# Patient Record
Sex: MALE | Race: ASIAN | NOT HISPANIC OR LATINO
[De-identification: names, ages, dates, MRNs, and addresses within clinical notes are randomized per-mention and may not be internally consistent; named-entity substitution may affect disease eponyms.]

---

## 2017-08-23 ENCOUNTER — APPOINTMENT (OUTPATIENT)
Dept: ORTHOPEDIC SURGERY | Facility: CLINIC | Age: 72
End: 2017-08-23
Payer: COMMERCIAL

## 2017-08-23 VITALS
WEIGHT: 184 LBS | HEIGHT: 65 IN | SYSTOLIC BLOOD PRESSURE: 107 MMHG | DIASTOLIC BLOOD PRESSURE: 72 MMHG | HEART RATE: 76 BPM | BODY MASS INDEX: 30.66 KG/M2

## 2017-08-23 DIAGNOSIS — M17.12 UNILATERAL PRIMARY OSTEOARTHRITIS, LEFT KNEE: ICD-10-CM

## 2017-08-23 PROCEDURE — 73564 X-RAY EXAM KNEE 4 OR MORE: CPT | Mod: LT

## 2017-08-23 PROCEDURE — 99204 OFFICE O/P NEW MOD 45 MIN: CPT | Mod: 25

## 2017-08-23 PROCEDURE — 20610 DRAIN/INJ JOINT/BURSA W/O US: CPT | Mod: LT

## 2018-02-19 ENCOUNTER — INPATIENT (INPATIENT)
Facility: HOSPITAL | Age: 73
LOS: 3 days | Discharge: HOME | DRG: 810 | End: 2018-02-23
Attending: FAMILY MEDICINE | Admitting: INTERNAL MEDICINE
Payer: COMMERCIAL

## 2018-02-19 VITALS
OXYGEN SATURATION: 97 % | WEIGHT: 180.78 LBS | SYSTOLIC BLOOD PRESSURE: 116 MMHG | HEIGHT: 65 IN | DIASTOLIC BLOOD PRESSURE: 67 MMHG | TEMPERATURE: 99 F | RESPIRATION RATE: 19 BRPM | HEART RATE: 95 BPM

## 2018-02-19 DIAGNOSIS — D64.9 ANEMIA, UNSPECIFIED: ICD-10-CM

## 2018-02-19 DIAGNOSIS — Z29.9 ENCOUNTER FOR PROPHYLACTIC MEASURES, UNSPECIFIED: ICD-10-CM

## 2018-02-19 DIAGNOSIS — R06.09 OTHER FORMS OF DYSPNEA: ICD-10-CM

## 2018-02-19 DIAGNOSIS — I10 ESSENTIAL (PRIMARY) HYPERTENSION: ICD-10-CM

## 2018-02-19 DIAGNOSIS — E11.9 TYPE 2 DIABETES MELLITUS WITHOUT COMPLICATIONS: ICD-10-CM

## 2018-02-19 LAB
ABO RH CONFIRMATION: SIGNIFICANT CHANGE UP
ALBUMIN SERPL ELPH-MCNC: 3.3 G/DL — SIGNIFICANT CHANGE UP (ref 3.3–5)
ALP SERPL-CCNC: 136 U/L — HIGH (ref 40–120)
ALT FLD-CCNC: 19 U/L — SIGNIFICANT CHANGE UP (ref 12–78)
ANION GAP SERPL CALC-SCNC: 7 MMOL/L — SIGNIFICANT CHANGE UP (ref 5–17)
APTT BLD: 29.9 SEC — SIGNIFICANT CHANGE UP (ref 27.5–37.4)
AST SERPL-CCNC: 22 U/L — SIGNIFICANT CHANGE UP (ref 15–37)
BASOPHILS NFR BLD AUTO: 2 % — SIGNIFICANT CHANGE UP (ref 0–2)
BILIRUB SERPL-MCNC: 1.9 MG/DL — HIGH (ref 0.2–1.2)
BUN SERPL-MCNC: 26 MG/DL — HIGH (ref 7–23)
CALCIUM SERPL-MCNC: 8.1 MG/DL — LOW (ref 8.5–10.1)
CHLORIDE SERPL-SCNC: 110 MMOL/L — HIGH (ref 96–108)
CK MB CFR SERPL CALC: 1.7 NG/ML — SIGNIFICANT CHANGE UP (ref 0–3.6)
CO2 SERPL-SCNC: 25 MMOL/L — SIGNIFICANT CHANGE UP (ref 22–31)
CREAT SERPL-MCNC: 1.5 MG/DL — HIGH (ref 0.5–1.3)
EOSINOPHIL NFR BLD AUTO: 8 % — HIGH (ref 0–6)
GLUCOSE SERPL-MCNC: 134 MG/DL — HIGH (ref 70–99)
HCT VFR BLD CALC: 17.8 % — CRITICAL LOW (ref 39–50)
HGB BLD-MCNC: 6.2 G/DL — CRITICAL LOW (ref 13–17)
INR BLD: 1.21 RATIO — HIGH (ref 0.88–1.16)
LIDOCAIN IGE QN: 167 U/L — SIGNIFICANT CHANGE UP (ref 73–393)
LYMPHOCYTES # BLD AUTO: 27 % — SIGNIFICANT CHANGE UP (ref 13–44)
MAGNESIUM SERPL-MCNC: 2.2 MG/DL — SIGNIFICANT CHANGE UP (ref 1.6–2.6)
MCHC RBC-ENTMCNC: 35 GM/DL — SIGNIFICANT CHANGE UP (ref 32–36)
MCHC RBC-ENTMCNC: 35.9 PG — HIGH (ref 27–34)
MCV RBC AUTO: 102.7 FL — HIGH (ref 80–100)
MONOCYTES NFR BLD AUTO: 8 % — SIGNIFICANT CHANGE UP (ref 1–9)
NEUTROPHILS NFR BLD AUTO: 42 % — LOW (ref 43–77)
NT-PROBNP SERPL-SCNC: 95 PG/ML — SIGNIFICANT CHANGE UP (ref 0–125)
OB PNL STL: NEGATIVE — SIGNIFICANT CHANGE UP
PLATELET # BLD AUTO: 191 K/UL — SIGNIFICANT CHANGE UP (ref 150–400)
POTASSIUM SERPL-MCNC: 3.8 MMOL/L — SIGNIFICANT CHANGE UP (ref 3.5–5.3)
POTASSIUM SERPL-SCNC: 3.8 MMOL/L — SIGNIFICANT CHANGE UP (ref 3.5–5.3)
PROT SERPL-MCNC: 6.4 G/DL — SIGNIFICANT CHANGE UP (ref 6–8.3)
PROTHROM AB SERPL-ACNC: 13.2 SEC — HIGH (ref 9.8–12.7)
RBC # BLD: 1.74 M/UL — LOW (ref 4.2–5.8)
RBC # FLD: 18.8 % — HIGH (ref 10.3–14.5)
SODIUM SERPL-SCNC: 142 MMOL/L — SIGNIFICANT CHANGE UP (ref 135–145)
TROPONIN I SERPL-MCNC: <.015 NG/ML — SIGNIFICANT CHANGE UP (ref 0.01–0.04)
WBC # BLD: 7.9 K/UL — SIGNIFICANT CHANGE UP (ref 3.8–10.5)
WBC # FLD AUTO: 7.9 K/UL — SIGNIFICANT CHANGE UP (ref 3.8–10.5)

## 2018-02-19 PROCEDURE — 71045 X-RAY EXAM CHEST 1 VIEW: CPT | Mod: 26

## 2018-02-19 PROCEDURE — 99223 1ST HOSP IP/OBS HIGH 75: CPT | Mod: AI,GC

## 2018-02-19 PROCEDURE — 99255 IP/OBS CONSLTJ NEW/EST HI 80: CPT

## 2018-02-19 PROCEDURE — 86077 PHYS BLOOD BANK SERV XMATCH: CPT

## 2018-02-19 PROCEDURE — 99285 EMERGENCY DEPT VISIT HI MDM: CPT

## 2018-02-19 PROCEDURE — 93010 ELECTROCARDIOGRAM REPORT: CPT

## 2018-02-19 RX ORDER — SODIUM CHLORIDE 9 MG/ML
1000 INJECTION, SOLUTION INTRAVENOUS
Qty: 0 | Refills: 0 | Status: DISCONTINUED | OUTPATIENT
Start: 2018-02-19 | End: 2018-02-20

## 2018-02-19 RX ORDER — GLUCAGON INJECTION, SOLUTION 0.5 MG/.1ML
1 INJECTION, SOLUTION SUBCUTANEOUS ONCE
Qty: 0 | Refills: 0 | Status: DISCONTINUED | OUTPATIENT
Start: 2018-02-19 | End: 2018-02-20

## 2018-02-19 RX ORDER — DEXTROSE 50 % IN WATER 50 %
25 SYRINGE (ML) INTRAVENOUS ONCE
Qty: 0 | Refills: 0 | Status: DISCONTINUED | OUTPATIENT
Start: 2018-02-19 | End: 2018-02-20

## 2018-02-19 RX ORDER — DEXTROSE 50 % IN WATER 50 %
1 SYRINGE (ML) INTRAVENOUS ONCE
Qty: 0 | Refills: 0 | Status: DISCONTINUED | OUTPATIENT
Start: 2018-02-19 | End: 2018-02-20

## 2018-02-19 RX ORDER — ACETAMINOPHEN 500 MG
650 TABLET ORAL ONCE
Qty: 0 | Refills: 0 | Status: DISCONTINUED | OUTPATIENT
Start: 2018-02-19 | End: 2018-02-23

## 2018-02-19 RX ORDER — DEXTROSE 50 % IN WATER 50 %
12.5 SYRINGE (ML) INTRAVENOUS ONCE
Qty: 0 | Refills: 0 | Status: DISCONTINUED | OUTPATIENT
Start: 2018-02-19 | End: 2018-02-20

## 2018-02-19 RX ORDER — PANTOPRAZOLE SODIUM 20 MG/1
40 TABLET, DELAYED RELEASE ORAL DAILY
Qty: 0 | Refills: 0 | Status: DISCONTINUED | OUTPATIENT
Start: 2018-02-19 | End: 2018-02-21

## 2018-02-19 RX ORDER — LOSARTAN POTASSIUM 100 MG/1
50 TABLET, FILM COATED ORAL DAILY
Qty: 0 | Refills: 0 | Status: DISCONTINUED | OUTPATIENT
Start: 2018-02-19 | End: 2018-02-19

## 2018-02-19 RX ORDER — ACETAMINOPHEN 500 MG
650 TABLET ORAL ONCE
Qty: 0 | Refills: 0 | Status: DISCONTINUED | OUTPATIENT
Start: 2018-02-19 | End: 2018-02-19

## 2018-02-19 RX ORDER — INSULIN LISPRO 100/ML
VIAL (ML) SUBCUTANEOUS
Qty: 0 | Refills: 0 | Status: DISCONTINUED | OUTPATIENT
Start: 2018-02-19 | End: 2018-02-20

## 2018-02-19 RX ORDER — DIPHENHYDRAMINE HCL 50 MG
25 CAPSULE ORAL DAILY
Qty: 0 | Refills: 0 | Status: DISCONTINUED | OUTPATIENT
Start: 2018-02-19 | End: 2018-02-23

## 2018-02-19 RX ADMIN — PANTOPRAZOLE SODIUM 40 MILLIGRAM(S): 20 TABLET, DELAYED RELEASE ORAL at 19:58

## 2018-02-19 NOTE — ED PROVIDER NOTE - CARE PLAN
Principal Discharge DX:	Dyspnea on exertion Principal Discharge DX:	Dyspnea on exertion  Secondary Diagnosis:	Anemia, unspecified type

## 2018-02-19 NOTE — H&P ADULT - NSHPPHYSICALEXAM_GEN_ALL_CORE
Vital Signs  T(C): 37 (19 Feb 2018 13:36), Max: 37 (19 Feb 2018 13:36)  T(F): 98.6 (19 Feb 2018 13:36), Max: 98.6 (19 Feb 2018 13:36)  HR: 95 (19 Feb 2018 13:36) (95 - 95)  BP: 116/67 (19 Feb 2018 13:36) (116/67 - 116/67)  RR: 19 (19 Feb 2018 13:36) (19 - 19)  SpO2: 97% (19 Feb 2018 13:36) (97% - 97%) Vital Signs  T(C): 37 (19 Feb 2018 13:36), Max: 37 (19 Feb 2018 13:36)  T(F): 98.6 (19 Feb 2018 13:36), Max: 98.6 (19 Feb 2018 13:36)  HR: 95 (19 Feb 2018 13:36) (95 - 95)  BP: 116/67 (19 Feb 2018 13:36) (116/67 - 116/67)  RR: 19 (19 Feb 2018 13:36) (19 - 19)  SpO2: 97% (19 Feb 2018 13:36) (97% - 97%)    Physical Exam:  General: Well developed, well nourished, No Acute Distress  HEENT: +Dry mucous membranes, Normocephallic Atraumatic, PERRLA, EOMI bl  Neck: Supple, nontender, no mass  Neurology: AA&Ox3, CN II-XII grossly intact, sensation intact  Respiratory: Clear To Auscultation B/L, No Wheezes, rhonchi or rales  CV: Regular Rate and Rhythm, +S1/S2, no murmurs, rubs or gallops  Abdominal: Soft, Non-Tender, Non-Distended +Bowel Soundsx4  Extremities: 1+ pitting edema B/L LE, No Clubbing, cyanosis, + peripheral pulses  Musculoskeletal: Normal Range of motion, no joint erythema or warmth, no joint swelling   Skin: +Multiple Old well healing bruises on back from "cupping" treatment, warm, dry, normal color

## 2018-02-19 NOTE — H&P ADULT - NSHPOUTPATIENTPROVIDERS_GEN_ALL_CORE
PMD: Dr. Camacho, Cardio Dr. Casillas PMD: Dr. Camacho, Cardio Dr. Casillas ([facility])

## 2018-02-19 NOTE — H&P ADULT - NSHPREVIEWOFSYSTEMS_GEN_ALL_CORE
Constitutional: Denies fever, chills, weight loss, weight gain, diaphoresis   HEENT: Denies sore throat, runny nose, photophobia, blurry vision, double vision, eye pain  Respiratory: +Shortness of breath, + dyspnea on exertion, Denies cough, sputum production, wheezing, hemoptysis  Cardiovascular: Denies chest pain, palpitations, edema  Gastrointestinal: Denies nausea, vomiting, diarrhea, constipation, abdominal pain, melena, hematochezia   Genitourinary: Denies dysuria, hematuria, frequency, urgency, incontinence  Skin/Breast: Denies rash, hives, itching  Musculoskeletal: Denies muscle pains, muscle weakness, joint pain or swelling  Neurologic: +weakness, dizziness, Denies syncope, loss of consciousness, headache, paresthesias, numbness, tingling, confusion, dementia   Psychiatric: Denies feeling anxious, depressed, suicidal, or homicidal thoughts  Endocrine: Denies cold or heat intolerance, polydipsia, polyphagia   Hematology/Oncology: Denies abnormal bruising, tender or enlarged lymph nodes   ROS negative except as noted above

## 2018-02-19 NOTE — H&P ADULT - PROBLEM SELECTOR PLAN 1
With elevated BUN and recent increased use of NSAIDs, likely upper GI bleed  -EGD Tomorrow with Dr. Forman  -f/u FOBT  -give 2 units PRBC  -f/u repeat H/H  -IV Protonix 40 BID  -NPO after midnight  -hold ASA 81 (no history of stent, for primary cardiac prevention) With elevated BUN and recent increased use of NSAIDs, likely upper GI bleed  -EGD Tomorrow with Dr. Forman  -f/u FOBT  -give 2 units PRBC  -f/u repeat H/H  -IV Protonix 40 daily  -NPO after midnight  -hold ASA 81 (no history of stent, for primary cardiac prevention)  -Out of bed to chair with assistance

## 2018-02-19 NOTE — H&P ADULT - HISTORY OF PRESENT ILLNESS
73M PMHx DM type 2, HTN presenting with .... worsening SOB and dyspnea on exertion x 2 weeks. Patient now unable to walk more than 15 minute without having to stop.     In the ED HR 95, T 98.6F, /67, SpO2 97%, RR 19. Hg 6.2, .7, BUN 26, Cr 1.5, total Bili 1.9, cardiac enzymes negative x1, proBNP within normal. 2 units PRBC ordered, CXR and EKG ordered..... GI (Dr. Forman) and Cardio (Dr. Marcelino) consulted. 73M PMHx DM type 2, HTN presenting with worsening SOB and dyspnea on exertion x 2 weeks. Patient now unable to walk more than 15 minute without having to stop. States he threw his back out 3 weeks ago, was taking 20mg Advil TID for 4 days, given muscle relaxer, and pain resolved. 2 weeks ago had 3 days of diarrhea, told it was gastroenteritis, denies blood, but unsure if dark in color. The dyspnea has gotten to the point where he cannot walk up 2 stairs without feeling tired, 1 month ago was able to run a 5k without difficulty.  Denies nausea, vomiting, loss of appetite, active bleeding, chest pain, palpitations.     In the ED HR 95, T 98.6F, /67, SpO2 97%, RR 19. Hg 6.2, .7, BUN 26, Cr 1.5, total Bili 1.9, cardiac enzymes negative x1, proBNP within normal. 2 units PRBC ordered, CXR prelim negative and EKG showing HR 88, normal sinus rhythm. GI (Dr. Forman) and Cardio (Dr. Marcelino) consulted. 73M PMHx DM type 2, HTN presenting with worsening SOB and dyspnea on exertion x 2 weeks. Patient now unable to walk more than 15 minute without having to stop. States he threw his back out 3 weeks ago, was taking 20mg Advil TID for 4 days, given muscle relaxer, and pain resolved. 2 weeks ago had 3 days of diarrhea, told it was gastroenteritis, denies blood, but unsure if dark in color. The dyspnea has gotten to the point where he cannot walk up 2 steps without feeling tired, 1 month ago was able to run a 5k without difficulty.  Denies nausea, vomiting, loss of appetite, active bleeding, chest pain, palpitations.     In the ED HR 95, T 98.6F, /67, SpO2 97%, RR 19. Hg 6.2, .7, BUN 26, Cr 1.5, total Bili 1.9, cardiac enzymes negative x1, proBNP within normal. 2 units PRBC ordered, CXR prelim negative and EKG showing HR 88, normal sinus rhythm. GI (Dr. Forman) and Cardio (Dr. Marcelino) consulted.

## 2018-02-19 NOTE — ED ADULT NURSE NOTE - NS ED NURSE REPORT GIVEN TO FT
Izzy BARNES on tele Izzy BARNES on tele who made aware that the pt has antibodies, blood will be coming from an outside blood bank.

## 2018-02-19 NOTE — H&P ADULT - PROBLEM SELECTOR PLAN 2
Likely 2/2 anemia (Hg 6.2)  -history of LE edema on Lasix, obtain echo (last echo 12/2016 per patient)  -Hold lasix in setting of acute blood loss  -f/u Cardio (Dr. Marcelino) consult Likely 2/2 anemia (Hg 6.2)  -history of LE edema on Lasix, obtain echo (last echo 12/2016 per patient)  -per patient had nuclear stress test 1 year ago, unremarkable  -Hold lasix in setting of acute blood loss  -f/u Cardio (Dr. Marcelino) consult Likely 2/2 anemia (Hg 6.2)  -history of LE edema on Lasix, obtain echo (last echo 12/2016 per patient)  -per patient had nuclear stress test 1 year ago, unremarkable  -Hold lasix in setting of IRMA, unknown LVF  -f/u Cardio (Dr. Marcelino) consult

## 2018-02-19 NOTE — CONSULT NOTE ADULT - ASSESSMENT
73M PMHx DM type 2, HTN, lower ext edema presenting with symptomatic anemia.   - He clearly is symptomatic from profound anemia.   - Tx PRBC to maintain H/H >8/24  - hold all antiplatelets or AC  - Will need urgent EGD. Currently no active cardiac conditions. No signs of ischemia, ADHF, clinical exam not consistent with severe stenotic valvular disease, no unstable arrhythmias noted. Therefore able to proceed with  EGD. Routine hemodynamic monitoring is suggested during the procedure.   -  IRMA. Hold lasix and losartan  - No signs of significant ischemia. EKG without sig ischemic changes  - trace edema is chronic. At some point can check a 2d echo  - Monitor and replete electrolytes. Keep K>4.0 and Mg>2.0.   - Further cardiac workup will depend on clinical course.   - All other workup per primary team. Will followup.

## 2018-02-19 NOTE — ED ADULT NURSE NOTE - ED STAT RN HANDOFF DETAILS
Spoke with the blood bank re blood, pt has antibodies, blood not given yet. Blood bank spoke with Dr. Hagan re: antibodies in blood.

## 2018-02-19 NOTE — H&P ADULT - PROBLEM SELECTOR PLAN 3
Controlled with diet and exercise  -when eating will give Carb consist DASH/TLC diet  -f/u HbA1c  -Accuchecks with low dose insulin sliding scale

## 2018-02-19 NOTE — H&P ADULT - NSHPSOCIALHISTORY_GEN_ALL_CORE
Social History:    Marital Status:   Occupation:   Lives with:     Substance Use :  Tobacco Usage:  (   ) never smoked   (   ) former smoker   (   ) current smoker  (     ) pack year  (        ) last tobacco use date  Alcohol Usage:      Health Management     For male:  Last prostate exam:          [  ] date:            (  ) findings      Immunization Hx:   (  ) flu shot                               (     ) date   (  ) pneumonia shot               (     ) date  (  ) tetanus                               (     ) date     (     ) Advanced Directives: (     ) declined   [  ] health care proxy Social History:    Marital Status:   Occupation:   Lives with: Wife    Health Management    For male:  Last prostate exam:    2017 ANNABELLE Within normal limits    Colonoscopy: 2014, unremarkable

## 2018-02-19 NOTE — ED PROVIDER NOTE - OBJECTIVE STATEMENT
74 yo male hx of DM, HTN c/o shortness of breath, dyspnea on exertion x 2 weeks, getting progressively worse.  [place] without having to stop to catch his breath. No chest pain.  PMD Dr. Camacho.  Cards Dr. Casillas.

## 2018-02-19 NOTE — H&P ADULT - ASSESSMENT
73M PMHx DM type 2, HTN presenting with worsening SOB and dyspnea admitted with acute symptomatic anemia, rule out GI Bleed.

## 2018-02-19 NOTE — H&P ADULT - PROBLEM SELECTOR PLAN 5
IMPROVE VTE Individual Risk Assessment          RISK                                                          Points  [  ] Previous VTE                                                3  [  ] Thrombophilia                                             2  [  ] Lower limb paralysis                                   2        (unable to hold up >15 seconds)    [  ] Current Cancer                                             2         (within 6 months)  [  ] Immobilization > 24 hrs                              1  [  ] ICU/CCU stay > 24 hours                             1  [ 1 ] Age > 60                                                         1    IMPROVE VTE Score: 1  In setting of anemia no pharmacologic anticoagulation, SCDs

## 2018-02-19 NOTE — ED ADULT NURSE NOTE - OBJECTIVE STATEMENT
Pt presents to the ED c/o SOB with exertion, denies chest pain. EKG done, pt placed on cardiac monitor

## 2018-02-19 NOTE — H&P ADULT - PROBLEM SELECTOR PLAN 4
-Continue Losartan with hold parameters -Continue Losartan with hold parameters, Hold for SBP<100 per Dr. Hagan -Hold Losartan and Lasix in setting of IRMA

## 2018-02-19 NOTE — H&P ADULT - ATTENDING COMMENTS
73M PMHx DM type 2, HTN presenting with worsening SOB and dyspnea admitted with acute symptomatic anemia, rule out GI Bleed.   Pt will receive 2 units of PRBCs, She has multiple antibodies and having cross reactions as per blood bank.  May give most compatible blood with caution and premedication. monitor H/H and NPO for EGD in AM. 73M PMHx DM type 2, HTN presenting with worsening SOB and dyspnea admitted with acute symptomatic anemia, rule out GI Bleed.   Pt will receive 2 units of PRBCs, She has multiple antibodies and having cross reactions as per blood bank.  May give most compatible blood with caution and premedication. monitor H/H and NPO for EGD in AM.  Dr. Camacho was informed about admission

## 2018-02-20 ENCOUNTER — TRANSCRIPTION ENCOUNTER (OUTPATIENT)
Age: 73
End: 2018-02-20

## 2018-02-20 ENCOUNTER — RESULT REVIEW (OUTPATIENT)
Age: 73
End: 2018-02-20

## 2018-02-20 DIAGNOSIS — N17.9 ACUTE KIDNEY FAILURE, UNSPECIFIED: ICD-10-CM

## 2018-02-20 LAB
ANION GAP SERPL CALC-SCNC: 8 MMOL/L — SIGNIFICANT CHANGE UP (ref 5–17)
BASOPHILS # BLD AUTO: 0.1 K/UL — SIGNIFICANT CHANGE UP (ref 0–0.2)
BASOPHILS NFR BLD AUTO: 1.3 % — SIGNIFICANT CHANGE UP (ref 0–2)
BUN SERPL-MCNC: 23 MG/DL — SIGNIFICANT CHANGE UP (ref 7–23)
CALCIUM SERPL-MCNC: 8.2 MG/DL — LOW (ref 8.5–10.1)
CHLORIDE SERPL-SCNC: 112 MMOL/L — HIGH (ref 96–108)
CO2 SERPL-SCNC: 23 MMOL/L — SIGNIFICANT CHANGE UP (ref 22–31)
CREAT SERPL-MCNC: 1.3 MG/DL — SIGNIFICANT CHANGE UP (ref 0.5–1.3)
EOSINOPHIL # BLD AUTO: 0.4 K/UL — SIGNIFICANT CHANGE UP (ref 0–0.5)
EOSINOPHIL NFR BLD AUTO: 5.8 % — SIGNIFICANT CHANGE UP (ref 0–6)
FERRITIN SERPL-MCNC: 1462 NG/ML — HIGH (ref 30–400)
GLUCOSE SERPL-MCNC: 106 MG/DL — HIGH (ref 70–99)
HAPTOGLOB SERPL-MCNC: <20 MG/DL — LOW (ref 34–200)
HBA1C BLD-MCNC: 5.3 % — SIGNIFICANT CHANGE UP (ref 4–5.6)
HCT VFR BLD CALC: 22.7 % — LOW (ref 39–50)
HCT VFR BLD CALC: 24 % — LOW (ref 39–50)
HGB BLD-MCNC: 7.9 G/DL — LOW (ref 13–17)
HGB BLD-MCNC: 8.2 G/DL — LOW (ref 13–17)
IRON SATN MFR SERPL: 43 % — SIGNIFICANT CHANGE UP (ref 16–55)
IRON SATN MFR SERPL: 96 UG/DL — SIGNIFICANT CHANGE UP (ref 45–165)
LDH SERPL L TO P-CCNC: 381 U/L — HIGH (ref 50–242)
LYMPHOCYTES # BLD AUTO: 2.2 K/UL — SIGNIFICANT CHANGE UP (ref 1–3.3)
LYMPHOCYTES # BLD AUTO: 29 % — SIGNIFICANT CHANGE UP (ref 13–44)
MCHC RBC-ENTMCNC: 35 GM/DL — SIGNIFICANT CHANGE UP (ref 32–36)
MCHC RBC-ENTMCNC: 35.3 PG — HIGH (ref 27–34)
MCV RBC AUTO: 100.9 FL — HIGH (ref 80–100)
MONOCYTES # BLD AUTO: 1.2 K/UL — HIGH (ref 0–0.9)
MONOCYTES NFR BLD AUTO: 15.8 % — HIGH (ref 1–9)
NEUTROPHILS # BLD AUTO: 3.7 K/UL — SIGNIFICANT CHANGE UP (ref 1.8–7.4)
NEUTROPHILS NFR BLD AUTO: 48.1 % — SIGNIFICANT CHANGE UP (ref 43–77)
PLATELET # BLD AUTO: 191 K/UL — SIGNIFICANT CHANGE UP (ref 150–400)
POTASSIUM SERPL-MCNC: 3.8 MMOL/L — SIGNIFICANT CHANGE UP (ref 3.5–5.3)
POTASSIUM SERPL-SCNC: 3.8 MMOL/L — SIGNIFICANT CHANGE UP (ref 3.5–5.3)
RBC # BLD: 2.25 M/UL — LOW (ref 4.2–5.8)
RBC # FLD: 17.9 % — HIGH (ref 10.3–14.5)
SODIUM SERPL-SCNC: 143 MMOL/L — SIGNIFICANT CHANGE UP (ref 135–145)
TIBC SERPL-MCNC: 222 UG/DL — SIGNIFICANT CHANGE UP (ref 220–430)
UIBC SERPL-MCNC: 126 UG/DL — SIGNIFICANT CHANGE UP (ref 110–370)
WBC # BLD: 7.7 K/UL — SIGNIFICANT CHANGE UP (ref 3.8–10.5)
WBC # FLD AUTO: 7.7 K/UL — SIGNIFICANT CHANGE UP (ref 3.8–10.5)

## 2018-02-20 PROCEDURE — 99233 SBSQ HOSP IP/OBS HIGH 50: CPT | Mod: GC

## 2018-02-20 PROCEDURE — 99232 SBSQ HOSP IP/OBS MODERATE 35: CPT

## 2018-02-20 PROCEDURE — 88312 SPECIAL STAINS GROUP 1: CPT | Mod: 26

## 2018-02-20 PROCEDURE — 93010 ELECTROCARDIOGRAM REPORT: CPT

## 2018-02-20 PROCEDURE — 88305 TISSUE EXAM BY PATHOLOGIST: CPT | Mod: 26

## 2018-02-20 RX ADMIN — PANTOPRAZOLE SODIUM 40 MILLIGRAM(S): 20 TABLET, DELAYED RELEASE ORAL at 11:56

## 2018-02-20 RX ADMIN — Medication 25 MILLIGRAM(S): at 00:42

## 2018-02-20 NOTE — PROGRESS NOTE ADULT - PROBLEM SELECTOR PLAN 4
PT has diet and exercise controlled DM, HBA1c 5.3  Continue Accuchecks with low dose insulin sliding scale

## 2018-02-20 NOTE — PROGRESS NOTE ADULT - ASSESSMENT
73M PMHx DM type 2, HTN and KIRTI presenting with worsening SOB and LLAMAS x 2 weeks, admitted for acute symptomatic anemia.   Pt currently stable with no signs of active severe GIB.

## 2018-02-20 NOTE — PROGRESS NOTE ADULT - PROBLEM SELECTOR PLAN 1
-s/p 2U PRBC  -moderate response  -endosopy today with Dasia  -cleared by cardio for endo  -medically optimized for endoscopy  -will check rpt H/H @1800  -1U PRBC ordered for transfusion if Hg <7 -s/p 2U PRBC possible r/o  gi bleed   -endosopy today with Dasia  -cleared by cardio for endo  -medically optimized for endoscopy  -will check rpt H/H @1800  -1U PRBC ordered for transfusion if Hg <7

## 2018-02-20 NOTE — PROGRESS NOTE ADULT - PROBLEM SELECTOR PLAN 1
Pt s/p 2 units prbcs  R/O GIB, pt for endoscopy with Dr. Forman, F/U any GI recs  Pt NPO after midnight  Hold ASA  Repeat CBC for H/H in am re-transfuse 1 unit PRBCs for HB < 7

## 2018-02-20 NOTE — CHART NOTE - NSCHARTNOTEFT_GEN_A_CORE
Called by RN to evaluate Pt experiencing intermittent chest discomfort.  Pt seen resting comfortably in bed, States he's had intermittent episodes of chest tightness lasting a few minutes.  Denies SOB, diaphoresis or numbness or tingling.  Pt states hes been thinking a lot about his condition and appears to be anxious.    General: Anxious  Neurology: A&Ox3, nonfocal   Respiratory: CTA B/L, No W/R/R  CV: RRR, +S1/S2, no murmurs, rubs or gallops  Abdominal: Soft, NT, ND +BSx4  Skin: warm, dry, normal color    A/P:  -Likely anxiety induced chest discomfort, R/O ACS  -Pt refusing anxiolytics  -EKG STAT shows NSR  -CE now and 6am  -RN to notify if any further changes Called by RN to evaluate Pt experiencing intermittent chest discomfort.  Pt seen resting comfortably in bed, States he's had intermittent episodes of chest tightness lasting a few minutes.  Denies SOB, diaphoresis or numbness or tingling.  Pt states hes been thinking a lot about his condition and appears to be anxious.    Vital Signs Last 24 Hrs  T(C): 37.1 (21 Feb 2018 00:11), Max: 37.2 (20 Feb 2018 20:03)  T(F): 98.8 (21 Feb 2018 00:11), Max: 98.9 (20 Feb 2018 20:03)  HR: 87 (21 Feb 2018 00:11) (68 - 88)  BP: 139/77 (21 Feb 2018 00:11) (97/59 - 139/77)  BP(mean): --  RR: 18 (21 Feb 2018 00:11) (12 - 20)  SpO2: 96% (21 Feb 2018 00:11) (96% - 100%)    General: Anxious  Neurology: A&Ox3, nonfocal   Respiratory: CTA B/L, No W/R/R  CV: RRR, +S1/S2, no murmurs, rubs or gallops  Abdominal: Soft, NT, ND +BSx4  Skin: warm, dry, normal color    A/P:  -Likely anxiety induced chest discomfort, R/O ACS  -Pt refusing anxiolytics  -EKG STAT shows NSR  -CE now and 6am  -RN to notify if any further changes

## 2018-02-20 NOTE — PROVIDER CONTACT NOTE (EICU) - BACKGROUND
72yo who presented with 2 wk history of LLAMAS on workup found to be anemic with Hbg 6.  Stable hemodynamics but needs ICU level of care for frequent vital sign check due to risk of transfusion reaction due to + blood antibodies.

## 2018-02-20 NOTE — PROGRESS NOTE ADULT - SUBJECTIVE AND OBJECTIVE BOX
DIONICIO SULLIVAN  73y  Male      Patient is a 73y old  Male who presents with a chief complaint of SOB, palpitations and fatigue. He presented to the the ED and found to have Hb of 6.2.    INTERVAL HPI/OVERNIGHT EVENTS: PT stable overnight, sinus rhythm on telemetry, S/P 2 units PRBCs.         REVIEW OF SYSTEMS:  CONSTITUTIONAL: Denies fever, no dizziness, admits to fatigue  EYES: No visual disturbances  ENMT:  No difficulty hearing   RESPIRATORY: No cough, No current shortness of breath  CARDIOVASCULAR: Denies chest pain, no current palpitations, no dizziness, has stable leg swelling  GASTROINTESTINAL: No abdominal or epigastric pain. No nausea, vomiting, or hematemesis; Had diarrhea last week 2/2 food poisoning had 2 days of loose watery stools. Denies h/o constipation. Does not pay attention to color of stools but denies noticing any melena or hematochezia.  GENITOURINARY: No dysuria or frequency or hematuria  NEUROLOGICAL: No headaches   MUSCULOSKELETAL: Knee pain B/L pt has OA, no current muscle, back, or extremity pain  HEME/LYMPH: No easy bruising, or bleeding gums      T(C): 36.7 (02-20-18 @ 07:48), Max: 37.2 (02-19-18 @ 23:38)  HR: 71 (02-20-18 @ 07:48) (68 - 95)  BP: 97/59 (02-20-18 @ 07:48) (97/59 - 129/62)  RR: 19 (02-20-18 @ 07:48) (12 - 19)  SpO2: 98% (02-20-18 @ 07:48) (97% - 100%)  Wt(kg): --Vital Signs Last 24 Hrs  T(C): 36.7 (20 Feb 2018 07:48), Max: 37.2 (19 Feb 2018 23:38)  T(F): 98 (20 Feb 2018 07:48), Max: 98.9 (19 Feb 2018 23:38)  HR: 71 (20 Feb 2018 07:48) (68 - 95)  BP: 97/59 (20 Feb 2018 07:48) (97/59 - 129/62)  BP(mean): --  RR: 19 (20 Feb 2018 07:48) (12 - 19)  SpO2: 98% (20 Feb 2018 07:48) (97% - 100%)    PHYSICAL EXAM:  GENERAL: NAD, well-groomed, well-developed  HEAD:  Atraumatic, Normocephalic  EYES: EOMI, PERRLA, conjunctiva and sclera clear  ENMT: Moist mucous membranes, Good dentition, No lesions  NECK: Supple, No JVD  NERVOUS SYSTEM:  Alert & Oriented X4, Good concentration   CHEST/LUNG: Clear to auscultation bilaterally; No rales, rhonchi, wheezing, or rubs  HEART: Regular rate and rhythm +S1/S2; No murmurs, rubs, or gallops  ABDOMEN: Soft, Nontender, Nondistended; Bowel sounds present  EXTREMITIES:  2+ Peripheral Pulses, No clubbing, cyanosis, or edema  SKIN: No rashes or lesions noted on face, UE or LE    LABS:                        7.9    7.7   )-----------( 191      ( 20 Feb 2018 07:10 )             22.7     02-20    143  |  112<H>  |  23  ----------------------------<  106<H>  3.8   |  23  |  1.30    Ca    8.2<L>      20 Feb 2018 07:10  Mg     2.2     02-19    TPro  6.4  /  Alb  3.3  /  TBili  1.9<H>  /  DBili  x   /  AST  22  /  ALT  19  /  AlkPhos  136<H>  02-19    PT/INR - ( 19 Feb 2018 14:36 )   PT: 13.2 sec;   INR: 1.21 ratio         PTT - ( 19 Feb 2018 14:36 )  PTT:29.9 sec    POCT Blood Glucose.: 120 mg/dL (20 Feb 2018 06:01)  POCT Blood Glucose.: 127 mg/dL (19 Feb 2018 19:02)

## 2018-02-20 NOTE — PROGRESS NOTE ADULT - SUBJECTIVE AND OBJECTIVE BOX
Resident Progress Note    Patient is a 73y old  Male who presents with a chief complaint of SOB (19 Feb 2018 15:12)      HPI: Patient seen and examined at bedside. No acute events overnight. s/p 2U PRBC. with some response (6.2-->7.9). Awaiting endoscopy today.     ROS:  Constitutional: denies fever, chills, diaphoresis   HEENT: denies blurry vision, difficulty hearing  Respiratory: denies SOB, LLAMAS, cough, sputum production, wheezing, hemoptysis  Cardiovascular: denies chest pain, palpitations, edema  Gastrointestinal: denies nausea, vomiting, diarrhea, constipation, abdominal pain, melena, hematochezia   Genitourinary: denies dysuria, frequency, urgency, hematuria   Skin/Breast: denies rash, itching  Musculoskeletal: denies myalgias, joint swelling, muscle weakness  Neurologic: denies headache, weakness, dizziness, paresthesias, numbness/tingling  Psychiatric: denies feeling anxious, depressed, suicidal, homicidal thoughts  Hematology/Oncology: denies bruising, tender or enlarged lymph nodes   ROS negative except as noted above    Vital Signs Last 24 Hrs  T(C): 36.7 (02-20-18 @ 07:48), Max: 37.2 (02-19-18 @ 23:38)  T(F): 98 (02-20-18 @ 07:48), Max: 98.9 (02-19-18 @ 23:38)  HR: 71 (02-20-18 @ 07:48) (68 - 95)  BP: 97/59 (02-20-18 @ 07:48) (97/59 - 129/62)  BP(mean): --  RR: 19 (02-20-18 @ 07:48) (12 - 19)  SpO2: 98% (02-20-18 @ 07:48) (97% - 100%)    Physical Exam:  General: Well developed, well nourished, NAD  HEENT: NCAT, PERRLA, EOMI bl, moist mucous membranes   Neck: Supple, nontender, no mass  Neurology: A&Ox3, nonfocal, CN II-XII grossly intact, sensation intact, no gait abnormalities   Respiratory: CTA B/L, No W/R/R  CV: RRR, +S1/S2, no murmurs, rubs or gallops  Abdominal: Soft, NT, ND +BSx4  Extremities: No C/C/E, + peripheral pulses  MSK: Normal ROM, no joint erythema or warmth, no joint swelling   Skin: warm, dry, normal color, no rash or abnormal lesions    LABS:                        7.9    7.7   )-----------( 191      ( 20 Feb 2018 07:10 )             22.7     20 Feb 2018 07:10    143    |  112    |  23     ----------------------------<  106    3.8     |  23     |  1.30     Ca    8.2        20 Feb 2018 07:10  Mg     2.2       19 Feb 2018 14:36    TPro  6.4    /  Alb  3.3    /  TBili  1.9    /  DBili  x      /  AST  22     /  ALT  19     /  AlkPhos  136    19 Feb 2018 14:36    PT/INR - ( 19 Feb 2018 14:36 )   PT: 13.2 sec;   INR: 1.21 ratio         PTT - ( 19 Feb 2018 14:36 )  PTT:29.9 sec  CARDIAC MARKERS ( 19 Feb 2018 14:36 )  <.015 ng/mL / x     / x     / x     / 1.7 ng/mL        CAPILLARY BLOOD GLUCOSE      POCT Blood Glucose.: 120 mg/dL (20 Feb 2018 06:01)  POCT Blood Glucose.: 127 mg/dL (19 Feb 2018 19:02)        RADIOLOGY & ADDITIONAL TESTS:    MEDICATIONS  (STANDING):  pantoprazole  Injectable 40 milliGRAM(s) IV Push daily    MEDICATIONS  (PRN):  acetaminophen   Tablet. 650 milliGRAM(s) Oral once PRN Mild Pain (1 - 3)  diphenhydrAMINE   Capsule 25 milliGRAM(s) Oral daily PRN Rash and/or Itching      Allergies    sulfa drugs (Unknown)    Intolerances Resident Progress Note    Patient is a 73y old  Male who presents with a chief complaint of SOB (19 Feb 2018 15:12)      HPI:73M PMHx DM type 2, HTN presenting with worsening SOB and dyspnea on exertion x 2 weeks. Patient now unable to walk more than 15 minute without having to stop. States he threw his back out 3 weeks ago, was taking 20mg Advil TID for 4 days, given muscle relaxer, and pain resolved. 2 weeks ago had 3 days of diarrhea, told it was gastroenteritis, denies blood, but unsure if dark in color. The dyspnea has gotten to the point where he cannot walk up 2 steps without feeling tired, 1 month ago was able to run a 5k without difficulty.  Denies nausea, vomiting, loss of appetite, active bleeding, chest pain, palpitations. admitted with symptomatic anemia     Patient seen and examined at bedside. No acute events overnight. s/p 2U PRBC. with some response (6.2-->7.9). Awaiting endoscopy today.     ROS:  Constitutional: denies fever, chills, diaphoresis   HEENT: denies blurry vision, difficulty hearing  Respiratory: denies SOB, cough, sputum production, wheezing, hemoptysis  Cardiovascular: denies chest pain, palpitations, edema  Gastrointestinal: denies nausea, vomiting, diarrhea, constipation, abdominal pain, melena, hematochezia   Genitourinary: denies dysuria, frequency,  Skin/Breast: denies rash, itching  Musculoskeletal: denies myalgias, joint swelling, muscle weakness  Neurologic: denies headache, weakness, dizziness, paresthesias, numbness    ROS negative except as noted above    Vital Signs Last 24 Hrs  T(C): 36.7 (02-20-18 @ 07:48), Max: 37.2 (02-19-18 @ 23:38)  T(F): 98 (02-20-18 @ 07:48), Max: 98.9 (02-19-18 @ 23:38)  HR: 71 (02-20-18 @ 07:48) (68 - 95)  BP: 97/59 (02-20-18 @ 07:48) (97/59 - 129/62)  BP(mean): --  RR: 19 (02-20-18 @ 07:48) (12 - 19)  SpO2: 98% (02-20-18 @ 07:48) (97% - 100%)    Physical Exam:  General: Well developed, well nourished, NAD  HEENT: NCAT, PERRLA, EOMI bl, moist mucous membranes   Neck: Supple, nontender, no mass  Neurology: A&Ox3, nonfocal, CN II-XII grossly intact, sensation intact, no gait abnormalities   Respiratory: CTA B/L, No W/R/R  CV: RRR, +S1/S2, no murmurs,    Abdominal: Soft, NT, ND +BSx4   Extremities: No C/C/E, + peripheral pulses  MSK: Normal ROM, no joint erythema or warmth, no joint swelling   Skin: warm, dry, normal color, no rash or abnormal lesions    LABS:                        7.9    7.7   )-----------( 191      ( 20 Feb 2018 07:10 )             22.7     20 Feb 2018 07:10    143    |  112    |  23     ----------------------------<  106    3.8     |  23     |  1.30     Ca    8.2        20 Feb 2018 07:10  Mg     2.2       19 Feb 2018 14:36    TPro  6.4    /  Alb  3.3    /  TBili  1.9    /  DBili  x      /  AST  22     /  ALT  19     /  AlkPhos  136    19 Feb 2018 14:36    PT/INR - ( 19 Feb 2018 14:36 )   PT: 13.2 sec;   INR: 1.21 ratio         PTT - ( 19 Feb 2018 14:36 )  PTT:29.9 sec  CARDIAC MARKERS ( 19 Feb 2018 14:36 )  <.015 ng/mL / x     / x     / x     / 1.7 ng/mL        CAPILLARY BLOOD GLUCOSE      POCT Blood Glucose.: 120 mg/dL (20 Feb 2018 06:01)  POCT Blood Glucose.: 127 mg/dL (19 Feb 2018 19:02)        RADIOLOGY & ADDITIONAL TESTS:    MEDICATIONS  (STANDING):  pantoprazole  Injectable 40 milliGRAM(s) IV Push daily    MEDICATIONS  (PRN):  acetaminophen   Tablet. 650 milliGRAM(s) Oral once PRN Mild Pain (1 - 3)  diphenhydrAMINE   Capsule 25 milliGRAM(s) Oral daily PRN Rash and/or Itching

## 2018-02-20 NOTE — PROGRESS NOTE ADULT - PROBLEM SELECTOR PLAN 5
Pt prior cardiac workup was negative, f/u cardio recommendations (Dr. Marcelino)     Pt should be encouraged to get OOB and into chair, and ambulate to use restroom if tolerated and not experiencing any symptoms such as SOB, LLAMAS, fatigue or lightheadedness

## 2018-02-20 NOTE — PROVIDER CONTACT NOTE (EICU) - ASSESSMENT
Labs/history reviewed.   Pt with progressive dyspnea over past few weeks with Macrocytic anemia on labs, and guaic negative stools.  Type and screen + for warm autoantibodies, Matthew +

## 2018-02-20 NOTE — PROGRESS NOTE ADULT - SUBJECTIVE AND OBJECTIVE BOX
[facility] - Cori Dawkins, Dahlia, Travis, Kyle, Ryland Ludmilalatoya  Office Number:  [phone]    Patient resting comfortably in bed in NAD.  Laying flat with no respiratory distress.  No complaints of chest pain, dyspnea, palpitations, PND, or orthopnea.  He is on nasal Bipap    ROS: negative unless otherwise mentioned.    Telemetry:      MEDICATIONS  (STANDING):  pantoprazole  Injectable 40 milliGRAM(s) IV Push daily    MEDICATIONS  (PRN):  acetaminophen   Tablet. 650 milliGRAM(s) Oral once PRN Mild Pain (1 - 3)  diphenhydrAMINE   Capsule 25 milliGRAM(s) Oral daily PRN Rash and/or Itching      Allergies    sulfa drugs (Unknown)    Intolerances        Vital Signs Last 24 Hrs  T(C): 36.7 (20 Feb 2018 07:48), Max: 37.2 (19 Feb 2018 23:38)  T(F): 98 (20 Feb 2018 07:48), Max: 98.9 (19 Feb 2018 23:38)  HR: 71 (20 Feb 2018 07:48) (68 - 95)  BP: 97/59 (20 Feb 2018 07:48) (97/59 - 129/62)  BP(mean): --  RR: 19 (20 Feb 2018 07:48) (12 - 19)  SpO2: 98% (20 Feb 2018 07:48) (97% - 100%)    I&O's Summary    19 Feb 2018 07:01  -  20 Feb 2018 07:00  --------------------------------------------------------  IN: 580 mL / OUT: 350 mL / NET: 230 mL        ON EXAM:    Constitutional: NAD, awake and alert, well-developed  HEENT: Moist Mucous Membranes, Anicteric  Pulmonary: Non-labored, breath sounds are clear bilaterally, No wheezing, rales or rhonchi  Cardiovascular: Regular, S1 and S2, No murmurs, rubs, gallops or clicks  Gastrointestinal: Bowel Sounds present, soft, nontender.   Lymph: trace peripheral edema. No lymphadenopathy.  Skin: No visible rashes or ulcers.  Psych:  Mood & affect appropriate    LABS: All Labs Reviewed:                        7.9    7.7   )-----------( 191      ( 20 Feb 2018 07:10 )             22.7                         6.2    7.9   )-----------( 191      ( 19 Feb 2018 14:36 )             17.8     20 Feb 2018 07:10    143    |  112    |  23     ----------------------------<  106    3.8     |  23     |  1.30   19 Feb 2018 14:36    142    |  110    |  26     ----------------------------<  134    3.8     |  25     |  1.50     Ca    8.2        20 Feb 2018 07:10  Ca    8.1        19 Feb 2018 14:36  Mg     2.2       19 Feb 2018 14:36    TPro  6.4    /  Alb  3.3    /  TBili  1.9    /  DBili  x      /  AST  22     /  ALT  19     /  AlkPhos  136    19 Feb 2018 14:36    PT/INR - ( 19 Feb 2018 14:36 )   PT: 13.2 sec;   INR: 1.21 ratio         PTT - ( 19 Feb 2018 14:36 )  PTT:29.9 sec  CARDIAC MARKERS ( 19 Feb 2018 14:36 )  <.015 ng/mL / x     / x     / x     / 1.7 ng/mL      Blood Culture:   02-19 @ 14:36  Pro Bnp 95

## 2018-02-20 NOTE — PROGRESS NOTE ADULT - ASSESSMENT
73M PMHx DM type 2, HTN, lower ext edema presenting with symptomatic anemia.   - He clearly is symptomatic from profound anemia.   - Tx PRBC to maintain H/H >8/24  - hold all antiplatelets or AC  - Will need EGD. Currently no active cardiac conditions. No signs of ischemia, ADHF, clinical exam not consistent with severe stenotic valvular disease, no unstable arrhythmias noted. Therefore able to proceed with  EGD. Routine hemodynamic monitoring is suggested during the procedure.   -  IRMA. Hold lasix and losartan  - No signs of significant ischemia. EKG without sig ischemic changes  - trace edema is chronic. At some point can check a 2d echo  - Monitor and replete electrolytes. Keep K>4.0 and Mg>2.0.   - Further cardiac workup will depend on clinical course.   - All other workup per primary team. Will followup.

## 2018-02-20 NOTE — PROGRESS NOTE ADULT - ASSESSMENT
73M PMHx DM type 2, HTN presenting with worsening SOB and dyspnea admitted with acute symptomatic anemia requiring transfusion; awaiting endoscopy to rule out GIB.

## 2018-02-20 NOTE — PROVIDER CONTACT NOTE (EICU) - RECOMMENDATIONS
Admitting to ICU for management of symptomatic anemia and close monitoring during transfusion.  Consider possibility of autoimmune hemolytic anemia (AIHA)  would check LDH, Haptoglobin, ferritin level.   Bili level low there is a possibility pt has extravascular hemolysis -- check for hypersplenism  Consider Hematology evaluation

## 2018-02-20 NOTE — PROGRESS NOTE ADULT - PROBLEM SELECTOR PLAN 3
-chronic  -hold home lasix, losartan, ASA in setting of borderline BPs and ?GIB -chronic  -hold home lasix, losartan,  as bp stable off meds

## 2018-02-21 DIAGNOSIS — Z71.89 OTHER SPECIFIED COUNSELING: ICD-10-CM

## 2018-02-21 LAB
CK MB BLD-MCNC: 3 % — SIGNIFICANT CHANGE UP (ref 0–3.5)
CK MB BLD-MCNC: 3.6 % — HIGH (ref 0–3.5)
CK MB CFR SERPL CALC: 0.9 NG/ML — SIGNIFICANT CHANGE UP (ref 0–3.6)
CK MB CFR SERPL CALC: 0.9 NG/ML — SIGNIFICANT CHANGE UP (ref 0–3.6)
CK SERPL-CCNC: 25 U/L — LOW (ref 26–308)
CK SERPL-CCNC: 30 U/L — SIGNIFICANT CHANGE UP (ref 26–308)
DIR ANTIGLOB POLYSPECIFIC INTERPRETATION: ABNORMAL
TROPONIN I SERPL-MCNC: <.015 NG/ML — SIGNIFICANT CHANGE UP (ref 0.01–0.04)
TROPONIN I SERPL-MCNC: <.015 NG/ML — SIGNIFICANT CHANGE UP (ref 0.01–0.04)

## 2018-02-21 PROCEDURE — 93306 TTE W/DOPPLER COMPLETE: CPT | Mod: 26

## 2018-02-21 PROCEDURE — 99233 SBSQ HOSP IP/OBS HIGH 50: CPT

## 2018-02-21 PROCEDURE — 99233 SBSQ HOSP IP/OBS HIGH 50: CPT | Mod: GC

## 2018-02-21 RX ORDER — FOLIC ACID 0.8 MG
1 TABLET ORAL DAILY
Qty: 0 | Refills: 0 | Status: DISCONTINUED | OUTPATIENT
Start: 2018-02-21 | End: 2018-02-23

## 2018-02-21 RX ORDER — PANTOPRAZOLE SODIUM 20 MG/1
40 TABLET, DELAYED RELEASE ORAL
Qty: 0 | Refills: 0 | Status: DISCONTINUED | OUTPATIENT
Start: 2018-02-21 | End: 2018-02-23

## 2018-02-21 RX ORDER — SODIUM CHLORIDE 9 MG/ML
1000 INJECTION INTRAMUSCULAR; INTRAVENOUS; SUBCUTANEOUS
Qty: 0 | Refills: 0 | Status: DISCONTINUED | OUTPATIENT
Start: 2018-02-21 | End: 2018-02-23

## 2018-02-21 RX ADMIN — Medication 60 MILLIGRAM(S): at 14:56

## 2018-02-21 RX ADMIN — PANTOPRAZOLE SODIUM 40 MILLIGRAM(S): 20 TABLET, DELAYED RELEASE ORAL at 11:23

## 2018-02-21 RX ADMIN — Medication 60 MILLIGRAM(S): at 21:33

## 2018-02-21 RX ADMIN — Medication 1 MILLIGRAM(S): at 14:56

## 2018-02-21 RX ADMIN — SODIUM CHLORIDE 100 MILLILITER(S): 9 INJECTION INTRAMUSCULAR; INTRAVENOUS; SUBCUTANEOUS at 20:33

## 2018-02-21 NOTE — CONSULT NOTE ADULT - SUBJECTIVE AND OBJECTIVE BOX
[facility]  Kj Doll PA-C  [address] [place] [address] [phone]      Chief Complaint:  Patient is a 73y old  Male who presents with a chief complaint of SOB (19 Feb 2018 15:12)      HPI:    73M PMHx DM type 2, HTN presenting with worsening SOB and dyspnea on exertion x 2 weeks. Patient now unable to walk more than 15 minute without having to stop. States he threw his back out 3 weeks ago, was taking 20mg Advil TID for 4 days, given muscle relaxer, and pain resolved. 2 weeks ago had 3 days of diarrhea, told it was gastroenteritis, denies blood, but unsure if dark in color. The dyspnea has gotten to the point where he cannot walk up 2 stairs without feeling tired, 1 month ago was able to run a 5k without difficulty.  Denies nausea, vomiting, loss of appetite, active bleeding, chest pain, palpitations.     In the ED HR 95, T 98.6F, /67, SpO2 97%, RR 19. Hg 6.2, .7, BUN 26, Cr 1.5, total Bili 1.9, cardiac enzymes negative x1, proBNP within normal. 2 units PRBC ordered, CXR prelim negative and EKG showing HR 88, normal sinus rhythm.   Allergies:  sulfa drugs (Unknown)      Medications:  dextrose 5%. 1000 milliLiter(s) IV Continuous <Continuous>  dextrose 50% Injectable 12.5 Gram(s) IV Push once  dextrose 50% Injectable 25 Gram(s) IV Push once  dextrose 50% Injectable 25 Gram(s) IV Push once  dextrose Gel 1 Dose(s) Oral once PRN  glucagon  Injectable 1 milliGRAM(s) IntraMuscular once PRN  insulin lispro (HumaLOG) corrective regimen sliding scale   SubCutaneous three times a day before meals  losartan 50 milliGRAM(s) Oral daily  pantoprazole  Injectable 40 milliGRAM(s) IV Push daily      PMHX/PSHX:  Diabetes  HTN (hypertension)  No significant past surgical history      Family history:  No pertinent family history in first degree relatives      Social History:     ROS:     General:  No wt loss, fevers, chills, night sweats, fatigue,   Eyes:  Good vision, no reported pain  ENT:  No sore throat, pain, runny nose, dysphagia  CV:  No pain, palpitations, hypo/hypertension  Resp:  No dyspnea, cough, tachypnea, wheezing  GI:  No pain, No nausea, No vomiting, No diarrhea, No constipation, No weight loss, No fever, No pruritis, No rectal bleeding, + tarry stools, No dysphagia,  :  No pain, bleeding, incontinence, nocturia  Muscle:  No pain, weakness  Neuro:  No weakness, tingling, memory problems  Psych:  No fatigue, insomnia, mood problems, depression  Endocrine:  No polyuria, polydipsia, cold/heat intolerance  Heme:  No petechiae, ecchymosis, easy bruisability  Skin:  No rash, tattoos, scars, edema      PHYSICAL EXAM:   Vital Signs:  Vital Signs Last 24 Hrs  T(C): 37 (19 Feb 2018 13:36), Max: 37 (19 Feb 2018 13:36)  T(F): 98.6 (19 Feb 2018 13:36), Max: 98.6 (19 Feb 2018 13:36)  HR: 88 (19 Feb 2018 14:30) (88 - 95)  BP: 104/48 (19 Feb 2018 14:30) (104/48 - 116/67)  BP(mean): --  RR: 16 (19 Feb 2018 14:30) (16 - 19)  SpO2: 99% (19 Feb 2018 14:30) (97% - 99%)  Daily Height in cm: 165.1 (19 Feb 2018 13:36)    Daily     GENERAL:  Appears stated age, well-groomed, well-nourished, no distress  HEENT:  NC/AT,  conjunctivae clear and pink, no thyromegaly, nodules, adenopathy, no JVD, sclera -anicteric  CHEST:  Full & symmetric excursion, no increased effort, breath sounds clear  HEART:  Regular rhythm, S1, S2, no murmur/rub/S3/S4, no abdominal bruit, no edema  ABDOMEN:  Soft, non-tender, non-distended, normoactive bowel sounds,  no masses ,no hepato-splenomegaly, no signs of chronic liver disease  EXTEREMITIES:  no cyanosis,clubbing or edema  SKIN:  No rash/erythema/ecchymoses/petechiae/wounds/abscess/warm/dry  NEURO:  Alert, oriented, no asterixis, no tremor, no encephalopathy    LABS:                        6.2    7.9   )-----------( 191      ( 19 Feb 2018 14:36 )             17.8     02-19    142  |  110<H>  |  26<H>  ----------------------------<  134<H>  3.8   |  25  |  1.50<H>    Ca    8.1<L>      19 Feb 2018 14:36  Mg     2.2     02-19    TPro  6.4  /  Alb  3.3  /  TBili  1.9<H>  /  DBili  x   /  AST  22  /  ALT  19  /  AlkPhos  136<H>  02-19    LIVER FUNCTIONS - ( 19 Feb 2018 14:36 )  Alb: 3.3 g/dL / Pro: 6.4 g/dL / ALK PHOS: 136 U/L / ALT: 19 U/L / AST: 22 U/L / GGT: x           PT/INR - ( 19 Feb 2018 14:36 )   PT: 13.2 sec;   INR: 1.21 ratio         PTT - ( 19 Feb 2018 14:36 )  PTT:29.9 sec    Amylase Serum--      Lipase [id number]       Ammonia--      Imaging:
CHIEF COMPLAINT: Patient is a 73y old  Male who presents with a chief complaint of SOB (19 Feb 2018 15:12)      HPI:  73M PMHx DM type 2, HTN, lower ext edema presenting with worsening SOB and dyspnea on exertion x 2 weeks. Patient now unable to walk more than 15 minute without having to stop. States he threw his back out 3 weeks ago, was taking 20mg Advil TID for 4 days, given muscle relaxer, and pain resolved. 2 weeks ago had 3 days of diarrhea, told it was gastroenteritis, denies blood, but unsure if dark in color. The dyspnea has gotten to the point where he cannot walk up 2 stairs without feeling tired, 1 month ago was able to run a 5k without difficulty.  Denies nausea, vomiting, loss of appetite, active bleeding, chest pain, palpitations.     In the ED HR 95, T 98.6F, /67, SpO2 97%, RR 19. Hg 6.2, .7, BUN 26, Cr 1.5, total Bili 1.9, cardiac enzymes negative x1, proBNP within normal. 2 units PRBC ordered,        EKG: SR    REVIEW OF SYSTEMS:   All other review of systems are negative unless indicated above    PAST MEDICAL & SURGICAL HISTORY:  Diabetes  HTN (hypertension)  No significant past surgical history      SOCIAL HISTORY:  No tobacco, ethanol, or drug abuse.    FAMILY HISTORY:  No pertinent family history in first degree relatives    No family history of acute MI or sudden cardiac death.    MEDICATIONS  (STANDING):  dextrose 5%. 1000 milliLiter(s) (50 mL/Hr) IV Continuous <Continuous>  dextrose 50% Injectable 12.5 Gram(s) IV Push once  dextrose 50% Injectable 25 Gram(s) IV Push once  dextrose 50% Injectable 25 Gram(s) IV Push once  insulin lispro (HumaLOG) corrective regimen sliding scale   SubCutaneous three times a day before meals  losartan 50 milliGRAM(s) Oral daily  pantoprazole  Injectable 40 milliGRAM(s) IV Push daily    MEDICATIONS  (PRN):  dextrose Gel 1 Dose(s) Oral once PRN Blood Glucose LESS THAN 70 milliGRAM(s)/deciliter  glucagon  Injectable 1 milliGRAM(s) IntraMuscular once PRN Glucose LESS THAN 70 milligrams/deciliter      Allergies    sulfa drugs (Unknown)    Intolerances        Home meds:  Home Medications:  aspirin 81 mg oral delayed release tablet: 1 tab(s) orally once a day (19 Feb 2018 15:09)  furosemide 40 mg oral tablet: 1 tab(s) orally once a day (19 Feb 2018 15:09)  losartan 50 mg oral tablet: 1 tab(s) orally once a day (19 Feb 2018 15:09)        VITAL SIGNS:   Vital Signs Last 24 Hrs  T(C): 37 (19 Feb 2018 13:36), Max: 37 (19 Feb 2018 13:36)  T(F): 98.6 (19 Feb 2018 13:36), Max: 98.6 (19 Feb 2018 13:36)  HR: 88 (19 Feb 2018 17:18) (88 - 95)  BP: 121/56 (19 Feb 2018 17:18) (104/48 - 121/56)  BP(mean): --  RR: 16 (19 Feb 2018 17:18) (16 - 19)  SpO2: 100% (19 Feb 2018 17:18) (97% - 100%)    I&O's Summary      On Exam:      Constitutional: NAD, awake and alert, well-developed  HEENT: Moist Mucous Membranes, Anicteric  Pulmonary: Non-labored, breath sounds are clear bilaterally, No wheezing, rales or rhonchi  Cardiovascular: Regular, S1 and S2, No murmurs, rubs, gallops or clicks  Gastrointestinal: Bowel Sounds present, soft, nontender.   Lymph: trace peripheral edema. No lymphadenopathy.  Skin: No visible rashes or ulcers.  Psych:  Mood & affect appropriate    LABS: All Labs Reviewed:                        6.2    7.9   )-----------( 191      ( 19 Feb 2018 14:36 )             17.8     19 Feb 2018 14:36    142    |  110    |  26     ----------------------------<  134    3.8     |  25     |  1.50     Ca    8.1        19 Feb 2018 14:36  Mg     2.2       19 Feb 2018 14:36    TPro  6.4    /  Alb  3.3    /  TBili  1.9    /  DBili  x      /  AST  22     /  ALT  19     /  AlkPhos  136    19 Feb 2018 14:36    PT/INR - ( 19 Feb 2018 14:36 )   PT: 13.2 sec;   INR: 1.21 ratio         PTT - ( 19 Feb 2018 14:36 )  PTT:29.9 sec  CARDIAC MARKERS ( 19 Feb 2018 14:36 )  <.015 ng/mL / x     / x     / x     / 1.7 ng/mL      Blood Culture:   02-19 @ 14:36  Pro Bnp 95        RADIOLOGY:  < from: Xray Chest 1 View AP/PA (02.19.18 @ 14:43) >    EXAM:  XR CHEST AP OR PA 1V                            PROCEDURE DATE:  02/19/2018          INTERPRETATION:  Chest 1 view    HISTORY: Shortness of breath    Radiographic examination shows the heart to be normal in size. The lungs   are clear and show no bleb formation. There is no evidence of focal   infiltrate, pneumothorax or pleural effusion.    IMPRESSION: No active pulmonary disease.    Thank you for this referral.                MARGARITA SESAY M.D., ATTENDING RADIOLOGIST  This document has been electronically signed. Feb 19 2018  4:00PM                < end of copied text >
Patient is a 73y old  Male who presents with a chief complaint of SOB (19 Feb 2018 15:12)      HPI:  73M PMHx DM type 2, HTN presenting with worsening SOB and dyspnea on exertion x 2 weeks. Patient now unable to walk more than 15 minute without having to stop. States he threw his back out 3 weeks ago, was taking 20mg Advil TID for 4 days, given muscle relaxer, and pain resolved. 2 weeks ago had 3 days of diarrhea, told it was gastroenteritis, denies blood, but unsure if dark in color. The dyspnea has gotten to the point where he cannot walk up 2 steps without feeling tired, 1 month ago was able to run a 5k without difficulty.  Denies nausea, vomiting, loss of appetite, active bleeding, chest pain, palpitations.     In the ED HR 95, T 98.6F, /67, SpO2 97%, RR 19. Hg 6.2, .7, BUN 26, Cr 1.5, total Bili 1.9, cardiac enzymes negative x1, proBNP within normal. 2 units PRBC ordered, CXR prelim negative and EKG showing HR 88, normal sinus rhythm.     GI (Dr. Forman) s/p EGD ; gastritis   Cardio (Dr. Marcelino) consulted           ROS:    CONSTITUTIONAL: No fever, weight loss, + 1 weeks  fatigue, since stomach flu  EYES: No eye pain, visual disturbances, or discharge  ENMT:  No difficulty hearing, tinnitus, vertigo; No sinus or throat pain  NECK: No pain or stiffness  BREASTS: No pain, masses, or nipple discharge  RESPIRATORY: No cough, wheezing, chills or hemoptysis; +shortness of breath  CARDIOVASCULAR: No chest pain, palpitations, dizziness, or leg swelling, no decreased exercise tolerance  GASTROINTESTINAL: No abdominal or epigastric pain. No nausea, vomiting, or hematemesis;+  diarrhea , now resolved , no blood  or constipation. No melena or hematochezia.  GENITOURINARY: No dysuria, frequency, hematuria, or incontinence  NEUROLOGICAL: No headaches, memory loss, loss of strength, numbness, or tremors  SKIN: No itching, burning, rashes, or lesions   LYMPH NODES: No enlargement or tenderness noted  ENDOCRINE: No heat or cold intolerance; No hair loss  MUSCULOSKELETAL: No muscle or back pain  PSYCHIATRIC: No depression, anxiety, mood swings, or difficulty sleeping  HEME/LYMPH: No easy bruising, or bleeding gums  ALLERGY AND IMMUNOLOGIC: No hives or eczema      PAST MEDICAL & SURGICAL HISTORY:  Diabetes  HTN (hypertension)  No significant past surgical history      SOCIAL HISTORY:  non smoker   no ETOH    FAMILY HISTORY:  No pertinent family history in first degree relatives    no FH of bleeding no h/o malignancies   MEDICATIONS  (STANDING):  folic acid 1 milliGRAM(s) Oral daily  methylPREDNISolone sodium succinate Injectable 60 milliGRAM(s) IV Push daily  pantoprazole    Tablet 40 milliGRAM(s) Oral before breakfast  sodium chloride 0.9%. 1000 milliLiter(s) (100 mL/Hr) IV Continuous <Continuous>    MEDICATIONS  (PRN):  acetaminophen   Tablet. 650 milliGRAM(s) Oral once PRN Mild Pain (1 - 3)  diphenhydrAMINE   Capsule 25 milliGRAM(s) Oral daily PRN Rash and/or Itching      Allergies    sulfa drugs (Unknown)    Intolerances        Vital Signs Last 24 Hrs  T(C): 37.1 (21 Feb 2018 15:30), Max: 37.2 (20 Feb 2018 20:03)  T(F): 98.7 (21 Feb 2018 15:30), Max: 98.9 (20 Feb 2018 20:03)  HR: 78 (21 Feb 2018 15:30) (74 - 87)  BP: 112/67 (21 Feb 2018 15:30) (102/65 - 139/77)  BP(mean): --  RR: 17 (21 Feb 2018 15:30) (15 - 20)  SpO2: 97% (21 Feb 2018 15:30) (96% - 98%)    PHYSICAL EXAM  General: adult in NAD  HEENT: clear oropharynx, anicteric sclera, pink conjunctivae  Neck: supple  CV: normal S1S2 with no murmur rubs or gallops  Lungs: clear to auscultation, no wheezes, no rhales  Abdomen: soft non-tender non-distended, no hepato/splenomegaly  Ext: no clubbing cyanosis or edema  Skin: no rashes and no petichiae  Neuro: alert and oriented X3 no focal deficits      LABS:    CBC Full  -  ( 21 Feb 2018 11:32 )  WBC Count : 8.5 K/uL  Hemoglobin : 8.4 g/dL  Hematocrit : 24.0 %  Platelet Count - Automated : 202 K/uL  Mean Cell Volume : 101.6 fl  Mean Cell Hemoglobin : 35.7 pg  Mean Cell Hemoglobin Concentration : 35.1 gm/dL  Auto Neutrophil # : x  Auto Lymphocyte # : x  Auto Monocyte # : x  Auto Eosinophil # : x  Auto Basophil # : x  Auto Neutrophil % : x  Auto Lymphocyte % : x  Auto Monocyte % : x  Auto Eosinophil % : x  Auto Basophil % : x    02-21    142  |  110<H>  |  24<H>  ----------------------------<  125<H>  4.2   |  23  |  1.50<H>    Ca    8.2<L>      21 Feb 2018 07:07            BLOOD SMEAR INTERPRETATION:  RBC: macrocytic , normochromic with poikiloanizocytosis no significant rouleaux  WBC: adequate in number , normal morphology, increased eosinophils   plt: adequate in number no clumps , +giant/ large plt     RADIOLOGY & ADDITIONAL STUDIES:  < from: Xray Chest 1 View AP/PA (02.19.18 @ 14:43) >  HISTORY: Shortness of breath    Radiographic examination shows the heart to be normal in size. The lungs   are clear and show no bleb formation. There is no evidence of focal   infiltrate, pneumothorax or pleural effusion.    IMPRESSION: No active pulmonary disease.    < end of copied text >

## 2018-02-21 NOTE — PROGRESS NOTE ADULT - SUBJECTIVE AND OBJECTIVE BOX
HPI:  73M PMHx DM type 2, HTN presenting with worsening SOB and dyspnea on exertion x 2 weeks. Patient now unable to walk more than 15 minute without having to stop. States he threw his back out 3 weeks ago, was taking 20mg Advil TID for 4 days, given muscle relaxer, and pain resolved. 2 weeks ago had 3 days of diarrhea, told it was gastroenteritis, denies blood, but unsure if dark in color. The dyspnea has gotten to the point where he cannot walk up 2 steps without feeling tired, 1 month ago was able to run a 5k without difficulty.  Denies nausea, vomiting, loss of appetite, active bleeding, chest pain, palpitations.     In the ED HR 95, T 98.6F, /67, SpO2 97%, RR 19. Hg 6.2, .7, BUN 26, Cr 1.5, total Bili 1.9, cardiac enzymes negative x1, proBNP within normal. 2 units PRBC ordered, CXR prelim negative and EKG showing HR 88, normal sinus rhythm. GI (Dr. Forman) and Cardio (Dr. Marcelino) consulted. (19 Feb 2018 15:12)      SUBJECTIVE: Pt. resting comfortably in bed in NAD,  with no respiratory distress, no c/o chest pain, dyspnea, palpitations, PND, or orthopnea, daughter at bedside       OBJECTIVE:  Review Of Systems:  Constitutional: [ ] Fever [ ] Chills [ ] Fatigue [ ] Weight change   HEENT: [ ] Blurred vision [ ] Eye Pain [ ] Headache [ ] Runny nose [ ] Sore Throat   Respiratory: [ ] Cough [ ] Wheezing [ ] Shortness of breath  Cardiovascular: [ ] Chest Pain [ ] Palpitations [ ] LLAMAS [ ] PND [ ] Orthopnea  Gastrointestinal: [ ] Abdominal Pain [ ] Diarrhea [ ] Constipation [ ] Hemorrhoids [ ] Nausea [ ] Vomiting  Genitourinary: [ ] Nocturia [ ] Dysuria [ ] Incontinence  Extremities: [ ] Swelling [ ] Joint Pain  Neurologic: [ ] Focal deficit [ ] Paresthesias [ ] Syncope  Lymphatic: [ ] Swelling [ ] Lymphadenopathy   Skin: [ ] Rash [ ] Ecchymoses [ ] Wounds [ ] Lesions  Psychiatry: [ ] Depression [ ] Suicidal/Homicidal Ideation [ ] Anxiety [ ] Sleep Disturbances  [X ] 10 point review of systems is otherwise negative except as mentioned above              [ ]Unable to obtain    Allergy:  Allergies    sulfa drugs (Unknown)    Intolerances        Medications:  MEDICATIONS  (STANDING):  pantoprazole  Injectable 40 milliGRAM(s) IV Push daily    MEDICATIONS  (PRN):  acetaminophen   Tablet. 650 milliGRAM(s) Oral once PRN Mild Pain (1 - 3)  diphenhydrAMINE   Capsule 25 milliGRAM(s) Oral daily PRN Rash and/or Itching      PMH/PSH/FH/SH: [ ] Unchanged    Vitals:  T(C): 36.9 (02-21-18 @ 07:20), Max: 37.2 (02-20-18 @ 20:03)  HR: 74 (02-21-18 @ 07:20) (74 - 87)  BP: 102/65 (02-21-18 @ 07:20) (102/65 - 139/77)  BP(mean): --  RR: 15 (02-21-18 @ 07:20) (15 - 20)  SpO2: 98% (02-21-18 @ 07:20) (96% - 98%)  Wt(kg): --  Daily     Daily   I&O's Summary      Labs:                        8.4    8.5   )-----------( 202      ( 21 Feb 2018 11:32 )             24.0     02-21    142  |  110<H>  |  24<H>  ----------------------------<  125<H>  4.2   |  23  |  1.50<H>    Ca    8.2<L>      21 Feb 2018 07:07  Mg     2.2     02-19    TPro  6.4  /  Alb  3.3  /  TBili  1.9<H>  /  DBili  x   /  AST  22  /  ALT  19  /  AlkPhos  136<H>  02-19    PT/INR - ( 19 Feb 2018 14:36 )   PT: 13.2 sec;   INR: 1.21 ratio         PTT - ( 19 Feb 2018 14:36 )  PTT:29.9 sec  CARDIAC MARKERS ( 21 Feb 2018 07:07 )  <.015 ng/mL / x     / 25 U/L / x     / 0.9 ng/mL  CARDIAC MARKERS ( 21 Feb 2018 00:21 )  <.015 ng/mL / x     / 30 U/L / x     / 0.9 ng/mL  CARDIAC MARKERS ( 19 Feb 2018 14:36 )  <.015 ng/mL / x     / x     / x     / 1.7 ng/mL        ECG:    Echo:    Stress Testing:     Cath:    Imaging:    Interpretation of Telemetry:  NSR @75       Physical Exam:  Appearance: [ ] Normal  [ ] abnormal [X ] NAD   Eyes: [ ] PERRL [ ] EOMI  HENT: [ ] Normal [ ] Abnormal oral mucosa [ ]NC/AT  Cardiovascular: [X ] S1 [X ] S2 [ ] RRR [ ] m/r/g [ ]edema [ ] JVP  Procedural Access Site: [ ]  hematoma [ ] tender to palpation [ ] 2+ pulse [ ] bruit [ ] Ecchymosis  Respiratory: [X ] Clear to auscultation bilaterally  Gastrointestinal: [ ] Soft [ ] tenderness[ ] distension [ ] BS  Musculoskeletal: [ ] clubbing [ ] joint deformity   Neurologic: [ ] Non-focal  Lymphatic: [ ] lymphadenopathy  Psychiatry: [X ] AAOx3  [ ] confused [ ] disoriented [ ] Mood & affect appropriate  Skin: [ ]  rashes [ ] ecchymoses [ ] cyanosis

## 2018-02-21 NOTE — PROGRESS NOTE ADULT - SUBJECTIVE AND OBJECTIVE BOX
The patient was interviewed and evaluated  73y Male    T(C): 36.9 (02-21-18 @ 07:20), Max: 37.2 (02-20-18 @ 20:03)  HR: 74 (02-21-18 @ 07:20) (74 - 87)  BP: 102/65 (02-21-18 @ 07:20) (102/65 - 139/77)  RR: 15 (02-21-18 @ 07:20) (15 - 20)  SpO2: 98% (02-21-18 @ 07:20) (96% - 98%)  Wt(kg): --    Pt seen, doing well, no anesthesia complications or complaints noted or reported.   No Nausea    All questions answered    No additional recommendations.

## 2018-02-21 NOTE — CONSULT NOTE ADULT - ASSESSMENT
74 y/o man presented with sever anemia.  3 weeks ago pt ''pulled muscle on the back and was taking advil and muscle relaxant.  Patient reports 2 weeks h/o weaknesses , SOB after he developed non bloody watery  diarrhea.      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

## 2018-02-21 NOTE — PROGRESS NOTE ADULT - PROBLEM SELECTOR PLAN 1
-s/p 2U PRBC possible ruled out   gi bleed   -endoscopy  with Sideridis no ulcer     -medically optimized for endoscopy    -1U PRBC ordered for transfusion if Hg <7

## 2018-02-21 NOTE — PROGRESS NOTE ADULT - ASSESSMENT
73M PMHx DM type 2, HTN presenting with worsening SOB and dyspnea admitted with acute symptomatic anemia requiring transfusion; awaiting endoscopy only gastritis pt does have out pt colonoscopy done  no need to repeat , possible this time cause of symptomatic anemia is hemolytic anemia.

## 2018-02-21 NOTE — PROGRESS NOTE ADULT - SUBJECTIVE AND OBJECTIVE BOX
Patient is a 73y old  Male who presents with a chief complaint of SOB (19 Feb 2018 15:12)      HPI:  73M PMHx DM type 2, HTN presenting with worsening SOB and dyspnea on exertion x 2 weeks. Patient now unable to walk more than 15 minute without having to stop. States he threw his back out 3 weeks ago, was taking 20mg Advil TID for 4 days, given muscle relaxer, and pain resolved. 2 weeks ago had 3 days of diarrhea, told it was gastroenteritis, denies blood, but unsure if dark in color. The dyspnea has gotten to the point where he cannot walk up 2 steps without feeling tired, 1 month ago was able to run a 5k without difficulty.  Denies nausea, vomiting, loss of appetite, active bleeding, chest pain, palpitations.  admitted with symptomatic  anemia hemolytic  pt seen and examine today pt state sob  still with exertion , no fever , no distress , tolerating po.     ROS:  Constitutional: denies fever, chills, diaphoresis   HEENT: denies blurry vision, difficulty hearing  Respiratory: denies SOB, cough, sputum production, wheezing, hemoptysis  Cardiovascular: denies chest pain, palpitations, edema  Gastrointestinal: denies nausea, vomiting, diarrhea, constipation, abdominal pain, melena, hematochezia   Genitourinary: denies dysuria, frequency,  Skin/Breast: denies rash, itching  Musculoskeletal: denies myalgias, joint swelling, muscle weakness  Neurologic: denies headache, weakness, dizziness, paresthesias, numbness    ROS negative except as noted above    INTERVAL HPI/OVERNIGHT EVENTS:  T(C): 36.9 (02-21-18 @ 07:20), Max: 37.2 (02-20-18 @ 20:03)  HR: 74 (02-21-18 @ 07:20) (74 - 87)  BP: 102/65 (02-21-18 @ 07:20) (102/65 - 139/77)  RR: 15 (02-21-18 @ 07:20) (15 - 20)  SpO2: 98% (02-21-18 @ 07:20) (96% - 98%)  Wt(kg): --  I&O's Summary      MEDICATIONS  (STANDING):  pantoprazole    Tablet 40 milliGRAM(s) Oral before breakfast  sodium chloride 0.9%. 1000 milliLiter(s) (100 mL/Hr) IV Continuous <Continuous>    MEDICATIONS  (PRN):  acetaminophen   Tablet. 650 milliGRAM(s) Oral once PRN Mild Pain (1 - 3)  diphenhydrAMINE   Capsule 25 milliGRAM(s) Oral daily PRN Rash and/or Itching      LABS:                        8.4    8.5   )-----------( 202      ( 21 Feb 2018 11:32 )             24.0     02-21    142  |  110<H>  |  24<H>  ----------------------------<  125<H>  4.2   |  23  |  1.50<H>    Ca    8.2<L>      21 Feb 2018 07:07  Mg     2.2     02-19    TPro  6.4  /  Alb  3.3  /  TBili  1.9<H>  /  DBili  x   /  AST  22  /  ALT  19  /  AlkPhos  136<H>  02-19    PT/INR - ( 19 Feb 2018 14:36 )   PT: 13.2 sec;   INR: 1.21 ratio         PTT - ( 19 Feb 2018 14:36 )  PTT:29.9 sec    CAPILLARY BLOOD GLUCOSE      POCT Blood Glucose.: 200 mg/dL (21 Feb 2018 12:06)  POCT Blood Glucose.: 138 mg/dL (21 Feb 2018 07:27)  POCT Blood Glucose.: 199 mg/dL (20 Feb 2018 21:07)  POCT Blood Glucose.: 183 mg/dL (20 Feb 2018 16:37)              RADIOLOGY & ADDITIONAL TESTS:    Imaging Personally Reviewed:     no new test   Advance Directives:    full code

## 2018-02-21 NOTE — PROGRESS NOTE ADULT - PROBLEM SELECTOR PLAN 1
s/p EGD with gastritis, f/u biopsy  ? hemolysis based on labs, heme eval recommended  monitor h/h daily, transfuse prn

## 2018-02-21 NOTE — PROGRESS NOTE ADULT - SUBJECTIVE AND OBJECTIVE BOX
Interval Events: s/p EGD with gastritis, f/u biopsy  No new overnight event.  No N/V/D.  Tolerating diet.    HPI:73M PMHx DM type 2, HTN presenting with worsening SOB and dyspnea on exertion x 2 weeks. Patient now unable to walk more than 15 minute without having to stop. States he threw his back out 3 weeks ago, was taking 20mg Advil TID for 4 days, given muscle relaxer, and pain resolved. 2 weeks ago had 3 days of diarrhea, told it was gastroenteritis, denies blood, but unsure if dark in color. The dyspnea has gotten to the point where he cannot walk up 2 stairs without feeling tired, 1 month ago was able to run a 5k without difficulty.  Denies nausea, vomiting, loss of appetite, active bleeding, chest pain, palpitations.     In the ED HR 95, T 98.6F, /67, SpO2 97%, RR 19. Hg 6.2, .7, BUN 26, Cr 1.5, total Bili 1.9, cardiac enzymes negative x1, proBNP within normal. 2 units PRBC ordered, CXR prelim negative and EKG showing HR 88, normal sinus rhythm.     MEDICATIONS  (STANDING):  folic acid 1 milliGRAM(s) Oral daily  methylPREDNISolone sodium succinate Injectable 60 milliGRAM(s) IV Push daily  pantoprazole    Tablet 40 milliGRAM(s) Oral before breakfast  sodium chloride 0.9%. 1000 milliLiter(s) (100 mL/Hr) IV Continuous <Continuous>    MEDICATIONS  (PRN):  acetaminophen   Tablet. 650 milliGRAM(s) Oral once PRN Mild Pain (1 - 3)  diphenhydrAMINE   Capsule 25 milliGRAM(s) Oral daily PRN Rash and/or Itching      Allergies    sulfa drugs (Unknown)    Intolerances        Review of Systems:    General:  No wt loss, fevers, chills, night sweats,fatigue,   Eyes:  Good vision, no reported pain  ENT:  No sore throat, pain, runny nose, dysphagia  CV:  No pain, palpitations, hypo/hypertension  Resp:  No dyspnea, cough, tachypnea, wheezing  GI:  No pain, No nausea, No vomiting, No diarrhea, No constipation, No weight loss, No fever, No pruritis, No rectal bleeding, No melena, No dysphagia  :  No pain, bleeding, incontinence, nocturia  Muscle:  No pain, weakness  Neuro:  No weakness, tingling, memory problems  Psych:  No fatigue, insomnia, mood problems, depression  Endocrine:  No polyuria, polydypsia, cold/heat intolerance  Heme:  No petechiae, ecchymosis, easy bruisability  Skin:  No rash, tattoos, scars, edema      Vital Signs Last 24 Hrs  T(C): 37.1 (21 Feb 2018 15:30), Max: 37.2 (20 Feb 2018 20:03)  T(F): 98.7 (21 Feb 2018 15:30), Max: 98.9 (20 Feb 2018 20:03)  HR: 78 (21 Feb 2018 15:30) (74 - 87)  BP: 112/67 (21 Feb 2018 15:30) (102/65 - 139/77)  BP(mean): --  RR: 17 (21 Feb 2018 15:30) (15 - 20)  SpO2: 97% (21 Feb 2018 15:30) (96% - 98%)    PHYSICAL EXAM:    Constitutional: NAD, well-developed  HEENT: EOMI, throat clear  Neck: No LAD, supple  Respiratory: CTA and P  Cardiovascular: S1 and S2, RRR, no M  Gastrointestinal: BS+, soft, NT/ND, neg HSM,  Extremities: No peripheral edema, neg clubing, cyanosis  Vascular: 2+ peripheral pulses  Neurological: A/O x 3, no focal deficits  Psychiatric: Normal mood, normal affect  Skin: No rashes      LABS:                        8.4    8.5   )-----------( 202      ( 21 Feb 2018 11:32 )             24.0     02-21    142  |  110<H>  |  24<H>  ----------------------------<  125<H>  4.2   |  23  |  1.50<H>    Ca    8.2<L>      21 Feb 2018 07:07            RADIOLOGY & ADDITIONAL TESTS:

## 2018-02-21 NOTE — PROGRESS NOTE ADULT - ASSESSMENT
73M PMHx DM type 2, HTN, lower ext edema presenting with symptomatic anemia.  Last night pt. c/o chest tightness EKG NSR@ 83BPM no acute changes noted     - He clearly is symptomatic from profound anemia.   - No signs of significant ischemia. EKG without sig ischemic changes  - Pt. has antibodies will give steroids   - Tx PRBC to maintain H/H >8/24  - Continue to hold all antiplatelets or AC  - Will need EGD. Currently no active cardiac conditions. No signs of ischemia, ADHF, clinical exam not consistent with severe stenotic valvular disease, no unstable arrhythmias noted. Therefore able to proceed with  EGD. Routine hemodynamic monitoring is suggested during the procedure.   -  IRMA. Creatinine 1.5 continue to hold lasix and losartan  - Trace edema is chronic. At some point can check a 2d echo  - Monitor and replete electrolytes. Keep K>4.0 and Mg>2.0.   - Further cardiac workup will depend on clinical course.   - All other workup per primary team. Will followup. 73M PMHx DM type 2, HTN, lower ext edema presenting with symptomatic anemia.  Last night pt. c/o chest tightness EKG NSR@ 83BPM no acute changes noted     - He clearly is symptomatic from profound anemia.   - He had nonanginal chest pain last night which was self limiting. No signs of significant ischemia. EKG without sig ischemic changes  - Pt. has antibodies will give steroids   - Tx PRBC to maintain H/H >8/24  - Continue to hold all antiplatelets or AC  -  IRMA. Creatinine 1.5 continue to hold lasix and losartan  - Check orthostatics.   - Trace edema is chronic.    - can check a 2d echo  - Monitor and replete electrolytes. Keep K>4.0 and Mg>2.0.   - Further cardiac workup will depend on clinical course.   - All other workup per primary team. Will followup.

## 2018-02-22 ENCOUNTER — TRANSCRIPTION ENCOUNTER (OUTPATIENT)
Age: 73
End: 2018-02-22

## 2018-02-22 DIAGNOSIS — R73.9 HYPERGLYCEMIA, UNSPECIFIED: ICD-10-CM

## 2018-02-22 LAB
ALBUMIN SERPL ELPH-MCNC: 3.4 G/DL — SIGNIFICANT CHANGE UP (ref 3.3–5)
ALP SERPL-CCNC: 141 U/L — HIGH (ref 40–120)
ALT FLD-CCNC: 17 U/L — SIGNIFICANT CHANGE UP (ref 12–78)
ANION GAP SERPL CALC-SCNC: 7 MMOL/L — SIGNIFICANT CHANGE UP (ref 5–17)
AST SERPL-CCNC: 21 U/L — SIGNIFICANT CHANGE UP (ref 15–37)
BILIRUB DIRECT SERPL-MCNC: 0.6 MG/DL — HIGH (ref 0.05–0.2)
BILIRUB INDIRECT FLD-MCNC: 1.1 MG/DL — HIGH (ref 0.2–1)
BILIRUB SERPL-MCNC: 1.7 MG/DL — HIGH (ref 0.2–1.2)
BUN SERPL-MCNC: 25 MG/DL — HIGH (ref 7–23)
CALCIUM SERPL-MCNC: 8.2 MG/DL — LOW (ref 8.5–10.1)
CHLORIDE SERPL-SCNC: 112 MMOL/L — HIGH (ref 96–108)
CO2 SERPL-SCNC: 22 MMOL/L — SIGNIFICANT CHANGE UP (ref 22–31)
CREAT SERPL-MCNC: 1.3 MG/DL — SIGNIFICANT CHANGE UP (ref 0.5–1.3)
FOLATE SERPL-MCNC: >20 NG/ML — SIGNIFICANT CHANGE UP (ref 4.8–24.2)
GLUCOSE SERPL-MCNC: 173 MG/DL — HIGH (ref 70–99)
HAPTOGLOB SERPL-MCNC: <20 MG/DL — LOW (ref 34–200)
HCT VFR BLD CALC: 22.8 % — LOW (ref 39–50)
HGB BLD-MCNC: 8.2 G/DL — LOW (ref 13–17)
LDH SERPL L TO P-CCNC: 365 U/L — HIGH (ref 50–242)
MCHC RBC-ENTMCNC: 36 GM/DL — SIGNIFICANT CHANGE UP (ref 32–36)
MCHC RBC-ENTMCNC: 36 PG — HIGH (ref 27–34)
MCV RBC AUTO: 100.1 FL — HIGH (ref 80–100)
PLATELET # BLD AUTO: 189 K/UL — SIGNIFICANT CHANGE UP (ref 150–400)
POTASSIUM SERPL-MCNC: 3.9 MMOL/L — SIGNIFICANT CHANGE UP (ref 3.5–5.3)
POTASSIUM SERPL-SCNC: 3.9 MMOL/L — SIGNIFICANT CHANGE UP (ref 3.5–5.3)
PROT SERPL-MCNC: 6.6 G/DL — SIGNIFICANT CHANGE UP (ref 6–8.3)
RBC # BLD: 2.27 M/UL — LOW (ref 4.2–5.8)
RBC # BLD: 2.27 M/UL — LOW (ref 4.2–5.8)
RBC # FLD: 18.3 % — HIGH (ref 10.3–14.5)
RETICS #: 184.6 K/UL — HIGH (ref 25–125)
RETICS/RBC NFR: 7.8 % — HIGH (ref 0.5–2.5)
SODIUM SERPL-SCNC: 141 MMOL/L — SIGNIFICANT CHANGE UP (ref 135–145)
VIT B12 SERPL-MCNC: 998 PG/ML — SIGNIFICANT CHANGE UP (ref 232–1245)
WBC # BLD: 10.5 K/UL — SIGNIFICANT CHANGE UP (ref 3.8–10.5)
WBC # FLD AUTO: 10.5 K/UL — SIGNIFICANT CHANGE UP (ref 3.8–10.5)

## 2018-02-22 PROCEDURE — 99232 SBSQ HOSP IP/OBS MODERATE 35: CPT

## 2018-02-22 PROCEDURE — 99233 SBSQ HOSP IP/OBS HIGH 50: CPT | Mod: GC

## 2018-02-22 RX ORDER — SODIUM CHLORIDE 9 MG/ML
1000 INJECTION, SOLUTION INTRAVENOUS
Qty: 0 | Refills: 0 | Status: DISCONTINUED | OUTPATIENT
Start: 2018-02-22 | End: 2018-02-23

## 2018-02-22 RX ORDER — DEXTROSE 50 % IN WATER 50 %
12.5 SYRINGE (ML) INTRAVENOUS ONCE
Qty: 0 | Refills: 0 | Status: DISCONTINUED | OUTPATIENT
Start: 2018-02-22 | End: 2018-02-23

## 2018-02-22 RX ORDER — INSULIN LISPRO 100/ML
VIAL (ML) SUBCUTANEOUS AT BEDTIME
Qty: 0 | Refills: 0 | Status: DISCONTINUED | OUTPATIENT
Start: 2018-02-22 | End: 2018-02-23

## 2018-02-22 RX ORDER — DEXTROSE 50 % IN WATER 50 %
25 SYRINGE (ML) INTRAVENOUS ONCE
Qty: 0 | Refills: 0 | Status: DISCONTINUED | OUTPATIENT
Start: 2018-02-22 | End: 2018-02-23

## 2018-02-22 RX ORDER — DEXTROSE 50 % IN WATER 50 %
1 SYRINGE (ML) INTRAVENOUS ONCE
Qty: 0 | Refills: 0 | Status: DISCONTINUED | OUTPATIENT
Start: 2018-02-22 | End: 2018-02-23

## 2018-02-22 RX ORDER — INSULIN LISPRO 100/ML
VIAL (ML) SUBCUTANEOUS
Qty: 0 | Refills: 0 | Status: DISCONTINUED | OUTPATIENT
Start: 2018-02-22 | End: 2018-02-23

## 2018-02-22 RX ORDER — GLUCAGON INJECTION, SOLUTION 0.5 MG/.1ML
1 INJECTION, SOLUTION SUBCUTANEOUS ONCE
Qty: 0 | Refills: 0 | Status: DISCONTINUED | OUTPATIENT
Start: 2018-02-22 | End: 2018-02-23

## 2018-02-22 RX ADMIN — Medication 3: at 11:57

## 2018-02-22 RX ADMIN — PANTOPRAZOLE SODIUM 40 MILLIGRAM(S): 20 TABLET, DELAYED RELEASE ORAL at 05:51

## 2018-02-22 RX ADMIN — Medication 1 MILLIGRAM(S): at 11:53

## 2018-02-22 RX ADMIN — Medication 3: at 17:37

## 2018-02-22 RX ADMIN — Medication 60 MILLIGRAM(S): at 05:51

## 2018-02-22 NOTE — DISCHARGE NOTE ADULT - MEDICATION SUMMARY - MEDICATIONS TO TAKE
I will START or STAY ON the medications listed below when I get home from the hospital:    predniSONE 20 mg oral tablet  -- 3 tab(s) by mouth once a day  -- Indication: For Hemolytic anemia    metFORMIN 500 mg oral tablet  -- 1 tab(s) by mouth once a day   -- Check with your doctor before becoming pregnant.  Do not drink alcoholic beverages when taking this medication.  It is very important that you take or use this exactly as directed.  Do not skip doses or discontinue unless directed by your doctor.  Obtain medical advice before taking any non-prescription drugs as some may affect the action of this medication.  Take with food or milk.    -- Indication: For Steroid-induced hyperglycemia    pantoprazole 40 mg oral delayed release tablet  -- 1 tab(s) by mouth once a day (before a meal)  -- Indication: For Prophylactic measure    folic acid 1 mg oral tablet  -- 1 tab(s) by mouth once a day  -- Indication: For Hemolytic anemia

## 2018-02-22 NOTE — PROGRESS NOTE ADULT - SUBJECTIVE AND OBJECTIVE BOX
Interval Events:No new overnight event.  No N/V/D.  Tolerating diet.    HPI:73M PMHx DM type 2, HTN presenting with worsening SOB and dyspnea on exertion x 2 weeks. Patient now unable to walk more than 15 minute without having to stop. States he threw his back out 3 weeks ago, was taking 20mg Advil TID for 4 days, given muscle relaxer, and pain resolved. 2 weeks ago had 3 days of diarrhea, told it was gastroenteritis, denies blood, but unsure if dark in color. The dyspnea has gotten to the point where he cannot walk up 2 stairs without feeling tired, 1 month ago was able to run a 5k without difficulty.  Denies nausea, vomiting, loss of appetite, active bleeding, chest pain, palpitations.     In the ED HR 95, T 98.6F, /67, SpO2 97%, RR 19. Hg 6.2, .7, BUN 26, Cr 1.5, total Bili 1.9, cardiac enzymes negative x1, proBNP within normal. 2 units PRBC ordered, CXR prelim negative and EKG showing HR 88, normal sinus rhythm.       MEDICATIONS  (STANDING):  dextrose 5%. 1000 milliLiter(s) (50 mL/Hr) IV Continuous <Continuous>  dextrose 50% Injectable 12.5 Gram(s) IV Push once  dextrose 50% Injectable 25 Gram(s) IV Push once  dextrose 50% Injectable 25 Gram(s) IV Push once  folic acid 1 milliGRAM(s) Oral daily  insulin lispro (HumaLOG) corrective regimen sliding scale   SubCutaneous three times a day before meals  insulin lispro (HumaLOG) corrective regimen sliding scale   SubCutaneous at bedtime  pantoprazole    Tablet 40 milliGRAM(s) Oral before breakfast  predniSONE   Tablet 60 milliGRAM(s) Oral daily  sodium chloride 0.9%. 1000 milliLiter(s) (100 mL/Hr) IV Continuous <Continuous>    MEDICATIONS  (PRN):  acetaminophen   Tablet. 650 milliGRAM(s) Oral once PRN Mild Pain (1 - 3)  dextrose Gel 1 Dose(s) Oral once PRN Blood Glucose LESS THAN 70 milliGRAM(s)/deciliter  diphenhydrAMINE   Capsule 25 milliGRAM(s) Oral daily PRN Rash and/or Itching  glucagon  Injectable 1 milliGRAM(s) IntraMuscular once PRN Glucose LESS THAN 70 milligrams/deciliter      Allergies    sulfa drugs (Unknown)    Intolerances        Review of Systems:    General:  No wt loss, fevers, chills, night sweats,fatigue,   Eyes:  Good vision, no reported pain  ENT:  No sore throat, pain, runny nose, dysphagia  CV:  No pain, palpitations, hypo/hypertension  Resp:  No dyspnea, cough, tachypnea, wheezing  GI:  No pain, No nausea, No vomiting, No diarrhea, No constipation, No weight loss, No fever, No pruritis, No rectal bleeding, No melena, No dysphagia  :  No pain, bleeding, incontinence, nocturia  Muscle:  No pain, weakness  Neuro:  No weakness, tingling, memory problems  Psych:  No fatigue, insomnia, mood problems, depression  Endocrine:  No polyuria, polydypsia, cold/heat intolerance  Heme:  No petechiae, ecchymosis, easy bruisability  Skin:  No rash, tattoos, scars, edema      Vital Signs Last 24 Hrs  T(C): 37.1 (22 Feb 2018 12:15), Max: 37.1 (21 Feb 2018 15:30)  T(F): 98.7 (22 Feb 2018 12:15), Max: 98.7 (21 Feb 2018 15:30)  HR: 85 (22 Feb 2018 12:15) (75 - 85)  BP: 122/72 (22 Feb 2018 12:15) (110/68 - 133/75)  BP(mean): --  RR: 17 (22 Feb 2018 12:15) (17 - 18)  SpO2: 98% (22 Feb 2018 12:15) (97% - 98%)    PHYSICAL EXAM:    Constitutional: NAD, well-developed  HEENT: EOMI, throat clear  Neck: No LAD, supple  Respiratory: CTA and P  Cardiovascular: S1 and S2, RRR, no M  Gastrointestinal: BS+, soft, NT/ND, neg HSM,  Extremities: No peripheral edema, neg clubing, cyanosis  Vascular: 2+ peripheral pulses  Neurological: A/O x 3, no focal deficits  Psychiatric: Normal mood, normal affect  Skin: No rashes      LABS:                        8.2    10.5  )-----------( 189      ( 22 Feb 2018 07:03 )             22.8     02-22    141  |  112<H>  |  25<H>  ----------------------------<  173<H>  3.9   |  22  |  1.30    Ca    8.2<L>      22 Feb 2018 07:03    TPro  6.6  /  Alb  3.4  /  TBili  1.7<H>  /  DBili  .60<H>  /  AST  21  /  ALT  17  /  AlkPhos  141<H>  02-22          RADIOLOGY & ADDITIONAL TESTS:

## 2018-02-22 NOTE — PROGRESS NOTE ADULT - SUBJECTIVE AND OBJECTIVE BOX
Patient is a 73y old  Male who presents with a chief complaint of SOB (19 Feb 2018 15:12)      HPI:  73M PMHx DM type 2, HTN presenting with worsening SOB and dyspnea on exertion x 2 weeks. Patient now unable to walk more than 15 minute without having to stop. States he threw his back out 3 weeks ago, was taking 20mg Advil TID for 4 days, given muscle relaxer, and pain resolved. 2 weeks ago had 3 days of diarrhea, told it was gastroenteritis, denies blood, but unsure if dark in color. The dyspnea has gotten to the point where he cannot walk up 2 steps without feeling tired, 1 month ago was able to run a 5k without difficulty.  Denies nausea, vomiting, loss of appetite, active bleeding, chest pain, palpitations.  admitted with symptomatic  anemia hemolytic  pt seen and examine today pt states SOB resolved at rest, but that he has not been ambulating since he needs assistance due to breathing.       ROS:  Constitutional: denies fever, chills, diaphoresis   HEENT: denies blurry vision, difficulty hearing  Respiratory: denies SOB at rest, cough, sputum production, wheezing, hemoptysis  Cardiovascular: denies chest pain, palpitations, chronic trace b/l LE edema  Gastrointestinal: denies nausea, vomiting, diarrhea, constipation, abdominal pain, melena, hematochezia   Genitourinary: denies dysuria, frequency,  Skin/Breast: denies rash, itching  Musculoskeletal: denies myalgias, joint swelling, muscle weakness  Neurologic: denies headache, weakness, dizziness, paresthesias, numbness    ROS negative except as noted above    INTERVAL HPI/OVERNIGHT EVENTS:  T(C): 36.9 (02-21-18 @ 07:20), Max: 37.2 (02-20-18 @ 20:03)  HR: 74 (02-21-18 @ 07:20) (74 - 87)  BP: 102/65 (02-21-18 @ 07:20) (102/65 - 139/77)  RR: 15 (02-21-18 @ 07:20) (15 - 20)  SpO2: 98% (02-21-18 @ 07:20) (96% - 98%)  Wt(kg): --  I&O's Summary    Physical Exam:  General: Well developed, well nourished, NAD; appears jaundiced  HEENT: NCAT, PERRLA, EOMI bl, moist mucous membranes; (+) scleral icterus   Neck: Supple, nontender, no mass  Neurology: A&Ox3, nonfocal, CN II-XII grossly intact, sensation intact  Respiratory: CTA B/L, No W/R/R  CV: RRR, +S1/S2, no murmurs, rubs or gallops  Abdominal: Soft, NT, ND +BSx4  Extremities: No C/C, trace pitting b/l LE edema, + peripheral pulses  MSK: Normal ROM, no joint erythema or warmth, no joint swelling   Skin: warm, dry, no rash or abnormal lesions; (+) jaundice      MEDICATIONS  (STANDING):  pantoprazole    Tablet 40 milliGRAM(s) Oral before breakfast  sodium chloride 0.9%. 1000 milliLiter(s) (100 mL/Hr) IV Continuous <Continuous>    MEDICATIONS  (PRN):  acetaminophen   Tablet. 650 milliGRAM(s) Oral once PRN Mild Pain (1 - 3)  diphenhydrAMINE   Capsule 25 milliGRAM(s) Oral daily PRN Rash and/or Itching      LABS:                        8.4    8.5   )-----------( 202      ( 21 Feb 2018 11:32 )             24.0     02-21    142  |  110<H>  |  24<H>  ----------------------------<  125<H>  4.2   |  23  |  1.50<H>    Ca    8.2<L>      21 Feb 2018 07:07  Mg     2.2     02-19    TPro  6.4  /  Alb  3.3  /  TBili  1.9<H>  /  DBili  x   /  AST  22  /  ALT  19  /  AlkPhos  136<H>  02-19    PT/INR - ( 19 Feb 2018 14:36 )   PT: 13.2 sec;   INR: 1.21 ratio         PTT - ( 19 Feb 2018 14:36 )  PTT:29.9 sec    CAPILLARY BLOOD GLUCOSE      POCT Blood Glucose.: 200 mg/dL (21 Feb 2018 12:06)  POCT Blood Glucose.: 138 mg/dL (21 Feb 2018 07:27)  POCT Blood Glucose.: 199 mg/dL (20 Feb 2018 21:07)  POCT Blood Glucose.: 183 mg/dL (20 Feb 2018 16:37)              RADIOLOGY & ADDITIONAL TESTS:    Imaging Personally Reviewed:     no new test   Advance Directives:    full code Patient is a 73y old  Male who presents with a chief complaint of SOB (19 Feb 2018 15:12)      HPI:  73M PMHx DM type 2, HTN presenting with worsening SOB and dyspnea on exertion x 2 weeks. Patient now unable to walk more than 15 minute without having to stop. States he threw his back out 3 weeks ago, was taking 20mg Advil TID for 4 days, given muscle relaxer, and pain resolved. 2 weeks ago had 3 days of diarrhea, told it was gastroenteritis, denies blood, but unsure if dark in color. The dyspnea has gotten to the point where he cannot walk up 2 steps without feeling tired, 1 month ago was able to run a 5k without difficulty.  Denies nausea, vomiting, loss of appetite, active bleeding, chest pain, palpitations.  admitted with symptomatic  anemia hemolytic  pt seen and examine today pt states SOB resolved at rest, but that he has not been ambulating since he needs assistance due to breathing.     acute symptomatic anemia requiring transfusion; endoscopy showing only gastritis pt had outpt colonoscopy done 3 years ago, no need to repeat at this time. now  hemolytic anemia. pt seen and examine today mild jaundice with no fever , no abd pain , tolreating po , oob.     ROS:  Constitutional: denies fever, chills, diaphoresis   HEENT: denies blurry vision, difficulty hearing  Respiratory: denies SOB at rest, cough, sputum production, wheezing, hemoptysis  Cardiovascular: denies chest pain, palpitations, chronic trace b/l LE edema  Gastrointestinal: denies nausea, vomiting, diarrhea, constipation, abdominal pain, melena, hematochezia   Genitourinary: denies dysuria, frequency,  Skin/Breast: denies rash, itching  Musculoskeletal: denies myalgias, joint swelling, muscle weakness  Neurologic: denies headache, weakness, dizziness, paresthesias, numbness    ROS negative except as noted above    INTERVAL HPI/OVERNIGHT EVENTS:  T(C): 36.9 (02-21-18 @ 07:20), Max: 37.2 (02-20-18 @ 20:03)  HR: 74 (02-21-18 @ 07:20) (74 - 87)  BP: 102/65 (02-21-18 @ 07:20) (102/65 - 139/77)  RR: 15 (02-21-18 @ 07:20) (15 - 20)  SpO2: 98% (02-21-18 @ 07:20) (96% - 98%)  Wt(kg): --  I&O's Summary    Physical Exam:  General: Well developed, well nourished, NAD; appears jaundiced  HEENT: NCAT, PERRLA, EOMI bl, moist mucous membranes; (+) scleral icterus   Neck: Supple, nontender, no mass  Neurology: A&Ox3, nonfocal, CN II-XII grossly intact, sensation intact  Respiratory: CTA B/L, No W/R/R  CV: RRR, +S1/S2, no murmurs, rubs or gallops  Abdominal: Soft, NT, ND +BSx4  Extremities: No C/C, trace pitting b/l LE edema, + peripheral pulses  MSK: Normal ROM, no joint erythema or warmth, no joint swelling   Skin: warm, dry, no rash or abnormal lesions; (+) jaundice      MEDICATIONS  (STANDING):  pantoprazole    Tablet 40 milliGRAM(s) Oral before breakfast  sodium chloride 0.9%. 1000 milliLiter(s) (100 mL/Hr) IV Continuous <Continuous>    MEDICATIONS  (PRN):  acetaminophen   Tablet. 650 milliGRAM(s) Oral once PRN Mild Pain (1 - 3)  diphenhydrAMINE   Capsule 25 milliGRAM(s) Oral daily PRN Rash and/or Itching      LABS:                        8.4    8.5   )-----------( 202      ( 21 Feb 2018 11:32 )             24.0     02-21    142  |  110<H>  |  24<H>  ----------------------------<  125<H>  4.2   |  23  |  1.50<H>    Ca    8.2<L>      21 Feb 2018 07:07  Mg     2.2     02-19    TPro  6.4  /  Alb  3.3  /  TBili  1.9<H>  /  DBili  x   /  AST  22  /  ALT  19  /  AlkPhos  136<H>  02-19    PT/INR - ( 19 Feb 2018 14:36 )   PT: 13.2 sec;   INR: 1.21 ratio         PTT - ( 19 Feb 2018 14:36 )  PTT:29.9 sec    CAPILLARY BLOOD GLUCOSE      POCT Blood Glucose.: 200 mg/dL (21 Feb 2018 12:06)  POCT Blood Glucose.: 138 mg/dL (21 Feb 2018 07:27)  POCT Blood Glucose.: 199 mg/dL (20 Feb 2018 21:07)  POCT Blood Glucose.: 183 mg/dL (20 Feb 2018 16:37)              RADIOLOGY & ADDITIONAL TESTS:    Imaging Personally Reviewed:     no new test   Advance Directives:    full code

## 2018-02-22 NOTE — PHYSICAL THERAPY INITIAL EVALUATION ADULT - PERTINENT HX OF CURRENT PROBLEM, REHAB EVAL
73M PMHx DM type 2, HTN presenting with worsening SOB and dyspnea on exertion x 2 weeks. Patient now unable to walk more than 15 minute without having to stop.

## 2018-02-22 NOTE — PROGRESS NOTE ADULT - ASSESSMENT
73M PMHx DM type 2, HTN presenting with worsening SOB and dyspnea admitted with acute symptomatic anemia requiring transfusion; endoscopy showing only gastritis pt had outpt colonoscopy done 3 years ago, no need to repeat at this time. Labs consistent with hemolytic anemia.

## 2018-02-22 NOTE — PROGRESS NOTE ADULT - SUBJECTIVE AND OBJECTIVE BOX
Follow up:    HPI:  73M PMHx DM type 2, HTN presenting with worsening SOB and dyspnea on exertion x 2 weeks. Patient now unable to walk more than 15 minute without having to stop. States he threw his back out 3 weeks ago, was taking 20mg Advil TID for 4 days, given muscle relaxer, and pain resolved. 2 weeks ago had 3 days of diarrhea, told it was gastroenteritis, denies blood, but unsure if dark in color. The dyspnea has gotten to the point where he cannot walk up 2 steps without feeling tired, 1 month ago was able to run a 5k without difficulty.  Denies nausea, vomiting, loss of appetite, active bleeding, chest pain, palpitations.     In the ED HR 95, T 98.6F, /67, SpO2 97%, RR 19. Hg 6.2, .7, BUN 26, Cr 1.5, total Bili 1.9, cardiac enzymes negative x1, proBNP within normal. 2 units PRBC ordered, CXR prelim negative and EKG showing HR 88, normal sinus rhythm. GI (Dr. Forman) and Cardio (Dr. Marcelino) consulted. (19 Feb 2018 15:12)      PAST MEDICAL & SURGICAL HISTORY:  Diabetes  HTN (hypertension)  No significant past surgical history      MEDICATIONS  (STANDING):  dextrose 5%. 1000 milliLiter(s) (50 mL/Hr) IV Continuous <Continuous>  dextrose 50% Injectable 12.5 Gram(s) IV Push once  dextrose 50% Injectable 25 Gram(s) IV Push once  dextrose 50% Injectable 25 Gram(s) IV Push once  folic acid 1 milliGRAM(s) Oral daily  insulin lispro (HumaLOG) corrective regimen sliding scale   SubCutaneous three times a day before meals  insulin lispro (HumaLOG) corrective regimen sliding scale   SubCutaneous at bedtime  pantoprazole    Tablet 40 milliGRAM(s) Oral before breakfast  predniSONE   Tablet 60 milliGRAM(s) Oral daily  sodium chloride 0.9%. 1000 milliLiter(s) (100 mL/Hr) IV Continuous <Continuous>    MEDICATIONS  (PRN):  acetaminophen   Tablet. 650 milliGRAM(s) Oral once PRN Mild Pain (1 - 3)  dextrose Gel 1 Dose(s) Oral once PRN Blood Glucose LESS THAN 70 milliGRAM(s)/deciliter  diphenhydrAMINE   Capsule 25 milliGRAM(s) Oral daily PRN Rash and/or Itching  glucagon  Injectable 1 milliGRAM(s) IntraMuscular once PRN Glucose LESS THAN 70 milligrams/deciliter      REVIEW OF SYSTEMS:    CONSTITUTIONAL: No weakness, fevers or chills  EYES: No visual changes, No diplopia  ENMT: No throat pain , No exudate  NECK: No pain or stiffness  RESPIRATORY: No cough, wheezing, hemoptysis; No shortness of breath  CARDIOVASCULAR: No chest pain or palpitations  GASTROINTESTINAL: No abdominal pain. No nausea, vomiting, or hematemesis; No diarrhea or constipation. No melena or hematochezia.  GENITOURINARY: No dysuria, frequency or hematuria  NEUROLOGICAL: No numbness or weakness  SKIN: No itching or rash  All other review of systems is negative unless indicated above    Vital Signs Last 24 Hrs  T(C): 37.1 (22 Feb 2018 12:15), Max: 37.1 (21 Feb 2018 15:30)  T(F): 98.7 (22 Feb 2018 12:15), Max: 98.7 (21 Feb 2018 15:30)  HR: 85 (22 Feb 2018 12:15) (75 - 85)  BP: 122/72 (22 Feb 2018 12:15) (109/67 - 133/75)  BP(mean): --  RR: 17 (22 Feb 2018 12:15) (17 - 18)  SpO2: 98% (22 Feb 2018 12:15) (97% - 98%)    I&O's Summary    21 Feb 2018 07:01  -  22 Feb 2018 07:00  --------------------------------------------------------  IN: 1000 mL / OUT: 450 mL / NET: 550 mL    PHYSICAL EXAM:    Constitutional: NAD, awake and alert, well-developed  Eyes:  EOMI,  Pupils round, no lesions  ENMT: no exudate or erythema  Pulmonary: Non-labored, breath sounds are clear bilaterally, No wheezing, rales or rhonchi  Cardiovascular: PMI not palpable non-displaced Regular S1 and S2, no murmurs, rubs, gallops or clicks  Gastrointestinal: Bowel Sounds present, soft, nontender.   Lymph: No peripheral edema. No cervical lymphadenopathy.  Neurological: Alert, no focal deficits  Skin: No rashes. Changes of chronic venous stasis. No cyanosis.  Psych:  Mood & affect appropriate Confused.      CARDIAC MARKERS ( 21 Feb 2018 07:07 )  <.015 ng/mL / x     / 25 U/L / x     / 0.9 ng/mL  CARDIAC MARKERS ( 21 Feb 2018 00:21 )  <.015 ng/mL / x     / 30 U/L / x     / 0.9 ng/mL                     8.2    10.5  )-----------( 189      ( 22 Feb 2018 07:03 )             22.8     CBC Full  -  ( 22 Feb 2018 07:03 )  WBC Count : 10.5 K/uL  Hemoglobin : 8.2 g/dL  Hematocrit : 22.8 %  Platelet Count - Automated : 189 K/uL  Mean Cell Volume : 100.1 fl  Mean Cell Hemoglobin : 36.0 pg  Mean Cell Hemoglobin Concentration : 36.0 gm/dL  Auto Neutrophil # : x  Auto Lymphocyte # : x  Auto Monocyte # : x  Auto Eosinophil # : x  Auto Basophil # : x  Auto Neutrophil % : x  Auto Lymphocyte % : x  Auto Monocyte % : x  Auto Eosinophil % : x  Auto Basophil % : x    02-22    141  |  112<H>  |  25<H>  ----------------------------<  173<H>  3.9   |  22  |  1.30    Ca    8.2<L>      22 Feb 2018 07:03    TPro  6.6  /  Alb  3.4  /  TBili  1.7<H>  /  DBili  .60<H>  /  AST  21  /  ALT  17  /  AlkPhos  141<H>  02-22 Follow up: Patient seen and examined. Events noted. Resting comfortably in bed. No complaints of chest pain, dyspnea, or palpitations reported. No signs of orthopnea or PND.     HPI:  73M PMHx DM type 2, HTN presenting with worsening SOB and dyspnea on exertion x 2 weeks. Patient now unable to walk more than 15 minute without having to stop. States he threw his back out 3 weeks ago, was taking 20mg Advil TID for 4 days, given muscle relaxer, and pain resolved. 2 weeks ago had 3 days of diarrhea, told it was gastroenteritis, denies blood, but unsure if dark in color. The dyspnea has gotten to the point where he cannot walk up 2 steps without feeling tired, 1 month ago was able to run a 5k without difficulty.  Denies nausea, vomiting, loss of appetite, active bleeding, chest pain, palpitations.     In the ED HR 95, T 98.6F, /67, SpO2 97%, RR 19. Hg 6.2, .7, BUN 26, Cr 1.5, total Bili 1.9, cardiac enzymes negative x1, proBNP within normal. 2 units PRBC ordered, CXR prelim negative and EKG showing HR 88, normal sinus rhythm. GI (Dr. Forman) and Cardio (Dr. Marcelino) consulted. (19 Feb 2018 15:12)      PAST MEDICAL & SURGICAL HISTORY:  Diabetes  HTN (hypertension)  No significant past surgical history      MEDICATIONS  (STANDING):  dextrose 5%. 1000 milliLiter(s) (50 mL/Hr) IV Continuous <Continuous>  dextrose 50% Injectable 12.5 Gram(s) IV Push once  dextrose 50% Injectable 25 Gram(s) IV Push once  dextrose 50% Injectable 25 Gram(s) IV Push once  folic acid 1 milliGRAM(s) Oral daily  insulin lispro (HumaLOG) corrective regimen sliding scale   SubCutaneous three times a day before meals  insulin lispro (HumaLOG) corrective regimen sliding scale   SubCutaneous at bedtime  pantoprazole    Tablet 40 milliGRAM(s) Oral before breakfast  predniSONE   Tablet 60 milliGRAM(s) Oral daily  sodium chloride 0.9%. 1000 milliLiter(s) (100 mL/Hr) IV Continuous <Continuous>    MEDICATIONS  (PRN):  acetaminophen   Tablet. 650 milliGRAM(s) Oral once PRN Mild Pain (1 - 3)  dextrose Gel 1 Dose(s) Oral once PRN Blood Glucose LESS THAN 70 milliGRAM(s)/deciliter  diphenhydrAMINE   Capsule 25 milliGRAM(s) Oral daily PRN Rash and/or Itching  glucagon  Injectable 1 milliGRAM(s) IntraMuscular once PRN Glucose LESS THAN 70 milligrams/deciliter      REVIEW OF SYSTEMS:    CONSTITUTIONAL: No weakness, fevers or chills  EYES: No visual changes, No diplopia  ENMT: No throat pain , No exudate  NECK: No pain or stiffness  RESPIRATORY: No cough, wheezing, hemoptysis; No shortness of breath  CARDIOVASCULAR: No chest pain or palpitations  GASTROINTESTINAL: No abdominal pain. No nausea, vomiting, or hematemesis; No diarrhea or constipation. No melena or hematochezia.  GENITOURINARY: No dysuria, frequency or hematuria  NEUROLOGICAL: No numbness or weakness  SKIN: No itching or rash  All other review of systems is negative unless indicated above    Vital Signs Last 24 Hrs  T(C): 37.1 (22 Feb 2018 12:15), Max: 37.1 (21 Feb 2018 15:30)  T(F): 98.7 (22 Feb 2018 12:15), Max: 98.7 (21 Feb 2018 15:30)  HR: 85 (22 Feb 2018 12:15) (75 - 85)  BP: 122/72 (22 Feb 2018 12:15) (109/67 - 133/75)  BP(mean): --  RR: 17 (22 Feb 2018 12:15) (17 - 18)  SpO2: 98% (22 Feb 2018 12:15) (97% - 98%)    I&O's Summary    21 Feb 2018 07:01  -  22 Feb 2018 07:00  --------------------------------------------------------  IN: 1000 mL / OUT: 450 mL / NET: 550 mL    PHYSICAL EXAM:    Constitutional: NAD, awake and alert, well-developed  Eyes:  EOMI,  Pupils round, no lesions  ENMT: no exudate or erythema  Pulmonary: Non-labored, breath sounds are clear bilaterally, No wheezing, rales or rhonchi  Cardiovascular: PMI not palpable non-displaced Regular S1 and S2, no murmurs, rubs, gallops or clicks  Gastrointestinal: Bowel Sounds present, soft, nontender.   Lymph: No peripheral edema. No cervical lymphadenopathy.  Neurological: Alert, no focal deficits  Skin: No rashes. Changes of chronic venous stasis. No cyanosis.  Psych:  Mood & affect appropriate Confused.      CARDIAC MARKERS ( 21 Feb 2018 07:07 )  <.015 ng/mL / x     / 25 U/L / x     / 0.9 ng/mL  CARDIAC MARKERS ( 21 Feb 2018 00:21 )  <.015 ng/mL / x     / 30 U/L / x     / 0.9 ng/mL                     8.2    10.5  )-----------( 189      ( 22 Feb 2018 07:03 )             22.8     CBC Full  -  ( 22 Feb 2018 07:03 )  WBC Count : 10.5 K/uL  Hemoglobin : 8.2 g/dL  Hematocrit : 22.8 %  Platelet Count - Automated : 189 K/uL  Mean Cell Volume : 100.1 fl  Mean Cell Hemoglobin : 36.0 pg  Mean Cell Hemoglobin Concentration : 36.0 gm/dL  Auto Neutrophil # : x  Auto Lymphocyte # : x  Auto Monocyte # : x  Auto Eosinophil # : x  Auto Basophil # : x  Auto Neutrophil % : x  Auto Lymphocyte % : x  Auto Monocyte % : x  Auto Eosinophil % : x  Auto Basophil % : x    02-22    141  |  112<H>  |  25<H>  ----------------------------<  173<H>  3.9   |  22  |  1.30    Ca    8.2<L>      22 Feb 2018 07:03    TPro  6.6  /  Alb  3.4  /  TBili  1.7<H>  /  DBili  .60<H>  /  AST  21  /  ALT  17  /  AlkPhos  141<H>  02-22

## 2018-02-22 NOTE — DISCHARGE NOTE ADULT - PATIENT PORTAL LINK FT
You can access the [facility] Portal, offered by [facility], by registering with the following website: [email or web address]

## 2018-02-22 NOTE — PROGRESS NOTE ADULT - ATTENDING COMMENTS
I was physically present for the key portions of the evaluation and management (E/M) service provided.  I agree with the above history, physical, and plan which I have reviewed and edited where appropriate.
I was physically present for the key portions of the evaluation and management (E/M) service provided.  I agree with the above history, physical, and plan which I have reviewed and edited where appropriate.
73M PMHx DM type 2, HTN presenting with worsening SOB and dyspnea admitted with  dyspnea  sec to acute symptomatic anemia sec to  hemolytic anemia  warm antibody positive , fu retic count  , start solumedrol iv by hem/onc . pt hx borderline dm type2  was not on home meds / fu with steroid closely . mild francisco j gentle hydration fu electrolyte  closely . chest tightness pt need echo possible sec anemia / cardio no acute sign of ischemia neg troponin. hx sleep apnea on cpap.
73M PMHx DM type 2, HTN presenting with worsening SOB and dyspnea admitted with acute symptomatic anemia,  sec to possible  GI Bleed vs hematological .   Pt  received 2 units of PRBCs, She has multiple antibodies and having cross reactions as per blood bank.  plan - endoscopy  today medically stabilize for procedure. hx sleep apnea on cpap .
73M PMHx DM type 2, HTN presenting with worsening SOB and dyspnea admitted with  dyspnea  sec to acute symptomatic anemia sec to  hemolytic anemia  warm antibody positive , solumedrol iv by hem/onc today hb stable change to po steriod  .   pt hx borderline dm type2  was not on home meds / fu with steroid closely .   mild irma gentle hydration fu electrolyte  closely  hold on lasix .   chest tightness possible sec anemia /-cardio no acute sign of ischemia neg troponin. fu echo .    hx sleep apnea on cpap.      -  IRMA improving Creatinine 1.5 down to 1.30, continue to hold lasix and losartan . if hb stable in am dc plan on po steriod.

## 2018-02-22 NOTE — DISCHARGE NOTE ADULT - HOSPITAL COURSE
73M PMHx DM type 2, HTN presenting with worsening SOB and dyspnea admitted with acutely symptomatic anemia.  Patient responded well to blood transfusions.  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  Treated with steroids, folate.  Followed by heme/onc (Dr. Stuart), with instructions to follow up after discharge.  Hemoglobin remained stable.  Cardio evaluated patient during admission.  Orthostatics negative; TTE with 65% EF and grade 1 diastolic dysfunction.  Pt required insulin during admission for steroid-induced hyperglycemia.  HA1C 5.3% during admission, however this may be falsely low in the setting of hemolytic disease. 73M PMHx DM type 2, HTN presenting with worsening SOB and dyspnea admitted with acutely symptomatic anemia.  Patient responded well to blood transfusions.  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  Treated with steroids, PPI, folate.  Followed by heme/onc (Dr. Alva). Hemoglobin remained stable and patient symptomatically improved.  Cardio evaluated patient during admission.  Orthostatics negative; TTE: 65% EF and grade 1 diastolic dysfunction.  Pt required insulin during admission for steroid-induced hyperglycemia.  HA1C 5.3%, however this may be falsely low in the setting of hemolytic disease.  Home Januvia was held and patient placed on sliding scale insulin.  Discharged on metformin.  Home lasix, losartan held on admission in setting of hypotension, IRMA.  BP controlled throughout admission.  Lasix, losartan not restarted at discharge.  Pt instructed to follow up with PCP within 1 week for assessment of glucose levels and BP.  Patient has appointment with hematology three days after discharge.      Patient was medically optimized and improved clinically throughout hospital course. Patient seen and examined on day of discharge.    Vital Signs Last 24 Hrs  T(C): 36.6 (23 Feb 2018 08:24), Max: 37.1 (22 Feb 2018 12:15)  T(F): 97.9 (23 Feb 2018 08:24), Max: 98.8 (22 Feb 2018 19:41)  HR: 70 (23 Feb 2018 08:24) (70 - 88)  BP: 121/77 (23 Feb 2018 08:24) (114/57 - 122/72)  RR: 15 (23 Feb 2018 08:24) (15 - 18)  SpO2: 99% (23 Feb 2018 08:24) (98% - 100%)    Physical Exam:  General: Well developed, well nourished, NAD, jaundice improved from prior  HEENT: NCAT, PERRLA, EOMI bl, moist mucous membranes   Neck: Supple, nontender, no mass  Neurology: A&Ox3, nonfocal, CN II-XII grossly intact  Respiratory: CTA B/L, No W/R/R  CV: RRR, +S1/S2, no murmurs, rubs or gallops  Abdominal: Soft, NT, ND +BSx4  Extremities: No C/C, chronic trace b/l LE pitting edema, + peripheral pulses  MSK: Normal ROM, no joint erythema or warmth, no joint swelling   Skin: warm, dry, normal color, no rash or abnormal lesions    Patient is medically stable and cleared for discharge to home with outpatient follow up. 73M PMHx DM type 2, HTN presenting with worsening SOB and dyspnea   admitted with acutely symptomatic anemia.  Patient responded well to blood transfusions. pt went for endoscopy as per gi sideridis  ruled out gi cause of anemia   endoscopy no ulcer no bleed notice . 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  Treated with steroids, PPI, folate.  Followed by heme/onc (Dr. Alva). Hemoglobin remained stable and patient symptomatically improved.  Cardio evaluated patient during admission.  Orthostatics negative; TTE: 65% EF and grade 1 diastolic dysfunction no chf  this time as per cardio dr drake chest tightness sec to symptomatic anemia  He had nonanginal chest pain last night which was self limiting. No signs of significant ischemia. EKG without sig ischemic changes , no acs this time. hx sleep apnea on cpap.  mild leucocytosis sec to steroid induce.        .  Pt required insulin during admission for steroid-induced hyperglycemia.  HA1C 5.3%, however this may be falsely low in the setting of hemolytic disease.  pt was not on home dm meds  was diet controlled and patient placed on sliding scale insulin hosp course . Discharged on metformin for steriod induce hyperglycemia .  Home lasix, losartan held on admission in setting of hypotension, IRMA  which sec to dehydration volume depletion was  resolved after blood transfusion .  BP controlled throughout admission without meds .  Lasix, losartan not restarted at discharge.  Pt instructed to follow up with PCP within 1 week for assessment of glucose levels and BP.  Patient has appointment with hematology three days after discharge.      Patient was medically optimized and improved clinically throughout hospital course. Patient seen and examined on day of discharge 2-23- 18 .    Vital Signs Last 24 Hrs  T(C): 36.6 (23 Feb 2018 08:24), Max: 37.1 (22 Feb 2018 12:15)  T(F): 97.9 (23 Feb 2018 08:24), Max: 98.8 (22 Feb 2018 19:41)  HR: 70 (23 Feb 2018 08:24) (70 - 88)  BP: 121/77 (23 Feb 2018 08:24) (114/57 - 122/72)  RR: 15 (23 Feb 2018 08:24) (15 - 18)  SpO2: 99% (23 Feb 2018 08:24) (98% - 100%)    Physical Exam:  General: Well developed, well nourished, NAD, jaundice improved from prior  HEENT: NCAT, PERRLA, EOMI bl, moist mucous membranes   Neck: Supple, nontender, no mass  Neurology: A&Ox3, nonfocal, CN II-XII grossly intact  Respiratory: CTA B/L, No W/R/R  CV: RRR, +S1/S2, no murmurs, rubs   Abdominal: Soft, NT, ND +BSx4  Extremities: No C/C, chronic trace b/l LE pitting edema, + peripheral pulses  MSK: Normal ROM, no joint erythema or warmth, no joint swelling   Skin: warm, dry, normal color, no rash or abnormal lesions    Patient is medically stable and cleared for discharge to home with outpatient follow up pmd dr ferraro office fu with in 1wk / fu mon day afternoon  hem/onch office.

## 2018-02-22 NOTE — DISCHARGE NOTE ADULT - PLAN OF CARE
stable hemoglobin level Continue medications as above.  Follow up with hematology on Monday for repeat hemoglobin level. Contact information is listed below. Continue metformin as instructed above.  Check fingersticks three times a day and write down your results.  Follow up with your PCP (Dr. Camacho) within 1 week of discharge to evaluate glucose levels. Call for appointment. Continue low salt diet.  Do not resume our home blood pressure medications as your blood pressures were low during admission.  Follow up with Dr. Camacho within 1 week of discharge for blood pressure check. Continue medications as above.  Follow up with hematology dr rodriguez gp  on Monday for repeat hemoglobin level. Contact information is listed below. off meds stable

## 2018-02-22 NOTE — PROGRESS NOTE ADULT - ASSESSMENT
74 y/o man presented with sever anemia.  3 weeks ago pt ''pulled muscle on the back and was taking advil and muscle relaxant.  Patient reports 2 weeks h/o weaknesses , SOB after he developed non bloody watery  diarrhea.      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

## 2018-02-22 NOTE — DISCHARGE NOTE ADULT - CARE PLAN
Principal Discharge DX:	Hemolytic anemia  Goal:	stable hemoglobin level  Assessment and plan of treatment:	Continue medications as above.  Follow up with hematology on Monday for repeat hemoglobin level. Contact information is listed below.  Secondary Diagnosis:	Steroid-induced hyperglycemia  Assessment and plan of treatment:	Continue metformin as instructed above.  Check fingersticks three times a day and write down your results.  Follow up with your PCP (Dr. Camacho) within 1 week of discharge to evaluate glucose levels. Call for appointment.  Secondary Diagnosis:	HTN (hypertension)  Assessment and plan of treatment:	Continue low salt diet.  Do not resume our home blood pressure medications as your blood pressures were low during admission.  Follow up with Dr. Camacho within 1 week of discharge for blood pressure check. Principal Discharge DX:	Hemolytic anemia  Goal:	stable hemoglobin level  Assessment and plan of treatment:	Continue medications as above.  Follow up with hematology dr rodriguez gp  on Monday for repeat hemoglobin level. Contact information is listed below.  Secondary Diagnosis:	Steroid-induced hyperglycemia  Assessment and plan of treatment:	Continue metformin as instructed above.  Check fingersticks three times a day and write down your results.  Follow up with your PCP (Dr. Camacho) within 1 week of discharge to evaluate glucose levels. Call for appointment.  Secondary Diagnosis:	HTN (hypertension)  Goal:	off meds stable  Assessment and plan of treatment:	Continue low salt diet.  Do not resume our home blood pressure medications as your blood pressures were low during admission.  Follow up with Dr. Camacho within 1 week of discharge for blood pressure check.

## 2018-02-22 NOTE — PROGRESS NOTE ADULT - PROBLEM SELECTOR PLAN 5
-A1c 5.3, which may be a falsely negative value given hemolysis  -not on home meds -A1c 5.3, which may be a falsely negative value given hemolysis  -not on home meds , fu closely bs

## 2018-02-22 NOTE — PROGRESS NOTE ADULT - PROBLEM SELECTOR PLAN 1
s/p EGD with gastritis, f/u biopsy  ? hemolysis based on labs, heme eval appreciated  monitor h/h daily, transfuse prn

## 2018-02-22 NOTE — DISCHARGE NOTE ADULT - MEDICATION SUMMARY - MEDICATIONS TO STOP TAKING
I will STOP taking the medications listed below when I get home from the hospital:    losartan 50 mg oral tablet  -- 1 tab(s) by mouth once a day    aspirin 81 mg oral delayed release tablet  -- 1 tab(s) by mouth once a day    furosemide 40 mg oral tablet  -- 1 tab(s) by mouth once a day

## 2018-02-22 NOTE — PROGRESS NOTE ADULT - ASSESSMENT
73M PMHx DM type 2, HTN, lower ext edema presenting with symptomatic anemia.  Last night pt. c/o chest tightness EKG NSR@ 83BPM no acute changes noted     - He clearly is symptomatic from profound anemia.   - He had nonanginal chest pain last night which was self limiting. No signs of significant ischemia. EKG without sig ischemic changes  - Pt. has antibodies will give steroids   - Tx PRBC to maintain H/H >8/24; today H/H 8.2/22.8 will monitor and treat as heme recs  - Continue to hold all antiplatelets or AC  -  IRMA improving Creatinine 1.5 down to 1.30, continue to hold lasix and losartan  - Check orthostatics.   - Trace edema is chronic.    - can check a 2d echo  - Monitor and replete electrolytes. Keep K>4.0 and Mg>2.0; replete K+ 3.9 with 20meq kcl  - Further cardiac workup will depend on clinical course.   - All other workup per primary team. Will followup. 73M PMHx DM type 2, HTN, lower ext edema presenting with symptomatic anemia.  Last night pt. c/o chest tightness EKG NSR@ 83BPM no acute changes noted     - He clearly is symptomatic from profound anemia.   - He had nonanginal chest pain last night which was self limiting. No signs of significant ischemia. EKG without sig ischemic changes  - Pt. has antibodies will give steroids   - Tx PRBC to maintain H/H >8/24; today H/H 8.2/22.8 will monitor and treat as heme recs  - Continue to hold all antiplatelets or AC  -  IRMA improving Creatinine 1.5 down to 1.30, continue to hold lasix and losartan  - Check orthostatics.   - Trace edema is chronic.    - echo is normal  - Monitor and replete electrolytes. Keep K>4.0 and Mg>2.0; replete K+ 3.9 with 20meq kcl  - Further cardiac workup will depend on clinical course.   - All other workup per primary team. Will followup.

## 2018-02-22 NOTE — PROGRESS NOTE ADULT - PROBLEM SELECTOR PLAN 1
-SOB resolved  -H/H stable  -s/p 2U PRBC   -ruled out upper gi bleed with endoscopy; recent colonoscopy w/o abnormality, no need to rpt this time cause  hemolytic anemia   -H/H stable  -s/p 2U PRBC   -ruled out upper gi bleed with endoscopy; recent colonoscopy w/o abnormality, no need to rpt

## 2018-02-22 NOTE — DISCHARGE NOTE ADULT - ADDITIONAL INSTRUCTIONS
fu pmd dr ferraro office fu with in 1wk  notified dc plan - fu bs and bp closely , fu  hem /onc  Fatuma Jones (MD), Hematology monday for repeat hb .pt off bp meds fu closely resume meds as per pmd fu up.

## 2018-02-22 NOTE — PROGRESS NOTE ADULT - PROBLEM SELECTOR PROBLEM 2
ACP (advance care planning)
Acute kidney injury
Dyspnea on exertion

## 2018-02-22 NOTE — PROGRESS NOTE ADULT - SUBJECTIVE AND OBJECTIVE BOX
Interval History: remains weak. no bleeding.  continues on IV steroids    Chart reviewed and events noted;   Overnight events:    MEDICATIONS  (STANDING):  dextrose 5%. 1000 milliLiter(s) (50 mL/Hr) IV Continuous <Continuous>  dextrose 50% Injectable 12.5 Gram(s) IV Push once  dextrose 50% Injectable 25 Gram(s) IV Push once  dextrose 50% Injectable 25 Gram(s) IV Push once  folic acid 1 milliGRAM(s) Oral daily  insulin lispro (HumaLOG) corrective regimen sliding scale   SubCutaneous three times a day before meals  insulin lispro (HumaLOG) corrective regimen sliding scale   SubCutaneous at bedtime  pantoprazole    Tablet 40 milliGRAM(s) Oral before breakfast  predniSONE   Tablet 60 milliGRAM(s) Oral daily  sodium chloride 0.9%. 1000 milliLiter(s) (100 mL/Hr) IV Continuous <Continuous>    MEDICATIONS  (PRN):  acetaminophen   Tablet. 650 milliGRAM(s) Oral once PRN Mild Pain (1 - 3)  dextrose Gel 1 Dose(s) Oral once PRN Blood Glucose LESS THAN 70 milliGRAM(s)/deciliter  diphenhydrAMINE   Capsule 25 milliGRAM(s) Oral daily PRN Rash and/or Itching  glucagon  Injectable 1 milliGRAM(s) IntraMuscular once PRN Glucose LESS THAN 70 milligrams/deciliter      Vital Signs Last 24 Hrs  T(C): 37.1 (22 Feb 2018 19:41), Max: 37.1 (22 Feb 2018 07:40)  T(F): 98.8 (22 Feb 2018 19:41), Max: 98.8 (22 Feb 2018 19:41)  HR: 88 (22 Feb 2018 23:25) (75 - 88)  BP: 115/66 (22 Feb 2018 19:41) (112/67 - 133/75)  BP(mean): --  RR: 17 (22 Feb 2018 19:41) (17 - 18)  SpO2: 99% (22 Feb 2018 23:25) (98% - 99%)    PHYSICAL EXAM  General: adult in NAD  HEENT: clear oropharynx, anicteric sclera, pink conjunctivae  Neck: supple  CV: normal S1S2 with no murmur rubs or gallops  Lungs: clear to auscultation, no wheezes, no rhales  Abdomen: soft non-tender non-distended, no hepato/splenomegaly  Ext: no clubbing cyanosis or edema  Skin: no rashes and no petichiae  Neuro: alert and oriented X3 no focal deficits      LABS:  CBC Full  -  ( 22 Feb 2018 07:03 )  WBC Count : 10.5 K/uL  Hemoglobin : 8.2 g/dL  Hematocrit : 22.8 %  Platelet Count - Automated : 189 K/uL  Mean Cell Volume : 100.1 fl  Mean Cell Hemoglobin : 36.0 pg  Mean Cell Hemoglobin Concentration : 36.0 gm/dL  Auto Neutrophil # : x  Auto Lymphocyte # : x  Auto Monocyte # : x  Auto Eosinophil # : x  Auto Basophil # : x  Auto Neutrophil % : x  Auto Lymphocyte % : x  Auto Monocyte % : x  Auto Eosinophil % : x  Auto Basophil % : x    02-22    141  |  112<H>  |  25<H>  ----------------------------<  173<H>  3.9   |  22  |  1.30    Ca    8.2<L>      22 Feb 2018 07:03    TPro  6.6  /  Alb  3.4  /  TBili  1.7<H>  /  DBili  .60<H>  /  AST  21  /  ALT  17  /  AlkPhos  141<H>  02-22        fe studies  Iron - Total Binding Capacity.: 222 ug/dL (02-20 @ 20:06)  Ferritin, Serum: 1462.0 ng/mL (02-20 @ 20:06)      WBC trend  10.5 K/uL (02-22-18 @ 07:03)  8.5 K/uL (02-21-18 @ 11:32)  7.7 K/uL (02-20-18 @ 07:10)      Hgb trend  8.2 g/dL (02-22-18 @ 07:03)  8.4 g/dL (02-21-18 @ 11:32)  8.2 g/dL (02-20-18 @ 18:05)  7.9 g/dL (02-20-18 @ 07:10)      plt trend  189 K/uL (02-22-18 @ 07:03)  202 K/uL (02-21-18 @ 11:32)  191 K/uL (02-20-18 @ 07:10)        RADIOLOGY & ADDITIONAL STUDIES:

## 2018-02-23 VITALS
OXYGEN SATURATION: 98 % | TEMPERATURE: 98 F | DIASTOLIC BLOOD PRESSURE: 68 MMHG | SYSTOLIC BLOOD PRESSURE: 111 MMHG | HEART RATE: 72 BPM | RESPIRATION RATE: 18 BRPM

## 2018-02-23 LAB
ALLERGY+IMMUNOLOGY DIAG STUDY NOTE: SIGNIFICANT CHANGE UP
ANION GAP SERPL CALC-SCNC: 10 MMOL/L — SIGNIFICANT CHANGE UP (ref 5–17)
BUN SERPL-MCNC: 30 MG/DL — HIGH (ref 7–23)
CALCIUM SERPL-MCNC: 8.2 MG/DL — LOW (ref 8.5–10.1)
CHLORIDE SERPL-SCNC: 111 MMOL/L — HIGH (ref 96–108)
CO2 SERPL-SCNC: 22 MMOL/L — SIGNIFICANT CHANGE UP (ref 22–31)
CREAT SERPL-MCNC: 1.5 MG/DL — HIGH (ref 0.5–1.3)
GLUCOSE SERPL-MCNC: 128 MG/DL — HIGH (ref 70–99)
HCT VFR BLD CALC: 22 % — LOW (ref 39–50)
HGB BLD-MCNC: 7.9 G/DL — LOW (ref 13–17)
MCHC RBC-ENTMCNC: 35.8 GM/DL — SIGNIFICANT CHANGE UP (ref 32–36)
MCHC RBC-ENTMCNC: 36.4 PG — HIGH (ref 27–34)
MCV RBC AUTO: 101.8 FL — HIGH (ref 80–100)
PLATELET # BLD AUTO: 198 K/UL — SIGNIFICANT CHANGE UP (ref 150–400)
POTASSIUM SERPL-MCNC: 3.5 MMOL/L — SIGNIFICANT CHANGE UP (ref 3.5–5.3)
POTASSIUM SERPL-SCNC: 3.5 MMOL/L — SIGNIFICANT CHANGE UP (ref 3.5–5.3)
RBC # BLD: 2.16 M/UL — LOW (ref 4.2–5.8)
RBC # FLD: 19 % — HIGH (ref 10.3–14.5)
SODIUM SERPL-SCNC: 143 MMOL/L — SIGNIFICANT CHANGE UP (ref 135–145)
WBC # BLD: 13.8 K/UL — HIGH (ref 3.8–10.5)
WBC # FLD AUTO: 13.8 K/UL — HIGH (ref 3.8–10.5)

## 2018-02-23 PROCEDURE — 85018 HEMOGLOBIN: CPT

## 2018-02-23 PROCEDURE — 94660 CPAP INITIATION&MGMT: CPT

## 2018-02-23 PROCEDURE — 86880 COOMBS TEST DIRECT: CPT

## 2018-02-23 PROCEDURE — 83880 ASSAY OF NATRIURETIC PEPTIDE: CPT

## 2018-02-23 PROCEDURE — 93306 TTE W/DOPPLER COMPLETE: CPT

## 2018-02-23 PROCEDURE — 80076 HEPATIC FUNCTION PANEL: CPT

## 2018-02-23 PROCEDURE — 85610 PROTHROMBIN TIME: CPT

## 2018-02-23 PROCEDURE — 80048 BASIC METABOLIC PNL TOTAL CA: CPT

## 2018-02-23 PROCEDURE — 82962 GLUCOSE BLOOD TEST: CPT

## 2018-02-23 PROCEDURE — 82553 CREATINE MB FRACTION: CPT

## 2018-02-23 PROCEDURE — 80053 COMPREHEN METABOLIC PANEL: CPT

## 2018-02-23 PROCEDURE — 82607 VITAMIN B-12: CPT

## 2018-02-23 PROCEDURE — 86922 COMPATIBILITY TEST ANTIGLOB: CPT

## 2018-02-23 PROCEDURE — 86900 BLOOD TYPING SEROLOGIC ABO: CPT

## 2018-02-23 PROCEDURE — 85027 COMPLETE CBC AUTOMATED: CPT

## 2018-02-23 PROCEDURE — 86902 BLOOD TYPE ANTIGEN DONOR EA: CPT

## 2018-02-23 PROCEDURE — 82746 ASSAY OF FOLIC ACID SERUM: CPT

## 2018-02-23 PROCEDURE — 82728 ASSAY OF FERRITIN: CPT

## 2018-02-23 PROCEDURE — 88312 SPECIAL STAINS GROUP 1: CPT

## 2018-02-23 PROCEDURE — 85730 THROMBOPLASTIN TIME PARTIAL: CPT

## 2018-02-23 PROCEDURE — 99232 SBSQ HOSP IP/OBS MODERATE 35: CPT

## 2018-02-23 PROCEDURE — P9016: CPT

## 2018-02-23 PROCEDURE — 86870 RBC ANTIBODY IDENTIFICATION: CPT

## 2018-02-23 PROCEDURE — 85045 AUTOMATED RETICULOCYTE COUNT: CPT

## 2018-02-23 PROCEDURE — 99285 EMERGENCY DEPT VISIT HI MDM: CPT | Mod: 25

## 2018-02-23 PROCEDURE — 82550 ASSAY OF CK (CPK): CPT

## 2018-02-23 PROCEDURE — 83036 HEMOGLOBIN GLYCOSYLATED A1C: CPT

## 2018-02-23 PROCEDURE — 82272 OCCULT BLD FECES 1-3 TESTS: CPT

## 2018-02-23 PROCEDURE — 93005 ELECTROCARDIOGRAM TRACING: CPT

## 2018-02-23 PROCEDURE — 86901 BLOOD TYPING SEROLOGIC RH(D): CPT

## 2018-02-23 PROCEDURE — 86905 BLOOD TYPING RBC ANTIGENS: CPT

## 2018-02-23 PROCEDURE — 83690 ASSAY OF LIPASE: CPT

## 2018-02-23 PROCEDURE — 83010 ASSAY OF HAPTOGLOBIN QUANT: CPT

## 2018-02-23 PROCEDURE — 99239 HOSP IP/OBS DSCHRG MGMT >30: CPT

## 2018-02-23 PROCEDURE — 83615 LACTATE (LD) (LDH) ENZYME: CPT

## 2018-02-23 PROCEDURE — 83550 IRON BINDING TEST: CPT

## 2018-02-23 PROCEDURE — 88305 TISSUE EXAM BY PATHOLOGIST: CPT

## 2018-02-23 PROCEDURE — 71045 X-RAY EXAM CHEST 1 VIEW: CPT

## 2018-02-23 PROCEDURE — 36415 COLL VENOUS BLD VENIPUNCTURE: CPT

## 2018-02-23 PROCEDURE — 86850 RBC ANTIBODY SCREEN: CPT

## 2018-02-23 PROCEDURE — 84484 ASSAY OF TROPONIN QUANT: CPT

## 2018-02-23 PROCEDURE — 36430 TRANSFUSION BLD/BLD COMPNT: CPT

## 2018-02-23 PROCEDURE — 97162 PT EVAL MOD COMPLEX 30 MIN: CPT

## 2018-02-23 PROCEDURE — 83735 ASSAY OF MAGNESIUM: CPT

## 2018-02-23 PROCEDURE — 86860 RBC ANTIBODY ELUTION: CPT

## 2018-02-23 RX ORDER — FOLIC ACID 0.8 MG
1 TABLET ORAL
Qty: 10 | Refills: 0
Start: 2018-02-23 | End: 2018-03-04

## 2018-02-23 RX ORDER — FUROSEMIDE 40 MG
1 TABLET ORAL
Qty: 0 | Refills: 0 | COMMUNITY

## 2018-02-23 RX ORDER — PANTOPRAZOLE SODIUM 20 MG/1
1 TABLET, DELAYED RELEASE ORAL
Qty: 10 | Refills: 0 | OUTPATIENT
Start: 2018-02-23 | End: 2018-03-04

## 2018-02-23 RX ORDER — LOSARTAN POTASSIUM 100 MG/1
1 TABLET, FILM COATED ORAL
Qty: 0 | Refills: 0 | COMMUNITY

## 2018-02-23 RX ORDER — METFORMIN HYDROCHLORIDE 850 MG/1
1 TABLET ORAL
Qty: 10 | Refills: 0 | OUTPATIENT
Start: 2018-02-23 | End: 2018-03-04

## 2018-02-23 RX ORDER — ASPIRIN/CALCIUM CARB/MAGNESIUM 324 MG
1 TABLET ORAL
Qty: 0 | Refills: 0 | COMMUNITY

## 2018-02-23 RX ADMIN — Medication 1 MILLIGRAM(S): at 12:17

## 2018-02-23 RX ADMIN — Medication 5: at 12:18

## 2018-02-23 RX ADMIN — PANTOPRAZOLE SODIUM 40 MILLIGRAM(S): 20 TABLET, DELAYED RELEASE ORAL at 05:06

## 2018-02-23 RX ADMIN — Medication 2: at 17:22

## 2018-02-23 RX ADMIN — Medication 1: at 08:11

## 2018-02-23 RX ADMIN — Medication 60 MILLIGRAM(S): at 05:07

## 2018-02-23 NOTE — PROGRESS NOTE ADULT - SUBJECTIVE AND OBJECTIVE BOX
Patient seen and examined;  Chart reviewed and events noted;   feeling well; wife at bedside    MEDICATIONS  (STANDING):  dextrose 5%. 1000 milliLiter(s) (50 mL/Hr) IV Continuous <Continuous>  dextrose 50% Injectable 12.5 Gram(s) IV Push once  dextrose 50% Injectable 25 Gram(s) IV Push once  dextrose 50% Injectable 25 Gram(s) IV Push once  folic acid 1 milliGRAM(s) Oral daily  insulin lispro (HumaLOG) corrective regimen sliding scale   SubCutaneous three times a day before meals  insulin lispro (HumaLOG) corrective regimen sliding scale   SubCutaneous at bedtime  pantoprazole    Tablet 40 milliGRAM(s) Oral before breakfast  predniSONE   Tablet 60 milliGRAM(s) Oral daily    MEDICATIONS  (PRN):  acetaminophen   Tablet. 650 milliGRAM(s) Oral once PRN Mild Pain (1 - 3)  dextrose Gel 1 Dose(s) Oral once PRN Blood Glucose LESS THAN 70 milliGRAM(s)/deciliter  diphenhydrAMINE   Capsule 25 milliGRAM(s) Oral daily PRN Rash and/or Itching  glucagon  Injectable 1 milliGRAM(s) IntraMuscular once PRN Glucose LESS THAN 70 milligrams/deciliter      Vital Signs Last 24 Hrs  T(C): 36.6 (23 Feb 2018 08:24), Max: 37.1 (22 Feb 2018 12:15)  T(F): 97.9 (23 Feb 2018 08:24), Max: 98.8 (22 Feb 2018 19:41)  HR: 70 (23 Feb 2018 08:24) (70 - 88)  BP: 121/77 (23 Feb 2018 08:24) (114/57 - 122/72)  RR: 15 (23 Feb 2018 08:24) (15 - 18)  SpO2: 99% (23 Feb 2018 08:24) (98% - 100%)    PHYSICAL EXAM  General: adult in NAD  HEENT: clear oropharynx, mild scleral icterus  Neck: supple  CV: normal S1S2 with no murmur rubs or gallops  Lungs: clear to auscultation, no wheezes, no rhales  Abdomen: soft non-tender non-distended, no hepato/splenomegaly  Ext: no clubbing cyanosis or edema  Skin: no rashes and no petichiae  Neuro: alert and oriented X3 no focal deficits      LABS:                        7.9    13.8  )-----------( 198      ( 23 Feb 2018 06:24 )             22.0     02-23    Hemoglobin: 7.9 g/dL (02-23 @ 06:24)  Hemoglobin: 8.2 g/dL (02-22 @ 07:03)  Hemoglobin: 8.4 g/dL (02-21 @ 11:32)  Hemoglobin: 8.2 g/dL (02-20 @ 18:05)  Hemoglobin: 7.9 g/dL (02-20 @ 07:10)      143  |  111<H>  |  30<H>  ----------------------------<  128<H>  3.5   |  22  |  1.50<H>    Ca    8.2<L>      23 Feb 2018 06:24    TPro  6.6  /  Alb  3.4  /  TBili  1.7<H>  /  DBili  .60<H>  /  AST  21  /  ALT  17  /  AlkPhos  141<H>  02-22

## 2018-02-23 NOTE — PROGRESS NOTE ADULT - SUBJECTIVE AND OBJECTIVE BOX
[facility] - Cori Dawkins, Dahlia, Travis, Kyle, Tyrel Marcelino  Office Number:  [phone]    Patient resting comfortably in bed in NAD.  Laying flat with no respiratory distress.  No complaints of chest pain, dyspnea, palpitations, PND, or orthopnea. wants to go home    ROS: negative unless otherwise mentioned.    Telemetry:  SR 70    MEDICATIONS  (STANDING):  dextrose 5%. 1000 milliLiter(s) (50 mL/Hr) IV Continuous <Continuous>  dextrose 50% Injectable 12.5 Gram(s) IV Push once  dextrose 50% Injectable 25 Gram(s) IV Push once  dextrose 50% Injectable 25 Gram(s) IV Push once  folic acid 1 milliGRAM(s) Oral daily  insulin lispro (HumaLOG) corrective regimen sliding scale   SubCutaneous three times a day before meals  insulin lispro (HumaLOG) corrective regimen sliding scale   SubCutaneous at bedtime  pantoprazole    Tablet 40 milliGRAM(s) Oral before breakfast  predniSONE   Tablet 60 milliGRAM(s) Oral daily    MEDICATIONS  (PRN):  acetaminophen   Tablet. 650 milliGRAM(s) Oral once PRN Mild Pain (1 - 3)  dextrose Gel 1 Dose(s) Oral once PRN Blood Glucose LESS THAN 70 milliGRAM(s)/deciliter  diphenhydrAMINE   Capsule 25 milliGRAM(s) Oral daily PRN Rash and/or Itching  glucagon  Injectable 1 milliGRAM(s) IntraMuscular once PRN Glucose LESS THAN 70 milligrams/deciliter      Allergies    sulfa drugs (Unknown)    Intolerances        Vital Signs Last 24 Hrs  T(C): 36.8 (23 Feb 2018 11:50), Max: 37.1 (22 Feb 2018 19:41)  T(F): 98.3 (23 Feb 2018 11:50), Max: 98.8 (22 Feb 2018 19:41)  HR: 74 (23 Feb 2018 11:50) (70 - 88)  BP: 117/72 (23 Feb 2018 11:50) (114/57 - 121/77)  BP(mean): --  RR: 19 (23 Feb 2018 11:50) (15 - 19)  SpO2: 99% (23 Feb 2018 11:50) (98% - 100%)    I&O's Summary    22 Feb 2018 07:01  -  23 Feb 2018 07:00  --------------------------------------------------------  IN: 780 mL / OUT: 0 mL / NET: 780 mL    23 Feb 2018 07:01  -  23 Feb 2018 12:27  --------------------------------------------------------  IN: 480 mL / OUT: 0 mL / NET: 480 mL        ON EXAM:    Constitutional: NAD, awake and alert, well-developed  Eyes:  EOMI,  Pupils round, no lesions  ENMT: no exudate or erythema  Pulmonary: Non-labored, breath sounds are clear bilaterally, No wheezing, rales or rhonchi  Cardiovascular: PMI not palpable non-displaced Regular S1 and S2, no murmurs, rubs, gallops or clicks  Gastrointestinal: Bowel Sounds present, soft, nontender.   Lymph: No peripheral edema. No cervical lymphadenopathy.  Neurological: Alert, no focal deficits  Skin: No rashes. Changes of chronic venous stasis. No cyanosis.  Psych:  Mood & affect appropriate.    LABS: All Labs Reviewed:                        7.9    13.8  )-----------( 198      ( 23 Feb 2018 06:24 )             22.0                         8.2    10.5  )-----------( 189      ( 22 Feb 2018 07:03 )             22.8                         8.4    8.5   )-----------( 202      ( 21 Feb 2018 11:32 )             24.0     23 Feb 2018 06:24    143    |  111    |  30     ----------------------------<  128    3.5     |  22     |  1.50   22 Feb 2018 07:03    141    |  112    |  25     ----------------------------<  173    3.9     |  22     |  1.30   21 Feb 2018 07:07    142    |  110    |  24     ----------------------------<  125    4.2     |  23     |  1.50     Ca    8.2        23 Feb 2018 06:24  Ca    8.2        22 Feb 2018 07:03  Ca    8.2        21 Feb 2018 07:07    TPro  6.6    /  Alb  3.4    /  TBili  1.7    /  DBili  .60    /  AST  21     /  ALT  17     /  AlkPhos  141    22 Feb 2018 07:03          Blood Culture:

## 2018-02-23 NOTE — PROGRESS NOTE ADULT - ASSESSMENT
74 y/o man presented with severe anemia in setting of recent non-bloody diarrhea; at time of admission found to have Hg 6.2 requiring transfusion  negative colonoscopy in 2015 and EGD during this admission only with gastritis    - LW consistent with hemolysis , DAISY is positive both IgG and complement , which is suggestive on Warm AIHA   - no palpable LAD , the trigger is unknown   - Hg has stabilized on oral steroids  - continue Prednisone 60mg daily over weekend  - continue folic acid supplement  - ensure PPI to prevent worsening gastritis  - patient has appointment scheduled in our office on Monday 2/26/18 for CBC check  - stable for discharge from Heme standpoint

## 2018-02-23 NOTE — PROGRESS NOTE ADULT - SUBJECTIVE AND OBJECTIVE BOX
Interval Events: No new overnight event.  No N/V/D.  Tolerating diet.    HPI:73M PMHx DM type 2, HTN presenting with worsening SOB and dyspnea on exertion x 2 weeks. Patient now unable to walk more than 15 minute without having to stop. States he threw his back out 3 weeks ago, was taking 20mg Advil TID for 4 days, given muscle relaxer, and pain resolved. 2 weeks ago had 3 days of diarrhea, told it was gastroenteritis, denies blood, but unsure if dark in color. The dyspnea has gotten to the point where he cannot walk up 2 stairs without feeling tired, 1 month ago was able to run a 5k without difficulty.  Denies nausea, vomiting, loss of appetite, active bleeding, chest pain, palpitations.     In the ED HR 95, T 98.6F, /67, SpO2 97%, RR 19. Hg 6.2, .7, BUN 26, Cr 1.5, total Bili 1.9, cardiac enzymes negative x1, proBNP within normal. 2 units PRBC ordered, CXR prelim negative and EKG showing HR 88, normal sinus rhythm.       MEDICATIONS  (STANDING):  dextrose 5%. 1000 milliLiter(s) (50 mL/Hr) IV Continuous <Continuous>  dextrose 50% Injectable 12.5 Gram(s) IV Push once  dextrose 50% Injectable 25 Gram(s) IV Push once  dextrose 50% Injectable 25 Gram(s) IV Push once  folic acid 1 milliGRAM(s) Oral daily  insulin lispro (HumaLOG) corrective regimen sliding scale   SubCutaneous three times a day before meals  insulin lispro (HumaLOG) corrective regimen sliding scale   SubCutaneous at bedtime  pantoprazole    Tablet 40 milliGRAM(s) Oral before breakfast  predniSONE   Tablet 60 milliGRAM(s) Oral daily    MEDICATIONS  (PRN):  acetaminophen   Tablet. 650 milliGRAM(s) Oral once PRN Mild Pain (1 - 3)  dextrose Gel 1 Dose(s) Oral once PRN Blood Glucose LESS THAN 70 milliGRAM(s)/deciliter  diphenhydrAMINE   Capsule 25 milliGRAM(s) Oral daily PRN Rash and/or Itching  glucagon  Injectable 1 milliGRAM(s) IntraMuscular once PRN Glucose LESS THAN 70 milligrams/deciliter      Allergies    sulfa drugs (Unknown)    Intolerances        Review of Systems:    General:  No wt loss, fevers, chills, night sweats,fatigue,   Eyes:  Good vision, no reported pain  ENT:  No sore throat, pain, runny nose, dysphagia  CV:  No pain, palpitations, hypo/hypertension  Resp:  No dyspnea, cough, tachypnea, wheezing  GI:  No pain, No nausea, No vomiting, No diarrhea, No constipation, No weight loss, No fever, No pruritis, No rectal bleeding, No melena, No dysphagia  :  No pain, bleeding, incontinence, nocturia  Muscle:  No pain, weakness  Neuro:  No weakness, tingling, memory problems  Psych:  No fatigue, insomnia, mood problems, depression  Endocrine:  No polyuria, polydypsia, cold/heat intolerance  Heme:  No petechiae, ecchymosis, easy bruisability  Skin:  No rash, tattoos, scars, edema      Vital Signs Last 24 Hrs  T(C): 36.8 (23 Feb 2018 11:50), Max: 37.1 (22 Feb 2018 12:15)  T(F): 98.3 (23 Feb 2018 11:50), Max: 98.8 (22 Feb 2018 19:41)  HR: 74 (23 Feb 2018 11:50) (70 - 88)  BP: 117/72 (23 Feb 2018 11:50) (114/57 - 122/72)  BP(mean): --  RR: 19 (23 Feb 2018 11:50) (15 - 19)  SpO2: 99% (23 Feb 2018 11:50) (98% - 100%)    PHYSICAL EXAM:    Constitutional: NAD, well-developed  HEENT: EOMI, throat clear  Neck: No LAD, supple  Respiratory: CTA and P  Cardiovascular: S1 and S2, RRR, no M  Gastrointestinal: BS+, soft, NT/ND, neg HSM,  Extremities: No peripheral edema, neg clubing, cyanosis  Vascular: 2+ peripheral pulses  Neurological: A/O x 3, no focal deficits  Psychiatric: Normal mood, normal affect  Skin: No rashes      LABS:                        7.9    13.8  )-----------( 198      ( 23 Feb 2018 06:24 )             22.0     02-23    143  |  111<H>  |  30<H>  ----------------------------<  128<H>  3.5   |  22  |  1.50<H>    Ca    8.2<L>      23 Feb 2018 06:24    TPro  6.6  /  Alb  3.4  /  TBili  1.7<H>  /  DBili  .60<H>  /  AST  21  /  ALT  17  /  AlkPhos  141<H>  02-22          RADIOLOGY & ADDITIONAL TESTS:

## 2018-02-23 NOTE — PROGRESS NOTE ADULT - PROBLEM SELECTOR PROBLEM 1
Anemia, unspecified type
Symptomatic anemia

## 2018-02-23 NOTE — PROGRESS NOTE ADULT - ASSESSMENT
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    - He clearly is symptomatic from profound anemia.   - He had nonanginal chest pain which was self limiting. No signs of significant ischemia. EKG without sig ischemic changes  - On steroids for warm hemolytic anemia  - heme follow up appreciated.  - Continue to hold all antiplatelets or AC  - IRMA improving, though creatinine 1.5  - Trace edema is chronic.    - echo is normal  - Monitor and replete electrolytes. Keep K>4.0 and Mg>2.0; replete K+ 3.9 with 20meq kcl  - Further cardiac workup will depend on clinical course.   - All other workup per primary team. Will followup.   - he will follow up with us in the office upon d/c

## 2018-02-23 NOTE — PROGRESS NOTE ADULT - PROBLEM SELECTOR PLAN 1
s/p EGD with gastritis, f/u biopsy  hemolysis based on labs, heme eval appreciated  on oral steriods  monitor h/h daily, transfuse prn

## 2018-02-23 NOTE — PROGRESS NOTE ADULT - PROVIDER SPECIALTY LIST ADULT
Anesthesia
Cardiology
Family Medicine
Gastroenterology
Heme/Onc
Heme/Onc
Hospitalist
Cardiology

## 2018-05-03 ENCOUNTER — NON-APPOINTMENT (OUTPATIENT)
Age: 73
End: 2018-05-03

## 2018-05-03 ENCOUNTER — APPOINTMENT (OUTPATIENT)
Dept: CARDIOLOGY | Facility: CLINIC | Age: 73
End: 2018-05-03
Payer: COMMERCIAL

## 2018-05-03 VITALS
DIASTOLIC BLOOD PRESSURE: 72 MMHG | BODY MASS INDEX: 29.49 KG/M2 | HEART RATE: 96 BPM | WEIGHT: 177 LBS | HEIGHT: 65 IN | SYSTOLIC BLOOD PRESSURE: 123 MMHG | OXYGEN SATURATION: 99 %

## 2018-05-03 DIAGNOSIS — I10 ESSENTIAL (PRIMARY) HYPERTENSION: ICD-10-CM

## 2018-05-03 DIAGNOSIS — R60.0 LOCALIZED EDEMA: ICD-10-CM

## 2018-05-03 PROCEDURE — 93000 ELECTROCARDIOGRAM COMPLETE: CPT

## 2018-05-03 PROCEDURE — 99214 OFFICE O/P EST MOD 30 MIN: CPT

## 2018-05-03 RX ORDER — FOLIC ACID 1 MG/1
1 TABLET ORAL
Qty: 30 | Refills: 0 | Status: ACTIVE | COMMUNITY
Start: 2018-03-05

## 2018-05-03 RX ORDER — SPIRONOLACTONE 50 MG/1
TABLET ORAL
Refills: 0 | Status: DISCONTINUED | COMMUNITY

## 2018-05-03 RX ORDER — ASPIRIN ENTERIC COATED TABLETS 81 MG 81 MG/1
81 TABLET, DELAYED RELEASE ORAL
Qty: 90 | Refills: 0 | Status: DISCONTINUED | COMMUNITY
Start: 2018-01-15

## 2018-05-03 RX ORDER — AZITHROMYCIN 250 MG/1
250 TABLET, FILM COATED ORAL
Qty: 6 | Refills: 0 | Status: DISCONTINUED | COMMUNITY
Start: 2018-03-26

## 2018-05-03 RX ORDER — UBIDECARENONE/VIT E ACET 100MG-5
CAPSULE ORAL
Refills: 0 | Status: DISCONTINUED | COMMUNITY

## 2018-05-03 RX ORDER — PANTOPRAZOLE 40 MG/1
40 TABLET, DELAYED RELEASE ORAL
Qty: 30 | Refills: 0 | Status: DISCONTINUED | COMMUNITY
Start: 2018-03-05

## 2018-05-03 RX ORDER — DIBASIC SODIUM PHOSPHATE, MONOBASIC POTASSIUM PHOSPHATE AND MONOBASIC SODIUM PHOSPHATE 852; 155; 130 MG/1; MG/1; MG/1
155-852-130 TABLET ORAL
Qty: 10 | Refills: 0 | Status: DISCONTINUED | COMMUNITY
Start: 2018-04-17

## 2018-05-03 RX ORDER — CYCLOBENZAPRINE HYDROCHLORIDE 10 MG/1
10 TABLET, FILM COATED ORAL
Qty: 21 | Refills: 0 | Status: DISCONTINUED | COMMUNITY
Start: 2018-01-26

## 2018-05-03 RX ORDER — LOSARTAN POTASSIUM 100 MG/1
TABLET, FILM COATED ORAL
Refills: 0 | Status: DISCONTINUED | COMMUNITY

## 2018-05-03 RX ORDER — METFORMIN HYDROCHLORIDE 625 MG/1
TABLET ORAL
Refills: 0 | Status: DISCONTINUED | COMMUNITY

## 2018-05-03 RX ORDER — GUAIFENESIN AND DEXTROMETHORPHAN HYDROBROMIDE 100; 10 MG/5ML; MG/5ML
100-10 LIQUID ORAL
Qty: 118 | Refills: 0 | Status: DISCONTINUED | COMMUNITY
Start: 2018-03-26

## 2018-05-03 RX ORDER — FEXOFENADINE HCL 60 MG
CAPSULE ORAL
Refills: 0 | Status: DISCONTINUED | COMMUNITY

## 2018-05-03 RX ORDER — FUROSEMIDE 40 MG/1
40 TABLET ORAL
Refills: 0 | Status: DISCONTINUED | COMMUNITY

## 2018-05-03 RX ORDER — ASPIRIN 81 MG
81 TABLET, DELAYED RELEASE (ENTERIC COATED) ORAL
Refills: 0 | Status: DISCONTINUED | COMMUNITY

## 2018-05-03 RX ORDER — PREDNISONE 20 MG/1
20 TABLET ORAL
Qty: 30 | Refills: 0 | Status: DISCONTINUED | COMMUNITY
Start: 2018-02-23

## 2018-05-03 RX ORDER — FUROSEMIDE 20 MG/1
20 TABLET ORAL
Qty: 20 | Refills: 0 | Status: DISCONTINUED | COMMUNITY
Start: 2018-03-12

## 2018-05-26 ENCOUNTER — OUTPATIENT (OUTPATIENT)
Dept: OUTPATIENT SERVICES | Facility: HOSPITAL | Age: 73
LOS: 1 days | End: 2018-05-26
Payer: COMMERCIAL

## 2018-05-26 ENCOUNTER — APPOINTMENT (OUTPATIENT)
Dept: CT IMAGING | Facility: CLINIC | Age: 73
End: 2018-05-26
Payer: COMMERCIAL

## 2018-05-26 DIAGNOSIS — Z00.8 ENCOUNTER FOR OTHER GENERAL EXAMINATION: ICD-10-CM

## 2018-05-26 PROCEDURE — 71260 CT THORAX DX C+: CPT | Mod: 26

## 2018-05-26 PROCEDURE — 74177 CT ABD & PELVIS W/CONTRAST: CPT | Mod: 26

## 2018-05-26 PROCEDURE — 74177 CT ABD & PELVIS W/CONTRAST: CPT

## 2018-05-26 PROCEDURE — 71260 CT THORAX DX C+: CPT

## 2018-07-28 ENCOUNTER — APPOINTMENT (OUTPATIENT)
Dept: CT IMAGING | Facility: CLINIC | Age: 73
End: 2018-07-28
Payer: COMMERCIAL

## 2018-07-28 ENCOUNTER — OUTPATIENT (OUTPATIENT)
Dept: OUTPATIENT SERVICES | Facility: HOSPITAL | Age: 73
LOS: 1 days | End: 2018-07-28
Payer: COMMERCIAL

## 2018-07-28 DIAGNOSIS — D64.89 OTHER SPECIFIED ANEMIAS: ICD-10-CM

## 2018-07-28 PROCEDURE — 74177 CT ABD & PELVIS W/CONTRAST: CPT | Mod: 26

## 2018-07-28 PROCEDURE — 71260 CT THORAX DX C+: CPT | Mod: 26

## 2018-07-28 PROCEDURE — 71260 CT THORAX DX C+: CPT

## 2018-07-28 PROCEDURE — 74177 CT ABD & PELVIS W/CONTRAST: CPT

## 2018-07-28 PROCEDURE — 82565 ASSAY OF CREATININE: CPT

## 2018-08-02 ENCOUNTER — APPOINTMENT (OUTPATIENT)
Dept: CARDIOLOGY | Facility: CLINIC | Age: 73
End: 2018-08-02

## 2018-10-23 ENCOUNTER — OUTPATIENT (OUTPATIENT)
Dept: OUTPATIENT SERVICES | Facility: HOSPITAL | Age: 73
LOS: 1 days | End: 2018-10-23
Payer: COMMERCIAL

## 2018-10-23 ENCOUNTER — APPOINTMENT (OUTPATIENT)
Dept: CT IMAGING | Facility: CLINIC | Age: 73
End: 2018-10-23

## 2018-10-23 DIAGNOSIS — Z00.8 ENCOUNTER FOR OTHER GENERAL EXAMINATION: ICD-10-CM

## 2018-10-23 PROCEDURE — 74177 CT ABD & PELVIS W/CONTRAST: CPT

## 2018-10-23 PROCEDURE — 71260 CT THORAX DX C+: CPT

## 2018-12-18 ENCOUNTER — INPATIENT (INPATIENT)
Facility: HOSPITAL | Age: 73
LOS: 0 days | Discharge: HOME | DRG: 149 | End: 2018-12-19
Attending: HOSPITALIST | Admitting: STUDENT IN AN ORGANIZED HEALTH CARE EDUCATION/TRAINING PROGRAM
Payer: COMMERCIAL

## 2018-12-18 VITALS
WEIGHT: 182.98 LBS | HEART RATE: 60 BPM | TEMPERATURE: 98 F | RESPIRATION RATE: 15 BRPM | HEIGHT: 65 IN | OXYGEN SATURATION: 96 % | SYSTOLIC BLOOD PRESSURE: 160 MMHG | DIASTOLIC BLOOD PRESSURE: 87 MMHG

## 2018-12-18 DIAGNOSIS — C95.90 LEUKEMIA, UNSPECIFIED NOT HAVING ACHIEVED REMISSION: ICD-10-CM

## 2018-12-18 DIAGNOSIS — R42 DIZZINESS AND GIDDINESS: ICD-10-CM

## 2018-12-18 DIAGNOSIS — E11.9 TYPE 2 DIABETES MELLITUS WITHOUT COMPLICATIONS: ICD-10-CM

## 2018-12-18 DIAGNOSIS — I10 ESSENTIAL (PRIMARY) HYPERTENSION: ICD-10-CM

## 2018-12-18 DIAGNOSIS — Z98.890 OTHER SPECIFIED POSTPROCEDURAL STATES: Chronic | ICD-10-CM

## 2018-12-18 DIAGNOSIS — Z29.9 ENCOUNTER FOR PROPHYLACTIC MEASURES, UNSPECIFIED: ICD-10-CM

## 2018-12-18 LAB
ALBUMIN SERPL ELPH-MCNC: 4.1 G/DL — SIGNIFICANT CHANGE UP (ref 3.3–5)
ALP SERPL-CCNC: 127 U/L — HIGH (ref 40–120)
ALT FLD-CCNC: 27 U/L — SIGNIFICANT CHANGE UP (ref 12–78)
ANION GAP SERPL CALC-SCNC: 5 MMOL/L — SIGNIFICANT CHANGE UP (ref 5–17)
APTT BLD: 21.7 SEC — LOW (ref 28.5–37)
APTT BLD: 30 SEC — SIGNIFICANT CHANGE UP (ref 28.5–37)
AST SERPL-CCNC: 20 U/L — SIGNIFICANT CHANGE UP (ref 15–37)
BASOPHILS # BLD AUTO: 0 K/UL — SIGNIFICANT CHANGE UP (ref 0–0.2)
BASOPHILS NFR BLD AUTO: 0 % — SIGNIFICANT CHANGE UP (ref 0–2)
BILIRUB SERPL-MCNC: 1.1 MG/DL — SIGNIFICANT CHANGE UP (ref 0.2–1.2)
BUN SERPL-MCNC: 26 MG/DL — HIGH (ref 7–23)
CALCIUM SERPL-MCNC: 8.3 MG/DL — LOW (ref 8.5–10.1)
CHLORIDE SERPL-SCNC: 105 MMOL/L — SIGNIFICANT CHANGE UP (ref 96–108)
CO2 SERPL-SCNC: 29 MMOL/L — SIGNIFICANT CHANGE UP (ref 22–31)
CREAT SERPL-MCNC: 1.3 MG/DL — SIGNIFICANT CHANGE UP (ref 0.5–1.3)
EOSINOPHIL # BLD AUTO: 0.07 K/UL — SIGNIFICANT CHANGE UP (ref 0–0.5)
EOSINOPHIL NFR BLD AUTO: 1 % — SIGNIFICANT CHANGE UP (ref 0–6)
GLUCOSE SERPL-MCNC: 120 MG/DL — HIGH (ref 70–99)
HCT VFR BLD CALC: 41.9 % — SIGNIFICANT CHANGE UP (ref 39–50)
HGB BLD-MCNC: 14.2 G/DL — SIGNIFICANT CHANGE UP (ref 13–17)
INR BLD: 1.03 RATIO — SIGNIFICANT CHANGE UP (ref 0.88–1.16)
INR BLD: 1.04 RATIO — SIGNIFICANT CHANGE UP (ref 0.88–1.16)
LYMPHOCYTES # BLD AUTO: 0.86 K/UL — LOW (ref 1–3.3)
LYMPHOCYTES # BLD AUTO: 12 % — LOW (ref 13–44)
MCHC RBC-ENTMCNC: 31.5 PG — SIGNIFICANT CHANGE UP (ref 27–34)
MCHC RBC-ENTMCNC: 33.9 GM/DL — SIGNIFICANT CHANGE UP (ref 32–36)
MCV RBC AUTO: 92.9 FL — SIGNIFICANT CHANGE UP (ref 80–100)
MONOCYTES # BLD AUTO: 0.72 K/UL — SIGNIFICANT CHANGE UP (ref 0–0.9)
MONOCYTES NFR BLD AUTO: 10 % — SIGNIFICANT CHANGE UP (ref 2–14)
NEUTROPHILS # BLD AUTO: 5.04 K/UL — SIGNIFICANT CHANGE UP (ref 1.8–7.4)
NEUTROPHILS NFR BLD AUTO: 66 % — SIGNIFICANT CHANGE UP (ref 43–77)
PLATELET # BLD AUTO: 102 K/UL — LOW (ref 150–400)
POTASSIUM SERPL-MCNC: 3.8 MMOL/L — SIGNIFICANT CHANGE UP (ref 3.5–5.3)
POTASSIUM SERPL-SCNC: 3.8 MMOL/L — SIGNIFICANT CHANGE UP (ref 3.5–5.3)
PROT SERPL-MCNC: 6.8 G/DL — SIGNIFICANT CHANGE UP (ref 6–8.3)
PROTHROM AB SERPL-ACNC: 11.7 SEC — SIGNIFICANT CHANGE UP (ref 10–12.9)
PROTHROM AB SERPL-ACNC: 11.9 SEC — SIGNIFICANT CHANGE UP (ref 10–12.9)
RBC # BLD: 4.51 M/UL — SIGNIFICANT CHANGE UP (ref 4.2–5.8)
RBC # FLD: 12 % — SIGNIFICANT CHANGE UP (ref 10.3–14.5)
SODIUM SERPL-SCNC: 139 MMOL/L — SIGNIFICANT CHANGE UP (ref 135–145)
WBC # BLD: 7.2 K/UL — SIGNIFICANT CHANGE UP (ref 3.8–10.5)
WBC # FLD AUTO: 7.2 K/UL — SIGNIFICANT CHANGE UP (ref 3.8–10.5)

## 2018-12-18 PROCEDURE — 71045 X-RAY EXAM CHEST 1 VIEW: CPT | Mod: 26

## 2018-12-18 PROCEDURE — 93010 ELECTROCARDIOGRAM REPORT: CPT

## 2018-12-18 PROCEDURE — 99285 EMERGENCY DEPT VISIT HI MDM: CPT

## 2018-12-18 PROCEDURE — 99223 1ST HOSP IP/OBS HIGH 75: CPT | Mod: GC,AI

## 2018-12-18 PROCEDURE — 70450 CT HEAD/BRAIN W/O DYE: CPT | Mod: 26

## 2018-12-18 RX ORDER — GLUCAGON INJECTION, SOLUTION 0.5 MG/.1ML
1 INJECTION, SOLUTION SUBCUTANEOUS ONCE
Qty: 0 | Refills: 0 | Status: DISCONTINUED | OUTPATIENT
Start: 2018-12-18 | End: 2018-12-19

## 2018-12-18 RX ORDER — DEXTROSE 50 % IN WATER 50 %
15 SYRINGE (ML) INTRAVENOUS ONCE
Qty: 0 | Refills: 0 | Status: DISCONTINUED | OUTPATIENT
Start: 2018-12-18 | End: 2018-12-18

## 2018-12-18 RX ORDER — DEXTROSE 50 % IN WATER 50 %
25 SYRINGE (ML) INTRAVENOUS ONCE
Qty: 0 | Refills: 0 | Status: DISCONTINUED | OUTPATIENT
Start: 2018-12-18 | End: 2018-12-18

## 2018-12-18 RX ORDER — INSULIN LISPRO 100/ML
VIAL (ML) SUBCUTANEOUS
Qty: 0 | Refills: 0 | Status: DISCONTINUED | OUTPATIENT
Start: 2018-12-18 | End: 2018-12-18

## 2018-12-18 RX ORDER — DEXTROSE 50 % IN WATER 50 %
15 SYRINGE (ML) INTRAVENOUS ONCE
Qty: 0 | Refills: 0 | Status: DISCONTINUED | OUTPATIENT
Start: 2018-12-18 | End: 2018-12-19

## 2018-12-18 RX ORDER — ONDANSETRON 8 MG/1
4 TABLET, FILM COATED ORAL EVERY 6 HOURS
Qty: 0 | Refills: 0 | Status: DISCONTINUED | OUTPATIENT
Start: 2018-12-18 | End: 2018-12-19

## 2018-12-18 RX ORDER — MECLIZINE HCL 12.5 MG
25 TABLET ORAL ONCE
Qty: 0 | Refills: 0 | Status: COMPLETED | OUTPATIENT
Start: 2018-12-18 | End: 2018-12-18

## 2018-12-18 RX ORDER — DEXTROSE 50 % IN WATER 50 %
12.5 SYRINGE (ML) INTRAVENOUS ONCE
Qty: 0 | Refills: 0 | Status: DISCONTINUED | OUTPATIENT
Start: 2018-12-18 | End: 2018-12-19

## 2018-12-18 RX ORDER — SODIUM CHLORIDE 9 MG/ML
1000 INJECTION, SOLUTION INTRAVENOUS
Qty: 0 | Refills: 0 | Status: DISCONTINUED | OUTPATIENT
Start: 2018-12-18 | End: 2018-12-19

## 2018-12-18 RX ORDER — FOLIC ACID 0.8 MG
1 TABLET ORAL DAILY
Qty: 0 | Refills: 0 | Status: DISCONTINUED | OUTPATIENT
Start: 2018-12-18 | End: 2018-12-19

## 2018-12-18 RX ORDER — ENTECAVIR 0.5 MG/1
0.5 TABLET ORAL DAILY
Qty: 0 | Refills: 0 | Status: DISCONTINUED | OUTPATIENT
Start: 2018-12-19 | End: 2018-12-19

## 2018-12-18 RX ORDER — DIAZEPAM 5 MG
5 TABLET ORAL ONCE
Qty: 0 | Refills: 0 | Status: DISCONTINUED | OUTPATIENT
Start: 2018-12-18 | End: 2018-12-18

## 2018-12-18 RX ORDER — ONDANSETRON 8 MG/1
4 TABLET, FILM COATED ORAL ONCE
Qty: 0 | Refills: 0 | Status: COMPLETED | OUTPATIENT
Start: 2018-12-18 | End: 2018-12-18

## 2018-12-18 RX ORDER — DEXTROSE 50 % IN WATER 50 %
25 SYRINGE (ML) INTRAVENOUS ONCE
Qty: 0 | Refills: 0 | Status: DISCONTINUED | OUTPATIENT
Start: 2018-12-18 | End: 2018-12-19

## 2018-12-18 RX ORDER — DEXTROSE 50 % IN WATER 50 %
12.5 SYRINGE (ML) INTRAVENOUS ONCE
Qty: 0 | Refills: 0 | Status: DISCONTINUED | OUTPATIENT
Start: 2018-12-18 | End: 2018-12-18

## 2018-12-18 RX ORDER — INSULIN LISPRO 100/ML
VIAL (ML) SUBCUTANEOUS
Qty: 0 | Refills: 0 | Status: DISCONTINUED | OUTPATIENT
Start: 2018-12-18 | End: 2018-12-19

## 2018-12-18 RX ORDER — SODIUM CHLORIDE 9 MG/ML
1000 INJECTION, SOLUTION INTRAVENOUS
Qty: 0 | Refills: 0 | Status: DISCONTINUED | OUTPATIENT
Start: 2018-12-18 | End: 2018-12-18

## 2018-12-18 RX ORDER — GLUCAGON INJECTION, SOLUTION 0.5 MG/.1ML
1 INJECTION, SOLUTION SUBCUTANEOUS ONCE
Qty: 0 | Refills: 0 | Status: DISCONTINUED | OUTPATIENT
Start: 2018-12-18 | End: 2018-12-18

## 2018-12-18 RX ORDER — MECLIZINE HCL 12.5 MG
25 TABLET ORAL
Qty: 0 | Refills: 0 | Status: DISCONTINUED | OUTPATIENT
Start: 2018-12-18 | End: 2018-12-19

## 2018-12-18 RX ADMIN — Medication 1: at 17:22

## 2018-12-18 RX ADMIN — ONDANSETRON 4 MILLIGRAM(S): 8 TABLET, FILM COATED ORAL at 10:57

## 2018-12-18 RX ADMIN — Medication 25 MILLIGRAM(S): at 10:57

## 2018-12-18 RX ADMIN — Medication 5 MILLIGRAM(S): at 13:21

## 2018-12-18 NOTE — ED PROVIDER NOTE - PROGRESS NOTE DETAILS
discussed case with Dr. Blackwell, will see patient later today.  Spoke with hematologist Dr. Allen recommend admit if patient still having difficulty walking, states the chemo medication he is on increased risk fo afib/stroke. patient still feels dizzy, discussed case with Dr. Young, will admit

## 2018-12-18 NOTE — PHYSICAL THERAPY INITIAL EVALUATION ADULT - ADDITIONAL COMMENTS
Pt lives in an apartment, + 13 AFTAB (6 outside and 7 inside).  Pt was independent in all ADLs and independent in ambulation without a device.

## 2018-12-18 NOTE — H&P ADULT - PROBLEM SELECTOR PLAN 1
Admit to [facility]  Concern for acute neurological event as potential side effect of Imbruvica   CT head done in ED not revealing of acute changes  Neurology consulted Dr. Blackwell  MRI brain planned for further w/u  Telemetry monitoring  Fall precautions  PT eval for gait evaluation Admit to [facility]  Concern for acute neurological event as potential side effect of Imbruvica   CT head done in ED not revealing of acute changes  Neurology consulted Dr. Blackwell  MRI brain planned for further w/u  Telemetry monitoring  Ambulate with Assistance  PT aaliyah for gait evaluation Admit to [facility]  Vertigo vs concern for acute neurological event as potential side effect of Imbruvica   CT head done in ED not revealing of acute changes  Neurology consulted Dr. Blackwell  Meclizine 25mg BID  MRI brain planned for further w/u  Telemetry monitoring  Ambulate with Assistance  PT aaliyah for gait evaluation Admit to [facility]  Vertigo vs acute neurological event as potential side effect of Imbruvica (Afib and bleeding)   CT head done in ED not revealing of acute changes  Neurology consulted Dr. Blackwell  Meclizine 25mg BID  MRI brain planned for further w/u  Telemetry monitoring  Ambulate with Assistance  PT eval for gait evaluation

## 2018-12-18 NOTE — H&P ADULT - NSHPPHYSICALEXAM_GEN_ALL_CORE
Physical Exam:  General: Well developed, well nourished, NAD, pleasant   HEENT: NCAT, PERRLA, EOMI bl, moist mucous membranes   Neck: Supple, nontender, no mass  Neurology: A&Ox3, nonfocal, sensation intact   Respiratory: CTA B/L, No W/R/R  CV: RRR, +S1/S2, no murmurs, rubs or gallops  Abdominal: Soft, NT, ND   Extremities: No C/C/E,  Skin: warm, dry, normal color Physical Exam:  General: Well developed, well nourished, NAD, pleasant   HEENT: NCAT, PERRLA, EOMI bl, moist mucous membranes   Neck: Supple, nontender, no mass  Neurology: A&Ox3, nonfocal, sensation intact   Respiratory: CTA B/L, No W/R/R  CV: RRR, +S1/S2, no murmurs, rubs or gallops  Abdominal: Soft, NT, ND   Extremities: No C/C/E, + hand swelling on right  Skin: warm, dry, normal color

## 2018-12-18 NOTE — H&P ADULT - HISTORY OF PRESENT ILLNESS
72 yo M with PMH of CLL (diagnosed May 2018 and started on Ibruciva July 2018), DM II (not currently on medication), Hepatitis (? type B) HTN presenting with dizziness since last Monday. Patient initially started feeling dizzy and felt as though "the room is spinning around my head." Patient denies any precipitating events or history of same and noticed symptoms were worse with sudden movement and associated with nausea. In the interim, symptoms were gradually improving and patient accommodated with moving slowly and avoiding sudden changes in position. He saw ID Dr. Webber for history of Hep C, who attributed these symptoms to Ibruciva. Patient was seen by Heme/Onc Dr. Abbott who noted that dizziness is not a common side effect and tried to treat him with ? Epley manuver. Patient the developed worsening nausea and dizziness and 1 episode of vomiting which perisited for over 20 minutes in office. EMS was called and patient was BIBA for further evaluation. Denies night sweats, weight loss, fatigue, headache, chest pain, palpitaions, abdominal pain, dysuria, hematuria, diarrhea, melena, hematochezia.    Of note, patient was initally seen in [facility] ED and admitted in Feb 2018 for complaint of weakness. He was found to have anemia with HGB of 6.2. Patient required blood transfusions, had upper endo and diagnosed with hemolytic anemia (warm agglutinins). He was discharged with PO prednisone and Folic Acid and metformin for new onset hypergycemia. Patient continued steroids outpatient and tapered under guidance of Dr. Abbott when patient was noticed to not tolerate decreasing pred dose. He had a CT and a BM biopsy in May 2018 and was diagnosed with CLL. He began treatment with Entacavir for hepatitis with Dr. Webber due to risk of reactivation and began treatment with Imbruciva in July 2018. He has otherwise tolerated the treatment well with no other side effects. 72 yo M with PMH of CLL (diagnosed May 2018 and started on Ibruciva July 2018), DM II (not currently on medication), Hepatitis (? type B) HTN presenting with dizziness since last Monday. Patient initially started feeling dizzy and felt as though "the room is spinning around my head." Patient denies any precipitating events or history of same and noticed symptoms were worse with sudden movement and associated with nausea. In the interim, symptoms were gradually improving and patient accommodated with moving slowly and avoiding sudden changes in position. He saw ID Dr. Webber for history of Hep C, who attributed these symptoms to Ibruciva. Patient was seen by Heme/Onc Dr. Robbins who noted that dizziness is not a common side effect and tried to treat him with ? Devon rizvi. Patient the developed worsening nausea and dizziness and 1 episode of vomiting which perisited for over 20 minutes in office. EMS was called and patient was BIBA for further evaluation. Denies night sweats, weight loss, fatigue, headache, chest pain, palpitaions, abdominal pain, dysuria, hematuria, diarrhea, melena, hematochezia.    Of note, patient was initially seen in [facility] ED and admitted in Feb 2018 for complaint of weakness. He was found to have anemia with HGB of 6.2. Patient required blood transfusions, had upper endo and diagnosed with hemolytic anemia (warm agglutinins). He was discharged with PO prednisone and Folic Acid and metformin for new onset hyperglycemia Patient continued steroids outpatient and tapered under guidance of Dr. Robbins when patient was noticed to not tolerate decreasing pred dose. He had a CT and a BM biopsy in May 2018 and was diagnosed with CLL. He began treatment with Entecavir for hepatitis with Dr. Webber due to risk of reactivation and began treatment with Imbruvica in July 2018. He has otherwise tolerated the treatment well with no other side effects.     In the ED: /88, HR 55, T 97.4 F, H/H 14.2/41.9, Alk Phos 127, EKG Sinus rhythm with 1st degree AV block, CT head- No acute intracranial hemorrhage. Received meclizine diazepam, Zofran 72 yo M with PMH of SLL (diagnosed May 2018 and started on Ibruciva July 2018), DM II (not currently on medication), Hepatitis (? type B) HTN presenting with dizziness since last Monday. Patient initially started feeling dizzy and felt as though "the room is spinning around my head." Patient denies any precipitating events or history of same and noticed symptoms were worse with sudden movement and associated with nausea. In the interim, symptoms were gradually improving and patient accommodated with moving slowly and avoiding sudden changes in position. He saw ID Dr. Webber for history of Hep C, who attributed these symptoms to his medications. Patient was seen by Heme/Onc Dr. Robbins who noted that dizziness is not a common side effect and tried to treat him with ? Devon rizvi. Patient the developed worsening nausea and dizziness and 1 episode of vomiting which perisited for over 20 minutes in office. EMS was called and patient was BIBA for further evaluation. Denies night sweats, weight loss, fatigue, headache, chest pain, palpitaions, abdominal pain, dysuria, hematuria, diarrhea, melena, hematochezia.    Of note, patient was initially seen in [facility] ED and admitted in Feb 2018 for complaint of weakness. He was found to have anemia with HGB of 6.2. Patient required blood transfusions, had upper endo and diagnosed with hemolytic anemia (warm agglutinins). He was discharged with PO prednisone and Folic Acid and metformin for new onset hyperglycemia Patient continued steroids outpatient and tapered under guidance of Dr. Robbins when patient was noticed to not tolerate decreasing pred dose. He had a CT and a BM biopsy in May 2018 and was diagnosed with SLL. He began treatment with Entecavir for hepatitis with Dr. Webber due to risk of reactivation and began treatment with Imbruvica in July 2018. He has otherwise tolerated the treatment well with no other side effects.     In the ED: /88, HR 55, T 97.4 F, H/H 14.2/41.9, Alk Phos 127, EKG Sinus rhythm with 1st degree AV block, CT head- No acute intracranial hemorrhage. Received meclizine diazepam, Zofran

## 2018-12-18 NOTE — ED ADULT NURSE NOTE - NSIMPLEMENTINTERV_GEN_ALL_ED
Implemented All Fall Risk Interventions:  [place] to call system. Call bell, personal items and telephone within reach. Instruct patient to call for assistance. Room bathroom lighting operational. Non-slip footwear when patient is off stretcher. Physically safe environment: no spills, clutter or unnecessary equipment. Stretcher in lowest position, wheels locked, appropriate side rails in place. Provide visual cue, wrist band, yellow gown, etc. Monitor gait and stability. Monitor for mental status changes and reorient to person, place, and time. Review medications for side effects contributing to fall risk. Reinforce activity limits and safety measures with patient and family.

## 2018-12-18 NOTE — H&P ADULT - PROBLEM SELECTOR PLAN 3
Not currently on medication  Will place on ISS to determine insulin need  A1c in AM Has hx of DM but not currently on medication  Diet controlled  A1c in AM Has hx of DM II but not currently on medication  Diet controlled  Started on  FS and low dose ISS   A1c in AM Has hx of DM II but not currently on medication  Diet controlled  Started on FS and low dose ISS   A1c in AM

## 2018-12-18 NOTE — H&P ADULT - FAMILY HISTORY
Family history of diabetes mellitus     Father  Still living? Unknown  Family history of lung cancer, Age at diagnosis: Age Unknown  Family history of hypertension, Age at diagnosis: Age Unknown

## 2018-12-18 NOTE — ED PROVIDER NOTE - NS ED ROS FT
Constitutional: - Fever, - Chills, - Anorexia, - Fatigue, - Night sweats  Eyes: - Discharge, - Irritation, - Redness, - Visual changes, - Light sensitivity, - Pain  EARS: - Ear Pain, - Tinnitus, - Decreased hearing  NOSE: - Congestion, - Bloody nose  MOUTH/THROAT: - Vocal Changes, - Drooling, - Sore throat  NECK: - Lumps, - Stiffness, - Pain  CV: - Palpitations, - Chest Pain, - Edema, - Syncope  RESP:  - Cough, - Shortness of Breath, - Dyspnea on Exertion, - Trouble speaking, - Pleuritic pain - Wheezing  GI: - Diarrhea, - Constipation, - Bloody stools, + Nausea, + Vomiting, - Abdominal Pain  : - Dysuria, -Frequency, - Hematuria, - Hesitancy, - Incontinence, - Saddle Anesthesia, - Abnormal discharge  MSK: - Myalgias, - Arthralgias, - Weakness, - Deformities, - Injuries  SKIN: - Color change, - Rash, - Swelling, - Ecchymosis, - Abrasion, - laceration  NEURO: - Change in behavior, - Dec. Alertness, - Headache, +Dizziness, - Change in speech, - Weakness, - Seizure-like activity, - Difficulty ambulating

## 2018-12-18 NOTE — H&P ADULT - ASSESSMENT
72 yo M with PMH of CLL (diagnosed May 2018 and started on Ibruciva July 2018), DM II (not currently on medication), Hepatitis (? type B) HTN presenting with dizziness since last Monday being admitted for neurology w/u. 74 yo M with PMH of SLL (diagnosed May 2018 and started on Ibruciva July 2018), DM II (not currently on medication), Hepatitis (? type B) HTN presenting with dizziness since last Monday being admitted for neurology w/u.

## 2018-12-18 NOTE — H&P ADULT - PROBLEM SELECTOR PLAN 5
Sequential Teds  Encourage early ambulation Sequential Teds  Encourage early ambulation  Agrees to [facility] Hep C screening

## 2018-12-18 NOTE — H&P ADULT - PROBLEM SELECTOR PLAN 2
H/H stable today  Heme/Onc consulted  Continue Imbruvica for CLL  Continue Entecavir for Hep prophylaxis   Continue Folic acid for prophylaxis H/H stable today  Heme/Onc consulted for management of (Imbruvica for CLL)  ID consulted for management of Entecavir (for Hep prophylaxis)   Continue Folic acid for prophylaxis H/H stable today  Heme/Onc consulted for management of (Imbruvica for SLL)  D/w with Dr. Robbins who advised to hold Imbuvica until she sees patient tomorrow  ID consulted for management of Entecavir (for Hep prophylaxis)   Continue Folic acid for prophylaxis H/H stable today  Heme/Onc consulted for management of (Imbruvica for SLL)  D/w with Dr. Robbins who advised to hold Imbuvica until she sees patient tomorrow  ID consulted for management of Entecavir (for Hep prophylaxis) pt took dose today and will bring from home   Continue Folic acid for prophylaxis

## 2018-12-18 NOTE — H&P ADULT - NSHPOUTPATIENTPROVIDERS_GEN_ALL_CORE
PMD- Dr. Camacho- [facility]   Heme/ Onc: Dr. Jorge PMD- Dr. Camacho- [facility]   Heme/ Onc: Dr. Robbins

## 2018-12-18 NOTE — PHYSICAL THERAPY INITIAL EVALUATION ADULT - PERTINENT HX OF CURRENT PROBLEM, REHAB EVAL
74 yo M with PMH of CLL (diagnosed May 2018 and started on Ibruciva July 2018), DM II (not currently on medication), Hepatitis (? type B) HTN presenting with dizziness since last Monday. Patient initially started feeling dizzy and felt as though "the room is spinning around my head." Patient denies any precipitating events or history of same and noticed symptoms were worse with sudden movement and associated with nausea.

## 2018-12-18 NOTE — H&P ADULT - NSHPREVIEWOFSYSTEMS_GEN_ALL_CORE
Constitutional: denies fever, chills, diaphoresis   HEENT: Denies blurred vision, difficulty hearing  Respiratory: denies SOB, LLAMAS, cough  Cardiovascular: denies CP, palpitations, edema  Gastrointestinal: admits nausea, vomiting, Denies diarrhea, constipation, abdominal pain, melena, hematochezia   Genitourinary: denies dysuria, frequency, hematuria   Neurologic: admits dizziness, denies headache, weakness   Hematology/Oncology: admits easy bruising   ROS negative except as noted above Constitutional: denies fever, chills, diaphoresis   HEENT: Denies blurred vision, difficulty hearing  Respiratory: denies SOB, LLAMAS, cough  Cardiovascular: denies CP, palpitations, edema  Gastrointestinal: admits nausea, vomiting, Denies diarrhea, constipation, abdominal pain, melena, hematochezia   Genitourinary: denies dysuria, frequency, hematuria   Neurologic: admits dizziness, denies headache, weakness   Ext: + Hand swelling on right  Hematology/Oncology: admits easy bruising   ROS negative except as noted above

## 2018-12-18 NOTE — ED ADULT NURSE NOTE - OBJECTIVE STATEMENT
Assumed patient care @ 1100. Pt received sitting on stretcher in no apparent distress. Pt AOx3 C/O being @ the doctors office this am and having dizziness/nausea/vomiting. Patient reports everything is moving upon position change. Vomiting x2 episode. Lungs clear to ausculation, respirations even unlabored. Denies Fevers, Chills., Diarrhea. Skin warm, dry, color appropriate for age and race.

## 2018-12-18 NOTE — H&P ADULT - PROBLEM SELECTOR PLAN 4
Not taking medication as outpatient  Continue to monitor and may need to start regimen if BP remains elevated Not taking medication as outpatient  BP was initially elevated in ED but improved, likely secondary to stress

## 2018-12-18 NOTE — ED PROVIDER NOTE - OBJECTIVE STATEMENT
74 yo male hx of DM, HTN, leukemia c/o dizziness "room spinning" for 1 week, worse with position, intermittently resolving on its own, today was at his oncologist office and they were trying some maneuver to make it better, but it ended up getting worse, now with nausea/vomiting.  No headache.  No motor/neuro deficits/weakness.

## 2018-12-19 ENCOUNTER — TRANSCRIPTION ENCOUNTER (OUTPATIENT)
Age: 73
End: 2018-12-19

## 2018-12-19 VITALS
DIASTOLIC BLOOD PRESSURE: 84 MMHG | RESPIRATION RATE: 16 BRPM | OXYGEN SATURATION: 96 % | HEART RATE: 62 BPM | SYSTOLIC BLOOD PRESSURE: 149 MMHG | TEMPERATURE: 98 F

## 2018-12-19 DIAGNOSIS — G47.33 OBSTRUCTIVE SLEEP APNEA (ADULT) (PEDIATRIC): ICD-10-CM

## 2018-12-19 DIAGNOSIS — B19.10 UNSPECIFIED VIRAL HEPATITIS B WITHOUT HEPATIC COMA: ICD-10-CM

## 2018-12-19 LAB
ANION GAP SERPL CALC-SCNC: 8 MMOL/L — SIGNIFICANT CHANGE UP (ref 5–17)
BASOPHILS # BLD AUTO: 0.03 K/UL — SIGNIFICANT CHANGE UP (ref 0–0.2)
BASOPHILS NFR BLD AUTO: 0.4 % — SIGNIFICANT CHANGE UP (ref 0–2)
BUN SERPL-MCNC: 28 MG/DL — HIGH (ref 7–23)
CALCIUM SERPL-MCNC: 8.5 MG/DL — SIGNIFICANT CHANGE UP (ref 8.5–10.1)
CHLORIDE SERPL-SCNC: 106 MMOL/L — SIGNIFICANT CHANGE UP (ref 96–108)
CO2 SERPL-SCNC: 27 MMOL/L — SIGNIFICANT CHANGE UP (ref 22–31)
CREAT SERPL-MCNC: 1.4 MG/DL — HIGH (ref 0.5–1.3)
EOSINOPHIL # BLD AUTO: 0.1 K/UL — SIGNIFICANT CHANGE UP (ref 0–0.5)
EOSINOPHIL NFR BLD AUTO: 1.4 % — SIGNIFICANT CHANGE UP (ref 0–6)
GLUCOSE SERPL-MCNC: 121 MG/DL — HIGH (ref 70–99)
HBA1C BLD-MCNC: 5.4 % — SIGNIFICANT CHANGE UP (ref 4–5.6)
HCT VFR BLD CALC: 43.2 % — SIGNIFICANT CHANGE UP (ref 39–50)
HCV AB S/CO SERPL IA: 0.06 S/CO — SIGNIFICANT CHANGE UP
HCV AB SERPL-IMP: SIGNIFICANT CHANGE UP
HGB BLD-MCNC: 14.5 G/DL — SIGNIFICANT CHANGE UP (ref 13–17)
IMM GRANULOCYTES NFR BLD AUTO: 0.6 % — SIGNIFICANT CHANGE UP (ref 0–1.5)
LYMPHOCYTES # BLD AUTO: 2.77 K/UL — SIGNIFICANT CHANGE UP (ref 1–3.3)
LYMPHOCYTES # BLD AUTO: 39.2 % — SIGNIFICANT CHANGE UP (ref 13–44)
MCHC RBC-ENTMCNC: 31.5 PG — SIGNIFICANT CHANGE UP (ref 27–34)
MCHC RBC-ENTMCNC: 33.6 GM/DL — SIGNIFICANT CHANGE UP (ref 32–36)
MCV RBC AUTO: 93.7 FL — SIGNIFICANT CHANGE UP (ref 80–100)
MONOCYTES # BLD AUTO: 0.97 K/UL — HIGH (ref 0–0.9)
MONOCYTES NFR BLD AUTO: 13.7 % — SIGNIFICANT CHANGE UP (ref 2–14)
NEUTROPHILS # BLD AUTO: 3.15 K/UL — SIGNIFICANT CHANGE UP (ref 1.8–7.4)
NEUTROPHILS NFR BLD AUTO: 44.7 % — SIGNIFICANT CHANGE UP (ref 43–77)
PLATELET # BLD AUTO: 100 K/UL — LOW (ref 150–400)
POTASSIUM SERPL-MCNC: 4.2 MMOL/L — SIGNIFICANT CHANGE UP (ref 3.5–5.3)
POTASSIUM SERPL-SCNC: 4.2 MMOL/L — SIGNIFICANT CHANGE UP (ref 3.5–5.3)
RBC # BLD: 4.61 M/UL — SIGNIFICANT CHANGE UP (ref 4.2–5.8)
RBC # FLD: 12.1 % — SIGNIFICANT CHANGE UP (ref 10.3–14.5)
SODIUM SERPL-SCNC: 141 MMOL/L — SIGNIFICANT CHANGE UP (ref 135–145)
WBC # BLD: 7.06 K/UL — SIGNIFICANT CHANGE UP (ref 3.8–10.5)
WBC # FLD AUTO: 7.06 K/UL — SIGNIFICANT CHANGE UP (ref 3.8–10.5)

## 2018-12-19 PROCEDURE — 70553 MRI BRAIN STEM W/O & W/DYE: CPT

## 2018-12-19 PROCEDURE — 97530 THERAPEUTIC ACTIVITIES: CPT

## 2018-12-19 PROCEDURE — 94660 CPAP INITIATION&MGMT: CPT

## 2018-12-19 PROCEDURE — 80048 BASIC METABOLIC PNL TOTAL CA: CPT

## 2018-12-19 PROCEDURE — 97116 GAIT TRAINING THERAPY: CPT

## 2018-12-19 PROCEDURE — 85730 THROMBOPLASTIN TIME PARTIAL: CPT

## 2018-12-19 PROCEDURE — G8979: CPT | Mod: CI

## 2018-12-19 PROCEDURE — 71045 X-RAY EXAM CHEST 1 VIEW: CPT

## 2018-12-19 PROCEDURE — 70553 MRI BRAIN STEM W/O & W/DYE: CPT | Mod: 26,76

## 2018-12-19 PROCEDURE — G8980: CPT | Mod: CI

## 2018-12-19 PROCEDURE — A9579: CPT

## 2018-12-19 PROCEDURE — 93005 ELECTROCARDIOGRAM TRACING: CPT

## 2018-12-19 PROCEDURE — 70450 CT HEAD/BRAIN W/O DYE: CPT

## 2018-12-19 PROCEDURE — 82962 GLUCOSE BLOOD TEST: CPT

## 2018-12-19 PROCEDURE — 85027 COMPLETE CBC AUTOMATED: CPT

## 2018-12-19 PROCEDURE — G8978: CPT | Mod: CI

## 2018-12-19 PROCEDURE — 80053 COMPREHEN METABOLIC PANEL: CPT

## 2018-12-19 PROCEDURE — 99285 EMERGENCY DEPT VISIT HI MDM: CPT | Mod: 25

## 2018-12-19 PROCEDURE — 96374 THER/PROPH/DIAG INJ IV PUSH: CPT

## 2018-12-19 PROCEDURE — 85610 PROTHROMBIN TIME: CPT

## 2018-12-19 PROCEDURE — 36415 COLL VENOUS BLD VENIPUNCTURE: CPT

## 2018-12-19 PROCEDURE — 83036 HEMOGLOBIN GLYCOSYLATED A1C: CPT

## 2018-12-19 PROCEDURE — 97161 PT EVAL LOW COMPLEX 20 MIN: CPT

## 2018-12-19 PROCEDURE — 99239 HOSP IP/OBS DSCHRG MGMT >30: CPT

## 2018-12-19 PROCEDURE — 86803 HEPATITIS C AB TEST: CPT

## 2018-12-19 RX ORDER — MECLIZINE HCL 12.5 MG
1 TABLET ORAL
Qty: 6 | Refills: 0
Start: 2018-12-19 | End: 2018-12-21

## 2018-12-19 RX ORDER — MECLIZINE HCL 12.5 MG
1 TABLET ORAL
Qty: 0 | Refills: 0 | COMMUNITY
Start: 2018-12-19

## 2018-12-19 RX ADMIN — ENTECAVIR 0.5 MILLIGRAM(S): 0.5 TABLET ORAL at 11:02

## 2018-12-19 RX ADMIN — Medication 1 MILLIGRAM(S): at 11:02

## 2018-12-19 NOTE — PROGRESS NOTE ADULT - SUBJECTIVE AND OBJECTIVE BOX
Patient is a 73y old  Male who presents with a chief complaint of Dizziness (19 Dec 2018 09:59)       INTERVAL HPI/OVERNIGHT EVENTS:    MEDICATIONS  (STANDING):  dextrose 5%. 1000 milliLiter(s) (50 mL/Hr) IV Continuous <Continuous>  dextrose 50% Injectable 12.5 Gram(s) IV Push once  dextrose 50% Injectable 25 Gram(s) IV Push once  dextrose 50% Injectable 25 Gram(s) IV Push once  entecavir 0.5 milliGRAM(s) Oral daily  folic acid 1 milliGRAM(s) Oral daily  insulin lispro (HumaLOG) corrective regimen sliding scale   SubCutaneous three times a day before meals    MEDICATIONS  (PRN):  dextrose 40% Gel 15 Gram(s) Oral once PRN Blood Glucose LESS THAN 70 milliGRAM(s)/deciliter  glucagon  Injectable 1 milliGRAM(s) IntraMuscular once PRN Glucose LESS THAN 70 milligrams/deciliter  meclizine 25 milliGRAM(s) Oral two times a day PRN Dizziness  ondansetron Injectable 4 milliGRAM(s) IV Push every 6 hours PRN Nausea      Allergies    sulfa drugs (Unknown)    Intolerances        REVIEW OF SYSTEMS:  CONSTITUTIONAL: No fever, weight loss, or fatigue  EYES: No eye pain, visual disturbances, or discharge  ENMT:  No difficulty hearing, tinnitus, vertigo; No sinus or throat pain  NECK: No pain or stiffness  BREASTS: No pain, masses, or nipple discharge  RESPIRATORY: No cough, wheezing, chills or hemoptysis; No shortness of breath  CARDIOVASCULAR: No chest pain, palpitations, dizziness, or leg swelling  GASTROINTESTINAL: No abdominal or epigastric pain. No nausea, vomiting, or hematemesis; No diarrhea or constipation. No melena or hematochezia.  GENITOURINARY: No dysuria, frequency, hematuria, or incontinence  NEUROLOGICAL: No headaches, memory loss, loss of strength, numbness, or tremors  SKIN: No itching, burning, rashes, or lesions   LYMPH NODES: No enlarged glands  ENDOCRINE: No heat or cold intolerance; No hair loss; No polydipsia or polyuria  MUSCULOSKELETAL: No joint pain or swelling; No muscle, back, or extremity pain  PSYCHIATRIC: No depression, anxiety, mood swings, or difficulty sleeping  HEME/LYMPH: No easy bruising, or bleeding gums  ALLERGY AND IMMUNOLOGIC: No hives or eczema    Vital Signs Last 24 Hrs  T(C): 36.5 (19 Dec 2018 09:50), Max: 36.6 (18 Dec 2018 20:08)  T(F): 97.7 (19 Dec 2018 09:50), Max: 97.8 (18 Dec 2018 20:08)  HR: 58 (19 Dec 2018 09:50) (55 - 83)  BP: 134/79 (19 Dec 2018 09:50) (134/64 - 166/84)  BP(mean): --  RR: 18 (19 Dec 2018 09:50) (16 - 18)  SpO2: 97% (19 Dec 2018 09:50) (95% - 98%)    PHYSICAL EXAM:  GENERAL: NAD, well-groomed, well-developed  HEAD:  Atraumatic, Normocephalic  EYES: EOMI, PERRLA, conjunctiva and sclera clear  ENMT: No tonsillar erythema, exudates, or enlargement; Moist mucous membranes, Good dentition, No lesions  NECK: Supple, No JVD, Normal thyroid  NERVOUS SYSTEM:  Alert & Oriented X3, Good concentration; Motor Strength 5/5 B/L upper and lower extremities; DTRs 2+ intact and symmetric  CHEST/LUNG: Clear to auscultation bilaterally; No rales, rhonchi, wheezing, or rubs  HEART: Regular rate and rhythm; No murmurs, rubs, or gallops  ABDOMEN: Soft, Nontender, Nondistended; Bowel sounds present  EXTREMITIES:  2+ Peripheral Pulses, No clubbing, cyanosis, or edema  LYMPH: No lymphadenopathy noted  SKIN: No rashes or lesions    LABS:                        14.5   7.06  )-----------( 100      ( 19 Dec 2018 05:56 )             43.2     19 Dec 2018 05:56    141    |  106    |  28     ----------------------------<  121    4.2     |  27     |  1.40     Ca    8.5        19 Dec 2018 05:56    TPro  6.8    /  Alb  4.1    /  TBili  1.1    /  DBili  x      /  AST  20     /  ALT  27     /  AlkPhos  127    18 Dec 2018 11:30    PT/INR - ( 18 Dec 2018 11:30 )   PT: 11.9 sec;   INR: 1.04 ratio         PTT - ( 18 Dec 2018 11:30 )  PTT:30.0 sec  CAPILLARY BLOOD GLUCOSE      POCT Blood Glucose.: 113 mg/dL (19 Dec 2018 07:37)  POCT Blood Glucose.: 138 mg/dL (18 Dec 2018 23:16)  POCT Blood Glucose.: 187 mg/dL (18 Dec 2018 16:51)    BLOOD CULTURE    RADIOLOGY & ADDITIONAL TESTS:    Imaging Personally Reviewed:  [ ] YES     Consultant(s) Notes Reviewed:      Care Discussed with Consultants/Other Providers:
neuro cons dict.  likely vertigo.  mri of the head and IAC WWO.  ANTIVERT 25 MG TID.

## 2018-12-19 NOTE — CONSULT NOTE ADULT - SUBJECTIVE AND OBJECTIVE BOX
Patient is a 73y old  Male who presents with a chief complaint of Dizziness (19 Dec 2018 09:03)      HPI:  74 yo M with PMH of SLL (diagnosed May 2018 and started on Ibrutinib  July 2018), DM II (not currently on medication), Hepatitis (? type B) HTN presenting with dizziness since last Monday. Patient initially started feeling dizzy and felt as though "the room is spinning around my head." Patient denies any precipitating events or history of same and noticed symptoms were worse with sudden movement and associated with nausea. In the interim, symptoms were gradually improving and patient accommodated with moving slowly and avoiding sudden changes in position. He saw ID Dr. Webber for history of Hep C, who attributed these symptoms to his medications. Patient was seen by Heme/Onc Dr. Robbins who noted that dizziness is not a common side effect and tried to treat him with ? Devon rizvi. Patient the developed worsening nausea and dizziness and 1 episode of vomiting which perisited for over 20 minutes in office. EMS was called and patient was BIBA for further evaluation. Denies night sweats, weight loss, fatigue, headache, chest pain, palpitations abdominal pain, dysuria, hematuria, diarrhea, melena, hematochezia.    Of note, patient was initially seen in [facility] ED and admitted in Feb 2018 for complaint of weakness. He was found to have anemia with HGB of 6.2. Patient required blood transfusions, had upper endo and diagnosed with hemolytic anemia (warm agglutinins). He was discharged with PO prednisone and Folic Acid and metformin for new onset hyperglycemia Patient continued steroids outpatient and tapered under guidance of Dr. Robbins when patient was noticed to not tolerate decreasing pred dose. He had a CT and a BM biopsy in May 2018 and was diagnosed with SLL. He began treatment with Entecavir for hepatitis with Dr. Webber due to risk of reactivation and began treatment with Imbruvica in July 2018. He has otherwise tolerated the treatment well with no other side effects.     In the ED: /88, HR 55, T 97.4 F, H/H 14.2/41.9, Alk Phos 127, EKG Sinus rhythm with 1st degree AV block, CT head- No acute intracranial hemorrhage. Received meclizine diazepam, Zofran (18 Dec 2018 15:24)       ROS:    CONSTITUTIONAL: No fever, weight loss, or fatigue  EYES: No eye pain, visual disturbances, or discharge  ENMT:  No difficulty hearing, tinnitus, vertigo; No sinus or throat pain  NECK: No pain or stiffness  BREASTS: No pain, masses, or nipple discharge  RESPIRATORY: No cough, wheezing, chills or hemoptysis; No shortness of breath  CARDIOVASCULAR: No chest pain, palpitations, dizziness, or leg swelling, no decreased exercise tolerance  GASTROINTESTINAL: No abdominal or epigastric pain. No nausea, vomiting, or hematemesis; No diarrhea or constipation. No melena or hematochezia.  GENITOURINARY: No dysuria, frequency, hematuria, or incontinence  NEUROLOGICAL: No headaches, memory loss, loss of strength, numbness, or tremors+ dizzines  SKIN: No itching, burning, rashes, or lesions   LYMPH NODES: No enlargement or tenderness noted  ENDOCRINE: No heat or cold intolerance; No hair loss  MUSCULOSKELETAL: No muscle or back pain  PSYCHIATRIC: No depression, anxiety, mood swings, or difficulty sleeping  HEME/LYMPH: + easy bruising, or bleeding gums  ALLERGY AND IMMUNOLOGIC: No hives or eczema      PAST MEDICAL & SURGICAL HISTORY:  Leukemia  Diabetes  HTN (hypertension)  H/O lithotripsy      SOCIAL HISTORY:  non smoker , no ETOH  FAMILY HISTORY:  Family history of diabetes mellitus  Family history of hypertension (Father)  Family history of lung cancer (Father)      MEDICATIONS  (STANDING):  dextrose 5%. 1000 milliLiter(s) (50 mL/Hr) IV Continuous <Continuous>  dextrose 50% Injectable 12.5 Gram(s) IV Push once  dextrose 50% Injectable 25 Gram(s) IV Push once  dextrose 50% Injectable 25 Gram(s) IV Push once  entecavir 0.5 milliGRAM(s) Oral daily  folic acid 1 milliGRAM(s) Oral daily  insulin lispro (HumaLOG) corrective regimen sliding scale   SubCutaneous three times a day before meals    MEDICATIONS  (PRN):  dextrose 40% Gel 15 Gram(s) Oral once PRN Blood Glucose LESS THAN 70 milliGRAM(s)/deciliter  glucagon  Injectable 1 milliGRAM(s) IntraMuscular once PRN Glucose LESS THAN 70 milligrams/deciliter  meclizine 25 milliGRAM(s) Oral two times a day PRN Dizziness  ondansetron Injectable 4 milliGRAM(s) IV Push every 6 hours PRN Nausea      Allergies    sulfa drugs (Unknown)    Intolerances        Vital Signs Last 24 Hrs  T(C): 36.5 (19 Dec 2018 09:50), Max: 36.6 (18 Dec 2018 20:08)  T(F): 97.7 (19 Dec 2018 09:50), Max: 97.8 (18 Dec 2018 20:08)  HR: 58 (19 Dec 2018 09:50) (55 - 83)  BP: 134/79 (19 Dec 2018 09:50) (134/64 - 166/84)  BP(mean): --  RR: 18 (19 Dec 2018 09:50) (15 - 18)  SpO2: 97% (19 Dec 2018 09:50) (95% - 98%)    PHYSICAL EXAM  General: adult in NAD  HEENT: clear oropharynx, anicteric sclera, pink conjunctivae  Neck: supple  CV: normal S1S2 with no murmur rubs or gallops  Lungs: clear to auscultation, no wheezes, no rhales  Abdomen: soft non-tender non-distended, no hepatosplenomegaly  Ext: no clubbing cyanosis or edema  Skin: no rashes and no petechiae  Neuro: alert and oriented X3 no focal deficits      LABS:    CBC Full  -  ( 19 Dec 2018 05:56 )  WBC Count : 7.06 K/uL  Hemoglobin : 14.5 g/dL  Hematocrit : 43.2 %  Platelet Count - Automated : 100 K/uL  Mean Cell Volume : 93.7 fl  Mean Cell Hemoglobin : 31.5 pg  Mean Cell Hemoglobin Concentration : 33.6 gm/dL  Auto Neutrophil # : 3.15 K/uL  Auto Lymphocyte # : 2.77 K/uL  Auto Monocyte # : 0.97 K/uL  Auto Eosinophil # : 0.10 K/uL  Auto Basophil # : 0.03 K/uL  Auto Neutrophil % : 44.7 %  Auto Lymphocyte % : 39.2 %  Auto Monocyte % : 13.7 %  Auto Eosinophil % : 1.4 %  Auto Basophil % : 0.4 %    12-19    141  |  106  |  28<H>  ----------------------------<  121<H>  4.2   |  27  |  1.40<H>    Ca    8.5      19 Dec 2018 05:56    TPro  6.8  /  Alb  4.1  /  TBili  1.1  /  DBili  x   /  AST  20  /  ALT  27  /  AlkPhos  127<H>  12-18    PT/INR - ( 18 Dec 2018 11:30 )   PT: 11.9 sec;   INR: 1.04 ratio         PTT - ( 18 Dec 2018 11:30 )  PTT:30.0 sec        RADIOLOGY & ADDITIONAL STUDIES:  < from: CT Head No Cont (12.18.18 @ 13:35) >  FINDINGS:  There is mild diffuse parenchymal volume loss. There are areas of low   attenuation in the periventricular white matter likely related to mild   chronic microvascular ischemic changes.    There is no acute intracranial hemorrhage, parenchymal mass, mass effect   or midlineshift. There is no acute territorial infarct. There is no   hydrocephalus. Nonspecific mild prominence bilateral optic nerve sheath.   Partial empty sella noted.    The cranium is intact.     The visualized paranasal sinuses are   well-aerated.    IMPRESSION:  No acute intracranial hemorrhage    < end of copied text >

## 2018-12-19 NOTE — DISCHARGE NOTE ADULT - PATIENT PORTAL LINK FT
You can access the [facility], offered by [facility], by registering with the following website: [email or web address]

## 2018-12-19 NOTE — CONSULT NOTE ADULT - ASSESSMENT
73m with small lymphocytic lymphoma -- ibruvica  diet controlled DM  hepatitis B  admitted for vertigo/ dizziness worsening

## 2018-12-19 NOTE — CONSULT NOTE ADULT - CONSULT REQUESTED DATE/TIME
18-Dec-2018 17:00 O-Z Plasty Text: The defect edges were debeveled with a #15 scalpel blade.  Given the location of the defect, shape of the defect and the proximity to free margins an O-Z plasty (double transposition flap) was deemed most appropriate.  Using a sterile surgical marker, the appropriate transposition flaps were drawn incorporating the defect and placing the expected incisions within the relaxed skin tension lines where possible.    The area thus outlined was incised deep to adipose tissue with a #15 scalpel blade.  The skin margins were undermined to an appropriate distance in all directions utilizing iris scissors.  Hemostasis was achieved with electrocautery.  The flaps were then transposed into place, one clockwise and the other counterclockwise, and anchored with interrupted buried subcutaneous sutures.

## 2018-12-19 NOTE — DISCHARGE NOTE ADULT - HOSPITAL COURSE
74 yo M with PMH of SLL (diagnosed May 2018 and started on Ibruciva July 2018), DM II (not currently on medication), Hepatitis (? type B) HTN presenting with dizziness since last Monday. Patient was seen by Neuro Dr. Collado. MRI came back normal. Symptoms likely secondary to Vertigo. Will be discharged on PRN meclizine. Will follow up as out patient with all the doctors.

## 2018-12-19 NOTE — PROGRESS NOTE ADULT - PROBLEM SELECTOR PLAN 3
Has hx of DM II but not currently on medication  Diet controlled  Started on FS and low dose ISS   A1c in AM

## 2018-12-19 NOTE — CONSULT NOTE ADULT - SUBJECTIVE AND OBJECTIVE BOX
Date/Time Patient Seen:  		  Referring MD:   Data Reviewed	       Patient is a 73y old  Male who presents with a chief complaint of Dizziness (18 Dec 2018 15:24)      Subjective/HPI    in bed  seen and examined  H and P reviewed  alert and verbal  ER provider note reviewed  labs and imaging reviewed    on CPAP night time at home, uses 9 cm water pressure with nasal pillows    H&P Adult [Charted Location: [place] [address]] [Authored: 18-Dec-2018 15:24]- for Visit: [id number], Complete, Revised, Signed in Full, General    History and Physical:   Source of Information	Chart(s), Patient, Spouse/Significant Other  Outpatient Providers	PMD- Dr. Camacho- ACP   Heme/ Onc: Dr. Robbins     Language:  · Patient/Family of Limited English Proficiency	No       History of Present Illness:  Reason for Admission: Dizziness  History of Present Illness:   74 yo M with PMH of SLL (diagnosed May 2018 and started on Ibruciva July 2018), DM II (not currently on medication), Hepatitis (? type B) HTN presenting with dizziness since last Monday. Patient initially started feeling dizzy and felt as though "the room is spinning around my head." Patient denies any precipitating events or history of same and noticed symptoms were worse with sudden movement and associated with nausea. In the interim, symptoms were gradually improving and patient accommodated with moving slowly and avoiding sudden changes in position. He saw ID Dr. Webber for history of Hep C, who attributed these symptoms to his medications. Patient was seen by Heme/Onc Dr. Robbins who noted that dizziness is not a common side effect and tried to treat him with ? Devon rizvi. Patient the developed worsening nausea and dizziness and 1 episode of vomiting which perisited for over 20 minutes in office. EMS was called and patient was BIBA for further evaluation. Denies night sweats, weight loss, fatigue, headache, chest pain, palpitaions, abdominal pain, dysuria, hematuria, diarrhea, melena, hematochezia.    Of note, patient was initially seen in [facility] ED and admitted in Feb 2018 for complaint of weakness. He was found to have anemia with HGB of 6.2. Patient required blood transfusions, had upper endo and diagnosed with hemolytic anemia (warm agglutinins). He was discharged with PO prednisone and Folic Acid and metformin for new onset hyperglycemia Patient continued steroids outpatient and tapered under guidance of Dr. Robbins when patient was noticed to not tolerate decreasing pred dose. He had a CT and a BM biopsy in May 2018 and was diagnosed with SLL. He began treatment with Entecavir for hepatitis with Dr. Webber due to risk of reactivation and began treatment with Imbruvica in July 2018. He has otherwise tolerated the treatment well with no other side effects.     In the ED: /88, HR 55, T 97.4 F, H/H 14.2/41.9, Alk Phos 127, EKG Sinus rhythm with 1st degree AV block, CT head- No acute intracranial hemorrhage. Received meclizine diazepam, Zofran     PAST MEDICAL & SURGICAL HISTORY:  Leukemia  Diabetes  HTN (hypertension)  H/O lithotripsy  No significant past surgical history        Medication list         MEDICATIONS  (STANDING):  dextrose 5%. 1000 milliLiter(s) (50 mL/Hr) IV Continuous <Continuous>  dextrose 50% Injectable 12.5 Gram(s) IV Push once  dextrose 50% Injectable 25 Gram(s) IV Push once  dextrose 50% Injectable 25 Gram(s) IV Push once  entecavir 0.5 milliGRAM(s) Oral daily  folic acid 1 milliGRAM(s) Oral daily  insulin lispro (HumaLOG) corrective regimen sliding scale   SubCutaneous three times a day before meals    MEDICATIONS  (PRN):  dextrose 40% Gel 15 Gram(s) Oral once PRN Blood Glucose LESS THAN 70 milliGRAM(s)/deciliter  glucagon  Injectable 1 milliGRAM(s) IntraMuscular once PRN Glucose LESS THAN 70 milligrams/deciliter  meclizine 25 milliGRAM(s) Oral two times a day PRN Dizziness  ondansetron Injectable 4 milliGRAM(s) IV Push every 6 hours PRN Nausea         Vitals log        ICU Vital Signs Last 24 Hrs  T(C): 36.3 (19 Dec 2018 07:53), Max: 36.6 (18 Dec 2018 20:08)  T(F): 97.4 (19 Dec 2018 07:53), Max: 97.8 (18 Dec 2018 20:08)  HR: 57 (19 Dec 2018 07:53) (55 - 83)  BP: 147/74 (19 Dec 2018 07:53) (134/64 - 166/84)  BP(mean): --  ABP: --  ABP(mean): --  RR: 16 (19 Dec 2018 07:53) (15 - 17)  SpO2: 97% (19 Dec 2018 07:53) (95% - 98%)           Input and Output:  I&O's Detail      Lab Data                        14.5   7.06  )-----------( 100      ( 19 Dec 2018 05:56 )             43.2     12-19    141  |  106  |  28<H>  ----------------------------<  121<H>  4.2   |  27  |  1.40<H>    Ca    8.5      19 Dec 2018 05:56    TPro  6.8  /  Alb  4.1  /  TBili  1.1  /  DBili  x   /  AST  20  /  ALT  27  /  AlkPhos  127<H>  12-18        Family History:  Family history of diabetes mellitus     Father  Still living? Unknown  Family history of lung cancer, Age at diagnosis: Age Unknown  Family history of hypertension, Age at diagnosis: Age Unknown.     Social History:  Social History (marital status, living situation, occupation, tobacco use, alcohol and drug use, and sexual history): Retired    Denies tobacco, alcohol drug use     Tobacco Screening:  · Core Measure Site	Yes  · Has the patient used tobacco in the past 30 days?	No    Risk Assessment:    Present on Admission:  Deep Venous Thrombosis	no  Pulmonary Embolus	no    non smoker  non drinker      Review of Systems	      Objective     Physical Examination    heart s1s2  lung dec BS  abd soft  cn grossly int  moves all extr  on room air  verbal and alert      Pertinent Lab findings & Imaging      Thompson:  NO   Adequate UO     I&O's Detail           Discussed with:     Cultures:	        Radiology

## 2018-12-19 NOTE — CONSULT NOTE ADULT - PROBLEM SELECTOR RECOMMENDATION 9
neuro work up under way  ID and Heme eval  monitor vs and HD and Sat  KIRTI - CPAP - 9 cm water pressure for night time use, full face mask  sleep hygiene discussed  will follow and monitor  pt is on room air  DM care and management, DM diet
Neuro f/u  f/u MRI results  etiology -- could be recent swim and water in ear  doubt otitis-- no recent viral symptoms  not bacterial , no pain no dicharge

## 2018-12-19 NOTE — ED ADULT NURSE REASSESSMENT NOTE - NS ED NURSE REASSESS COMMENT FT1
PT @ bedside assessing patient at this time.
Patient ambulating with some difficulty and dizziness. Dr. Yoder aware and patient to be scanned and hold off on food till CT.
Patient reports no nausea and feeling much better, less dizzy.
Patient returned from CT and IVF infiltrated right AC IV. IV removed, warm and cold pack provided. Dr. Yoder aware. Only about 300 mL of fluids infused.
Resident @ bedside assessing patient for admission @ this time.
Patient and family aware that he is awaiting Dr. Blackwell to see him.
Call bell within reach.  Report to Nolvia BARNES

## 2018-12-19 NOTE — DISCHARGE NOTE ADULT - CARE PLAN
Principal Discharge DX:	Dizziness  Goal:	Improvement  Assessment and plan of treatment:	Likely BPV  Continue PRN meclizine  F/u with your doctor  Secondary Diagnosis:	Hepatitis B  Assessment and plan of treatment:	Continue home med  F/u with your doctor  Secondary Diagnosis:	Leukemia  Assessment and plan of treatment:	Continue home med  F/u with your oncologist  Secondary Diagnosis:	KIRTI (obstructive sleep apnea)  Assessment and plan of treatment:	Continue home management  F/u with your doctor  Secondary Diagnosis:	Diabetes  Assessment and plan of treatment:	HbA1c 5.4  F/u with PCP

## 2018-12-19 NOTE — PROGRESS NOTE ADULT - PROBLEM SELECTOR PLAN 2
Heme/Onc consulted for management of (Imbruvica for SLL)  ID consulted for management of Entecavir (for Hep prophylaxis) pt took dose today and will bring from home   Continue Folic acid for prophylaxis

## 2018-12-19 NOTE — DISCHARGE NOTE ADULT - PLAN OF CARE
Improvement Likely BPV  Continue PRN meclizine  F/u with your doctor Continue home med  F/u with your doctor Continue home med  F/u with your oncologist Continue home management  F/u with your doctor HbA1c 5.4  F/u with PCP

## 2018-12-19 NOTE — CONSULT NOTE ADULT - ASSESSMENT
74 y/o man with SLL on Ibrutinib with excellent hematological and radiological response presented with 1 week onset of dizziness.    undergoing workup  no evidence of Ibrutinib related complications: no new onset A fib, no ICH  mild bruising ( expected, monitor)   mild thrombocytopenia due to therapy, hepatitis     - neuro evaluation for mngt of ? BPV  - seen by pulmonary for KIRTI    will f/u 72 y/o man with SLL on Ibrutinib with excellent hematological and radiological response presented with 1 week onset of dizziness.    undergoing workup  no evidence of Ibrutinib related complications: no new onset A fib, no ICH  mild bruising ( expected, monitor)   mild thrombocytopenia due to therapy, hepatitis     - neuro evaluation for mngt of ? BPV, MRI is pending as per neurology with contrast ( r/o schwannoma)  - seen by pulmonary for KIRTI  - if MRI is negative may be d/cesar ,if medically stable and able to ambulate safely ( eval by PT)   - to continue Imbruvica   will f/u

## 2018-12-19 NOTE — PROGRESS NOTE ADULT - PROBLEM SELECTOR PLAN 1
Vertigo vs acute neurological event as potential side effect of Imbruvica (Afib and bleeding)   CT head done in ED not revealing of acute changes  Neurology consulted Dr. Blackwell  Meclizine 25mg BID  MRI brain

## 2018-12-19 NOTE — DISCHARGE NOTE ADULT - MEDICATION SUMMARY - MEDICATIONS TO TAKE
I will START or STAY ON the medications listed below when I get home from the hospital:    meclizine 25 mg oral tablet  -- 1 tab(s) by mouth 2 times a day, As needed, Dizziness  -- Indication: For As needed     entecavir 0.5 mg oral tablet  -- 1 tab(s) by mouth once a day  -- Indication: For Home Med    Imbruvica  -- Indication: For Home Med    folic acid 1 mg oral tablet  -- 1 tab(s) by mouth once a day  -- Indication: For Home Med

## 2018-12-19 NOTE — PROGRESS NOTE ADULT - PROBLEM SELECTOR PLAN 4
Not taking medication as outpatient  BP was initially elevated in ED but improved, likely secondary to stress  Monitor off meds for now

## 2018-12-19 NOTE — PROGRESS NOTE ADULT - ASSESSMENT
72 yo M with PMH of SLL (diagnosed May 2018 and started on Ibruciva July 2018), DM II (not currently on medication), Hepatitis (? type B) HTN presenting with dizziness since last Monday being admitted for neurology w/u.

## 2018-12-19 NOTE — CONSULT NOTE ADULT - SUBJECTIVE AND OBJECTIVE BOX
[facility], Division of Infectious Diseases  ELLIOT Hopkins, YOAV Baker    DIONICIO SULLIVAN  73y, Male  [id number]    HPI--  HPI:  74 yo M with PMH of SLL (diagnosed May 2018 and started on Ibruciva July 2018), DM II (not currently on medication), HepatitisB,  HTN presenting with dizziness since last Monday.   Patient initially started feeling dizzy and felt as though "the room is spinning around my head." Symptoms started about 10 days ago. 12 days ago, pt went swimming and noted water in his ears.     He was seen IN ID clinic f/u and then complained of some veritigo at that time Dr Webber recommeded f/u with oncologist to make sure not side affect of meds  He saw his ONcologist yesterday, and hallpike maneuver to address issue which is not medication related.     Patient the developed worsening nausea and dizziness and 1 episode of vomiting which persisted for over 20 minutes in office. EMS was called and patient was BIBA for further evaluation.   NO headache, no recent viral infection, no sick contacts.      PMH/PSH--  Leukemia  Diabetes  HTN (hypertension)  H/O lithotripsy  No significant past surgical history      Allergies--sulfa      Medications--  Antibiotics: entecavir 0.5 milliGRAM(s) Oral daily    Immunologic:   Other: dextrose 40% Gel PRN  dextrose 5%.  dextrose 50% Injectable  dextrose 50% Injectable  dextrose 50% Injectable  folic acid  glucagon  Injectable PRN  insulin lispro (HumaLOG) corrective regimen sliding scale  meclizine PRN  ondansetron Injectable PRN      Social History--  EtOH: denies ***  Tobacco: denies ***  Drug Use: denies ***    Family/Marital History--  Family history of diabetes mellitus  Family history of hypertension (Father)  Family history of lung cancer (Father)   lives with wife    Remainder not relevant to clinical concern.    Travel/Environmental/Occupational History:  retired     Review of Systems:  A >=10-point review of systems was obtained.     Pertinent positives and negatives--  Constitutional: No fevers. No Chills. No Rigors.   Eyes: no blurry vision  ENMT: no sore throat  Cardiovascular: No chest pain. No palpitations.  Respiratory: No shortness of breath. No cough.  Gastrointestinal: No nausea or vomiting. No diarrhea or constipation.   Genitourinary: no dysuria  Musculoskeletal: no myalgia  Skin: no rash  Neurologic: + dizziness  Psychiatric: no depression    Review of systems otherwise negative except as previously noted.    Physical Exam--  Vital Signs: T(F): 97.7 (12-19-18 @ 09:50), Max: 97.8 (12-18-18 @ 20:08)  HR: 58 (12-19-18 @ 09:50)  BP: 134/79 (12-19-18 @ 09:50)  RR: 18 (12-19-18 @ 09:50)  SpO2: 97% (12-19-18 @ 09:50)  Wt(kg): --  General: Nontoxic-appearing Male in no acute distress.  HEENT: AT/NCAnicteric. Conjunctiva pink and moist. Oropharynx clear. Dentition fair.  Neck: Not rigid. No sense of mass.  Nodes: None palpable.  Lungs: Clear bilaterally without rales, wheezing or rhonchi  Heart: Regular rate and rhythm. No Murmur. No rub. No gallop. No palpable thrill.  Abdomen: Bowel sounds present and normoactive. Soft. Nondistended. Nontender.   Back: No spinal tenderness. No costovertebral angle tenderness.   Extremities: No cyanosis or clubbing. trace edema.   Skin: Warm. Dry. Good turgor. No rash. No vasculitic stigmata.  Psychiatric: Appropriate affect and mood for situation.         Laboratory & Imaging Data--  CBC                        14.5   7.06  )-----------( 100      ( 19 Dec 2018 05:56 )             43.2       Chemistries  12-19    141  |  106  |  28<H>  ----------------------------<  121<H>  4.2   |  27  |  1.40<H>    Ca    8.5      19 Dec 2018 05:56    TPro  6.8  /  Alb  4.1  /  TBili  1.1  /  DBili  x   /  AST  20  /  ALT  27  /  AlkPhos  127<H>  12-18      Culture Data  < from: CT Head No Cont (12.18.18 @ 13:35) >    EXAM:  CT BRAIN                            PROCEDURE DATE:  12/18/2018          INTERPRETATION:  CLINICAL STATEMENT: Dizziness    TECHNIQUE: CT of the head was performed without IV contrast.    COMPARISON: None.    FINDINGS:  There is mild diffuse parenchymal volume loss. There are areas of low   attenuation in the periventricular white matter likely related to mild   chronic microvascular ischemic changes.    There is no acute intracranial hemorrhage, parenchymal mass, mass effect   or midlineshift. There is no acute territorial infarct. There is no   hydrocephalus. Nonspecific mild prominence bilateral optic nerve sheath.   Partial empty sella noted.    The cranium is intact.     The visualized paranasal sinuses are   well-aerated.    IMPRESSION:  No acute intracranial hemorrhage        < end of copied text >

## 2018-12-19 NOTE — DISCHARGE NOTE ADULT - MEDICATION SUMMARY - MEDICATIONS TO CHANGE
Mother seeking xray results. I will SWITCH the dose or number of times a day I take the medications listed below when I get home from the hospital:  None

## 2019-08-30 ENCOUNTER — APPOINTMENT (OUTPATIENT)
Dept: CT IMAGING | Facility: CLINIC | Age: 74
End: 2019-08-30
Payer: MEDICARE

## 2019-08-30 ENCOUNTER — OUTPATIENT (OUTPATIENT)
Dept: OUTPATIENT SERVICES | Facility: HOSPITAL | Age: 74
LOS: 1 days | End: 2019-08-30
Payer: MEDICARE

## 2019-08-30 DIAGNOSIS — D64.89 OTHER SPECIFIED ANEMIAS: ICD-10-CM

## 2019-08-30 DIAGNOSIS — Z98.890 OTHER SPECIFIED POSTPROCEDURAL STATES: Chronic | ICD-10-CM

## 2019-08-30 PROCEDURE — 74177 CT ABD & PELVIS W/CONTRAST: CPT

## 2019-08-30 PROCEDURE — 82565 ASSAY OF CREATININE: CPT

## 2019-08-30 PROCEDURE — 71260 CT THORAX DX C+: CPT

## 2019-08-30 PROCEDURE — 71260 CT THORAX DX C+: CPT | Mod: 26

## 2019-08-30 PROCEDURE — 74177 CT ABD & PELVIS W/CONTRAST: CPT | Mod: 26

## 2019-12-02 NOTE — PATIENT PROFILE ADULT - NSPROMUTANXFEARADDRESSFT_GEN_A_NUR
NONE Transposition Flap Text: The defect edges were debeveled with a #15 scalpel blade.  Given the location of the defect and the proximity to free margins a transposition flap was deemed most appropriate.  Using a sterile surgical marker, an appropriate transposition flap was drawn incorporating the defect.    The area thus outlined was incised deep to adipose tissue with a #15 scalpel blade.  The skin margins were undermined to an appropriate distance in all directions utilizing iris scissors.

## 2020-02-04 ENCOUNTER — OUTPATIENT (OUTPATIENT)
Dept: OUTPATIENT SERVICES | Facility: HOSPITAL | Age: 75
LOS: 1 days | End: 2020-02-04
Payer: MEDICARE

## 2020-02-04 ENCOUNTER — APPOINTMENT (OUTPATIENT)
Dept: NUCLEAR MEDICINE | Facility: CLINIC | Age: 75
End: 2020-02-04
Payer: MEDICARE

## 2020-02-04 DIAGNOSIS — Z98.890 OTHER SPECIFIED POSTPROCEDURAL STATES: Chronic | ICD-10-CM

## 2020-02-04 DIAGNOSIS — Z00.8 ENCOUNTER FOR OTHER GENERAL EXAMINATION: ICD-10-CM

## 2020-02-04 PROCEDURE — 78815 PET IMAGE W/CT SKULL-THIGH: CPT | Mod: 26,PS

## 2020-02-04 PROCEDURE — A9552: CPT

## 2020-02-04 PROCEDURE — 78815 PET IMAGE W/CT SKULL-THIGH: CPT

## 2020-02-14 ENCOUNTER — INPATIENT (INPATIENT)
Facility: HOSPITAL | Age: 75
LOS: 0 days | Discharge: HOME | DRG: 194 | End: 2020-02-15
Attending: INTERNAL MEDICINE | Admitting: INTERNAL MEDICINE
Payer: MEDICARE

## 2020-02-14 VITALS
SYSTOLIC BLOOD PRESSURE: 126 MMHG | DIASTOLIC BLOOD PRESSURE: 67 MMHG | HEIGHT: 65 IN | HEART RATE: 103 BPM | TEMPERATURE: 102 F | WEIGHT: 160.94 LBS | OXYGEN SATURATION: 96 % | RESPIRATION RATE: 15 BRPM

## 2020-02-14 DIAGNOSIS — D64.9 ANEMIA, UNSPECIFIED: ICD-10-CM

## 2020-02-14 DIAGNOSIS — J10.1 INFLUENZA DUE TO OTHER IDENTIFIED INFLUENZA VIRUS WITH OTHER RESPIRATORY MANIFESTATIONS: ICD-10-CM

## 2020-02-14 DIAGNOSIS — Z98.890 OTHER SPECIFIED POSTPROCEDURAL STATES: Chronic | ICD-10-CM

## 2020-02-14 DIAGNOSIS — R50.9 FEVER, UNSPECIFIED: ICD-10-CM

## 2020-02-14 DIAGNOSIS — C95.92 LEUKEMIA, UNSPECIFIED, IN RELAPSE: ICD-10-CM

## 2020-02-14 DIAGNOSIS — C85.90 NON-HODGKIN LYMPHOMA, UNSPECIFIED, UNSPECIFIED SITE: ICD-10-CM

## 2020-02-14 LAB
ALBUMIN SERPL ELPH-MCNC: 2.8 G/DL — LOW (ref 3.3–5)
ALP SERPL-CCNC: 130 U/L — HIGH (ref 40–120)
ALT FLD-CCNC: 28 U/L — SIGNIFICANT CHANGE UP (ref 12–78)
ANION GAP SERPL CALC-SCNC: 10 MMOL/L — SIGNIFICANT CHANGE UP (ref 5–17)
APTT BLD: 30.2 SEC — SIGNIFICANT CHANGE UP (ref 28.5–37)
AST SERPL-CCNC: 42 U/L — HIGH (ref 15–37)
BASOPHILS # BLD AUTO: 0 K/UL — SIGNIFICANT CHANGE UP (ref 0–0.2)
BASOPHILS NFR BLD AUTO: 0 % — SIGNIFICANT CHANGE UP (ref 0–2)
BILIRUB SERPL-MCNC: 1.8 MG/DL — HIGH (ref 0.2–1.2)
BUN SERPL-MCNC: 30 MG/DL — HIGH (ref 7–23)
CALCIUM SERPL-MCNC: 8.3 MG/DL — LOW (ref 8.5–10.1)
CHLORIDE SERPL-SCNC: 108 MMOL/L — SIGNIFICANT CHANGE UP (ref 96–108)
CK MB CFR SERPL CALC: 1.2 NG/ML — SIGNIFICANT CHANGE UP (ref 0–3.6)
CO2 SERPL-SCNC: 19 MMOL/L — LOW (ref 22–31)
CREAT SERPL-MCNC: 1.7 MG/DL — HIGH (ref 0.5–1.3)
EOSINOPHIL # BLD AUTO: 0 K/UL — SIGNIFICANT CHANGE UP (ref 0–0.5)
EOSINOPHIL NFR BLD AUTO: 0 % — SIGNIFICANT CHANGE UP (ref 0–6)
FLU A RESULT: DETECTED
FLU A RESULT: DETECTED
FLUAV AG NPH QL: DETECTED
FLUBV AG NPH QL: SIGNIFICANT CHANGE UP
GLUCOSE SERPL-MCNC: 201 MG/DL — HIGH (ref 70–99)
HCT VFR BLD CALC: 21.1 % — LOW (ref 39–50)
HGB BLD-MCNC: 6.6 G/DL — CRITICAL LOW (ref 13–17)
INR BLD: 1.28 RATIO — HIGH (ref 0.88–1.16)
LACTATE SERPL-SCNC: 1.9 MMOL/L — SIGNIFICANT CHANGE UP (ref 0.7–2)
LIDOCAIN IGE QN: 291 U/L — SIGNIFICANT CHANGE UP (ref 73–393)
LYMPHOCYTES # BLD AUTO: 29.64 K/UL — HIGH (ref 1–3.3)
LYMPHOCYTES # BLD AUTO: 39 % — SIGNIFICANT CHANGE UP (ref 13–44)
MAGNESIUM SERPL-MCNC: 2.3 MG/DL — SIGNIFICANT CHANGE UP (ref 1.6–2.6)
MCHC RBC-ENTMCNC: 31.3 GM/DL — LOW (ref 32–36)
MCHC RBC-ENTMCNC: 35.7 PG — HIGH (ref 27–34)
MCV RBC AUTO: 114.1 FL — HIGH (ref 80–100)
MONOCYTES # BLD AUTO: 1.52 K/UL — HIGH (ref 0–0.9)
MONOCYTES NFR BLD AUTO: 2 % — SIGNIFICANT CHANGE UP (ref 2–14)
NEUTROPHILS # BLD AUTO: 11.4 K/UL — HIGH (ref 1.8–7.4)
NEUTROPHILS NFR BLD AUTO: 12 % — LOW (ref 43–77)
NRBC # BLD: SIGNIFICANT CHANGE UP /100 WBCS (ref 0–0)
NT-PROBNP SERPL-SCNC: 66 PG/ML — SIGNIFICANT CHANGE UP (ref 0–125)
PLATELET # BLD AUTO: 35 K/UL — LOW (ref 150–400)
POTASSIUM SERPL-MCNC: 3.7 MMOL/L — SIGNIFICANT CHANGE UP (ref 3.5–5.3)
POTASSIUM SERPL-SCNC: 3.7 MMOL/L — SIGNIFICANT CHANGE UP (ref 3.5–5.3)
PROCALCITONIN SERPL-MCNC: 0.32 NG/ML — HIGH (ref 0–0.04)
PROT SERPL-MCNC: 5.9 G/DL — LOW (ref 6–8.3)
PROTHROM AB SERPL-ACNC: 14.5 SEC — HIGH (ref 10–12.9)
RBC # BLD: 1.85 M/UL — LOW (ref 4.2–5.8)
RBC # FLD: 21 % — HIGH (ref 10.3–14.5)
RSV RESULT: SIGNIFICANT CHANGE UP
RSV RNA RESP QL NAA+PROBE: SIGNIFICANT CHANGE UP
SODIUM SERPL-SCNC: 137 MMOL/L — SIGNIFICANT CHANGE UP (ref 135–145)
TROPONIN I SERPL-MCNC: 0.02 NG/ML — SIGNIFICANT CHANGE UP (ref 0.01–0.04)
WBC # BLD: 75.99 K/UL — CRITICAL HIGH (ref 3.8–10.5)
WBC # FLD AUTO: 75.99 K/UL — CRITICAL HIGH (ref 3.8–10.5)

## 2020-02-14 PROCEDURE — 86077 PHYS BLOOD BANK SERV XMATCH: CPT

## 2020-02-14 PROCEDURE — 93010 ELECTROCARDIOGRAM REPORT: CPT

## 2020-02-14 PROCEDURE — 99283 EMERGENCY DEPT VISIT LOW MDM: CPT

## 2020-02-14 PROCEDURE — 71046 X-RAY EXAM CHEST 2 VIEWS: CPT | Mod: 26

## 2020-02-14 RX ORDER — ACETAMINOPHEN 500 MG
650 TABLET ORAL ONCE
Refills: 0 | Status: COMPLETED | OUTPATIENT
Start: 2020-02-14 | End: 2020-02-14

## 2020-02-14 RX ORDER — ENTECAVIR 0.5 MG/1
0.5 TABLET ORAL DAILY
Refills: 0 | Status: DISCONTINUED | OUTPATIENT
Start: 2020-02-14 | End: 2020-02-15

## 2020-02-14 RX ORDER — ALLOPURINOL 300 MG
100 TABLET ORAL THREE TIMES A DAY
Refills: 0 | Status: DISCONTINUED | OUTPATIENT
Start: 2020-02-14 | End: 2020-02-15

## 2020-02-14 RX ORDER — FOLIC ACID 0.8 MG
1 TABLET ORAL DAILY
Refills: 0 | Status: DISCONTINUED | OUTPATIENT
Start: 2020-02-14 | End: 2020-02-15

## 2020-02-14 RX ORDER — SODIUM CHLORIDE 9 MG/ML
1000 INJECTION INTRAMUSCULAR; INTRAVENOUS; SUBCUTANEOUS
Refills: 0 | Status: DISCONTINUED | OUTPATIENT
Start: 2020-02-14 | End: 2020-02-15

## 2020-02-14 RX ORDER — SODIUM CHLORIDE 9 MG/ML
1000 INJECTION INTRAMUSCULAR; INTRAVENOUS; SUBCUTANEOUS ONCE
Refills: 0 | Status: COMPLETED | OUTPATIENT
Start: 2020-02-14 | End: 2020-02-14

## 2020-02-14 RX ORDER — PREGABALIN 225 MG/1
1000 CAPSULE ORAL DAILY
Refills: 0 | Status: DISCONTINUED | OUTPATIENT
Start: 2020-02-14 | End: 2020-02-15

## 2020-02-14 RX ORDER — ACETAMINOPHEN 500 MG
650 TABLET ORAL EVERY 6 HOURS
Refills: 0 | Status: DISCONTINUED | OUTPATIENT
Start: 2020-02-14 | End: 2020-02-15

## 2020-02-14 RX ORDER — IBRUTINIB 140 MG/1
420 TABLET, FILM COATED ORAL DAILY
Refills: 0 | Status: DISCONTINUED | OUTPATIENT
Start: 2020-02-14 | End: 2020-02-14

## 2020-02-14 RX ORDER — IBRUTINIB 140 MG/1
0 TABLET, FILM COATED ORAL
Qty: 0 | Refills: 0 | DISCHARGE

## 2020-02-14 RX ADMIN — Medication 100 MILLIGRAM(S): at 22:35

## 2020-02-14 RX ADMIN — Medication 650 MILLIGRAM(S): at 09:36

## 2020-02-14 RX ADMIN — Medication 100 MILLIGRAM(S): at 12:46

## 2020-02-14 RX ADMIN — PREGABALIN 1000 MICROGRAM(S): 225 CAPSULE ORAL at 18:32

## 2020-02-14 RX ADMIN — Medication 650 MILLIGRAM(S): at 11:41

## 2020-02-14 RX ADMIN — Medication 1 MILLIGRAM(S): at 18:32

## 2020-02-14 RX ADMIN — SODIUM CHLORIDE 1000 MILLILITER(S): 9 INJECTION INTRAMUSCULAR; INTRAVENOUS; SUBCUTANEOUS at 11:15

## 2020-02-14 RX ADMIN — Medication 100 MILLIGRAM(S): at 13:15

## 2020-02-14 RX ADMIN — Medication 10 MILLIGRAM(S): at 12:46

## 2020-02-14 RX ADMIN — Medication 30 MILLIGRAM(S): at 18:32

## 2020-02-14 RX ADMIN — Medication 30 MILLIGRAM(S): at 11:41

## 2020-02-14 NOTE — H&P ADULT - HISTORY OF PRESENT ILLNESS
This is a 75 M with PMH of small cell B-cell lymphoma, AIHA, hepatitis B who was sent in to start new chemotherapy -- Venetoclax. He c/o generalized malaise, weakness, dry cough for several weeks. He denies SOB, CP, n/v/d, fever/chills. He was found to have a fever of 102 in ER.

## 2020-02-14 NOTE — CONSULT NOTE ADULT - PROBLEM SELECTOR RECOMMENDATION 4
may require transfusion    Thank you for consulting us and involving us in the management of this most interesting and challenging case.     We will follow along in the care of this patient.

## 2020-02-14 NOTE — H&P ADULT - PROBLEM SELECTOR PLAN 2
R/O sepsis  Pan-culture  Likely from flu  Defer iv abx for now  Check procalcitonin  ID consult  Further work-up/management pending clinical course.

## 2020-02-14 NOTE — ED PROVIDER NOTE - PHYSICAL EXAMINATION
GEN: awake, alert, well appearing, NAD, febrile    HENT: atraumatic, normocephalic, MYAH, EOMI, no midline instability, oropharynx w/o erythema or exudates, no lymphadenopathy  CV: tachycardic, S1, S2, no MRG, equal pulses throughout, no JVD, +LE edema   RESP: no distress, no IWOB, no retraction, clear to auscultation bilaterally   ABD: soft, nontender, distended, no rebound, no guarding, normoactive bowel sounds, no organomegally  MUSCULOSKELETAL: strenght 5/5 x 4, full range of motion, CMS intact   SKIN: normal color, no turgor, no wounds or rash   NEURO: Awake alert oriented x 3, no facial asymmetry, no slurred speech, no pronator drift, moving all extremities  PSYCH: no suicial ideation, no homicidal ideation, no depression, no anxiety, no hallucination

## 2020-02-14 NOTE — ED PROVIDER NOTE - NS ED ROS FT
GEN: no fever, no chills, +weakness  HENT: no eye pain, no visual changes, no ear pain, no visual or hearing changes, no sore throat, no swelling or neck pain  CV: no chest pain, no palpitations, no dizziness, no swelling  RESP: +coughing, no sob, no IWOB, no LLAMAS  GI: no abd pain, no distension, no nausea, no vomiting, no diarrhea, no constipation  : no dysuria,  no frequency, no hematuria, no discharge, no flank pain  MUSCULOSKELETAL: no myalgia, no arthralgia, no joint swelling, no bruising   SKIN: no rash, no wounds, no itching  NEURO: no change in mentation, no visual changes, no HA, no focal weakness, no trouble speaking, no gait abnormalities, no dizziness  PSYCH: no suidical ideation, no homicidal ideation, no depression, no anxiety, no hallucinations

## 2020-02-14 NOTE — ED ADULT NURSE REASSESSMENT NOTE - NS ED NURSE REASSESS COMMENT FT1
Patient is flu A positive, he was moved to an isolation room.  Afebrile at this time, he is alert and calm, NS infusing to left AC 20 gauge IV as ordered.  Report given to Russel Helton RN.

## 2020-02-14 NOTE — ED PROVIDER NOTE - CARE PLAN
Assessment and plan of treatment:	76yo M h/o leukemia, htn DM, on po chemo and steroid therapy sent for anemia thought to be due to oral chemo therapy. pt reports cough for some time. denies AC, no melena. Principal Discharge DX:	Influenza A  Assessment and plan of treatment:	76yo M h/o leukemia, htn DM, on po chemo and steroid therapy sent for anemia thought to be due to oral chemo therapy. pt reports cough for some time. denies AC, no melena.  Secondary Diagnosis:	Leukemia in relapse, unspecified leukemia type  Secondary Diagnosis:	Anemia, unspecified type

## 2020-02-14 NOTE — CONSULT NOTE ADULT - SUBJECTIVE AND OBJECTIVE BOX
HPI:  This is a 75 M with PMH of small cell B-cell lymphoma, AIHA, hepatitis B who was sent in to start new chemotherapy -- Venetoclax. He c/o generalized malaise, weakness, dry cough for several weeks. He denies SOB, CP, n/v/d, fever/chills. He was found to have a fever of 102 in ER. Patient reports no travel but does report he thinks his wife had the Flu and that his how he was exposed.      PAST MEDICAL & SURGICAL HISTORY:  Leukemia  Diabetes  HTN (hypertension)  H/O lithotripsy      Antimicrobials  entecavir 0.5 milliGRAM(s) Oral daily  oseltamivir 30 milliGRAM(s) Oral two times a day      Immunological      Other  acetaminophen   Tablet .. 650 milliGRAM(s) Oral every 6 hours PRN  allopurinol 100 milliGRAM(s) Oral three times a day  benzonatate 100 milliGRAM(s) Oral three times a day  guaiFENesin   Syrup  (Sugar-Free) 100 milliGRAM(s) Oral every 6 hours PRN  ibrutinib 420 milliGRAM(s) Oral daily  predniSONE   Tablet 10 milliGRAM(s) Oral daily  sodium chloride 0.9%. 1000 milliLiter(s) IV Continuous <Continuous>      Allergies    sulfa drugs (Unknown)    Intolerances    SOCIAL HISTORY:  no toxic habits reported      FAMILY HISTORY:  Family history of diabetes mellitus  Family history of hypertension  Family history of lung cancer      ROS:    EYES:  Negative  blurry vision or double vision  GASTROINTESTINAL:  Negative for nausea, vomiting, diarrhea  -otherwise negative except for subjective    Vital Signs Last 24 Hrs  T(C): 37.1 (14 Feb 2020 11:39), Max: 38.9 (14 Feb 2020 08:54)  T(F): 98.8 (14 Feb 2020 11:39), Max: 102 (14 Feb 2020 08:54)  HR: 87 (14 Feb 2020 11:39) (87 - 103)  BP: 128/88 (14 Feb 2020 11:39) (126/67 - 128/88)  BP(mean): --  RR: 18 (14 Feb 2020 11:39) (15 - 18)  SpO2: 98% (14 Feb 2020 11:39) (96% - 98%)    PE:  WDWN in no distress  HEENT:  NC, PERRL, sclerae anicteric, conjunctivae clear, EOMI.  Sinuses nontender, no nasal exudate.  No buccal or pharyngeal lesions, erythema or exudate  Neck:  Supple, no adenopathy  Lungs:  No accessory muscle use, bilaterally clear to auscultation  Cor:  RRR, S1, S2, no murmur appreciated  Abd:  Symmetric, normoactive BS.  Soft, nontender, no masses, guarding or rebound.  Liver and spleen not enlarged  Extrem:  No cyanosis or edema  Skin:  No rashes.  Neuro: grossly intact  Musc: moving all limbs freely, no focal deficits    LABS:                        6.6    75.99 )-----------( 35       ( 14 Feb 2020 09:34 )             21.1     Band Neutrophils %: 3.0 % (02.14.20 @ 09:34)    Auto Neutrophil #: 11.40 K/uL (02.14.20 @ 09:34)    Auto Lymphocyte #: 29.64 K/uL (02.14.20 @ 09:34)      WBC Count: 75.99 K/uL (02-14-20 @ 09:34)      02-14    137  |  108  |  30<H>  ----------------------------<  201<H>  3.7   |  19<L>  |  1.70<H>    Ca    8.3<L>      14 Feb 2020 09:34  Mg     2.3     02-14    TPro  5.9<L>  /  Alb  2.8<L>  /  TBili  1.8<H>  /  DBili  x   /  AST  42<H>  /  ALT  28  /  AlkPhos  130<H>  02-14      Creatinine, Serum: 1.70 mg/dL (02-14-20 @ 09:34)      MICROBIOLOGY:      RADIOLOGY & ADDITIONAL STUDIES:    --< from: Xray Chest 2 Views PA/Lat (02.14.20 @ 10:10) >    EXAM:  XR CHEST PA LAT 2V                            PROCEDURE DATE:  02/14/2020          INTERPRETATION:  DATE OF STUDY: 2/14/2020.    COMPARISON: Plain chest done 12/18/18; had CT scan of the chest done 8/30/19.    CLINICAL INDICATION: Weakness; fever -assess for chest process.    TECHNIQUE: PA and lateral chest films.     FINDINGS:   Thoracic aortic atheromatous changes and ectasia are stable.  The cardiomediastinal silhouette is otherwise within normal limits.  Pulmonary vascularity is normal.  No focal consolidations. No pleural effusion or pneumothorax.  The small bilateral lung nodules and mediastinal adenopathy better appreciated on 8/30/19 CT scan.    No acute bony findings.    IMPRESSION:    No acute finding.

## 2020-02-14 NOTE — H&P ADULT - SKIN
RT Protocol Note  Estela Tovar  68 y o  male MRN: [id number]  [facility] [address] )     No lesions; no rash

## 2020-02-14 NOTE — ED PROVIDER NOTE - OBJECTIVE STATEMENT
76yo M h/o leukemia, htn DM, on po chemo and steroid therapy sent for anemia thought to be due to oral chemo therapy. pt reports cough for some time. denies AC, no melena.  hem/onc: Heidi Stephenson  PCP: Wanda

## 2020-02-14 NOTE — ED PROVIDER NOTE - CLINICAL SUMMARY MEDICAL DECISION MAKING FREE TEXT BOX
74yo M h/o leukemia, htn DM, on po chemo and steroid therapy sent for anemia thought to be due to oral chemo therapy. pt reports cough for some time. denies AC, no melena. 76yo M h/o leukemia, htn DM, on po chemo and steroid therapy sent for anemia thought to be due to oral chemo therapy. pt reports cough for some time. denies AC, no melena.  febrile, nontoxic   ekg no sig change, labs pos influenza A, cxr neg, labs c/w leukemia, anemia thrombocytopenia  given ivf, tamiflu, pt admitted for further eval and mgmt

## 2020-02-14 NOTE — H&P ADULT - NSICDXFAMILYHX_GEN_ALL_CORE_FT
FAMILY HISTORY:  Family history of diabetes mellitus  Family history of hypertension  Family history of lung cancer

## 2020-02-14 NOTE — CONSULT NOTE ADULT - ASSESSMENT
75 M with PMH of small cell B-cell lymphoma, AIHA, hepatitis B coming in with clear CXR and FluA+
[ASSESSMENT and  PLAN]  75 M with PMH of small cell B-cell lymphoma/CLL, admitted with fever, FluA+.   CXR negative.     Leukocytosis due to CLL  Mote cytopenic wiht dec Hgb, dec Plts  02/12/20 ==>02/14/20  WBC 96==>75.99  Hgb 7.2==>6.6  Plt 62==>35    Hx  AIHA,   Hgb decreased from 7.2 in office Wed to 6.6 today.     hx chronic hepatitis B     RECOMMENDATIONS  Hold Venetoclax.   IVF  Tamiflu per infectious disease.   Continue HBV tx.   Appreciate ID input.     Hold Ibrutinib  Continue low dose Prednisone 10mg for AIHA.   May consider increased dosing.     Txfuse 1-2 U PRBC.   Keep Hgb >7  Monitor CBC    DVT Prophylaxis  OOB as darvin  SQ Heparin or LMWH if decreased ambulation and plts >50K.     Thank you for consulting us.     I have discussed the above plan of care with patient in detail. They expressed understanding of the treatment plan . Risks, benefits and alternatives discussed in detail. I have asked if they have any questions or concerns and appropriately addressed them; all questions answered to their satisfactions and in lay terms.     Discussed with Dr ANAMIKA Ledesma.

## 2020-02-14 NOTE — ED PROVIDER NOTE - PLAN OF CARE
74yo M h/o leukemia, htn DM, on po chemo and steroid therapy sent for anemia thought to be due to oral chemo therapy. pt reports cough for some time. denies AC, no melena.

## 2020-02-14 NOTE — CONSULT NOTE ADULT - SUBJECTIVE AND OBJECTIVE BOX
INCOMPLETE NOTE.  Documentation in Progress  PT SEEN AND EVALUATED.     FULL/ADDITIONAL RECOMMENDATIONS TO FOLLOW   ***************************************************************  ***************************************************************    Patient is a 75y old  Male who presents with a chief complaint of For chemo (14 Feb 2020 12:28)      HPI:  This is a 75 M with PMH of small cell B-cell lymphoma, AIHA, hepatitis B who was sent in to start new chemotherapy -- Venetoclax. He c/o generalized malaise, weakness, dry cough for several weeks. He denies SOB, CP, n/v/d, fever/chills. He was found to have a fever of 102 in ER. (14 Feb 2020 11:38)      PAST MEDICAL & SURGICAL HISTORY:  Leukemia  Diabetes  HTN (hypertension)  H/O lithotripsy      HEALTH ISSUES - PROBLEM Dx:  Lymphoma: Lymphoma  Leukemia in relapse, unspecified leukemia type: Leukemia in relapse, unspecified leukemia type  Anemia, unspecified type: Anemia, unspecified type  Fever: Fever  Influenza A: Influenza A          Influenza with other respiratory manifestations, other influenza virus identified (J10.1)  Leukemia (C95.90)  Diabetes (E11.9)  HTN (hypertension) (I10)  H/O lithotripsy (Z98.890)  No significant past surgical history ([id number])  Anemia, unspecified type (D64.9)  Leukemia in relapse, unspecified leukemia type (C95.92)      FAMILY HISTORY:  Family history of diabetes mellitus  Family history of hypertension  Family history of lung cancer        [SOCIAL HISTORY: ]     smoking:       EtOH:       illicit drugs:       occupation:       marital status:       Other:       [ALLERGIES/INTOLERANCES:]  Allergies    sulfa drugs (Unknown)    Intolerances          [MEDICATIONS]  MEDICATIONS  (STANDING):  allopurinol 100 milliGRAM(s) Oral three times a day  benzonatate 100 milliGRAM(s) Oral three times a day  entecavir 0.5 milliGRAM(s) Oral daily  ibrutinib 420 milliGRAM(s) Oral daily  oseltamivir 30 milliGRAM(s) Oral two times a day  predniSONE   Tablet 10 milliGRAM(s) Oral daily  sodium chloride 0.9%. 1000 milliLiter(s) (100 mL/Hr) IV Continuous <Continuous>    MEDICATIONS  (PRN):  acetaminophen   Tablet .. 650 milliGRAM(s) Oral every 6 hours PRN Temp greater or equal to 38C (100.4F), Mild Pain (1 - 3)  guaiFENesin   Syrup  (Sugar-Free) 100 milliGRAM(s) Oral every 6 hours PRN Cough        [REVIEW OF SYSTEMS: ]  CONSTITUTIONAL: normal, no fever, no shakes, no chills   EYES: No eye pain, no visual disturbances, no discharge  ENMT:  no discharge  NECK: No pain, no stiffness  BREASTS: No pain, no masses, no nipple discharge  RESPIRATORY: No cough, no wheezing, no chills, no hemoptysis; No shortness of breath  CARDIOVASCULAR: No chest pain, no palpitations, no dizziness, no leg swelling  GASTROINTESTINAL: No abdominal, no epigastric pain. No nausea, no vomiting, no hematemesis; No diarrhea , no constipation. No melena, no hematochezia.  GENITOURINARY: No dysuria, no frequency, no hematuria, no incontinence  NEUROLOGICAL: No headaches, no memory loss, no loss of strength, no numbness, no tremors  SKIN: No itching, no burning, no rashes, no lesions   LYMPH NODES: No enlarged glands  ENDOCRINE: No heat or cold intolerance; No hair loss  MUSCULOSKELETAL: No joint pain or swelling; No muscle, no back, no extremity pain  PSYCHIATRIC: No depression, no anxiety, no mood swings, no difficulty sleeping  HEME/LYMPH: No easy bruising, no bleeding gums      [VITALS SIGNS 24hrs]  Vital Signs Last 24 Hrs  T(C): 36.9 (14 Feb 2020 13:35), Max: 38.9 (14 Feb 2020 08:54)  T(F): 98.4 (14 Feb 2020 13:35), Max: 102 (14 Feb 2020 08:54)  HR: 88 (14 Feb 2020 13:35) (87 - 103)  BP: 122/70 (14 Feb 2020 13:35) (122/70 - 128/88)  BP(mean): --  RR: 18 (14 Feb 2020 13:35) (15 - 18)  SpO2: 99% (14 Feb 2020 13:35) (96% - 99%)    [PHYSICAL EXAM]  General: adult in NAD,  WN,  WD.  HEENT: clear oropharynx, anicteric sclera, pink conjunctivae.  Neck: supple, no masses.  CV: normal S1S2, no murmur, no rubs, no gallops.  Lungs: clear to auscultation, no wheezes, no rales, no rhonchi.  Abdomen: soft, non-tender, non-distended, no hepatosplenomegaly, normal BS, no guarding.  Ext: no clubbing, no cyanosis, no edema.  Skin: no rashes,  no petechiae, no venous stasis changes.  Neuro: alert and oriented X3, no focal motor deficits.  LN: no SC FERNIE.      [LABS:]                        6.6    75.99 )-----------( 35       ( 14 Feb 2020 09:34 )             21.1     CBC Full  -  ( 14 Feb 2020 09:34 )  WBC Count : 75.99 K/uL  RBC Count : 1.85 M/uL  Hemoglobin : 6.6 g/dL  Hematocrit : 21.1 %  Platelet Count - Automated : 35 K/uL  Mean Cell Volume : 114.1 fl  Mean Cell Hemoglobin : 35.7 pg  Mean Cell Hemoglobin Concentration : 31.3 gm/dL  Auto Neutrophil # : 11.40 K/uL  Auto Lymphocyte # : 29.64 K/uL  Auto Monocyte # : 1.52 K/uL  Auto Eosinophil # : 0.00 K/uL  Auto Basophil # : 0.00 K/uL  Auto Neutrophil % : 12.0 %  Auto Lymphocyte % : 39.0 %  Auto Monocyte % : 2.0 %  Auto Eosinophil % : 0.0 %  Auto Basophil % : 0.0 %    02-14    137  |  108  |  30<H>  ----------------------------<  201<H>  3.7   |  19<L>  |  1.70<H>    Ca    8.3<L>      14 Feb 2020 09:34  Mg     2.3     02-14    TPro  5.9<L>  /  Alb  2.8<L>  /  TBili  1.8<H>  /  DBili  x   /  AST  42<H>  /  ALT  28  /  AlkPhos  130<H>  02-14    PT/INR - ( 14 Feb 2020 09:34 )   PT: 14.5 sec;   INR: 1.28 ratio         PTT - ( 14 Feb 2020 09:34 )  PTT:30.2 sec  LIVER FUNCTIONS - ( 14 Feb 2020 09:34 )  Alb: 2.8 g/dL / Pro: 5.9 g/dL / ALK PHOS: 130 U/L / ALT: 28 U/L / AST: 42 U/L / GGT: x           CARDIAC MARKERS ( 14 Feb 2020 09:34 )  .024 ng/mL / x     / x     / x     / 1.2 ng/mL          WBC  TREND (5 Days)  WBC Count: 75.99 K/uL (02-14 @ 09:34)    HGB  TREND (5 Days)  Hemoglobin: 6.6 g/dL (02-14 @ 09:34)    HCT  TREND (5 Days)  Hematocrit: 21.1 % (02-14 @ 09:34)    PLT  TREND (5 Days)  Platelet Count - Automated: 35 K/uL (02-14 @ 09:34)      Anemia Studies                      Myeloma Studies                    [RADIOLOGY & ADDITIONAL STUDIES:] Patient is a 75y old  Male who presents with a chief complaint of For chemo (14 Feb 2020 12:28)      HPI:  This is a 75 M with PMH of small cell B-cell lymphoma, AIHA, hepatitis B who was sent in to start new chemotherapy -- Venetoclax. He c/o generalized malaise, weakness, dry cough for several weeks. He denies SOB, CP, n/v/d, fever/chills. He was found to have a fever of 102 in ER. (14 Feb 2020 11:38)      Pt known to office,   On Ibrutinib  Came in for IVF to prevent tumor lysis, and to start Venteclax  But noted in ER with have fever 102. FluA positive      PAST MEDICAL & SURGICAL HISTORY:  Leukemia  Diabetes  HTN (hypertension)  H/O lithotripsy      HEALTH ISSUES - PROBLEM Dx:  Lymphoma: Lymphoma  Leukemia in relapse, unspecified leukemia type: Leukemia in relapse, unspecified leukemia type  Anemia, unspecified type: Anemia, unspecified type  Fever: Fever  Influenza A: Influenza A          Influenza with other respiratory manifestations, other influenza virus identified (J10.1)  Leukemia (C95.90)  Diabetes (E11.9)  HTN (hypertension) (I10)  H/O lithotripsy (Z98.890)  No significant past surgical history ([id number])  Anemia, unspecified type (D64.9)  Leukemia in relapse, unspecified leukemia type (C95.92)      FAMILY HISTORY:  Family history of diabetes mellitus  Family history of hypertension  Family history of lung cancer        [SOCIAL HISTORY: ]     smoking:  denies     EtOH:  denies     illicit drugs:  denies     occupation:  retired      [ALLERGIES/INTOLERANCES:]  Allergies     sulfa drugs (Unknown)  Intolerances          [MEDICATIONS]  MEDICATIONS  (STANDING):  allopurinol 100 milliGRAM(s) Oral three times a day  benzonatate 100 milliGRAM(s) Oral three times a day  entecavir 0.5 milliGRAM(s) Oral daily  ibrutinib 420 milliGRAM(s) Oral daily  oseltamivir 30 milliGRAM(s) Oral two times a day  predniSONE   Tablet 10 milliGRAM(s) Oral daily  sodium chloride 0.9%. 1000 milliLiter(s) (100 mL/Hr) IV Continuous <Continuous>    MEDICATIONS  (PRN):  acetaminophen   Tablet .. 650 milliGRAM(s) Oral every 6 hours PRN Temp greater or equal to 38C (100.4F), Mild Pain (1 - 3)  guaiFENesin   Syrup  (Sugar-Free) 100 milliGRAM(s) Oral every 6 hours PRN Cough        [REVIEW OF SYSTEMS: ]  CONSTITUTIONAL: normal, +fever, no shakes, no chills   EYES: No eye pain, no visual disturbances, no discharge  ENMT:  no discharge  NECK: No pain, no stiffness  BREASTS: No pain, no masses, no nipple discharge  RESPIRATORY: No cough, no wheezing, no chills, no hemoptysis; No shortness of breath  CARDIOVASCULAR: No chest pain, no palpitations, no dizziness, no leg swelling  GASTROINTESTINAL: No abdominal, no epigastric pain. No nausea, no vomiting, no hematemesis; No diarrhea , no constipation. No melena, no hematochezia.  GENITOURINARY: No dysuria, no frequency, no hematuria, no incontinence  NEUROLOGICAL: No headaches, no memory loss, no loss of strength, no numbness, no tremors  SKIN: No itching, no burning, no rashes, no lesions   LYMPH NODES: No enlarged glands  ENDOCRINE: No heat or cold intolerance; No hair loss  MUSCULOSKELETAL: No joint pain or swelling; No muscle, no back, no extremity pain  PSYCHIATRIC: No depression, no anxiety, no mood swings, no difficulty sleeping  HEME/LYMPH: No easy bruising, no bleeding gums      [VITALS SIGNS 24hrs]  Vital Signs Last 24 Hrs  T(C): 36.9 (14 Feb 2020 13:35), Max: 38.9 (14 Feb 2020 08:54)  T(F): 98.4 (14 Feb 2020 13:35), Max: 102 (14 Feb 2020 08:54)  HR: 88 (14 Feb 2020 13:35) (87 - 103)  BP: 122/70 (14 Feb 2020 13:35) (122/70 - 128/88)  BP(mean): --  RR: 18 (14 Feb 2020 13:35) (15 - 18)  SpO2: 99% (14 Feb 2020 13:35) (96% - 99%)    [PHYSICAL EXAM]  General: adult in NAD,  WN,  WD.  HEENT: clear oropharynx, anicteric sclera, pink conjunctivae.  Neck: supple, no masses.  CV: normal S1S2, no murmur, no rubs, no gallops.  Lungs: clear to auscultation, no wheezes, no rales, no rhonchi.  Abdomen: soft, non-tender, non-distended, no hepatosplenomegaly, normal BS, no guarding.  Ext: no clubbing, no cyanosis, no edema.  Skin: no rashes,  no petechiae, no venous stasis changes.  Neuro: alert and oriented X3, no focal motor deficits.  LN: no SC FERNIE.      [LABS:]                        6.6    75.99 )-----------( 35       ( 14 Feb 2020 09:34 )             21.1     CBC Full  -  ( 14 Feb 2020 09:34 )  WBC Count : 75.99 K/uL  RBC Count : 1.85 M/uL  Hemoglobin : 6.6 g/dL  Hematocrit : 21.1 %  Platelet Count - Automated : 35 K/uL  Mean Cell Volume : 114.1 fl  Mean Cell Hemoglobin : 35.7 pg  Mean Cell Hemoglobin Concentration : 31.3 gm/dL  Auto Neutrophil # : 11.40 K/uL  Auto Lymphocyte # : 29.64 K/uL  Auto Monocyte # : 1.52 K/uL  Auto Eosinophil # : 0.00 K/uL  Auto Basophil # : 0.00 K/uL  Auto Neutrophil % : 12.0 %  Auto Lymphocyte % : 39.0 %  Auto Monocyte % : 2.0 %  Auto Eosinophil % : 0.0 %  Auto Basophil % : 0.0 %    02-14    137  |  108  |  30<H>  ----------------------------<  201<H>  3.7   |  19<L>  |  1.70<H>    Ca    8.3<L>      14 Feb 2020 09:34  Mg     2.3     02-14    TPro  5.9<L>  /  Alb  2.8<L>  /  TBili  1.8<H>  /  DBili  x   /  AST  42<H>  /  ALT  28  /  AlkPhos  130<H>  02-14    PT/INR - ( 14 Feb 2020 09:34 )   PT: 14.5 sec;   INR: 1.28 ratio         PTT - ( 14 Feb 2020 09:34 )  PTT:30.2 sec  LIVER FUNCTIONS - ( 14 Feb 2020 09:34 )  Alb: 2.8 g/dL / Pro: 5.9 g/dL / ALK PHOS: 130 U/L / ALT: 28 U/L / AST: 42 U/L / GGT: x           CARDIAC MARKERS ( 14 Feb 2020 09:34 )  .024 ng/mL / x     / x     / x     / 1.2 ng/mL          WBC  TREND (5 Days)  WBC Count: 75.99 K/uL (02-14 @ 09:34)    HGB  TREND (5 Days)  Hemoglobin: 6.6 g/dL (02-14 @ 09:34)    HCT  TREND (5 Days)  Hematocrit: 21.1 % (02-14 @ 09:34)    PLT  TREND (5 Days)  Platelet Count - Automated: 35 K/uL (02-14 @ 09:34)      [RADIOLOGY & ADDITIONAL STUDIES:]      --< from: Xray Chest 2 Views PA/Lat (02.14.20 @ 10:10) >  EXAM:  XR CHEST PA LAT 2V                        PROCEDURE DATE:  02/14/2020    INTERPRETATION:  DATE OF STUDY: 2/14/2020.  COMPARISON: Plain chest done 12/18/18; had CT scan of the chest done 8/30/19.  CLINICAL INDICATION: Weakness; fever -assess for chest process.  TECHNIQUE: PA and lateral chest films.     FINDINGS:   Thoracic aortic atheromatous changes and ectasia are stable.  The cardiomediastinal silhouette is otherwise within normal limits.  Pulmonary vascularity is normal.  No focal consolidations. No pleural effusion or pneumothorax.  The small bilateral lung nodules and mediastinal adenopathy better appreciated on 8/30/19 CT scan.    No acute bony findings.    IMPRESSION:    No acute finding.

## 2020-02-15 ENCOUNTER — TRANSCRIPTION ENCOUNTER (OUTPATIENT)
Age: 75
End: 2020-02-15

## 2020-02-15 VITALS
HEART RATE: 73 BPM | OXYGEN SATURATION: 98 % | RESPIRATION RATE: 18 BRPM | DIASTOLIC BLOOD PRESSURE: 73 MMHG | TEMPERATURE: 98 F | SYSTOLIC BLOOD PRESSURE: 123 MMHG

## 2020-02-15 LAB
ANION GAP SERPL CALC-SCNC: 11 MMOL/L — SIGNIFICANT CHANGE UP (ref 5–17)
BASOPHILS # BLD AUTO: 0.56 K/UL — HIGH (ref 0–0.2)
BASOPHILS NFR BLD AUTO: 1 % — SIGNIFICANT CHANGE UP (ref 0–2)
BUN SERPL-MCNC: 22 MG/DL — SIGNIFICANT CHANGE UP (ref 7–23)
CALCIUM SERPL-MCNC: 8 MG/DL — LOW (ref 8.5–10.1)
CHLORIDE SERPL-SCNC: 111 MMOL/L — HIGH (ref 96–108)
CO2 SERPL-SCNC: 16 MMOL/L — LOW (ref 22–31)
CREAT SERPL-MCNC: 1.4 MG/DL — HIGH (ref 0.5–1.3)
EOSINOPHIL # BLD AUTO: 0 K/UL — SIGNIFICANT CHANGE UP (ref 0–0.5)
EOSINOPHIL NFR BLD AUTO: 0 % — SIGNIFICANT CHANGE UP (ref 0–6)
GLUCOSE SERPL-MCNC: 131 MG/DL — HIGH (ref 70–99)
HCT VFR BLD CALC: 27.3 % — LOW (ref 39–50)
HGB BLD-MCNC: 8.7 G/DL — LOW (ref 13–17)
LYMPHOCYTES # BLD AUTO: 16.88 K/UL — HIGH (ref 1–3.3)
LYMPHOCYTES # BLD AUTO: 30 % — SIGNIFICANT CHANGE UP (ref 13–44)
LYMPHOCYTES # SPEC AUTO: 42 % — HIGH (ref 0–0)
MAGNESIUM SERPL-MCNC: 2.2 MG/DL — SIGNIFICANT CHANGE UP (ref 1.6–2.6)
MANUAL SMEAR VERIFICATION: SIGNIFICANT CHANGE UP
MCHC RBC-ENTMCNC: 31.9 GM/DL — LOW (ref 32–36)
MCHC RBC-ENTMCNC: 33.1 PG — SIGNIFICANT CHANGE UP (ref 27–34)
MCV RBC AUTO: 103.8 FL — HIGH (ref 80–100)
MONOCYTES # BLD AUTO: 0.56 K/UL — SIGNIFICANT CHANGE UP (ref 0–0.9)
MONOCYTES NFR BLD AUTO: 1 % — LOW (ref 2–14)
NEUTROPHILS # BLD AUTO: 14.06 K/UL — HIGH (ref 1.8–7.4)
NEUTROPHILS NFR BLD AUTO: 20 % — LOW (ref 43–77)
NEUTS BAND # BLD: 5 % — SIGNIFICANT CHANGE UP (ref 0–8)
NRBC # BLD: 2 — SIGNIFICANT CHANGE UP
NRBC # BLD: SIGNIFICANT CHANGE UP /100 WBCS (ref 0–0)
PLAT MORPH BLD: NORMAL — SIGNIFICANT CHANGE UP
PLATELET # BLD AUTO: 21 K/UL — LOW (ref 150–400)
POTASSIUM SERPL-MCNC: 3.4 MMOL/L — LOW (ref 3.5–5.3)
POTASSIUM SERPL-SCNC: 3.4 MMOL/L — LOW (ref 3.5–5.3)
PROCALCITONIN SERPL-MCNC: 0.26 NG/ML — HIGH (ref 0–0.04)
RBC # BLD: 2.63 M/UL — LOW (ref 4.2–5.8)
RBC # FLD: 21 % — HIGH (ref 10.3–14.5)
RBC BLD AUTO: SIGNIFICANT CHANGE UP
SODIUM SERPL-SCNC: 138 MMOL/L — SIGNIFICANT CHANGE UP (ref 135–145)
VARIANT LYMPHS # BLD: 1 % — SIGNIFICANT CHANGE UP (ref 0–6)
WBC # BLD: 56.25 K/UL — CRITICAL HIGH (ref 3.8–10.5)
WBC # FLD AUTO: 56.25 K/UL — CRITICAL HIGH (ref 3.8–10.5)

## 2020-02-15 PROCEDURE — 85027 COMPLETE CBC AUTOMATED: CPT

## 2020-02-15 PROCEDURE — 83880 ASSAY OF NATRIURETIC PEPTIDE: CPT

## 2020-02-15 PROCEDURE — 71046 X-RAY EXAM CHEST 2 VIEWS: CPT

## 2020-02-15 PROCEDURE — 86860 RBC ANTIBODY ELUTION: CPT

## 2020-02-15 PROCEDURE — 86901 BLOOD TYPING SEROLOGIC RH(D): CPT

## 2020-02-15 PROCEDURE — 82553 CREATINE MB FRACTION: CPT

## 2020-02-15 PROCEDURE — P9037: CPT

## 2020-02-15 PROCEDURE — 83735 ASSAY OF MAGNESIUM: CPT

## 2020-02-15 PROCEDURE — 86870 RBC ANTIBODY IDENTIFICATION: CPT

## 2020-02-15 PROCEDURE — 86922 COMPATIBILITY TEST ANTIGLOB: CPT

## 2020-02-15 PROCEDURE — 84145 PROCALCITONIN (PCT): CPT

## 2020-02-15 PROCEDURE — 83605 ASSAY OF LACTIC ACID: CPT

## 2020-02-15 PROCEDURE — 93005 ELECTROCARDIOGRAM TRACING: CPT

## 2020-02-15 PROCEDURE — 36430 TRANSFUSION BLD/BLD COMPNT: CPT

## 2020-02-15 PROCEDURE — 80048 BASIC METABOLIC PNL TOTAL CA: CPT

## 2020-02-15 PROCEDURE — 84484 ASSAY OF TROPONIN QUANT: CPT

## 2020-02-15 PROCEDURE — 99285 EMERGENCY DEPT VISIT HI MDM: CPT | Mod: 25

## 2020-02-15 PROCEDURE — P9016: CPT

## 2020-02-15 PROCEDURE — 86900 BLOOD TYPING SEROLOGIC ABO: CPT

## 2020-02-15 PROCEDURE — 83690 ASSAY OF LIPASE: CPT

## 2020-02-15 PROCEDURE — 87631 RESP VIRUS 3-5 TARGETS: CPT

## 2020-02-15 PROCEDURE — 86850 RBC ANTIBODY SCREEN: CPT

## 2020-02-15 PROCEDURE — 86880 COOMBS TEST DIRECT: CPT

## 2020-02-15 PROCEDURE — 36415 COLL VENOUS BLD VENIPUNCTURE: CPT

## 2020-02-15 PROCEDURE — 87040 BLOOD CULTURE FOR BACTERIA: CPT

## 2020-02-15 PROCEDURE — 85610 PROTHROMBIN TIME: CPT

## 2020-02-15 PROCEDURE — 85730 THROMBOPLASTIN TIME PARTIAL: CPT

## 2020-02-15 PROCEDURE — 80053 COMPREHEN METABOLIC PANEL: CPT

## 2020-02-15 PROCEDURE — 94660 CPAP INITIATION&MGMT: CPT

## 2020-02-15 RX ORDER — POTASSIUM CHLORIDE 20 MEQ
40 PACKET (EA) ORAL ONCE
Refills: 0 | Status: COMPLETED | OUTPATIENT
Start: 2020-02-15 | End: 2020-02-15

## 2020-02-15 RX ORDER — PREGABALIN 225 MG/1
1 CAPSULE ORAL
Qty: 30 | Refills: 0
Start: 2020-02-15 | End: 2020-03-15

## 2020-02-15 RX ORDER — FOLIC ACID 0.8 MG
1 TABLET ORAL
Qty: 30 | Refills: 0
Start: 2020-02-15 | End: 2020-03-15

## 2020-02-15 RX ORDER — ACETAMINOPHEN 500 MG
325 TABLET ORAL ONCE
Refills: 0 | Status: COMPLETED | OUTPATIENT
Start: 2020-02-15 | End: 2020-02-15

## 2020-02-15 RX ADMIN — Medication 10 MILLIGRAM(S): at 05:22

## 2020-02-15 RX ADMIN — Medication 100 MILLIGRAM(S): at 05:31

## 2020-02-15 RX ADMIN — Medication 325 MILLIGRAM(S): at 11:58

## 2020-02-15 RX ADMIN — Medication 100 MILLIGRAM(S): at 13:14

## 2020-02-15 RX ADMIN — Medication 100 MILLIGRAM(S): at 11:58

## 2020-02-15 RX ADMIN — Medication 30 MILLIGRAM(S): at 05:22

## 2020-02-15 RX ADMIN — Medication 100 MILLIGRAM(S): at 05:32

## 2020-02-15 RX ADMIN — Medication 30 MILLIGRAM(S): at 11:58

## 2020-02-15 RX ADMIN — Medication 40 MILLIEQUIVALENT(S): at 11:58

## 2020-02-15 RX ADMIN — Medication 30 MILLIGRAM(S): at 17:21

## 2020-02-15 RX ADMIN — Medication 1 MILLIGRAM(S): at 11:58

## 2020-02-15 RX ADMIN — PREGABALIN 1000 MICROGRAM(S): 225 CAPSULE ORAL at 11:58

## 2020-02-15 RX ADMIN — Medication 325 MILLIGRAM(S): at 12:59

## 2020-02-15 RX ADMIN — SODIUM CHLORIDE 100 MILLILITER(S): 9 INJECTION INTRAMUSCULAR; INTRAVENOUS; SUBCUTANEOUS at 05:23

## 2020-02-15 RX ADMIN — ENTECAVIR 0.5 MILLIGRAM(S): 0.5 TABLET ORAL at 13:07

## 2020-02-15 RX ADMIN — SODIUM CHLORIDE 100 MILLILITER(S): 9 INJECTION INTRAMUSCULAR; INTRAVENOUS; SUBCUTANEOUS at 13:07

## 2020-02-15 NOTE — DISCHARGE NOTE PROVIDER - NSDCCPCAREPLAN_GEN_ALL_CORE_FT
PRINCIPAL DISCHARGE DIAGNOSIS  Diagnosis: Influenza A  Assessment and Plan of Treatment: Finish course of antibiotics.  Follow-up with your primary care doctor within 1 week.        SECONDARY DISCHARGE DIAGNOSES  Diagnosis: Leukemia in relapse, unspecified leukemia type  Assessment and Plan of Treatment: Take prednisone 20mg on 2.16.2020 and then 10mg daily starting 2.17.2020  Follow-up with Dr Stephenson on 2.17.2020

## 2020-02-15 NOTE — PROGRESS NOTE ADULT - SUBJECTIVE AND OBJECTIVE BOX
[INTERVAL HX: ]  Patient seen and examined;  Chart reviewed and events noted;     No Cp, no SOB,  had blood transfusion yesterday with 2U. Feels better, less weak.   mild cough.   Did not take Venetoclax.  Did not take entacavir yest.   Ibrutinib was stopped.     Patient is a 75y Male with a known history of :  Influenza with other respiratory manifestations, other influenza virus identified (J10.1) [Active]  Leukemia (C95.90) [Active]  Diabetes (E11.9) [Active]  HTN (hypertension) (I10) [Active]  H/O lithotripsy (Z98.890) [Active]  No significant past surgical history ([id number]) [Inactive]      HPI:  This is a 75 M with PMH of small cell B-cell lymphoma, AIHA, hepatitis B who was sent in to start new chemotherapy -- Venetoclax. He c/o generalized malaise, weakness, dry cough for several weeks. He denies SOB, CP, n/v/d, fever/chills. He was found to have a fever of 102 in ER. (14 Feb 2020 11:38)            MEDICATIONS  (STANDING):  allopurinol 100 milliGRAM(s) Oral three times a day  benzonatate 100 milliGRAM(s) Oral three times a day  cyanocobalamin 1000 MICROGram(s) Oral daily  entecavir 0.5 milliGRAM(s) Oral daily  folic acid 1 milliGRAM(s) Oral daily  oseltamivir 30 milliGRAM(s) Oral two times a day  potassium chloride    Tablet ER 40 milliEquivalent(s) Oral once  predniSONE   Tablet 10 milliGRAM(s) Oral daily  sodium chloride 0.9%. 1000 milliLiter(s) (100 mL/Hr) IV Continuous <Continuous>    MEDICATIONS  (PRN):  acetaminophen   Tablet .. 650 milliGRAM(s) Oral every 6 hours PRN Temp greater or equal to 38C (100.4F), Mild Pain (1 - 3)  guaiFENesin   Syrup  (Sugar-Free) 100 milliGRAM(s) Oral every 6 hours PRN Cough      Vital Signs Last 24 Hrs  T(C): 37.2 (15 Feb 2020 09:09), Max: 37.6 (14 Feb 2020 19:15)  T(F): 99 (15 Feb 2020 09:09), Max: 99.7 (14 Feb 2020 19:15)  HR: 92 (15 Feb 2020 09:09) (76 - 93)  BP: 132/65 (15 Feb 2020 09:09) (116/68 - 135/95)  BP(mean): --  RR: 18 (15 Feb 2020 09:09) (18 - 19)  SpO2: 98% (15 Feb 2020 09:09) (97% - 99%)      [PHYSICAL EXAM]  General: adult in NAD,  WN,  WD.  HEENT: clear oropharynx, anicteric sclera, pink conjunctivae.  Neck: supple, no masses.  CV: normal S1S2, no murmur, no rubs, no gallops.  Lungs: clear to auscultation, no wheezes, no rales, no rhonchi.  Abdomen: soft, non-tender, non-distended, no hepatosplenomegaly, normal BS, no guarding.  Ext: no clubbing, no cyanosis, no edema.  Skin: no rashes,  no petechiae, no venous stasis changes.  Neuro: alert and oriented X3  , no focal motor deficits.  LN: no SC FERNIE.      [LABS:]                        8.7    56.25 )-----------( 21       ( 15 Feb 2020 06:51 )             27.3     02-15    138  |  111<H>  |  22  ----------------------------<  131<H>  3.4<L>   |  16<L>  |  1.40<H>    Ca    8.0<L>      15 Feb 2020 06:51  Mg     2.2     02-15    TPro  5.9<L>  /  Alb  2.8<L>  /  TBili  1.8<H>  /  DBili  x   /  AST  42<H>  /  ALT  28  /  AlkPhos  130<H>  02-14    PT/INR - ( 14 Feb 2020 09:34 )   PT: 14.5 sec;   INR: 1.28 ratio         PTT - ( 14 Feb 2020 09:34 )  PTT:30.2 sec        Vitamin B12, Serum in AM (02.22.18 @ 12:06)    Vitamin B12, Serum: 998: Note: Reference Range Change on 12/18/2017. pg/mL    Folate, Serum in AM (02.22.18 @ 12:06)    Folate, Serum: >20.0 ng/mL          [RADIOLOGY STUDIES:]

## 2020-02-15 NOTE — PROGRESS NOTE ADULT - ASSESSMENT
[ASSESSMENT and  PLAN]  75 M with PMH of small cell B-cell lymphoma/CLL, admitted with fever, FluA+.   CXR negative.     Leukocytosis due to CLL  Mote cytopenic wiht dec Hgb, dec Plts  02/12/20 ==>02/14/20==>02/15/20  WBC 96==>75.99==>56.25  Hgb 7.2==>6.6 +2U PRBC==> 8.7  Plt 62==>35==>21    Hx  AIHA,   Hgb decreased from 7.2 g/dL Wed to 6.6 g/dL Fri, given 2U PRBC Fri with appropriate response.     hx chronic hepatitis B   On entacavir    Thrombocytoepnia  likely from Flu.     RECOMMENDATIONS  SLL/CLL  Hold Venetoclax.   Hold Ibrutinib due to declining counts from Flu and AIHA.     FLu  Continue IVF, encourage PO fluids.   Continue Tamiflu per infectious disease.   HBV  Continue HBV tx.   Appreciate ID input.     AIHA  Continue low dose Prednisone 10mg daily for AIHA.   Will dose additional 30mg today for total 40mg. Additional dosing pending course.   ·	Sat 40mg  ·	Sun Consider 20mg,   ·	Mon 20 or 10mg  S/p Txfusion 2 U PRBC.   Keep Hgb >7  Monitor CBC    Thrombocytopenia  B12 and folate supplementation empirically for now.   Consider DC later this weekend.      DVT Prophylaxis:   OOB as darvin, ambulating.   SQ Heparin or LMWH if decreased ambulation and plts >50K.     DC planning if Hgb stable.     Thank you for consulting us.   I have discussed the above plan of care with patient in detail. They expressed understanding of the treatment plan . Risks, benefits and alternatives discussed in detail. I have asked if they have any questions or concerns and appropriately addressed them; all questions answered to their satisfactions and in lay terms.

## 2020-02-15 NOTE — PROGRESS NOTE ADULT - PROBLEM SELECTOR PLAN 4
Continue Imbruvica  Will defer starting new chemo until patient finishes treatment for influenza  Transfuse 1 unit PLT prior to d/c today
per oncology.    From an ID standpoint no further requirement for inpatient status for the management of ID issues. Fine with discharge from ID standpoint when other medical issues no longer require inpatient care and social issues allow for a safe discharge plan.  Thank you for consulting us and involving us in the management of this most interesting and challenging case.     Please Call with any further questions

## 2020-02-15 NOTE — PROGRESS NOTE ADULT - SUBJECTIVE AND OBJECTIVE BOX
Patient is a 75y old  Male who presents with a chief complaint of For chemo (15 Feb 2020 10:38)      INTERVAL HPI/OVERNIGHT EVENTS: Patient seen and examined. NAD. No complaints.    Vital Signs Last 24 Hrs  T(C): 37.2 (15 Feb 2020 09:09), Max: 37.6 (14 Feb 2020 19:15)  T(F): 99 (15 Feb 2020 09:09), Max: 99.7 (14 Feb 2020 19:15)  HR: 92 (15 Feb 2020 09:09) (76 - 93)  BP: 132/65 (15 Feb 2020 09:09) (116/68 - 135/95)  BP(mean): --  RR: 18 (15 Feb 2020 09:09) (18 - 19)  SpO2: 98% (15 Feb 2020 09:09) (97% - 99%)    02-15    138  |  111<H>  |  22  ----------------------------<  131<H>  3.4<L>   |  16<L>  |  1.40<H>    Ca    8.0<L>      15 Feb 2020 06:51  Mg     2.2     02-15    TPro  5.9<L>  /  Alb  2.8<L>  /  TBili  1.8<H>  /  DBili  x   /  AST  42<H>  /  ALT  28  /  AlkPhos  130<H>  02-14                          8.7    56.25 )-----------( 21       ( 15 Feb 2020 06:51 )             27.3     PT/INR - ( 14 Feb 2020 09:34 )   PT: 14.5 sec;   INR: 1.28 ratio         PTT - ( 14 Feb 2020 09:34 )  PTT:30.2 sec  CAPILLARY BLOOD GLUCOSE                  acetaminophen   Tablet .. 650 milliGRAM(s) Oral every 6 hours PRN  allopurinol 100 milliGRAM(s) Oral three times a day  benzonatate 100 milliGRAM(s) Oral three times a day  cyanocobalamin 1000 MICROGram(s) Oral daily  entecavir 0.5 milliGRAM(s) Oral daily  folic acid 1 milliGRAM(s) Oral daily  guaiFENesin   Syrup  (Sugar-Free) 100 milliGRAM(s) Oral every 6 hours PRN  oseltamivir 30 milliGRAM(s) Oral two times a day  potassium chloride    Tablet ER 40 milliEquivalent(s) Oral once  predniSONE   Tablet 30 milliGRAM(s) Oral once  predniSONE   Tablet 10 milliGRAM(s) Oral daily  sodium chloride 0.9%. 1000 milliLiter(s) IV Continuous <Continuous>              REVIEW OF SYSTEMS:  CONSTITUTIONAL: No fever, no weight loss, or no fatigue  NECK: No pain, no stiffness  RESPIRATORY: No cough, no wheezing, no chills, no hemoptysis, No shortness of breath  CARDIOVASCULAR: No chest pain, no palpitations, no dizziness, no leg swelling  GASTROINTESTINAL: No abdominal pain. No nausea, no vomiting, no hematemesis; No diarrhea, no constipation. No melena, no hematochezia.  GENITOURINARY: No dysuria, no frequency, no hematuria, no incontinence  NEUROLOGICAL: No headaches, no loss of strength, no numbness, no tremors  SKIN: No itching, no burning  MUSCULOSKELETAL: No joint pain, no swelling; No muscle, no back, no extremity pain  PSYCHIATRIC: No depression, no mood swings,   HEME/LYMPH: No easy bruising, no bleeding gums  ALLERY AND IMMUNOLOGIC: No hives       Consultant(s) Notes Reviewed:  [X] YES  [ ] NO    PHYSICAL EXAM:  GENERAL: NAD  HEAD:  Atraumatic, Normocephalic  EYES: EOMI, PERRLA, conjunctiva and sclera clear  ENMT: No tonsillar erythema, exudates, or enlargement; Moist mucous membranes  NECK: Supple, No JVD  NERVOUS SYSTEM:  Awake & alert  CHEST/LUNG: Clear to auscultation bilaterally; No rales, rhonchi, wheezing,  HEART: Regular rate and rhythm  ABDOMEN: Soft, Nontender, Nondistended; Bowel sounds present  EXTREMITIES:  No clubbing, cyanosis, or edema  LYMPH: No lymphadenopathy noted  SKIN: No rashes      Advanced care planning discussed with patient/family [X] YES   [ ] NO    Advanced care planning discussed with patient/family. Patient's health status was discussed. All appropriate changes have been made regarding patient's end-of-life care. Advanced care planning forms reviewed/discussed/completed.  20 minutes spent.

## 2020-02-15 NOTE — DISCHARGE NOTE NURSING/CASE MANAGEMENT/SOCIAL WORK - PATIENT PORTAL LINK FT
You can access the FollowMyHealth Patient Portal offered by [facility] by registering at the following website: [email or web address]. By joining [email or web address]’s FollowMyHealth portal, you will also be able to view your health information using other applications (apps) compatible with our system.

## 2020-02-15 NOTE — DISCHARGE NOTE PROVIDER - CARE PROVIDER_API CALL
Heidi Stephenson)  Medical Oncology  [address] [facility], [address] [place] [address]  Phone: [phone]  Fax: [phone]  Established Patient  Follow Up Time: 1-3 days

## 2020-02-15 NOTE — PROGRESS NOTE ADULT - SUBJECTIVE AND OBJECTIVE BOX
infectious diseases progress note:    DIONICIO SULLIVAN is a 75y y. o. Male patient    Patient reports: "going home today after getting these platelets"    ROS:    EYES:  Negative  blurry vision or double vision  GASTROINTESTINAL:  Negative for nausea, vomiting, diarrhea  -otherwise negative except for subjective    Allergies    sulfa drugs (Unknown)    Intolerances        ANTIBIOTICS/RELEVANT:  antimicrobials  entecavir 0.5 milliGRAM(s) Oral daily  oseltamivir 30 milliGRAM(s) Oral two times a day    immunologic:    OTHER:  acetaminophen   Tablet .. 650 milliGRAM(s) Oral every 6 hours PRN  allopurinol 100 milliGRAM(s) Oral three times a day  benzonatate 100 milliGRAM(s) Oral three times a day  cyanocobalamin 1000 MICROGram(s) Oral daily  folic acid 1 milliGRAM(s) Oral daily  guaiFENesin   Syrup  (Sugar-Free) 100 milliGRAM(s) Oral every 6 hours PRN  predniSONE   Tablet 10 milliGRAM(s) Oral daily  sodium chloride 0.9%. 1000 milliLiter(s) IV Continuous <Continuous>      Objective:  Vital Signs Last 24 Hrs  T(C): 36.8 (15 Feb 2020 13:34), Max: 37.6 (14 Feb 2020 19:15)  T(F): 98.3 (15 Feb 2020 13:34), Max: 99.7 (14 Feb 2020 19:15)  HR: 76 (15 Feb 2020 13:34) (76 - 93)  BP: 121/65 (15 Feb 2020 13:34) (116/68 - 136/72)  BP(mean): --  RR: 18 (15 Feb 2020 13:34) (18 - 19)  SpO2: 98% (15 Feb 2020 13:34) (97% - 99%)    T(C): 36.8 (02-15-20 @ 13:34), Max: 38.9 (02-14-20 @ 08:54)  T(C): 36.8 (02-15-20 @ 13:34), Max: 38.9 (02-14-20 @ 08:54)  T(C): 36.8 (02-15-20 @ 13:34), Max: 38.9 (02-14-20 @ 08:54)    PHYSICAL EXAM:  Constitutional: Well-developed, well nourished  Eyes: PERRLA, EOMI  Ear/Nose/Throat: oropharynx normal	  Neck: no JVD, no lymphadenopathy, supple  Respiratory: no accessory muscle use  Cardiovascular: RRR,   Gastrointestinal: soft, NT  Extremities: no clubbing, no cyanosis, edema absent      LABS:                        8.7    56.25 )-----------( 21       ( 15 Feb 2020 06:51 )             27.3       56.25 02-15 @ 06:51  75.99 02-14 @ 09:34      02-15    138  |  111<H>  |  22  ----------------------------<  131<H>  3.4<L>   |  16<L>  |  1.40<H>    Ca    8.0<L>      15 Feb 2020 06:51  Mg     2.2     02-15    TPro  5.9<L>  /  Alb  2.8<L>  /  TBili  1.8<H>  /  DBili  x   /  AST  42<H>  /  ALT  28  /  AlkPhos  130<H>  02-14      Creatinine, Serum: 1.40 mg/dL (02-15-20 @ 06:51)  Creatinine, Serum: 1.70 mg/dL (02-14-20 @ 09:34)      PT/INR - ( 14 Feb 2020 09:34 )   PT: 14.5 sec;   INR: 1.28 ratio         PTT - ( 14 Feb 2020 09:34 )  PTT:30.2 sec          MICROBIOLOGY:              RADIOLOGY & ADDITIONAL STUDIES:

## 2020-02-15 NOTE — DISCHARGE NOTE PROVIDER - NSDCMRMEDTOKEN_GEN_ALL_CORE_FT
allopurinol 100 mg oral tablet: 1 tab(s) orally 3 times a day  benzonatate 100 mg oral capsule: 1 cap(s) orally 3 times a day, As Needed -for cough   cyanocobalamin 1000 mcg oral tablet: 1 tab(s) orally once a day  entecavir 0.5 mg oral tablet: 1 tab(s) orally once a day  folic acid 1 mg oral tablet: 1 tab(s) orally once a day  Imbruvica 420 mg oral tablet: 1 tab(s) orally once a day  oseltamivir 30 mg oral capsule: 1 cap(s) orally 2 times a day  predniSONE 10 mg oral tablet: 1 tab(s) orally once a day

## 2020-02-15 NOTE — PROGRESS NOTE ADULT - PROBLEM SELECTOR PLAN 1
Tamiflu x 5 days  Supportive care
with clear CXR and no compelling evidence to suggest PNA recommend tamiflu but no abx at this point.

## 2020-02-15 NOTE — DISCHARGE NOTE PROVIDER - HOSPITAL COURSE
This is a 75 M with PMH of small cell B-cell lymphoma, AIHA, hepatitis B who was sent in to start new chemotherapy -- Venetoclax. He c/o generalized malaise, weakness, dry cough for several weeks. He denies SOB, CP, n/v/d, fever/chills. He was found to have a fever of 102 in ER.         Found to have Flu A    Treated with tamiflu    Transfused 2 unit PRBC and 1 unit PLT     Counts improved    Chemo deferred for now        >35 minutes spent on discharge

## 2020-02-19 LAB
CULTURE RESULTS: SIGNIFICANT CHANGE UP
CULTURE RESULTS: SIGNIFICANT CHANGE UP
SPECIMEN SOURCE: SIGNIFICANT CHANGE UP
SPECIMEN SOURCE: SIGNIFICANT CHANGE UP

## 2020-02-21 ENCOUNTER — INPATIENT (INPATIENT)
Facility: HOSPITAL | Age: 75
LOS: 42 days | Discharge: HOSPICE | DRG: 178 | End: 2020-04-04
Attending: INTERNAL MEDICINE | Admitting: HOSPITALIST
Payer: MEDICARE

## 2020-02-21 VITALS
OXYGEN SATURATION: 94 % | RESPIRATION RATE: 17 BRPM | TEMPERATURE: 99 F | DIASTOLIC BLOOD PRESSURE: 76 MMHG | HEIGHT: 65 IN | SYSTOLIC BLOOD PRESSURE: 132 MMHG | WEIGHT: 179.02 LBS | HEART RATE: 117 BPM

## 2020-02-21 DIAGNOSIS — Z98.890 OTHER SPECIFIED POSTPROCEDURAL STATES: Chronic | ICD-10-CM

## 2020-02-21 LAB
ALBUMIN SERPL ELPH-MCNC: 2.5 G/DL — LOW (ref 3.3–5)
ALP SERPL-CCNC: 167 U/L — HIGH (ref 40–120)
ALT FLD-CCNC: 26 U/L — SIGNIFICANT CHANGE UP (ref 12–78)
ANION GAP SERPL CALC-SCNC: 10 MMOL/L — SIGNIFICANT CHANGE UP (ref 5–17)
ANISOCYTOSIS BLD QL: SLIGHT — SIGNIFICANT CHANGE UP
AST SERPL-CCNC: 36 U/L — SIGNIFICANT CHANGE UP (ref 15–37)
BASOPHILS # BLD AUTO: 0 K/UL — SIGNIFICANT CHANGE UP (ref 0–0.2)
BASOPHILS NFR BLD AUTO: 0 % — SIGNIFICANT CHANGE UP (ref 0–2)
BILIRUB SERPL-MCNC: 3.3 MG/DL — HIGH (ref 0.2–1.2)
BLD GP AB SCN SERPL QL: SIGNIFICANT CHANGE UP
BUN SERPL-MCNC: 32 MG/DL — HIGH (ref 7–23)
CALCIUM SERPL-MCNC: 8 MG/DL — LOW (ref 8.5–10.1)
CHLORIDE SERPL-SCNC: 109 MMOL/L — HIGH (ref 96–108)
CO2 SERPL-SCNC: 18 MMOL/L — LOW (ref 22–31)
CREAT SERPL-MCNC: 1.5 MG/DL — HIGH (ref 0.5–1.3)
EOSINOPHIL # BLD AUTO: 0 K/UL — SIGNIFICANT CHANGE UP (ref 0–0.5)
EOSINOPHIL NFR BLD AUTO: 0 % — SIGNIFICANT CHANGE UP (ref 0–6)
FLU A RESULT: SIGNIFICANT CHANGE UP
FLU A RESULT: SIGNIFICANT CHANGE UP
FLUAV AG NPH QL: SIGNIFICANT CHANGE UP
FLUBV AG NPH QL: SIGNIFICANT CHANGE UP
GLUCOSE SERPL-MCNC: 207 MG/DL — HIGH (ref 70–99)
HCT VFR BLD CALC: 21.6 % — LOW (ref 39–50)
HGB BLD-MCNC: 6.8 G/DL — CRITICAL LOW (ref 13–17)
LACTATE SERPL-SCNC: 1.3 MMOL/L — SIGNIFICANT CHANGE UP (ref 0.7–2)
LYMPHOCYTES # BLD AUTO: 49 % — HIGH (ref 13–44)
LYMPHOCYTES # BLD AUTO: 60.34 K/UL — HIGH (ref 1–3.3)
LYMPHOCYTES # SPEC AUTO: 41 % — HIGH (ref 0–0)
MACROCYTES BLD QL: SLIGHT — SIGNIFICANT CHANGE UP
MANUAL SMEAR VERIFICATION: SIGNIFICANT CHANGE UP
MCHC RBC-ENTMCNC: 31.5 GM/DL — LOW (ref 32–36)
MCHC RBC-ENTMCNC: 33.2 PG — SIGNIFICANT CHANGE UP (ref 27–34)
MCV RBC AUTO: 105.4 FL — HIGH (ref 80–100)
MONOCYTES # BLD AUTO: 0 K/UL — SIGNIFICANT CHANGE UP (ref 0–0.9)
MONOCYTES NFR BLD AUTO: 0 % — LOW (ref 2–14)
NEUTROPHILS # BLD AUTO: 12.31 K/UL — HIGH (ref 1.8–7.4)
NEUTROPHILS NFR BLD AUTO: 8 % — LOW (ref 43–77)
NEUTS BAND # BLD: 2 % — SIGNIFICANT CHANGE UP (ref 0–8)
NRBC # BLD: 3 — SIGNIFICANT CHANGE UP
NRBC # BLD: SIGNIFICANT CHANGE UP /100 WBCS (ref 0–0)
PLAT MORPH BLD: NORMAL — SIGNIFICANT CHANGE UP
PLATELET # BLD AUTO: 11 K/UL — CRITICAL LOW (ref 150–400)
POIKILOCYTOSIS BLD QL AUTO: SLIGHT — SIGNIFICANT CHANGE UP
POLYCHROMASIA BLD QL SMEAR: SLIGHT — SIGNIFICANT CHANGE UP
POTASSIUM SERPL-MCNC: 3.4 MMOL/L — LOW (ref 3.5–5.3)
POTASSIUM SERPL-SCNC: 3.4 MMOL/L — LOW (ref 3.5–5.3)
PROT SERPL-MCNC: 5.6 G/DL — LOW (ref 6–8.3)
RBC # BLD: 2.05 M/UL — LOW (ref 4.2–5.8)
RBC # FLD: 21.2 % — HIGH (ref 10.3–14.5)
RBC BLD AUTO: ABNORMAL
RSV RESULT: SIGNIFICANT CHANGE UP
RSV RNA RESP QL NAA+PROBE: SIGNIFICANT CHANGE UP
SODIUM SERPL-SCNC: 137 MMOL/L — SIGNIFICANT CHANGE UP (ref 135–145)
SPHEROCYTES BLD QL SMEAR: SLIGHT — SIGNIFICANT CHANGE UP
WBC # BLD: 123.14 K/UL — CRITICAL HIGH (ref 3.8–10.5)
WBC # FLD AUTO: 123.14 K/UL — CRITICAL HIGH (ref 3.8–10.5)

## 2020-02-21 PROCEDURE — 71045 X-RAY EXAM CHEST 1 VIEW: CPT | Mod: 26

## 2020-02-21 PROCEDURE — 71250 CT THORAX DX C-: CPT | Mod: 26

## 2020-02-21 PROCEDURE — 93010 ELECTROCARDIOGRAM REPORT: CPT

## 2020-02-21 RX ORDER — CEFEPIME 1 G/1
INJECTION, POWDER, FOR SOLUTION INTRAMUSCULAR; INTRAVENOUS
Refills: 0 | Status: DISCONTINUED | OUTPATIENT
Start: 2020-02-21 | End: 2020-02-21

## 2020-02-21 RX ORDER — IPRATROPIUM/ALBUTEROL SULFATE 18-103MCG
3 AEROSOL WITH ADAPTER (GRAM) INHALATION ONCE
Refills: 0 | Status: COMPLETED | OUTPATIENT
Start: 2020-02-21 | End: 2020-02-21

## 2020-02-21 RX ORDER — ACETAMINOPHEN 500 MG
650 TABLET ORAL ONCE
Refills: 0 | Status: COMPLETED | OUTPATIENT
Start: 2020-02-21 | End: 2020-02-21

## 2020-02-21 RX ORDER — CEFEPIME 1 G/1
1000 INJECTION, POWDER, FOR SOLUTION INTRAMUSCULAR; INTRAVENOUS EVERY 12 HOURS
Refills: 0 | Status: DISCONTINUED | OUTPATIENT
Start: 2020-02-21 | End: 2020-02-24

## 2020-02-21 RX ORDER — SODIUM CHLORIDE 9 MG/ML
1000 INJECTION INTRAMUSCULAR; INTRAVENOUS; SUBCUTANEOUS ONCE
Refills: 0 | Status: COMPLETED | OUTPATIENT
Start: 2020-02-21 | End: 2020-02-21

## 2020-02-21 RX ADMIN — Medication 3 MILLILITER(S): at 22:41

## 2020-02-21 RX ADMIN — SODIUM CHLORIDE 1000 MILLILITER(S): 9 INJECTION INTRAMUSCULAR; INTRAVENOUS; SUBCUTANEOUS at 23:41

## 2020-02-21 RX ADMIN — Medication 650 MILLIGRAM(S): at 21:19

## 2020-02-21 RX ADMIN — SODIUM CHLORIDE 1000 MILLILITER(S): 9 INJECTION INTRAMUSCULAR; INTRAVENOUS; SUBCUTANEOUS at 22:34

## 2020-02-21 RX ADMIN — Medication 3 MILLILITER(S): at 22:35

## 2020-02-21 RX ADMIN — CEFEPIME 100 MILLIGRAM(S): 1 INJECTION, POWDER, FOR SOLUTION INTRAMUSCULAR; INTRAVENOUS at 22:45

## 2020-02-21 RX ADMIN — Medication 650 MILLIGRAM(S): at 22:04

## 2020-02-21 RX ADMIN — CEFEPIME 1000 MILLIGRAM(S): 1 INJECTION, POWDER, FOR SOLUTION INTRAMUSCULAR; INTRAVENOUS at 23:41

## 2020-02-21 NOTE — ED PROVIDER NOTE - CONSTITUTIONAL, MLM
normal... chronically ill, awake, alert, oriented to person, place, time/situation and in no apparent distress.

## 2020-02-21 NOTE — ED PROVIDER NOTE - CLINICAL SUMMARY MEDICAL DECISION MAKING FREE TEXT BOX
pt with hx leukemia on imbruvica dialy, today with fever, cough and weakness, wbc doubled, anemic, low plts, pan cultured d/w onc, abx, for pn, prbc, admit.  will consult in am

## 2020-02-21 NOTE — ED ADULT NURSE REASSESSMENT NOTE - NS ED NURSE REASSESS COMMENT FT1
Patient brought to CT scan at this time. Was advised by blood bank that they will call upon blood availability approximately an hour.

## 2020-02-21 NOTE — ED ADULT NURSE REASSESSMENT NOTE - NS ED NURSE REASSESS COMMENT FT1
Called blood bank to ask for blood availability was advised they are doing crossmatch and will call when ready.

## 2020-02-21 NOTE — ED ADULT NURSE NOTE - OBJECTIVE STATEMENT
Patient complaining of fever since yesterday and coughing since last week as reported was diagnosed to have flu last week and was taking tamiflu was suppose to have blood transfusion tomorrow with history of leukemia noted with bruising to bilateral arm and petechial rash to trunk and chest area waiting for MD evaluation. Patient complaining of fever since yesterday and coughing since last week as reported was diagnosed to have flu last week and was taking tamiflu was suppose to have blood transfusion tomorrow with history of leukemia noted with bruising to bilateral arm and petechial rash to trunk and chest area noted to be coughing out blood waiting for MD evaluation. Blood drawn and sent to lab.

## 2020-02-21 NOTE — ED PROVIDER NOTE - FAMILY HISTORY
Mother  Still living? Yes, Estimated age:   Family history of hypertension in mother, Age at diagnosis: Age Unknown

## 2020-02-21 NOTE — ED PROVIDER NOTE - OBJECTIVE STATEMENT
Pt is a 74 yo male with hx of leukemia on imbruvica and sp chemo several weeks ago.  pt also recently with influenza. pt today with fever and cough, non productive, no body aches,  mild dyspnea.  pt denies ha, new rash, cp or any other complaints.  pt called dr perea and sent to er    pmd; abhay  onc: dr preciado

## 2020-02-21 NOTE — ED ADULT NURSE REASSESSMENT NOTE - NS ED NURSE REASSESS COMMENT FT1
Patient lying in bed at this time informed of the plan of care with understanding with spouse at the bedside.

## 2020-02-22 DIAGNOSIS — C85.90 NON-HODGKIN LYMPHOMA, UNSPECIFIED, UNSPECIFIED SITE: ICD-10-CM

## 2020-02-22 DIAGNOSIS — C95.90 LEUKEMIA, UNSPECIFIED NOT HAVING ACHIEVED REMISSION: ICD-10-CM

## 2020-02-22 DIAGNOSIS — I10 ESSENTIAL (PRIMARY) HYPERTENSION: ICD-10-CM

## 2020-02-22 DIAGNOSIS — J18.9 PNEUMONIA, UNSPECIFIED ORGANISM: ICD-10-CM

## 2020-02-22 DIAGNOSIS — N18.3 CHRONIC KIDNEY DISEASE, STAGE 3 (MODERATE): ICD-10-CM

## 2020-02-22 DIAGNOSIS — B19.10 UNSPECIFIED VIRAL HEPATITIS B WITHOUT HEPATIC COMA: ICD-10-CM

## 2020-02-22 DIAGNOSIS — G47.33 OBSTRUCTIVE SLEEP APNEA (ADULT) (PEDIATRIC): ICD-10-CM

## 2020-02-22 DIAGNOSIS — C91.10 CHRONIC LYMPHOCYTIC LEUKEMIA OF B-CELL TYPE NOT HAVING ACHIEVED REMISSION: ICD-10-CM

## 2020-02-22 DIAGNOSIS — R60.0 LOCALIZED EDEMA: ICD-10-CM

## 2020-02-22 DIAGNOSIS — D64.9 ANEMIA, UNSPECIFIED: ICD-10-CM

## 2020-02-22 DIAGNOSIS — E11.9 TYPE 2 DIABETES MELLITUS WITHOUT COMPLICATIONS: ICD-10-CM

## 2020-02-22 DIAGNOSIS — R50.9 FEVER, UNSPECIFIED: ICD-10-CM

## 2020-02-22 DIAGNOSIS — Z29.9 ENCOUNTER FOR PROPHYLACTIC MEASURES, UNSPECIFIED: ICD-10-CM

## 2020-02-22 DIAGNOSIS — E80.6 OTHER DISORDERS OF BILIRUBIN METABOLISM: ICD-10-CM

## 2020-02-22 DIAGNOSIS — D69.6 THROMBOCYTOPENIA, UNSPECIFIED: ICD-10-CM

## 2020-02-22 LAB
ALBUMIN SERPL ELPH-MCNC: 2.2 G/DL — LOW (ref 3.3–5)
ALP SERPL-CCNC: 140 U/L — HIGH (ref 40–120)
ALT FLD-CCNC: 23 U/L — SIGNIFICANT CHANGE UP (ref 12–78)
ANION GAP SERPL CALC-SCNC: 11 MMOL/L — SIGNIFICANT CHANGE UP (ref 5–17)
AST SERPL-CCNC: 31 U/L — SIGNIFICANT CHANGE UP (ref 15–37)
BASOPHILS # BLD AUTO: 0.01 K/UL — SIGNIFICANT CHANGE UP (ref 0–0.2)
BASOPHILS NFR BLD AUTO: 0 % — SIGNIFICANT CHANGE UP (ref 0–2)
BILIRUB SERPL-MCNC: 3.5 MG/DL — HIGH (ref 0.2–1.2)
BUN SERPL-MCNC: 28 MG/DL — HIGH (ref 7–23)
CALCIUM SERPL-MCNC: 7.8 MG/DL — LOW (ref 8.5–10.1)
CHLORIDE SERPL-SCNC: 110 MMOL/L — HIGH (ref 96–108)
CO2 SERPL-SCNC: 19 MMOL/L — LOW (ref 22–31)
CREAT SERPL-MCNC: 1.5 MG/DL — HIGH (ref 0.5–1.3)
EOSINOPHIL # BLD AUTO: 0.17 K/UL — SIGNIFICANT CHANGE UP (ref 0–0.5)
EOSINOPHIL NFR BLD AUTO: 0.2 % — SIGNIFICANT CHANGE UP (ref 0–6)
GLUCOSE SERPL-MCNC: 204 MG/DL — HIGH (ref 70–99)
GRAM STN FLD: SIGNIFICANT CHANGE UP
HCT VFR BLD CALC: 23.4 % — LOW (ref 39–50)
HCT VFR BLD CALC: 23.5 % — LOW (ref 39–50)
HGB BLD-MCNC: 7.4 G/DL — LOW (ref 13–17)
HGB BLD-MCNC: 7.6 G/DL — LOW (ref 13–17)
IMM GRANULOCYTES NFR BLD AUTO: 0.3 % — SIGNIFICANT CHANGE UP (ref 0–1.5)
LYMPHOCYTES # BLD AUTO: 58.59 K/UL — HIGH (ref 1–3.3)
LYMPHOCYTES # BLD AUTO: 74.7 % — HIGH (ref 13–44)
MAGNESIUM SERPL-MCNC: 2.5 MG/DL — SIGNIFICANT CHANGE UP (ref 1.6–2.6)
MCHC RBC-ENTMCNC: 31.6 GM/DL — LOW (ref 32–36)
MCHC RBC-ENTMCNC: 32.3 PG — SIGNIFICANT CHANGE UP (ref 27–34)
MCV RBC AUTO: 102.2 FL — HIGH (ref 80–100)
MONOCYTES # BLD AUTO: 15.69 K/UL — HIGH (ref 0–0.9)
MONOCYTES NFR BLD AUTO: 20 % — HIGH (ref 2–14)
MRSA PCR RESULT.: SIGNIFICANT CHANGE UP
NEUTROPHILS # BLD AUTO: 3.68 K/UL — SIGNIFICANT CHANGE UP (ref 1.8–7.4)
NEUTROPHILS NFR BLD AUTO: 4.8 % — LOW (ref 43–77)
NRBC # BLD: 0 /100 WBCS — SIGNIFICANT CHANGE UP (ref 0–0)
PHOSPHATE SERPL-MCNC: 2.1 MG/DL — LOW (ref 2.5–4.5)
PLATELET # BLD AUTO: 6 K/UL — CRITICAL LOW (ref 150–400)
POTASSIUM SERPL-MCNC: 3.3 MMOL/L — LOW (ref 3.5–5.3)
POTASSIUM SERPL-SCNC: 3.3 MMOL/L — LOW (ref 3.5–5.3)
PROT SERPL-MCNC: 5 G/DL — LOW (ref 6–8.3)
RAPID RVP RESULT: SIGNIFICANT CHANGE UP
RBC # BLD: 2.29 M/UL — LOW (ref 4.2–5.8)
RBC # FLD: 23 % — HIGH (ref 10.3–14.5)
S AUREUS DNA NOSE QL NAA+PROBE: SIGNIFICANT CHANGE UP
SODIUM SERPL-SCNC: 140 MMOL/L — SIGNIFICANT CHANGE UP (ref 135–145)
SPECIMEN SOURCE: SIGNIFICANT CHANGE UP
WBC # BLD: 78.39 K/UL — CRITICAL HIGH (ref 3.8–10.5)
WBC # FLD AUTO: 78.39 K/UL — CRITICAL HIGH (ref 3.8–10.5)

## 2020-02-22 PROCEDURE — 99223 1ST HOSP IP/OBS HIGH 75: CPT | Mod: GC,AI

## 2020-02-22 PROCEDURE — 99285 EMERGENCY DEPT VISIT HI MDM: CPT

## 2020-02-22 PROCEDURE — 93970 EXTREMITY STUDY: CPT | Mod: 26

## 2020-02-22 RX ORDER — AZITHROMYCIN 500 MG/1
TABLET, FILM COATED ORAL
Refills: 0 | Status: DISCONTINUED | OUTPATIENT
Start: 2020-02-22 | End: 2020-02-24

## 2020-02-22 RX ORDER — ALLOPURINOL 300 MG
1 TABLET ORAL
Qty: 0 | Refills: 0 | DISCHARGE

## 2020-02-22 RX ORDER — ENTECAVIR 0.5 MG/1
1 TABLET ORAL
Qty: 0 | Refills: 0 | DISCHARGE

## 2020-02-22 RX ORDER — IBRUTINIB 140 MG/1
420 TABLET, FILM COATED ORAL DAILY
Refills: 0 | Status: DISCONTINUED | OUTPATIENT
Start: 2020-02-22 | End: 2020-03-03

## 2020-02-22 RX ORDER — POTASSIUM CHLORIDE 20 MEQ
20 PACKET (EA) ORAL ONCE
Refills: 0 | Status: COMPLETED | OUTPATIENT
Start: 2020-02-22 | End: 2020-02-22

## 2020-02-22 RX ORDER — AZITHROMYCIN 500 MG/1
500 TABLET, FILM COATED ORAL EVERY 24 HOURS
Refills: 0 | Status: DISCONTINUED | OUTPATIENT
Start: 2020-02-23 | End: 2020-02-24

## 2020-02-22 RX ORDER — ENTECAVIR 0.5 MG/1
0.5 TABLET ORAL DAILY
Refills: 0 | Status: DISCONTINUED | OUTPATIENT
Start: 2020-02-22 | End: 2020-04-04

## 2020-02-22 RX ORDER — IPRATROPIUM/ALBUTEROL SULFATE 18-103MCG
3 AEROSOL WITH ADAPTER (GRAM) INHALATION EVERY 6 HOURS
Refills: 0 | Status: DISCONTINUED | OUTPATIENT
Start: 2020-02-22 | End: 2020-04-04

## 2020-02-22 RX ORDER — AZITHROMYCIN 500 MG/1
500 TABLET, FILM COATED ORAL ONCE
Refills: 0 | Status: COMPLETED | OUTPATIENT
Start: 2020-02-22 | End: 2020-02-22

## 2020-02-22 RX ORDER — ACETAMINOPHEN 500 MG
650 TABLET ORAL EVERY 6 HOURS
Refills: 0 | Status: DISCONTINUED | OUTPATIENT
Start: 2020-02-22 | End: 2020-02-24

## 2020-02-22 RX ADMIN — Medication 100 MILLIGRAM(S): at 22:24

## 2020-02-22 RX ADMIN — CEFEPIME 100 MILLIGRAM(S): 1 INJECTION, POWDER, FOR SOLUTION INTRAMUSCULAR; INTRAVENOUS at 10:31

## 2020-02-22 RX ADMIN — Medication 10 MILLIGRAM(S): at 06:13

## 2020-02-22 RX ADMIN — AZITHROMYCIN 255 MILLIGRAM(S): 500 TABLET, FILM COATED ORAL at 04:28

## 2020-02-22 RX ADMIN — IBRUTINIB 420 MILLIGRAM(S): 140 TABLET, FILM COATED ORAL at 18:25

## 2020-02-22 RX ADMIN — Medication 20 MILLIEQUIVALENT(S): at 04:29

## 2020-02-22 RX ADMIN — ENTECAVIR 0.5 MILLIGRAM(S): 0.5 TABLET ORAL at 11:24

## 2020-02-22 RX ADMIN — Medication 100 MILLIGRAM(S): at 10:56

## 2020-02-22 RX ADMIN — CEFEPIME 100 MILLIGRAM(S): 1 INJECTION, POWDER, FOR SOLUTION INTRAMUSCULAR; INTRAVENOUS at 18:25

## 2020-02-22 NOTE — H&P ADULT - PROBLEM SELECTOR PLAN 4
Hgb 6.8. Upon d/c on 2/14, hgb was 8.7 s/p 2 units pRBCs.   - Likely due to AIHA. On prednisone 10mg qd  - States that baseline hgb ~14  - Transfuse 1 unit pRBCs  - Trend H/H. Keep Hgb >7  - Monitor CBC Hgb 6.8. Upon d/c on 2/14, hgb was 8.7 s/p 2 units pRBCs.   - Likely due to AIHA. On prednisone 10mg qd  - States that baseline hgb ~14  - Transfuse 1 unit pRBCs  - Trend H/H. Keep Hgb >7  - check fobt  - Monitor CBC

## 2020-02-22 NOTE — CONSULT NOTE ADULT - SUBJECTIVE AND OBJECTIVE BOX
Patient is a 75y old  Male who presents with a chief complaint of weakness, anemia (22 Feb 2020 15:32)      HPI:  Patient is a 76 y/o M with 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 Has been coughing as he was recently treated for influenza A on last admission. States that when he was discharged from [facility] on 2/15, he was feeling well and had no fevers. Patient developed a fever, T100.9F (oral) today for which he called heme/onc, Dr. Stephenson. Per Dr. Stuart, patient advised to go to ER. He was found to have a fever, T100.6F (rectal) in ER. Denies recent travel or sick contacts. Denies headache, chest pain, sob, palpitations, abdominal pain, diarrhea, melena, hematochezia, dysuria or hematuria.     Of note, patient was recently admitted to [facility] from 2/14-2/15/2020 for chemotherapy with Venetoclax, found to be febrile with similar associated complaints of generalized malaise, cough, and weakness. Patient was found to be Flu A positive during that admission and was treated with tamiflu. Patient was found to be thrombocytopenic to 35k with a Hb of 6.6, transfused 2U PRBCs and 1U platelets. Chemotherapy was held off due to acute illness.      In ED, VS Tmax 100.6 rectal (repeat s/p tylenol 99.5),  -> 99, BP stable, saturating well on RA  CBC significant for .14 (on d/c 56.25), Hb 6.8 (on d/c 8.7), Plt 11 (on d/c 21)  CMP significant for K 3.4, Cr 1.5 (appears to be baseline per chart review), bili 3.3  Type and screen performed. Will be getting 1 unit pRBCs  Flu/RSV negative  CT Chest: Multilobar patchy peribronchovascular airspace opacities and more dense consolidative process in the right middle lobe likely reflecting multifocal pneumonia. Small right and trace left pleural effusion. Mediastinal and hilar lymphadenopathy worsened compared with prior exam from 8/30/2019. Numerous mildly prominent bilateral axillary lymph nodes. Retroperitoneal lymphadenopathy. Mild splenomegaly. Cholelithiasis.  CXR (wet read): noted to have increased opacities in right lower and middle lobes as well as left lower lobe in comparison to CXR on 2/14/2020  EKG: sinus tachycardic    S/p cefepime 1g IV x1, NS bolus 1L x1, duonebs x2, tylenol 650mg PO x1, 1U PRBCs ordered and to be transfused    Dr. Stuart was called and he recommended to only transfused PRBC and not platelets.  He will see pt in the morning. (22 Feb 2020 00:14)       ROS:  Negative except for:    PAST MEDICAL & SURGICAL HISTORY:  KIRTI (obstructive sleep apnea): on nocturnal cpap  Hypertension: not on any medications  Diabetes: not on any medications  Hepatitis B  Lymphoma: Small cell B cell  AIHA (autoimmune hemolytic anemia)  H/O lithotripsy      SOCIAL HISTORY:    FAMILY HISTORY:  Family history of hypertension in mother      MEDICATIONS  (STANDING):  azithromycin  IVPB      cefepime   IVPB 1000 milliGRAM(s) IV Intermittent every 12 hours  entecavir 0.5 milliGRAM(s) Oral daily  ibrutinib 420 milliGRAM(s) Oral daily  predniSONE   Tablet 10 milliGRAM(s) Oral daily    MEDICATIONS  (PRN):  acetaminophen   Tablet .. 650 milliGRAM(s) Oral every 6 hours PRN Temp greater or equal to 38C (100.4F), Mild Pain (1 - 3)  albuterol/ipratropium for Nebulization 3 milliLiter(s) Nebulizer every 6 hours PRN Shortness of Breath and/or Wheezing  benzonatate 100 milliGRAM(s) Oral three times a day PRN Cough      Allergies    sulfa drugs (Unknown)    Intolerances        Vital Signs Last 24 Hrs  T(C): 36.4 (22 Feb 2020 14:02), Max: 38.1 (21 Feb 2020 20:14)  T(F): 97.5 (22 Feb 2020 14:02), Max: 100.6 (21 Feb 2020 20:14)  HR: 89 (22 Feb 2020 14:02) (85 - 117)  BP: 123/63 (22 Feb 2020 14:02) (115/67 - 141/70)  BP(mean): --  RR: 16 (22 Feb 2020 14:02) (16 - 18)  SpO2: 97% (22 Feb 2020 14:02) (94% - 98%)    PHYSICAL EXAM  General: adult in NAD  HEENT: clear oropharynx, anicteric sclera, pink conjunctivae  Neck: supple  CV: normal S1S2 with no murmur rubs or gallops  Lungs: clear to auscultation, no wheezes, no rhales  Abdomen: soft non-tender non-distended, no hepato/splenomegaly  Ext: no clubbing cyanosis or edema  Skin: no rashes and no petichiae  Neuro: alert and oriented X3 no focal deficits      LABS:    CBC Full  -  ( 22 Feb 2020 13:36 )  WBC Count : x  RBC Count : x  Hemoglobin : 7.6 g/dL  Hematocrit : 23.5 %  Platelet Count - Automated : x  Mean Cell Volume : x  Mean Cell Hemoglobin : x  Mean Cell Hemoglobin Concentration : x  Auto Neutrophil # : x  Auto Lymphocyte # : x  Auto Monocyte # : x  Auto Eosinophil # : x  Auto Basophil # : x  Auto Neutrophil % : x  Auto Lymphocyte % : x  Auto Monocyte % : x  Auto Eosinophil % : x  Auto Basophil % : x    02-22    140  |  110<H>  |  28<H>  ----------------------------<  204<H>  3.3<L>   |  19<L>  |  1.50<H>    Ca    7.8<L>      22 Feb 2020 06:06  Phos  2.1     02-22  Mg     2.5     02-22    TPro  5.0<L>  /  Alb  2.2<L>  /  TBili  3.5<H>  /  DBili  2.60<H>  /  AST  31  /  ALT  23  /  AlkPhos  140<H>  02-22              BLOOD SMEAR INTERPRETATION:    RADIOLOGY & ADDITIONAL STUDIES:    < from: CT Chest No Cont (02.21.20 @ 23:43) >  EXAM:  CT CHEST                            PROCEDURE DATE:  02/21/2020          INTERPRETATION:  CLINICAL INFORMATION: Cough, fever, leukemia.    COMPARISON: 8/30/2019    PROCEDURE:   CT of the Chest was performed without intravenous contrast.  Sagittal and coronal reformats were performed.      FINDINGS:    LUNGS AND AIRWAYS: Patent central airways.  Peribronchial thickening. Multilobar patchy peribronchovascular airspace opacities and more dense consolidative process in the right middle lobe.. Calcified granuloma in the left upper lobe. Nodular opacities along the right minor fissure and left major fissure likely representing intrafissural lymph nodes.    PLEURA: Small right and trace left pleural effusion.    MEDIASTINUM AND FABIAN: Mediastinal and hilar lymphadenopathy, worsened compared with prior exam. Representative prevascular lymph node measuring 3.3 x 1.9 cm (series 2:44, previously measuring up to 1.2 x 0.8 cm.    VESSELS: Normal caliber thoracic aorta. Main pulmonary artery isnot dilated. Atherosclerotic calcifications of the thoracic aorta, great vessels, and coronary arteries.    HEART: Heart size is normal. Trace pericardial effusion.    CHEST WALL AND LOWER NECK: Mild heterogeneity of the right thyroid lobe with few small subcentimeter hypodense nodules. Numerous mildly prominent bilateral axillary lymph nodes.    VISUALIZED UPPER ABDOMEN: Cholelithiasis. Retroperitoneal lymphadenopathy. Splenomegaly. Upper pole left renal cyst.    BONES: Degenerative changes of the spine.    IMPRESSION:     Multilobar patchy peribronchovascular airspace opacities and more dense consolidative process in the right middle lobe likely reflecting multifocal pneumonia.    Small right and trace left pleural effusion.    Mediastinal and hilar lymphadenopathy worsened compared with prior exam from 8/30/2019. Numerous mildly prominent bilateral axillary lymph nodes. Retroperitoneal lymphadenopathy. Mild splenomegaly.    Cholelithiasis.                RONEN CALDERÓN D.O. ATTENDINGRADIOLOGIST  This document has been electronically signed. Feb 22 2020 12:07AM    < end of copied text >    < from: Xray Chest 1 View- PORTABLE-Urgent (02.21.20 @ 22:40) >  EXAM:  XR CHEST PORTABLE URGENT 1V                            PROCEDURE DATE:  02/21/2020          INTERPRETATION:  AP semierect chest on February 21, 2020 at 10:42 PM. Patient has cough, fever, and leukemia.    Heart is magnified by technique.    The aorta is ectatic particularly in the transverse area.    Scattered right lower lobe infiltrate is seen and is new since February 14.    IMPRESSION: Right lower lobe pneumonia new since February 14 of this year.                SARAH KEVIN M.D., ATTENDING RADIOLOGIST  This document has been electronically signed. Feb 22 2020  8:21AM    < end of copied text >

## 2020-02-22 NOTE — CONSULT NOTE ADULT - ASSESSMENT
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  continue abx as per ID  2.  to follow CBC and transfuse PRBC and platelets as indicated  3.  to check Ig levels (were adequate 3/2019)  4.  to evaluate for additional therapy for CLL after clinical improvement (?Rituxan and venetoclax)  5.  further hem recommendations pending above  discussed with patient, Dr. Lemons and Akbar [facility] (Pediatric)

## 2020-02-22 NOTE — CONSULT NOTE ADULT - SUBJECTIVE AND OBJECTIVE BOX
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 Has been coughing as he was recently treated for influenza A on last admission. States that when he was discharged from [facility]"" on 2/15, he was feeling well and had no fevers. Patient developed a fever, T100.9F (oral) today for which he called heme/onc, Dr. Stephenson. Per Dr. Stuart, patient advised to go to ER. He was found to have a fever, T100.6F (rectal) in ER. Denies recent travel or sick contacts. Denies headache, chest pain, sob, palpitations, abdominal pain, diarrhea, melena, hematochezia, dysuria or hematuria.     Of note, patient was recently admitted to [facility] from 2/14-2/15/2020 for chemotherapy with Venetoclax, found to be febrile with similar associated complaints of generalized malaise, cough, and weakness. Patient was found to be Flu A positive during that admission and was treated with tamiflu. Patient was found to be thrombocytopenic to 35k with a Hb of 6.6, transfused 2U PRBCs and 1U platelets. Chemotherapy was held off due to acute illness.      In ED, VS Tmax 100.6 rectal (repeat s/p tylenol 99.5),  -> 99, BP stable, saturating well on RA  CBC significant for .14 (on d/c 56.25), Hb 6.8 (on d/c 8.7), Plt 11 (on d/c 21)  CMP significant for K 3.4, Cr 1.5 (appears to be baseline per chart review), bili 3.3  Type and screen performed. Will be getting 1 unit pRBCs  Flu/RSV negative  CT Chest: Multilobar patchy peribronchovascular airspace opacities and more dense consolidative process in the right middle lobe likely reflecting multifocal pneumonia. Small right and trace left pleural effusion. Mediastinal and hilar lymphadenopathy worsened compared with prior exam from 8/30/2019. Numerous mildly prominent bilateral axillary lymph nodes. Retroperitoneal lymphadenopathy. Mild splenomegaly. Cholelithiasis.  CXR (wet read): noted to have increased opacities in right lower and middle lobes as well as left lower lobe in comparison to CXR on 2/14/2020  EKG: sinus tachycardic    S/p cefepime 1g IV x1, NS bolus 1L x1, duonebs x2, tylenol 650mg PO x1, 1U PRBCs ordered and to be transfused    Dr. Stuart was called and he recommended to only transfused PRBC and not platelets.  He will see pt in the morning. (22 Feb 2020 00:14)      PAST MEDICAL & SURGICAL HISTORY:  KIRTI (obstructive sleep apnea): on nocturnal cpap  Hypertension: not on any medications  Diabetes: not on any medications  Hepatitis B  Lymphoma: Small cell B cell  AIHA (autoimmune hemolytic anemia)  H/O lithotripsy      Antimicrobials  azithromycin  IVPB      cefepime   IVPB 1000 milliGRAM(s) IV Intermittent every 12 hours  entecavir 0.5 milliGRAM(s) Oral daily      Immunological      Other  acetaminophen   Tablet .. 650 milliGRAM(s) Oral every 6 hours PRN  albuterol/ipratropium for Nebulization 3 milliLiter(s) Nebulizer every 6 hours PRN  benzonatate 100 milliGRAM(s) Oral three times a day PRN  ibrutinib 420 milliGRAM(s) Oral daily  predniSONE   Tablet 10 milliGRAM(s) Oral daily      Allergies    sulfa drugs (Unknown)    Intolerances        SOCIAL HISTORY:  Marital Status:   Occupation: former   Lives with: wife  Ambulates at home: independently    Substance Use: denies  Tobacco Usage: denies  Alcohol Usage: denies  Illicit Drug Usage: denies (22 Feb 2020 00:14)      FAMILY HISTORY:  Family history of hypertension in mother      ROS:    EYES:  Negative  blurry vision or double vision  GASTROINTESTINAL:  Negative for nausea, vomiting, diarrhea  -otherwise negative except for subjective    Vital Signs Last 24 Hrs  T(C): 36.4 (22 Feb 2020 14:02), Max: 38.1 (21 Feb 2020 20:14)  T(F): 97.5 (22 Feb 2020 14:02), Max: 100.6 (21 Feb 2020 20:14)  HR: 89 (22 Feb 2020 14:02) (85 - 117)  BP: 123/63 (22 Feb 2020 14:02) (115/67 - 141/70)  BP(mean): --  RR: 16 (22 Feb 2020 14:02) (16 - 18)  SpO2: 97% (22 Feb 2020 14:02) (94% - 98%)    PE:  WDWN in no distress  HEENT:  NC, PERRL, sclerae anicteric, conjunctivae clear, EOMI.  Sinuses nontender, no nasal exudate.  No buccal or pharyngeal lesions, erythema or exudate  Neck:  Supple, no adenopathy  Lungs:  No accessory muscle use, bilaterally clear to auscultation  Cor:  RRR, S1, S2, no murmur appreciated  Abd:  Symmetric, normoactive BS.  Soft, nontender, no masses, guarding or rebound.  Liver and spleen not enlarged  Extrem:  No cyanosis or edema  Skin:  No rashes.  Neuro: grossly intact  Musc: moving all limbs freely, no focal deficits    LABS:                        7.6    x     )-----------( x        ( 22 Feb 2020 13:36 )             23.5       WBC Count: 78.39 K/uL (02-22-20 @ 06:06)  WBC Count: 123.14 K/uL (02-21-20 @ 20:29)      02-22    140  |  110<H>  |  28<H>  ----------------------------<  204<H>  3.3<L>   |  19<L>  |  1.50<H>    Ca    7.8<L>      22 Feb 2020 06:06  Phos  2.1     02-22  Mg     2.5     02-22    TPro  5.0<L>  /  Alb  2.2<L>  /  TBili  3.5<H>  /  DBili  2.60<H>  /  AST  31  /  ALT  23  /  AlkPhos  140<H>  02-22      Creatinine, Serum: 1.50 mg/dL (02-22-20 @ 06:06)  Creatinine, Serum: 1.50 mg/dL (02-21-20 @ 20:29)    MICROBIOLOGY:      RADIOLOGY & ADDITIONAL STUDIES:    --

## 2020-02-22 NOTE — ED ADULT NURSE REASSESSMENT NOTE - NS ED NURSE REASSESS COMMENT FT1
Patient stated he has sleep apnea advised MD reinoso ordered for BIPAP/CPAP called RT requested for BIPAP machine was advised will look for a machine. Patient's cancer medications sent to pharmacy for labeling for meds to be taken in the hospital.

## 2020-02-22 NOTE — H&P ADULT - NSHPREVIEWOFSYSTEMS_GEN_ALL_CORE
Constitutional: admits to fever, malaise, generalized weakness; denies chills, sweating  HEENT: denies headache, dizziness, or lightheadedness  Respiratory: admits to cough; denies SOB or wheezing  Cardiovascular: denies CP, palpitations  Gastrointestinal: denies nausea, vomiting, diarrhea, constipation, abdominal pain, or bloody stools  Genitourinary: denies painful urination, increased frequency, urgency, or bloody urine  Skin/Breast: denies rashes or itching  Musculoskeletal: denies muscle aches, joint swelling, or muscle weakness  Neurologic: denies loss of sensation, numbness, or tingling  ROS negative except as noted above Constitutional: admits to fever, malaise, generalized weakness; denies chills, sweating  HEENT: denies headache, dizziness, or lightheadedness  Respiratory: admits to cough; denies SOB or wheezing  Cardiovascular: denies CP, palpitations  Gastrointestinal: denies nausea, vomiting, diarrhea, constipation, abdominal pain, or bloody stools  Genitourinary: denies painful urination, increased frequency, urgency, or bloody urine  Skin/Breast: denies rashes or itching  Musculoskeletal: admits b/l leg swelling, denies muscle aches, joint swelling, or muscle weakness  Neurologic: denies loss of sensation, numbness, or tingling

## 2020-02-22 NOTE — H&P ADULT - ASSESSMENT
Patient is a 74 y/o M with PMHx of small cell B-cell lymphoma, AIHA, hepatitis B, T2DM (managed with lifestyle changes), HTN (not on any medication) who presents with chief complaint of fever with associated cough and weakness. Found to be profoundly anemic and thrombocytopenic. Admitted for further management of multifocal pneumonia, anemia and TCP in setting of lymphoma. Patient is a 76 y/o M with PMHx of small cell B-cell lymphoma, AIHA, hepatitis B, KIRTI on cpap, T2DM (managed with lifestyle changes), HTN (not on any medication) who presents with chief complaint of fever with associated cough and weakness. Found to be profoundly anemic and thrombocytopenic requiring transfusion. Admitted for further management of multifocal pneumonia, anemia and TCP in setting of lymphoma.

## 2020-02-22 NOTE — ED ADULT NURSE REASSESSMENT NOTE - NS ED NURSE REASSESS COMMENT FT1
Patient currently receiving blood transfusion at this time attached to CPAP machine call bell within reach.

## 2020-02-22 NOTE — CONSULT NOTE ADULT - PROBLEM SELECTOR RECOMMENDATION 4
discussed with Dr Lemons and Dr Stuart. This is new with acute infection and although one would anticipate elevation of indirect this appears concurrent with acute infection. Reasonable to perform US but of not no abns noted on exam.  Thank you for consulting us and involving us in the management of this most interesting and challenging case.     We will follow along in the care of this patient.

## 2020-02-22 NOTE — ED ADULT NURSE REASSESSMENT NOTE - NS ED NURSE REASSESS COMMENT FT1
Blood transfusion started to patient with 2 nurses verifying the blood informed patient to report any adverse reaction call bell within reach.

## 2020-02-22 NOTE — H&P ADULT - NSHPPHYSICALEXAM_GEN_ALL_CORE
Vital Signs Last 24 Hrs  T(C): 37.5 (21 Feb 2020 22:56), Max: 38.1 (21 Feb 2020 20:14)  T(F): 99.5 (21 Feb 2020 22:56), Max: 100.6 (21 Feb 2020 20:14)  HR: 99 (21 Feb 2020 22:56) (99 - 117)  BP: 133/73 (21 Feb 2020 22:56) (128/70 - 141/70)  RR: 18 (21 Feb 2020 22:56) (17 - 18)  SpO2: 97% (21 Feb 2020 22:56) (94% - 98%)    Physical Exam:  General: Well developed, well nourished, NAD  HEENT: NC/AT, PERRLA, EOMI B/L, moist mucous membranes   Neck: Supple, nontender, no masses  CV: RRR, +S1/S2, no murmurs, rubs or gallops  Respiratory: CTA B/L, No W/R/R  Abdominal: Soft, NT, ND +BSx4  Extremities: No C/C/E, + peripheral pulses  Neurology: AAOx3, nonfocal, CN II-XII grossly intact, sensation intact Vital Signs Last 24 Hrs  T(C): 37.5 (21 Feb 2020 22:56), Max: 38.1 (21 Feb 2020 20:14)  T(F): 99.5 (21 Feb 2020 22:56), Max: 100.6 (21 Feb 2020 20:14)  HR: 99 (21 Feb 2020 22:56) (99 - 117)  BP: 133/73 (21 Feb 2020 22:56) (128/70 - 141/70)  RR: 18 (21 Feb 2020 22:56) (17 - 18)  SpO2: 97% (21 Feb 2020 22:56) (94% - 98%)    Physical Exam:  General: Well developed, appears fatigued, NAD  HEENT: left eye with lateral subconjunctival hemorrhage    Neck: Supple, nontender, no masses  CV: RRR, +S1/S2, no murmurs, rubs or gallops  Respiratory: b/l lower lobe wheezing, no rales, no rhonchi  Abdominal: Soft, NT, ND +BSx4  Extremities: 2+ pitting edema b/l, L>R  Neurology: AAOx3, nonfocal, CN II-XII grossly intact, sensation intact  Skin: appears mildly jaundiced

## 2020-02-22 NOTE — H&P ADULT - PROBLEM SELECTOR PLAN 8
Cr 1.5 today. Per chart review, baseline Cr 1.3  - Avoid nephrotoxic medications  - s/p 1L IVF  - Check AM CMP

## 2020-02-22 NOTE — CHART NOTE - NSCHARTNOTEFT_GEN_A_CORE
Hospitalist Attending Chart Update / Progress Note    Please see H&P written early today by Dr. Horner, for more information.     Pt seen, interviewed, and examined by me.     Pt reported some improvement in cough and denies SOB. No subjective fever or chills today. No nausea, vomiting, abd pain, diarrhea, constipation. No dysuria, change in urinary frequency/urgency. Denied bleeding.    Lung exam notable for decreased breath sounds and scattered area of coarse breath sounds b/l. LEs with 2+ edema b/l.     A&P:  74yo M with PMH of refractory CLL, AIHA, chronic hepatitis B (on entecavir), KIRTI on CPAP, T2DM (managed with lifestyle changes), HTN (not on any medication) who presents with chief complaint of fever, cough and generalized weakness, admitted with multifocal pneumonia, anemia and severe thrombocytopenia.  -given recent admission and pt being immunocompromised, started on cefepime / azithromycin and MRSA PCR swab performed to eval for need to add vanco   -RVP negative  -f/up BCx, urine legionella Ag and sputum Cx  -Akbar (ID) recs appreciated  -consulted heme (Sade) recs appreciated -- rec 1 un of platelets and to monitor CBC daily  -pt had 1un PRBC overnight with rise in Hgb from 6.8 to 7.6.   -will transfuse for goal Hgb > 7 for now  -pt's bilirubin was elevated at 3.5 (2.6 direct bili fraction) -- was 1.8 a week ago  -pt has no GI symptoms and benign abdominal exam, but will check RUQ US to eval GB and biliary system  -pt was diagnosed with AIHA and given his Hgb did drop 2g in a week it is not unreasonable to believe he had more hemolysis which can raise bilirubin, but would expect a higher fraction of indirect bilirubin  -monitor CMP, and f/up RUQ US; if direct bili persistently elevated, will consult GI  -pt with hx of chronic Hep B on entecavir (will continue)  -pt instructed to not attempt walking by himself and nursing staff notified of need for him to walk only with assistance for now given his severe thrombocytopenia Hospitalist Attending Chart Update / Progress Note    Please see H&P written early today by Dr. Horner, for more information.      Pt seen, interviewed, and examined by me.     Pt reported some improvement in cough and denies SOB. No subjective fever or chills today. No nausea, vomiting, abd pain, diarrhea, constipation. No dysuria, change in urinary frequency/urgency. Denied bleeding.    Lung exam notable for decreased breath sounds and scattered area of coarse breath sounds b/l. LEs with 2+ edema b/l.     A&P:  74yo M with PMH of refractory CLL, AIHA, chronic hepatitis B (on entecavir), KIRTI on CPAP, T2DM (managed with lifestyle changes), HTN (not on any medication) who presents with chief complaint of fever, cough and generalized weakness, admitted with multifocal pneumonia, anemia and severe thrombocytopenia.  -given recent admission and pt being immunocompromised, started on cefepime / azithromycin and MRSA PCR swab performed to eval for need to add vanco   -RVP negative  -f/up BCx, urine legionella Ag and sputum Cx  -Akbar (ID) recs appreciated  -consulted heme (Sade) recs appreciated -- rec 1 un of platelets and to monitor CBC daily  -pt had 1un PRBC overnight with rise in Hgb from 6.8 to 7.6.   -will transfuse for goal Hgb > 7 for now  -pt's bilirubin was elevated at 3.5 (2.6 direct bili fraction) -- was 1.8 a week ago  -pt has no GI symptoms and benign abdominal exam, but will check RUQ US to eval GB and biliary system  -pt was diagnosed with AIHA and given his Hgb did drop 2g in a week it is not unreasonable to believe he had more hemolysis which can raise bilirubin, but would expect a higher fraction of indirect bilirubin  -monitor CMP, and f/up RUQ US; if direct bili persistently elevated, will consult GI  -pt with hx of chronic Hep B on entecavir (will continue)  -pt instructed to not attempt walking by himself and nursing staff notified of need for him to walk only with assistance for now given his severe thrombocytopenia Hospitalist Attending Chart Update / Progress Note    Please see H&P written early today by Dr. Horner, for more information.      Pt seen, interviewed, and examined by me.     Pt reported some improvement in cough and denies SOB. No subjective fever or chills today. No nausea, vomiting, abd pain, diarrhea, constipation. No dysuria, change in urinary frequency/urgency. Denied bleeding.    Lung exam notable for decreased breath sounds and scattered area of coarse breath sounds b/l. LEs with 2+ edema b/l.     A&P:  74yo M with PMH of refractory CLL, AIHA, chronic hepatitis B (on entecavir), KIRTI on CPAP, T2DM (managed with lifestyle changes), HTN (not on any medication) who presents with chief complaint of fever, cough and generalized weakness, admitted with multifocal pneumonia, anemia and severe thrombocytopenia.  -given recent admission and pt being immunocompromised, started on cefepime / azithromycin and MRSA PCR swab performed to eval for need to add vanco   -RVP negative  -f/up BCx, urine legionella Ag and sputum Cx  -Akbar (ID) recs appreciated  -consulted heme (Sade) recs appreciated -- rec 1 un of platelets and to monitor CBC daily  -pt had 1un PRBC overnight with rise in Hgb from 6.8 to 7.6.   -will transfuse for goal Hgb > 7 for now  -pt's bilirubin was elevated at 3.5 (2.6 direct bili fraction) -- was 1.8 a week ago  -pt has no GI symptoms and benign abdominal exam, but will check RUQ US to eval GB and biliary system  -pt was diagnosed with AIHA and given his Hgb did drop 2g in a week it is not unreasonable to believe he had more hemolysis which can raise bilirubin, but would expect a higher fraction of indirect bilirubin  -monitor CMP, and f/up RUQ US; if direct bili persistently elevated, will consult GI  -pt with hx of chronic Hep B on entecavir (will continue)  -pt instructed to not attempt walking by himself and nursing staff notified of need for him to walk only with assistance for now given his severe thrombocytopenia  -has LE edema worsening since being given prednisone for AIHA as per pt  -LE Doppler negative for DVT  -will check TTE

## 2020-02-22 NOTE — CONSULT NOTE ADULT - PROBLEM SELECTOR RECOMMENDATION 9
PNA following recent flu in immunocompromised host so concur with cefepime/azithro but with higher risk for staph post flu will follow clinical course and results of MRSA PCR screen and sputum regarding any need for MRSA coverage

## 2020-02-22 NOTE — H&P ADULT - NSHPSOCIALHISTORY_GEN_ALL_CORE
Social History/Preventive Medicine:    Marital Status:   Occupation: former   Lives with: wife  Ambulates at home:    Substance Use:  Tobacco Usage:   Alcohol Usage:   Illicit Drug Usage: Marital Status:   Occupation: former   Lives with: wife  Ambulates at home: independently    Substance Use: denies  Tobacco Usage: denies  Alcohol Usage: denies  Illicit Drug Usage: denies

## 2020-02-22 NOTE — CONSULT NOTE ADULT - SUBJECTIVE AND OBJECTIVE BOX
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 Has been coughing as he was recently treated for influenza A on last admission. States that when he was discharged from [facility] on 2/15, he was feeling well and had no fevers. Patient developed a fever, T100.9F (oral) today for which he called heme/onc, Dr. Stephenson. Per Dr. Stuart, patient advised to go to ER. He was found to have a fever, T100.6F (rectal) in ER. Denies recent travel or sick contacts. Denies headache, chest pain, sob, palpitations, abdominal pain, diarrhea, melena, hematochezia, dysuria or hematuria.     Of note, patient was recently admitted to [facility] from 2/14-2/15/2020 for chemotherapy with Venetoclax, found to be febrile with similar associated complaints of generalized malaise, cough, and weakness. Patient was found to be Flu A positive during that admission and was treated with tamiflu. Patient was found to be thrombocytopenic to 35k with a Hb of 6.6, transfused 2U PRBCs and 1U platelets. Chemotherapy was held off due to acute illness.      In ED, VS Tmax 100.6 rectal (repeat s/p tylenol 99.5),  -> 99, BP stable, saturating well on RA  CBC significant for .14 (on d/c 56.25), Hb 6.8 (on d/c 8.7), Plt 11 (on d/c 21)  CMP significant for K 3.4, Cr 1.5 (appears to be baseline per chart review), bili 3.3  Type and screen performed. Will be getting 1 unit pRBCs  Flu/RSV negative  CT Chest: Multilobar patchy peribronchovascular airspace opacities and more dense consolidative process in the right middle lobe likely reflecting multifocal pneumonia. Small right and trace left pleural effusion. Mediastinal and hilar lymphadenopathy worsened compared with prior exam from 8/30/2019. Numerous mildly prominent bilateral axillary lymph nodes. Retroperitoneal lymphadenopathy. Mild splenomegaly. Cholelithiasis.  CXR (wet read): noted to have increased opacities in right lower and middle lobes as well as left lower lobe in comparison to CXR on 2/14/2020  EKG: sinus tachycardic    S/p cefepime 1g IV x1, NS bolus 1L x1, duonebs x2, tylenol 650mg PO x1, 1U PRBCs ordered and to be transfused    Dr. Stuart was called and he recommended to only transfused PRBC and not platelets.  He will see pt in the morning. (22 Feb 2020 00:14)      PAST MEDICAL & SURGICAL HISTORY:  KIRTI (obstructive sleep apnea): on nocturnal cpap  Hypertension: not on any medications  Diabetes: not on any medications  Hepatitis B  Lymphoma: Small cell B cell  AIHA (autoimmune hemolytic anemia)  H/O lithotripsy      Antimicrobials  azithromycin  IVPB      cefepime   IVPB 1000 milliGRAM(s) IV Intermittent every 12 hours  entecavir 0.5 milliGRAM(s) Oral daily      Immunological      Other  acetaminophen   Tablet .. 650 milliGRAM(s) Oral every 6 hours PRN  albuterol/ipratropium for Nebulization 3 milliLiter(s) Nebulizer every 6 hours PRN  benzonatate 100 milliGRAM(s) Oral three times a day PRN  ibrutinib 420 milliGRAM(s) Oral daily  predniSONE   Tablet 10 milliGRAM(s) Oral daily      Allergies    sulfa drugs (Unknown)    Intolerances        SOCIAL HISTORY:  Social History:  Marital Status:   Occupation: former   Lives with: wife  Ambulates at home: independently    Substance Use: denies  Tobacco Usage: denies  Alcohol Usage: denies  Illicit Drug Usage: denies (22 Feb 2020 00:14)      FAMILY HISTORY:  Family history of hypertension in mother      ROS:    EYES:  Negative  blurry vision or double vision  GASTROINTESTINAL:  Negative for nausea, vomiting, diarrhea  -otherwise negative except for subjective    Vital Signs Last 24 Hrs  T(C): 36.4 (22 Feb 2020 14:02), Max: 38.1 (21 Feb 2020 20:14)  T(F): 97.5 (22 Feb 2020 14:02), Max: 100.6 (21 Feb 2020 20:14)  HR: 89 (22 Feb 2020 14:02) (85 - 117)  BP: 123/63 (22 Feb 2020 14:02) (115/67 - 141/70)  BP(mean): --  RR: 16 (22 Feb 2020 14:02) (16 - 18)  SpO2: 97% (22 Feb 2020 14:02) (94% - 98%)    PE:  WDWN in some  distress  HEENT:  NC, PERRL, sclerae anicteric, conjunctivae clear, EOMI.  Sinuses nontender, no nasal exudate.  No buccal or pharyngeal lesions, erythema or exudate  Neck:  Supple, no adenopathy  Lungs:  No accessory muscle use, bilaterally areas of decreased BS and crackles  Cor:  RRR, S1, S2, no murmur appreciated  Abd:  Symmetric, normoactive BS.  Soft, nontender, no masses, guarding or rebound.  Liver and spleen not enlarged  Extrem:  No cyanosis or edema  Skin:  No rashes.  Neuro: grossly intact  Musc: moving all limbs freely, no focal deficits    LABS:                        7.6    x     )-----------( x        ( 22 Feb 2020 13:36 )             23.5       WBC Count: 78.39 K/uL (02-22-20 @ 06:06)  WBC Count: 123.14 K/uL (02-21-20 @ 20:29)      02-22    140  |  110<H>  |  28<H>  ----------------------------<  204<H>  3.3<L>   |  19<L>  |  1.50<H>    Ca    7.8<L>      22 Feb 2020 06:06  Phos  2.1     02-22  Mg     2.5     02-22    TPro  5.0<L>  /  Alb  2.2<L>  /  TBili  3.5<H>  /  DBili  2.60<H>  /  AST  31  /  ALT  23  /  AlkPhos  140<H>  02-22      Creatinine, Serum: 1.50 mg/dL (02-22-20 @ 06:06)  Creatinine, Serum: 1.50 mg/dL (02-21-20 @ 20:29)              MICROBIOLOGY:      RADIOLOGY & ADDITIONAL STUDIES:    --< from: CT Chest No Cont (02.21.20 @ 23:43) >    EXAM:  CT CHEST                            PROCEDURE DATE:  02/21/2020          INTERPRETATION:  CLINICAL INFORMATION: Cough, fever, leukemia.    COMPARISON: 8/30/2019    PROCEDURE:   CT of the Chest was performed without intravenous contrast.  Sagittal and coronal reformats were performed.      FINDINGS:    LUNGS AND AIRWAYS: Patent central airways.  Peribronchial thickening. Multilobar patchy peribronchovascular airspace opacities and more dense consolidative process in the right middle lobe.. Calcified granuloma in the left upper lobe. Nodular opacities along the right minor fissure and left major fissure likely representing intrafissural lymph nodes.    PLEURA: Small right and trace left pleural effusion.    MEDIASTINUM AND FABIAN: Mediastinal and hilar lymphadenopathy, worsened compared with prior exam. Representative prevascular lymph node measuring 3.3 x 1.9 cm (series 2:44, previously measuring up to 1.2 x 0.8 cm.    VESSELS: Normal caliber thoracic aorta. Main pulmonary artery isnot dilated. Atherosclerotic calcifications of the thoracic aorta, great vessels, and coronary arteries.    HEART: Heart size is normal. Trace pericardial effusion.    CHEST WALL AND LOWER NECK: Mild heterogeneity of the right thyroid lobe with few small subcentimeter hypodense nodules. Numerous mildly prominent bilateral axillary lymph nodes.    VISUALIZED UPPER ABDOMEN: Cholelithiasis. Retroperitoneal lymphadenopathy. Splenomegaly. Upper pole left renal cyst.    BONES: Degenerative changes of the spine.    IMPRESSION:     Multilobar patchy peribronchovascular airspace opacities and more dense consolidative process in the right middle lobe likely reflecting multifocal pneumonia.    Small right and trace left pleural effusion.    Mediastinal and hilar lymphadenopathy worsened compared with prior exam from 8/30/2019. Numerous mildly prominent bilateral axillary lymph nodes. Retroperitoneal lymphadenopathy. Mild splenomegaly.    Cholelithiasis.

## 2020-02-22 NOTE — H&P ADULT - NSICDXPASTMEDICALHX_GEN_ALL_CORE_FT
PAST MEDICAL HISTORY:  AIHA (autoimmune hemolytic anemia)     Diabetes not on any medications    Hepatitis B     Hypertension not on any medications    Lymphoma Small cell B cell PAST MEDICAL HISTORY:  AIHA (autoimmune hemolytic anemia)     Diabetes not on any medications    Hepatitis B     Hypertension not on any medications    Lymphoma Small cell B cell    KIRTI (obstructive sleep apnea) on nocturnal cpap

## 2020-02-22 NOTE — H&P ADULT - PROBLEM SELECTOR PLAN 7
Pt states that lower ext edema occurs while on prednisone  - Check venous doppler  - ECHO 2/2018 showed Preserved left ventricular systolic function. Stage I   diastolic dysfunction. EF 65%  - Would avoid IVF   - Strict I &O's  - daily weights  - SCDs Pt states that lower ext edema occurs while on prednisone  - Check venous doppler  - ECHO 2/2018 showed Preserved left ventricular systolic function. Stage I   diastolic dysfunction. EF 65%  - Caution with IVF  - Strict I &O's  - daily weights  - SCDs

## 2020-02-22 NOTE — H&P ADULT - PROBLEM SELECTOR PLAN 2
History of Small Cell B-Cell Lymphoma, relapsed  - Worsening anemia and TCP from discharge ~1 week ago  - Patient to receive 1U PRBCs in ED  - No acute s/s of bleeding on admission, continue to monitor  - Patient was to start chemo on previous admission 2/14, but was deferred due to acute illness  - Heme Dr. Stephenson following, f/u recs Likely due to pna  - Tylenol prn fever  - On Cefepime  - Follow up blood cultures  - check rvp  - ID consulted- Dr. Webber

## 2020-02-22 NOTE — H&P ADULT - HISTORY OF PRESENT ILLNESS
Patient is a 74 y/o M with PMHx of small cell B-cell lymphoma, AIHA, hepatitis B, T2DM (managed with lifestyle changes), HTN (not on any medication) who presents with chief complaint of fever with associated cough and weakness.    Of note, patient was recently admitted to [facility] from 2/14-2/15/2020 for chemotherapy with Venetoclax, found to be febrile with similar associated complaints of generalized malaise, cough, and weakness. Patient was found to be Flu A positive during that admission and was treated with tamiflu. Patient was found to be thrombocytopenic to 35k with a Hb of 6.6, transfused 2U PRBCs and 1U platelets. Chemotherapy was held off due to acute illness.      In ED, VS Tmax 100.6 rectal (repeat s/p tylenol 99.5),  -> 99, BP stable, saturating well on RA  CBC significant for .14 (on d/c 56.25), Hb 6.8 (on d/c 8.7), Plt 11 (on d/c 21)  CMP significant for K 3.4, Cr 1.5 (appears to be baseline per chart review), bili 3.3  Type and screen performed  Flu/RSV negative  CT Chest: Multilobar patchy peribronchovascular airspace opacities and more dense consolidative process in the right middle lobe likely reflecting multifocal pneumonia. Small right and trace left pleural effusion. Mediastinal and hilar lymphadenopathy worsened compared with prior exam from 8/30/2019. Numerous mildly prominent bilateral axillary lymph nodes. Retroperitoneal lymphadenopathy. Mild splenomegaly. Cholelithiasis.  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 Has been coughing as he was recently treated for influenza A on last admission. States that when he was discharged from \[facility]"" on 2/15, he was feeling well and had no fevers. Patient developed a fever, T100.9F (oral) today for which he called heme/onc, Dr. Stephenson. Per Dr. Stuart, patient advised to go to ER. He was found to have a fever, T100.6F (rectal) in ER. Denies recent travel or sick contacts. Denies headache, chest pain, sob, palpitations, abdominal pain, diarrhea, melena, hematochezia, dysuria or hematuria.     Of note, patient was recently admitted to [facility] from 2/14-2/15/2020 for chemotherapy with Venetoclax, found to be febrile with similar associated complaints of generalized malaise, cough, and weakness. Patient was found to be Flu A positive during that admission and was treated with tamiflu. Patient was found to be thrombocytopenic to 35k with a Hb of 6.6, transfused 2U PRBCs and 1U platelets. Chemotherapy was held off due to acute illness.      In ED, VS Tmax 100.6 rectal (repeat s/p tylenol 99.5),  -> 99, BP stable, saturating well on RA  CBC significant for .14 (on d/c 56.25), Hb 6.8 (on d/c 8.7), Plt 11 (on d/c 21)  CMP significant for K 3.4, Cr 1.5 (appears to be baseline per chart review), bili 3.3  Type and screen performed. Will be getting 1 unit pRBCs  Flu/RSV negative  CT Chest: Multilobar patchy peribronchovascular airspace opacities and more dense consolidative process in the right middle lobe likely reflecting multifocal pneumonia. Small right and trace left pleural effusion. Mediastinal and hilar lymphadenopathy worsened compared with prior exam from 8/30/2019. Numerous mildly prominent bilateral axillary lymph nodes. Retroperitoneal lymphadenopathy. Mild splenomegaly. Cholelithiasis.  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 Has been coughing as he was recently treated for influenza A on last admission. States that when he was discharged from [facility] on 2/15, he was feeling well and had no fevers. Patient developed a fever, T100.9F (oral) today for which he called heme/onc, Dr. Stephenson. Per Dr. Stuart, patient advised to go to ER. He was found to have a fever, T100.6F (rectal) in ER. Denies recent travel or sick contacts. Denies headache, chest pain, sob, palpitations, abdominal pain, diarrhea, melena, hematochezia, dysuria or hematuria.     Of note, patient was recently admitted to [facility] from 2/14-2/15/2020 for chemotherapy with Venetoclax, found to be febrile with similar associated complaints of generalized malaise, cough, and weakness. Patient was found to be Flu A positive during that admission and was treated with tamiflu. Patient was found to be thrombocytopenic to 35k with a Hb of 6.6, transfused 2U PRBCs and 1U platelets. Chemotherapy was held off due to acute illness.      In ED, VS Tmax 100.6 rectal (repeat s/p tylenol 99.5),  -> 99, BP stable, saturating well on RA  CBC significant for .14 (on d/c 56.25), Hb 6.8 (on d/c 8.7), Plt 11 (on d/c 21)  CMP significant for K 3.4, Cr 1.5 (appears to be baseline per chart review), bili 3.3  Type and screen performed. Will be getting 1 unit pRBCs  Flu/RSV negative  CT Chest: Multilobar patchy peribronchovascular airspace opacities and more dense consolidative process in the right middle lobe likely reflecting multifocal pneumonia. Small right and trace left pleural effusion. Mediastinal and hilar lymphadenopathy worsened compared with prior exam from 8/30/2019. Numerous mildly prominent bilateral axillary lymph nodes. Retroperitoneal lymphadenopathy. Mild splenomegaly. Cholelithiasis.  CXR (wet read): noted to have increased opacities in right lower and middle lobes as well as left lower lobe in comparison to CXR on 2/14/2020  EKG: sinus tachycardic    S/p cefepime 1g IV x1, NS bolus 1L x1, duonebs x2, tylenol 650mg PO x1, 1U PRBCs ordered and to be transfused    Dr. Stuart was called and he recommended to only transfused PRBC and not platelets.  He will see pt in the morning.

## 2020-02-22 NOTE — H&P ADULT - NSICDXFAMILYHX_GEN_ALL_CORE_FT
FAMILY HISTORY:  Mother  Still living? Yes, Estimated age:   Family history of hypertension in mother, Age at diagnosis: Age Unknown FAMILY HISTORY:  Family history of hypertension in mother

## 2020-02-22 NOTE — CONSULT NOTE ADULT - ASSESSMENT
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

## 2020-02-22 NOTE — H&P ADULT - PROBLEM SELECTOR PLAN 10
IMPROVE VTE Individual Risk Assessment          RISK                                                          Points  [  ] Previous VTE                                                3  [  ] Thrombophilia                                             2  [  ] Lower limb paralysis                                   2        (unable to hold up >15 seconds)    [ x ] Current Cancer                                             2         (within 6 months)  [  ] Immobilization > 24 hrs                              1  [  ] ICU/CCU stay > 24 hours                             1  [ x ] Age > 60                                                         1    IMPROVE VTE Score: 3  DVT PPx: SCDs, no chemical ppx in the setting of symptomatic anemia and thrombocytopenia    11. HTN- not on meds  12. T2DM- stable with lifestyle modifications IMPROVE VTE Individual Risk Assessment          RISK                                                          Points  [  ] Previous VTE                                                3  [  ] Thrombophilia                                             2  [  ] Lower limb paralysis                                   2        (unable to hold up >15 seconds)    [ x ] Current Cancer                                             2         (within 6 months)  [  ] Immobilization > 24 hrs                              1  [  ] ICU/CCU stay > 24 hours                             1  [ x ] Age > 60                                                         1    IMPROVE VTE Score: 3  DVT PPx: SCDs, no chemical ppx in the setting of symptomatic anemia and thrombocytopenia    11. HTN- not on meds  12. T2DM- stable with lifestyle modifications. Hyperglycemia likely due to steroid use.

## 2020-02-22 NOTE — H&P ADULT - PROBLEM SELECTOR PLAN 1
- Admit to telemetry  - S/p 1g Cefepime x1 in ED, continue Q12H. Abx treatment was differed on patient's last admission as CXR at that time was no convincing of pna.  - Aspiration precautions  - F/u s/s eval  - CT Chest showing multilobar patchy peribronchovascular airspace opacities and more dense consolidative process in the right middle lobe likely reflecting multifocal pneumonia, trace b/l pleural effusions  - Febrile in ED, monitor fever curve, tylenol PRN for fever  - WBC doubled since d/c, f/u AM CBC  - ID Dr. Webber consulted, f/u recs - Admit to telemetry  - Consider post-viral bacterial pneumonia as pt recently treated for influenza A  - S/p 1g Cefepime x1 in ED, continue Q12H. Abx treatment was deferred on patient's last admission as CXR at that time was no convincing of pna.  - CT Chest showing multilobar patchy peribronchovascular airspace opacities and more dense consolidative process in the right middle lobe likely reflecting multifocal pneumonia, trace b/l pleural effusions  - Febrile in ED, monitor fever curve, tylenol PRN for fever  - duonebs prn sob/wheezing  - RSV/flu neg. Will check RVP  - WBC doubled since d/c, f/u AM CBC  - pt completed a course of tamiflu  - ID Dr. Webber consulted, f/u recs - Admit to telemetry  - Consider post-viral bacterial pneumonia as pt recently treated for influenza A  - S/p 1g Cefepime x1 in ED, continue Q12H. Abx treatment was deferred on patient's last admission as CXR at that time was no convincing of pna.  - Continue Cefepime and Azithromycin for atypical coverage  - Check urine legionella and strep pna. Can de-escalate if legionella neg  - CT Chest showing multilobar patchy peribronchovascular airspace opacities and more dense consolidative process in the right middle lobe likely reflecting multifocal pneumonia, trace b/l pleural effusions  - Febrile in ED, monitor fever curve, tylenol PRN for fever  - duonebs prn sob/wheezing  - RSV/flu neg. Will check RVP  - WBC doubled since d/c, f/u AM CBC  - pt completed a course of tamiflu  - ID Dr. Webber consulted, f/u recs

## 2020-02-22 NOTE — H&P ADULT - PROBLEM SELECTOR PLAN 5
Per chart review, platelet count steadily declining since 2018  - Plt 11 today  - On Prednisone 10mg qd  - Pt was empirically taking b12 and folate. Continue folate 1mg qd for now. Per patient, he stopped taking b12 when he finished taking tamiflu.

## 2020-02-23 LAB
ALBUMIN SERPL ELPH-MCNC: 2.2 G/DL — LOW (ref 3.3–5)
ALP SERPL-CCNC: 137 U/L — HIGH (ref 40–120)
ALT FLD-CCNC: 21 U/L — SIGNIFICANT CHANGE UP (ref 12–78)
ANION GAP SERPL CALC-SCNC: 9 MMOL/L — SIGNIFICANT CHANGE UP (ref 5–17)
AST SERPL-CCNC: 33 U/L — SIGNIFICANT CHANGE UP (ref 15–37)
BILIRUB SERPL-MCNC: 3.8 MG/DL — HIGH (ref 0.2–1.2)
BUN SERPL-MCNC: 23 MG/DL — SIGNIFICANT CHANGE UP (ref 7–23)
CALCIUM SERPL-MCNC: 7.9 MG/DL — LOW (ref 8.5–10.1)
CHLORIDE SERPL-SCNC: 109 MMOL/L — HIGH (ref 96–108)
CO2 SERPL-SCNC: 20 MMOL/L — LOW (ref 22–31)
CREAT SERPL-MCNC: 1.2 MG/DL — SIGNIFICANT CHANGE UP (ref 0.5–1.3)
GLUCOSE SERPL-MCNC: 144 MG/DL — HIGH (ref 70–99)
HCT VFR BLD CALC: 21 % — CRITICAL LOW (ref 39–50)
HGB BLD-MCNC: 6.7 G/DL — CRITICAL LOW (ref 13–17)
LEGIONELLA AG UR QL: NEGATIVE — SIGNIFICANT CHANGE UP
MAGNESIUM SERPL-MCNC: 2.4 MG/DL — SIGNIFICANT CHANGE UP (ref 1.6–2.6)
MCHC RBC-ENTMCNC: 31.9 GM/DL — LOW (ref 32–36)
MCHC RBC-ENTMCNC: 32.7 PG — SIGNIFICANT CHANGE UP (ref 27–34)
MCV RBC AUTO: 102.4 FL — HIGH (ref 80–100)
NRBC # BLD: 0 /100 WBCS — SIGNIFICANT CHANGE UP (ref 0–0)
PHOSPHATE SERPL-MCNC: 1.6 MG/DL — LOW (ref 2.5–4.5)
PLATELET # BLD AUTO: 16 K/UL — CRITICAL LOW (ref 150–400)
POTASSIUM SERPL-MCNC: 3.2 MMOL/L — LOW (ref 3.5–5.3)
POTASSIUM SERPL-SCNC: 3.2 MMOL/L — LOW (ref 3.5–5.3)
PROCALCITONIN SERPL-MCNC: 0.36 NG/ML — HIGH (ref 0–0.04)
PROT SERPL-MCNC: 5.1 G/DL — LOW (ref 6–8.3)
RBC # BLD: 2.05 M/UL — LOW (ref 4.2–5.8)
RBC # FLD: 23.7 % — HIGH (ref 10.3–14.5)
SODIUM SERPL-SCNC: 138 MMOL/L — SIGNIFICANT CHANGE UP (ref 135–145)
WBC # BLD: 117.59 K/UL — CRITICAL HIGH (ref 3.8–10.5)
WBC # FLD AUTO: 117.59 K/UL — CRITICAL HIGH (ref 3.8–10.5)

## 2020-02-23 PROCEDURE — 76705 ECHO EXAM OF ABDOMEN: CPT | Mod: 26

## 2020-02-23 PROCEDURE — 99233 SBSQ HOSP IP/OBS HIGH 50: CPT

## 2020-02-23 RX ORDER — SODIUM,POTASSIUM PHOSPHATES 278-250MG
1 POWDER IN PACKET (EA) ORAL
Refills: 0 | Status: COMPLETED | OUTPATIENT
Start: 2020-02-23 | End: 2020-02-23

## 2020-02-23 RX ORDER — PANTOPRAZOLE SODIUM 20 MG/1
40 TABLET, DELAYED RELEASE ORAL DAILY
Refills: 0 | Status: DISCONTINUED | OUTPATIENT
Start: 2020-02-23 | End: 2020-02-27

## 2020-02-23 RX ORDER — POTASSIUM CHLORIDE 20 MEQ
40 PACKET (EA) ORAL ONCE
Refills: 0 | Status: COMPLETED | OUTPATIENT
Start: 2020-02-23 | End: 2020-02-23

## 2020-02-23 RX ADMIN — Medication 1 TABLET(S): at 12:19

## 2020-02-23 RX ADMIN — Medication 40 MILLIEQUIVALENT(S): at 09:16

## 2020-02-23 RX ADMIN — IBRUTINIB 420 MILLIGRAM(S): 140 TABLET, FILM COATED ORAL at 17:44

## 2020-02-23 RX ADMIN — AZITHROMYCIN 255 MILLIGRAM(S): 500 TABLET, FILM COATED ORAL at 00:50

## 2020-02-23 RX ADMIN — ENTECAVIR 0.5 MILLIGRAM(S): 0.5 TABLET ORAL at 06:10

## 2020-02-23 RX ADMIN — Medication 1 TABLET(S): at 17:45

## 2020-02-23 RX ADMIN — Medication 10 MILLIGRAM(S): at 06:00

## 2020-02-23 RX ADMIN — PANTOPRAZOLE SODIUM 40 MILLIGRAM(S): 20 TABLET, DELAYED RELEASE ORAL at 16:14

## 2020-02-23 RX ADMIN — Medication 100 MILLIGRAM(S): at 22:59

## 2020-02-23 RX ADMIN — CEFEPIME 100 MILLIGRAM(S): 1 INJECTION, POWDER, FOR SOLUTION INTRAMUSCULAR; INTRAVENOUS at 17:45

## 2020-02-23 RX ADMIN — CEFEPIME 100 MILLIGRAM(S): 1 INJECTION, POWDER, FOR SOLUTION INTRAMUSCULAR; INTRAVENOUS at 05:59

## 2020-02-23 RX ADMIN — Medication 1 TABLET(S): at 09:16

## 2020-02-23 RX ADMIN — Medication 30 MILLIGRAM(S): at 16:14

## 2020-02-23 NOTE — PROGRESS NOTE ADULT - PROBLEM SELECTOR PLAN 9
IMPROVE VTE Individual Risk Assessment          RISK                                                          Points  [  ] Previous VTE                                                3  [  ] Thrombophilia                                             2  [  ] Lower limb paralysis                                   2        (unable to hold up >15 seconds)    [ x ] Current Cancer                                             2         (within 6 months)  [  ] Immobilization > 24 hrs                              1  [  ] ICU/CCU stay > 24 hours                             1  [ x ] Age > 60                                                         1    IMPROVE VTE Score: 3  DVT PPx: SCDs, no chemical ppx in the setting of symptomatic anemia and thrombocytopenia    11. HTN- not on meds  12. T2DM- stable with lifestyle modifications. Hyperglycemia likely due to steroid use.

## 2020-02-23 NOTE — PROGRESS NOTE ADULT - SUBJECTIVE AND OBJECTIVE BOX
Patient is a 75y old  Male who presents with a chief complaint of fever, weakness, and cough.      INTERVAL HPI/OVERNIGHT EVENTS: Pt states his weakness is improving with blood transfusions. Reports cough is starting to improve. Denies fever, chills, SOB, CP, palpitations, headache, abd pain.    MEDICATIONS  (STANDING):  azithromycin  IVPB      azithromycin  IVPB 500 milliGRAM(s) IV Intermittent every 24 hours  cefepime   IVPB 1000 milliGRAM(s) IV Intermittent every 12 hours  entecavir 0.5 milliGRAM(s) Oral daily  ibrutinib 420 milliGRAM(s) Oral daily  potassium acid phosphate/sodium acid phosphate tablet (K-PHOS No. 2) 1 Tablet(s) Oral four times a day with meals  predniSONE   Tablet 10 milliGRAM(s) Oral daily    MEDICATIONS  (PRN):  acetaminophen   Tablet .. 650 milliGRAM(s) Oral every 6 hours PRN Temp greater or equal to 38C (100.4F), Mild Pain (1 - 3)  albuterol/ipratropium for Nebulization 3 milliLiter(s) Nebulizer every 6 hours PRN Shortness of Breath and/or Wheezing  benzonatate 100 milliGRAM(s) Oral three times a day PRN Cough      Allergies    sulfa drugs (Unknown)    Intolerances        REVIEW OF SYSTEMS:  CONSTITUTIONAL: +generalized weakness (improving); No fever or chills  HEENT:  No headache, no sore throat  RESPIRATORY: +cough (improving), denies shortness of breath  CARDIOVASCULAR: No chest pain, palpitations  GASTROINTESTINAL: No melena, hematochezia, abd pain, nausea, vomiting, or diarrhea  GENITOURINARY: No dysuria, frequency, or hematuria  NEUROLOGICAL: no focal weakness or dizziness  MUSCULOSKELETAL: no myalgias     Vital Signs Last 24 Hrs  T(C): 36.5 (23 Feb 2020 07:36), Max: 36.8 (22 Feb 2020 19:53)  T(F): 97.7 (23 Feb 2020 07:36), Max: 98.3 (22 Feb 2020 19:53)  HR: 85 (23 Feb 2020 07:36) (85 - 93)  BP: 130/72 (23 Feb 2020 07:36) (123/63 - 145/75)  BP(mean): --  RR: 17 (23 Feb 2020 07:36) (16 - 18)  SpO2: 98% (23 Feb 2020 07:36) (94% - 98%)    PHYSICAL EXAM:  GENERAL: NAD  HEENT:  moist mucous membranes, EOMI  CHEST/LUNG:  decreased breath sounds in right lower lung field  HEART:  RRR, S1, S2  ABDOMEN:  BS+, soft, nontender, nondistended  EXTREMITIES: 3+ edema in LEs b/l, No cyanosis or calf tenderness  NERVOUS SYSTEM: answers questions and follows commands appropriately    LABS:                        6.7    117.59 )-----------( 16       ( 23 Feb 2020 07:06 )             21.0     CBC Full  -  ( 23 Feb 2020 07:06 )  WBC Count : 117.59 K/uL  Hemoglobin : 6.7 g/dL  Hematocrit : 21.0 %  Platelet Count - Automated : 16 K/uL  Mean Cell Volume : 102.4 fl  Mean Cell Hemoglobin : 32.7 pg  Mean Cell Hemoglobin Concentration : 31.9 gm/dL  Auto Neutrophil # : x  Auto Lymphocyte # : x  Auto Monocyte # : x  Auto Eosinophil # : x  Auto Basophil # : x  Auto Neutrophil % : x  Auto Lymphocyte % : x  Auto Monocyte % : x  Auto Eosinophil % : x  Auto Basophil % : x    23 Feb 2020 07:06    138    |  109    |  23     ----------------------------<  144    3.2     |  20     |  1.20     Ca    7.9        23 Feb 2020 07:06  Phos  1.6       23 Feb 2020 07:06  Mg     2.4       23 Feb 2020 07:06    TPro  5.1    /  Alb  2.2    /  TBili  3.8    /  DBili  2.60   /  AST  33     /  ALT  21     /  AlkPhos  137    23 Feb 2020 07:06        CAPILLARY BLOOD GLUCOSE              Culture - Blood (collected 02-22-20 @ 01:08)  Source: .Blood Blood  Preliminary Report (02-23-20 @ 02:02):    No growth to date.    Culture - Blood (collected 02-22-20 @ 01:08)  Source: .Blood Blood  Preliminary Report (02-23-20 @ 02:02):    No growth to date.        RADIOLOGY & ADDITIONAL TESTS:    Personally reviewed.     Consultant(s) Notes Reviewed:  [x] YES  [ ] NO Patient is a 75y old  Male who presents with a chief complaint of fever, weakness, and cough.    INTERVAL HPI/OVERNIGHT EVENTS: Pt states his weakness is improving with blood transfusions. Reports cough is starting to improve. Denies fever, chills, SOB, CP, palpitations, headache, abd pain.    MEDICATIONS  (STANDING):  azithromycin  IVPB      azithromycin  IVPB 500 milliGRAM(s) IV Intermittent every 24 hours  cefepime   IVPB 1000 milliGRAM(s) IV Intermittent every 12 hours  entecavir 0.5 milliGRAM(s) Oral daily  ibrutinib 420 milliGRAM(s) Oral daily  potassium acid phosphate/sodium acid phosphate tablet (K-PHOS No. 2) 1 Tablet(s) Oral four times a day with meals  predniSONE   Tablet 10 milliGRAM(s) Oral daily    MEDICATIONS  (PRN):  acetaminophen   Tablet .. 650 milliGRAM(s) Oral every 6 hours PRN Temp greater or equal to 38C (100.4F), Mild Pain (1 - 3)  albuterol/ipratropium for Nebulization 3 milliLiter(s) Nebulizer every 6 hours PRN Shortness of Breath and/or Wheezing  benzonatate 100 milliGRAM(s) Oral three times a day PRN Cough      Allergies    sulfa drugs (Unknown)    Intolerances        REVIEW OF SYSTEMS:  CONSTITUTIONAL: +generalized weakness (improving); No fever or chills  HEENT:  No headache, no sore throat  RESPIRATORY: +cough (improving), denies shortness of breath  CARDIOVASCULAR: No chest pain, palpitations  GASTROINTESTINAL: No melena, hematochezia, abd pain, nausea, vomiting, or diarrhea  GENITOURINARY: No dysuria, frequency, or hematuria  NEUROLOGICAL: no focal weakness or dizziness  MUSCULOSKELETAL: no myalgias     Vital Signs Last 24 Hrs  T(C): 36.5 (23 Feb 2020 07:36), Max: 36.8 (22 Feb 2020 19:53)  T(F): 97.7 (23 Feb 2020 07:36), Max: 98.3 (22 Feb 2020 19:53)  HR: 85 (23 Feb 2020 07:36) (85 - 93)  BP: 130/72 (23 Feb 2020 07:36) (123/63 - 145/75)  BP(mean): --  RR: 17 (23 Feb 2020 07:36) (16 - 18)  SpO2: 98% (23 Feb 2020 07:36) (94% - 98%)    PHYSICAL EXAM:  GENERAL: NAD  HEENT:  moist mucous membranes, EOMI  CHEST/LUNG:  decreased breath sounds in right lower lung field  HEART:  RRR, S1, S2  ABDOMEN:  BS+, soft, nontender, nondistended  EXTREMITIES: 3+ edema in LEs b/l, No cyanosis or calf tenderness  NERVOUS SYSTEM: answers questions and follows commands appropriately    LABS:                        6.7    117.59 )-----------( 16       ( 23 Feb 2020 07:06 )             21.0     CBC Full  -  ( 23 Feb 2020 07:06 )  WBC Count : 117.59 K/uL  Hemoglobin : 6.7 g/dL  Hematocrit : 21.0 %  Platelet Count - Automated : 16 K/uL  Mean Cell Volume : 102.4 fl  Mean Cell Hemoglobin : 32.7 pg  Mean Cell Hemoglobin Concentration : 31.9 gm/dL  Auto Neutrophil # : x  Auto Lymphocyte # : x  Auto Monocyte # : x  Auto Eosinophil # : x  Auto Basophil # : x  Auto Neutrophil % : x  Auto Lymphocyte % : x  Auto Monocyte % : x  Auto Eosinophil % : x  Auto Basophil % : x    23 Feb 2020 07:06    138    |  109    |  23     ----------------------------<  144    3.2     |  20     |  1.20     Ca    7.9        23 Feb 2020 07:06  Phos  1.6       23 Feb 2020 07:06  Mg     2.4       23 Feb 2020 07:06    TPro  5.1    /  Alb  2.2    /  TBili  3.8    /  DBili  2.60   /  AST  33     /  ALT  21     /  AlkPhos  137    23 Feb 2020 07:06        CAPILLARY BLOOD GLUCOSE              Culture - Blood (collected 02-22-20 @ 01:08)  Source: .Blood Blood  Preliminary Report (02-23-20 @ 02:02):    No growth to date.    Culture - Blood (collected 02-22-20 @ 01:08)  Source: .Blood Blood  Preliminary Report (02-23-20 @ 02:02):    No growth to date.        RADIOLOGY & ADDITIONAL TESTS:    Personally reviewed.     Consultant(s) Notes Reviewed:  [x] YES  [ ] NO

## 2020-02-23 NOTE — PROGRESS NOTE ADULT - SUBJECTIVE AND OBJECTIVE BOX
Interval History:  feels better on abx.  no visible bleeding    Chart reviewed and events noted;   Overnight events:    MEDICATIONS  (STANDING):  azithromycin  IVPB      azithromycin  IVPB 500 milliGRAM(s) IV Intermittent every 24 hours  cefepime   IVPB 1000 milliGRAM(s) IV Intermittent every 12 hours  entecavir 0.5 milliGRAM(s) Oral daily  ibrutinib 420 milliGRAM(s) Oral daily  potassium acid phosphate/sodium acid phosphate tablet (K-PHOS No. 2) 1 Tablet(s) Oral four times a day with meals  predniSONE   Tablet 10 milliGRAM(s) Oral daily    MEDICATIONS  (PRN):  acetaminophen   Tablet .. 650 milliGRAM(s) Oral every 6 hours PRN Temp greater or equal to 38C (100.4F), Mild Pain (1 - 3)  albuterol/ipratropium for Nebulization 3 milliLiter(s) Nebulizer every 6 hours PRN Shortness of Breath and/or Wheezing  benzonatate 100 milliGRAM(s) Oral three times a day PRN Cough      Vital Signs Last 24 Hrs  T(C): 36.5 (23 Feb 2020 07:36), Max: 36.8 (22 Feb 2020 19:53)  T(F): 97.7 (23 Feb 2020 07:36), Max: 98.3 (22 Feb 2020 19:53)  HR: 85 (23 Feb 2020 07:36) (85 - 93)  BP: 130/72 (23 Feb 2020 07:36) (123/63 - 145/75)  BP(mean): --  RR: 17 (23 Feb 2020 07:36) (16 - 18)  SpO2: 98% (23 Feb 2020 07:36) (94% - 98%)    PHYSICAL EXAM  General: adult in NAD  HEENT: clear oropharynx, anicteric sclera, pink conjunctivae  Neck: supple  CV: normal S1S2 with no murmur rubs or gallops  Lungs: clear to auscultation, no wheezes, no rhales  Abdomen: soft non-tender non-distended, no hepato/splenomegaly  Ext: no clubbing cyanosis or edema  Skin: no rashes and no petichiae  Neuro: alert and oriented X3 no focal deficits      LABS:  CBC Full  -  ( 23 Feb 2020 07:06 )  WBC Count : 117.59 K/uL  RBC Count : 2.05 M/uL  Hemoglobin : 6.7 g/dL  Hematocrit : 21.0 %  Platelet Count - Automated : 16 K/uL  Mean Cell Volume : 102.4 fl  Mean Cell Hemoglobin : 32.7 pg  Mean Cell Hemoglobin Concentration : 31.9 gm/dL  Auto Neutrophil # : x  Auto Lymphocyte # : x  Auto Monocyte # : x  Auto Eosinophil # : x  Auto Basophil # : x  Auto Neutrophil % : x  Auto Lymphocyte % : x  Auto Monocyte % : x  Auto Eosinophil % : x  Auto Basophil % : x    02-23    138  |  109<H>  |  23  ----------------------------<  144<H>  3.2<L>   |  20<L>  |  1.20    Ca    7.9<L>      23 Feb 2020 07:06  Phos  1.6     02-23  Mg     2.4     02-23    TPro  5.1<L>  /  Alb  2.2<L>  /  TBili  3.8<H>  /  DBili  x   /  AST  33  /  ALT  21  /  AlkPhos  137<H>  02-23        fe studies      WBC trend  117.59 K/uL (02-23-20 @ 07:06)  78.39 K/uL (02-22-20 @ 06:06)  123.14 K/uL (02-21-20 @ 20:29)      Hgb trend  6.7 g/dL (02-23-20 @ 07:06)  7.6 g/dL (02-22-20 @ 13:36)  7.4 g/dL (02-22-20 @ 06:06)  6.8 g/dL (02-21-20 @ 20:29)      plt trend  16 K/uL (02-23-20 @ 07:06)  6 K/uL (02-22-20 @ 06:06)  11 K/uL (02-21-20 @ 20:29)        RADIOLOGY & ADDITIONAL STUDIES:

## 2020-02-23 NOTE — PROGRESS NOTE ADULT - PROBLEM SELECTOR PLAN 2
History of Small Cell B-Cell Lymphoma/leukemia, relapsed  - Worsening anemia and thrombocytopenia since discharge ~1 week ago likely in part due to lymphoma/leukemia  - c/w imbruvica as per heme  - Heme Dr. Stephenson group following, f/u recs

## 2020-02-23 NOTE — PROGRESS NOTE ADULT - PROBLEM SELECTOR PLAN 3
- Worsening anemia and thrombocytopenia since discharge ~1 week ago likely in part due to lymphoma/leukemia  - transfused 1un PRBC on 2/21 and 1 more unit PRBC this morning for Hgb < 7.  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 On prednisone 10mg daily -- heme rec to increase to prednisone 40mg po daily.  - States that baseline hgb ~14  - Trend H/H. Keep Hgb >7  - Monitor CBC

## 2020-02-23 NOTE — PROGRESS NOTE ADULT - PROBLEM SELECTOR PLAN 4
Per chart review, platelet count steadily declining since 2018  - Plt 16 today (required 1unit of platelets yesterday as pt was below 10,000. No platelets today as per heme  - likely related to the pt's leukemia, but may also in part be related to AIHA  - Pt was empirically taking B12 and folate. Continue folate 1mg daily for now. Per patient, he stopped taking b12 when he finished taking tamiflu.

## 2020-02-23 NOTE — PROGRESS NOTE ADULT - PROBLEM SELECTOR PLAN 1
pt doing well with potential to de-escalate to ceftriaxone and potentially finish course with oral Rx if pt continues to do well

## 2020-02-23 NOTE — PROGRESS NOTE ADULT - SUBJECTIVE AND OBJECTIVE BOX
infectious diseases progress note:    DIONICIO SULLIVAN is a 75y y. o. Male patient    Patient reports: feeling better    ROS:    EYES:  Negative  blurry vision or double vision  GASTROINTESTINAL:  Negative for nausea, vomiting, diarrhea  -otherwise negative except for subjective    Allergies    sulfa drugs (Unknown)    Intolerances        ANTIBIOTICS/RELEVANT:  antimicrobials  azithromycin  IVPB      azithromycin  IVPB 500 milliGRAM(s) IV Intermittent every 24 hours  cefepime   IVPB 1000 milliGRAM(s) IV Intermittent every 12 hours  entecavir 0.5 milliGRAM(s) Oral daily    immunologic:    OTHER:  acetaminophen   Tablet .. 650 milliGRAM(s) Oral every 6 hours PRN  albuterol/ipratropium for Nebulization 3 milliLiter(s) Nebulizer every 6 hours PRN  benzonatate 100 milliGRAM(s) Oral three times a day PRN  ibrutinib 420 milliGRAM(s) Oral daily  potassium acid phosphate/sodium acid phosphate tablet (K-PHOS No. 2) 1 Tablet(s) Oral four times a day with meals  predniSONE   Tablet 10 milliGRAM(s) Oral daily      Objective:  Last 24-Vital Signs Last 24 Hrs  T(C): 36.5 (23 Feb 2020 07:36), Max: 36.8 (22 Feb 2020 19:53)  T(F): 97.7 (23 Feb 2020 07:36), Max: 98.3 (22 Feb 2020 19:53)  HR: 85 (23 Feb 2020 07:36) (85 - 93)  BP: 130/72 (23 Feb 2020 07:36) (123/63 - 145/75)  BP(mean): --  RR: 17 (23 Feb 2020 07:36) (16 - 18)  SpO2: 98% (23 Feb 2020 07:36) (94% - 98%)    T(C): 36.5 (02-23-20 @ 07:36), Max: 38.1 (02-21-20 @ 20:14)  T(F): 97.7 (02-23-20 @ 07:36), Max: 100.6 (02-21-20 @ 20:14)  T(C): 36.5 (02-23-20 @ 07:36), Max: 38.1 (02-21-20 @ 20:14)  T(F): 97.7 (02-23-20 @ 07:36), Max: 100.6 (02-21-20 @ 20:14)  T(C): 36.5 (02-23-20 @ 07:36), Max: 38.1 (02-21-20 @ 20:14)  T(F): 97.7 (02-23-20 @ 07:36), Max: 100.6 (02-21-20 @ 20:14)    PHYSICAL EXAM:  Constitutional: Well-developed, well nourished  Eyes: PERRLA, EOMI  Ear/Nose/Throat: oropharynx normal	  Neck: no JVD, no lymphadenopathy, supple  Respiratory: no accessory muscle use, lungs with decreased BS in RLL  Cardiovascular: RRR, normal S1, S2 no m/r/g  Gastrointestinal: soft, NT, no HSM, BS-normal  Extremities: no clubbing, no cyanosis, edema absent  Neuro: patient alert, oriented and appropriate  Skin: no sig lesions      LABS:                        6.7    117.59 )-----------( 16       ( 23 Feb 2020 07:06 )             21.0   Auto Neutrophil #: 3.68 K/uL (02.22.20 @ 06:06)    Auto Eosinophil #: 0.17 K/uL (02.22.20 @ 06:06)        .59  02-23 @ 07:06  WBC 78.39  02-22 @ 06:06  .14  02-21 @ 20:29      02-23    138  |  109<H>  |  23  ----------------------------<  144<H>  3.2<L>   |  20<L>  |  1.20    Ca    7.9<L>      23 Feb 2020 07:06  Phos  1.6     02-23  Mg     2.4     02-23    TPro  5.1<L>  /  Alb  2.2<L>  /  TBili  3.8<H>  /  DBili  x   /  AST  33  /  ALT  21  /  AlkPhos  137<H>  02-23      Creatinine, Serum: 1.20 mg/dL (02-23-20 @ 07:06)  Creatinine, Serum: 1.50 mg/dL (02-22-20 @ 06:06)  Creatinine, Serum: 1.50 mg/dL (02-21-20 @ 20:29)      MICROBIOLOGY:        RADIOLOGY & ADDITIONAL STUDIES:

## 2020-02-23 NOTE — PROGRESS NOTE ADULT - ASSESSMENT
74yo M with PMH of refractory CLL, AIHA, chronic hepatitis B (on entecavir), KIRTI on CPAP, T2DM (managed with lifestyle changes), HTN (not on any medication) who presents with chief complaint of fever, cough and generalized weakness, admitted with suspected gram-negative multifocal pneumonia, severe anemia and severe thrombocytopenia.

## 2020-02-23 NOTE — PROGRESS NOTE ADULT - PROBLEM SELECTOR PLAN 1
- suspected gram-negative PNA   - in setting of post-influenza bacterial pneumonia, ruled out staph with nares swab which was negative for MRSA/MSSA.  - Continue Cefepime and Azithromycin  - Check urine legionella Ag and strep pneumo Ag.   - CT Chest showing multilobar patchy peribronchovascular airspace opacities and more dense consolidative process in the right middle lobe likely reflecting multifocal pneumonia, trace b/l pleural effusions  - fever resolving, tylenol PRN for fever  - duonebs PRN for wheezing  - RVP negative  - WBC very labile and difficult to gauge utility  - ID Dr. Webber consulted, recs appreciated

## 2020-02-23 NOTE — PROGRESS NOTE ADULT - ASSESSMENT
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

## 2020-02-23 NOTE — PROGRESS NOTE ADULT - ASSESSMENT
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  continue abx as per ID  2.  to follow CBC and transfuse PRBC and platelets as indicated. to transfuse 1 unit PRBC and hold platelets today  3.  to check Ig levels (were adequate 3/2019)  4.  to evaluate for additional therapy for CLL after clinical improvement (?Rituxan and venetoclax)  5.  further hem recommendations pending above  discussed with patient, Dr. Lemons and Akbar Refractory CLL with 17p deletion,  11q-  anddeletion of chr 12 and 13 now admitted with RML pneumonia  Elevation of bilirubin is not related to hemolysis with fractionation showing predominant direct bilirubin    Recommendations:  1.  continue abx as per ID  2.  to follow CBC and transfuse PRBC and platelets as indicated. to transfuse 1 unit PRBC and hold platelets today  3.  to check Ig levels (were adequate 3/2019)  4.  to evaluate for additional therapy for CLL after clinical improvement (?Rituxan and venetoclax)  5.  to continue ibrutinib  6.  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  further hem recommendations pending above  discussed with patient, Dr. Lemons and Akbar

## 2020-02-23 NOTE — PROGRESS NOTE ADULT - PROBLEM SELECTOR PLAN 6
Pt states that lower ext edema occurs while on prednisone  - Check venous doppler - negative  - ECHO 2/2018 showed Preserved left ventricular systolic function. Stage I   diastolic dysfunction. EF 65% -- will repeat TTE  - Caution with IVF  - Strict I &O's  - daily weights  - SCDs

## 2020-02-24 LAB
ALBUMIN SERPL ELPH-MCNC: 2.4 G/DL — LOW (ref 3.3–5)
ALP SERPL-CCNC: 154 U/L — HIGH (ref 40–120)
ALT FLD-CCNC: 21 U/L — SIGNIFICANT CHANGE UP (ref 12–78)
ANION GAP SERPL CALC-SCNC: 11 MMOL/L — SIGNIFICANT CHANGE UP (ref 5–17)
AST SERPL-CCNC: 28 U/L — SIGNIFICANT CHANGE UP (ref 15–37)
BASOPHILS # BLD AUTO: 0.02 K/UL — SIGNIFICANT CHANGE UP (ref 0–0.2)
BASOPHILS NFR BLD AUTO: 0 % — SIGNIFICANT CHANGE UP (ref 0–2)
BILIRUB SERPL-MCNC: 3.6 MG/DL — HIGH (ref 0.2–1.2)
BUN SERPL-MCNC: 26 MG/DL — HIGH (ref 7–23)
CALCIUM SERPL-MCNC: 8 MG/DL — LOW (ref 8.5–10.1)
CHLORIDE SERPL-SCNC: 110 MMOL/L — HIGH (ref 96–108)
CO2 SERPL-SCNC: 16 MMOL/L — LOW (ref 22–31)
CREAT SERPL-MCNC: 1.1 MG/DL — SIGNIFICANT CHANGE UP (ref 0.5–1.3)
CULTURE RESULTS: SIGNIFICANT CHANGE UP
EOSINOPHIL # BLD AUTO: 0.09 K/UL — SIGNIFICANT CHANGE UP (ref 0–0.5)
EOSINOPHIL NFR BLD AUTO: 0.1 % — SIGNIFICANT CHANGE UP (ref 0–6)
GLUCOSE SERPL-MCNC: 218 MG/DL — HIGH (ref 70–99)
HCT VFR BLD CALC: 25.6 % — LOW (ref 39–50)
HGB BLD-MCNC: 8.3 G/DL — LOW (ref 13–17)
IGA FLD-MCNC: 40 MG/DL — LOW (ref 84–499)
IGG FLD-MCNC: 408 MG/DL — LOW (ref 610–1660)
IGM SERPL-MCNC: 20 MG/DL — LOW (ref 35–242)
IMM GRANULOCYTES NFR BLD AUTO: 0.6 % — SIGNIFICANT CHANGE UP (ref 0–1.5)
KAPPA LC SER QL IFE: 10.51 MG/DL — HIGH (ref 0.33–1.94)
KAPPA/LAMBDA FREE LIGHT CHAIN RATIO, SERUM: 11.81 RATIO — HIGH (ref 0.26–1.65)
LAMBDA LC SER QL IFE: 0.89 MG/DL — SIGNIFICANT CHANGE UP (ref 0.57–2.63)
LYMPHOCYTES # BLD AUTO: 119.73 K/UL — HIGH (ref 1–3.3)
LYMPHOCYTES # BLD AUTO: 79.2 % — HIGH (ref 13–44)
MAGNESIUM SERPL-MCNC: 2.6 MG/DL — SIGNIFICANT CHANGE UP (ref 1.6–2.6)
MCHC RBC-ENTMCNC: 32 PG — SIGNIFICANT CHANGE UP (ref 27–34)
MCHC RBC-ENTMCNC: 32.4 GM/DL — SIGNIFICANT CHANGE UP (ref 32–36)
MCV RBC AUTO: 98.8 FL — SIGNIFICANT CHANGE UP (ref 80–100)
MONOCYTES # BLD AUTO: 25.31 K/UL — HIGH (ref 0–0.9)
MONOCYTES NFR BLD AUTO: 16.7 % — HIGH (ref 2–14)
NEUTROPHILS # BLD AUTO: 5.15 K/UL — SIGNIFICANT CHANGE UP (ref 1.8–7.4)
NEUTROPHILS NFR BLD AUTO: 3.4 % — LOW (ref 43–77)
NRBC # BLD: 0 /100 WBCS — SIGNIFICANT CHANGE UP (ref 0–0)
PHOSPHATE SERPL-MCNC: 2.4 MG/DL — LOW (ref 2.5–4.5)
PLATELET # BLD AUTO: 11 K/UL — CRITICAL LOW (ref 150–400)
POTASSIUM SERPL-MCNC: 4 MMOL/L — SIGNIFICANT CHANGE UP (ref 3.5–5.3)
POTASSIUM SERPL-SCNC: 4 MMOL/L — SIGNIFICANT CHANGE UP (ref 3.5–5.3)
PROT SERPL-MCNC: 5.6 G/DL — LOW (ref 6–8.3)
RBC # BLD: 2.59 M/UL — LOW (ref 4.2–5.8)
RBC # FLD: 22.7 % — HIGH (ref 10.3–14.5)
S PNEUM AG UR QL: NEGATIVE — SIGNIFICANT CHANGE UP
SODIUM SERPL-SCNC: 137 MMOL/L — SIGNIFICANT CHANGE UP (ref 135–145)
SPECIMEN SOURCE: SIGNIFICANT CHANGE UP
WBC # BLD: 151.16 K/UL — CRITICAL HIGH (ref 3.8–10.5)
WBC # FLD AUTO: 151.16 K/UL — CRITICAL HIGH (ref 3.8–10.5)

## 2020-02-24 PROCEDURE — 93306 TTE W/DOPPLER COMPLETE: CPT | Mod: 26

## 2020-02-24 PROCEDURE — 99233 SBSQ HOSP IP/OBS HIGH 50: CPT

## 2020-02-24 RX ORDER — ACETAMINOPHEN 500 MG
650 TABLET ORAL EVERY 6 HOURS
Refills: 0 | Status: DISCONTINUED | OUTPATIENT
Start: 2020-02-24 | End: 2020-04-04

## 2020-02-24 RX ORDER — CEFTRIAXONE 500 MG/1
1000 INJECTION, POWDER, FOR SOLUTION INTRAMUSCULAR; INTRAVENOUS EVERY 24 HOURS
Refills: 0 | Status: COMPLETED | OUTPATIENT
Start: 2020-02-24 | End: 2020-02-26

## 2020-02-24 RX ADMIN — Medication 100 MILLIGRAM(S): at 23:04

## 2020-02-24 RX ADMIN — ENTECAVIR 0.5 MILLIGRAM(S): 0.5 TABLET ORAL at 05:31

## 2020-02-24 RX ADMIN — AZITHROMYCIN 255 MILLIGRAM(S): 500 TABLET, FILM COATED ORAL at 01:00

## 2020-02-24 RX ADMIN — CEFTRIAXONE 100 MILLIGRAM(S): 500 INJECTION, POWDER, FOR SOLUTION INTRAMUSCULAR; INTRAVENOUS at 12:05

## 2020-02-24 RX ADMIN — CEFEPIME 100 MILLIGRAM(S): 1 INJECTION, POWDER, FOR SOLUTION INTRAMUSCULAR; INTRAVENOUS at 05:30

## 2020-02-24 RX ADMIN — Medication 100 MILLIGRAM(S): at 05:30

## 2020-02-24 RX ADMIN — PANTOPRAZOLE SODIUM 40 MILLIGRAM(S): 20 TABLET, DELAYED RELEASE ORAL at 12:05

## 2020-02-24 RX ADMIN — IBRUTINIB 420 MILLIGRAM(S): 140 TABLET, FILM COATED ORAL at 17:32

## 2020-02-24 RX ADMIN — Medication 40 MILLIGRAM(S): at 05:30

## 2020-02-24 NOTE — PROGRESS NOTE ADULT - PROBLEM SELECTOR PLAN 3
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 On prednisone 10mg daily -- heme rec to increase to prednisone 40mg po daily.  - States that baseline hgb ~14  - Trend H/H. Keep Hgb >7  - Monitor CBC

## 2020-02-24 NOTE — PROGRESS NOTE ADULT - ASSESSMENT
76 yo male with Refractory CLL with 17p deletion,  11q-  and deletion of chr 12 and 13; has been progressing on ibrutinib and in early February 2020 admitted for planned hydration/Venetoclax but unfortunately found to have Flu and treated with Tamiflu; patient again developed worsening cough and admitted on 2/22/20 with respiratory symptoms and found to have RML pneumonia;      - Elevation of bilirubin is not related to hemolysis with fractionation showing predominant direct bilirubin; suggest GI eval  - antibiotics as per ID  - awaiting Immunoglobulin levels and would consider IVIG if hypogammaglobulinemia  - for now continue ibrutinib  - continue prednisone 40mg po QD  - follow CBC daily  - hold platelet and blood transfusion today    discussed with patient and Dr. Lemons

## 2020-02-24 NOTE — PROGRESS NOTE ADULT - PROBLEM SELECTOR PLAN 1
pt doing well will de-escalate to ceftriaxone and potentially finish course with oral Rx if pt continues to do well

## 2020-02-24 NOTE — PROGRESS NOTE ADULT - PROBLEM SELECTOR PLAN 10
DVT PPx: SCDs, no chemical ppx in the setting of symptomatic anemia and thrombocytopenia    11. HTN- not on meds  12. T2DM- stable with lifestyle modifications. Hyperglycemia likely due to steroid use.

## 2020-02-24 NOTE — PROGRESS NOTE ADULT - PROBLEM SELECTOR PLAN 5
-unclear why pt's direct bilirubin is the majority fraction   -t bili stable in the 3.5-4 range   -pt has no symptoms or exam findings consistent with obstructed CVDk -unclear why pt's direct bilirubin is the majority fraction   -t bili stable in the 3.5-4 range   -pt has no symptoms or exam findings consistent with obstructed CBD  -RUQ US showing GB with stones but no obstructing stones, and normal caliber CBD  -given hep B hx, GI recs MRCP with and without contrast

## 2020-02-24 NOTE — PROGRESS NOTE ADULT - PROBLEM SELECTOR PLAN 2
History of Small Cell B-Cell Lymphoma/leukemia, relapsed  - Worsening anemia and thrombocytopenia since discharge ~1 week ago likely in part due to lymphoma/leukemia  - c/w imbruvica as per heme  - Heme Dr. Stephenson group following, recs appreciated

## 2020-02-24 NOTE — PROGRESS NOTE ADULT - PROBLEM SELECTOR PLAN 4
Per chart review, platelet count steadily declining since 2018  - Plt 16 today (required 1unit of platelets on 02/22 yesterday as pt was below 10,000. No platelets today adwdcs per heme  - likely related to the pt's leukemia, but may also in part be related to AIHA  - Pt was empirically taking B12 and folate. Continue folate 1mg daily for now. Per patient, he stopped taking b12 when he finished taking tamiflu. Per chart review, platelet count steadily declining since 2018  - Plt 16 today (required 1unit of platelets on 02/22 as pt was below 10,000. No platelets today per heme  - likely related to the pt's leukemia, but may also in part be related to AIHA  - Pt was empirically taking B12 and folate. Continue folate 1mg daily for now. Per patient, he stopped taking b12 when he finished taking tamiflu.

## 2020-02-24 NOTE — PROGRESS NOTE ADULT - SUBJECTIVE AND OBJECTIVE BOX
Patient seen and examined;  Chart reviewed and events noted;   continues to cough      MEDICATIONS  (STANDING):  cefTRIAXone   IVPB 1000 milliGRAM(s) IV Intermittent every 24 hours  entecavir 0.5 milliGRAM(s) Oral daily  ibrutinib 420 milliGRAM(s) Oral daily  pantoprazole  Injectable 40 milliGRAM(s) IV Push daily  predniSONE   Tablet 40 milliGRAM(s) Oral daily    MEDICATIONS  (PRN):  acetaminophen   Tablet .. 650 milliGRAM(s) Oral every 6 hours PRN Temp greater or equal to 38C (100.4F), Mild Pain (1 - 3)  albuterol/ipratropium for Nebulization 3 milliLiter(s) Nebulizer every 6 hours PRN Shortness of Breath and/or Wheezing  benzonatate 100 milliGRAM(s) Oral three times a day PRN Cough      Vital Signs Last 24 Hrs  T(C): 36.3 (24 Feb 2020 08:00), Max: 36.8 (23 Feb 2020 11:54)  T(F): 97.3 (24 Feb 2020 08:00), Max: 98.2 (23 Feb 2020 11:54)  HR: 70 (24 Feb 2020 08:00) (70 - 87)  BP: 132/73 (24 Feb 2020 08:00) (123/70 - 142/78)  RR: 17 (24 Feb 2020 08:00) (16 - 18)  SpO2: 97% (24 Feb 2020 08:00) (96% - 98%)    PHYSICAL EXAM  General: adult in NAD  HEENT: clear oropharynx, anicteric sclera, pink conjunctivae  Neck: supple  CV: normal S1S2 with no murmur rubs or gallops  Lungs: course breath sounds throughtou  Abdomen: soft non-tender non-distended, no hepato/splenomegaly  Ext: no clubbing cyanosis or edema  Skin: circular rash on back healing; + papular rash on bilateral flanks  Neuro: alert and oriented X3 no focal deficits      LABS:                        8.3    151.16 )-----------( 11       ( 24 Feb 2020 06:40 )             25.6     Hemoglobin: 8.3 g/dL (02-24 @ 06:40)  Hemoglobin: 6.7 g/dL (02-23 @ 07:06)  Hemoglobin: 7.6 g/dL (02-22 @ 13:36)  Hemoglobin: 7.4 g/dL (02-22 @ 06:06)  Hemoglobin: 6.8 g/dL (02-21 @ 20:29)    Platelet Count - Automated: 11 K/uL (02-24 @ 06:40)  Platelet Count - Automated: 16 K/uL (02-23 @ 07:06)  Platelet Count - Automated: 6 K/uL (02-22 @ 06:06)  Platelet Count - Automated: 11 K/uL (02-21 @ 20:29)    WBC Count: 151.16 K/uL (02-24 @ 06:40)  WBC Count: 117.59 K/uL (02-23 @ 07:06)  WBC Count: 78.39 K/uL (02-22 @ 06:06)  WBC Count: 123.14 K/uL (02-21 @ 20:29)    02-24    137  |  110<H>  |  26<H>  ----------------------------<  218<H>  4.0   |  16<L>  |  1.10    Ca    8.0<L>      24 Feb 2020 06:39  Phos  2.4     02-24  Mg     2.6     02-24    TPro  5.6<L>  /  Alb  2.4<L>  /  TBili  3.6<H>  /  DBili 2.6  /  AST  28  /  ALT  21  /  AlkPhos  154<H>  02-24    Immunoglobulin level pending

## 2020-02-24 NOTE — PROGRESS NOTE ADULT - SUBJECTIVE AND OBJECTIVE BOX
Patient is a 75y old  Male who presents with a chief complaint of fever, weakness, and cough.    INTERVAL HPI/OVERNIGHT EVENTS: Pt states his weakness is improving. Reports cough is starting to improve. Denies fever, chills, SOB, CP, palpitations, headache, abd pain.      MEDICATIONS  (STANDING):  cefTRIAXone   IVPB 1000 milliGRAM(s) IV Intermittent every 24 hours  entecavir 0.5 milliGRAM(s) Oral daily  ibrutinib 420 milliGRAM(s) Oral daily  pantoprazole  Injectable 40 milliGRAM(s) IV Push daily  predniSONE   Tablet 40 milliGRAM(s) Oral daily    MEDICATIONS  (PRN):  acetaminophen   Tablet .. 650 milliGRAM(s) Oral every 6 hours PRN Mild Pain (1 - 3)  acetaminophen   Tablet .. 650 milliGRAM(s) Oral every 6 hours PRN Temp greater or equal to 38C (100.4F)  albuterol/ipratropium for Nebulization 3 milliLiter(s) Nebulizer every 6 hours PRN Shortness of Breath and/or Wheezing  benzonatate 100 milliGRAM(s) Oral three times a day PRN Cough      Allergies    sulfa drugs (Unknown)    Intolerances        REVIEW OF SYSTEMS:  CONSTITUTIONAL: +generalized weakness (improving); No fever or chills  HEENT:  No headache, no sore throat  RESPIRATORY: +cough (improving), denies shortness of breath  CARDIOVASCULAR: No chest pain, palpitations  GASTROINTESTINAL: No melena, hematochezia, abd pain, nausea, vomiting, or diarrhea  GENITOURINARY: No dysuria, frequency, or hematuria  NEUROLOGICAL: no focal weakness or dizziness  MUSCULOSKELETAL: no myalgias     Vital Signs Last 24 Hrs  T(C): 36.4 (24 Feb 2020 19:12), Max: 36.7 (23 Feb 2020 23:11)  T(F): 97.5 (24 Feb 2020 19:12), Max: 98 (23 Feb 2020 23:11)  HR: 72 (24 Feb 2020 19:12) (69 - 87)  BP: 126/76 (24 Feb 2020 19:12) (124/73 - 153/82)  BP(mean): --  RR: 18 (24 Feb 2020 19:12) (16 - 18)  SpO2: 97% (24 Feb 2020 19:12) (96% - 100%)    PHYSICAL EXAM:  GENERAL: NAD  HEENT:  moist mucous membranes, EOMI  CHEST/LUNG:  decreased breath sounds in right lower lung field, coarse breath sounds in right lateral side of chest  HEART:  RRR, S1, S2  ABDOMEN:  BS+, soft, nontender, nondistended  EXTREMITIES: 3+ edema in LEs b/l, No cyanosis or calf tenderness  NERVOUS SYSTEM: answers questions and follows commands appropriately    LABS:                        8.3    151.16 )-----------( 11       ( 24 Feb 2020 06:40 )             25.6     CBC Full  -  ( 24 Feb 2020 06:40 )  WBC Count : 151.16 K/uL  Hemoglobin : 8.3 g/dL  Hematocrit : 25.6 %  Platelet Count - Automated : 11 K/uL  Mean Cell Volume : 98.8 fl  Mean Cell Hemoglobin : 32.0 pg  Mean Cell Hemoglobin Concentration : 32.4 gm/dL  Auto Neutrophil # : 5.15 K/uL  Auto Lymphocyte # : 119.73 K/uL  Auto Monocyte # : 25.31 K/uL  Auto Eosinophil # : 0.09 K/uL  Auto Basophil # : 0.02 K/uL  Auto Neutrophil % : 3.4 %  Auto Lymphocyte % : 79.2 %  Auto Monocyte % : 16.7 %  Auto Eosinophil % : 0.1 %  Auto Basophil % : 0.0 %    24 Feb 2020 06:39    137    |  110    |  26     ----------------------------<  218    4.0     |  16     |  1.10     Ca    8.0        24 Feb 2020 06:39  Phos  2.4       24 Feb 2020 06:39  Mg     2.6       24 Feb 2020 06:39    TPro  5.6    /  Alb  2.4    /  TBili  3.6    /  DBili  x      /  AST  28     /  ALT  21     /  AlkPhos  154    24 Feb 2020 06:39        CAPILLARY BLOOD GLUCOSE            Culture - Sputum (collected 02-22-20 @ 20:36)  Source: .Sputum Sputum  Gram Stain (02-22-20 @ 22:48):    Rare polymorphonuclear leukocytes per low power field    Rare Squamous epithelial cells per low power field    Rare Gram Negative Rods per oil power field  Final Report (02-24-20 @ 17:40):    Normal Respiratory Criss present    Culture - Blood (collected 02-22-20 @ 01:08)  Source: .Blood Blood  Preliminary Report (02-23-20 @ 02:02):    No growth to date.    Culture - Blood (collected 02-22-20 @ 01:08)  Source: .Blood Blood  Preliminary Report (02-23-20 @ 02:02):    No growth to date.        RADIOLOGY & ADDITIONAL TESTS:    Personally reviewed.     Consultant(s) Notes Reviewed:  [x] YES  [ ] NO

## 2020-02-24 NOTE — CONSULT NOTE ADULT - ASSESSMENT
hepatitis B   elevated lfts   CLL  pneumonia    patient with elevated bilirubin and alkphos at baseline ? leon  suspect worsening LFTS 2/2 ceftriaxone  u/s reviewed; he does have gallstones  given history of hepatitis B would suggest MRI with IV contrast r/o hepatoma  consider HBV DNA   will follow with you

## 2020-02-24 NOTE — PROGRESS NOTE ADULT - PROBLEM SELECTOR PLAN 1
- suspected gram-negative PNA   - in setting of post-influenza bacterial pneumonia, ruled out staph with nares swab which was negative for MRSA/MSSA.  - Cefepime and Azithromycin de-escalated to rocephin today per ID recs  - Check urine legionella Ag and strep pneumo Ag.   - CT Chest showing multilobar patchy peribronchovascular airspace opacities and more dense consolidative process in the right middle lobe likely reflecting multifocal pneumonia, trace b/l pleural effusions  - fever resolving, tylenol PRN for fever  - duonebs PRN for wheezing  - RVP negative  - WBC very labile and difficult to gauge utility  - ID Dr. Webber consulted, recs appreciated

## 2020-02-24 NOTE — CONSULT NOTE ADULT - SUBJECTIVE AND OBJECTIVE BOX
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 Has been coughing as he was recently treated for influenza A on last admission. States that when he was discharged from [facility] on 2/15, he was feeling well and had no fevers. Patient developed a fever, T100.9F (oral) today for which he called heme/onc, Dr. Stephenson. Per Dr. Stuart, patient advised to go to ER. He was found to have a fever, T100.6F (rectal) in ER. Denies recent travel or sick contacts. Denies headache, chest pain, sob, palpitations, abdominal pain, diarrhea, melena, hematochezia, dysuria or hematuria. found to have pneumonia and influenza     Allergies:  sulfa drugs (Unknown)      Medications:  acetaminophen   Tablet .. 650 milliGRAM(s) Oral every 6 hours PRN  acetaminophen   Tablet .. 650 milliGRAM(s) Oral every 6 hours PRN  albuterol/ipratropium for Nebulization 3 milliLiter(s) Nebulizer every 6 hours PRN  benzonatate 100 milliGRAM(s) Oral three times a day PRN  cefTRIAXone   IVPB 1000 milliGRAM(s) IV Intermittent every 24 hours  entecavir 0.5 milliGRAM(s) Oral daily  ibrutinib 420 milliGRAM(s) Oral daily  pantoprazole  Injectable 40 milliGRAM(s) IV Push daily  predniSONE   Tablet 40 milliGRAM(s) Oral daily      PMHX/PSHX:  KIRTI (obstructive sleep apnea)  Hypertension  Diabetes  Hepatitis B  Lymphoma  AIHA (autoimmune hemolytic anemia)  Leukemia  Diabetes  HTN (hypertension)  H/O lithotripsy  No significant past surgical history      Family history:  Family history of hypertension in mother  Family history of diabetes mellitus  Family history of hypertension  Family history of lung cancer  No pertinent family history in first degree relatives      Social History:     ROS:     General:  No wt loss, fevers, chills, night sweats, fatigue,   Eyes:  Good vision, no reported pain  ENT:  No sore throat, pain, runny nose, dysphagia  CV:  No pain, palpitations, hypo/hypertension  Resp:  No dyspnea, cough, tachypnea, wheezing  GI:  No pain, No nausea, No vomiting, No diarrhea, No constipation, No weight loss, No fever, No pruritis, No rectal bleeding, No tarry stools, No dysphagia,  :  No pain, bleeding, incontinence, nocturia  Muscle:  No pain, weakness  Neuro:  No weakness, tingling, memory problems  Psych:  No fatigue, insomnia, mood problems, depression  Endocrine:  No polyuria, polydipsia, cold/heat intolerance  Heme:  No petechiae, ecchymosis, easy bruisability  Skin:  No rash, tattoos, scars, edema      PHYSICAL EXAM:   Vital Signs:  Vital Signs Last 24 Hrs  T(C): 36.3 (24 Feb 2020 16:06), Max: 36.7 (23 Feb 2020 19:12)  T(F): 97.4 (24 Feb 2020 16:06), Max: 98.1 (23 Feb 2020 19:12)  HR: 69 (24 Feb 2020 16:06) (69 - 87)  BP: 153/82 (24 Feb 2020 16:06) (124/73 - 153/82)  BP(mean): --  RR: 17 (24 Feb 2020 16:06) (16 - 18)  SpO2: 99% (24 Feb 2020 16:06) (96% - 100%)  Daily     Daily     GENERAL:  Appears stated age, well-groomed, well-nourished, no distress  HEENT:  NC/AT,  conjunctivae clear and pink, no thyromegaly, nodules, adenopathy, no JVD, sclera -anicteric  CHEST:  Full & symmetric excursion, no increased effort, breath sounds clear  HEART:  Regular rhythm, S1, S2, no murmur/rub/S3/S4, no abdominal bruit, no edema  ABDOMEN:  Soft, non-tender, non-distended, normoactive bowel sounds,  no masses ,no hepato-splenomegaly, no signs of chronic liver disease  EXTEREMITIES:  no cyanosis,clubbing or edema  SKIN:  No rash/erythema/ecchymoses/petechiae/wounds/abscess/warm/dry  NEURO:  Alert, oriented, no asterixis, no tremor, no encephalopathy    LABS:                        8.3    151.16 )-----------( 11       ( 24 Feb 2020 06:40 )             25.6     02-24    137  |  110<H>  |  26<H>  ----------------------------<  218<H>  4.0   |  16<L>  |  1.10    Ca    8.0<L>      24 Feb 2020 06:39  Phos  2.4     02-24  Mg     2.6     02-24    TPro  5.6<L>  /  Alb  2.4<L>  /  TBili  3.6<H>  /  DBili  x   /  AST  28  /  ALT  21  /  AlkPhos  154<H>  02-24    LIVER FUNCTIONS - ( 24 Feb 2020 06:39 )  Alb: 2.4 g/dL / Pro: 5.6 g/dL / ALK PHOS: 154 U/L / ALT: 21 U/L / AST: 28 U/L / GGT: x                   Imaging:

## 2020-02-24 NOTE — PROGRESS NOTE ADULT - SUBJECTIVE AND OBJECTIVE BOX
infectious diseases progress note:    DIONICIO SULLIVAN is a 75y y. o. Male patient    Patient reports:    ROS:    EYES:  Negative  blurry vision or double vision  GASTROINTESTINAL:  Negative for nausea, vomiting, diarrhea  -otherwise negative except for subjective    Allergies    sulfa drugs (Unknown)    Intolerances        ANTIBIOTICS/RELEVANT:  antimicrobials  cefTRIAXone   IVPB 1000 milliGRAM(s) IV Intermittent every 24 hours  entecavir 0.5 milliGRAM(s) Oral daily    immunologic:    OTHER:  acetaminophen   Tablet .. 650 milliGRAM(s) Oral every 6 hours PRN  albuterol/ipratropium for Nebulization 3 milliLiter(s) Nebulizer every 6 hours PRN  benzonatate 100 milliGRAM(s) Oral three times a day PRN  ibrutinib 420 milliGRAM(s) Oral daily  pantoprazole  Injectable 40 milliGRAM(s) IV Push daily  predniSONE   Tablet 40 milliGRAM(s) Oral daily      Objective:  Last 24-Vital Signs Last 24 Hrs  T(C): 36.3 (24 Feb 2020 08:00), Max: 36.8 (23 Feb 2020 11:54)  T(F): 97.3 (24 Feb 2020 08:00), Max: 98.2 (23 Feb 2020 11:54)  HR: 70 (24 Feb 2020 08:00) (70 - 87)  BP: 132/73 (24 Feb 2020 08:00) (123/70 - 142/78)  BP(mean): --  RR: 17 (24 Feb 2020 08:00) (16 - 18)  SpO2: 97% (24 Feb 2020 08:00) (96% - 98%)    T(C): 36.3 (02-24-20 @ 08:00), Max: 36.8 (02-22-20 @ 19:53)  T(F): 97.3 (02-24-20 @ 08:00), Max: 98.3 (02-22-20 @ 19:53)  T(C): 36.3 (02-24-20 @ 08:00), Max: 38.1 (02-21-20 @ 20:14)  T(F): 97.3 (02-24-20 @ 08:00), Max: 100.6 (02-21-20 @ 20:14)  T(C): 36.3 (02-24-20 @ 08:00), Max: 38.1 (02-21-20 @ 20:14)  T(F): 97.3 (02-24-20 @ 08:00), Max: 100.6 (02-21-20 @ 20:14)    PHYSICAL EXAM:  Constitutional: Well-developed, well nourished  Eyes: PERRLA, EOMI  Ear/Nose/Throat: oropharynx normal	  Neck: no JVD, no lymphadenopathy, supple  Respiratory: no accessory muscle use, lung fields bilaterally diffuse scattered crackles  Cardiovascular: RRR, normal S1, S2 no m/r/g  Gastrointestinal: soft, NT, no HSM, BS-normal  Extremities: no clubbing, no cyanosis, edema absent  Neuro: patient alert, oriented and appropriate  Skin: no sig lesions      LABS:                        8.3    151.16 )-----------( 11       ( 24 Feb 2020 06:40 )             25.6     Auto Neutrophil #: 5.15 K/uL (02.24.20 @ 06:40)      .16  02-24 @ 06:40  .59  02-23 @ 07:06  WBC 78.39  02-22 @ 06:06  .14  02-21 @ 20:29      02-24    137  |  110<H>  |  26<H>  ----------------------------<  218<H>  4.0   |  16<L>  |  1.10    Ca    8.0<L>      24 Feb 2020 06:39  Phos  2.4     02-24  Mg     2.6     02-24    TPro  5.6<L>  /  Alb  2.4<L>  /  TBili  3.6<H>  /  DBili  x   /  AST  28  /  ALT  21  /  AlkPhos  154<H>  02-24      Creatinine, Serum: 1.10 mg/dL (02-24-20 @ 06:39)  Creatinine, Serum: 1.20 mg/dL (02-23-20 @ 07:06)  Creatinine, Serum: 1.50 mg/dL (02-22-20 @ 06:06)  Creatinine, Serum: 1.50 mg/dL (02-21-20 @ 20:29)    MICROBIOLOGY:    Legionella Antigen, Urine: Negative (02.22.20 @ 23:12)    Culture - Sputum . (02.22.20 @ 20:36)    Gram Stain:   Rare polymorphonuclear leukocytes per low power field  Rare Squamous epithelial cells per low power field  Rare Gram Negative Rods per oil power field    Specimen Source: .Sputum Sputum    Culture Results:   Normal Respiratory Criss present    MRSA/MSSA PCR (02.22.20 @ 19:59)    MRSA PCR Result.: NotDetec:     Staph Aureus PCR Result: NotDetec        RADIOLOGY & ADDITIONAL STUDIES:  < from: US Abdomen Limited (02.23.20 @ 16:45) >  EXAM:  US ABDOMEN LIMITED                            PROCEDURE DATE:  02/23/2020          INTERPRETATION:  CLINICAL INFORMATION: 75-year-old man with history of B-cell lymphoma, presents with fever    COMPARISON: CT abdomen/pelvis August 30, 2019    TECHNIQUE: Sonography of the right upper quadrant.     FINDINGS:    Liver: Within normal limits.    Bile ducts: Normal caliber. Common bile duct measures 4 mm.     Gallbladder: Numerous gallstones causing a wall echo shadow complex which limits evaluation of the posterior gallbladder. Grossly, no gallbladder wall thickening and no pericholecystic fluid collection. Negative sonographic Barahona's sign.    Pancreas: Not adequately visualized.    Right kidney: 9.8 cm. No hydronephrosis, stone or mass.    Ascites: None.    IVC: Visualized portions are within normal limits.    Other: Right pleural effusion.    IMPRESSION:     Multiple gallstones. No sonographic evidence of acute cholecystitis.

## 2020-02-25 LAB
ALBUMIN SERPL ELPH-MCNC: 2.5 G/DL — LOW (ref 3.3–5)
ALP SERPL-CCNC: 144 U/L — HIGH (ref 40–120)
ALT FLD-CCNC: 19 U/L — SIGNIFICANT CHANGE UP (ref 12–78)
ANION GAP SERPL CALC-SCNC: 8 MMOL/L — SIGNIFICANT CHANGE UP (ref 5–17)
AST SERPL-CCNC: 24 U/L — SIGNIFICANT CHANGE UP (ref 15–37)
BASOPHILS # BLD AUTO: 0 K/UL — SIGNIFICANT CHANGE UP (ref 0–0.2)
BASOPHILS NFR BLD AUTO: 0 % — SIGNIFICANT CHANGE UP (ref 0–2)
BILIRUB SERPL-MCNC: 2.4 MG/DL — HIGH (ref 0.2–1.2)
BUN SERPL-MCNC: 27 MG/DL — HIGH (ref 7–23)
CALCIUM SERPL-MCNC: 8.2 MG/DL — LOW (ref 8.5–10.1)
CHLORIDE SERPL-SCNC: 111 MMOL/L — HIGH (ref 96–108)
CO2 SERPL-SCNC: 21 MMOL/L — LOW (ref 22–31)
CREAT SERPL-MCNC: 1.1 MG/DL — SIGNIFICANT CHANGE UP (ref 0.5–1.3)
EOSINOPHIL # BLD AUTO: 1.72 K/UL — HIGH (ref 0–0.5)
EOSINOPHIL NFR BLD AUTO: 1 % — SIGNIFICANT CHANGE UP (ref 0–6)
GLUCOSE SERPL-MCNC: 142 MG/DL — HIGH (ref 70–99)
HCT VFR BLD CALC: 25 % — LOW (ref 39–50)
HGB BLD-MCNC: 7.7 G/DL — LOW (ref 13–17)
LYMPHOCYTES # BLD AUTO: 158.51 K/UL — HIGH (ref 1–3.3)
LYMPHOCYTES # BLD AUTO: 92 % — HIGH (ref 13–44)
MAGNESIUM SERPL-MCNC: 2.5 MG/DL — SIGNIFICANT CHANGE UP (ref 1.6–2.6)
MCHC RBC-ENTMCNC: 30.8 GM/DL — LOW (ref 32–36)
MCHC RBC-ENTMCNC: 31.3 PG — SIGNIFICANT CHANGE UP (ref 27–34)
MCV RBC AUTO: 101.6 FL — HIGH (ref 80–100)
MONOCYTES # BLD AUTO: 3.45 K/UL — HIGH (ref 0–0.9)
MONOCYTES NFR BLD AUTO: 2 % — SIGNIFICANT CHANGE UP (ref 2–14)
NEUTROPHILS # BLD AUTO: 6.89 K/UL — SIGNIFICANT CHANGE UP (ref 1.8–7.4)
NEUTROPHILS NFR BLD AUTO: 4 % — LOW (ref 43–77)
NRBC # BLD: SIGNIFICANT CHANGE UP /100 WBCS (ref 0–0)
PHOSPHATE SERPL-MCNC: 1.9 MG/DL — LOW (ref 2.5–4.5)
PLATELET # BLD AUTO: 9 K/UL — CRITICAL LOW (ref 150–400)
POTASSIUM SERPL-MCNC: 4 MMOL/L — SIGNIFICANT CHANGE UP (ref 3.5–5.3)
POTASSIUM SERPL-SCNC: 4 MMOL/L — SIGNIFICANT CHANGE UP (ref 3.5–5.3)
PROT SERPL-MCNC: 5.5 G/DL — LOW (ref 6–8.3)
RBC # BLD: 2.46 M/UL — LOW (ref 4.2–5.8)
RBC # FLD: 22.9 % — HIGH (ref 10.3–14.5)
SODIUM SERPL-SCNC: 140 MMOL/L — SIGNIFICANT CHANGE UP (ref 135–145)
WBC # BLD: 172.29 K/UL — CRITICAL HIGH (ref 3.8–10.5)
WBC # FLD AUTO: 172.29 K/UL — CRITICAL HIGH (ref 3.8–10.5)

## 2020-02-25 PROCEDURE — 74183 MRI ABD W/O CNTR FLWD CNTR: CPT | Mod: 26

## 2020-02-25 PROCEDURE — 99233 SBSQ HOSP IP/OBS HIGH 50: CPT

## 2020-02-25 RX ORDER — SODIUM,POTASSIUM PHOSPHATES 278-250MG
1 POWDER IN PACKET (EA) ORAL
Refills: 0 | Status: COMPLETED | OUTPATIENT
Start: 2020-02-25 | End: 2020-02-25

## 2020-02-25 RX ADMIN — Medication 1 TABLET(S): at 21:07

## 2020-02-25 RX ADMIN — Medication 40 MILLIGRAM(S): at 08:56

## 2020-02-25 RX ADMIN — Medication 1 TABLET(S): at 17:23

## 2020-02-25 RX ADMIN — Medication 1 TABLET(S): at 12:54

## 2020-02-25 RX ADMIN — ENTECAVIR 0.5 MILLIGRAM(S): 0.5 TABLET ORAL at 08:56

## 2020-02-25 RX ADMIN — IBRUTINIB 420 MILLIGRAM(S): 140 TABLET, FILM COATED ORAL at 17:22

## 2020-02-25 RX ADMIN — PANTOPRAZOLE SODIUM 40 MILLIGRAM(S): 20 TABLET, DELAYED RELEASE ORAL at 12:37

## 2020-02-25 RX ADMIN — CEFTRIAXONE 100 MILLIGRAM(S): 500 INJECTION, POWDER, FOR SOLUTION INTRAMUSCULAR; INTRAVENOUS at 12:37

## 2020-02-25 NOTE — PROGRESS NOTE ADULT - PROBLEM SELECTOR PLAN 2
History of Small Cell B-Cell Lymphoma/leukemia, relapsed  - Worsening anemia and thrombocytopenia since discharge ~1 week ago likely in part due to lymphoma/leukemia  - c/w imbruvica as per heme  - Heme Dr. Stephenson group following, recs appreciated History of Small Cell B-Cell Lymphoma/leukemia, relapsed  - Worsening anemia and thrombocytopenia since discharge ~1 week ago likely in part due to lymphoma/leukemia  - c/w imbruvica as per heme  - Heme Dr. Stephenson group following, recs appreciated -- considering starting chemo at tail end of this admission

## 2020-02-25 NOTE — PROGRESS NOTE ADULT - PROBLEM SELECTOR PLAN 3
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 Was on prednisone 10mg daily -- heme rec to increase to prednisone 40mg po daily.  - States that baseline hgb ~14  - Trend H/H. Keep Hgb >7  - Monitor CBC

## 2020-02-25 NOTE — PROGRESS NOTE ADULT - SUBJECTIVE AND OBJECTIVE BOX
Patient is a 75y old  Male who presents with a chief complaint of fever, weakness, and cough.    INTERVAL HPI/OVERNIGHT EVENTS: Pt states his weakness is improving. Reports cough is slowly improving. Denies fever, chills, SOB, CP, palpitations, headache, abd pain.    MEDICATIONS  (STANDING):  cefTRIAXone   IVPB 1000 milliGRAM(s) IV Intermittent every 24 hours  entecavir 0.5 milliGRAM(s) Oral daily  ibrutinib 420 milliGRAM(s) Oral daily  pantoprazole  Injectable 40 milliGRAM(s) IV Push daily  predniSONE   Tablet 40 milliGRAM(s) Oral daily    MEDICATIONS  (PRN):  acetaminophen   Tablet .. 650 milliGRAM(s) Oral every 6 hours PRN Mild Pain (1 - 3)  acetaminophen   Tablet .. 650 milliGRAM(s) Oral every 6 hours PRN Temp greater or equal to 38C (100.4F)  albuterol/ipratropium for Nebulization 3 milliLiter(s) Nebulizer every 6 hours PRN Shortness of Breath and/or Wheezing  benzonatate 100 milliGRAM(s) Oral three times a day PRN Cough      Allergies    sulfa drugs (Unknown)    Intolerances        REVIEW OF SYSTEMS:  CONSTITUTIONAL: +generalized weakness (improving); No fever or chills  HEENT:  No headache, no sore throat  RESPIRATORY: +cough (improving), denies shortness of breath  CARDIOVASCULAR: No chest pain, palpitations  GASTROINTESTINAL: No melena, hematochezia, abd pain, nausea, vomiting, or diarrhea  GENITOURINARY: No dysuria, frequency, or hematuria  NEUROLOGICAL: no focal weakness or dizziness  MUSCULOSKELETAL: no myalgias     Vital Signs Last 24 Hrs  T(C): 36.2 (25 Feb 2020 19:35), Max: 36.8 (25 Feb 2020 15:30)  T(F): 97.2 (25 Feb 2020 19:35), Max: 98.3 (25 Feb 2020 15:30)  HR: 96 (25 Feb 2020 19:35) (74 - 102)  BP: 127/79 (25 Feb 2020 19:35) (110/67 - 138/75)  BP(mean): --  RR: 18 (25 Feb 2020 19:35) (18 - 18)  SpO2: 94% (25 Feb 2020 19:35) (94% - 99%)    PHYSICAL EXAM:  GENERAL: NAD  HEENT:  moist mucous membranes, EOMI  CHEST/LUNG:  decreased breath sounds in right lower lung field, coarse breath sounds in right lateral side of chest  HEART:  RRR, S1, S2  ABDOMEN:  BS+, soft, nontender, nondistended  EXTREMITIES: 3+ edema in LEs b/l, No cyanosis or calf tenderness  NERVOUS SYSTEM: answers questions and follows commands appropriately    LABS:                        7.7    172.29 )-----------( 9        ( 25 Feb 2020 06:59 )             25.0     CBC Full  -  ( 25 Feb 2020 06:59 )  WBC Count : 172.29 K/uL  Hemoglobin : 7.7 g/dL  Hematocrit : 25.0 %  Platelet Count - Automated : 9 K/uL  Mean Cell Volume : 101.6 fl  Mean Cell Hemoglobin : 31.3 pg  Mean Cell Hemoglobin Concentration : 30.8 gm/dL  Auto Neutrophil # : 6.89 K/uL  Auto Lymphocyte # : 158.51 K/uL  Auto Monocyte # : 3.45 K/uL  Auto Eosinophil # : 1.72 K/uL  Auto Basophil # : 0.00 K/uL  Auto Neutrophil % : 4.0 %  Auto Lymphocyte % : 92.0 %  Auto Monocyte % : 2.0 %  Auto Eosinophil % : 1.0 %  Auto Basophil % : 0.0 %    25 Feb 2020 06:59    140    |  111    |  27     ----------------------------<  142    4.0     |  21     |  1.10     Ca    8.2        25 Feb 2020 06:59  Phos  1.9       25 Feb 2020 06:59  Mg     2.5       25 Feb 2020 06:59    TPro  5.5    /  Alb  2.5    /  TBili  2.4    /  DBili  x      /  AST  24     /  ALT  19     /  AlkPhos  144    25 Feb 2020 06:59        CAPILLARY BLOOD GLUCOSE            Culture - Sputum (collected 02-22-20 @ 20:36)  Source: .Sputum Sputum  Gram Stain (02-22-20 @ 22:48):    Rare polymorphonuclear leukocytes per low power field    Rare Squamous epithelial cells per low power field    Rare Gram Negative Rods per oil power field  Final Report (02-24-20 @ 17:40):    Normal Respiratory Criss present    Culture - Blood (collected 02-22-20 @ 01:08)  Source: .Blood Blood  Preliminary Report (02-23-20 @ 02:02):    No growth to date.    Culture - Blood (collected 02-22-20 @ 01:08)  Source: .Blood Blood  Preliminary Report (02-23-20 @ 02:02):    No growth to date.        RADIOLOGY & ADDITIONAL TESTS:    Personally reviewed.     Consultant(s) Notes Reviewed:  [x] YES  [ ] NO    d

## 2020-02-25 NOTE — PROGRESS NOTE ADULT - PROBLEM SELECTOR PLAN 1
challenging to follow wbc (neutrophils/eosinophils) with CLL   pt doing well clinically and afebrile recommend  continue on ceftriaxone and potentially finish course with oral Rx if pt continues to do well

## 2020-02-25 NOTE — PROGRESS NOTE ADULT - PROBLEM SELECTOR PLAN 5
-unclear why pt's direct bilirubin is the majority fraction   -t bili coming down today to 2.6  -pt has no symptoms or exam findings consistent with obstructed CBD  -RUQ US showing GB with stones but no obstructing stones, and normal caliber CBD  -given hep B hx, GI recs MRCP with and without contrast  -MRCP did not reveal obstruction

## 2020-02-25 NOTE — PROGRESS NOTE ADULT - PROBLEM SELECTOR PLAN 7
Pt states that lower ext edema occurs while on prednisone  - Checked venous doppler - negative  - ECHO 2/2018 showed Preserved left ventricular systolic function. Stage I   diastolic dysfunction. EF 65% -- will repeat TTE  - Caution with IVF  - Strict I&O's  - daily weights  - SCDs

## 2020-02-25 NOTE — PROGRESS NOTE ADULT - SUBJECTIVE AND OBJECTIVE BOX
infectious diseases progress note:    DIONICIO SULLIVAN is a 75y y. o. Male patient    Patient reports: still coughing    ROS:    EYES:  Negative  blurry vision or double vision  GASTROINTESTINAL:  Negative for nausea, vomiting, diarrhea  -otherwise negative except for subjective    Allergies    sulfa drugs (Unknown)    Intolerances        ANTIBIOTICS/RELEVANT:  antimicrobials  cefTRIAXone   IVPB 1000 milliGRAM(s) IV Intermittent every 24 hours  entecavir 0.5 milliGRAM(s) Oral daily    immunologic:    OTHER:  acetaminophen   Tablet .. 650 milliGRAM(s) Oral every 6 hours PRN  acetaminophen   Tablet .. 650 milliGRAM(s) Oral every 6 hours PRN  albuterol/ipratropium for Nebulization 3 milliLiter(s) Nebulizer every 6 hours PRN  benzonatate 100 milliGRAM(s) Oral three times a day PRN  ibrutinib 420 milliGRAM(s) Oral daily  pantoprazole  Injectable 40 milliGRAM(s) IV Push daily  predniSONE   Tablet 40 milliGRAM(s) Oral daily      Objective:  Last 24-Vital Signs Last 24 Hrs  T(C): 36.4 (25 Feb 2020 07:34), Max: 36.6 (24 Feb 2020 23:06)  T(F): 97.6 (25 Feb 2020 07:34), Max: 97.9 (24 Feb 2020 23:06)  HR: 82 (25 Feb 2020 07:34) (69 - 84)  BP: 138/75 (25 Feb 2020 07:34) (111/76 - 153/82)  BP(mean): --  RR: 18 (25 Feb 2020 07:34) (16 - 18)  SpO2: 97% (25 Feb 2020 07:34) (96% - 100%)    T(C): 36.4 (02-25-20 @ 07:34), Max: 36.8 (02-23-20 @ 11:54)  T(F): 97.6 (02-25-20 @ 07:34), Max: 98.2 (02-23-20 @ 11:54)  T(C): 36.4 (02-25-20 @ 07:34), Max: 36.8 (02-22-20 @ 19:53)  T(F): 97.6 (02-25-20 @ 07:34), Max: 98.3 (02-22-20 @ 19:53)  T(C): 36.4 (02-25-20 @ 07:34), Max: 38.1 (02-21-20 @ 20:14)  T(F): 97.6 (02-25-20 @ 07:34), Max: 100.6 (02-21-20 @ 20:14)    PHYSICAL EXAM:  Constitutional: Well-developed, well nourished  Eyes: PERRLA, EOMI  Ear/Nose/Throat: oropharynx normal	  Neck: no JVD, no lymphadenopathy, supple  Respiratory: no accessory muscle use, lung fields bilaterally scattered crackles  Cardiovascular: RRR, normal S1, S2 no m/r/g  Gastrointestinal: soft, NT, no HSM, BS-normal  Extremities: no clubbing, no cyanosis, edema absent  Neuro: patient alert, oriented and appropriate  Skin: no sig lesions      LABS:                        7.7    172.29 )-----------( 9        ( 25 Feb 2020 06:59 )             25.0     Auto Neutrophil #: 6.89 K/uL (02.25.20 @ 06:59)    Auto Neutrophil #: 5.15 K/uL (02.24.20 @ 06:40)    Auto Eosinophil #: 1.72 K/uL (02.25.20 @ 06:59)    Auto Eosinophil #: 0.09 K/uL (02.24.20 @ 06:40)      .29  02-25 @ 06:59  .16  02-24 @ 06:40  .59  02-23 @ 07:06  WBC 78.39  02-22 @ 06:06  .14  02-21 @ 20:29      02-25    140  |  111<H>  |  27<H>  ----------------------------<  142<H>  4.0   |  21<L>  |  1.10    Ca    8.2<L>      25 Feb 2020 06:59  Phos  1.9     02-25  Mg     2.5     02-25    TPro  5.5<L>  /  Alb  2.5<L>  /  TBili  2.4<H>  /  DBili  x   /  AST  24  /  ALT  19  /  AlkPhos  144<H>  02-25      Creatinine, Serum: 1.10 mg/dL (02-25-20 @ 06:59)  Creatinine, Serum: 1.10 mg/dL (02-24-20 @ 06:39)  Creatinine, Serum: 1.20 mg/dL (02-23-20 @ 07:06)  Creatinine, Serum: 1.50 mg/dL (02-22-20 @ 06:06)  Creatinine, Serum: 1.50 mg/dL (02-21-20 @ 20:29)      MICROBIOLOGY:        RADIOLOGY & ADDITIONAL STUDIES:  < from: MR MRCP w/wo IV Cont (02.25.20 @ 08:34) >    EXAM:  MR MRCP WAW IC                            PROCEDURE DATE:  02/25/2020          INTERPRETATION:  History: Leukemia elevated bilirubin.    MRCP and multiphasic abdominal MR without with IV contrast.  8 cc Gadavist injected intravenously.  Contracted gallbladder with stones. No significant biliary dilatation. Common duct 7 mm normal for age. No common duct stone or stricture. Liver pancreas not remarkable. Spleen slightly enlarged measuring up to 15 cm long dimension.  No adrenal nodules. Left renal cysts.  There is  moderate retrocrural, periportal portacaval and retroperitoneal para-aortic lymphadenopathy. The largest retroperitoneal lymph nodes measure on the order of 2 cm.  No ascites.  Normal marrow signal.  Incidental nonspecific airspace opacities at the lung bases bilaterally.    Impression:  Contracted gallbladder with stones.  No biliary dilatation or common duct stone.  Splenomegaly.  Multistation adenopathy as described.

## 2020-02-25 NOTE — PROGRESS NOTE ADULT - SUBJECTIVE AND OBJECTIVE BOX
INTERVAL HPI/OVERNIGHT EVENTS:  pt seen and examined  denies n/v/d/c/abd pain  no acute overnight events per rn, no overt gib  plt transfusion pending    MEDICATIONS  (STANDING):  cefTRIAXone   IVPB 1000 milliGRAM(s) IV Intermittent every 24 hours  entecavir 0.5 milliGRAM(s) Oral daily  ibrutinib 420 milliGRAM(s) Oral daily  pantoprazole  Injectable 40 milliGRAM(s) IV Push daily  predniSONE   Tablet 40 milliGRAM(s) Oral daily    MEDICATIONS  (PRN):  acetaminophen   Tablet .. 650 milliGRAM(s) Oral every 6 hours PRN Mild Pain (1 - 3)  acetaminophen   Tablet .. 650 milliGRAM(s) Oral every 6 hours PRN Temp greater or equal to 38C (100.4F)  albuterol/ipratropium for Nebulization 3 milliLiter(s) Nebulizer every 6 hours PRN Shortness of Breath and/or Wheezing  benzonatate 100 milliGRAM(s) Oral three times a day PRN Cough      Allergies    sulfa drugs (Unknown)    Intolerances        Review of Systems:    General:  No wt loss, fevers, chills, night sweats, fatigue   Eyes:  Good vision, no reported pain  ENT:  No sore throat, pain, runny nose, dysphagia  CV:  No pain, palpitations, hypo/hypertension  Resp:  No dyspnea, cough, tachypnea, wheezing  GI:  No pain, No nausea, No vomiting, No diarrhea, No constipation, No weight loss, No fever, No pruritis, No rectal bleeding, No melena, No dysphagia  :  No pain, bleeding, incontinence, nocturia  Muscle:  No pain, weakness  Neuro:  No weakness, tingling, memory problems  Psych:  No fatigue, insomnia, mood problems, depression  Endocrine:  No polyuria, polydypsia, cold/heat intolerance  Heme:  No petechiae, ecchymosis, easy bruisability  Skin:  No rash, tattoos, scars, edema      Vital Signs Last 24 Hrs  T(C): 36.4 (25 Feb 2020 07:34), Max: 36.6 (24 Feb 2020 23:06)  T(F): 97.6 (25 Feb 2020 07:34), Max: 97.9 (24 Feb 2020 23:06)  HR: 82 (25 Feb 2020 07:34) (69 - 84)  BP: 138/75 (25 Feb 2020 07:34) (111/76 - 153/82)  BP(mean): --  RR: 18 (25 Feb 2020 07:34) (16 - 18)  SpO2: 97% (25 Feb 2020 07:34) (96% - 100%)    PHYSICAL EXAM:    Constitutional: sitting up in bed  HEENT: ncat  Neck: No LAD  Gastrointestinal: soft nt nd rash to abd  Extremities: edema  Neurological: Awake alert responds appropriately      LABS:                        7.7    172.29 )-----------( 9        ( 25 Feb 2020 06:59 )             25.0     02-25    140  |  111<H>  |  27<H>  ----------------------------<  142<H>  4.0   |  21<L>  |  1.10    Ca    8.2<L>      25 Feb 2020 06:59  Phos  1.9     02-25  Mg     2.5     02-25    TPro  5.5<L>  /  Alb  2.5<L>  /  TBili  2.4<H>  /  DBili  x   /  AST  24  /  ALT  19  /  AlkPhos  144<H>  02-25          RADIOLOGY & ADDITIONAL TESTS:

## 2020-02-25 NOTE — PHYSICAL THERAPY INITIAL EVALUATION ADULT - PERTINENT HX OF CURRENT PROBLEM, REHAB EVAL
73M PMHx DM type 2, HTN admitted 2/22 due to chief c/o fever, cough and generalized weakness. 73M PMHx DM type 2, HTN, h/o CLL admitted 2/22 due to chief c/o fever, cough and generalized weakness.

## 2020-02-25 NOTE — PROGRESS NOTE ADULT - ASSESSMENT
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  Repeat Flow Cytometry still SLL/CLL, but FISH now w 11q-, 17p-, del of chromosome 12 and 13, all poor prognostic factors.]  PETCT only mildly hypermetabolic LADs w highest SUV 3, largest conglomerate f LNs ~5x2cm prevascular, mild splenomegaly 14cm  Pt was scheduled for Venetoclax/Rituxan second line treatment w admission for ramp up Venetoclax and tumor lysis prophylaxis when found w Influenza A during the 2/2020 adm, Rx w Temaflu, subsequent also sig more anemic/thrombocytopenic.  Now admitted w again fever and found w pneumonia    -clinically now improved, on Ceftriaxone  -prednisone increased to 40mg due to concern over now w probable element of immune hemolysis and ITP(elevated bili, AST, normal GGTP in office), MRI/MRCP negative liver pathology, transaminases now normal, bili decreasing.  -leukocytosis due to steroid effect, >90% lymphocytes  -anemia and thrombocytopenia due to acute illness, compromised bone marrow. CLL/SLL, possible immune element, continue daily CBC monitoring and transfuse as clinically indicated  -consider start ramp up Venetoclax course during this admission when clinically improves more.    discussed w Medicine, discussed w ID.

## 2020-02-25 NOTE — PROGRESS NOTE ADULT - ASSESSMENT
hepatitis B   elevated lfts   CLL  pneumonia    patient with elevated bilirubin and alk phos at baseline ? leon  suspect worsening LFTS 2/2 ceftriaxone  u/s reviewed; he does have gallstones  f/u mri to r/o hepatoma given hx of hep b  consider checking HBV DNA   will follow    Advanced care planning was discussed with patient and family.  Advanced care planning forms were reviewed and discussed.  Risks, benefits and alternatives of gastroenterologic procedures were discussed in detail and all questions were answered.    30 minutes spent.

## 2020-02-25 NOTE — PROGRESS NOTE ADULT - PROBLEM SELECTOR PLAN 4
Per chart review, platelet count steadily declining since 2018  - Plt 9 today - transfused 1un ; (required 1unit of platelets on 02/22, as well, as pt was below 10,000.   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 Continue folate 1mg daily for now. Per patient, he stopped taking b12 when he finished taking tamiflu.

## 2020-02-25 NOTE — PROGRESS NOTE ADULT - PROBLEM SELECTOR PLAN 1
- suspected gram-negative PNA   - in setting of post-influenza bacterial pneumonia, ruled out staph with nares swab which was negative for MRSA/MSSA.  - Cefepime and Azithromycin de-escalated to rocephin today per ID recs  - urine legionella Ag and strep pneumo Ag negative ; sputum Cx: normal resp marnie - RVP negative  - CT Chest showing multilobar patchy peribronchovascular airspace opacities and more dense consolidative process in the right middle lobe likely reflecting multifocal pneumonia, trace b/l pleural effusions  - fever resolving, tylenol PRN for fever  - duonebs PRN for wheezing  - WBC very labile and difficult to gauge utility  - ID Dr. Webber consulted, recs appreciated - suspected gram-negative PNA   - in setting of post-influenza bacterial pneumonia, ruled out staph with nares swab which was negative for MRSA/MSSA.  - c/w rocephin per ID recs  - urine legionella Ag and strep pneumo Ag negative ; sputum Cx: normal resp marnie - RVP negative  - CT Chest showing multilobar patchy peribronchovascular airspace opacities and more dense consolidative process in the right middle lobe likely reflecting multifocal pneumonia, trace b/l pleural effusions  - fever resolving, tylenol PRN for fever  - duonebs PRN for wheezing  - WBC very labile and difficult to gauge utility  - ID Dr. Webber consulted, recs appreciated

## 2020-02-25 NOTE — PHYSICAL THERAPY INITIAL EVALUATION ADULT - ADDITIONAL COMMENTS
Pt recently discharged from this hospital, Pt states that he has a flight of stairs to enter home and a flight of stairs inside. Pt recently discharged from this hospital, Pt lives in an apt with 6 steps outside and 7 steps inside. Pt does not own any equipment.

## 2020-02-25 NOTE — PROGRESS NOTE ADULT - SUBJECTIVE AND OBJECTIVE BOX
All interim records and events noted.    up in chair, wife and brother present.  feeling better although still w coughs.    MEDICATIONS  (STANDING):  cefTRIAXone   IVPB 1000 milliGRAM(s) IV Intermittent every 24 hours  entecavir 0.5 milliGRAM(s) Oral daily  ibrutinib 420 milliGRAM(s) Oral daily  pantoprazole  Injectable 40 milliGRAM(s) IV Push daily  potassium acid phosphate/sodium acid phosphate tablet (K-PHOS No. 2) 1 Tablet(s) Oral four times a day with meals  predniSONE   Tablet 40 milliGRAM(s) Oral daily    MEDICATIONS  (PRN):  acetaminophen   Tablet .. 650 milliGRAM(s) Oral every 6 hours PRN Mild Pain (1 - 3)  acetaminophen   Tablet .. 650 milliGRAM(s) Oral every 6 hours PRN Temp greater or equal to 38C (100.4F)  albuterol/ipratropium for Nebulization 3 milliLiter(s) Nebulizer every 6 hours PRN Shortness of Breath and/or Wheezing  benzonatate 100 milliGRAM(s) Oral three times a day PRN Cough      Vital Signs Last 24 Hrs  T(C): 36.4 (25 Feb 2020 11:06), Max: 36.6 (24 Feb 2020 23:06)  T(F): 97.5 (25 Feb 2020 11:06), Max: 97.9 (24 Feb 2020 23:06)  HR: 102 (25 Feb 2020 11:06) (69 - 102)  BP: 110/67 (25 Feb 2020 11:06) (110/67 - 153/82)  BP(mean): --  RR: 18 (25 Feb 2020 11:06) (17 - 18)  SpO2: 99% (25 Feb 2020 11:06) (96% - 99%)    PHYSICAL EXAM  General: well developed  well nourished, in no acute distress, w pallor  Head: atraumatic, normocephalic  ENT: sclera anicteric, buccal mucosa moist  Neck: supple, trachea midline  CV: S1 S2, regular rate and rhythm  Lungs: clear to auscultation, no wheezes/rhonchi  Abdomen: soft, nontender, bowel sounds present, no palpable masses  Extrem: trace blair pedal and lower legs edema  Skin: no significant increased ecchymosis/petechiae  Neuro: alert and oriented X3,  no focal deficits      LABS:             7.7    172.29 )-----------( 9        ( 02-25 @ 06:59 )             25.0                8.3    151.16 )-----------( 11       ( 02-24 @ 06:40 )             25.6                6.7    117.59 )-----------( 16       ( 02-23 @ 07:06 )             21.0                7.6    x     )-----------( x        ( 02-22 @ 13:36 )             23.5       02-25    140  |  111<H>  |  27<H>  ----------------------------<  142<H>  4.0   |  21<L>  |  1.10    Ca    8.2<L>      25 Feb 2020 06:59  Phos  1.9     02-25  Mg     2.5     02-25    TPro  5.5<L>  /  Alb  2.5<L>  /  TBili  2.4<H>  /  DBili  x   /  AST  24  /  ALT  19  /  AlkPhos  144<H>  02-25        RADIOLOGY & ADDITIONAL STUDIES:    IMPRESSION/RECOMMENDATIONS:

## 2020-02-26 ENCOUNTER — TRANSCRIPTION ENCOUNTER (OUTPATIENT)
Age: 75
End: 2020-02-26

## 2020-02-26 LAB
ALBUMIN SERPL ELPH-MCNC: 2.5 G/DL — LOW (ref 3.3–5)
ALP SERPL-CCNC: 133 U/L — HIGH (ref 40–120)
ALT FLD-CCNC: 18 U/L — SIGNIFICANT CHANGE UP (ref 12–78)
ANION GAP SERPL CALC-SCNC: 8 MMOL/L — SIGNIFICANT CHANGE UP (ref 5–17)
AST SERPL-CCNC: 21 U/L — SIGNIFICANT CHANGE UP (ref 15–37)
BASOPHILS # BLD AUTO: SIGNIFICANT CHANGE UP (ref 0–0.2)
BASOPHILS NFR BLD AUTO: SIGNIFICANT CHANGE UP (ref 0–2)
BILIRUB SERPL-MCNC: 2.2 MG/DL — HIGH (ref 0.2–1.2)
BUN SERPL-MCNC: 28 MG/DL — HIGH (ref 7–23)
CALCIUM SERPL-MCNC: 8.3 MG/DL — LOW (ref 8.5–10.1)
CHLORIDE SERPL-SCNC: 110 MMOL/L — HIGH (ref 96–108)
CO2 SERPL-SCNC: 21 MMOL/L — LOW (ref 22–31)
CREAT SERPL-MCNC: 1.2 MG/DL — SIGNIFICANT CHANGE UP (ref 0.5–1.3)
EOSINOPHIL # BLD AUTO: SIGNIFICANT CHANGE UP (ref 0–0.5)
EOSINOPHIL NFR BLD AUTO: SIGNIFICANT CHANGE UP (ref 0–6)
GLUCOSE SERPL-MCNC: 205 MG/DL — HIGH (ref 70–99)
HCT VFR BLD CALC: 21.7 % — LOW (ref 39–50)
HCT VFR BLD CALC: 22.8 % — LOW (ref 39–50)
HGB BLD-MCNC: 6.9 G/DL — CRITICAL LOW (ref 13–17)
HGB BLD-MCNC: 7.1 G/DL — LOW (ref 13–17)
IMM GRANULOCYTES NFR BLD AUTO: SIGNIFICANT CHANGE UP (ref 0–1.5)
LYMPHOCYTES # BLD AUTO: SIGNIFICANT CHANGE UP (ref 13–44)
LYMPHOCYTES # BLD AUTO: SIGNIFICANT CHANGE UP (ref 1–3.3)
MAGNESIUM SERPL-MCNC: 2.4 MG/DL — SIGNIFICANT CHANGE UP (ref 1.6–2.6)
MCHC RBC-ENTMCNC: 31.1 GM/DL — LOW (ref 32–36)
MCHC RBC-ENTMCNC: 31.1 PG — SIGNIFICANT CHANGE UP (ref 27–34)
MCHC RBC-ENTMCNC: 31.8 GM/DL — LOW (ref 32–36)
MCHC RBC-ENTMCNC: 32.2 PG — SIGNIFICANT CHANGE UP (ref 27–34)
MCV RBC AUTO: 100 FL — SIGNIFICANT CHANGE UP (ref 80–100)
MCV RBC AUTO: 101.4 FL — HIGH (ref 80–100)
MONOCYTES # BLD AUTO: SIGNIFICANT CHANGE UP (ref 0–0.9)
MONOCYTES NFR BLD AUTO: SIGNIFICANT CHANGE UP (ref 2–14)
NEUTROPHILS # BLD AUTO: SIGNIFICANT CHANGE UP (ref 1.8–7.4)
NEUTROPHILS NFR BLD AUTO: SIGNIFICANT CHANGE UP (ref 43–77)
NRBC # BLD: 0 /100 WBCS — SIGNIFICANT CHANGE UP (ref 0–0)
NRBC # BLD: 0 /100 WBCS — SIGNIFICANT CHANGE UP (ref 0–0)
PHOSPHATE SERPL-MCNC: 2.1 MG/DL — LOW (ref 2.5–4.5)
PLATELET # BLD AUTO: 10 K/UL — CRITICAL LOW (ref 150–400)
PLATELET # BLD AUTO: 35 K/UL — LOW (ref 150–400)
POTASSIUM SERPL-MCNC: 4.2 MMOL/L — SIGNIFICANT CHANGE UP (ref 3.5–5.3)
POTASSIUM SERPL-SCNC: 4.2 MMOL/L — SIGNIFICANT CHANGE UP (ref 3.5–5.3)
PROT SERPL-MCNC: 5.2 G/DL — LOW (ref 6–8.3)
RBC # BLD: 2.14 M/UL — LOW (ref 4.2–5.8)
RBC # BLD: 2.28 M/UL — LOW (ref 4.2–5.8)
RBC # FLD: 21.6 % — HIGH (ref 10.3–14.5)
RBC # FLD: 22.4 % — HIGH (ref 10.3–14.5)
SODIUM SERPL-SCNC: 139 MMOL/L — SIGNIFICANT CHANGE UP (ref 135–145)
WBC # BLD: 111.94 K/UL — CRITICAL HIGH (ref 3.8–10.5)
WBC # BLD: 128.72 K/UL — CRITICAL HIGH (ref 3.8–10.5)
WBC # FLD AUTO: 111.94 K/UL — CRITICAL HIGH (ref 3.8–10.5)
WBC # FLD AUTO: 128.72 K/UL — CRITICAL HIGH (ref 3.8–10.5)

## 2020-02-26 PROCEDURE — 99233 SBSQ HOSP IP/OBS HIGH 50: CPT

## 2020-02-26 RX ORDER — DIPHENHYDRAMINE HCL 50 MG
25 CAPSULE ORAL ONCE
Refills: 0 | Status: COMPLETED | OUTPATIENT
Start: 2020-02-26 | End: 2020-02-26

## 2020-02-26 RX ORDER — IMMUNE GLOBULIN (HUMAN) 10 G/100ML
40 INJECTION INTRAVENOUS; SUBCUTANEOUS ONCE
Refills: 0 | Status: COMPLETED | OUTPATIENT
Start: 2020-02-26 | End: 2020-02-26

## 2020-02-26 RX ADMIN — IMMUNE GLOBULIN (HUMAN) 66.67 GRAM(S): 10 INJECTION INTRAVENOUS; SUBCUTANEOUS at 12:21

## 2020-02-26 RX ADMIN — PANTOPRAZOLE SODIUM 40 MILLIGRAM(S): 20 TABLET, DELAYED RELEASE ORAL at 12:40

## 2020-02-26 RX ADMIN — ENTECAVIR 0.5 MILLIGRAM(S): 0.5 TABLET ORAL at 05:11

## 2020-02-26 RX ADMIN — CEFTRIAXONE 100 MILLIGRAM(S): 500 INJECTION, POWDER, FOR SOLUTION INTRAMUSCULAR; INTRAVENOUS at 12:39

## 2020-02-26 RX ADMIN — Medication 40 MILLIGRAM(S): at 05:09

## 2020-02-26 RX ADMIN — IBRUTINIB 420 MILLIGRAM(S): 140 TABLET, FILM COATED ORAL at 17:29

## 2020-02-26 RX ADMIN — Medication 25 MILLIGRAM(S): at 12:20

## 2020-02-26 NOTE — DISCHARGE NOTE PROVIDER - CARE PROVIDER_API CALL
Heidi Stephenson)  Medical Oncology  [address] [facility], [address] [place] [address]  Phone: [phone]  Fax: [phone]  Follow Up Time:

## 2020-02-26 NOTE — PROGRESS NOTE ADULT - PROBLEM SELECTOR PLAN 5
-unclear why pt's direct bilirubin is the majority fraction   -t bili coming down today to 2.6  -pt has no symptoms or exam findings consistent with obstructed CBD  -RUQ US showing GB with stones but no obstructing stones, and normal caliber CBD  -given hep B hx, GI recs MRCP with and without contrast  -MRCP did not reveal obstruction -unclear why pt's direct bilirubin is the majority fraction   -t bili coming down today to 2.2  -pt has no symptoms or exam findings consistent with obstructed CBD  -RUQ US showing GB with stones but no obstructing stones, and normal caliber CBD  -given hep B hx, GI recs MRCP with and without contrast  -MRCP did not reveal obstruction

## 2020-02-26 NOTE — DISCHARGE NOTE PROVIDER - HOSPITAL COURSE
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 Has been coughing as he was recently treated for influenza A on last admission. States that when he was discharged from [facility] on 2/15, he was feeling well and had no fevers. Patient developed a fever, T100.9F (oral) today for which he called heme/onc, Dr. Stephenson. Per Dr. Stuart, patient advised to go to ER. He was found to have a fever, T100.6F (rectal) in ER. Denies recent travel or sick contacts. Denies headache, chest pain, sob, palpitations, abdominal pain, diarrhea, melena, hematochezia, dysuria or hematuria.         Of note, patient was recently admitted to [facility] from 2/14-2/15/2020 for chemotherapy with Venetoclax, found to be febrile with similar associated complaints of generalized malaise, cough, and weakness. Patient was found to be Flu A positive during that admission and was treated with tamiflu. Patient was found to be thrombocytopenic to 35k with a Hb of 6.6, transfused 2U PRBCs and 1U platelets. Chemotherapy was held off due to acute illness.          In ED, VS Tmax 100.6 rectal (repeat s/p tylenol 99.5),  -> 99, BP stable, saturating well on RA    CBC significant for .14 (on d/c 56.25), Hb 6.8 (on d/c 8.7), Plt 11 (on d/c 21)    CMP significant for K 3.4, Cr 1.5 (appears to be baseline per chart review), bili 3.3    Type and screen performed. Will be getting 1 unit pRBCs    Flu/RSV negative    CT Chest: Multilobar patchy peribronchovascular airspace opacities and more dense consolidative process in the right middle lobe likely reflecting multifocal pneumonia. Small right and trace left pleural effusion. Mediastinal and hilar lymphadenopathy worsened compared with prior exam from 8/30/2019. Numerous mildly prominent bilateral axillary lymph nodes. Retroperitoneal lymphadenopathy. Mild splenomegaly. Cholelithiasis.    CXR (wet read): noted to have increased opacities in right lower and middle lobes as well as left lower lobe in comparison to CXR on 2/14/2020    EKG: sinus tachycardic        S/p cefepime 1g IV x1, NS bolus 1L x1, duonebs x2, tylenol 650mg PO x1, 1U PRBCs ordered and to be transfused        Dr. Stuart was called and he recommended to only transfused PRBC and not platelets.  He will see pt in the morning. (22 Feb 2020 00:14)            ---    HOSPITAL COURSE:     Patient was admitted to telemetry for suspected gram-negative multifocal pneumonia, severe anemia and severe thrombocytopenia. The patient was followed by infectious disease, Dr. Webber. He was started on cefepime in the ED, and eventually transitioned to ceftriaxone and azithromycin once admitted. Patient was also given acetaminophen and benzonatate for symptomatic relief. For anemia and thrombocytopenia, the patient was followed by Heme/onc, Dr. Vazquez. He was transfused 3 units PRBC and 3 units platelets. An autoimmune process likely contributed to anemia and thrombocytopenia. IV Ig was given... Patient was also seen by gastroenterology, Dr. Torres, for elevated liver enzymes and bilirubin. MRI was obtained, and no acute pathology was found.         ---    CONSULTANTS:         ---    TIME SPENT:    The total amount of time spent reviewing the hospital notes, laboratory values, imaging findings, assessing/counseling the patient, discussing with consultant physicians, social work, nursing staff took -- minutes        ---    FINAL DISCHARGE DIAGNOSIS LIST:    Please see last daily progress note for final discharge diagnoses.        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 Has been coughing as he was recently treated for influenza A on last admission. States that when he was discharged from [facility] on 2/15, he was feeling well and had no fevers. Patient developed a fever, T100.9F (oral) today for which he called heme/onc, Dr. Stephenson. Per Dr. Stuart, patient advised to go to ER. He was found to have a fever, T100.6F (rectal) in ER. Denies recent travel or sick contacts. Denies headache, chest pain, sob, palpitations, abdominal pain, diarrhea, melena, hematochezia, dysuria or hematuria.         Of note, patient was recently admitted to [facility] from 2/14-2/15/2020 for chemotherapy with Venetoclax, found to be febrile with similar associated complaints of generalized malaise, cough, and weakness. Patient was found to be Flu A positive during that admission and was treated with tamiflu. Patient was found to be thrombocytopenic to 35k with a Hb of 6.6, transfused 2U PRBCs and 1U platelets. Chemotherapy was held off due to acute illness.          In ED, VS Tmax 100.6 rectal (repeat s/p tylenol 99.5),  -> 99, BP stable, saturating well on RA    CBC significant for .14 (on d/c 56.25), Hb 6.8 (on d/c 8.7), Plt 11 (on d/c 21)    CMP significant for K 3.4, Cr 1.5 (appears to be baseline per chart review), bili 3.3    Type and screen performed. Will be getting 1 unit pRBCs    Flu/RSV negative    CT Chest: Multilobar patchy peribronchovascular airspace opacities and more dense consolidative process in the right middle lobe likely reflecting multifocal pneumonia. Small right and trace left pleural effusion. Mediastinal and hilar lymphadenopathy worsened compared with prior exam from 8/30/2019. Numerous mildly prominent bilateral axillary lymph nodes. Retroperitoneal lymphadenopathy. Mild splenomegaly. Cholelithiasis.    CXR (wet read): noted to have increased opacities in right lower and middle lobes as well as left lower lobe in comparison to CXR on 2/14/2020    EKG: sinus tachycardic        S/p cefepime 1g IV x1, NS bolus 1L x1, duonebs x2, tylenol 650mg PO x1, 1U PRBCs ordered and to be transfused        Dr. Stuart was called and he recommended to only transfused PRBC and not platelets.  He will see pt in the morning. (22 Feb 2020 00:14)            ---    HOSPITAL COURSE:     Patient was admitted to telemetry for suspected gram-negative multifocal pneumonia, severe anemia and severe thrombocytopenia. The patient was followed by infectious disease, Dr. Webber. He was started on cefepime in the ED, and eventually transitioned to ceftriaxone and azithromycin once admitted. Patient was also given acetaminophen and benzonatate for symptomatic relief. For anemia and thrombocytopenia, the patient was followed by Heme/onc, Dr. Vazquez. He was transfused 3 units PRBC and 3 units platelets. An autoimmune process likely contributed to anemia and thrombocytopenia. IV Ig was given... Patient was also seen by gastroenterology, Dr. Torres, for elevated liver enzymes and bilirubin. MRI was obtained, and no acute pathology was found.                 ---    TIME SPENT:    45min

## 2020-02-26 NOTE — PROGRESS NOTE ADULT - ASSESSMENT
74yo M with PMH of refractory CLL, AIHA, chronic hepatitis B (on entecavir), KIRTI on CPAP, T2DM (managed with lifestyle changes), HTN (not on any medication) who presents with chief complaint of fever, cough and generalized weakness, admitted with suspected gram-negative multifocal pneumonia, severe anemia and severe thrombocytopenia. 74yo M with PMH of CLL, AIHA, chronic hepatitis B (on entecavir), KIRTI on CPAP, T2DM (managed with lifestyle changes), HTN (not on any medication) who presents with chief complaint of fever, cough and generalized weakness, admitted with suspected gram-negative multifocal pneumonia, severe anemia and severe thrombocytopenia s/p transfusion of 2 units of PRBC and 2 units of platelets. Plt has not improved after transfusion, suspect possible ITP

## 2020-02-26 NOTE — PROGRESS NOTE ADULT - PROBLEM SELECTOR PLAN 2
History of Small Cell B-Cell Lymphoma/leukemia, relapsed  - Worsening anemia and thrombocytopenia since discharge ~1 week ago likely in part due to lymphoma/leukemia  - c/w imbruvica as per heme  - Heme Dr. Stephenson group following, recs appreciated -- considering starting chemo at tail end of this admission 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  - Worsening anemia and thrombocytopenia since discharge ~1 week ago likely in part due to lymphoma/leukemia  - c/w imbruvica as per heme  - Heme Dr. Stephenson group following, recs appreciated -- considering starting chemo at tail end of this admission 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  - Worsening anemia and thrombocytopenia since discharge ~1 week ago likely in part due to lymphoma/leukemia  - c/w imbruvica as per heme  - Heme Dr. Stephenson group following, recs appreciated -- considering starting chemo at tail end of this admission  - ID, Dr. Webber - no issues per Id with resuming chemo.

## 2020-02-26 NOTE — PROGRESS NOTE ADULT - SUBJECTIVE AND OBJECTIVE BOX
[INTERVAL HX: ]  Patient seen and examined;  Chart reviewed and events noted;   no new complaints.   no CP, no SOB, no overt bleeding.     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 Has been coughing as he was recently treated for influenza A on last admission. States that when he was discharged from [facility] on 2/15, he was feeling well and had no fevers. Patient developed a fever, T100.9F (oral) today for which he called heme/onc, Dr. Stephenson. Per Dr. Stuart, patient advised to go to ER. He was found to have a fever, T100.6F (rectal) in ER. Denies recent travel or sick contacts. Denies headache, chest pain, sob, palpitations, abdominal pain, diarrhea, melena, hematochezia, dysuria or hematuria.     Of note, patient was recently admitted to [facility] from -2/15/2020 for chemotherapy with Venetoclax, found to be febrile with similar associated complaints of generalized malaise, cough, and weakness. Patient was found to be Flu A positive during that admission and was treated with tamiflu. Patient was found to be thrombocytopenic to 35k with a Hb of 6.6, transfused 2U PRBCs and 1U platelets. Chemotherapy was held off due to acute illness.      In ED, VS Tmax 100.6 rectal (repeat s/p tylenol 99.5),  -> 99, BP stable, saturating well on RA  CBC significant for .14 (on d/c 56.25), Hb 6.8 (on d/c 8.7), Plt 11 (on d/c 21)  CMP significant for K 3.4, Cr 1.5 (appears to be baseline per chart review), bili 3.3  Type and screen performed. Will be getting 1 unit pRBCs  Flu/RSV negative  CT Chest: Multilobar patchy peribronchovascular airspace opacities and more dense consolidative process in the right middle lobe likely reflecting multifocal pneumonia. Small right and trace left pleural effusion. Mediastinal and hilar lymphadenopathy worsened compared with prior exam from 2019. Numerous mildly prominent bilateral axillary lymph nodes. Retroperitoneal lymphadenopathy. Mild splenomegaly. Cholelithiasis.  CXR (wet read): noted to have increased opacities in right lower and middle lobes as well as left lower lobe in comparison to CXR on 2020  EKG: sinus tachycardic    S/p cefepime 1g IV x1, NS bolus 1L x1, duonebs x2, tylenol 650mg PO x1, 1U PRBCs ordered and to be transfused    Dr. Stuart was called and he recommended to only transfused PRBC and not platelets.  He will see pt in the morning. (2020 00:14)            MEDICATIONS  (STANDING):  cefTRIAXone   IVPB 1000 milliGRAM(s) IV Intermittent every 24 hours  entecavir 0.5 milliGRAM(s) Oral daily  ibrutinib 420 milliGRAM(s) Oral daily  pantoprazole  Injectable 40 milliGRAM(s) IV Push daily  predniSONE   Tablet 40 milliGRAM(s) Oral daily    MEDICATIONS  (PRN):  acetaminophen   Tablet .. 650 milliGRAM(s) Oral every 6 hours PRN Mild Pain (1 - 3)  acetaminophen   Tablet .. 650 milliGRAM(s) Oral every 6 hours PRN Temp greater or equal to 38C (100.4F)  albuterol/ipratropium for Nebulization 3 milliLiter(s) Nebulizer every 6 hours PRN Shortness of Breath and/or Wheezing  benzonatate 100 milliGRAM(s) Oral three times a day PRN Cough      Vital Signs Last 24 Hrs  T(C): 36.3 (2020 07:47), Max: 36.8 (2020 15:30)  T(F): 97.3 (2020 07:47), Max: 98.3 (2020 15:30)  HR: 80 (2020 07:47) (77 - 102)  BP: 132/71 (2020 07:47) (110/67 - 144/82)  BP(mean): --  RR: 17 (2020 07:47) (17 - 18)  SpO2: 96% (2020 07:47) (94% - 99%)      [PHYSICAL EXAM]  General: adult in NAD,  WN,  WD.  HEENT: clear oropharynx, anicteric sclera, pink conjunctivae.  Neck: supple, no masses.  CV: normal S1S2, no murmur, no rubs, no gallops.  Lungs: clear to auscultation, no wheezes, no rales, no rhonchi.  Abdomen: soft, non-tender, non-distended, no hepatosplenomegaly, normal BS, no guarding.  Ext: no clubbing, no cyanosis, no edema.  Skin: no rashes,  scattered faint petechiae, no venous stasis changes.  Neuro: alert and oriented X3  , no focal motor deficits.  LN: no SC FERNIE.      [LABS:]                        6.9    128.72 )-----------( 10       ( 2020 07:16 )             21.7     -    139  |  110<H>  |  28<H>  ----------------------------<  205<H>  4.2   |  21<L>  |  1.20    Ca    8.3<L>      2020 07:16  Phos  2.1     -  Mg     2.4     -    TPro  5.2<L>  /  Alb  2.5<L>  /  TBili  2.2<H>  /  DBili  x   /  AST  21  /  ALT  18  /  AlkPhos  133<H>  -          Immunoglobulins Panel (20 @ 20:27)    Quantitative Ig mg/dL    Quantitative IgA: 40 mg/dL    Quantitative IgM: 20 mg/dL    JOSEFA Kappa: 10.51 mg/dL    JOSEFA Lambda: 0.89 mg/dL    [place]/Lambda Free Light Chain Ratio, Serum: 11.81 Ratio            [RADIOLOGY STUDIES:]

## 2020-02-26 NOTE — DISCHARGE NOTE PROVIDER - NSDCCPCAREPLAN_GEN_ALL_CORE_FT
PRINCIPAL DISCHARGE DIAGNOSIS  Diagnosis: Leukemia  Assessment and Plan of Treatment: You had multiple therapies with the guiadance of your oncologists that have not had a good response. Your oncologist has recommended that the focus now should be on medication to provide comfort if you develop uncomfortable symptoms in the future.   If you have any new symptoms or questions, please call the [facility] at [phone] who can help give you any medications needed to control discomfort you may develop.      SECONDARY DISCHARGE DIAGNOSES  Diagnosis: Hepatitis B  Assessment and Plan of Treatment: You may continue your entecavir if you wish, but would need to get refills from your regular pharmacy once your supply is low.    Diagnosis: Hypokalemia  Assessment and Plan of Treatment: You may take the potassium supplement if you wish. Your potassium was somewhat low earlier in the hospitalization and has no normalized.

## 2020-02-26 NOTE — PROGRESS NOTE ADULT - ASSESSMENT
76 y/o man w Hepatitis B on Entecavirt, Chronic Lymphocytic Leukemia/Small Lymphocytic Lymphoma 4/2018 when presented w immune hemolytic anemia w partial response to steroids, started on Ibrutinib w heme CR and VT by CT scan w some decrease of lymphadenopathies, until 1/2020 when relapsed w incr WBC, anemia(initially not hemolytic, has chronic Matthew positive due to CLL/SLL), thrombocytopenia.  Repeat Flow Cytometry still SLL/CLL, but FISH now w 11q-, 17p-, del of chromosome 12 and 13, all poor prognostic factors.]  PETCT only mildly hypermetabolic LADs w highest SUV 3, largest conglomerate f LNs ~5x2cm prevascular, mild splenomegaly 14cm  Pt was scheduled for Venetoclax/Rituxan second line treatment w admission for ramp up Venetoclax and tumor lysis prophylaxis when found w Influenza A during the 2/2020 adm, Rx w Temaflu, subsequent also sig more anemic/thrombocytopenic.  Now admitted w again fever and found w pneumonia    -clinically now improved, on Ceftriaxone  -prednisone increased to 40mg due to concern over now w probable element of immune hemolysis and ITP(elevated bili, AST, normal GGTP in office), MRI/MRCP negative liver pathology, transaminases now normal, bili decreasing.  -leukocytosis due to steroid effect, >90% lymphocytes  -anemia and thrombocytopenia due to acute illness, compromised bone marrow. CLL/SLL, possible immune element, continue daily CBC monitoring and transfuse as clinically indicated      RECOMMEND:   Continue Prednisone 40mg daily.   Start IVIG dose 40g IV gammagard.   Premed on Steroids  Premed with Benadryl prior.     Transfuse 1U PRBC  IVIG   Then transfuse 1U SDP    may need PRBC txfusion tomorrow again.       consider start ramp up Venetoclax course during this admission when clinically improves more.  BUT For now hold    discussed w Medicine team. 74 y/o man w Hepatitis B on Entecavirt, Chronic Lymphocytic Leukemia/Small Lymphocytic Lymphoma 4/2018 when presented w immune hemolytic anemia w partial response to steroids, started on Ibrutinib w heme CR and NE by CT scan w some decrease of lymphadenopathies, until 1/2020 when relapsed w incr WBC, anemia(initially not hemolytic, has chronic Matthew positive due to CLL/SLL), thrombocytopenia.  Repeat Flow Cytometry still SLL/CLL, but FISH now w 11q-, 17p-, del of chromosome 12 and 13, all poor prognostic factors.]  PETCT only mildly hypermetabolic LADs w highest SUV 3, largest conglomerate f LNs ~5x2cm prevascular, mild splenomegaly 14cm  Pt was scheduled for Venetoclax/Rituxan second line treatment w admission for ramp up Venetoclax and tumor lysis prophylaxis when found w Influenza A during the 2/2020 adm, Rx w Temaflu, subsequent also sig more anemic/thrombocytopenic.  Now admitted w again fever and found w pneumonia    -clinically now improved, on Ceftriaxone  -prednisone increased to 40mg due to concern over now w probable element of immune hemolysis and ITP(elevated bili, AST, normal GGTP in office), MRI/MRCP negative liver pathology, transaminases now normal, bili decreasing.  -leukocytosis due to steroid effect, >90% lymphocytes  -anemia and thrombocytopenia due to acute illness, compromised bone marrow. CLL/SLL, possible immune element, continue daily CBC monitoring and transfuse as clinically indicated      RECOMMEND:   Continue Prednisone 40mg daily.   Start IVIG dose 40g IV gammagard.   Premed on Steroids  Premed with Benadryl prior.     Transfuse 1U PRBC  IVIG 40g  Then transfuse 1U SDP    may need PRBC txfusion tomorrow again.   may cosnider additional doses of IVIG for ITP.     consider start ramp up Venetoclax course during this admission when clinically improves more.  BUT For now hold    discussed w Medicine team.

## 2020-02-26 NOTE — PROGRESS NOTE ADULT - SUBJECTIVE AND OBJECTIVE BOX
INTERVAL HPI/OVERNIGHT EVENTS:  pt seen and examined  denies n/v/abd pain  no s/s overt gib per pt/rn  tolerating po  sp plts yesterday    MEDICATIONS  (STANDING):  cefTRIAXone   IVPB 1000 milliGRAM(s) IV Intermittent every 24 hours  entecavir 0.5 milliGRAM(s) Oral daily  ibrutinib 420 milliGRAM(s) Oral daily  pantoprazole  Injectable 40 milliGRAM(s) IV Push daily  predniSONE   Tablet 40 milliGRAM(s) Oral daily    MEDICATIONS  (PRN):  acetaminophen   Tablet .. 650 milliGRAM(s) Oral every 6 hours PRN Mild Pain (1 - 3)  acetaminophen   Tablet .. 650 milliGRAM(s) Oral every 6 hours PRN Temp greater or equal to 38C (100.4F)  albuterol/ipratropium for Nebulization 3 milliLiter(s) Nebulizer every 6 hours PRN Shortness of Breath and/or Wheezing  benzonatate 100 milliGRAM(s) Oral three times a day PRN Cough      Allergies    sulfa drugs (Unknown)    Intolerances        Review of Systems:    General:  No wt loss, fevers, chills, night sweats, fatigue   Eyes:  Good vision, no reported pain  ENT:  No sore throat, pain, runny nose, dysphagia  CV:  No pain, palpitations, hypo/hypertension  Resp:  No dyspnea, cough, tachypnea, wheezing  GI:  No pain, No nausea, No vomiting, No diarrhea, No constipation, No weight loss, No fever, No pruritis, No rectal bleeding, No melena, No dysphagia  :  No pain, bleeding, incontinence, nocturia  Muscle:  No pain, weakness  Neuro:  No weakness, tingling, memory problems  Psych:  No fatigue, insomnia, mood problems, depression  Endocrine:  No polyuria, polydypsia, cold/heat intolerance  Heme:  No petechiae, ecchymosis, easy bruisability  Skin:  No rash, tattoos, scars, edema      Vital Signs Last 24 Hrs  T(C): 36.3 (26 Feb 2020 07:47), Max: 36.8 (25 Feb 2020 15:30)  T(F): 97.3 (26 Feb 2020 07:47), Max: 98.3 (25 Feb 2020 15:30)  HR: 80 (26 Feb 2020 07:47) (77 - 102)  BP: 132/71 (26 Feb 2020 07:47) (110/67 - 144/82)  BP(mean): --  RR: 17 (26 Feb 2020 07:47) (17 - 18)  SpO2: 96% (26 Feb 2020 07:47) (94% - 99%)    PHYSICAL EXAM:  Constitutional: sitting up in bed  HEENT: ncat  Neck: No LAD  Gastrointestinal: soft nt mild dt rash to abd  Extremities: edema  Neurological: Awake alert responds appropriately    LABS:                        6.9    128.72 )-----------( 10       ( 26 Feb 2020 07:16 )             21.7     02-26    139  |  110<H>  |  28<H>  ----------------------------<  205<H>  4.2   |  21<L>  |  1.20    Ca    8.3<L>      26 Feb 2020 07:16  Phos  2.1     02-26  Mg     2.4     02-26    TPro  5.2<L>  /  Alb  2.5<L>  /  TBili  2.2<H>  /  DBili  x   /  AST  21  /  ALT  18  /  AlkPhos  133<H>  02-26          RADIOLOGY & ADDITIONAL TESTS:  < from: MR MRCP w/wo IV Cont (02.25.20 @ 08:34) >    EXAM:  MR MRCP [facility] IC                            PROCEDURE DATE:  02/25/2020          INTERPRETATION:  History: Leukemia elevated bilirubin.    MRCP and multiphasic abdominal MR without with IV contrast.  8 cc Gadavist injected intravenously.  Contracted gallbladder with stones. No significant biliary dilatation. Common duct 7 mm normal for age. No common duct stone or stricture. Liver pancreas not remarkable. Spleen slightly enlarged measuring up to 15 cm long dimension.  No adrenal nodules. Left renal cysts.  There is  moderate retrocrural, periportal portacaval and retroperitoneal para-aortic lymphadenopathy. The largest retroperitoneal lymph nodes measure on the order of 2 cm.  No ascites.  Normal marrow signal.  Incidental nonspecific airspace opacities at the lung bases bilaterally.    Impression:  Contracted gallbladder with stones.  No biliary dilatation or common duct stone.  Splenomegaly.  Multistation adenopathy as described.                PANTERA CALVILLO M.D., ATTENDING RADIOLOGIST  This document has been electronically signed. Feb 25 2020  9:49AM                < end of copied text >

## 2020-02-26 NOTE — DISCHARGE NOTE PROVIDER - NSDCMRMEDTOKEN_GEN_ALL_CORE_FT
allopurinol 100 mg oral tablet: 1 tab(s) orally 3 times a day  entecavir 0.5 mg oral tablet: 1 tab(s) orally once a day  Equipment: Rolling Walker   Dx: gait instability (ICD 10: R26.89)  OG: 99  Disp: 1  folic acid 1 mg oral tablet: 1 tab(s) orally once a day  Imbruvica 420 mg oral tablet: 1 tab(s) orally once a day  predniSONE 10 mg oral tablet: 1 tab(s) orally once a day  valsartan 80 mg oral tablet: 1 tab(s) orally once a day  Vitamin B12 1000 mcg oral tablet: 1 tab(s) orally once a day acetaminophen 325 mg oral tablet: 2 tab(s) orally every 6 hours, As needed, for pain or fever  entecavir 0.5 mg oral tablet: 1 tab(s) orally once a day  guaiFENesin 100 mg/5 mL oral liquid: 10 milliliter(s) orally every 6 hours, As needed, Cough  nystatin 100,000 units/mL oral suspension: 5 milliliter(s) orally 3 times a day, As Needed for oral thrush  pantoprazole 40 mg oral delayed release tablet: 1 tab(s) orally once a day (before a meal)  potassium chloride 20 mEq oral tablet, extended release: 1 tab(s) orally once a day  predniSONE 2.5 mg oral tablet: 1 tab(s) orally once a day acetaminophen 325 mg oral tablet: 2 tab(s) orally every 6 hours, As needed, for pain or fever  entecavir 0.5 mg oral tablet: 1 tab(s) orally once a day  guaiFENesin 100 mg/5 mL oral liquid: 10 milliliter(s) orally every 6 hours, As needed, Cough  Lidocaine Viscous 2% mucous membrane solution: Apply topically to affected area 3 times a day (before meals), As Needed  for mouth pain  nystatin 100,000 units/mL oral suspension: 5 milliliter(s) orally 3 times a day, As Needed for oral thrush  pantoprazole 40 mg oral delayed release tablet: 1 tab(s) orally once a day (before a meal)  potassium chloride 20 mEq oral tablet, extended release: 1 tab(s) orally once a day  predniSONE 2.5 mg oral tablet: 1 tab(s) orally once a day

## 2020-02-26 NOTE — PROGRESS NOTE ADULT - SUBJECTIVE AND OBJECTIVE BOX
Patient is a 75y old  Male who presents with a chief complaint of weakness, anemia (26 Feb 2020 09:59)      INTERVAL HPI/OVERNIGHT EVENTS: Patient seen and examined at bedside. No overnight events occurred. Patient has no complaints at this time. Denies fevers, chills, headache, lightheadedness, chest pain, dyspnea, abdominal pain, n/v/d/c.    MEDICATIONS  (STANDING):  cefTRIAXone   IVPB 1000 milliGRAM(s) IV Intermittent every 24 hours  diphenhydrAMINE   Injectable 25 milliGRAM(s) IV Push once  entecavir 0.5 milliGRAM(s) Oral daily  ibrutinib 420 milliGRAM(s) Oral daily  immune   globulin 10% (GAMMAGARD) IVPB 40 Gram(s) IV Intermittent once  pantoprazole  Injectable 40 milliGRAM(s) IV Push daily  predniSONE   Tablet 40 milliGRAM(s) Oral daily    MEDICATIONS  (PRN):  acetaminophen   Tablet .. 650 milliGRAM(s) Oral every 6 hours PRN Mild Pain (1 - 3)  acetaminophen   Tablet .. 650 milliGRAM(s) Oral every 6 hours PRN Temp greater or equal to 38C (100.4F)  albuterol/ipratropium for Nebulization 3 milliLiter(s) Nebulizer every 6 hours PRN Shortness of Breath and/or Wheezing  benzonatate 100 milliGRAM(s) Oral three times a day PRN Cough      Allergies    sulfa drugs (Unknown)    Intolerances        REVIEW OF SYSTEMS:  CONSTITUTIONAL: No fever or chills  HEENT:  No headache, no sore throat  RESPIRATORY: No cough, wheezing, or shortness of breath  CARDIOVASCULAR: No chest pain, palpitations  GASTROINTESTINAL: No abd pain, nausea, vomiting, or diarrhea  GENITOURINARY: No dysuria, frequency, or hematuria  NEUROLOGICAL: no focal weakness or dizziness  MUSCULOSKELETAL: no myalgias     Vital Signs Last 24 Hrs  T(C): 36.3 (26 Feb 2020 07:47), Max: 36.8 (25 Feb 2020 15:30)  T(F): 97.3 (26 Feb 2020 07:47), Max: 98.3 (25 Feb 2020 15:30)  HR: 80 (26 Feb 2020 07:47) (77 - 102)  BP: 132/71 (26 Feb 2020 07:47) (110/67 - 144/82)  BP(mean): --  RR: 17 (26 Feb 2020 07:47) (17 - 18)  SpO2: 96% (26 Feb 2020 07:47) (94% - 99%)    PHYSICAL EXAM:  GENERAL: NAD  HEENT:  anicteric, moist mucous membranes  CHEST/LUNG:  CTA b/l, no rales, wheezes, or rhonchi  HEART:  RRR, S1, S2  ABDOMEN:  BS+, soft, nontender, nondistended  EXTREMITIES: no edema, cyanosis, or calf tenderness  NERVOUS SYSTEM: answers questions and follows commands appropriately    LABS:                        6.9    128.72 )-----------( 10       ( 26 Feb 2020 07:16 )             21.7     CBC Full  -  ( 26 Feb 2020 07:16 )  WBC Count : 128.72 K/uL  Hemoglobin : 6.9 g/dL  Hematocrit : 21.7 %  Platelet Count - Automated : 10 K/uL  Mean Cell Volume : 101.4 fl  Mean Cell Hemoglobin : 32.2 pg  Mean Cell Hemoglobin Concentration : 31.8 gm/dL  Auto Neutrophil # : x  Auto Lymphocyte # : x  Auto Monocyte # : x  Auto Eosinophil # : x  Auto Basophil # : x  Auto Neutrophil % : x  Auto Lymphocyte % : x  Auto Monocyte % : x  Auto Eosinophil % : x  Auto Basophil % : x    26 Feb 2020 07:16    139    |  110    |  28     ----------------------------<  205    4.2     |  21     |  1.20     Ca    8.3        26 Feb 2020 07:16  Phos  2.1       26 Feb 2020 07:16  Mg     2.4       26 Feb 2020 07:16    TPro  5.2    /  Alb  2.5    /  TBili  2.2    /  DBili  x      /  AST  21     /  ALT  18     /  AlkPhos  133    26 Feb 2020 07:16        CAPILLARY BLOOD GLUCOSE            Culture - Sputum (collected 02-22-20 @ 20:36)  Source: .Sputum Sputum  Gram Stain (02-22-20 @ 22:48):    Rare polymorphonuclear leukocytes per low power field    Rare Squamous epithelial cells per low power field    Rare Gram Negative Rods per oil power field  Final Report (02-24-20 @ 17:40):    Normal Respiratory Criss present    Culture - Blood (collected 02-22-20 @ 01:08)  Source: .Blood Blood  Preliminary Report (02-23-20 @ 02:02):    No growth to date.    Culture - Blood (collected 02-22-20 @ 01:08)  Source: .Blood Blood  Preliminary Report (02-23-20 @ 02:02):    No growth to date.        RADIOLOGY & ADDITIONAL TESTS:    Personally reviewed.     Consultant(s) Notes Reviewed:  [x] YES  [ ] NO Patient is a 75y old  Male who presents with a chief complaint of weakness, anemia (26 Feb 2020 09:59)      INTERVAL HPI/OVERNIGHT EVENTS: Patient seen and examined at bedside. No overnight events occurred. Patient states feeling weak, dry cough especially at night. Denies fevers, chills. No chest pain, palpitation or shortness of breath.    MEDICATIONS  (STANDING):  cefTRIAXone   IVPB 1000 milliGRAM(s) IV Intermittent every 24 hours  diphenhydrAMINE   Injectable 25 milliGRAM(s) IV Push once  entecavir 0.5 milliGRAM(s) Oral daily  ibrutinib 420 milliGRAM(s) Oral daily  immune   globulin 10% (GAMMAGARD) IVPB 40 Gram(s) IV Intermittent once  pantoprazole  Injectable 40 milliGRAM(s) IV Push daily  predniSONE   Tablet 40 milliGRAM(s) Oral daily    MEDICATIONS  (PRN):  acetaminophen   Tablet .. 650 milliGRAM(s) Oral every 6 hours PRN Mild Pain (1 - 3)  acetaminophen   Tablet .. 650 milliGRAM(s) Oral every 6 hours PRN Temp greater or equal to 38C (100.4F)  albuterol/ipratropium for Nebulization 3 milliLiter(s) Nebulizer every 6 hours PRN Shortness of Breath and/or Wheezing  benzonatate 100 milliGRAM(s) Oral three times a day PRN Cough      Allergies    sulfa drugs (Unknown)    Intolerances    REVIEW OF SYSTEMS:  CONSTITUTIONAL: No fever or chills  HEENT:  No headache, no sore throat  RESPIRATORY: + cough, no wheezing, or shortness of breath  CARDIOVASCULAR: No chest pain, palpitations  GASTROINTESTINAL: No abd pain, nausea, vomiting, or diarrhea  GENITOURINARY: No dysuria, frequency, or hematuria  NEUROLOGICAL: + weakness, no dizziness    Vital Signs Last 24 Hrs  T(C): 36.3 (26 Feb 2020 07:47), Max: 36.8 (25 Feb 2020 15:30)  T(F): 97.3 (26 Feb 2020 07:47), Max: 98.3 (25 Feb 2020 15:30)  HR: 80 (26 Feb 2020 07:47) (77 - 102)  BP: 132/71 (26 Feb 2020 07:47) (110/67 - 144/82)  BP(mean): --  RR: 17 (26 Feb 2020 07:47) (17 - 18)  SpO2: 96% (26 Feb 2020 07:47) (94% - 99%)    PHYSICAL EXAM:  GENERAL: NAD  HEENT:  anicteric, moist mucous membranes  CHEST/LUNG:  CTA b/l, no rales, wheezes, or rhonchi  HEART:  RRR, S1, S2  ABDOMEN:  BS+, soft, nontender, nondistended  EXTREMITIES: 2+ pitting edema on extremities  SKIN: scattered fainted petechiae  NERVOUS SYSTEM: answers questions and follows commands appropriately    LABS:                        6.9    128.72 )-----------( 10       ( 26 Feb 2020 07:16 )             21.7     CBC Full  -  ( 26 Feb 2020 07:16 )  WBC Count : 128.72 K/uL  Hemoglobin : 6.9 g/dL  Hematocrit : 21.7 %  Platelet Count - Automated : 10 K/uL  Mean Cell Volume : 101.4 fl  Mean Cell Hemoglobin : 32.2 pg  Mean Cell Hemoglobin Concentration : 31.8 gm/dL  Auto Neutrophil # : x  Auto Lymphocyte # : x  Auto Monocyte # : x  Auto Eosinophil # : x  Auto Basophil # : x  Auto Neutrophil % : x  Auto Lymphocyte % : x  Auto Monocyte % : x  Auto Eosinophil % : x  Auto Basophil % : x    26 Feb 2020 07:16    139    |  110    |  28     ----------------------------<  205    4.2     |  21     |  1.20     Ca    8.3        26 Feb 2020 07:16  Phos  2.1       26 Feb 2020 07:16  Mg     2.4       26 Feb 2020 07:16    TPro  5.2    /  Alb  2.5    /  TBili  2.2    /  DBili  x      /  AST  21     /  ALT  18     /  AlkPhos  133    26 Feb 2020 07:16    CAPILLARY BLOOD GLUCOSE    Culture - Sputum (collected 02-22-20 @ 20:36)  Source: .Sputum Sputum  Gram Stain (02-22-20 @ 22:48):    Rare polymorphonuclear leukocytes per low power field    Rare Squamous epithelial cells per low power field    Rare Gram Negative Rods per oil power field  Final Report (02-24-20 @ 17:40):    Normal Respiratory Criss present    Culture - Blood (collected 02-22-20 @ 01:08)  Source: .Blood Blood  Preliminary Report (02-23-20 @ 02:02):    No growth to date.    Culture - Blood (collected 02-22-20 @ 01:08)  Source: .Blood Blood  Preliminary Report (02-23-20 @ 02:02):    No growth to date.    RADIOLOGY & ADDITIONAL TESTS:    Personally reviewed.     Consultant(s) Notes Reviewed:  [x] YES  [ ] NO Patient is a 75y old  Male who presents with a chief complaint of weakness, anemia (26 Feb 2020 09:59)      INTERVAL HPI/OVERNIGHT EVENTS: Patient seen and examined at bedside. No overnight events occurred. Patient states feeling weak, dry cough especially at night. Denies fevers, chills. No chest pain, palpitation or shortness of breath.    MEDICATIONS  (STANDING):  cefTRIAXone   IVPB 1000 milliGRAM(s) IV Intermittent every 24 hours  diphenhydrAMINE   Injectable 25 milliGRAM(s) IV Push once  entecavir 0.5 milliGRAM(s) Oral daily  ibrutinib 420 milliGRAM(s) Oral daily  immune   globulin 10% (GAMMAGARD) IVPB 40 Gram(s) IV Intermittent once  pantoprazole  Injectable 40 milliGRAM(s) IV Push daily  predniSONE   Tablet 40 milliGRAM(s) Oral daily    MEDICATIONS  (PRN):  acetaminophen   Tablet .. 650 milliGRAM(s) Oral every 6 hours PRN Mild Pain (1 - 3)  acetaminophen   Tablet .. 650 milliGRAM(s) Oral every 6 hours PRN Temp greater or equal to 38C (100.4F)  albuterol/ipratropium for Nebulization 3 milliLiter(s) Nebulizer every 6 hours PRN Shortness of Breath and/or Wheezing  benzonatate 100 milliGRAM(s) Oral three times a day PRN Cough      Allergies    sulfa drugs (Unknown)    Intolerances    REVIEW OF SYSTEMS:  CONSTITUTIONAL: No fever or chills  HEENT:  No headache, no sore throat  RESPIRATORY: + cough, no wheezing, or shortness of breath  CARDIOVASCULAR: No chest pain, palpitations  GASTROINTESTINAL: No abd pain, nausea, vomiting, or diarrhea  GENITOURINARY: No dysuria, frequency, or hematuria  EXTREMITIES: Admits extremity swelling since being on steroids.   NEUROLOGICAL: + weakness, no dizziness    Vital Signs Last 24 Hrs  T(C): 36.3 (26 Feb 2020 07:47), Max: 36.8 (25 Feb 2020 15:30)  T(F): 97.3 (26 Feb 2020 07:47), Max: 98.3 (25 Feb 2020 15:30)  HR: 80 (26 Feb 2020 07:47) (77 - 102)  BP: 132/71 (26 Feb 2020 07:47) (110/67 - 144/82)  BP(mean): --  RR: 17 (26 Feb 2020 07:47) (17 - 18)  SpO2: 96% (26 Feb 2020 07:47) (94% - 99%)    PHYSICAL EXAM:  GENERAL: NAD, resting comfortably in chair.   HEENT:  anicteric, moist mucous membranes, EOMI  CHEST/LUNG:  CTA b/l, no rales, wheezes, or rhonchi  HEART:  RRR, S1, S2, No murmurs appreciated.   ABDOMEN:  BS+, soft, nontender, nondistended  EXTREMITIES: 2+ pitting edema on extremities  SKIN: scattered fainted petechiae, Ecchymoses at IV sites.    NERVOUS SYSTEM: answers questions and follows commands appropriately    LABS:                        6.9    128.72 )-----------( 10       ( 26 Feb 2020 07:16 )             21.7     CBC Full  -  ( 26 Feb 2020 07:16 )  WBC Count : 128.72 K/uL  Hemoglobin : 6.9 g/dL  Hematocrit : 21.7 %  Platelet Count - Automated : 10 K/uL  Mean Cell Volume : 101.4 fl  Mean Cell Hemoglobin : 32.2 pg  Mean Cell Hemoglobin Concentration : 31.8 gm/dL  Auto Neutrophil # : x  Auto Lymphocyte # : x  Auto Monocyte # : x  Auto Eosinophil # : x  Auto Basophil # : x  Auto Neutrophil % : x  Auto Lymphocyte % : x  Auto Monocyte % : x  Auto Eosinophil % : x  Auto Basophil % : x    26 Feb 2020 07:16    139    |  110    |  28     ----------------------------<  205    4.2     |  21     |  1.20     Ca    8.3        26 Feb 2020 07:16  Phos  2.1       26 Feb 2020 07:16  Mg     2.4       26 Feb 2020 07:16    TPro  5.2    /  Alb  2.5    /  TBili  2.2    /  DBili  x      /  AST  21     /  ALT  18     /  AlkPhos  133    26 Feb 2020 07:16    CAPILLARY BLOOD GLUCOSE    Culture - Sputum (collected 02-22-20 @ 20:36)  Source: .Sputum Sputum  Gram Stain (02-22-20 @ 22:48):    Rare polymorphonuclear leukocytes per low power field    Rare Squamous epithelial cells per low power field    Rare Gram Negative Rods per oil power field  Final Report (02-24-20 @ 17:40):    Normal Respiratory Criss present    Culture - Blood (collected 02-22-20 @ 01:08)  Source: .Blood Blood  Preliminary Report (02-23-20 @ 02:02):    No growth to date.    Culture - Blood (collected 02-22-20 @ 01:08)  Source: .Blood Blood  Preliminary Report (02-23-20 @ 02:02):    No growth to date.    RADIOLOGY & ADDITIONAL TESTS:    Personally reviewed.     Consultant(s) Notes Reviewed:  [x] YES  [ ] NO

## 2020-02-26 NOTE — PROGRESS NOTE ADULT - ASSESSMENT
hepatitis B   elevated lfts   CLL  pneumonia    patient with elevated bilirubin and alk phos at baseline ? leon  suspect prior worsening LFTS 2/2 ceftriaxone  mri showed normal liver, no cbd stone/dilatation  lfts now improving  monitor cbc daily  transfuse as per heme/once  diet as tolerated  will follow    Advanced care planning was discussed with patient and family.  Advanced care planning forms were reviewed and discussed.  Risks, benefits and alternatives of gastroenterologic procedures were discussed in detail and all questions were answered.    30 minutes spent.

## 2020-02-26 NOTE — PROGRESS NOTE ADULT - PROBLEM SELECTOR PLAN 1
challenging to follow wbc (neutrophils/eosinophils) with CLL   pt doing well clinically and afebrile recommend  today can stop ceftriaxone after this last dose (day 5 of abx) and do no recommend additional abx past this point

## 2020-02-26 NOTE — DISCHARGE NOTE PROVIDER - NS AS DC PROVIDER CONTACT Y/N MULTI
INDICATION:  Headache, history of aneurysm.



COMPARISON: Comparison is made with a prior CT angiogram of the brain from February 03, 2016 correlation is also made with a prior CT of the brain from April 25, 2017.



TECHNIQUE: A CT angiogram of the head and neck was performed following intravenous

injection of 80 ml of Visipaque 320 nonionic contrast. Contiguous axial sections were

obtained from the thoracic inlet through the skull vertex. Images were reconstructed in

the coronal and sagittal planes and in a 3-D volume rendered format.  The distal cervical

internal carotid artery diameter is used as the denominator for stenosis measurement.



FINDINGS: 



RIGHT CAROTID:  The common carotid artery appears widely patent. There is moderate

calcific plaque present within the carotid bulb and proximal internal carotid artery

giving rise to approximately a 30% stenosis. The remaining internal carotid artery appears

widely patent.



LEFT CAROTID: The left common carotid artery appears widely patent. There is moderate

calcific plaque present within the carotid bulb and proximal internal carotid artery

giving rise to approximately a 30% stenosis. The remaining internal carotid artery appears

widely patent.



VERTEBRALS: The vertebral arteries appear patent.



CTA BRAIN:  The internal carotid, anterior and middle cerebral arteries appear patent

without evidence for high-grade stenosis or occlusion. There is mild to moderate calcific

plaque present within the cavernous portion of the internal carotid arteries. There are

aneurysm clips present at the skull base which limits the study slightly.



The vertebral, basilar and posterior cerebral arteries appear patent without evidence for

high-grade stenosis or occlusion.  



There is a focal area of encephalomalacia involving the anterior aspect of the right

temporal lobe and the adjacent right frontal lobe. No other focal abnormalities are seen.



No aneurysm or vascular malformation is seen. 



NECK:  No significant enlarged lymph nodes are seen within the neck. The thyroid, parotid

and submandibular glands appear to be within normal limits.



Mild interstitial changes noted at the lung apices which otherwise appear clear.



The sinuses are clear. The patient is status post right frontal craniotomy.



IMPRESSION:  

1. NO EVIDENCE FOR HEMODYNAMICALLY SIGNIFICANT CAROTID STENOSIS.

2. NO EVIDENCE FOR RECURRENT ANEURYSM.

3. STUDY SLIGHTLY LIMITED DUE TO ARTIFACT FROM ANEURYSM CLIPS.



CPT II Codes: 3100F Yes

## 2020-02-26 NOTE — PROGRESS NOTE ADULT - SUBJECTIVE AND OBJECTIVE BOX
infectious diseases progress note:    DIONICIO SULLIVAN is a 75y y. o. Male patient    Patient reports: "feeling better and coughing less"    ROS:    EYES:  Negative  blurry vision or double vision  GASTROINTESTINAL:  Negative for nausea, vomiting, diarrhea  -otherwise negative except for subjective    Allergies    sulfa drugs (Unknown)    Intolerances        ANTIBIOTICS/RELEVANT:  antimicrobials  cefTRIAXone   IVPB 1000 milliGRAM(s) IV Intermittent every 24 hours  entecavir 0.5 milliGRAM(s) Oral daily    immunologic:  immune   globulin 10% (GAMMAGARD) IVPB 40 Gram(s) IV Intermittent once    OTHER:  acetaminophen   Tablet .. 650 milliGRAM(s) Oral every 6 hours PRN  acetaminophen   Tablet .. 650 milliGRAM(s) Oral every 6 hours PRN  albuterol/ipratropium for Nebulization 3 milliLiter(s) Nebulizer every 6 hours PRN  benzonatate 100 milliGRAM(s) Oral three times a day PRN  diphenhydrAMINE   Injectable 25 milliGRAM(s) IV Push once  ibrutinib 420 milliGRAM(s) Oral daily  pantoprazole  Injectable 40 milliGRAM(s) IV Push daily  predniSONE   Tablet 40 milliGRAM(s) Oral daily      Objective:  Last 24-Vital Signs Last 24 Hrs  T(C): 36.3 (26 Feb 2020 07:47), Max: 36.8 (25 Feb 2020 15:30)  T(F): 97.3 (26 Feb 2020 07:47), Max: 98.3 (25 Feb 2020 15:30)  HR: 80 (26 Feb 2020 07:47) (77 - 96)  BP: 132/71 (26 Feb 2020 07:47) (127/75 - 144/82)  BP(mean): --  RR: 17 (26 Feb 2020 07:47) (17 - 18)  SpO2: 96% (26 Feb 2020 07:47) (94% - 98%)    T(C): 36.3 (02-26-20 @ 07:47), Max: 36.8 (02-25-20 @ 15:30)  T(F): 97.3 (02-26-20 @ 07:47), Max: 98.3 (02-25-20 @ 15:30)  T(C): 36.3 (02-26-20 @ 07:47), Max: 36.8 (02-23-20 @ 11:54)  T(F): 97.3 (02-26-20 @ 07:47), Max: 98.3 (02-25-20 @ 15:30)  T(C): 36.3 (02-26-20 @ 07:47), Max: 36.8 (02-22-20 @ 19:53)  T(F): 97.3 (02-26-20 @ 07:47), Max: 98.3 (02-22-20 @ 19:53)    PHYSICAL EXAM:  Constitutional: Well-developed, well nourished  Eyes: PERRLA, EOMI  Ear/Nose/Throat: oropharynx normal	  Neck: no JVD, no lymphadenopathy, supple  Respiratory: no accessory muscle use, lung fields bilaterally clear  Cardiovascular: RRR, normal S1, S2 no m/r/g  Gastrointestinal: soft, NT, no HSM, BS-normal  Extremities: no clubbing, no cyanosis, edema absent  Neuro: patient alert, oriented and appropriate  Skin: no sig lesions      LABS:                        6.9    128.72 )-----------( 10       ( 26 Feb 2020 07:16 )             21.7       .72  02-26 @ 07:16  .29  02-25 @ 06:59  .16  02-24 @ 06:40  .59  02-23 @ 07:06  WBC 78.39  02-22 @ 06:06  .14  02-21 @ 20:29      02-26    139  |  110<H>  |  28<H>  ----------------------------<  205<H>  4.2   |  21<L>  |  1.20    Ca    8.3<L>      26 Feb 2020 07:16  Phos  2.1     02-26  Mg     2.4     02-26    TPro  5.2<L>  /  Alb  2.5<L>  /  TBili  2.2<H>  /  DBili  x   /  AST  21  /  ALT  18  /  AlkPhos  133<H>  02-26      Creatinine, Serum: 1.20 mg/dL (02-26-20 @ 07:16)  Creatinine, Serum: 1.10 mg/dL (02-25-20 @ 06:59)  Creatinine, Serum: 1.10 mg/dL (02-24-20 @ 06:39)  Creatinine, Serum: 1.20 mg/dL (02-23-20 @ 07:06)  Creatinine, Serum: 1.50 mg/dL (02-22-20 @ 06:06)  Creatinine, Serum: 1.50 mg/dL (02-21-20 @ 20:29)                MICROBIOLOGY:              RADIOLOGY & ADDITIONAL STUDIES:

## 2020-02-26 NOTE — PROGRESS NOTE ADULT - PROBLEM SELECTOR PLAN 7
Pt states that lower ext edema occurs while on prednisone  - Checked venous doppler - negative  - ECHO 2/2018 showed Preserved left ventricular systolic function. Stage I   diastolic dysfunction. EF 65% -- will repeat TTE  - Caution with IVF  - Strict I&O's  - daily weights  - SCDs Pt states that lower ext edema occurs while on prednisone  - Checked venous doppler - negative  - ECHO 2/2018 showed Preserved left ventricular systolic function. Stage I   diastolic dysfunction. EF 65% -- will repeat TTE  - albumin level has been low might contribute to edema  - Caution with IVF  - Strict I&O's  - daily weights  - SCDs

## 2020-02-26 NOTE — PROGRESS NOTE ADULT - PROBLEM SELECTOR PLAN 3
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 Was on prednisone 10mg daily -- heme rec to increase to prednisone 40mg po daily.  - States that baseline hgb ~14  - Trend H/H. 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 Was on prednisone 10mg daily -- heme rec to increase to prednisone 40mg po daily. 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 Was on prednisone 10mg daily -- heme rec to increase to prednisone 40mg po daily.  - will transfuse 1 unit of PRBC today, follow H/H at 8:00pm.  - Follow PRBC with IVIg

## 2020-02-26 NOTE — PROGRESS NOTE ADULT - PROBLEM SELECTOR PLAN 4
Per chart review, platelet count steadily declining since 2018  - Plt 9 today - transfused 1un ; (required 1unit of platelets on 02/22, as well, as pt was below 10,000.   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 Continue folate 1mg daily for now. Per patient, he stopped taking b12 when he finished taking tamiflu. - Per chart review, platelet count steadily declining since 2018  - Plt 10 today s/p 2 units of platelets transfusion  - Heme/Onc consults (Dr. Vazquez) considering ITP possible cause of no response to plt transfusion  - one dose of IVIG premed with benadryl  - transfuse 1 unit of plt after IVIG  - Heme/Onc recs reviewed and appreciated

## 2020-02-26 NOTE — PROGRESS NOTE ADULT - PROBLEM SELECTOR PLAN 1
- suspected gram-negative PNA   - in setting of post-influenza bacterial pneumonia, ruled out staph with nares swab which was negative for MRSA/MSSA.  - c/w rocephin per ID recs  - urine legionella Ag and strep pneumo Ag negative ; sputum Cx: normal resp marnie - RVP negative  - CT Chest showing multilobar patchy peribronchovascular airspace opacities and more dense consolidative process in the right middle lobe likely reflecting multifocal pneumonia, trace b/l pleural effusions  - fever resolving, tylenol PRN for fever  - duonebs PRN for wheezing  - WBC very labile and difficult to gauge utility  - ID Dr. Webber consulted, recs appreciated - CT Chest showing multilobar patchy peribronchovascular airspace opacities and more dense consolidative process in the right middle lobe likely reflecting multifocal pneumonia, trace b/l pleural effusions  - nares swab negative for MRSA/MSSA  - urine legionella Ag and strep pneumo Ag negative   - sputum Cx: normal resp marnie   - RVP negative  - c/w rocephin per ID recs  - fever resolving, tylenol PRN for fever  - duonebs PRN for wheezing  - WBC very labile and difficult to gauge utility  - ID Dr. Webber consulted, recs appreciated - CT Chest showing multilobar patchy peribronchovascular airspace opacities and more dense consolidative process in the right middle lobe likely reflecting multifocal pneumonia, trace b/l pleural effusions  - nares swab negative for MRSA/MSSA  - urine legionella Ag and strep pneumo Ag negative   - sputum Cx: normal resp marnie   - RVP negative  - clinically improved, DC rocephin today per ID recs  - fever resolving, tylenol PRN for fever  - duonebs PRN for wheezing  - WBC very labile and difficult to gauge utility  - ID Dr. Webber consulted, recs appreciated - CT Chest showing multilobar patchy peribronchovascular airspace opacities and more dense consolidative process in the right middle lobe likely reflecting multifocal pneumonia, trace b/l pleural effusions  - nares swab negative for MRSA/MSSA  - urine legionella Ag and strep pneumo Ag negative   - sputum Cx: normal resp marnie   - RVP negative  - clinically improved, DC rocephin today per ID recs - No additional abx needed.   - fever resolving, tylenol PRN for fever  - duonebs PRN for wheezing  - WBC very labile and difficult to gauge utility  - ID Dr. Webber consulted, recs appreciated

## 2020-02-27 LAB
ALBUMIN SERPL ELPH-MCNC: 2.4 G/DL — LOW (ref 3.3–5)
ALP SERPL-CCNC: 122 U/L — HIGH (ref 40–120)
ALT FLD-CCNC: 17 U/L — SIGNIFICANT CHANGE UP (ref 12–78)
ANION GAP SERPL CALC-SCNC: 9 MMOL/L — SIGNIFICANT CHANGE UP (ref 5–17)
AST SERPL-CCNC: 18 U/L — SIGNIFICANT CHANGE UP (ref 15–37)
BASOPHILS # BLD AUTO: 0.04 K/UL — SIGNIFICANT CHANGE UP (ref 0–0.2)
BASOPHILS NFR BLD AUTO: 0 % — SIGNIFICANT CHANGE UP (ref 0–2)
BILIRUB SERPL-MCNC: 2.4 MG/DL — HIGH (ref 0.2–1.2)
BUN SERPL-MCNC: 28 MG/DL — HIGH (ref 7–23)
CALCIUM SERPL-MCNC: 8 MG/DL — LOW (ref 8.5–10.1)
CHLORIDE SERPL-SCNC: 111 MMOL/L — HIGH (ref 96–108)
CO2 SERPL-SCNC: 19 MMOL/L — LOW (ref 22–31)
CREAT SERPL-MCNC: 1.1 MG/DL — SIGNIFICANT CHANGE UP (ref 0.5–1.3)
CULTURE RESULTS: SIGNIFICANT CHANGE UP
CULTURE RESULTS: SIGNIFICANT CHANGE UP
EOSINOPHIL # BLD AUTO: 0.01 K/UL — SIGNIFICANT CHANGE UP (ref 0–0.5)
EOSINOPHIL NFR BLD AUTO: 0 % — SIGNIFICANT CHANGE UP (ref 0–6)
GLUCOSE SERPL-MCNC: 144 MG/DL — HIGH (ref 70–99)
HBV DNA # SERPL NAA+PROBE: SIGNIFICANT CHANGE UP IU/ML
HBV DNA SERPL NAA+PROBE-LOG#: SIGNIFICANT CHANGE UP LOG10IU/ML
HCT VFR BLD CALC: 23.1 % — LOW (ref 39–50)
HGB BLD-MCNC: 7.4 G/DL — LOW (ref 13–17)
IMM GRANULOCYTES NFR BLD AUTO: 0.4 % — SIGNIFICANT CHANGE UP (ref 0–1.5)
LYMPHOCYTES # BLD AUTO: 115.22 K/UL — HIGH (ref 1–3.3)
LYMPHOCYTES # BLD AUTO: 81.2 % — HIGH (ref 13–44)
MCHC RBC-ENTMCNC: 31.9 PG — SIGNIFICANT CHANGE UP (ref 27–34)
MCHC RBC-ENTMCNC: 32 GM/DL — SIGNIFICANT CHANGE UP (ref 32–36)
MCV RBC AUTO: 99.6 FL — SIGNIFICANT CHANGE UP (ref 80–100)
MONOCYTES # BLD AUTO: 23.74 K/UL — HIGH (ref 0–0.9)
MONOCYTES NFR BLD AUTO: 16.7 % — HIGH (ref 2–14)
NEUTROPHILS # BLD AUTO: 2.41 K/UL — SIGNIFICANT CHANGE UP (ref 1.8–7.4)
NEUTROPHILS NFR BLD AUTO: 1.7 % — LOW (ref 43–77)
NRBC # BLD: 0 /100 WBCS — SIGNIFICANT CHANGE UP (ref 0–0)
PLATELET # BLD AUTO: 25 K/UL — LOW (ref 150–400)
POTASSIUM SERPL-MCNC: 3.5 MMOL/L — SIGNIFICANT CHANGE UP (ref 3.5–5.3)
POTASSIUM SERPL-SCNC: 3.5 MMOL/L — SIGNIFICANT CHANGE UP (ref 3.5–5.3)
PROT SERPL-MCNC: 5.9 G/DL — LOW (ref 6–8.3)
RBC # BLD: 2.32 M/UL — LOW (ref 4.2–5.8)
RBC # FLD: 21.7 % — HIGH (ref 10.3–14.5)
SODIUM SERPL-SCNC: 139 MMOL/L — SIGNIFICANT CHANGE UP (ref 135–145)
SPECIMEN SOURCE: SIGNIFICANT CHANGE UP
SPECIMEN SOURCE: SIGNIFICANT CHANGE UP
WBC # BLD: 141.98 K/UL — CRITICAL HIGH (ref 3.8–10.5)
WBC # FLD AUTO: 141.98 K/UL — CRITICAL HIGH (ref 3.8–10.5)

## 2020-02-27 PROCEDURE — 99232 SBSQ HOSP IP/OBS MODERATE 35: CPT

## 2020-02-27 RX ORDER — PANTOPRAZOLE SODIUM 20 MG/1
40 TABLET, DELAYED RELEASE ORAL
Refills: 0 | Status: DISCONTINUED | OUTPATIENT
Start: 2020-02-27 | End: 2020-04-04

## 2020-02-27 RX ADMIN — IBRUTINIB 420 MILLIGRAM(S): 140 TABLET, FILM COATED ORAL at 17:57

## 2020-02-27 RX ADMIN — Medication 40 MILLIGRAM(S): at 06:56

## 2020-02-27 RX ADMIN — ENTECAVIR 0.5 MILLIGRAM(S): 0.5 TABLET ORAL at 06:57

## 2020-02-27 RX ADMIN — PANTOPRAZOLE SODIUM 40 MILLIGRAM(S): 20 TABLET, DELAYED RELEASE ORAL at 09:29

## 2020-02-27 NOTE — PROGRESS NOTE ADULT - SUBJECTIVE AND OBJECTIVE BOX
Patient is a 75y old  Male who presents with a chief complaint of weakness, anemia (27 Feb 2020 11:28)      INTERVAL HPI/OVERNIGHT EVENTS: Patient seen and examined at bedside. No overnight events occurred. Patient has no complains of lingering cough. Denies fevers, chills, headache, lightheadedness, chest pain, dyspnea, abdominal pain, n/v/d/c.    MEDICATIONS  (STANDING):  entecavir 0.5 milliGRAM(s) Oral daily  ibrutinib 420 milliGRAM(s) Oral daily  pantoprazole    Tablet 40 milliGRAM(s) Oral before breakfast  predniSONE   Tablet 40 milliGRAM(s) Oral daily    MEDICATIONS  (PRN):  acetaminophen   Tablet .. 650 milliGRAM(s) Oral every 6 hours PRN Mild Pain (1 - 3)  acetaminophen   Tablet .. 650 milliGRAM(s) Oral every 6 hours PRN Temp greater or equal to 38C (100.4F)  albuterol/ipratropium for Nebulization 3 milliLiter(s) Nebulizer every 6 hours PRN Shortness of Breath and/or Wheezing  benzonatate 100 milliGRAM(s) Oral three times a day PRN Cough  guaiFENesin   Syrup  (Sugar-Free) 200 milliGRAM(s) Oral every 6 hours PRN Cough      Allergies    sulfa drugs (Unknown)    Intolerances        REVIEW OF SYSTEMS:  CONSTITUTIONAL: No fever or chills  HEENT:  No headache, no sore throat  RESPIRATORY: Admits cough. Denies wheezing, or shortness of breath  CARDIOVASCULAR: No chest pain, palpitations  GASTROINTESTINAL: No abd pain, nausea, vomiting, or diarrhea  GENITOURINARY: No dysuria, frequency, or hematuria  NEUROLOGICAL: no focal weakness or dizziness  MUSCULOSKELETAL: no myalgias     Vital Signs Last 24 Hrs  T(C): 36.3 (27 Feb 2020 11:21), Max: 36.9 (27 Feb 2020 08:01)  T(F): 97.4 (27 Feb 2020 11:21), Max: 98.5 (27 Feb 2020 08:01)  HR: 86 (27 Feb 2020 11:21) (71 - 86)  BP: 142/76 (27 Feb 2020 11:21) (130/65 - 155/84)  BP(mean): --  RR: 20 (27 Feb 2020 11:21) (18 - 20)  SpO2: 98% (27 Feb 2020 11:21) (97% - 98%)    PHYSICAL EXAM:  GENERAL: NAD, resting comfortably in chair  HEENT:  anicteric, moist mucous membranes, EOMI  CHEST/LUNG:  CTA b/l, no rales, wheezes, or rhonchi  HEART:  RRR, S1, S2, No murmurs appreciated.   ABDOMEN:  BS+, soft, nontender, nondistended  EXTREMITIES: no edema, cyanosis, or calf tenderness  NERVOUS SYSTEM: answers questions and follows commands appropriately    LABS:                        7.4    141.98 )-----------( 25       ( 27 Feb 2020 06:37 )             23.1     CBC Full  -  ( 27 Feb 2020 06:37 )  WBC Count : 141.98 K/uL  Hemoglobin : 7.4 g/dL  Hematocrit : 23.1 %  Platelet Count - Automated : 25 K/uL  Mean Cell Volume : 99.6 fl  Mean Cell Hemoglobin : 31.9 pg  Mean Cell Hemoglobin Concentration : 32.0 gm/dL  Auto Neutrophil # : 2.41 K/uL  Auto Lymphocyte # : 115.22 K/uL  Auto Monocyte # : 23.74 K/uL  Auto Eosinophil # : 0.01 K/uL  Auto Basophil # : 0.04 K/uL  Auto Neutrophil % : 1.7 %  Auto Lymphocyte % : 81.2 %  Auto Monocyte % : 16.7 %  Auto Eosinophil % : 0.0 %  Auto Basophil % : 0.0 %    27 Feb 2020 06:37    139    |  111    |  28     ----------------------------<  144    3.5     |  19     |  1.10     Ca    8.0        27 Feb 2020 06:37    TPro  5.9    /  Alb  2.4    /  TBili  2.4    /  DBili  x      /  AST  18     /  ALT  17     /  AlkPhos  122    27 Feb 2020 06:37        CAPILLARY BLOOD GLUCOSE            Culture - Sputum (collected 02-22-20 @ 20:36)  Source: .Sputum Sputum  Gram Stain (02-22-20 @ 22:48):    Rare polymorphonuclear leukocytes per low power field    Rare Squamous epithelial cells per low power field    Rare Gram Negative Rods per oil power field  Final Report (02-24-20 @ 17:40):    Normal Respiratory Criss present    Culture - Blood (collected 02-22-20 @ 01:08)  Source: .Blood Blood  Final Report (02-27-20 @ 02:01):    No growth at 5 days.    Culture - Blood (collected 02-22-20 @ 01:08)  Source: .Blood Blood  Final Report (02-27-20 @ 02:00):    No growth at 5 days.        RADIOLOGY & ADDITIONAL TESTS:    Personally reviewed.     Consultant(s) Notes Reviewed:  [x] YES  [ ] NO Patient is a 75y old  Male who presents with a chief complaint of weakness, anemia (27 Feb 2020 11:28)      INTERVAL HPI/OVERNIGHT EVENTS: Patient seen and examined at bedside. No overnight events occurred. Patient has no complains of lingering cough. Denies fevers, chills, headache, lightheadedness, chest pain, dyspnea, abdominal pain, n/v/d/c.    MEDICATIONS  (STANDING):  entecavir 0.5 milliGRAM(s) Oral daily  ibrutinib 420 milliGRAM(s) Oral daily  pantoprazole    Tablet 40 milliGRAM(s) Oral before breakfast  predniSONE   Tablet 40 milliGRAM(s) Oral daily    MEDICATIONS  (PRN):  acetaminophen   Tablet .. 650 milliGRAM(s) Oral every 6 hours PRN Mild Pain (1 - 3)  acetaminophen   Tablet .. 650 milliGRAM(s) Oral every 6 hours PRN Temp greater or equal to 38C (100.4F)  albuterol/ipratropium for Nebulization 3 milliLiter(s) Nebulizer every 6 hours PRN Shortness of Breath and/or Wheezing  benzonatate 100 milliGRAM(s) Oral three times a day PRN Cough  guaiFENesin   Syrup  (Sugar-Free) 200 milliGRAM(s) Oral every 6 hours PRN Cough      Allergies    sulfa drugs (Unknown)    Intolerances        REVIEW OF SYSTEMS:  CONSTITUTIONAL: No fever or chills  HEENT:  No headache, no sore throat  RESPIRATORY: Admits cough. Denies wheezing, or shortness of breath  CARDIOVASCULAR: No chest pain, palpitations  GASTROINTESTINAL: No abd pain, nausea, vomiting, or diarrhea  GENITOURINARY: No dysuria, frequency, or hematuria  NEUROLOGICAL: no focal weakness or dizziness  MUSCULOSKELETAL: no myalgias     Vital Signs Last 24 Hrs  T(C): 36.3 (27 Feb 2020 11:21), Max: 36.9 (27 Feb 2020 08:01)  T(F): 97.4 (27 Feb 2020 11:21), Max: 98.5 (27 Feb 2020 08:01)  HR: 86 (27 Feb 2020 11:21) (71 - 86)  BP: 142/76 (27 Feb 2020 11:21) (130/65 - 155/84)  BP(mean): --  RR: 20 (27 Feb 2020 11:21) (18 - 20)  SpO2: 98% (27 Feb 2020 11:21) (97% - 98%)    PHYSICAL EXAM:  GENERAL: NAD, resting comfortably in chair  HEENT:  anicteric, moist mucous membranes, EOMI  CHEST/LUNG:  CTA b/l, no rales, wheezes, or rhonchi  HEART:  RRR, S1, S2, No murmurs appreciated.   ABDOMEN:  BS+, soft, nontender, nondistended  EXTREMITIES: + edema B/l LE, cyanosis, or calf tenderness  NERVOUS SYSTEM: answers questions and follows commands appropriately    LABS:                        7.4    141.98 )-----------( 25       ( 27 Feb 2020 06:37 )             23.1     CBC Full  -  ( 27 Feb 2020 06:37 )  WBC Count : 141.98 K/uL  Hemoglobin : 7.4 g/dL  Hematocrit : 23.1 %  Platelet Count - Automated : 25 K/uL  Mean Cell Volume : 99.6 fl  Mean Cell Hemoglobin : 31.9 pg  Mean Cell Hemoglobin Concentration : 32.0 gm/dL  Auto Neutrophil # : 2.41 K/uL  Auto Lymphocyte # : 115.22 K/uL  Auto Monocyte # : 23.74 K/uL  Auto Eosinophil # : 0.01 K/uL  Auto Basophil # : 0.04 K/uL  Auto Neutrophil % : 1.7 %  Auto Lymphocyte % : 81.2 %  Auto Monocyte % : 16.7 %  Auto Eosinophil % : 0.0 %  Auto Basophil % : 0.0 %    27 Feb 2020 06:37    139    |  111    |  28     ----------------------------<  144    3.5     |  19     |  1.10     Ca    8.0        27 Feb 2020 06:37    TPro  5.9    /  Alb  2.4    /  TBili  2.4    /  DBili  x      /  AST  18     /  ALT  17     /  AlkPhos  122    27 Feb 2020 06:37        CAPILLARY BLOOD GLUCOSE            Culture - Sputum (collected 02-22-20 @ 20:36)  Source: .Sputum Sputum  Gram Stain (02-22-20 @ 22:48):    Rare polymorphonuclear leukocytes per low power field    Rare Squamous epithelial cells per low power field    Rare Gram Negative Rods per oil power field  Final Report (02-24-20 @ 17:40):    Normal Respiratory Criss present    Culture - Blood (collected 02-22-20 @ 01:08)  Source: .Blood Blood  Final Report (02-27-20 @ 02:01):    No growth at 5 days.    Culture - Blood (collected 02-22-20 @ 01:08)  Source: .Blood Blood  Final Report (02-27-20 @ 02:00):    No growth at 5 days.        RADIOLOGY & ADDITIONAL TESTS:    Personally reviewed.     Consultant(s) Notes Reviewed:  [x] YES  [ ] NO

## 2020-02-27 NOTE — PROGRESS NOTE ADULT - SUBJECTIVE AND OBJECTIVE BOX
infectious diseases progress note:    DIONICIO SULLIVAN is a 75y y. o. Male patient    Patient reports: "feeling better but still coughing"    ROS:    EYES:  Negative  blurry vision or double vision  GASTROINTESTINAL:  Negative for nausea, vomiting, diarrhea  -otherwise negative except for subjective    Allergies    sulfa drugs (Unknown)    Intolerances        ANTIBIOTICS/RELEVANT:  antimicrobials  entecavir 0.5 milliGRAM(s) Oral daily    immunologic:    OTHER:  acetaminophen   Tablet .. 650 milliGRAM(s) Oral every 6 hours PRN  acetaminophen   Tablet .. 650 milliGRAM(s) Oral every 6 hours PRN  albuterol/ipratropium for Nebulization 3 milliLiter(s) Nebulizer every 6 hours PRN  benzonatate 100 milliGRAM(s) Oral three times a day PRN  guaiFENesin   Syrup  (Sugar-Free) 200 milliGRAM(s) Oral every 6 hours PRN  ibrutinib 420 milliGRAM(s) Oral daily  pantoprazole    Tablet 40 milliGRAM(s) Oral before breakfast  predniSONE   Tablet 40 milliGRAM(s) Oral daily      Objective:  Last 24-Vital Signs Last 24 Hrs  T(C): 36.3 (27 Feb 2020 11:21), Max: 36.9 (27 Feb 2020 08:01)  T(F): 97.4 (27 Feb 2020 11:21), Max: 98.5 (27 Feb 2020 08:01)  HR: 86 (27 Feb 2020 11:21) (71 - 86)  BP: 142/76 (27 Feb 2020 11:21) (130/65 - 155/84)  BP(mean): --  RR: 20 (27 Feb 2020 11:21) (16 - 20)  SpO2: 98% (27 Feb 2020 11:21) (97% - 98%)    T(C): 36.3 (02-27-20 @ 11:21), Max: 36.9 (02-27-20 @ 08:01)  T(F): 97.4 (02-27-20 @ 11:21), Max: 98.5 (02-27-20 @ 08:01)  T(C): 36.3 (02-27-20 @ 11:21), Max: 36.9 (02-27-20 @ 08:01)  T(F): 97.4 (02-27-20 @ 11:21), Max: 98.5 (02-27-20 @ 08:01)  T(C): 36.3 (02-27-20 @ 11:21), Max: 36.9 (02-27-20 @ 08:01)  T(F): 97.4 (02-27-20 @ 11:21), Max: 98.5 (02-27-20 @ 08:01)    PHYSICAL EXAM:  Constitutional: Well-developed, well nourished  Eyes: PERRLA, EOMI  Ear/Nose/Throat: oropharynx normal	  Neck: no JVD, no lymphadenopathy, supple  Respiratory: no accessory muscle use, lung fields bilaterally clear  Cardiovascular: RRR, normal S1, S2 no m/r/g  Gastrointestinal: soft, NT, no HSM, BS-normal  Extremities: no clubbing, no cyanosis, edema absent  Neuro: patient alert, oriented and appropriate  Skin: no sig lesions      LABS:                        7.4    141.98 )-----------( 25       ( 27 Feb 2020 06:37 )             23.1       .98  02-27 @ 06:37  .94  02-26 @ 22:51  .72  02-26 @ 07:16  .29  02-25 @ 06:59  .16  02-24 @ 06:40  .59  02-23 @ 07:06  WBC 78.39  02-22 @ 06:06  .14  02-21 @ 20:29      02-27    139  |  111<H>  |  28<H>  ----------------------------<  144<H>  3.5   |  19<L>  |  1.10    Ca    8.0<L>      27 Feb 2020 06:37  Phos  2.1     02-26  Mg     2.4     02-26    TPro  5.9<L>  /  Alb  2.4<L>  /  TBili  2.4<H>  /  DBili  x   /  AST  18  /  ALT  17  /  AlkPhos  122<H>  02-27      Creatinine, Serum: 1.10 mg/dL (02-27-20 @ 06:37)  Creatinine, Serum: 1.20 mg/dL (02-26-20 @ 07:16)  Creatinine, Serum: 1.10 mg/dL (02-25-20 @ 06:59)  Creatinine, Serum: 1.10 mg/dL (02-24-20 @ 06:39)  Creatinine, Serum: 1.20 mg/dL (02-23-20 @ 07:06)  Creatinine, Serum: 1.50 mg/dL (02-22-20 @ 06:06)  Creatinine, Serum: 1.50 mg/dL (02-21-20 @ 20:29)                MICROBIOLOGY:              RADIOLOGY & ADDITIONAL STUDIES:

## 2020-02-27 NOTE — PROGRESS NOTE ADULT - SUBJECTIVE AND OBJECTIVE BOX
INTERVAL HPI/OVERNIGHT EVENTS:  pt seen and examined  denies n/v/abd pain  no s/s overt gib per pt/rn  tolerating po      MEDICATIONS  (STANDING):  cefTRIAXone   IVPB 1000 milliGRAM(s) IV Intermittent every 24 hours  entecavir 0.5 milliGRAM(s) Oral daily  ibrutinib 420 milliGRAM(s) Oral daily  pantoprazole  Injectable 40 milliGRAM(s) IV Push daily  predniSONE   Tablet 40 milliGRAM(s) Oral daily    MEDICATIONS  (PRN):  acetaminophen   Tablet .. 650 milliGRAM(s) Oral every 6 hours PRN Mild Pain (1 - 3)  acetaminophen   Tablet .. 650 milliGRAM(s) Oral every 6 hours PRN Temp greater or equal to 38C (100.4F)  albuterol/ipratropium for Nebulization 3 milliLiter(s) Nebulizer every 6 hours PRN Shortness of Breath and/or Wheezing  benzonatate 100 milliGRAM(s) Oral three times a day PRN Cough      Allergies    sulfa drugs (Unknown)    Intolerances        Review of Systems:    General:  No wt loss, fevers, chills, night sweats, fatigue   Eyes:  Good vision, no reported pain  ENT:  No sore throat, pain, runny nose, dysphagia  CV:  No pain, palpitations, hypo/hypertension  Resp:  No dyspnea, cough, tachypnea, wheezing  GI:  No pain, No nausea, No vomiting, No diarrhea, No constipation, No weight loss, No fever, No pruritis, No rectal bleeding, No melena, No dysphagia  :  No pain, bleeding, incontinence, nocturia  Muscle:  No pain, weakness  Neuro:  No weakness, tingling, memory problems  Psych:  No fatigue, insomnia, mood problems, depression  Endocrine:  No polyuria, polydypsia, cold/heat intolerance  Heme:  No petechiae, ecchymosis, easy bruisability  Skin:  No rash, tattoos, scars, edema      Vital Signs Last 24 Hrs  T(C): 36.3 (26 Feb 2020 07:47), Max: 36.8 (25 Feb 2020 15:30)  T(F): 97.3 (26 Feb 2020 07:47), Max: 98.3 (25 Feb 2020 15:30)  HR: 80 (26 Feb 2020 07:47) (77 - 102)  BP: 132/71 (26 Feb 2020 07:47) (110/67 - 144/82)  BP(mean): --  RR: 17 (26 Feb 2020 07:47) (17 - 18)  SpO2: 96% (26 Feb 2020 07:47) (94% - 99%)    PHYSICAL EXAM:  Constitutional: sitting up in bed  HEENT: ncat  Neck: No LAD  Gastrointestinal: soft nt mild dt rash to abd  Extremities: edema  Neurological: Awake alert responds appropriately    LABS:                        6.9    128.72 )-----------( 10       ( 26 Feb 2020 07:16 )             21.7     02-26    139  |  110<H>  |  28<H>  ----------------------------<  205<H>  4.2   |  21<L>  |  1.20    Ca    8.3<L>      26 Feb 2020 07:16  Phos  2.1     02-26  Mg     2.4     02-26    TPro  5.2<L>  /  Alb  2.5<L>  /  TBili  2.2<H>  /  DBili  x   /  AST  21  /  ALT  18  /  AlkPhos  133<H>  02-26          RADIOLOGY & ADDITIONAL TESTS:  < from: MR MRCP w/wo IV Cont (02.25.20 @ 08:34) >    EXAM:  MR MRCP [facility] IC                            PROCEDURE DATE:  02/25/2020          INTERPRETATION:  History: Leukemia elevated bilirubin.    MRCP and multiphasic abdominal MR without with IV contrast.  8 cc Gadavist injected intravenously.  Contracted gallbladder with stones. No significant biliary dilatation. Common duct 7 mm normal for age. No common duct stone or stricture. Liver pancreas not remarkable. Spleen slightly enlarged measuring up to 15 cm long dimension.  No adrenal nodules. Left renal cysts.  There is  moderate retrocrural, periportal portacaval and retroperitoneal para-aortic lymphadenopathy. The largest retroperitoneal lymph nodes measure on the order of 2 cm.  No ascites.  Normal marrow signal.  Incidental nonspecific airspace opacities at the lung bases bilaterally.    Impression:  Contracted gallbladder with stones.  No biliary dilatation or common duct stone.  Splenomegaly.  Multistation adenopathy as described.                PANTERA CALVILLO M.D., ATTENDING RADIOLOGIST  This document has been electronically signed. Feb 25 2020  9:49AM                < end of copied text >

## 2020-02-27 NOTE — PROGRESS NOTE ADULT - SUBJECTIVE AND OBJECTIVE BOX
Interval History:  no new complaints. still weak.   Chart reviewed and events noted;   Overnight events:    MEDICATIONS  (STANDING):  entecavir 0.5 milliGRAM(s) Oral daily  ibrutinib 420 milliGRAM(s) Oral daily  pantoprazole    Tablet 40 milliGRAM(s) Oral before breakfast  predniSONE   Tablet 40 milliGRAM(s) Oral daily    MEDICATIONS  (PRN):  acetaminophen   Tablet .. 650 milliGRAM(s) Oral every 6 hours PRN Mild Pain (1 - 3)  acetaminophen   Tablet .. 650 milliGRAM(s) Oral every 6 hours PRN Temp greater or equal to 38C (100.4F)  albuterol/ipratropium for Nebulization 3 milliLiter(s) Nebulizer every 6 hours PRN Shortness of Breath and/or Wheezing  benzonatate 100 milliGRAM(s) Oral three times a day PRN Cough  guaiFENesin   Syrup  (Sugar-Free) 200 milliGRAM(s) Oral every 6 hours PRN Cough      Vital Signs Last 24 Hrs  T(C): 36.3 (27 Feb 2020 11:21), Max: 36.9 (27 Feb 2020 08:01)  T(F): 97.4 (27 Feb 2020 11:21), Max: 98.5 (27 Feb 2020 08:01)  HR: 86 (27 Feb 2020 11:21) (71 - 86)  BP: 142/76 (27 Feb 2020 11:21) (130/65 - 155/84)  BP(mean): --  RR: 20 (27 Feb 2020 11:21) (16 - 20)  SpO2: 98% (27 Feb 2020 11:21) (97% - 98%)    PHYSICAL EXAM  General: adult in NAD, chronically ill appearing  HEENT: clear oropharynx, anicteric sclera, pink conjunctivae  Neck: supple  CV: normal S1S2 with no murmur rubs or gallops  Lungs: clear to auscultation, no wheezes, no rhales  Abdomen: soft non-tender non-distended, no hepato/splenomegaly  Ext: no clubbing cyanosis or edema  Skin: no rashes and no petichiae  Neuro: alert and oriented X3 no focal deficits      LABS:  CBC Full  -  ( 27 Feb 2020 06:37 )  WBC Count : 141.98 K/uL  RBC Count : 2.32 M/uL  Hemoglobin : 7.4 g/dL  Hematocrit : 23.1 %  Platelet Count - Automated : 25 K/uL  Mean Cell Volume : 99.6 fl  Mean Cell Hemoglobin : 31.9 pg  Mean Cell Hemoglobin Concentration : 32.0 gm/dL  Auto Neutrophil # : 2.41 K/uL  Auto Lymphocyte # : 115.22 K/uL  Auto Monocyte # : 23.74 K/uL  Auto Eosinophil # : 0.01 K/uL  Auto Basophil # : 0.04 K/uL  Auto Neutrophil % : 1.7 %  Auto Lymphocyte % : 81.2 %  Auto Monocyte % : 16.7 %  Auto Eosinophil % : 0.0 %  Auto Basophil % : 0.0 %    02-27    139  |  111<H>  |  28<H>  ----------------------------<  144<H>  3.5   |  19<L>  |  1.10    Ca    8.0<L>      27 Feb 2020 06:37  Phos  2.1     02-26  Mg     2.4     02-26    TPro  5.9<L>  /  Alb  2.4<L>  /  TBili  2.4<H>  /  DBili  x   /  AST  18  /  ALT  17  /  AlkPhos  122<H>  02-27        fe studies      WBC trend  141.98 K/uL (02-27-20 @ 06:37)  111.94 K/uL (02-26-20 @ 22:51)  128.72 K/uL (02-26-20 @ 07:16)  172.29 K/uL (02-25-20 @ 06:59)      Hgb trend  7.4 g/dL (02-27-20 @ 06:37)  7.1 g/dL (02-26-20 @ 22:51)  6.9 g/dL (02-26-20 @ 07:16)  7.7 g/dL (02-25-20 @ 06:59)      plt trend  25 K/uL (02-27-20 @ 06:37)  35 K/uL (02-26-20 @ 22:51)  10 K/uL (02-26-20 @ 07:16)  9 K/uL (02-25-20 @ 06:59)        RADIOLOGY & ADDITIONAL STUDIES:

## 2020-02-27 NOTE — PROGRESS NOTE ADULT - PROBLEM SELECTOR PLAN 5
-unclear why pt's direct bilirubin is the majority fraction   -t bili today to 2.4  -pt has no symptoms or exam findings consistent with obstructed CBD  -RUQ US showing GB with stones but no obstructing stones, and normal caliber CBD  -given hep B hx, GI recs MRCP with and without contrast  -MRCP did not reveal obstruction

## 2020-02-27 NOTE — PROGRESS NOTE ADULT - PROBLEM SELECTOR PLAN 2
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 Stephenson group following, recs appreciated -- considering starting chemo at tail end of this admission  - ID, Dr. Webber - no issues per Id with resuming chemo.

## 2020-02-27 NOTE — PROGRESS NOTE ADULT - PROBLEM SELECTOR PLAN 1
challenging to follow wbc (neutrophils/eosinophils) with CLL   pt doing well clinically and afebrile recommend  sp ceftriaxone after last dose (day 5 of abx) 2/26 and do not recommend additional abx

## 2020-02-27 NOTE — PROGRESS NOTE ADULT - ASSESSMENT
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  Repeat Flow Cytometry still SLL/CLL, but FISH now w 11q-, 17p-, del of chromosome 12 and 13, all poor prognostic factors.]  PETCT only mildly hypermetabolic LADs w highest SUV 3, largest conglomerate f LNs ~5x2cm prevascular, mild splenomegaly 14cm  Pt was scheduled for Venetoclax/Rituxan second line treatment w admission for ramp up Venetoclax and tumor lysis prophylaxis when found w Influenza A during the 2/2020 adm, Rx w Temaflu, subsequent also sig more anemic/thrombocytopenic.  Now admitted w again fever and found w pneumonia    -clinically now improved, on Ceftriaxone  -prednisone increased to 40mg due to concern over now w probable element of immune hemolysis and ITP(elevated bili, AST, normal GGTP in office), MRI/MRCP negative liver pathology, transaminases now normal, bili decreasing.  -leukocytosis due to steroid effect, >90% lymphocytes  -anemia and thrombocytopenia due to acute illness, compromised bone marrow. CLL/SLL, possible immune element, continue daily CBC monitoring and transfuse as clinically indicated      RECOMMEND:   Continue Prednisone 40mg daily.   to hold  IVIG today and observe      Transfuse 1U PRBC  may cosnider additional doses of IVIG for ITP.     consider start ramp up Venetoclax course during this admission when clinically improves more.  BUT For now hold    discussed w Medicine team.

## 2020-02-27 NOTE — PROGRESS NOTE ADULT - PROBLEM SELECTOR PLAN 4
- Per chart review, platelet count steadily declining since 2018  - Plt 24 today s/p 3 units of platelets transfusion, IVIg  - one dose of IVIG premed with benadryl given yesterday before 1 unit platelets  - Heme/Onc recs reviewed and appreciated

## 2020-02-27 NOTE — PROGRESS NOTE ADULT - PROBLEM SELECTOR PLAN 4
noted rise unclear etiology-Gi involved  Thank you for consulting us and involving us in the management of this most interesting and challenging case.     Please Call with any further questions

## 2020-02-27 NOTE — PROGRESS NOTE ADULT - PROBLEM SELECTOR PLAN 3
- Worsening anemia and thrombocytopenia since discharge ~1 week ago likely in part due to lymphoma/leukemia in part due to hemolytic anemia  - s/p 2 units of PRBC  6.8-1u-7.4-7.6-6.7-1u-8.3-7.7-6.9-7.4  - No acute s/s of bleeding on admission, continue to monitor, high bilirubin and though a greater direct bili component, suspect this is in large part due to AIHA and the indirect bili is getting conjugated  - suspect due to AIHA. Was on prednisone 10mg daily -- heme rec to increase to prednisone 40mg po daily.  - will transfuse 1 unit of PRBC today  - Will follow AM CBC

## 2020-02-27 NOTE — PROGRESS NOTE ADULT - PROBLEM SELECTOR PLAN 1
- CT Chest showing multilobar patchy peribronchovascular airspace opacities and more dense consolidative process in the right middle lobe likely reflecting multifocal pneumonia, trace b/l pleural effusions  - nares swab negative for MRSA/MSSA  - urine legionella Ag and strep pneumo Ag negative   - sputum Cx: normal resp marnie   - RVP negative  - clinically improved, DC rocephin today per ID recs - No additional abx needed.   - fever resolving, tylenol PRN for fever  - duonebs PRN for wheezing  - WBC very labile and difficult to gauge utility  - ID Dr. Webber consulted, recs appreciated - CT Chest showing multilobar patchy peribronchovascular airspace opacities and more dense consolidative process in the right middle lobe likely reflecting multifocal pneumonia, trace b/l pleural effusions  - nares swab negative for MRSA/MSSA  - urine legionella Ag and strep pneumo Ag negative   - sputum Cx: normal resp marnie   - RVP negative  - clinically improved, completed course of abx as per ID recs - No additional abx needed.   - fever resolving, tylenol PRN for fever  - duonebs PRN for wheezing  - WBC very labile and difficult to gauge utility  - ID Dr. Webber consulted, recs appreciated

## 2020-02-27 NOTE — PROGRESS NOTE ADULT - ASSESSMENT
76yo M with PMH of CLL, AIHA, chronic hepatitis B (on entecavir), KIRTI on CPAP, T2DM (managed with lifestyle changes), HTN (not on any medication) who presents with chief complaint of fever, cough and generalized weakness, admitted with suspected gram-negative multifocal pneumonia, severe anemia and severe thrombocytopenia s/p transfusion of 2 units of PRBC and 2 units of platelets. Plt has not improved after transfusion, suspect possible ITP

## 2020-02-27 NOTE — PROGRESS NOTE ADULT - PROBLEM SELECTOR PLAN 7
Pt states that lower ext edema occurs while on prednisone  - Checked venous doppler - negative  - ECHO 2/2018 showed Preserved left ventricular systolic function. Stage I   diastolic dysfunction. EF 65% -- will repeat TTE  - albumin level has been low might contribute to edema  - Caution with IVF  - Strict I&O's  - daily weights  - SCDs

## 2020-02-27 NOTE — PROGRESS NOTE ADULT - ASSESSMENT
hepatitis B   elevated lfts   CLL  pneumonia    patient with elevated bilirubin and alk phos at baseline ? leon  lfts now improving  etiology unclear  mri showed normal liver, no cbd stone/dilatation  hep b dna pending   monitor cbc daily  transfuse as per heme/once  diet as tolerated  will follow    Advanced care planning was discussed with patient and family.  Advanced care planning forms were reviewed and discussed.  Risks, benefits and alternatives of gastroenterologic procedures were discussed in detail and all questions were answered.    30 minutes spent.

## 2020-02-28 LAB
ALBUMIN SERPL ELPH-MCNC: 2.5 G/DL — LOW (ref 3.3–5)
ALP SERPL-CCNC: 134 U/L — HIGH (ref 40–120)
ALT FLD-CCNC: 18 U/L — SIGNIFICANT CHANGE UP (ref 12–78)
ANION GAP SERPL CALC-SCNC: 9 MMOL/L — SIGNIFICANT CHANGE UP (ref 5–17)
AST SERPL-CCNC: 19 U/L — SIGNIFICANT CHANGE UP (ref 15–37)
BILIRUB SERPL-MCNC: 2.5 MG/DL — HIGH (ref 0.2–1.2)
BUN SERPL-MCNC: 26 MG/DL — HIGH (ref 7–23)
CALCIUM SERPL-MCNC: 8.3 MG/DL — LOW (ref 8.5–10.1)
CHLORIDE SERPL-SCNC: 109 MMOL/L — HIGH (ref 96–108)
CO2 SERPL-SCNC: 19 MMOL/L — LOW (ref 22–31)
CREAT SERPL-MCNC: 1.1 MG/DL — SIGNIFICANT CHANGE UP (ref 0.5–1.3)
GLUCOSE SERPL-MCNC: 168 MG/DL — HIGH (ref 70–99)
HCT VFR BLD CALC: 27.1 % — LOW (ref 39–50)
HGB BLD-MCNC: 8.9 G/DL — LOW (ref 13–17)
MCHC RBC-ENTMCNC: 32.1 PG — SIGNIFICANT CHANGE UP (ref 27–34)
MCHC RBC-ENTMCNC: 32.8 GM/DL — SIGNIFICANT CHANGE UP (ref 32–36)
MCV RBC AUTO: 97.8 FL — SIGNIFICANT CHANGE UP (ref 80–100)
NRBC # BLD: 0 /100 WBCS — SIGNIFICANT CHANGE UP (ref 0–0)
PLATELET # BLD AUTO: 10 K/UL — CRITICAL LOW (ref 150–400)
POTASSIUM SERPL-MCNC: 4.7 MMOL/L — SIGNIFICANT CHANGE UP (ref 3.5–5.3)
POTASSIUM SERPL-SCNC: 4.7 MMOL/L — SIGNIFICANT CHANGE UP (ref 3.5–5.3)
PROT SERPL-MCNC: 6.3 G/DL — SIGNIFICANT CHANGE UP (ref 6–8.3)
RBC # BLD: 2.77 M/UL — LOW (ref 4.2–5.8)
RBC # FLD: 21.2 % — HIGH (ref 10.3–14.5)
SODIUM SERPL-SCNC: 137 MMOL/L — SIGNIFICANT CHANGE UP (ref 135–145)
WBC # BLD: 120.22 K/UL — CRITICAL HIGH (ref 3.8–10.5)
WBC # FLD AUTO: 120.22 K/UL — CRITICAL HIGH (ref 3.8–10.5)

## 2020-02-28 PROCEDURE — 99233 SBSQ HOSP IP/OBS HIGH 50: CPT | Mod: GC

## 2020-02-28 RX ORDER — DIPHENHYDRAMINE HCL 50 MG
25 CAPSULE ORAL ONCE
Refills: 0 | Status: COMPLETED | OUTPATIENT
Start: 2020-02-28 | End: 2020-02-28

## 2020-02-28 RX ORDER — IMMUNE GLOBULIN (HUMAN) 10 G/100ML
40 INJECTION INTRAVENOUS; SUBCUTANEOUS ONCE
Refills: 0 | Status: COMPLETED | OUTPATIENT
Start: 2020-02-28 | End: 2020-02-28

## 2020-02-28 RX ADMIN — ENTECAVIR 0.5 MILLIGRAM(S): 0.5 TABLET ORAL at 05:55

## 2020-02-28 RX ADMIN — IBRUTINIB 420 MILLIGRAM(S): 140 TABLET, FILM COATED ORAL at 17:23

## 2020-02-28 RX ADMIN — Medication 200 MILLIGRAM(S): at 00:41

## 2020-02-28 RX ADMIN — PANTOPRAZOLE SODIUM 40 MILLIGRAM(S): 20 TABLET, DELAYED RELEASE ORAL at 05:57

## 2020-02-28 RX ADMIN — Medication 100 MILLIGRAM(S): at 16:15

## 2020-02-28 RX ADMIN — Medication 25 MILLIGRAM(S): at 16:04

## 2020-02-28 RX ADMIN — Medication 40 MILLIGRAM(S): at 05:55

## 2020-02-28 RX ADMIN — Medication 200 MILLIGRAM(S): at 16:15

## 2020-02-28 RX ADMIN — IMMUNE GLOBULIN (HUMAN) 66.67 GRAM(S): 10 INJECTION INTRAVENOUS; SUBCUTANEOUS at 16:21

## 2020-02-28 RX ADMIN — Medication 200 MILLIGRAM(S): at 23:45

## 2020-02-28 NOTE — PROGRESS NOTE ADULT - ASSESSMENT
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  Repeat Flow Cytometry still SLL/CLL, but FISH now w 11q-, 17p-, del of chromosome 12 and 13, all poor prognostic factors.]  PETCT only mildly hypermetabolic LADs w highest SUV 3, largest conglomerate f LNs ~5x2cm prevascular, mild splenomegaly 14cm  Pt was scheduled for Venetoclax/Rituxan second line treatment w admission for ramp up Venetoclax and tumor lysis prophylaxis when found w Influenza A during the 2/2020 adm, Rx w Temaflu, subsequent also sig more anemic/thrombocytopenic.  Now admitted w again fever and found w pneumonia    -clinically now improved, on Ceftriaxone  -prednisone increased to 40mg due to concern over now w probable element of immune hemolysis and ITP(elevated bili, AST, normal GGTP in office), MRI/MRCP negative liver pathology, transaminases now normal, bili decreasing.  -leukocytosis due to steroid effect, >90% lymphocytes  -anemia and thrombocytopenia due to acute illness, compromised bone marrow. CLL/SLL, possible immune element, continue daily CBC monitoring and transfuse as clinically indicated      RECOMMEND:   Continue Prednisone 40mg daily.     Redose IVIG today at 40g.   may consider additional doses of IVIG for ITP.     Transfuse 1U SDP today.     HOLD start of Venetoclax, till seen by Dr Stephenson; when counts better.     OOB as darvin.

## 2020-02-28 NOTE — PROGRESS NOTE ADULT - ASSESSMENT
74yo M with PMH of CLL, AIHA, chronic hepatitis B (on entecavir), KIRTI on CPAP, T2DM (managed with lifestyle changes), HTN (not on any medication) who presents with chief complaint of fever, cough and generalized weakness, admitted with suspected gram-negative multifocal pneumonia, severe anemia and severe thrombocytopenia s/p transfusion of 2 units of PRBC and 2 units of platelets. Plt has not improved after transfusion, suspect possible ITP 74yo M with PMH of CLL/SLL, AIHA, chronic hepatitis B (on entecavir), KIRTI on CPAP, T2DM (managed with lifestyle changes), HTN (not on any medication) who presents with chief complaint of fever, cough and generalized weakness, admitted with suspected gram-negative multifocal pneumonia, severe anemia s/p transfusion of 4 units of PRBC and severe thrombocytopenia s/p platelet transfusion.  Suspect possible ITP after poor response to initial platelet transfusion. After one dose of IVIG, platelet kyung to 35 from 10 after 1 unit of platelet transfusion, however, drops to 10 2 days later

## 2020-02-28 NOTE — PROGRESS NOTE ADULT - PROBLEM SELECTOR PLAN 3
- Worsening anemia and thrombocytopenia since discharge ~1 week ago likely in part due to lymphoma/leukemia in part due to hemolytic anemia  - s/p 2 units of PRBC  6.8-1u-7.4-7.6-6.7-1u-8.3-7.7-6.9-7.4  - No acute s/s of bleeding on admission, continue to monitor, high bilirubin and though a greater direct bili component, suspect this is in large part due to AIHA and the indirect bili is getting conjugated  - suspect due to AIHA. Was on prednisone 10mg daily -- heme rec to increase to prednisone 40mg po daily.  - will transfuse 1 unit of PRBC today  - Will follow AM CBC - Worsening anemia and thrombocytopenia since discharge ~1 week ago likely in part due to lymphoma/leukemia in part due to hemolytic anemia  - s/p 4 units of PRBC  6.8-1u-7.4-7.6-6.7-1u-8.3-7.7-6.9-1u-7.1-7.4-1u-8.9  - No acute s/s of bleeding on admission, continue to monitor, high bilirubin and though a greater direct bili component, suspect this is in large part due to AIHA and the indirect bili is getting conjugated  - suspect due to AIHA. Was on prednisone 10mg daily -- heme rec to increase to prednisone 40mg po daily. - Worsening anemia and thrombocytopenia since discharge ~1 week ago likely in part due to lymphoma/leukemia in part due to hemolytic anemia  - s/p 4 units of PRBC  6.8-1u-7.4-7.6-6.7-1u-8.3-7.7-6.9-1u-7.1-7.4-1u-8.9  - No acute s/s of bleeding on admission, continue to monitor, high bilirubin and though a greater direct bili component, suspect this is in large part due to AIHA and the indirect bili is getting conjugated  - suspect due to AIHA. Was on prednisone 10mg daily -- heme rec to increase to prednisone 40mg po daily.  - 4uPRBC total since admission

## 2020-02-28 NOTE — PROGRESS NOTE ADULT - PROBLEM SELECTOR PLAN 5
-unclear why pt's direct bilirubin is the majority fraction   -t bili today to 2.4  -pt has no symptoms or exam findings consistent with obstructed CBD  -RUQ US showing GB with stones but no obstructing stones, and normal caliber CBD  -given hep B hx, GI recs MRCP with and without contrast  -MRCP did not reveal obstruction -unclear why pt's direct bilirubin is the majority fraction   -t bili today to 2.4  -pt has no symptoms or exam findings consistent with obstructed CBD  -RUQ US showing GB with stones but no obstructing stones, and normal caliber CBD  -given hep B hx, GI recs MRCP with and without contrast  -MRCP did not reveal obstruction  -GI following

## 2020-02-28 NOTE — PROGRESS NOTE ADULT - PROBLEM SELECTOR PLAN 4
- Per chart review, platelet count steadily declining since 2018  - Plt 24 today s/p 3 units of platelets transfusion, IVIg  - one dose of IVIG premed with benadryl given yesterday before 1 unit platelets  - Heme/Onc recs reviewed and appreciated - Per chart review, platelet count steadily declining since 2018  - plt count up to 35 with 1 unit of platelet transfusion after 1 dose of IVIG, drops to 10 two days after  - s/p 3 units of platelet transfusion since admission  - Heme/Onc recs reviewed and appreciated - Per chart review, platelet count steadily declining since 2018  - plt count up to 35 with 1 unit of platelet transfusion after 1 dose of IVIG, drops to 10 two days after  - s/p 3 units of platelet transfusion since admission  - Heme/Onc recs reviewed  - transfused 1 unit platelet, IVIG

## 2020-02-28 NOTE — PROGRESS NOTE ADULT - PROBLEM SELECTOR PLAN 2
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 Stephenson group following, recs appreciated -- considering starting chemo at tail end of this admission  - ID, Dr. Webber - no issues per Id with resuming chemo. 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 Stephenson group following, recs appreciated -- considering starting chemo at tail end of this admission  - ID, Dr. Webber - no issues per Id with resuming chemo.

## 2020-02-28 NOTE — PROGRESS NOTE ADULT - PROBLEM SELECTOR PLAN 1
- CT Chest showing multilobar patchy peribronchovascular airspace opacities and more dense consolidative process in the right middle lobe likely reflecting multifocal pneumonia, trace b/l pleural effusions  - nares swab negative for MRSA/MSSA  - urine legionella Ag and strep pneumo Ag negative   - sputum Cx: normal resp marnie   - RVP negative  - clinically improved, completed course of abx as per ID recs - No additional abx needed.   - fever resolving, tylenol PRN for fever  - duonebs PRN for wheezing  - WBC very labile and difficult to gauge utility  - ID Dr. Webber consulted, recs appreciated - CT Chest showing multilobar patchy peribronchovascular airspace opacities and more dense consolidative process in the right middle lobe likely reflecting multifocal pneumonia, trace b/l pleural effusions  - nares swab negative for MRSA/MSSA  - urine legionella Ag and strep pneumo Ag negative   - sputum Cx: normal resp marnie   - RVP negative  - clinically improved, completed course of abx as per ID recs - No additional abx needed.   - fever resolving, tylenol PRN for fever  - duonebs PRN for wheezing  - WBC very labile and difficult to gauge utility  - ID Dr. Webber consulted, recs appreciated  - Tessalon Perle and Robitussin for cough

## 2020-02-28 NOTE — PROGRESS NOTE ADULT - SUBJECTIVE AND OBJECTIVE BOX
Patient is a 75y old  Male who presents with a chief complaint of weakness, anemia (28 Feb 2020 09:18)      INTERVAL HPI/OVERNIGHT EVENTS: Patient seen and examined at bedside. No overnight events occurred. Patient has no complaints at this time. Denies fevers, chills, headache, lightheadedness, chest pain, dyspnea, abdominal pain, n/v/d/c.    MEDICATIONS  (STANDING):  entecavir 0.5 milliGRAM(s) Oral daily  ibrutinib 420 milliGRAM(s) Oral daily  pantoprazole    Tablet 40 milliGRAM(s) Oral before breakfast  predniSONE   Tablet 40 milliGRAM(s) Oral daily    MEDICATIONS  (PRN):  acetaminophen   Tablet .. 650 milliGRAM(s) Oral every 6 hours PRN Mild Pain (1 - 3)  acetaminophen   Tablet .. 650 milliGRAM(s) Oral every 6 hours PRN Temp greater or equal to 38C (100.4F)  albuterol/ipratropium for Nebulization 3 milliLiter(s) Nebulizer every 6 hours PRN Shortness of Breath and/or Wheezing  benzonatate 100 milliGRAM(s) Oral three times a day PRN Cough  guaiFENesin   Syrup  (Sugar-Free) 200 milliGRAM(s) Oral every 6 hours PRN Cough      Allergies    sulfa drugs (Unknown)    Intolerances        REVIEW OF SYSTEMS:  CONSTITUTIONAL: No fever or chills  HEENT:  No headache, no sore throat  RESPIRATORY: No cough, wheezing, or shortness of breath  CARDIOVASCULAR: No chest pain, palpitations  GASTROINTESTINAL: No abd pain, nausea, vomiting, or diarrhea  GENITOURINARY: No dysuria, frequency, or hematuria  NEUROLOGICAL: no focal weakness or dizziness  MUSCULOSKELETAL: no myalgias     Vital Signs Last 24 Hrs  T(C): 36.4 (28 Feb 2020 05:15), Max: 37.1 (27 Feb 2020 18:15)  T(F): 97.6 (28 Feb 2020 05:15), Max: 98.8 (27 Feb 2020 18:15)  HR: 77 (28 Feb 2020 05:15) (77 - 98)  BP: 148/78 (28 Feb 2020 05:15) (131/70 - 151/78)  BP(mean): --  RR: 18 (28 Feb 2020 05:15) (18 - 22)  SpO2: 97% (28 Feb 2020 05:15) (95% - 98%)    PHYSICAL EXAM:  GENERAL: NAD  HEENT:  anicteric, moist mucous membranes  CHEST/LUNG:  CTA b/l, no rales, wheezes, or rhonchi  HEART:  RRR, S1, S2  ABDOMEN:  BS+, soft, nontender, nondistended  EXTREMITIES: no edema, cyanosis, or calf tenderness  NERVOUS SYSTEM: answers questions and follows commands appropriately    LABS:                        8.9    120.22 )-----------( 10       ( 28 Feb 2020 08:11 )             27.1     CBC Full  -  ( 28 Feb 2020 08:11 )  WBC Count : 120.22 K/uL  Hemoglobin : 8.9 g/dL  Hematocrit : 27.1 %  Platelet Count - Automated : 10 K/uL  Mean Cell Volume : 97.8 fl  Mean Cell Hemoglobin : 32.1 pg  Mean Cell Hemoglobin Concentration : 32.8 gm/dL  Auto Neutrophil # : x  Auto Lymphocyte # : x  Auto Monocyte # : x  Auto Eosinophil # : x  Auto Basophil # : x  Auto Neutrophil % : x  Auto Lymphocyte % : x  Auto Monocyte % : x  Auto Eosinophil % : x  Auto Basophil % : x    28 Feb 2020 08:11    137    |  109    |  26     ----------------------------<  168    4.7     |  19     |  1.10     Ca    8.3        28 Feb 2020 08:11    TPro  6.3    /  Alb  2.5    /  TBili  2.5    /  DBili  x      /  AST  19     /  ALT  18     /  AlkPhos  134    28 Feb 2020 08:11        CAPILLARY BLOOD GLUCOSE            Culture - Sputum (collected 02-22-20 @ 20:36)  Source: .Sputum Sputum  Gram Stain (02-22-20 @ 22:48):    Rare polymorphonuclear leukocytes per low power field    Rare Squamous epithelial cells per low power field    Rare Gram Negative Rods per oil power field  Final Report (02-24-20 @ 17:40):    Normal Respiratory Criss present    Culture - Blood (collected 02-22-20 @ 01:08)  Source: .Blood Blood  Final Report (02-27-20 @ 02:01):    No growth at 5 days.    Culture - Blood (collected 02-22-20 @ 01:08)  Source: .Blood Blood  Final Report (02-27-20 @ 02:00):    No growth at 5 days.        RADIOLOGY & ADDITIONAL TESTS:    Personally reviewed.     Consultant(s) Notes Reviewed:  [x] YES  [ ] NO Patient is a 75y old  Male who presents with a chief complaint of weakness, anemia (28 Feb 2020 09:18)      INTERVAL HPI/OVERNIGHT EVENTS: Patient seen and examined at bedside. No overnight events occurred. Patient states he feels stronger compared to before. No other complaints at this time. Denies fevers, chills, headache, lightheadedness, chest pain, dyspnea. No n/v/d/c.    MEDICATIONS  (STANDING):  entecavir 0.5 milliGRAM(s) Oral daily  ibrutinib 420 milliGRAM(s) Oral daily  pantoprazole    Tablet 40 milliGRAM(s) Oral before breakfast  predniSONE   Tablet 40 milliGRAM(s) Oral daily    MEDICATIONS  (PRN):  acetaminophen   Tablet .. 650 milliGRAM(s) Oral every 6 hours PRN Mild Pain (1 - 3)  acetaminophen   Tablet .. 650 milliGRAM(s) Oral every 6 hours PRN Temp greater or equal to 38C (100.4F)  albuterol/ipratropium for Nebulization 3 milliLiter(s) Nebulizer every 6 hours PRN Shortness of Breath and/or Wheezing  benzonatate 100 milliGRAM(s) Oral three times a day PRN Cough  guaiFENesin   Syrup  (Sugar-Free) 200 milliGRAM(s) Oral every 6 hours PRN Cough    Allergies    sulfa drugs (Unknown)    Intolerances    REVIEW OF SYSTEMS:  CONSTITUTIONAL: No fever or chills  HEENT:  No headache, no sore throat  RESPIRATORY: intermittent cough, no wheezing, or shortness of breath  CARDIOVASCULAR: No chest pain, palpitations  GASTROINTESTINAL: No abd pain, nausea, vomiting, or diarrhea  GENITOURINARY: No dysuria, frequency, or hematuria  NEUROLOGICAL: +/- weakness, no dizziness  MUSCULOSKELETAL: no myalgias     Vital Signs Last 24 Hrs  T(C): 36.4 (28 Feb 2020 05:15), Max: 37.1 (27 Feb 2020 18:15)  T(F): 97.6 (28 Feb 2020 05:15), Max: 98.8 (27 Feb 2020 18:15)  HR: 77 (28 Feb 2020 05:15) (77 - 98)  BP: 148/78 (28 Feb 2020 05:15) (131/70 - 151/78)  BP(mean): --  RR: 18 (28 Feb 2020 05:15) (18 - 22)  SpO2: 97% (28 Feb 2020 05:15) (95% - 98%)    PHYSICAL EXAM:  GENERAL: NAD  HEENT:  anicteric, moist mucous membranes  CHEST/LUNG:  CTA b/l, no rales, wheezes, or rhonchi  HEART:  RRR, S1, S2  ABDOMEN:  BS+, soft, nontender, nondistended  EXTREMITIES: 2+ pitting edema on lower extremities, no cyanosis, or calf tenderness  NERVOUS SYSTEM: answers questions and follows commands appropriately    LABS:                        8.9    120.22 )-----------( 10       ( 28 Feb 2020 08:11 )             27.1     CBC Full  -  ( 28 Feb 2020 08:11 )  WBC Count : 120.22 K/uL  Hemoglobin : 8.9 g/dL  Hematocrit : 27.1 %  Platelet Count - Automated : 10 K/uL  Mean Cell Volume : 97.8 fl  Mean Cell Hemoglobin : 32.1 pg  Mean Cell Hemoglobin Concentration : 32.8 gm/dL  Auto Neutrophil # : x  Auto Lymphocyte # : x  Auto Monocyte # : x  Auto Eosinophil # : x  Auto Basophil # : x  Auto Neutrophil % : x  Auto Lymphocyte % : x  Auto Monocyte % : x  Auto Eosinophil % : x  Auto Basophil % : x    28 Feb 2020 08:11    137    |  109    |  26     ----------------------------<  168    4.7     |  19     |  1.10     Ca    8.3        28 Feb 2020 08:11    TPro  6.3    /  Alb  2.5    /  TBili  2.5    /  DBili  x      /  AST  19     /  ALT  18     /  AlkPhos  134    28 Feb 2020 08:11    CAPILLARY BLOOD GLUCOSE    Culture - Sputum (collected 02-22-20 @ 20:36)  Source: .Sputum Sputum  Gram Stain (02-22-20 @ 22:48):    Rare polymorphonuclear leukocytes per low power field    Rare Squamous epithelial cells per low power field    Rare Gram Negative Rods per oil power field  Final Report (02-24-20 @ 17:40):    Normal Respiratory Criss present    Culture - Blood (collected 02-22-20 @ 01:08)  Source: .Blood Blood  Final Report (02-27-20 @ 02:01):    No growth at 5 days.    Culture - Blood (collected 02-22-20 @ 01:08)  Source: .Blood Blood  Final Report (02-27-20 @ 02:00):    No growth at 5 days.    RADIOLOGY & ADDITIONAL TESTS:    Personally reviewed.     Consultant(s) Notes Reviewed:  [x] YES  [ ] NO Patient is a 75y old  Male who presents with a chief complaint of weakness, anemia (28 Feb 2020 09:18)      INTERVAL HPI/OVERNIGHT EVENTS: Patient seen and examined at bedside. No overnight events occurred. Patient states he feels stronger compared to before. No other complaints at this time. Denies fevers, chills, headache, lightheadedness, chest pain, dyspnea. No n/v/d/c.    MEDICATIONS  (STANDING):  entecavir 0.5 milliGRAM(s) Oral daily  ibrutinib 420 milliGRAM(s) Oral daily  pantoprazole    Tablet 40 milliGRAM(s) Oral before breakfast  predniSONE   Tablet 40 milliGRAM(s) Oral daily    MEDICATIONS  (PRN):  acetaminophen   Tablet .. 650 milliGRAM(s) Oral every 6 hours PRN Mild Pain (1 - 3)  acetaminophen   Tablet .. 650 milliGRAM(s) Oral every 6 hours PRN Temp greater or equal to 38C (100.4F)  albuterol/ipratropium for Nebulization 3 milliLiter(s) Nebulizer every 6 hours PRN Shortness of Breath and/or Wheezing  benzonatate 100 milliGRAM(s) Oral three times a day PRN Cough  guaiFENesin   Syrup  (Sugar-Free) 200 milliGRAM(s) Oral every 6 hours PRN Cough    Allergies    sulfa drugs (Unknown)    Intolerances    REVIEW OF SYSTEMS:  CONSTITUTIONAL: No fever or chills  HEENT:  No headache, no sore throat  RESPIRATORY: intermittent cough, no wheezing, or shortness of breath  CARDIOVASCULAR: No chest pain, palpitations  GASTROINTESTINAL: No abd pain, nausea, vomiting, or diarrhea  GENITOURINARY: No dysuria, frequency, or hematuria  NEUROLOGICAL: +/- weakness, no dizziness  MUSCULOSKELETAL: no myalgias     Vital Signs Last 24 Hrs  T(C): 36.4 (28 Feb 2020 05:15), Max: 37.1 (27 Feb 2020 18:15)  T(F): 97.6 (28 Feb 2020 05:15), Max: 98.8 (27 Feb 2020 18:15)  HR: 77 (28 Feb 2020 05:15) (77 - 98)  BP: 148/78 (28 Feb 2020 05:15) (131/70 - 151/78)  BP(mean): --  RR: 18 (28 Feb 2020 05:15) (18 - 22)  SpO2: 97% (28 Feb 2020 05:15) (95% - 98%)    PHYSICAL EXAM:  GENERAL: NAD, resting comfortably in chair, coughing occasionally   HEENT:  anicteric, moist mucous membranes, EOMI  CHEST/LUNG:  CTA b/l, no rales, wheezes, or rhonchi  HEART:  RRR, S1, S2, no murmurs appreciated   ABDOMEN:  BS+, soft, nontender, nondistended  EXTREMITIES: 2+ pitting edema on lower extremities, no cyanosis, or calf tenderness  NERVOUS SYSTEM: answers questions and follows commands appropriately    LABS:                        8.9    120.22 )-----------( 10       ( 28 Feb 2020 08:11 )             27.1     CBC Full  -  ( 28 Feb 2020 08:11 )  WBC Count : 120.22 K/uL  Hemoglobin : 8.9 g/dL  Hematocrit : 27.1 %  Platelet Count - Automated : 10 K/uL  Mean Cell Volume : 97.8 fl  Mean Cell Hemoglobin : 32.1 pg  Mean Cell Hemoglobin Concentration : 32.8 gm/dL  Auto Neutrophil # : x  Auto Lymphocyte # : x  Auto Monocyte # : x  Auto Eosinophil # : x  Auto Basophil # : x  Auto Neutrophil % : x  Auto Lymphocyte % : x  Auto Monocyte % : x  Auto Eosinophil % : x  Auto Basophil % : x    28 Feb 2020 08:11    137    |  109    |  26     ----------------------------<  168    4.7     |  19     |  1.10     Ca    8.3        28 Feb 2020 08:11    TPro  6.3    /  Alb  2.5    /  TBili  2.5    /  DBili  x      /  AST  19     /  ALT  18     /  AlkPhos  134    28 Feb 2020 08:11    CAPILLARY BLOOD GLUCOSE    Culture - Sputum (collected 02-22-20 @ 20:36)  Source: .Sputum Sputum  Gram Stain (02-22-20 @ 22:48):    Rare polymorphonuclear leukocytes per low power field    Rare Squamous epithelial cells per low power field    Rare Gram Negative Rods per oil power field  Final Report (02-24-20 @ 17:40):    Normal Respiratory Criss present    Culture - Blood (collected 02-22-20 @ 01:08)  Source: .Blood Blood  Final Report (02-27-20 @ 02:01):    No growth at 5 days.    Culture - Blood (collected 02-22-20 @ 01:08)  Source: .Blood Blood  Final Report (02-27-20 @ 02:00):    No growth at 5 days.    RADIOLOGY & ADDITIONAL TESTS:    Personally reviewed.     Consultant(s) Notes Reviewed:  [x] YES  [ ] NO

## 2020-02-28 NOTE — PROGRESS NOTE ADULT - SUBJECTIVE AND OBJECTIVE BOX
INTERVAL HPI/OVERNIGHT EVENTS:  pt seen and examined  denies n/v/abd pain  no melena/hematochezia  tolerating po  sp 1u prbc yesterday    MEDICATIONS  (STANDING):  entecavir 0.5 milliGRAM(s) Oral daily  ibrutinib 420 milliGRAM(s) Oral daily  pantoprazole    Tablet 40 milliGRAM(s) Oral before breakfast  predniSONE   Tablet 40 milliGRAM(s) Oral daily    MEDICATIONS  (PRN):  acetaminophen   Tablet .. 650 milliGRAM(s) Oral every 6 hours PRN Mild Pain (1 - 3)  acetaminophen   Tablet .. 650 milliGRAM(s) Oral every 6 hours PRN Temp greater or equal to 38C (100.4F)  albuterol/ipratropium for Nebulization 3 milliLiter(s) Nebulizer every 6 hours PRN Shortness of Breath and/or Wheezing  benzonatate 100 milliGRAM(s) Oral three times a day PRN Cough  guaiFENesin   Syrup  (Sugar-Free) 200 milliGRAM(s) Oral every 6 hours PRN Cough      Allergies    sulfa drugs (Unknown)    Intolerances        Review of Systems:    General:  No wt loss, fevers, chills, night sweats, fatigue   Eyes:  Good vision, no reported pain  ENT:  No sore throat, pain, runny nose, dysphagia  CV:  No pain, palpitations, hypo/hypertension  Resp:  No dyspnea, cough, tachypnea, wheezing  GI:  No pain, No nausea, No vomiting, No diarrhea, No constipation, No weight loss, No fever, No pruritis, No rectal bleeding, No melena, No dysphagia  :  No pain, bleeding, incontinence, nocturia  Muscle:  No pain, weakness  Neuro:  No weakness, tingling, memory problems  Psych:  No fatigue, insomnia, mood problems, depression  Endocrine:  No polyuria, polydypsia, cold/heat intolerance  Heme:  No petechiae, ecchymosis, easy bruisability  Skin:  No rash, tattoos, scars, edema      Vital Signs Last 24 Hrs  T(C): 36.4 (28 Feb 2020 05:15), Max: 37.1 (27 Feb 2020 18:15)  T(F): 97.6 (28 Feb 2020 05:15), Max: 98.8 (27 Feb 2020 18:15)  HR: 77 (28 Feb 2020 05:15) (77 - 98)  BP: 148/78 (28 Feb 2020 05:15) (131/70 - 151/78)  BP(mean): --  RR: 18 (28 Feb 2020 05:15) (18 - 22)  SpO2: 97% (28 Feb 2020 05:15) (95% - 98%)    PHYSICAL EXAM:    Constitutional: oob in chair  HEENT: ncat  Gastrointestinal: soft nt mild dt  Extremities: edema  Neurological: Awake alert responds appropriately    LABS:                        8.9    120.22 )-----------( 10       ( 28 Feb 2020 08:11 )             27.1     02-28    137  |  109<H>  |  26<H>  ----------------------------<  168<H>  4.7   |  19<L>  |  1.10    Ca    8.3<L>      28 Feb 2020 08:11    TPro  6.3  /  Alb  2.5<L>  /  TBili  2.5<H>  /  DBili  x   /  AST  19  /  ALT  18  /  AlkPhos  134<H>  02-28          RADIOLOGY & ADDITIONAL TESTS:

## 2020-02-28 NOTE — PROGRESS NOTE ADULT - SUBJECTIVE AND OBJECTIVE BOX
[INTERVAL HX: ]  Patient seen and examined;  Chart reviewed and events noted;   No HA, no CP, no SOB  s/p txfusion with improved Hgb  Wife at bedside.     Discussed plan for IVIG today and SDP.   Questions answered.   hold chemotx start till see by Dr Stephenson tomorrow.       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 Has been coughing as he was recently treated for influenza A on last admission. States that when he was discharged from [facility] on 2/15, he was feeling well and had no fevers. Patient developed a fever, T100.9F (oral) today for which he called heme/onc, Dr. Stephenson. Per Dr. Stuart, patient advised to go to ER. He was found to have a fever, T100.6F (rectal) in ER. Denies recent travel or sick contacts. Denies headache, chest pain, sob, palpitations, abdominal pain, diarrhea, melena, hematochezia, dysuria or hematuria.     Of note, patient was recently admitted to [facility] hospital from 2/14-2/15/2020 for chemotherapy with Venetoclax, found to be febrile with similar associated complaints of generalized malaise, cough, and weakness. Patient was found to be Flu A positive during that admission and was treated with tamiflu. Patient was found to be thrombocytopenic to 35k with a Hb of 6.6, transfused 2U PRBCs and 1U platelets. Chemotherapy was held off due to acute illness.      In ED, VS Tmax 100.6 rectal (repeat s/p tylenol 99.5),  -> 99, BP stable, saturating well on RA  CBC significant for .14 (on d/c 56.25), Hb 6.8 (on d/c 8.7), Plt 11 (on d/c 21)  CMP significant for K 3.4, Cr 1.5 (appears to be baseline per chart review), bili 3.3  Type and screen performed. Will be getting 1 unit pRBCs  Flu/RSV negative  CT Chest: Multilobar patchy peribronchovascular airspace opacities and more dense consolidative process in the right middle lobe likely reflecting multifocal pneumonia. Small right and trace left pleural effusion. Mediastinal and hilar lymphadenopathy worsened compared with prior exam from 8/30/2019. Numerous mildly prominent bilateral axillary lymph nodes. Retroperitoneal lymphadenopathy. Mild splenomegaly. Cholelithiasis.  CXR (wet read): noted to have increased opacities in right lower and middle lobes as well as left lower lobe in comparison to CXR on 2/14/2020  EKG: sinus tachycardic    S/p cefepime 1g IV x1, NS bolus 1L x1, duonebs x2, tylenol 650mg PO x1, 1U PRBCs ordered and to be transfused    Dr. Stuart was called and he recommended to only transfused PRBC and not platelets.  He will see pt in the morning. (22 Feb 2020 00:14)            MEDICATIONS  (STANDING):  diphenhydrAMINE   Injectable 25 milliGRAM(s) IntraMuscular once  entecavir 0.5 milliGRAM(s) Oral daily  ibrutinib 420 milliGRAM(s) Oral daily  immune   globulin 10% (GAMMAGARD) IVPB 40 Gram(s) IV Intermittent once  pantoprazole    Tablet 40 milliGRAM(s) Oral before breakfast  predniSONE   Tablet 40 milliGRAM(s) Oral daily    MEDICATIONS  (PRN):  acetaminophen   Tablet .. 650 milliGRAM(s) Oral every 6 hours PRN Mild Pain (1 - 3)  acetaminophen   Tablet .. 650 milliGRAM(s) Oral every 6 hours PRN Temp greater or equal to 38C (100.4F)  albuterol/ipratropium for Nebulization 3 milliLiter(s) Nebulizer every 6 hours PRN Shortness of Breath and/or Wheezing  benzonatate 100 milliGRAM(s) Oral three times a day PRN Cough  guaiFENesin   Syrup  (Sugar-Free) 200 milliGRAM(s) Oral every 6 hours PRN Cough      Vital Signs Last 24 Hrs  T(C): 36.3 (28 Feb 2020 12:58), Max: 37.1 (27 Feb 2020 18:15)  T(F): 97.4 (28 Feb 2020 12:58), Max: 98.8 (27 Feb 2020 18:15)  HR: 88 (28 Feb 2020 12:58) (77 - 88)  BP: 158/80 (28 Feb 2020 12:58) (131/76 - 158/80)  BP(mean): --  RR: 18 (28 Feb 2020 12:58) (18 - 19)  SpO2: 98% (28 Feb 2020 12:58) (95% - 98%)      [PHYSICAL EXAM]  General: adult in NAD,  WN,  WD.  HEENT: clear oropharynx, anicteric sclera, pink conjunctivae.  Neck: supple, no masses.  CV: normal S1S2, no murmur, no rubs, no gallops.  Lungs: clear to auscultation, no wheezes, no rales, no rhonchi.  Abdomen: soft, non-tender, non-distended, no hepatosplenomegaly, normal BS, no guarding.  Ext: no clubbing, no cyanosis, no edema.  Skin: no rashes,  +scattered faint petechiae, no venous stasis changes. tongue petechiae.   Neuro: alert and oriented X 3 , no focal motor deficits.  LN: no SC FERNIE.      [LABS:]                        8.9    120.22 )-----------( 10       ( 28 Feb 2020 08:11 )             27.1     02-28    137  |  109<H>  |  26<H>  ----------------------------<  168<H>  4.7   |  19<L>  |  1.10    Ca    8.3<L>      28 Feb 2020 08:11    TPro  6.3  /  Alb  2.5<L>  /  TBili  2.5<H>  /  DBili  x   /  AST  19  /  ALT  18  /  AlkPhos  134<H>  02-28                  [RADIOLOGY STUDIES:]

## 2020-02-28 NOTE — PROGRESS NOTE ADULT - ASSESSMENT
hepatitis B   elevated lfts   CLL  pneumonia    patient with elevated bilirubin and alk phos at baseline ? leon  lfts overall improving; etiology unclear  mri showed normal liver, no cbd stone/dilatation; hep p pcr neg  monitor cbc daily  transfuse as per heme/once  diet as tolerated  will follow    Advanced care planning was discussed with patient and family.  Advanced care planning forms were reviewed and discussed.  Risks, benefits and alternatives of gastroenterologic procedures were discussed in detail and all questions were answered.    30 minutes spent.

## 2020-02-29 DIAGNOSIS — J18.9 PNEUMONIA, UNSPECIFIED ORGANISM: ICD-10-CM

## 2020-02-29 LAB
ALBUMIN SERPL ELPH-MCNC: 2.5 G/DL — LOW (ref 3.3–5)
ALP SERPL-CCNC: 121 U/L — HIGH (ref 40–120)
ALT FLD-CCNC: 18 U/L — SIGNIFICANT CHANGE UP (ref 12–78)
ANION GAP SERPL CALC-SCNC: 8 MMOL/L — SIGNIFICANT CHANGE UP (ref 5–17)
AST SERPL-CCNC: 17 U/L — SIGNIFICANT CHANGE UP (ref 15–37)
BILIRUB SERPL-MCNC: 2.3 MG/DL — HIGH (ref 0.2–1.2)
BUN SERPL-MCNC: 23 MG/DL — SIGNIFICANT CHANGE UP (ref 7–23)
CALCIUM SERPL-MCNC: 8.1 MG/DL — LOW (ref 8.5–10.1)
CHLORIDE SERPL-SCNC: 110 MMOL/L — HIGH (ref 96–108)
CO2 SERPL-SCNC: 20 MMOL/L — LOW (ref 22–31)
CREAT SERPL-MCNC: 0.98 MG/DL — SIGNIFICANT CHANGE UP (ref 0.5–1.3)
GLUCOSE SERPL-MCNC: 134 MG/DL — HIGH (ref 70–99)
HCT VFR BLD CALC: 24.3 % — LOW (ref 39–50)
HGB BLD-MCNC: 8 G/DL — LOW (ref 13–17)
MCHC RBC-ENTMCNC: 32.4 PG — SIGNIFICANT CHANGE UP (ref 27–34)
MCHC RBC-ENTMCNC: 32.9 GM/DL — SIGNIFICANT CHANGE UP (ref 32–36)
MCV RBC AUTO: 98.4 FL — SIGNIFICANT CHANGE UP (ref 80–100)
NRBC # BLD: 0 /100 WBCS — SIGNIFICANT CHANGE UP (ref 0–0)
PLATELET # BLD AUTO: 21 K/UL — LOW (ref 150–400)
POTASSIUM SERPL-MCNC: 3.9 MMOL/L — SIGNIFICANT CHANGE UP (ref 3.5–5.3)
POTASSIUM SERPL-SCNC: 3.9 MMOL/L — SIGNIFICANT CHANGE UP (ref 3.5–5.3)
PROT SERPL-MCNC: 6.8 G/DL — SIGNIFICANT CHANGE UP (ref 6–8.3)
RBC # BLD: 2.47 M/UL — LOW (ref 4.2–5.8)
RBC # FLD: 21.1 % — HIGH (ref 10.3–14.5)
SODIUM SERPL-SCNC: 138 MMOL/L — SIGNIFICANT CHANGE UP (ref 135–145)
WBC # BLD: 146.14 K/UL — CRITICAL HIGH (ref 3.8–10.5)
WBC # FLD AUTO: 146.14 K/UL — CRITICAL HIGH (ref 3.8–10.5)

## 2020-02-29 PROCEDURE — 99233 SBSQ HOSP IP/OBS HIGH 50: CPT

## 2020-02-29 RX ORDER — ALLOPURINOL 300 MG
300 TABLET ORAL ONCE
Refills: 0 | Status: COMPLETED | OUTPATIENT
Start: 2020-02-29 | End: 2020-02-29

## 2020-02-29 RX ORDER — ALLOPURINOL 300 MG
100 TABLET ORAL EVERY 8 HOURS
Refills: 0 | Status: DISCONTINUED | OUTPATIENT
Start: 2020-02-29 | End: 2020-03-06

## 2020-02-29 RX ORDER — SODIUM CHLORIDE 9 MG/ML
1000 INJECTION, SOLUTION INTRAVENOUS
Refills: 0 | Status: DISCONTINUED | OUTPATIENT
Start: 2020-02-29 | End: 2020-03-03

## 2020-02-29 RX ADMIN — Medication 100 MILLIGRAM(S): at 20:08

## 2020-02-29 RX ADMIN — IBRUTINIB 420 MILLIGRAM(S): 140 TABLET, FILM COATED ORAL at 17:34

## 2020-02-29 RX ADMIN — Medication 100 MILLIGRAM(S): at 01:04

## 2020-02-29 RX ADMIN — Medication 200 MILLIGRAM(S): at 04:10

## 2020-02-29 RX ADMIN — PANTOPRAZOLE SODIUM 40 MILLIGRAM(S): 20 TABLET, DELAYED RELEASE ORAL at 06:00

## 2020-02-29 RX ADMIN — Medication 40 MILLIGRAM(S): at 05:50

## 2020-02-29 RX ADMIN — SODIUM CHLORIDE 125 MILLILITER(S): 9 INJECTION, SOLUTION INTRAVENOUS at 12:16

## 2020-02-29 RX ADMIN — Medication 300 MILLIGRAM(S): at 12:44

## 2020-02-29 RX ADMIN — ENTECAVIR 0.5 MILLIGRAM(S): 0.5 TABLET ORAL at 05:53

## 2020-02-29 RX ADMIN — Medication 100 MILLIGRAM(S): at 12:16

## 2020-02-29 RX ADMIN — SODIUM CHLORIDE 125 MILLILITER(S): 9 INJECTION, SOLUTION INTRAVENOUS at 20:07

## 2020-02-29 RX ADMIN — Medication 200 MILLIGRAM(S): at 12:16

## 2020-02-29 NOTE — PROGRESS NOTE ADULT - SUBJECTIVE AND OBJECTIVE BOX
All interim records and events noted.    could not sleep well last night due to dry coughs again  no active bleeds      MEDICATIONS  (STANDING):  entecavir 0.5 milliGRAM(s) Oral daily  ibrutinib 420 milliGRAM(s) Oral daily  pantoprazole    Tablet 40 milliGRAM(s) Oral before breakfast  predniSONE   Tablet 30 milliGRAM(s) Oral daily    MEDICATIONS  (PRN):  acetaminophen   Tablet .. 650 milliGRAM(s) Oral every 6 hours PRN Mild Pain (1 - 3)  acetaminophen   Tablet .. 650 milliGRAM(s) Oral every 6 hours PRN Temp greater or equal to 38C (100.4F)  albuterol/ipratropium for Nebulization 3 milliLiter(s) Nebulizer every 6 hours PRN Shortness of Breath and/or Wheezing  benzonatate 100 milliGRAM(s) Oral three times a day PRN Cough  guaiFENesin   Syrup  (Sugar-Free) 200 milliGRAM(s) Oral every 6 hours PRN Cough      Vital Signs Last 24 Hrs  T(C): 36.6 (29 Feb 2020 04:42), Max: 36.6 (29 Feb 2020 04:42)  T(F): 97.8 (29 Feb 2020 04:42), Max: 97.8 (29 Feb 2020 04:42)  HR: 88 (29 Feb 2020 04:42) (82 - 88)  BP: 136/78 (29 Feb 2020 04:42) (136/78 - 158/80)  BP(mean): --  RR: 18 (29 Feb 2020 04:42) (18 - 18)  SpO2: 99% (29 Feb 2020 04:42) (98% - 99%)    PHYSICAL EXAM  General: well developed  well nourished, mildly ill appearing man up in chair, in no acute distress  Head: atraumatic, normocephalic  ENT: sclera anicteric, buccal mucosa moist  Neck: supple, trachea midline, no palpable masses  CV: S1 S2, regular rate and rhythm  Lungs: clear to auscultation, no wheezes/rhonchi  Abdomen: soft, nontender, bowel sounds present, no palpable masses  Extrem: no clubbing/cyanosis/edema  Skin: no significant increased ecchymosis/petechiae  Neuro: alert and oriented X3,  no focal deficits      LABS:             8.0    146.14 )-----------( 21       ( 02-29 @ 05:55 )             24.3                8.9    120.22 )-----------( 10       ( 02-28 @ 08:11 )             27.1                7.4    141.98 )-----------( 25       ( 02-27 @ 06:37 )             23.1                7.1    111.94 )-----------( 35       ( 02-26 @ 22:51 )             22.8       02-29    138  |  110<H>  |  23  ----------------------------<  134<H>  3.9   |  20<L>  |  0.98    Ca    8.1<L>      29 Feb 2020 05:55    TPro  6.8  /  Alb  2.5<L>  /  TBili  2.3<H>  /  DBili  x   /  AST  17  /  ALT  18  /  AlkPhos  121<H>  02-29        RADIOLOGY & ADDITIONAL STUDIES:    IMPRESSION/RECOMMENDATIONS: All interim records and events noted.    could not sleep well last night due to dry coughs again  no active bleeds      MEDICATIONS  (STANDING):  entecavir 0.5 milliGRAM(s) Oral daily  ibrutinib 420 milliGRAM(s) Oral daily  pantoprazole    Tablet 40 milliGRAM(s) Oral before breakfast  predniSONE   Tablet 30 milliGRAM(s) Oral daily    MEDICATIONS  (PRN):  acetaminophen   Tablet .. 650 milliGRAM(s) Oral every 6 hours PRN Mild Pain (1 - 3)  acetaminophen   Tablet .. 650 milliGRAM(s) Oral every 6 hours PRN Temp greater or equal to 38C (100.4F)  albuterol/ipratropium for Nebulization 3 milliLiter(s) Nebulizer every 6 hours PRN Shortness of Breath and/or Wheezing  benzonatate 100 milliGRAM(s) Oral three times a day PRN Cough  guaiFENesin   Syrup  (Sugar-Free) 200 milliGRAM(s) Oral every 6 hours PRN Cough      Vital Signs Last 24 Hrs  T(C): 36.6 (29 Feb 2020 04:42), Max: 36.6 (29 Feb 2020 04:42)  T(F): 97.8 (29 Feb 2020 04:42), Max: 97.8 (29 Feb 2020 04:42)  HR: 88 (29 Feb 2020 04:42) (82 - 88)  BP: 136/78 (29 Feb 2020 04:42) (136/78 - 158/80)  BP(mean): --  RR: 18 (29 Feb 2020 04:42) (18 - 18)  SpO2: 99% (29 Feb 2020 04:42) (98% - 99%)    PHYSICAL EXAM  General: well developed  well nourished, mildly ill appearing man up in chair, in no acute distress  Head: atraumatic, normocephalic  ENT: sclera anicteric, buccal mucosa moist  Neck: supple, trachea midline, no palpable masses  CV: S1 S2, regular rate and rhythm  Lungs: clear to auscultation, no wheezes/rhonchi  Abdomen: soft, nontender, bowel sounds present, no palpable masses  Extrem: no clubbing/cyanosis, has trace-1+ edema all extremities  Skin: no significant increased ecchymosis/petechiae  Neuro: alert and oriented X3,  no focal deficits      LABS:             8.0    146.14 )-----------( 21       ( 02-29 @ 05:55 )             24.3                8.9    120.22 )-----------( 10       ( 02-28 @ 08:11 )             27.1                7.4    141.98 )-----------( 25       ( 02-27 @ 06:37 )             23.1                7.1    111.94 )-----------( 35       ( 02-26 @ 22:51 )             22.8       02-29    138  |  110<H>  |  23  ----------------------------<  134<H>  3.9   |  20<L>  |  0.98    Ca    8.1<L>      29 Feb 2020 05:55    TPro  6.8  /  Alb  2.5<L>  /  TBili  2.3<H>  /  DBili  x   /  AST  17  /  ALT  18  /  AlkPhos  121<H>  02-29        RADIOLOGY & ADDITIONAL STUDIES:    IMPRESSION/RECOMMENDATIONS:

## 2020-02-29 NOTE — PROGRESS NOTE ADULT - SUBJECTIVE AND OBJECTIVE BOX
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 Has been coughing as he was recently treated for influenza A on last admission. States that when he was discharged from [facility] on 2/15, he was feeling well and had no fevers. Patient developed a fever, T100.9F (oral) today for which he called heme/onc, Dr. Stephenson. Per Dr. Stuart, patient advised to go to ER. He was found to have a fever, T100.6F (rectal) in ER. Denies recent travel or sick contacts. Denies headache, chest pain, sob, palpitations, abdominal pain, diarrhea, melena, hematochezia, dysuria or hematuria.     Of note, patient was recently admitted to [facility] from 2/14-2/15/2020 for chemotherapy with Venetoclax, found to be febrile with similar associated complaints of generalized malaise, cough, and weakness. Patient was found to be Flu A positive during that admission and was treated with tamiflu. Patient was found to be thrombocytopenic to 35k with a Hb of 6.6, transfused 2U PRBCs and 1U platelets. Chemotherapy was held off due to acute illness.      In ED, VS Tmax 100.6 rectal (repeat s/p tylenol 99.5),  -> 99, BP stable, saturating well on RA  CBC significant for .14 (on d/c 56.25), Hb 6.8 (on d/c 8.7), Plt 11 (on d/c 21)  CMP significant for K 3.4, Cr 1.5 (appears to be baseline per chart review), bili 3.3  Type and screen performed. Will be getting 1 unit pRBCs  Flu/RSV negative  CT Chest: Multilobar patchy peribronchovascular airspace opacities and more dense consolidative process in the right middle lobe likely reflecting multifocal pneumonia. Small right and trace left pleural effusion. Mediastinal and hilar lymphadenopathy worsened compared with prior exam from 8/30/2019. Numerous mildly prominent bilateral axillary lymph nodes. Retroperitoneal lymphadenopathy. Mild splenomegaly. Cholelithiasis.  CXR (wet read): noted to have increased opacities in right lower and middle lobes as well as left lower lobe in comparison to CXR on 2/14/2020  EKG: sinus tachycardic    S/p cefepime 1g IV x1, NS bolus 1L x1, duonebs x2, tylenol 650mg PO x1, 1U PRBCs ordered and to be transfused    Dr. Stuart was called and he recommended to only transfused PRBC and not platelets.  He will see pt in the morning. (22 Feb 2020 00:14)        INTERVAL HPI/OVERNIGHT EVENTS:  Pt seen and examined at bedside.     Allergies/Intolerance: sulfa drugs (Unknown)      MEDICATIONS  (STANDING):  entecavir 0.5 milliGRAM(s) Oral daily  ibrutinib 420 milliGRAM(s) Oral daily  pantoprazole    Tablet 40 milliGRAM(s) Oral before breakfast  predniSONE   Tablet 40 milliGRAM(s) Oral daily    MEDICATIONS  (PRN):  acetaminophen   Tablet .. 650 milliGRAM(s) Oral every 6 hours PRN Mild Pain (1 - 3)  acetaminophen   Tablet .. 650 milliGRAM(s) Oral every 6 hours PRN Temp greater or equal to 38C (100.4F)  albuterol/ipratropium for Nebulization 3 milliLiter(s) Nebulizer every 6 hours PRN Shortness of Breath and/or Wheezing  benzonatate 100 milliGRAM(s) Oral three times a day PRN Cough  guaiFENesin   Syrup  (Sugar-Free) 200 milliGRAM(s) Oral every 6 hours PRN Cough        ROS: as above; all other systems reviewed and wnl      PHYSICAL EXAMINATION:  Vital Signs Last 24 Hrs  T(C): 36.6 (29 Feb 2020 04:42), Max: 36.6 (29 Feb 2020 04:42)  T(F): 97.8 (29 Feb 2020 04:42), Max: 97.8 (29 Feb 2020 04:42)  HR: 88 (29 Feb 2020 04:42) (82 - 88)  BP: 136/78 (29 Feb 2020 04:42) (136/78 - 158/80)  BP(mean): --  RR: 18 (29 Feb 2020 04:42) (18 - 18)  SpO2: 99% (29 Feb 2020 04:42) (98% - 99%)  CAPILLARY BLOOD GLUCOSE          GENERAL: NAD  HEAD:  atraumatic  EYES: sclera anicteric  ENMT: mucous membranes dry  NECK: supple  CHEST/LUNG: respirations unlabored; air entry symmetric; no wheezing  HEART: normal S1, S2  ABDOMEN: BS+, soft, ND, NT   EXTREMITIES:  no edema b/l LEs, no calf tenderness  NEURO: awake, alert, interactive; moves all extremities      LABS:                        8.0    146.14 )-----------( 21       ( 29 Feb 2020 05:55 )             24.3     02-29    138  |  110<H>  |  23  ----------------------------<  134<H>  3.9   |  20<L>  |  0.98    Ca    8.1<L>      29 Feb 2020 05:55    TPro  6.8  /  Alb  2.5<L>  /  TBili  2.3<H>  /  DBili  x   /  AST  17  /  ALT  18  /  AlkPhos  121<H>  02-29            RADIOLOGY & ADDITIONAL TESTS: CC/F/U for: pna, anemia/thrombocytopenia, leukemia    HPI:  Patient is a 76 y/o M with 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 Has been coughing as he was recently treated for influenza A on last admission. States that when he was discharged from [facility] on 2/15, he was feeling well and had no fevers. Patient developed a fever, T100.9F (oral) today for which he called heme/onc, Dr. Stephenson. Per Dr. Stuart, patient advised to go to ER. He was found to have a fever, T100.6F (rectal) in ER. Denies recent travel or sick contacts. Denies headache, chest pain, sob, palpitations, abdominal pain, diarrhea, melena, hematochezia, dysuria or hematuria.     Of note, patient was recently admitted to [facility] from 2/14-2/15/2020 for chemotherapy with Venetoclax, found to be febrile with similar associated complaints of generalized malaise, cough, and weakness. Patient was found to be Flu A positive during that admission and was treated with tamiflu. Patient was found to be thrombocytopenic to 35k with a Hb of 6.6, transfused 2U PRBCs and 1U platelets. Chemotherapy was held off due to acute illness.      In ED, VS Tmax 100.6 rectal (repeat s/p tylenol 99.5),  -> 99, BP stable, saturating well on RA  CBC significant for .14 (on d/c 56.25), Hb 6.8 (on d/c 8.7), Plt 11 (on d/c 21)  CMP significant for K 3.4, Cr 1.5 (appears to be baseline per chart review), bili 3.3  Type and screen performed. Will be getting 1 unit pRBCs  Flu/RSV negative  CT Chest: Multilobar patchy peribronchovascular airspace opacities and more dense consolidative process in the right middle lobe likely reflecting multifocal pneumonia. Small right and trace left pleural effusion. Mediastinal and hilar lymphadenopathy worsened compared with prior exam from 8/30/2019. Numerous mildly prominent bilateral axillary lymph nodes. Retroperitoneal lymphadenopathy. Mild splenomegaly. Cholelithiasis.  CXR (wet read): noted to have increased opacities in right lower and middle lobes as well as left lower lobe in comparison to CXR on 2/14/2020  EKG: sinus tachycardic    S/p cefepime 1g IV x1, NS bolus 1L x1, duonebs x2, tylenol 650mg PO x1, 1U PRBCs ordered and to be transfused    Dr. Stuart was called and he recommended to only transfused PRBC and not platelets.  He will see pt in the morning. (22 Feb 2020 00:14)        INTERVAL HPI/OVERNIGHT EVENTS:  Pt seen and examined at bedside - per Onc, to start chemotx today.     Allergies/Intolerance: sulfa drugs (Unknown)      MEDICATIONS  (STANDING):  entecavir 0.5 milliGRAM(s) Oral daily  ibrutinib 420 milliGRAM(s) Oral daily  pantoprazole    Tablet 40 milliGRAM(s) Oral before breakfast  predniSONE   Tablet 40 milliGRAM(s) Oral daily    MEDICATIONS  (PRN):  acetaminophen   Tablet .. 650 milliGRAM(s) Oral every 6 hours PRN Mild Pain (1 - 3)  acetaminophen   Tablet .. 650 milliGRAM(s) Oral every 6 hours PRN Temp greater or equal to 38C (100.4F)  albuterol/ipratropium for Nebulization 3 milliLiter(s) Nebulizer every 6 hours PRN Shortness of Breath and/or Wheezing  benzonatate 100 milliGRAM(s) Oral three times a day PRN Cough  guaiFENesin   Syrup  (Sugar-Free) 200 milliGRAM(s) Oral every 6 hours PRN Cough        ROS: as above; all other systems reviewed and wnl      PHYSICAL EXAMINATION:  Vital Signs Last 24 Hrs  T(C): 36.6 (29 Feb 2020 04:42), Max: 36.6 (29 Feb 2020 04:42)  T(F): 97.8 (29 Feb 2020 04:42), Max: 97.8 (29 Feb 2020 04:42)  HR: 88 (29 Feb 2020 04:42) (82 - 88)  BP: 136/78 (29 Feb 2020 04:42) (136/78 - 158/80)  RR: 18 (29 Feb 2020 04:42) (18 - 18)  SpO2: 99% (29 Feb 2020 04:42) (98% - 99%)      GENERAL: obese, elderly male, NAD  HEAD:  atraumatic  EYES: sclera anicteric  ENMT: mucous membranes dry  NECK: supple  CHEST/LUNG: respirations unlabored; air entry symmetric; no wheezing  HEART: normal S1, S2  ABDOMEN: obese, BS+, soft, ND, NT   EXTREMITIES:  no edema b/l LEs, no calf tenderness  NEURO: awake, alert, interactive; moves all extremities      LABS:                        8.0    146.14 )-----------( 21       ( 29 Feb 2020 05:55 )             24.3     02-29    138  |  110<H>  |  23  ----------------------------<  134<H>  3.9   |  20<L>  |  0.98    Ca    8.1<L>      29 Feb 2020 05:55    TPro  6.8  /  Alb  2.5<L>  /  TBili  2.3<H>  /  DBili  x   /  AST  17  /  ALT  18  /  AlkPhos  121<H>  02-29

## 2020-02-29 NOTE — PROGRESS NOTE ADULT - SUBJECTIVE AND OBJECTIVE BOX
INTERVAL HPI/OVERNIGHT EVENTS:  pt seen and examined  denies n/v/abd pain  no melena/hematochezia  tolerating po      MEDICATIONS  (STANDING):  entecavir 0.5 milliGRAM(s) Oral daily  ibrutinib 420 milliGRAM(s) Oral daily  pantoprazole    Tablet 40 milliGRAM(s) Oral before breakfast  predniSONE   Tablet 40 milliGRAM(s) Oral daily    MEDICATIONS  (PRN):  acetaminophen   Tablet .. 650 milliGRAM(s) Oral every 6 hours PRN Mild Pain (1 - 3)  acetaminophen   Tablet .. 650 milliGRAM(s) Oral every 6 hours PRN Temp greater or equal to 38C (100.4F)  albuterol/ipratropium for Nebulization 3 milliLiter(s) Nebulizer every 6 hours PRN Shortness of Breath and/or Wheezing  benzonatate 100 milliGRAM(s) Oral three times a day PRN Cough  guaiFENesin   Syrup  (Sugar-Free) 200 milliGRAM(s) Oral every 6 hours PRN Cough      Allergies    sulfa drugs (Unknown)    Intolerances        Review of Systems:    General:  No wt loss, fevers, chills, night sweats, fatigue   Eyes:  Good vision, no reported pain  ENT:  No sore throat, pain, runny nose, dysphagia  CV:  No pain, palpitations, hypo/hypertension  Resp:  No dyspnea, cough, tachypnea, wheezing  GI:  No pain, No nausea, No vomiting, No diarrhea, No constipation, No weight loss, No fever, No pruritis, No rectal bleeding, No melena, No dysphagia  :  No pain, bleeding, incontinence, nocturia  Muscle:  No pain, weakness  Neuro:  No weakness, tingling, memory problems  Psych:  No fatigue, insomnia, mood problems, depression  Endocrine:  No polyuria, polydypsia, cold/heat intolerance  Heme:  No petechiae, ecchymosis, easy bruisability  Skin:  No rash, tattoos, scars, edema      Vital Signs Last 24 Hrs  T(C): 36.4 (28 Feb 2020 05:15), Max: 37.1 (27 Feb 2020 18:15)  T(F): 97.6 (28 Feb 2020 05:15), Max: 98.8 (27 Feb 2020 18:15)  HR: 77 (28 Feb 2020 05:15) (77 - 98)  BP: 148/78 (28 Feb 2020 05:15) (131/70 - 151/78)  BP(mean): --  RR: 18 (28 Feb 2020 05:15) (18 - 22)  SpO2: 97% (28 Feb 2020 05:15) (95% - 98%)    PHYSICAL EXAM:    Constitutional: oob in chair  HEENT: ncat  Gastrointestinal: soft nt mild dt  Extremities: edema  Neurological: Awake alert responds appropriately    LABS:                        8.9    120.22 )-----------( 10       ( 28 Feb 2020 08:11 )             27.1     02-28    137  |  109<H>  |  26<H>  ----------------------------<  168<H>  4.7   |  19<L>  |  1.10    Ca    8.3<L>      28 Feb 2020 08:11    TPro  6.3  /  Alb  2.5<L>  /  TBili  2.5<H>  /  DBili  x   /  AST  19  /  ALT  18  /  AlkPhos  134<H>  02-28          RADIOLOGY & ADDITIONAL TESTS:

## 2020-02-29 NOTE — PROGRESS NOTE ADULT - ASSESSMENT
75M, DM/diet-controlled, HTN, KIRTI/CPAP, HCV on Entecavir, hx SCLC, refractory CLL, AIHA/IVIG, recent admit 2/14-2/15 for chemotx/influenza A tx’d w/Tamiflu; adm w/fever, cough, weakness; mgmt for multifocal PNA, anemia/thrombocytopenia requiring multiple transfusions – being optimized hematologically, planned for further chemotx doses inhouse  -multifocal PNA – completed course ceftriaxone/azithromycin; residual post-infectious cough – on tessalon perles  -anemia/thrombocytopenia – acute on chronic – hgb improved p transfusion PRBCs total x 4 so far; platelets improved to 30s s/p transfusion x 3u platelets, now downtrended to 10 – d/w Heme re need for further transfusion  -Onc – Onc to decide timing of further chemotx inhouse  -dvt prophy 75M, DM/diet-controlled, HTN, KIRTI/CPAP, HCV on Entecavir, hx SCLC, refractory CLL, AIHA/IVIG, recent admit 2/14-2/15 for chemotx/influenza A tx’d w/Tamiflu; adm w/fever, cough, weakness; mgmt for multifocal PNA, anemia/thrombocytopenia requiring multiple transfusions – being optimized hematologically, to restart chemotx doses inhouse  -multifocal PNA – completed course ceftriaxone/azithromycin; residual post-infectious cough – on tessalon perles  -anemia/thrombocytopenia – acute on chronic – hgb improved p transfusion PRBCs total x 4 so far; platelets 10-30s range threshold for transfusion platelet <10  -Onc – per Onc, to restart chemotx inhouse - to start Venetoclax - first dose tomorrow - pt has own medication to be provided to pharmacy - per Onc, elev risk for TLS - aggressive IVF hydration, allopurinol tid, and serial CMP/Ca/phos/LDH/uric acid measures before 1st dose, then q4h after - orders placed  -dvt prophy

## 2020-02-29 NOTE — PROGRESS NOTE ADULT - ASSESSMENT
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  Repeat Flow Cytometry still SLL/CLL, but FISH now w 11q-, 17p-, del of chromosome 12 and 13, all poor prognostic factors.]  Repeat PETCT only mildly hypermetabolic LADs w highest SUV 3, largest conglomerate f LNs ~5x2cm prevascular, mild splenomegaly 14cm  Pt was scheduled for Venetoclax/Rituxan second line treatment w admission for ramp up Venetoclax and tumor lysis prophylaxis when found w Influenza A during the 2/2020 adm, Rx w Temaflu, subsequent also sig more anemic/thrombocytopenic.  Now admitted w again fever and found w pneumonia, continues to require transfusions plts and PRBCs.    -completed course of  Ceftriaxone  -Hep B titer negative  -prednisone increased to 40mg due to concern over now w probable element of immune hemolysis and ITP(elevated bili, AST, normal GGTP in office), MRI/MRCP negative liver pathology. Now post IVIG due to hypogammaglobulinemia in face of infection, also in hope of improve anemia/thrombocytopenia from autoimmune etiology. However, min improvement, will start taper prednisone. Decr to 30mg today  -leukocytosis due to CLL/SLL w circulating lymphs now exacerbated by steroid effect, >90% lymphocytes,   -anemia and thrombocytopenia due to acute illness, compromised bone marrow. CLL/SLL, possible immune element, continue daily CBC monitoring and transfuse as clinically indicated  -present clinical condition due to progression of CLL/SLL, worsened by acute illness of Influenza A/pneumonia/+-drug effects  -discussed w pt, although very concerned pt at higher risk for treatment related adverse side effects, also very worrisome for further deteriorating physical condition due to continued progression of CLL/SLL. Will start ramp up Venetoclax, start hydration/allopurinol 100mg po TID today, pt has medication supply, plan for first dose tomorrow.  -pt in agreement, understands greater risk of adverse side effects and events assoc w Rx  -Will need IV NS hydration, 1.5-2 liters a day, start Venetoclax 20mg po qD for 7days then incr to 50mg qD for 7days, then 200mg po qD for 7 days, then 400mg po qD. Rituxan to start after 5 weeks.  -monitor for tumor lysis syndrome, check CMP including LDH/calcium/phos/renal function/electrolytes, before first dose/4/8/12/24 hours later. 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  Repeat Flow Cytometry still SLL/CLL, but FISH now w 11q-, 17p-, del of chromosome 12 and 13, all poor prognostic factors.]  Repeat PETCT only mildly hypermetabolic LADs w highest SUV 3, largest conglomerate f LNs ~5x2cm prevascular, mild splenomegaly 14cm  Pt was scheduled for Venetoclax/Rituxan second line treatment w admission for ramp up Venetoclax and tumor lysis prophylaxis when found w Influenza A during the 2/2020 adm, Rx w Temaflu, subsequent also sig more anemic/thrombocytopenic.  Now admitted w again fever and found w pneumonia, continues to require transfusions plts and PRBCs.    -completed course of  Ceftriaxone  -Hep B titer negative  -prednisone increased to 40mg due to concern over now w probable element of immune hemolysis and ITP(elevated bili, AST, normal GGTP in office), MRI/MRCP negative liver pathology. Now post IVIG due to hypogammaglobulinemia in face of infection, also in hope of improve anemia/thrombocytopenia from autoimmune etiology. However, min improvement, will start taper prednisone. Decr to 30mg today  -leukocytosis due to CLL/SLL w circulating lymphs now exacerbated by steroid effect, >90% lymphocytes,   -anemia and thrombocytopenia due to acute illness, compromised bone marrow. CLL/SLL, possible immune element, continue daily CBC monitoring and transfuse as clinically indicated  -present clinical condition due to progression of CLL/SLL, worsened by acute illness of Influenza A/pneumonia/+-drug effects  -discussed w pt, although very concerned pt at higher risk for treatment related adverse side effects, also very worrisome for further deteriorating physical condition due to continued progression of CLL/SLL. Will start ramp up Venetoclax, start hydration/allopurinol 100mg po TID today, pt has medication supply, plan for first dose tomorrow.  -pt in agreement, understands greater risk of adverse side effects and events assoc w Rx  -Will need IV NS hydration, 1.5-2 liters a day, start Venetoclax 20mg po qD for 7days then incr to 50mg qD for 7days, then 200mg po qD for 7 days, then 400mg po qD. Rituxan to start after 5 weeks.  -monitor for tumor lysis syndrome, check CMP including LDH/calcium/uric acid/phos/renal function/electrolytes, before first dose/4/8/12/24 hours later. 74 y/o man w Hepatitis B on Entecavirt, Chronic Lymphocytic Leukemia/Small Lymphocytic Lymphoma 4/2018 when presented w immune hemolytic anemia w partial response to steroids, started on Ibrutinib w heme CR and [place] by CT scan w some decrease of lymphadenopathies, until 1/2020 when relapsed w incr WBC, anemia(initially not hemolytic, has chronic Matthew positive due to CLL/SLL), thrombocytopenia.  Repeat Flow Cytometry still SLL/CLL, but FISH now w 11q-, 17p-, del of chromosome 12 and 13, all poor prognostic factors.]  Repeat PETCT only mildly hypermetabolic LADs w highest SUV 3, largest conglomerate f LNs ~5x2cm prevascular, mild splenomegaly 14cm  Pt was scheduled for Venetoclax/Rituxan second line treatment w admission for ramp up Venetoclax and tumor lysis prophylaxis when found w Influenza A during the 2/2020 adm, Rx w Temaflu, subsequent also sig more anemic/thrombocytopenic.  Now admitted w again fever and found w pneumonia, continues to require transfusions plts and PRBCs.    -completed course of  Ceftriaxone  -Hep B titer negative  -prednisone increased to 40mg due to concern over now w probable element of immune hemolysis and ITP(elevated bili, AST, normal GGTP in office), MRI/MRCP negative liver pathology. Now post IVIG due to hypogammaglobulinemia in face of infection, also in hope of improve anemia/thrombocytopenia from autoimmune etiology. However, min improvement, will start taper prednisone. Decr to 30mg today  -leukocytosis due to CLL/SLL w circulating lymphs now exacerbated by steroid effect, >90% lymphocytes,   -anemia and thrombocytopenia due to acute illness, compromised bone marrow. CLL/SLL, possible immune element, continue daily CBC monitoring and transfuse as clinically indicated  -present clinical condition due to progression of CLL/SLL, worsened by acute illness of Influenza A/pneumonia/+-drug effects  -discussed w pt, although very concerned pt at higher risk for treatment related adverse side effects, also very worrisome for further deteriorating physical condition due to continued progression of CLL/SLL. Will start ramp up Venetoclax, start hydration/allopurinol 100mg po TID today, pt has medication supply, plan for first dose tomorrow.  -pt in agreement, understands greater risk of adverse side effects and events assoc w Rx  -Will need IV hydration, 1.5-2 liters a day, start Venetoclax 20mg po qD for 7days then incr to 50mg qD for 7days, then 200mg po qD for 7 days, then 400mg po qD. Rituxan to start after 5 weeks.  -monitor for tumor lysis syndrome, check CMP including LDH/calcium/uric acid/phos/renal function/electrolytes, before first dose/4/8/12/24 hours later. 74 y/o man w Hepatitis B on Entecavirt, Chronic Lymphocytic Leukemia/Small Lymphocytic Lymphoma 4/2018 when presented w immune hemolytic anemia w partial response to steroids, started on Ibrutinib w heme CR and [place] by CT scan w some decrease of lymphadenopathies, until 1/2020 when relapsed w incr WBC, anemia(initially not hemolytic, has chronic Matthew positive due to CLL/SLL), thrombocytopenia.  Repeat Flow Cytometry still SLL/CLL, but FISH now w 11q-, 17p-, del of chromosome 12 and 13, all poor prognostic factors.]  Repeat PETCT only mildly hypermetabolic LADs w highest SUV 3, largest conglomerate f LNs ~5x2cm prevascular, mild splenomegaly 14cm  Pt was scheduled for Venetoclax/Rituxan second line treatment w admission for ramp up Venetoclax and tumor lysis prophylaxis when found w Influenza A during the 2/2020 adm, Rx w Temaflu, subsequent also sig more anemic/thrombocytopenic.  Now admitted w again fever and found w pneumonia, continues to require transfusions plts and PRBCs.    -completed course of  Ceftriaxone  -Hep B titer negative  -prednisone increased to 40mg due to concern over now w probable element of immune hemolysis and ITP(elevated bili, AST, normal GGTP in office), MRI/MRCP negative liver pathology. Now post IVIG due to hypogammaglobulinemia in face of infection, also in hope of improve anemia/thrombocytopenia from autoimmune etiology. However, min improvement, will start taper prednisone. Decr to 30mg today  -leukocytosis due to CLL/SLL w circulating lymphs now exacerbated by steroid effect, >90% lymphocytes,   -anemia and thrombocytopenia due to acute illness, compromised bone marrow. CLL/SLL, possible immune element, continue daily CBC monitoring and transfuse as clinically indicated  -present clinical condition due to progression of CLL/SLL, worsened by acute illness of Influenza A/pneumonia/+-drug effects  -discussed w pt, although very concerned pt at higher risk for treatment related adverse side effects, also very worrisome for further deteriorating physical condition due to continued progression of CLL/SLL. Will start ramp up Venetoclax, start hydration/allopurinol 100mg po TID today, pt has medication supply, plan for first dose tomorrow.  -pt in agreement, understands greater risk of adverse side effects and events assoc w Rx  -Will need IV hydration, 1.5-2 liters a day, start Venetoclax 20mg po qD for 7days then incr to 50mg qD for 7days, then 200mg po qD for 7 days, then 400mg po qD. Rituxan to start after 5 weeks.  -monitor for tumor lysis syndrome, check CMP including LDH/calcium/uric acid/phos/renal function/electrolytes, before first dose/4/8/12/24 hours later.  -will d/c ibrutinib after started on Venetoclax

## 2020-03-01 LAB
ALBUMIN SERPL ELPH-MCNC: 2.4 G/DL — LOW (ref 3.3–5)
ALBUMIN SERPL ELPH-MCNC: 2.4 G/DL — LOW (ref 3.3–5)
ALP SERPL-CCNC: 241 U/L — HIGH (ref 40–120)
ALP SERPL-CCNC: 277 U/L — HIGH (ref 40–120)
ALT FLD-CCNC: 38 U/L — SIGNIFICANT CHANGE UP (ref 12–78)
ALT FLD-CCNC: 45 U/L — SIGNIFICANT CHANGE UP (ref 12–78)
ANION GAP SERPL CALC-SCNC: 10 MMOL/L — SIGNIFICANT CHANGE UP (ref 5–17)
ANION GAP SERPL CALC-SCNC: 8 MMOL/L — SIGNIFICANT CHANGE UP (ref 5–17)
ANISOCYTOSIS BLD QL: SLIGHT — SIGNIFICANT CHANGE UP
AST SERPL-CCNC: 23 U/L — SIGNIFICANT CHANGE UP (ref 15–37)
AST SERPL-CCNC: 30 U/L — SIGNIFICANT CHANGE UP (ref 15–37)
BASOPHILS # BLD AUTO: 0 K/UL — SIGNIFICANT CHANGE UP (ref 0–0.2)
BASOPHILS NFR BLD AUTO: 0 % — SIGNIFICANT CHANGE UP (ref 0–2)
BILIRUB DIRECT SERPL-MCNC: 1.9 MG/DL — HIGH (ref 0.05–0.2)
BILIRUB SERPL-MCNC: 1.9 MG/DL — HIGH (ref 0.2–1.2)
BILIRUB SERPL-MCNC: 2.8 MG/DL — HIGH (ref 0.2–1.2)
BLD GP AB SCN SERPL QL: SIGNIFICANT CHANGE UP
BUN SERPL-MCNC: 21 MG/DL — SIGNIFICANT CHANGE UP (ref 7–23)
BUN SERPL-MCNC: 21 MG/DL — SIGNIFICANT CHANGE UP (ref 7–23)
CALCIUM SERPL-MCNC: 7.8 MG/DL — LOW (ref 8.5–10.1)
CALCIUM SERPL-MCNC: 7.8 MG/DL — LOW (ref 8.5–10.1)
CHLORIDE SERPL-SCNC: 109 MMOL/L — HIGH (ref 96–108)
CHLORIDE SERPL-SCNC: 110 MMOL/L — HIGH (ref 96–108)
CO2 SERPL-SCNC: 19 MMOL/L — LOW (ref 22–31)
CO2 SERPL-SCNC: 21 MMOL/L — LOW (ref 22–31)
CREAT SERPL-MCNC: 1 MG/DL — SIGNIFICANT CHANGE UP (ref 0.5–1.3)
CREAT SERPL-MCNC: 1.1 MG/DL — SIGNIFICANT CHANGE UP (ref 0.5–1.3)
EOSINOPHIL # BLD AUTO: 0 K/UL — SIGNIFICANT CHANGE UP (ref 0–0.5)
EOSINOPHIL NFR BLD AUTO: 0 % — SIGNIFICANT CHANGE UP (ref 0–6)
GLUCOSE SERPL-MCNC: 224 MG/DL — HIGH (ref 70–99)
GLUCOSE SERPL-MCNC: 291 MG/DL — HIGH (ref 70–99)
HCT VFR BLD CALC: 23.9 % — LOW (ref 39–50)
HGB BLD-MCNC: 7.6 G/DL — LOW (ref 13–17)
HYPOCHROMIA BLD QL: SLIGHT — SIGNIFICANT CHANGE UP
LYMPHOCYTES # BLD AUTO: 62 % — HIGH (ref 13–44)
LYMPHOCYTES # BLD AUTO: 86.78 K/UL — HIGH (ref 1–3.3)
LYMPHOCYTES # SPEC AUTO: 36 % — HIGH (ref 0–0)
MACROCYTES BLD QL: SLIGHT — SIGNIFICANT CHANGE UP
MAGNESIUM SERPL-MCNC: 2 MG/DL — SIGNIFICANT CHANGE UP (ref 1.6–2.6)
MANUAL SMEAR VERIFICATION: SIGNIFICANT CHANGE UP
MCHC RBC-ENTMCNC: 31.7 PG — SIGNIFICANT CHANGE UP (ref 27–34)
MCHC RBC-ENTMCNC: 31.8 GM/DL — LOW (ref 32–36)
MCV RBC AUTO: 99.6 FL — SIGNIFICANT CHANGE UP (ref 80–100)
MONOCYTES # BLD AUTO: 1.4 K/UL — HIGH (ref 0–0.9)
MONOCYTES NFR BLD AUTO: 1 % — LOW (ref 2–14)
NEUTROPHILS # BLD AUTO: 1.4 K/UL — LOW (ref 1.8–7.4)
NEUTROPHILS NFR BLD AUTO: 1 % — LOW (ref 43–77)
NRBC # BLD: 0 — SIGNIFICANT CHANGE UP
NRBC # BLD: SIGNIFICANT CHANGE UP /100 WBCS (ref 0–0)
OB PNL STL: NEGATIVE — SIGNIFICANT CHANGE UP
OVALOCYTES BLD QL SMEAR: SLIGHT — SIGNIFICANT CHANGE UP
PHOSPHATE SERPL-MCNC: 1.1 MG/DL — LOW (ref 2.5–4.5)
PHOSPHATE SERPL-MCNC: 1.7 MG/DL — LOW (ref 2.5–4.5)
PLAT MORPH BLD: NORMAL — SIGNIFICANT CHANGE UP
PLATELET # BLD AUTO: 10 K/UL — CRITICAL LOW (ref 150–400)
POIKILOCYTOSIS BLD QL AUTO: SLIGHT — SIGNIFICANT CHANGE UP
POLYCHROMASIA BLD QL SMEAR: SLIGHT — SIGNIFICANT CHANGE UP
POTASSIUM SERPL-MCNC: 3.4 MMOL/L — LOW (ref 3.5–5.3)
POTASSIUM SERPL-MCNC: 3.9 MMOL/L — SIGNIFICANT CHANGE UP (ref 3.5–5.3)
POTASSIUM SERPL-SCNC: 3.4 MMOL/L — LOW (ref 3.5–5.3)
POTASSIUM SERPL-SCNC: 3.9 MMOL/L — SIGNIFICANT CHANGE UP (ref 3.5–5.3)
PROT SERPL-MCNC: 6.2 G/DL — SIGNIFICANT CHANGE UP (ref 6–8.3)
PROT SERPL-MCNC: 6.3 G/DL — SIGNIFICANT CHANGE UP (ref 6–8.3)
RBC # BLD: 2.4 M/UL — LOW (ref 4.2–5.8)
RBC # FLD: 21.4 % — HIGH (ref 10.3–14.5)
RBC BLD AUTO: ABNORMAL
SODIUM SERPL-SCNC: 138 MMOL/L — SIGNIFICANT CHANGE UP (ref 135–145)
SODIUM SERPL-SCNC: 139 MMOL/L — SIGNIFICANT CHANGE UP (ref 135–145)
URATE SERPL-MCNC: 0.6 MG/DL — LOW (ref 3.4–8.8)
WBC # BLD: 139.97 K/UL — CRITICAL HIGH (ref 3.8–10.5)
WBC # FLD AUTO: 139.97 K/UL — CRITICAL HIGH (ref 3.8–10.5)

## 2020-03-01 PROCEDURE — 99233 SBSQ HOSP IP/OBS HIGH 50: CPT

## 2020-03-01 RX ORDER — SODIUM,POTASSIUM PHOSPHATES 278-250MG
2 POWDER IN PACKET (EA) ORAL ONCE
Refills: 0 | Status: DISCONTINUED | OUTPATIENT
Start: 2020-03-01 | End: 2020-03-01

## 2020-03-01 RX ORDER — POTASSIUM CHLORIDE 20 MEQ
40 PACKET (EA) ORAL ONCE
Refills: 0 | Status: COMPLETED | OUTPATIENT
Start: 2020-03-01 | End: 2020-03-01

## 2020-03-01 RX ORDER — DIPHENHYDRAMINE HCL 50 MG
25 CAPSULE ORAL ONCE
Refills: 0 | Status: COMPLETED | OUTPATIENT
Start: 2020-03-01 | End: 2020-03-01

## 2020-03-01 RX ORDER — VENETOCLAX 100 MG/1
20 TABLET, FILM COATED ORAL
Refills: 0 | Status: DISCONTINUED | OUTPATIENT
Start: 2020-03-01 | End: 2020-03-05

## 2020-03-01 RX ORDER — ACETAMINOPHEN 500 MG
650 TABLET ORAL ONCE
Refills: 0 | Status: COMPLETED | OUTPATIENT
Start: 2020-03-01 | End: 2020-03-01

## 2020-03-01 RX ADMIN — SODIUM CHLORIDE 125 MILLILITER(S): 9 INJECTION, SOLUTION INTRAVENOUS at 16:37

## 2020-03-01 RX ADMIN — VENETOCLAX 20 MILLIGRAM(S): 100 TABLET, FILM COATED ORAL at 14:39

## 2020-03-01 RX ADMIN — Medication 650 MILLIGRAM(S): at 12:09

## 2020-03-01 RX ADMIN — Medication 100 MILLIGRAM(S): at 05:35

## 2020-03-01 RX ADMIN — Medication 40 MILLIEQUIVALENT(S): at 08:28

## 2020-03-01 RX ADMIN — Medication 85 MILLIMOLE(S): at 08:28

## 2020-03-01 RX ADMIN — Medication 100 MILLIGRAM(S): at 21:46

## 2020-03-01 RX ADMIN — Medication 650 MILLIGRAM(S): at 12:08

## 2020-03-01 RX ADMIN — Medication 30 MILLIGRAM(S): at 05:36

## 2020-03-01 RX ADMIN — Medication 25 MILLIGRAM(S): at 12:09

## 2020-03-01 RX ADMIN — ENTECAVIR 0.5 MILLIGRAM(S): 0.5 TABLET ORAL at 05:36

## 2020-03-01 RX ADMIN — Medication 100 MILLIGRAM(S): at 13:42

## 2020-03-01 RX ADMIN — PANTOPRAZOLE SODIUM 40 MILLIGRAM(S): 20 TABLET, DELAYED RELEASE ORAL at 05:35

## 2020-03-01 RX ADMIN — IBRUTINIB 420 MILLIGRAM(S): 140 TABLET, FILM COATED ORAL at 18:00

## 2020-03-01 RX ADMIN — SODIUM CHLORIDE 125 MILLILITER(S): 9 INJECTION, SOLUTION INTRAVENOUS at 05:35

## 2020-03-01 NOTE — DIETITIAN INITIAL EVALUATION ADULT. - PROBLEM SELECTOR PLAN 7
Pt states that lower ext edema occurs while on prednisone  - Check venous doppler  - ECHO 2/2018 showed Preserved left ventricular systolic function. Stage I   diastolic dysfunction. EF 65%  - Caution with IVF  - Strict I &O's  - daily weights  - SCDs

## 2020-03-01 NOTE — PROGRESS NOTE ADULT - SUBJECTIVE AND OBJECTIVE BOX
Patient seen and examined at bedside. reports feels tired and weak.    Hb at 7.6  plt at 10  K very low  Po4 very low  on track to start venatoclax today      Review of Systems:  General:denies fever chills, headache, +++generalized weakness  HEENT: denies blurry vision,diffculty swallowing, difficulty hearing, tinnitus  Cardiovascular: denies chest pain  ,palpitations  Pulmonary:denies shortness of breath, cough, wheezing, hemoptysis  Gastrointestinal: denies abdominal pain, constipation, diarrhea,nausea , vomiting, hematochezia  : denies hematuria, dysuria, or incontinence  Neurological: denies weakness, numbness , tingling, dizziness, tremors  MSK: denies muscle pain, difficulty ambulating, swelling, back pain  skin: denies skin rash, itching, burning, or  skin lesions  Psychiatrical: denies mood disturbances, anxierty, feeling depressed, depression , or difficulty sleeping    ~Objective:  Vitals  T(C): 37.1 (03-01-20 @ 04:43), Max: 37.1 (03-01-20 @ 04:43)  HR: 81 (03-01-20 @ 04:43) (81 - 99)  BP: 133/67 (03-01-20 @ 04:43) (133/67 - 148/82)  RR: 17 (03-01-20 @ 04:43) (17 - 18)  SpO2: 100% (03-01-20 @ 04:43) (98% - 100%)    Physical Exam:  General: weak.. ill appearing  HEENT: Atraumatic, no LAD, trachea midline, PERRLA  Cardiovascular: normal s1s2, no murmurs, gallops or fricition rubs  Pulmonary: clear to ausculation Bilaterally, no wheezing , rhonchi  Gastrointestinal: soft non tender non distended, no masses felt, no organomegally  Muscloskeletal: no lower extremity edema, intact bilateral lower extremity pulses  Neurological: CN II-12 intact. No focal weakness  Psychiatrical: normal mood, cooperative  SKIN: no rash, lesions or ulcers    ~  Labs:                          7.6    139.97 )-----------( 10       ( 01 Mar 2020 06:21 )             23.9     03-01    139  |  110<H>  |  21  ----------------------------<  224<H>  3.4<L>   |  21<L>  |  1.00    Ca    7.8<L>      01 Mar 2020 06:21  Phos  1.1     03-01    TPro  6.3  /  Alb  2.4<L>  /  TBili  2.8<H>  /  DBili  1.90<H>  /  AST  30  /  ALT  45  /  AlkPhos  277<H>  03-01    LIVER FUNCTIONS - ( 01 Mar 2020 06:21 )  Alb: 2.4 g/dL / Pro: 6.3 g/dL / ALK PHOS: 277 U/L / ALT: 45 U/L / AST: 30 U/L / GGT: x                 Active Medications  MEDICATIONS  (STANDING):  acetaminophen   Tablet .. 650 milliGRAM(s) Oral once  allopurinol 100 milliGRAM(s) Oral every 8 hours  dextrose 5% + sodium chloride 0.45%. 1000 milliLiter(s) (125 mL/Hr) IV Continuous <Continuous>  entecavir 0.5 milliGRAM(s) Oral daily  ibrutinib 420 milliGRAM(s) Oral daily  pantoprazole    Tablet 40 milliGRAM(s) Oral before breakfast  predniSONE   Tablet 30 milliGRAM(s) Oral daily  venetoclax 20 milliGRAM(s) Oral <User Schedule>    MEDICATIONS  (PRN):  acetaminophen   Tablet .. 650 milliGRAM(s) Oral every 6 hours PRN Mild Pain (1 - 3)  acetaminophen   Tablet .. 650 milliGRAM(s) Oral every 6 hours PRN Temp greater or equal to 38C (100.4F)  albuterol/ipratropium for Nebulization 3 milliLiter(s) Nebulizer every 6 hours PRN Shortness of Breath and/or Wheezing  benzonatate 100 milliGRAM(s) Oral three times a day PRN Cough  diphenhydrAMINE 25 milliGRAM(s) Oral once PRN Rash and/or Itching  guaiFENesin   Syrup  (Sugar-Free) 200 milliGRAM(s) Oral every 6 hours PRN Cough

## 2020-03-01 NOTE — DIETITIAN INITIAL EVALUATION ADULT. - PROBLEM SELECTOR PLAN 1
- Admit to telemetry  - Consider post-viral bacterial pneumonia as pt recently treated for influenza A  - S/p 1g Cefepime x1 in ED, continue Q12H. Abx treatment was deferred on patient's last admission as CXR at that time was no convincing of pna.  - Continue Cefepime and Azithromycin for atypical coverage  - Check urine legionella and strep pna. Can de-escalate if legionella neg  - CT Chest showing multilobar patchy peribronchovascular airspace opacities and more dense consolidative process in the right middle lobe likely reflecting multifocal pneumonia, trace b/l pleural effusions  - Febrile in ED, monitor fever curve, tylenol PRN for fever  - duonebs prn sob/wheezing  - RSV/flu neg. Will check RVP  - WBC doubled since d/c, f/u AM CBC  - pt completed a course of tamiflu  - ID Dr. Webber consulted, f/u recs

## 2020-03-01 NOTE — DIETITIAN INITIAL EVALUATION ADULT. - PROBLEM SELECTOR PLAN 3
History of Small Cell B-Cell Lymphoma, relapsed  - Worsening anemia and TCP from discharge ~1 week ago  - Patient to receive 1U PRBCs in ED  - No acute s/s of bleeding on admission, continue to monitor  - Patient was to start chemo on previous admission 2/14, but was deferred due to acute illness  - Heme Dr. Stephenson following, f/u recs

## 2020-03-01 NOTE — PROGRESS NOTE ADULT - ASSESSMENT
4yo M with PMH of CLL/SLL, AIHA, chronic hepatitis B (on entecavir), KIRTI on CPAP, T2DM (managed with lifestyle changes), HTN (not on any medication) who presents with chief complaint of fever, cough and generalized weakness, admitted with suspected gram-negative multifocal pneumonia, severe anemia s/p transfusion of 4 units of PRBC and severe thrombocytopenia s/p platelet transfusion.  Suspect possible ITP after poor response to initial platelet transfusion.    Multifocal pneumonia.   CT Chest showing multilobar patchy peribronchovascular airspace opacities and more dense consolidative process in the right middle lobe likely reflecting multifocal pneumonia, trace b/l pleural effusions  - nares swab negative for MRSA/MSSA  - urine legionella Ag and strep pneumo Ag negative   - sputum Cx: normal resp marnie   - RVP negative  - ID Dr. Webber consulted, recs appreciated  - clinically improved, completed course of abx as per ID recs - No additional abx needed.  Plan:  - Tessalon Perle and Robitussin for cough.     Progression of  Small Cell B-Cell Lymphoma/leukemia, not in remission  Pancytopenia with anemia and refractory Thrombocytopenia  Hyperbilirubinemia  - repeat flow cytometry confirms diagnosis, FISH study suggests poor prognosis, detailed in Hem/Onc notes  - Worsening anemia and thrombocytopenia since discharge ~1 week ago likely in part due to lymphoma/leukemia  - on imbruvica as per heme  - ID, Dr. Webber - cleared for chemotherapy  -Hematology on board  -GI on board  - s/p 4 units of PRBC   -s/p 3 units of platelet  - No acute s/s of bleeding on admission, continue to monitor, high bilirubin and though a greater direct bili component, suspect this is in large part due to AIHA and the indirect bili is getting conjugated  -pt has no symptoms or exam findings consistent with obstructed CBD  -RUQ US showing GB with stones but no obstructing stones, and normal caliber CBD  -given hep B hx, GI recs MRCP with and without contrast  -MRCP did not reveal obstruction  - suspect due to AIHA. Was on prednisone 10mg daily -- now on steroid taper  -Now patient on chemotherapy and IVIG     Plan:  -Start venetoclax  -Maintain active type and screen  -Tranfuse plt >10 and HB >8  -Monitor chemitries 4/8/12/24 after first dose given    Patient is very high risk of clinical deteroriation and failure of chemotherapy. Hematology Extensively discussed case with patient and wife      Chronic Hepatitis B.   Continue Entecavir 0.5mg daily  - ID- Dr. Webber (recs appreciated).    Lower extremity edema.  Plan: Pt states that lower ext edema occurs while on prednisone  - Checked venous doppler - negative  - ECHO 2/2018 showed Preserved left ventricular systolic function. Stage I   diastolic dysfunction. EF 65% -- will repeat TTE  - albumin level has been low might contribute to edema  - Caution with IVF  - Strict I&O's  - daily weights  - SCDs.      Problem/Plan - 8:  ·  Problem: CKD (chronic kidney disease), stage III.   Cr at baseline     Problem/Plan - 9:  ·  Problem: KIRTI (obstructive sleep apnea).  Plan: - c/w Nocturnal BiPAP.       11. HTN- not on meds    12. T2DM- stable with lifestyle modifications. Hyperglycemia likely due to steroid use.

## 2020-03-01 NOTE — PROGRESS NOTE ADULT - ASSESSMENT
74 y/o man w Hepatitis B on Entecavirt, Chronic Lymphocytic Leukemia/Small Lymphocytic Lymphoma 4/2018 when presented w immune hemolytic anemia w partial response to steroids, started on Ibrutinib w heme CR and [place] by CT scan w some decrease of lymphadenopathies, until 1/2020 when relapsed w incr WBC, anemia(initially not hemolytic, has chronic Matthew positive due to CLL/SLL), thrombocytopenia.  Repeat Flow Cytometry still SLL/CLL, but FISH now w 11q-, 17p-, del of chromosome 12 and 13, all poor prognostic factors.]  Repeat PETCT only mildly hypermetabolic LADs w highest SUV 3, largest conglomerate f LNs ~5x2cm prevascular, mild splenomegaly 14cm  Pt was scheduled for Venetoclax/Rituxan second line treatment w admission for ramp up Venetoclax and tumor lysis prophylaxis when found w Influenza A during the 2/2020 adm, Rx w Temaflu, subsequent also sig more anemic/thrombocytopenic.  Now admitted w again fever and found w pneumonia, continues to require transfusions plts and PRBCs.  Completed course of  Ceftriaxone  Hep B titer negative  Post IVIG    -prednisone started taper yesterday, continue 30mg today  -leukocytosis due to CLL/SLL w circulating lymphs now exacerbated by steroid effect,  -anemia and thrombocytopenia due to acute illness, compromised bone marrow. CLL/SLL, possible immune element, continue daily CBC monitoring and transfuse as clinically indicated  -present clinical condition due to progression of CLL/SLL, worsened by acute illness of Influenza A/pneumonia/+-drug effects  -after discussion w pt and wife, both agreed to start Venetoclax, both understand incr risks at present state  -started  IV hydration, 1.5-2 liters a day, start Venetoclax 20mg po qD for 7days then incr to 50mg qD for 7days, then 200mg po qD for 7 days, then 400mg po qD. Rituxan to start after 5 weeks.  -monitor for tumor lysis syndrome, check CMP including LDH/calcium/uric acid/phos/renal function/electrolytes, before first dose/4/8/12/24 hours later.  -will d/c ibrutinib after started on Venetoclax  -labs noted, on K and phos repletion, ordered 1 unit each PRBC and platelets  -plan for first dose Venetoclax at 1pm  today    discussed w pt, Medicine and Nursing

## 2020-03-01 NOTE — DIETITIAN INITIAL EVALUATION ADULT. - PROBLEM SELECTOR PLAN 2
Likely due to pna  - Tylenol prn fever  - On Cefepime  - Follow up blood cultures  - check rvp  - ID consulted- Dr. Webber

## 2020-03-01 NOTE — DIETITIAN INITIAL EVALUATION ADULT. - PROBLEM SELECTOR PLAN 4
Hgb 6.8. Upon d/c on 2/14, hgb was 8.7 s/p 2 units pRBCs.   - Likely due to AIHA. On prednisone 10mg qd  - States that baseline hgb ~14  - Transfuse 1 unit pRBCs  - Trend H/H. Keep Hgb >7  - check fobt  - Monitor CBC

## 2020-03-01 NOTE — PROGRESS NOTE ADULT - SUBJECTIVE AND OBJECTIVE BOX
All interim records and events noted.    feeling tired      MEDICATIONS  (STANDING):  acetaminophen   Tablet .. 650 milliGRAM(s) Oral once  allopurinol 100 milliGRAM(s) Oral every 8 hours  dextrose 5% + sodium chloride 0.45%. 1000 milliLiter(s) (125 mL/Hr) IV Continuous <Continuous>  entecavir 0.5 milliGRAM(s) Oral daily  ibrutinib 420 milliGRAM(s) Oral daily  pantoprazole    Tablet 40 milliGRAM(s) Oral before breakfast  predniSONE   Tablet 30 milliGRAM(s) Oral daily    MEDICATIONS  (PRN):  acetaminophen   Tablet .. 650 milliGRAM(s) Oral every 6 hours PRN Mild Pain (1 - 3)  acetaminophen   Tablet .. 650 milliGRAM(s) Oral every 6 hours PRN Temp greater or equal to 38C (100.4F)  albuterol/ipratropium for Nebulization 3 milliLiter(s) Nebulizer every 6 hours PRN Shortness of Breath and/or Wheezing  benzonatate 100 milliGRAM(s) Oral three times a day PRN Cough  diphenhydrAMINE 25 milliGRAM(s) Oral once PRN Rash and/or Itching  guaiFENesin   Syrup  (Sugar-Free) 200 milliGRAM(s) Oral every 6 hours PRN Cough      Vital Signs Last 24 Hrs  T(C): 37.1 (01 Mar 2020 04:43), Max: 37.1 (01 Mar 2020 04:43)  T(F): 98.7 (01 Mar 2020 04:43), Max: 98.7 (01 Mar 2020 04:43)  HR: 81 (01 Mar 2020 04:43) (81 - 99)  BP: 133/67 (01 Mar 2020 04:43) (133/67 - 148/82)  BP(mean): --  RR: 17 (01 Mar 2020 04:43) (17 - 18)  SpO2: 100% (01 Mar 2020 04:43) (98% - 100%)    PHYSICAL EXAM  General: well developed  well nourished, in no acute distress but weak appearing  Head: atraumatic, normocephalic  ENT: sclera anicteric, buccal mucosa moist  Neck: supple, trachea midline, no palpable masses  CV: S1 S2, regular rate and rhythm  Lungs: clear to auscultation, no wheezes/rhonchi  Abdomen: soft, nontender, bowel sounds present, pouchy  Extrem: trace-1+ edema extremities  Skin: min petechiae  Neuro: alert and oriented X3,  no focal deficits      LABS:             7.6    139.97 )-----------( 10       ( 03-01 @ 06:21 )             23.9                8.0    146.14 )-----------( 21       ( 02-29 @ 05:55 )             24.3                8.9    120.22 )-----------( 10       ( 02-28 @ 08:11 )             27.1       03-01    139  |  110<H>  |  21  ----------------------------<  224<H>  3.4<L>   |  21<L>  |  1.00    Ca    7.8<L>      01 Mar 2020 06:21  Phos  1.1     03-01    TPro  6.3  /  Alb  2.4<L>  /  TBili  2.8<H>  /  DBili  1.90<H>  /  AST  30  /  ALT  45  /  AlkPhos  277<H>  03-01        RADIOLOGY & ADDITIONAL STUDIES:    IMPRESSION/RECOMMENDATIONS:

## 2020-03-01 NOTE — DIETITIAN INITIAL EVALUATION ADULT. - OTHER INFO
74 y/o male adm with leudemia no having achieved remission, pneumonia, fever, anemia. PMH KIRTI, HTN, DM (diet controlled), hep B, small cell B lymphoma, AIHA, flu A. Pt tolerating diet well, no complaints. Pt consumes HBV protein daily. Met with pt and pt's wife. Blood sugars are elevated. Pt made aware of glucose of 224. Current diet order is regular. Spoke with Dr. Campbell, rx changing diet to consistent carb and ordering POCT glucose levels. Pt currently taking prednisone, h/o DM which is usually diet controlled. Provided pt and pt's wife with verbal and written information re: consistent carb diet. Verbalized understanding.

## 2020-03-02 LAB
ALBUMIN SERPL ELPH-MCNC: 2.4 G/DL — LOW (ref 3.3–5)
ALBUMIN SERPL ELPH-MCNC: 2.5 G/DL — LOW (ref 3.3–5)
ALP SERPL-CCNC: 218 U/L — HIGH (ref 40–120)
ALP SERPL-CCNC: 228 U/L — HIGH (ref 40–120)
ALT FLD-CCNC: 33 U/L — SIGNIFICANT CHANGE UP (ref 12–78)
ALT FLD-CCNC: 38 U/L — SIGNIFICANT CHANGE UP (ref 12–78)
ANION GAP SERPL CALC-SCNC: 10 MMOL/L — SIGNIFICANT CHANGE UP (ref 5–17)
ANION GAP SERPL CALC-SCNC: 9 MMOL/L — SIGNIFICANT CHANGE UP (ref 5–17)
AST SERPL-CCNC: 43 U/L — HIGH (ref 15–37)
AST SERPL-CCNC: 55 U/L — HIGH (ref 15–37)
BASOPHILS # BLD AUTO: 0.02 K/UL — SIGNIFICANT CHANGE UP (ref 0–0.2)
BASOPHILS NFR BLD AUTO: 0 % — SIGNIFICANT CHANGE UP (ref 0–2)
BILIRUB SERPL-MCNC: 2.1 MG/DL — HIGH (ref 0.2–1.2)
BILIRUB SERPL-MCNC: 2.5 MG/DL — HIGH (ref 0.2–1.2)
BUN SERPL-MCNC: 23 MG/DL — SIGNIFICANT CHANGE UP (ref 7–23)
BUN SERPL-MCNC: 26 MG/DL — HIGH (ref 7–23)
CALCIUM SERPL-MCNC: 7.7 MG/DL — LOW (ref 8.5–10.1)
CALCIUM SERPL-MCNC: 8 MG/DL — LOW (ref 8.5–10.1)
CHLORIDE SERPL-SCNC: 107 MMOL/L — SIGNIFICANT CHANGE UP (ref 96–108)
CHLORIDE SERPL-SCNC: 112 MMOL/L — HIGH (ref 96–108)
CO2 SERPL-SCNC: 16 MMOL/L — LOW (ref 22–31)
CO2 SERPL-SCNC: 19 MMOL/L — LOW (ref 22–31)
CREAT SERPL-MCNC: 0.84 MG/DL — SIGNIFICANT CHANGE UP (ref 0.5–1.3)
CREAT SERPL-MCNC: 1 MG/DL — SIGNIFICANT CHANGE UP (ref 0.5–1.3)
EOSINOPHIL # BLD AUTO: 0.02 K/UL — SIGNIFICANT CHANGE UP (ref 0–0.5)
EOSINOPHIL NFR BLD AUTO: 0 % — SIGNIFICANT CHANGE UP (ref 0–6)
GLUCOSE SERPL-MCNC: 177 MG/DL — HIGH (ref 70–99)
GLUCOSE SERPL-MCNC: 282 MG/DL — HIGH (ref 70–99)
HCT VFR BLD CALC: 24.9 % — LOW (ref 39–50)
HGB BLD-MCNC: 8.2 G/DL — LOW (ref 13–17)
IMM GRANULOCYTES NFR BLD AUTO: 0.3 % — SIGNIFICANT CHANGE UP (ref 0–1.5)
LDH SERPL L TO P-CCNC: 208 U/L — SIGNIFICANT CHANGE UP (ref 50–242)
LDH SERPL L TO P-CCNC: 216 U/L — SIGNIFICANT CHANGE UP (ref 50–242)
LDH SERPL L TO P-CCNC: 339 U/L — HIGH (ref 50–242)
LDH SERPL L TO P-CCNC: 410 U/L — HIGH (ref 50–242)
LYMPHOCYTES # BLD AUTO: 74.61 K/UL — HIGH (ref 1–3.3)
LYMPHOCYTES # BLD AUTO: 81.2 % — HIGH (ref 13–44)
MAGNESIUM SERPL-MCNC: 2 MG/DL — SIGNIFICANT CHANGE UP (ref 1.6–2.6)
MCHC RBC-ENTMCNC: 32.3 PG — SIGNIFICANT CHANGE UP (ref 27–34)
MCHC RBC-ENTMCNC: 32.9 GM/DL — SIGNIFICANT CHANGE UP (ref 32–36)
MCV RBC AUTO: 98 FL — SIGNIFICANT CHANGE UP (ref 80–100)
MONOCYTES # BLD AUTO: 15.58 K/UL — HIGH (ref 0–0.9)
MONOCYTES NFR BLD AUTO: 17 % — HIGH (ref 2–14)
NEUTROPHILS # BLD AUTO: 1.37 K/UL — LOW (ref 1.8–7.4)
NEUTROPHILS NFR BLD AUTO: 1.5 % — LOW (ref 43–77)
NRBC # BLD: 0 /100 WBCS — SIGNIFICANT CHANGE UP (ref 0–0)
PHOSPHATE SERPL-MCNC: 2.2 MG/DL — LOW (ref 2.5–4.5)
PLATELET # BLD AUTO: 17 K/UL — CRITICAL LOW (ref 150–400)
POTASSIUM SERPL-MCNC: 3.9 MMOL/L — SIGNIFICANT CHANGE UP (ref 3.5–5.3)
POTASSIUM SERPL-MCNC: 4.2 MMOL/L — SIGNIFICANT CHANGE UP (ref 3.5–5.3)
POTASSIUM SERPL-SCNC: 3.9 MMOL/L — SIGNIFICANT CHANGE UP (ref 3.5–5.3)
POTASSIUM SERPL-SCNC: 4.2 MMOL/L — SIGNIFICANT CHANGE UP (ref 3.5–5.3)
PROT SERPL-MCNC: 6.2 G/DL — SIGNIFICANT CHANGE UP (ref 6–8.3)
PROT SERPL-MCNC: 6.3 G/DL — SIGNIFICANT CHANGE UP (ref 6–8.3)
RBC # BLD: 2.54 M/UL — LOW (ref 4.2–5.8)
RBC # FLD: 19.9 % — HIGH (ref 10.3–14.5)
SODIUM SERPL-SCNC: 136 MMOL/L — SIGNIFICANT CHANGE UP (ref 135–145)
SODIUM SERPL-SCNC: 137 MMOL/L — SIGNIFICANT CHANGE UP (ref 135–145)
URATE SERPL-MCNC: 1.2 MG/DL — LOW (ref 3.4–8.8)
URATE SERPL-MCNC: 1.7 MG/DL — LOW (ref 3.4–8.8)
WBC # BLD: 91.85 K/UL — CRITICAL HIGH (ref 3.8–10.5)
WBC # FLD AUTO: 91.85 K/UL — CRITICAL HIGH (ref 3.8–10.5)

## 2020-03-02 PROCEDURE — 99233 SBSQ HOSP IP/OBS HIGH 50: CPT | Mod: GC

## 2020-03-02 RX ADMIN — SODIUM CHLORIDE 125 MILLILITER(S): 9 INJECTION, SOLUTION INTRAVENOUS at 23:44

## 2020-03-02 RX ADMIN — IBRUTINIB 420 MILLIGRAM(S): 140 TABLET, FILM COATED ORAL at 17:04

## 2020-03-02 RX ADMIN — Medication 30 MILLIGRAM(S): at 05:49

## 2020-03-02 RX ADMIN — SODIUM CHLORIDE 125 MILLILITER(S): 9 INJECTION, SOLUTION INTRAVENOUS at 09:17

## 2020-03-02 RX ADMIN — Medication 100 MILLIGRAM(S): at 05:49

## 2020-03-02 RX ADMIN — Medication 100 MILLIGRAM(S): at 21:29

## 2020-03-02 RX ADMIN — VENETOCLAX 20 MILLIGRAM(S): 100 TABLET, FILM COATED ORAL at 13:16

## 2020-03-02 RX ADMIN — Medication 200 MILLIGRAM(S): at 05:52

## 2020-03-02 RX ADMIN — Medication 100 MILLIGRAM(S): at 13:16

## 2020-03-02 RX ADMIN — SODIUM CHLORIDE 125 MILLILITER(S): 9 INJECTION, SOLUTION INTRAVENOUS at 17:05

## 2020-03-02 RX ADMIN — ENTECAVIR 0.5 MILLIGRAM(S): 0.5 TABLET ORAL at 05:50

## 2020-03-02 RX ADMIN — PANTOPRAZOLE SODIUM 40 MILLIGRAM(S): 20 TABLET, DELAYED RELEASE ORAL at 05:49

## 2020-03-02 NOTE — PROGRESS NOTE ADULT - PROBLEM SELECTOR PLAN 2
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 Stephenson group following, recs appreciated -- considering starting chemo at tail end of this admission  - ID, Dr. Webber - no issues per Id with resuming chemo. 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 Webber - no issues per Id with resuming chemo. 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 Webber - no issues per Id with resuming chemo.  - Steroids being tapered - 30mg today  - chemo therapy with venetoclax started yesterday per Hem/Onc  - IVF, allopurinol started to prevent tumor lysis syndrome  - follow up with CMP, uric acid level

## 2020-03-02 NOTE — PROGRESS NOTE ADULT - PROBLEM SELECTOR PLAN 3
- Worsening anemia and thrombocytopenia since discharge ~1 week ago likely in part due to lymphoma/leukemia in part due to hemolytic anemia  - s/p 4 units of PRBC  6.8-1u-7.4-7.6-6.7-1u-8.3-7.7-6.9-1u-7.1-7.4-1u-8.9  - No acute s/s of bleeding on admission, continue to monitor, high bilirubin and though a greater direct bili component, suspect this is in large part due to AIHA and the indirect bili is getting conjugated  - suspect due to AIHA. Was on prednisone 10mg daily -- heme rec to increase to prednisone 40mg po daily.  - 4uPRBC total since admission - Worsening anemia and thrombocytopenia since discharge ~1 week ago likely in part due to lymphoma/leukemia in part due to hemolytic anemia  - s/p 5 units of PRBC  6.8-1u-7.4-7.6-6.7-1u-8.3-7.7-6.9-1u-7.1-7.4-1u-8.9-8.0-7.6-1u-8.2  - No acute s/s of bleeding on admission, continue to monitor, high bilirubin and though a greater direct bili component, suspect this is in large part due to AIHA and the indirect bili is getting conjugated  - suspect due to AIHA. Was on prednisone 10mg daily -- heme rec to increase to prednisone 40mg po, tapering started @2/29   - S/P 5uPRBC since admission

## 2020-03-02 NOTE — PROGRESS NOTE ADULT - SUBJECTIVE AND OBJECTIVE BOX
Patient is a 75y old  Male who presents with a chief complaint of weakness, anemia (02 Mar 2020 08:35)      INTERVAL HPI/OVERNIGHT EVENTS: Patient seen and examined at bedside. No overnight events occurred. Patient received 1uPRBC, 1u platelets yesterday. Patient now on Venetoclax per heme onc recs. He has no complaints today. Denies fever, chills, nausea, vomiting, chest pain, abdominal pain, diarrhea, constipation.     MEDICATIONS  (STANDING):  allopurinol 100 milliGRAM(s) Oral every 8 hours  dextrose 5% + sodium chloride 0.45%. 1000 milliLiter(s) (125 mL/Hr) IV Continuous <Continuous>  entecavir 0.5 milliGRAM(s) Oral daily  ibrutinib 420 milliGRAM(s) Oral daily  pantoprazole    Tablet 40 milliGRAM(s) Oral before breakfast  predniSONE   Tablet 30 milliGRAM(s) Oral daily  venetoclax 20 milliGRAM(s) Oral <User Schedule>    MEDICATIONS  (PRN):  acetaminophen   Tablet .. 650 milliGRAM(s) Oral every 6 hours PRN Mild Pain (1 - 3)  acetaminophen   Tablet .. 650 milliGRAM(s) Oral every 6 hours PRN Temp greater or equal to 38C (100.4F)  albuterol/ipratropium for Nebulization 3 milliLiter(s) Nebulizer every 6 hours PRN Shortness of Breath and/or Wheezing  benzonatate 100 milliGRAM(s) Oral three times a day PRN Cough  guaiFENesin   Syrup  (Sugar-Free) 200 milliGRAM(s) Oral every 6 hours PRN Cough      Allergies    sulfa drugs (Unknown)    Intolerances        REVIEW OF SYSTEMS:  CONSTITUTIONAL: No fever or chills  HEENT:  No headache, no sore throat  RESPIRATORY: No cough, wheezing, or shortness of breath  CARDIOVASCULAR: No chest pain, palpitations  GASTROINTESTINAL: No abd pain, nausea, vomiting, or diarrhea  GENITOURINARY: No dysuria, frequency, or hematuria  NEUROLOGICAL: no focal weakness or dizziness  MUSCULOSKELETAL: no myalgias     Vital Signs Last 24 Hrs  T(C): 36.3 (02 Mar 2020 04:52), Max: 36.8 (01 Mar 2020 20:30)  T(F): 97.4 (02 Mar 2020 04:52), Max: 98.2 (01 Mar 2020 20:30)  HR: 95 (02 Mar 2020 04:52) (81 - 95)  BP: 151/78 (02 Mar 2020 04:52) (132/64 - 151/78)  BP(mean): --  RR: 17 (02 Mar 2020 04:52) (17 - 17)  SpO2: 98% (02 Mar 2020 04:52) (98% - 100%)    PHYSICAL EXAM:  GENERAL: NAD  HEENT:  anicteric, moist mucous membranes  CHEST/LUNG:  CTA b/l, no rales, wheezes, or rhonchi  HEART:  RRR, S1, S2  ABDOMEN:  BS+, soft, nontender, nondistended  EXTREMITIES: no edema, cyanosis, or calf tenderness  NERVOUS SYSTEM: answers questions and follows commands appropriately    LABS:                        8.2    91.85 )-----------( 17       ( 02 Mar 2020 03:48 )             24.9     CBC Full  -  ( 02 Mar 2020 03:48 )  WBC Count : 91.85 K/uL  Hemoglobin : 8.2 g/dL  Hematocrit : 24.9 %  Platelet Count - Automated : 17 K/uL  Mean Cell Volume : 98.0 fl  Mean Cell Hemoglobin : 32.3 pg  Mean Cell Hemoglobin Concentration : 32.9 gm/dL  Auto Neutrophil # : 1.37 K/uL  Auto Lymphocyte # : 74.61 K/uL  Auto Monocyte # : 15.58 K/uL  Auto Eosinophil # : 0.02 K/uL  Auto Basophil # : 0.02 K/uL  Auto Neutrophil % : 1.5 %  Auto Lymphocyte % : 81.2 %  Auto Monocyte % : 17.0 %  Auto Eosinophil % : 0.0 %  Auto Basophil % : 0.0 %    02 Mar 2020 03:19    137    |  112    |  23     ----------------------------<  177    3.9     |  16     |  0.84     Ca    7.7        02 Mar 2020 03:19  Phos  2.2       02 Mar 2020 03:19  Mg     2.0       02 Mar 2020 03:19    TPro  6.3    /  Alb  2.4    /  TBili  2.1    /  DBili  x      /  AST  55     /  ALT  38     /  AlkPhos  228    02 Mar 2020 03:19        CAPILLARY BLOOD GLUCOSE              RADIOLOGY & ADDITIONAL TESTS:    Personally reviewed.     Consultant(s) Notes Reviewed:  [x] YES  [ ] NO Patient is a 75y old  Male who presents with a chief complaint of weakness, anemia (02 Mar 2020 08:35)      INTERVAL HPI/OVERNIGHT EVENTS: Patient seen and examined at bedside. No overnight events occurred. Patient received 1uPRBC, 1u platelets yesterday. Patient started chemo (Venetoclax) @3/1 per heme onc recs. He complains cough overnight which prevented him from sleeping. Denies fever, chills, nausea, vomiting, chest pain, abdominal pain, diarrhea, constipation.     MEDICATIONS  (STANDING):  allopurinol 100 milliGRAM(s) Oral every 8 hours  dextrose 5% + sodium chloride 0.45%. 1000 milliLiter(s) (125 mL/Hr) IV Continuous <Continuous>  entecavir 0.5 milliGRAM(s) Oral daily  ibrutinib 420 milliGRAM(s) Oral daily  pantoprazole    Tablet 40 milliGRAM(s) Oral before breakfast  predniSONE   Tablet 30 milliGRAM(s) Oral daily  venetoclax 20 milliGRAM(s) Oral <User Schedule>    MEDICATIONS  (PRN):  acetaminophen   Tablet .. 650 milliGRAM(s) Oral every 6 hours PRN Mild Pain (1 - 3)  acetaminophen   Tablet .. 650 milliGRAM(s) Oral every 6 hours PRN Temp greater or equal to 38C (100.4F)  albuterol/ipratropium for Nebulization 3 milliLiter(s) Nebulizer every 6 hours PRN Shortness of Breath and/or Wheezing  benzonatate 100 milliGRAM(s) Oral three times a day PRN Cough  guaiFENesin   Syrup  (Sugar-Free) 200 milliGRAM(s) Oral every 6 hours PRN Cough    Allergies    sulfa drugs (Unknown)    Intolerances    REVIEW OF SYSTEMS:  CONSTITUTIONAL: No fever or chills  HEENT:  No headache, no sore throat  RESPIRATORY: + cough, no wheezing, or shortness of breath  CARDIOVASCULAR: No chest pain, palpitations  GASTROINTESTINAL: No abd pain, nausea, vomiting, or diarrhea  GENITOURINARY: No dysuria, frequency, or hematuria  NEUROLOGICAL: weakness, no dizziness     Vital Signs Last 24 Hrs  T(C): 36.3 (02 Mar 2020 04:52), Max: 36.8 (01 Mar 2020 20:30)  T(F): 97.4 (02 Mar 2020 04:52), Max: 98.2 (01 Mar 2020 20:30)  HR: 95 (02 Mar 2020 04:52) (81 - 95)  BP: 151/78 (02 Mar 2020 04:52) (132/64 - 151/78)  BP(mean): --  RR: 17 (02 Mar 2020 04:52) (17 - 17)  SpO2: 98% (02 Mar 2020 04:52) (98% - 100%)    PHYSICAL EXAM:  GENERAL: NAD  HEENT:  anicteric, moist mucous membranes  CHEST/LUNG:  CTA b/l, no rales, wheezes, or rhonchi  HEART:  RRR, S1, S2  ABDOMEN:  BS+, soft, nontender, nondistended  EXTREMITIES: 2+ pitting edema on lower extremities, no cyanosis, or calf tenderness  NERVOUS SYSTEM: answers questions and follows commands appropriately    LABS:                        8.2    91.85 )-----------( 17       ( 02 Mar 2020 03:48 )             24.9     CBC Full  -  ( 02 Mar 2020 03:48 )  WBC Count : 91.85 K/uL  Hemoglobin : 8.2 g/dL  Hematocrit : 24.9 %  Platelet Count - Automated : 17 K/uL  Mean Cell Volume : 98.0 fl  Mean Cell Hemoglobin : 32.3 pg  Mean Cell Hemoglobin Concentration : 32.9 gm/dL  Auto Neutrophil # : 1.37 K/uL  Auto Lymphocyte # : 74.61 K/uL  Auto Monocyte # : 15.58 K/uL  Auto Eosinophil # : 0.02 K/uL  Auto Basophil # : 0.02 K/uL  Auto Neutrophil % : 1.5 %  Auto Lymphocyte % : 81.2 %  Auto Monocyte % : 17.0 %  Auto Eosinophil % : 0.0 %  Auto Basophil % : 0.0 %    02 Mar 2020 03:19    137    |  112    |  23     ----------------------------<  177    3.9     |  16     |  0.84     Ca    7.7        02 Mar 2020 03:19  Phos  2.2       02 Mar 2020 03:19  Mg     2.0       02 Mar 2020 03:19    TPro  6.3    /  Alb  2.4    /  TBili  2.1    /  DBili  x      /  AST  55     /  ALT  38     /  AlkPhos  228    02 Mar 2020 03:19        CAPILLARY BLOOD GLUCOSE              RADIOLOGY & ADDITIONAL TESTS:    Personally reviewed.     Consultant(s) Notes Reviewed:  [x] YES  [ ] NO Patient is a 75y old  Male who presents with a chief complaint of weakness, anemia (02 Mar 2020 08:35)      INTERVAL HPI/OVERNIGHT EVENTS: Patient seen and examined at bedside. No overnight events occurred. Patient received 1uPRBC, 1u platelets yesterday. Patient started chemo (Venetoclax) @3/1 per heme onc recs. He complains cough overnight which prevented him from sleeping. Denies fever, chills, nausea, vomiting, chest pain, abdominal pain, diarrhea, constipation.     MEDICATIONS  (STANDING):  allopurinol 100 milliGRAM(s) Oral every 8 hours  dextrose 5% + sodium chloride 0.45%. 1000 milliLiter(s) (125 mL/Hr) IV Continuous <Continuous>  entecavir 0.5 milliGRAM(s) Oral daily  ibrutinib 420 milliGRAM(s) Oral daily  pantoprazole    Tablet 40 milliGRAM(s) Oral before breakfast  predniSONE   Tablet 30 milliGRAM(s) Oral daily  venetoclax 20 milliGRAM(s) Oral <User Schedule>    MEDICATIONS  (PRN):  acetaminophen   Tablet .. 650 milliGRAM(s) Oral every 6 hours PRN Mild Pain (1 - 3)  acetaminophen   Tablet .. 650 milliGRAM(s) Oral every 6 hours PRN Temp greater or equal to 38C (100.4F)  albuterol/ipratropium for Nebulization 3 milliLiter(s) Nebulizer every 6 hours PRN Shortness of Breath and/or Wheezing  benzonatate 100 milliGRAM(s) Oral three times a day PRN Cough  guaiFENesin   Syrup  (Sugar-Free) 200 milliGRAM(s) Oral every 6 hours PRN Cough    Allergies    sulfa drugs (Unknown)    Intolerances    REVIEW OF SYSTEMS:  CONSTITUTIONAL: No fever or chills  HEENT:  No headache, no sore throat  RESPIRATORY: + cough, no wheezing, or shortness of breath  CARDIOVASCULAR: No chest pain, palpitations  GASTROINTESTINAL: No abd pain, nausea, vomiting, or diarrhea  GENITOURINARY: No dysuria, frequency, or hematuria  NEUROLOGICAL: weakness, no dizziness     Vital Signs Last 24 Hrs  T(C): 36.3 (02 Mar 2020 04:52), Max: 36.8 (01 Mar 2020 20:30)  T(F): 97.4 (02 Mar 2020 04:52), Max: 98.2 (01 Mar 2020 20:30)  HR: 95 (02 Mar 2020 04:52) (81 - 95)  BP: 151/78 (02 Mar 2020 04:52) (132/64 - 151/78)  BP(mean): --  RR: 17 (02 Mar 2020 04:52) (17 - 17)  SpO2: 98% (02 Mar 2020 04:52) (98% - 100%)    PHYSICAL EXAM:  GENERAL: NAD, resting comfortably in bed  HEENT:  anicteric, moist mucous membranes, EOMI  CHEST/LUNG:  CTA b/l, no rales, wheezes, or rhonchi  HEART:  RRR, S1, S2  ABDOMEN:  BS+, soft, nontender, nondistended  EXTREMITIES: 2+ pitting edema on lower extremities, no cyanosis, or calf tenderness  NERVOUS SYSTEM: answers questions and follows commands appropriately    LABS:                        8.2    91.85 )-----------( 17       ( 02 Mar 2020 03:48 )             24.9     CBC Full  -  ( 02 Mar 2020 03:48 )  WBC Count : 91.85 K/uL  Hemoglobin : 8.2 g/dL  Hematocrit : 24.9 %  Platelet Count - Automated : 17 K/uL  Mean Cell Volume : 98.0 fl  Mean Cell Hemoglobin : 32.3 pg  Mean Cell Hemoglobin Concentration : 32.9 gm/dL  Auto Neutrophil # : 1.37 K/uL  Auto Lymphocyte # : 74.61 K/uL  Auto Monocyte # : 15.58 K/uL  Auto Eosinophil # : 0.02 K/uL  Auto Basophil # : 0.02 K/uL  Auto Neutrophil % : 1.5 %  Auto Lymphocyte % : 81.2 %  Auto Monocyte % : 17.0 %  Auto Eosinophil % : 0.0 %  Auto Basophil % : 0.0 %    02 Mar 2020 03:19    137    |  112    |  23     ----------------------------<  177    3.9     |  16     |  0.84     Ca    7.7        02 Mar 2020 03:19  Phos  2.2       02 Mar 2020 03:19  Mg     2.0       02 Mar 2020 03:19    TPro  6.3    /  Alb  2.4    /  TBili  2.1    /  DBili  x      /  AST  55     /  ALT  38     /  AlkPhos  228    02 Mar 2020 03:19        CAPILLARY BLOOD GLUCOSE              RADIOLOGY & ADDITIONAL TESTS:    Personally reviewed.     Consultant(s) Notes Reviewed:  [x] YES  [ ] NO

## 2020-03-02 NOTE — PROGRESS NOTE ADULT - PROBLEM SELECTOR PLAN 1
- CT Chest showing multilobar patchy peribronchovascular airspace opacities and more dense consolidative process in the right middle lobe likely reflecting multifocal pneumonia, trace b/l pleural effusions  - nares swab negative for MRSA/MSSA  - urine legionella Ag and strep pneumo Ag negative   - sputum Cx: normal resp marnie   - RVP negative  - clinically improved, completed course of abx as per ID recs - No additional abx needed.   - fever resolving, tylenol PRN for fever  - duonebs PRN for wheezing  - WBC very labile and difficult to gauge utility  - ID Dr. Webber consulted, recs appreciated  - Tessalon Perle and Robitussin for cough

## 2020-03-02 NOTE — PROGRESS NOTE ADULT - SUBJECTIVE AND OBJECTIVE BOX
Patient seen and examined;  Chart reviewed and events noted;   continues to cough; reports getting better      MEDICATIONS  (STANDING):  allopurinol 100 milliGRAM(s) Oral every 8 hours  dextrose 5% + sodium chloride 0.45%. 1000 milliLiter(s) (125 mL/Hr) IV Continuous <Continuous>  entecavir 0.5 milliGRAM(s) Oral daily  ibrutinib 420 milliGRAM(s) Oral daily  pantoprazole    Tablet 40 milliGRAM(s) Oral before breakfast  predniSONE   Tablet 30 milliGRAM(s) Oral daily  venetoclax 20 milliGRAM(s) Oral <User Schedule>    MEDICATIONS  (PRN):  acetaminophen   Tablet .. 650 milliGRAM(s) Oral every 6 hours PRN Mild Pain (1 - 3)  acetaminophen   Tablet .. 650 milliGRAM(s) Oral every 6 hours PRN Temp greater or equal to 38C (100.4F)  albuterol/ipratropium for Nebulization 3 milliLiter(s) Nebulizer every 6 hours PRN Shortness of Breath and/or Wheezing  benzonatate 100 milliGRAM(s) Oral three times a day PRN Cough  guaiFENesin   Syrup  (Sugar-Free) 200 milliGRAM(s) Oral every 6 hours PRN Cough      Vital Signs Last 24 Hrs  T(C): 36.3 (02 Mar 2020 04:52), Max: 36.8 (01 Mar 2020 20:30)  T(F): 97.4 (02 Mar 2020 04:52), Max: 98.2 (01 Mar 2020 20:30)  HR: 95 (02 Mar 2020 04:52) (81 - 95)  BP: 151/78 (02 Mar 2020 04:52) (132/64 - 151/78)  BP(mean): --  RR: 17 (02 Mar 2020 04:52) (17 - 17)  SpO2: 98% (02 Mar 2020 04:52) (98% - 100%)    PHYSICAL EXAM  General: adult in NAD  HEENT: clear oropharynx, anicteric sclera, pink conjunctivae  Neck: supple  CV: normal S1S2 with no murmur rubs or gallops  Lungs: clear to auscultation, no wheezes, no rhales  Abdomen: soft non-tender non-distended, no hepato/splenomegaly  Ext: 2+ edema  Skin: no rashes and no petichiae  Neuro: alert and oriented X3 no focal deficits      LABS:                        8.2    91.85 )-----------( 17       ( 02 Mar 2020 03:48 )             24.9     Hemoglobin: 8.2 g/dL (03-02 @ 03:48)  Hemoglobin: 7.6 g/dL (03-01 @ 06:21)  Hemoglobin: 8.0 g/dL (02-29 @ 05:55)  Hemoglobin: 8.9 g/dL (02-28 @ 08:11)  Hemoglobin: 7.4 g/dL (02-27 @ 06:37)    WBC Count: 91.85 K/uL (03-02 @ 03:48)  WBC Count: 139.97 K/uL (03-01 @ 06:21)  WBC Count: 146.14 K/uL (02-29 @ 05:55)  WBC Count: 120.22 K/uL (02-28 @ 08:11)  WBC Count: 141.98 K/uL (02-27 @ 06:37)    Platelet Count - Automated: 17 K/uL (03-02 @ 03:48)  Platelet Count - Automated: 10 K/uL (03-01 @ 06:21)  Platelet Count - Automated: 21 K/uL (02-29 @ 05:55)  Platelet Count - Automated: 10 K/uL (02-28 @ 08:11)  Platelet Count - Automated: 25 K/uL (02-27 @ 06:37)    03-02    137  |  112<H>  |  23  ----------------------------<  177<H>  3.9   |  16<L>  |  0.84    Ca    7.7<L>      02 Mar 2020 03:19  Phos  2.2     03-02  Mg     2.0     03-02    TPro  6.3  /  Alb  2.4<L>  /  TBili  2.1<H>  /  DBili  x   /  AST  55<H>  /  ALT  38  /  AlkPhos  228<H>  03-02    Lactate Dehydrogenase, Serum: 410 U/L <--208  Uric Acid, Serum: 1.2 mg/dL <--0.6

## 2020-03-02 NOTE — PROGRESS NOTE ADULT - ASSESSMENT
76yo M with PMH of CLL/SLL, AIHA, chronic hepatitis B (on entecavir), KIRTI on CPAP, T2DM (managed with lifestyle changes), HTN (not on any medication) who presents with chief complaint of fever, cough and generalized weakness, admitted with suspected gram-negative multifocal pneumonia, severe anemia s/p transfusion of 4 units of PRBC and severe thrombocytopenia s/p platelet transfusion.  Suspect possible ITP after poor response to initial platelet transfusion. After one dose of IVIG, platelet kyung to 35 from 10 after 1 unit of platelet transfusion, however, drops to 10 2 days later 74yo M with PMH of CLL/SLL, AIHA, chronic hepatitis B (on entecavir), KIRTI on CPAP, T2DM (managed with lifestyle changes), HTN (not on any medication) who presents with chief complaint of fever, cough and generalized weakness, admitted with suspected gram-negative multifocal pneumonia, completed a course of IV antibiotics.  Severe anemia s/p transfusion of 5 units of PRBC and severe thrombocytopenia s/p platelet transfusion.  Suspect possible ITP after poor response to initial platelet transfusion. One dose of IVIG was given, s/p 5 units of platelets transfusion. Pt started chemo therapy with venetoclax @ 3/1/2020 76yo M with PMH of CLL/SLL, AIHA, chronic hepatitis B (on entecavir), KIRTI on CPAP, T2DM (managed with lifestyle changes), HTN (not on any medication) who presents with chief complaint of fever, cough and generalized weakness, admitted with suspected gram-negative multifocal pneumonia, completed a course of IV antibiotics.  Severe anemia s/p transfusion of 5 units of PRBC and severe thrombocytopenia s/p platelet transfusion.  Suspect possible ITP after poor response to initial platelet transfusion. One dose of IVIG was given, s/p 5 units of platelets transfusion. Pt started chemo therapy with venetoclax @ 3/1/2020

## 2020-03-02 NOTE — PROGRESS NOTE ADULT - PROBLEM SELECTOR PLAN 8
Cr at baseline  - Avoid nephrotoxic medications  - s/p 1L IVF  - Check AM CMP Cr at baseline  - Avoid nephrotoxic medications  - IVF 1.5-2 liter in the setting of chemo therapy to prevent TLS  - Monitor renal function

## 2020-03-02 NOTE — PROGRESS NOTE ADULT - PROBLEM SELECTOR PLAN 5
-unclear why pt's direct bilirubin is the majority fraction   -t bili today to 2.4  -pt has no symptoms or exam findings consistent with obstructed CBD  -RUQ US showing GB with stones but no obstructing stones, and normal caliber CBD  -given hep B hx, GI recs MRCP with and without contrast  -MRCP did not reveal obstruction  -GI following -unclear why pt's direct bilirubin is the majority fraction   -t bili today to 2.1  -pt has no symptoms or exam findings consistent with obstructed CBD  -RUQ US showing GB with stones but no obstructing stones, and normal caliber CBD  -MRCP did not reveal obstruction  -GI following

## 2020-03-02 NOTE — PROGRESS NOTE ADULT - ASSESSMENT
74 y/o man w Hepatitis B on Entecavirt, Chronic Lymphocytic Leukemia/Small Lymphocytic Lymphoma 4/2018 when presented w immune hemolytic anemia w partial response to steroids, started on Ibrutinib w heme CR and MD by CT scan w some decrease of lymphadenopathies, until 1/2020 when relapsed w incr WBC, anemia(initially not hemolytic, has chronic Matthew positive due to CLL/SLL), thrombocytopenia.  Repeat Flow Cytometry still SLL/CLL, but FISH now w 11q-, 17p-, del of chromosome 12 and 13, all poor prognostic factors.]  Repeat PETCT only mildly hypermetabolic LADs w highest SUV 3, largest conglomerate f LNs ~5x2cm prevascular, mild splenomegaly 14cm  Pt was scheduled for Venetoclax/Rituxan second line treatment w admission for ramp up Venetoclax and tumor lysis prophylaxis when found w Influenza A during the 2/2020 adm, Rx w Temaflu, subsequent also sig more anemic/thrombocytopenic.  Was discharged home and admitted again 2/22/20 with  fever and found w pneumonia, continues to require transfusions plts and PRBCs.  Completed course of  Ceftriaxone  Hep B titer negative  Post IVIG    -prednisone started tapered 2/29/20, continue 30mg today and will slowly taper off  -leukocytosis due to CLL/SLL w circulating lymphs exacerbated by steroid effect,  -present clinical condition due to progression of CLL/SLL, worsened by acute illness of Influenza A/pneumonia/+-drug effects  -after discussion w pt and wife on 2/29/20, started Venetoclax 20mg po qd for 7days then increase to 50mg qd for 7days, then 200mg po qD for 7 days, then 400mg po qD. Rituxan to start after 5 weeks.  -continue IV hydration, 1.5-2 liters a day  - monitor for tumor lysis syndrome, check CMP including LDH/calcium/uric acid/phos/renal function/electrolytes, before first dose/4/8/12/24 hours later.  -will d/c ibrutinib once blood indices are stabilized (anticipate 3/3/20)  - no transfusions needed today    discussed w pt, Medicine (Dr. Campbell) and Nursing

## 2020-03-02 NOTE — PROGRESS NOTE ADULT - SUBJECTIVE AND OBJECTIVE BOX
INTERVAL HPI/OVERNIGHT EVENTS:  pt seen and examined  resting in bed  did not sleep well overnight, no acute gi issues per rn  sp 1 u prbc and 1 u plts yesterday    MEDICATIONS  (STANDING):  allopurinol 100 milliGRAM(s) Oral every 8 hours  dextrose 5% + sodium chloride 0.45%. 1000 milliLiter(s) (125 mL/Hr) IV Continuous <Continuous>  entecavir 0.5 milliGRAM(s) Oral daily  ibrutinib 420 milliGRAM(s) Oral daily  pantoprazole    Tablet 40 milliGRAM(s) Oral before breakfast  predniSONE   Tablet 30 milliGRAM(s) Oral daily  venetoclax 20 milliGRAM(s) Oral <User Schedule>    MEDICATIONS  (PRN):  acetaminophen   Tablet .. 650 milliGRAM(s) Oral every 6 hours PRN Mild Pain (1 - 3)  acetaminophen   Tablet .. 650 milliGRAM(s) Oral every 6 hours PRN Temp greater or equal to 38C (100.4F)  albuterol/ipratropium for Nebulization 3 milliLiter(s) Nebulizer every 6 hours PRN Shortness of Breath and/or Wheezing  benzonatate 100 milliGRAM(s) Oral three times a day PRN Cough  guaiFENesin   Syrup  (Sugar-Free) 200 milliGRAM(s) Oral every 6 hours PRN Cough      Allergies    sulfa drugs (Unknown)    Intolerances        Review of Systems:    General:  No wt loss, fevers, chills, night sweats, fatigue   Eyes:  Good vision, no reported pain  ENT:  No sore throat, pain, runny nose, dysphagia  CV:  No pain, palpitations, hypo/hypertension  Resp:  No dyspnea, cough, tachypnea, wheezing  GI:  No pain, No nausea, No vomiting, No diarrhea, No constipation, No weight loss, No fever, No pruritis, No rectal bleeding, No melena, No dysphagia  :  No pain, bleeding, incontinence, nocturia  Muscle:  No pain, weakness  Neuro:  No weakness, tingling, memory problems  Psych:  No fatigue, insomnia, mood problems, depression  Endocrine:  No polyuria, polydypsia, cold/heat intolerance  Heme:  No petechiae, ecchymosis, easy bruisability  Skin:  No rash, tattoos, scars, edema      Vital Signs Last 24 Hrs  T(C): 36.3 (02 Mar 2020 04:52), Max: 36.8 (01 Mar 2020 20:30)  T(F): 97.4 (02 Mar 2020 04:52), Max: 98.2 (01 Mar 2020 20:30)  HR: 95 (02 Mar 2020 04:52) (81 - 95)  BP: 151/78 (02 Mar 2020 04:52) (132/64 - 151/78)  BP(mean): --  RR: 17 (02 Mar 2020 04:52) (17 - 17)  SpO2: 98% (02 Mar 2020 04:52) (98% - 100%)    PHYSICAL EXAM:    Constitutional: lying in bed  HEENT: ncat  Gastrointestinal: soft nt mild dt  Extremities: edema  Neurological: Awake alert responds appropriately      LABS:                        8.2    91.85 )-----------( 17       ( 02 Mar 2020 03:48 )             24.9     03-02    137  |  112<H>  |  23  ----------------------------<  177<H>  3.9   |  16<L>  |  0.84    Ca    7.7<L>      02 Mar 2020 03:19  Phos  2.2     03-02  Mg     2.0     03-02    TPro  6.3  /  Alb  2.4<L>  /  TBili  2.1<H>  /  DBili  x   /  AST  55<H>  /  ALT  38  /  AlkPhos  228<H>  03-02          RADIOLOGY & ADDITIONAL TESTS:

## 2020-03-02 NOTE — PROGRESS NOTE ADULT - PROBLEM SELECTOR PLAN 4
- Per chart review, platelet count steadily declining since 2018  - plt count up to 35 with 1 unit of platelet transfusion after 1 dose of IVIG, drops to 10 two days after  - s/p 3 units of platelet transfusion since admission  - Heme/Onc recs reviewed  - transfused 1 unit platelet, IVIG - Per chart review, platelet count steadily declining since 2018  - poor response to platelets transfusion, suspect ITP component  - S/P one dose of IVIG  - s/p 5 units of platelet transfusion since admission  - Heme/Onc recs reviewed - Per chart review, platelet count steadily declining since 2018  - poor response to platelets transfusion, suspect ITP component  - s/p one dose of IVIG  - s/p 5 units of platelet transfusion since admission  - Heme/Onc recs reviewed

## 2020-03-03 LAB
ALBUMIN SERPL ELPH-MCNC: 2.5 G/DL — LOW (ref 3.3–5)
ALP SERPL-CCNC: 190 U/L — HIGH (ref 40–120)
ALT FLD-CCNC: 28 U/L — SIGNIFICANT CHANGE UP (ref 12–78)
ANION GAP SERPL CALC-SCNC: 10 MMOL/L — SIGNIFICANT CHANGE UP (ref 5–17)
AST SERPL-CCNC: 22 U/L — SIGNIFICANT CHANGE UP (ref 15–37)
BASOPHILS # BLD AUTO: 0 K/UL — SIGNIFICANT CHANGE UP (ref 0–0.2)
BASOPHILS NFR BLD AUTO: 0 % — SIGNIFICANT CHANGE UP (ref 0–2)
BILIRUB SERPL-MCNC: 2.6 MG/DL — HIGH (ref 0.2–1.2)
BUN SERPL-MCNC: 27 MG/DL — HIGH (ref 7–23)
CALCIUM SERPL-MCNC: 7.7 MG/DL — LOW (ref 8.5–10.1)
CHLORIDE SERPL-SCNC: 108 MMOL/L — SIGNIFICANT CHANGE UP (ref 96–108)
CO2 SERPL-SCNC: 19 MMOL/L — LOW (ref 22–31)
CREAT SERPL-MCNC: 1 MG/DL — SIGNIFICANT CHANGE UP (ref 0.5–1.3)
EOSINOPHIL # BLD AUTO: 0 K/UL — SIGNIFICANT CHANGE UP (ref 0–0.5)
EOSINOPHIL NFR BLD AUTO: 0 % — SIGNIFICANT CHANGE UP (ref 0–6)
GLUCOSE SERPL-MCNC: 200 MG/DL — HIGH (ref 70–99)
HCT VFR BLD CALC: 23.2 % — LOW (ref 39–50)
HGB BLD-MCNC: 7.6 G/DL — LOW (ref 13–17)
LDH SERPL L TO P-CCNC: 212 U/L — SIGNIFICANT CHANGE UP (ref 50–242)
LYMPHOCYTES # BLD AUTO: 71 % — HIGH (ref 13–44)
LYMPHOCYTES # BLD AUTO: 9.05 K/UL — HIGH (ref 1–3.3)
MAGNESIUM SERPL-MCNC: 2 MG/DL — SIGNIFICANT CHANGE UP (ref 1.6–2.6)
MCHC RBC-ENTMCNC: 32.1 PG — SIGNIFICANT CHANGE UP (ref 27–34)
MCHC RBC-ENTMCNC: 32.8 GM/DL — SIGNIFICANT CHANGE UP (ref 32–36)
MCV RBC AUTO: 97.9 FL — SIGNIFICANT CHANGE UP (ref 80–100)
MONOCYTES # BLD AUTO: 0.25 K/UL — SIGNIFICANT CHANGE UP (ref 0–0.9)
MONOCYTES NFR BLD AUTO: 2 % — SIGNIFICANT CHANGE UP (ref 2–14)
NEUTROPHILS # BLD AUTO: 0.76 K/UL — LOW (ref 1.8–7.4)
NEUTROPHILS NFR BLD AUTO: 5 % — LOW (ref 43–77)
NRBC # BLD: SIGNIFICANT CHANGE UP /100 WBCS (ref 0–0)
PHOSPHATE SERPL-MCNC: 2.6 MG/DL — SIGNIFICANT CHANGE UP (ref 2.5–4.5)
PLATELET # BLD AUTO: 4 K/UL — CRITICAL LOW (ref 150–400)
POTASSIUM SERPL-MCNC: 3.5 MMOL/L — SIGNIFICANT CHANGE UP (ref 3.5–5.3)
POTASSIUM SERPL-SCNC: 3.5 MMOL/L — SIGNIFICANT CHANGE UP (ref 3.5–5.3)
PROT SERPL-MCNC: 6.1 G/DL — SIGNIFICANT CHANGE UP (ref 6–8.3)
RBC # BLD: 2.37 M/UL — LOW (ref 4.2–5.8)
RBC # FLD: 19.6 % — HIGH (ref 10.3–14.5)
SODIUM SERPL-SCNC: 137 MMOL/L — SIGNIFICANT CHANGE UP (ref 135–145)
URATE SERPL-MCNC: 2.1 MG/DL — LOW (ref 3.4–8.8)
WBC # BLD: 12.74 K/UL — HIGH (ref 3.8–10.5)
WBC # FLD AUTO: 12.74 K/UL — HIGH (ref 3.8–10.5)

## 2020-03-03 PROCEDURE — 99233 SBSQ HOSP IP/OBS HIGH 50: CPT | Mod: GC

## 2020-03-03 RX ORDER — SODIUM CHLORIDE 9 MG/ML
1000 INJECTION, SOLUTION INTRAVENOUS
Refills: 0 | Status: DISCONTINUED | OUTPATIENT
Start: 2020-03-03 | End: 2020-03-09

## 2020-03-03 RX ORDER — ALBUMIN HUMAN 25 %
50 VIAL (ML) INTRAVENOUS EVERY 8 HOURS
Refills: 0 | Status: COMPLETED | OUTPATIENT
Start: 2020-03-03 | End: 2020-03-04

## 2020-03-03 RX ADMIN — Medication 100 MILLIGRAM(S): at 14:30

## 2020-03-03 RX ADMIN — PANTOPRAZOLE SODIUM 40 MILLIGRAM(S): 20 TABLET, DELAYED RELEASE ORAL at 06:06

## 2020-03-03 RX ADMIN — SODIUM CHLORIDE 125 MILLILITER(S): 9 INJECTION, SOLUTION INTRAVENOUS at 07:01

## 2020-03-03 RX ADMIN — Medication 50 MILLILITER(S): at 16:33

## 2020-03-03 RX ADMIN — ENTECAVIR 0.5 MILLIGRAM(S): 0.5 TABLET ORAL at 05:35

## 2020-03-03 RX ADMIN — Medication 20 MILLIGRAM(S): at 14:28

## 2020-03-03 RX ADMIN — Medication 100 MILLIGRAM(S): at 05:35

## 2020-03-03 RX ADMIN — Medication 50 MILLILITER(S): at 21:40

## 2020-03-03 RX ADMIN — Medication 100 MILLIGRAM(S): at 21:40

## 2020-03-03 RX ADMIN — VENETOCLAX 20 MILLIGRAM(S): 100 TABLET, FILM COATED ORAL at 14:31

## 2020-03-03 RX ADMIN — Medication 30 MILLIGRAM(S): at 05:35

## 2020-03-03 RX ADMIN — SODIUM CHLORIDE 100 MILLILITER(S): 9 INJECTION, SOLUTION INTRAVENOUS at 18:09

## 2020-03-03 NOTE — PROGRESS NOTE ADULT - SUBJECTIVE AND OBJECTIVE BOX
All interim records and events noted.    up in chair  wife present  tolerating Venetoclax well thus far  slept well for first time last night  nonproductive cough sl better each day      MEDICATIONS  (STANDING):  albumin human 25% IVPB 50 milliLiter(s) IV Intermittent every 8 hours  allopurinol 100 milliGRAM(s) Oral every 8 hours  entecavir 0.5 milliGRAM(s) Oral daily  lactated ringers. 1000 milliLiter(s) (100 mL/Hr) IV Continuous <Continuous>  pantoprazole    Tablet 40 milliGRAM(s) Oral before breakfast  predniSONE   Tablet 20 milliGRAM(s) Oral daily  venetoclax 20 milliGRAM(s) Oral <User Schedule>    MEDICATIONS  (PRN):  acetaminophen   Tablet .. 650 milliGRAM(s) Oral every 6 hours PRN Mild Pain (1 - 3)  acetaminophen   Tablet .. 650 milliGRAM(s) Oral every 6 hours PRN Temp greater or equal to 38C (100.4F)  albuterol/ipratropium for Nebulization 3 milliLiter(s) Nebulizer every 6 hours PRN Shortness of Breath and/or Wheezing  benzonatate 100 milliGRAM(s) Oral three times a day PRN Cough  guaiFENesin   Syrup  (Sugar-Free) 200 milliGRAM(s) Oral every 6 hours PRN Cough      Vital Signs Last 24 Hrs  T(C): 36.6 (03 Mar 2020 10:36), Max: 36.6 (02 Mar 2020 13:19)  T(F): 97.8 (03 Mar 2020 10:36), Max: 97.9 (02 Mar 2020 13:19)  HR: 87 (03 Mar 2020 10:36) (87 - 96)  BP: 133/70 (03 Mar 2020 10:36) (133/70 - 144/70)  BP(mean): --  RR: 17 (03 Mar 2020 10:36) (17 - 17)  SpO2: 98% (03 Mar 2020 10:36) (98% - 98%)    PHYSICAL EXAM  General: well developed  well nourished, ill appearing man, up in chair, in no acute distress  Head: atraumatic, normocephalic  ENT: sclera anicteric, buccal mucosa moist  Neck: supple, trachea midline  CV: S1 S2, regular rate and rhythm  Lungs: clear to auscultation, no wheezes/rhonchi  Abdomen: soft, nontender, bowel sounds present, no palpable hepatosplenomegaly  Extrem: 1 + blair lower legs edema  Skin: no significant increased ecchymosis/petechiae  Neuro: alert and oriented X3,  no focal deficits      LABS:             7.6    12.74 )-----------( 4        ( 03-03 @ 08:22 )             23.2                8.2    91.85 )-----------( 17       ( 03-02 @ 03:48 )             24.9                7.6    139.97 )-----------( 10       ( 03-01 @ 06:21 )             23.9       03-03    137  |  108  |  27<H>  ----------------------------<  200<H>  3.5   |  19<L>  |  1.00    Ca    7.7<L>      03 Mar 2020 08:22  Phos  2.6     03-03  Mg     2.0     03-03    TPro  6.1  /  Alb  2.5<L>  /  TBili  2.6<H>  /  DBili  x   /  AST  22  /  ALT  28  /  AlkPhos  190<H>  03-03        RADIOLOGY & ADDITIONAL STUDIES:    IMPRESSION/RECOMMENDATIONS:

## 2020-03-03 NOTE — PROGRESS NOTE ADULT - ASSESSMENT
76 y/o man w Hepatitis B on Entecavirt, Chronic Lymphocytic Leukemia/Small Lymphocytic Lymphoma 4/2018 when presented w immune hemolytic anemia w partial response to steroids, started on Ibrutinib w heme CR and [place] by CT scan w some decrease of lymphadenopathies, until 1/2020 when relapsed w incr WBC, anemia(initially not hemolytic, has chronic Matthew positive due to CLL/SLL), thrombocytopenia.  Repeat Flow Cytometry still SLL/CLL, but FISH now w 11q-, 17p-, del of chromosome 12 and 13, all poor prognostic factors.]  Repeat PETCT only mildly hypermetabolic LADs w highest SUV 3, largest conglomerate f LNs ~5x2cm prevascular, mild splenomegaly 14cm  Pt was scheduled for Venetoclax/Rituxan second line treatment w admission for ramp up Venetoclax and tumor lysis prophylaxis when found w Influenza A during the 2/2020 adm, Rx w Temaflu, subsequent also sig more anemic/thrombocytopenic.  Now admitted w again fever and found w pneumonia, continues to require transfusions plts and PRBCs.  Completed course of  Ceftriaxone  Hep B titer negative  Post IVIG  Started on ramp up Venetoclax 3/1/20    -prednisone on tapering, will decr to 20mg today  -leukocytosis due to CLL/SLL w circulating lymphs, exacerbated by steroid effect, Note rapid decrease since starting Venetoclax  -anemia and thrombocytopenia due to acute illness, compromised bone marrow. CLL/SLL, possible immune element, continue daily CBC monitoring and transfuse as clinically indicated. Transfuse platelets today, plts 4k  -started on ramp up Venetoclax for progressive CLL/SLL,---> regimen Venetoclax 20mg po qD for 7days then incr to 50mg qD for 7days, then 200mg po qD for 7 days, then 400mg po qD. Rituxan to start after 5 weeks.  -monitor for tumor lysis syndrome, started on IV hydration, now w incr edema and chem relatively stable w now WBC much decreased therefore much less circulating CLL/SLL, can decr IV, keep hydration 1.5-2L/day, continue check CMP including LDH/calcium/uric acid/phos/renal function/electrolytes, can decrease frequent to in AM and 8hours after Venetoclax dose(due 1pm every day)  -d/c ibrutinib today  -labs noted, on K and phos repletion, ordered 1 unit each PRBC and platelets  -plan for first dose Venetoclax at 1pm  today    discussed w pt, wife, Medicine.    discussed w pt, Medicine and Nursing

## 2020-03-03 NOTE — PROGRESS NOTE ADULT - PROBLEM SELECTOR PLAN 8
Cr at baseline  - Avoid nephrotoxic medications  - IVF 1.5-2 liter in the setting of chemo therapy to prevent TLS  - Monitor renal function

## 2020-03-03 NOTE — PROGRESS NOTE ADULT - PROBLEM SELECTOR PLAN 4
- Per chart review, platelet count steadily declining since 2018  - poor response to platelets transfusion, suspect ITP component  - s/p one dose of IVIG  - s/p 5 units of platelet transfusion since admission  - Heme/Onc recs reviewed - CT Chest showing multilobar patchy peribronchovascular airspace opacities and more dense consolidative process in the right middle lobe likely reflecting multifocal pneumonia, trace b/l pleural effusions  - nares swab negative for MRSA/MSSA  - urine legionella Ag and strep pneumo Ag negative   - sputum Cx: normal resp marnie   - RVP negative  - clinically improved, completed course of abx as per ID recs - No additional abx needed.   - fever resolving, tylenol PRN for fever  - duonebs PRN for wheezing  - WBC very labile and difficult to gauge utility  - ID Dr. Webber consulted, recs appreciated  - Tessalon Perle and Robitussin for cough

## 2020-03-03 NOTE — PROGRESS NOTE ADULT - PROBLEM SELECTOR PLAN 2
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 Webber - no issues per Id with resuming chemo.  - Steroids being tapered - 30mg today  - chemo therapy with venetoclax started yesterday per Hem/Onc  - IVF, allopurinol started to prevent tumor lysis syndrome  - follow up with CMP, uric acid level - Per chart review, platelet count steadily declining since 2018  - poor response to platelets transfusion, suspect ITP component  - s/p one dose of IVIG  - s/p 5 units of platelet transfusion since admission  - plt count 4, will give 2 units of platelets today

## 2020-03-03 NOTE — PROGRESS NOTE ADULT - PROBLEM SELECTOR PLAN 3
- Worsening anemia and thrombocytopenia since discharge ~1 week ago likely in part due to lymphoma/leukemia in part due to hemolytic anemia  - s/p 5 units of PRBC  6.8-1u-7.4-7.6-6.7-1u-8.3-7.7-6.9-1u-7.1-7.4-1u-8.9-8.0-7.6-1u-8.2  - No acute s/s of bleeding on admission, continue to monitor, high bilirubin and though a greater direct bili component, suspect this is in large part due to AIHA and the indirect bili is getting conjugated  - suspect due to AIHA. Was on prednisone 10mg daily -- heme rec to increase to prednisone 40mg po, tapering started @2/29   - S/P 5uPRBC since admission - Worsening anemia and thrombocytopenia since discharge ~1 week ago likely in part due to lymphoma/leukemia in part due to hemolytic anemia  - s/p 5 units of PRBC  6.8-1u-7.4-7.6-6.7-1u-8.3-7.7-6.9-1u-7.1-7.4-1u-8.9-8.0-7.6-1u-8.2-7.6  - No acute s/s of bleeding on admission, continue to monitor, high bilirubin and though a greater direct bili component, suspect this is in large part due to AIHA and the indirect bili is getting conjugated  - suspect due to AIHA. Was on prednisone 10mg daily -- heme rec to increase to prednisone 40mg po, tapering started @2/29   - S/P 5uPRBC since admission

## 2020-03-03 NOTE — PROGRESS NOTE ADULT - SUBJECTIVE AND OBJECTIVE BOX
INTERVAL HPI/OVERNIGHT EVENTS:  pt seen and examined  denies n/v/abd pain  +gi fxn  tolerating po  ambulating in room  no s/s gib per nursing    MEDICATIONS  (STANDING):  allopurinol 100 milliGRAM(s) Oral every 8 hours  dextrose 5% + sodium chloride 0.45%. 1000 milliLiter(s) (125 mL/Hr) IV Continuous <Continuous>  entecavir 0.5 milliGRAM(s) Oral daily  pantoprazole    Tablet 40 milliGRAM(s) Oral before breakfast  predniSONE   Tablet 30 milliGRAM(s) Oral daily  venetoclax 20 milliGRAM(s) Oral <User Schedule>    MEDICATIONS  (PRN):  acetaminophen   Tablet .. 650 milliGRAM(s) Oral every 6 hours PRN Mild Pain (1 - 3)  acetaminophen   Tablet .. 650 milliGRAM(s) Oral every 6 hours PRN Temp greater or equal to 38C (100.4F)  albuterol/ipratropium for Nebulization 3 milliLiter(s) Nebulizer every 6 hours PRN Shortness of Breath and/or Wheezing  benzonatate 100 milliGRAM(s) Oral three times a day PRN Cough  guaiFENesin   Syrup  (Sugar-Free) 200 milliGRAM(s) Oral every 6 hours PRN Cough      Allergies    sulfa drugs (Unknown)    Intolerances        Review of Systems:    General:  No wt loss, fevers, chills, night sweats, fatigue   Eyes:  Good vision, no reported pain  ENT:  No sore throat, pain, runny nose, dysphagia  CV:  No pain, palpitations, hypo/hypertension  Resp:  No dyspnea, cough, tachypnea, wheezing  GI:  No pain, No nausea, No vomiting, No diarrhea, No constipation, No weight loss, No fever, No pruritis, No rectal bleeding, No melena, No dysphagia  :  No pain, bleeding, incontinence, nocturia  Muscle:  No pain, weakness  Neuro:  No weakness, tingling, memory problems  Psych:  No fatigue, insomnia, mood problems, depression  Endocrine:  No polyuria, polydypsia, cold/heat intolerance  Heme:  No petechiae, ecchymosis, easy bruisability  Skin:  No rash, tattoos, scars, edema      Vital Signs Last 24 Hrs  T(C): 36.4 (03 Mar 2020 05:02), Max: 36.6 (02 Mar 2020 13:19)  T(F): 97.5 (03 Mar 2020 05:02), Max: 97.9 (02 Mar 2020 13:19)  HR: 94 (03 Mar 2020 05:02) (87 - 96)  BP: 142/72 (03 Mar 2020 05:02) (136/70 - 144/70)  BP(mean): --  RR: 17 (03 Mar 2020 05:02) (17 - 17)  SpO2: 98% (03 Mar 2020 05:02) (98% - 98%)    PHYSICAL EXAM:  Constitutional: lying in bed  HEENT: ncat  Gastrointestinal: soft nt mild dt  Extremities: edema  Neurological: Awake alert responds appropriately      LABS:                        7.6    x     )-----------( x        ( 03 Mar 2020 08:22 )             23.2     03-02    136  |  107  |  26<H>  ----------------------------<  282<H>  4.2   |  19<L>  |  1.00    Ca    8.0<L>      02 Mar 2020 12:28  Phos  2.2     03-02  Mg     2.0     03-02    TPro  6.2  /  Alb  2.5<L>  /  TBili  2.5<H>  /  DBili  x   /  AST  43<H>  /  ALT  33  /  AlkPhos  218<H>  03-02          RADIOLOGY & ADDITIONAL TESTS:

## 2020-03-03 NOTE — PROGRESS NOTE ADULT - SUBJECTIVE AND OBJECTIVE BOX
Patient is a 75y old  Male who presents with a chief complaint of weakness, anemia (03 Mar 2020 08:45)      INTERVAL HPI/OVERNIGHT EVENTS: Patient seen and examined at bedside. No overnight events occurred. Patient has no complaints at this time. Denies fevers, chills, headache, lightheadedness, chest pain, dyspnea, abdominal pain, n/v/d/c.    MEDICATIONS  (STANDING):  allopurinol 100 milliGRAM(s) Oral every 8 hours  entecavir 0.5 milliGRAM(s) Oral daily  pantoprazole    Tablet 40 milliGRAM(s) Oral before breakfast  predniSONE   Tablet 30 milliGRAM(s) Oral daily  venetoclax 20 milliGRAM(s) Oral <User Schedule>    MEDICATIONS  (PRN):  acetaminophen   Tablet .. 650 milliGRAM(s) Oral every 6 hours PRN Mild Pain (1 - 3)  acetaminophen   Tablet .. 650 milliGRAM(s) Oral every 6 hours PRN Temp greater or equal to 38C (100.4F)  albuterol/ipratropium for Nebulization 3 milliLiter(s) Nebulizer every 6 hours PRN Shortness of Breath and/or Wheezing  benzonatate 100 milliGRAM(s) Oral three times a day PRN Cough  guaiFENesin   Syrup  (Sugar-Free) 200 milliGRAM(s) Oral every 6 hours PRN Cough      Allergies    sulfa drugs (Unknown)    Intolerances        REVIEW OF SYSTEMS:  CONSTITUTIONAL: No fever or chills  HEENT:  No headache, no sore throat  RESPIRATORY: No cough, wheezing, or shortness of breath  CARDIOVASCULAR: No chest pain, palpitations  GASTROINTESTINAL: No abd pain, nausea, vomiting, or diarrhea  GENITOURINARY: No dysuria, frequency, or hematuria  NEUROLOGICAL: no focal weakness or dizziness  MUSCULOSKELETAL: no myalgias     Vital Signs Last 24 Hrs  T(C): 36.6 (03 Mar 2020 10:36), Max: 36.6 (02 Mar 2020 13:19)  T(F): 97.8 (03 Mar 2020 10:36), Max: 97.9 (02 Mar 2020 13:19)  HR: 87 (03 Mar 2020 10:36) (87 - 96)  BP: 133/70 (03 Mar 2020 10:36) (133/70 - 144/70)  BP(mean): --  RR: 17 (03 Mar 2020 10:36) (17 - 17)  SpO2: 98% (03 Mar 2020 10:36) (98% - 98%)    PHYSICAL EXAM:  GENERAL: NAD  HEENT:  anicteric, moist mucous membranes  CHEST/LUNG:  CTA b/l, no rales, wheezes, or rhonchi  HEART:  RRR, S1, S2  ABDOMEN:  BS+, soft, nontender, nondistended  EXTREMITIES: no edema, cyanosis, or calf tenderness  NERVOUS SYSTEM: answers questions and follows commands appropriately    LABS:                        7.6    12.74 )-----------( 4        ( 03 Mar 2020 08:22 )             23.2     CBC Full  -  ( 03 Mar 2020 08:22 )  WBC Count : 12.74 K/uL  Hemoglobin : 7.6 g/dL  Hematocrit : 23.2 %  Platelet Count - Automated : 4 K/uL  Mean Cell Volume : 97.9 fl  Mean Cell Hemoglobin : 32.1 pg  Mean Cell Hemoglobin Concentration : 32.8 gm/dL  Auto Neutrophil # : 0.76 K/uL  Auto Lymphocyte # : 9.05 K/uL  Auto Monocyte # : 0.25 K/uL  Auto Eosinophil # : 0.00 K/uL  Auto Basophil # : 0.00 K/uL  Auto Neutrophil % : 5.0 %  Auto Lymphocyte % : 71.0 %  Auto Monocyte % : 2.0 %  Auto Eosinophil % : 0.0 %  Auto Basophil % : 0.0 %    03 Mar 2020 08:22    137    |  108    |  27     ----------------------------<  200    3.5     |  19     |  1.00     Ca    7.7        03 Mar 2020 08:22  Phos  2.6       03 Mar 2020 08:22  Mg     2.0       03 Mar 2020 08:22    TPro  6.1    /  Alb  2.5    /  TBili  2.6    /  DBili  x      /  AST  22     /  ALT  28     /  AlkPhos  190    03 Mar 2020 08:22        CAPILLARY BLOOD GLUCOSE              RADIOLOGY & ADDITIONAL TESTS:    Personally reviewed.     Consultant(s) Notes Reviewed:  [x] YES  [ ] NO Patient is a 75y old  Male who presents with a chief complaint of weakness, anemia (03 Mar 2020 08:45)      INTERVAL HPI/OVERNIGHT EVENTS: Patient seen and examined at bedside. No overnight events occurred. Patient starts he feels better, cough has improved. Denies fevers, chills, headache, lightheadedness, chest pain, dyspnea, abdominal pain, n/v/d/c.    MEDICATIONS  (STANDING):  allopurinol 100 milliGRAM(s) Oral every 8 hours  entecavir 0.5 milliGRAM(s) Oral daily  pantoprazole    Tablet 40 milliGRAM(s) Oral before breakfast  predniSONE   Tablet 30 milliGRAM(s) Oral daily  venetoclax 20 milliGRAM(s) Oral <User Schedule>    MEDICATIONS  (PRN):  acetaminophen   Tablet .. 650 milliGRAM(s) Oral every 6 hours PRN Mild Pain (1 - 3)  acetaminophen   Tablet .. 650 milliGRAM(s) Oral every 6 hours PRN Temp greater or equal to 38C (100.4F)  albuterol/ipratropium for Nebulization 3 milliLiter(s) Nebulizer every 6 hours PRN Shortness of Breath and/or Wheezing  benzonatate 100 milliGRAM(s) Oral three times a day PRN Cough  guaiFENesin   Syrup  (Sugar-Free) 200 milliGRAM(s) Oral every 6 hours PRN Cough    Allergies    sulfa drugs (Unknown)    Intolerances    REVIEW OF SYSTEMS:  CONSTITUTIONAL: No fever or chills  HEENT:  No headache, no sore throat  RESPIRATORY: intermittent cough. No wheezing, or shortness of breath  CARDIOVASCULAR: No chest pain, palpitations  GASTROINTESTINAL: No abd pain, nausea, vomiting, or diarrhea  GENITOURINARY: No dysuria, frequency, or hematuria  NEUROLOGICAL: no focal weakness or dizziness  MUSCULOSKELETAL: legs swollen    Vital Signs Last 24 Hrs  T(C): 36.6 (03 Mar 2020 10:36), Max: 36.6 (02 Mar 2020 13:19)  T(F): 97.8 (03 Mar 2020 10:36), Max: 97.9 (02 Mar 2020 13:19)  HR: 87 (03 Mar 2020 10:36) (87 - 96)  BP: 133/70 (03 Mar 2020 10:36) (133/70 - 144/70)  BP(mean): --  RR: 17 (03 Mar 2020 10:36) (17 - 17)  SpO2: 98% (03 Mar 2020 10:36) (98% - 98%)    PHYSICAL EXAM:  GENERAL: NAD  HEENT:  anicteric, moist mucous membranes  CHEST/LUNG:  CTA b/l, no rales, wheezes, or rhonchi  HEART:  RRR, S1, S2, soft heart murmur noted  ABDOMEN:  BS+, soft, nontender, nondistended  EXTREMITIES: 3+ pitting edema b/l lower extremities, no cyanosis, or calf tenderness  NERVOUS SYSTEM: answers questions and follows commands appropriately    LABS:                        7.6    12.74 )-----------( 4        ( 03 Mar 2020 08:22 )             23.2     CBC Full  -  ( 03 Mar 2020 08:22 )  WBC Count : 12.74 K/uL  Hemoglobin : 7.6 g/dL  Hematocrit : 23.2 %  Platelet Count - Automated : 4 K/uL  Mean Cell Volume : 97.9 fl  Mean Cell Hemoglobin : 32.1 pg  Mean Cell Hemoglobin Concentration : 32.8 gm/dL  Auto Neutrophil # : 0.76 K/uL  Auto Lymphocyte # : 9.05 K/uL  Auto Monocyte # : 0.25 K/uL  Auto Eosinophil # : 0.00 K/uL  Auto Basophil # : 0.00 K/uL  Auto Neutrophil % : 5.0 %  Auto Lymphocyte % : 71.0 %  Auto Monocyte % : 2.0 %  Auto Eosinophil % : 0.0 %  Auto Basophil % : 0.0 %    03 Mar 2020 08:22    137    |  108    |  27     ----------------------------<  200    3.5     |  19     |  1.00     Ca    7.7        03 Mar 2020 08:22  Phos  2.6       03 Mar 2020 08:22  Mg     2.0       03 Mar 2020 08:22    TPro  6.1    /  Alb  2.5    /  TBili  2.6    /  DBili  x      /  AST  22     /  ALT  28     /  AlkPhos  190    03 Mar 2020 08:22    CAPILLARY BLOOD GLUCOSE    RADIOLOGY & ADDITIONAL TESTS:    Personally reviewed.     Consultant(s) Notes Reviewed:  [x] YES  [ ] NO Patient is a 75y old  Male who presents with a chief complaint of weakness, anemia (03 Mar 2020 08:45)      INTERVAL HPI/OVERNIGHT EVENTS: Patient seen and examined at bedside. No overnight events occurred. Patient states he feels better, cough has improved. Denies fevers, chills, headache, lightheadedness, chest pain, dyspnea, abdominal pain, n/v/d/c.    MEDICATIONS  (STANDING):  allopurinol 100 milliGRAM(s) Oral every 8 hours  entecavir 0.5 milliGRAM(s) Oral daily  pantoprazole    Tablet 40 milliGRAM(s) Oral before breakfast  predniSONE   Tablet 30 milliGRAM(s) Oral daily  venetoclax 20 milliGRAM(s) Oral <User Schedule>    MEDICATIONS  (PRN):  acetaminophen   Tablet .. 650 milliGRAM(s) Oral every 6 hours PRN Mild Pain (1 - 3)  acetaminophen   Tablet .. 650 milliGRAM(s) Oral every 6 hours PRN Temp greater or equal to 38C (100.4F)  albuterol/ipratropium for Nebulization 3 milliLiter(s) Nebulizer every 6 hours PRN Shortness of Breath and/or Wheezing  benzonatate 100 milliGRAM(s) Oral three times a day PRN Cough  guaiFENesin   Syrup  (Sugar-Free) 200 milliGRAM(s) Oral every 6 hours PRN Cough    Allergies    sulfa drugs (Unknown)    Intolerances    REVIEW OF SYSTEMS:  CONSTITUTIONAL: No fever or chills  HEENT:  No headache, no sore throat  RESPIRATORY: intermittent cough. No wheezing, or shortness of breath  CARDIOVASCULAR: No chest pain, palpitations  GASTROINTESTINAL: No abd pain, nausea, vomiting, or diarrhea  GENITOURINARY: No dysuria, frequency, or hematuria  NEUROLOGICAL: no focal weakness or dizziness  MUSCULOSKELETAL: legs swollen    Vital Signs Last 24 Hrs  T(C): 36.6 (03 Mar 2020 10:36), Max: 36.6 (02 Mar 2020 13:19)  T(F): 97.8 (03 Mar 2020 10:36), Max: 97.9 (02 Mar 2020 13:19)  HR: 87 (03 Mar 2020 10:36) (87 - 96)  BP: 133/70 (03 Mar 2020 10:36) (133/70 - 144/70)  BP(mean): --  RR: 17 (03 Mar 2020 10:36) (17 - 17)  SpO2: 98% (03 Mar 2020 10:36) (98% - 98%)    PHYSICAL EXAM:  GENERAL: NAD  HEENT:  anicteric, moist mucous membranes  CHEST/LUNG:  CTA b/l, no rales, wheezes, or rhonchi  HEART:  RRR, S1, S2, soft heart murmur noted  ABDOMEN:  BS+, soft, nontender, nondistended  EXTREMITIES: 3+ pitting edema b/l lower extremities, no cyanosis, or calf tenderness  NERVOUS SYSTEM: answers questions and follows commands appropriately    LABS:                        7.6    12.74 )-----------( 4        ( 03 Mar 2020 08:22 )             23.2     CBC Full  -  ( 03 Mar 2020 08:22 )  WBC Count : 12.74 K/uL  Hemoglobin : 7.6 g/dL  Hematocrit : 23.2 %  Platelet Count - Automated : 4 K/uL  Mean Cell Volume : 97.9 fl  Mean Cell Hemoglobin : 32.1 pg  Mean Cell Hemoglobin Concentration : 32.8 gm/dL  Auto Neutrophil # : 0.76 K/uL  Auto Lymphocyte # : 9.05 K/uL  Auto Monocyte # : 0.25 K/uL  Auto Eosinophil # : 0.00 K/uL  Auto Basophil # : 0.00 K/uL  Auto Neutrophil % : 5.0 %  Auto Lymphocyte % : 71.0 %  Auto Monocyte % : 2.0 %  Auto Eosinophil % : 0.0 %  Auto Basophil % : 0.0 %    03 Mar 2020 08:22    137    |  108    |  27     ----------------------------<  200    3.5     |  19     |  1.00     Ca    7.7        03 Mar 2020 08:22  Phos  2.6       03 Mar 2020 08:22  Mg     2.0       03 Mar 2020 08:22    TPro  6.1    /  Alb  2.5    /  TBili  2.6    /  DBili  x      /  AST  22     /  ALT  28     /  AlkPhos  190    03 Mar 2020 08:22    CAPILLARY BLOOD GLUCOSE    RADIOLOGY & ADDITIONAL TESTS:    Personally reviewed.     Consultant(s) Notes Reviewed:  [x] YES  [ ] NO

## 2020-03-03 NOTE — PROGRESS NOTE ADULT - PROBLEM SELECTOR PLAN 5
-unclear why pt's direct bilirubin is the majority fraction   -t bili today to 2.1  -pt has no symptoms or exam findings consistent with obstructed CBD  -RUQ US showing GB with stones but no obstructing stones, and normal caliber CBD  -MRCP did not reveal obstruction  -GI following

## 2020-03-03 NOTE — PROGRESS NOTE ADULT - PROBLEM SELECTOR PLAN 1
- CT Chest showing multilobar patchy peribronchovascular airspace opacities and more dense consolidative process in the right middle lobe likely reflecting multifocal pneumonia, trace b/l pleural effusions  - nares swab negative for MRSA/MSSA  - urine legionella Ag and strep pneumo Ag negative   - sputum Cx: normal resp marnie   - RVP negative  - clinically improved, completed course of abx as per ID recs - No additional abx needed.   - fever resolving, tylenol PRN for fever  - duonebs PRN for wheezing  - WBC very labile and difficult to gauge utility  - ID Dr. 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

## 2020-03-03 NOTE — PROGRESS NOTE ADULT - PROBLEM SELECTOR PLAN 7
Pt states that lower ext edema occurs while on prednisone  - Checked venous doppler - negative  - ECHO 2/2018 showed Preserved left ventricular systolic function. Stage I   diastolic dysfunction. EF 65% -- will repeat TTE  - albumin level has been low might contribute to edema  - Caution with IVF  - Strict I&O's  - daily weights  - SCDs Pt states that lower ext edema occurs while on prednisone  - Checked venous doppler - negative  - ECHO 2/2018 showed Preserved left ventricular systolic function. Stage I   diastolic dysfunction. EF 65%   - repeat ECHO estimated EF 60-65%  - albumin level has been low might contribute to edema  - edema worsening after IVF with chemo  - Strict I&O's  - will repeat ECHO Pt states that lower ext edema occurs while on prednisone  - Checked venous doppler - negative  - ECHO 2/2018 showed Preserved left ventricular systolic function. Stage I   diastolic dysfunction. EF 65%   - repeat ECHO estimated EF 60-65%  - albumin level has been low might contribute to edema  - edema worsening after IVF with chemo  - Strict I&O's  - will repeat ECHO, F/U results

## 2020-03-03 NOTE — PROGRESS NOTE ADULT - ASSESSMENT
74yo M with PMH of CLL/SLL, AIHA, chronic hepatitis B (on entecavir), KIRTI on CPAP, T2DM (managed with lifestyle changes), HTN (not on any medication) who presents with chief complaint of fever, cough and generalized weakness, admitted with suspected gram-negative multifocal pneumonia, completed a course of IV antibiotics.  Severe anemia s/p transfusion of 5 units of PRBC and severe thrombocytopenia s/p platelet transfusion.  Suspect possible ITP after poor response to initial platelet transfusion. One dose of IVIG was given, s/p 5 units of platelets transfusion. Pt started chemo therapy with venetoclax @ 3/1/2020

## 2020-03-03 NOTE — PROGRESS NOTE ADULT - ASSESSMENT
hepatitis B   elevated lfts   CLL        f/u am labs  patient with elevated bilirubin and alk phos at baseline ? leon  mri showed normal liver, no cbd stone/dilatation; hep b pcr neg  ob neg; no s/s overt gib; monitor cbc daily  transfuse as per heme/onc  diet as tolerated      Advanced care planning was discussed with patient and family.  Advanced care planning forms were reviewed and discussed.  Risks, benefits and alternatives of gastroenterologic procedures were discussed in detail and all questions were answered.    30 minutes spent.

## 2020-03-04 DIAGNOSIS — E87.6 HYPOKALEMIA: ICD-10-CM

## 2020-03-04 LAB
ALBUMIN SERPL ELPH-MCNC: 2.7 G/DL — LOW (ref 3.3–5)
ALP SERPL-CCNC: 149 U/L — HIGH (ref 40–120)
ALT FLD-CCNC: 22 U/L — SIGNIFICANT CHANGE UP (ref 12–78)
ANION GAP SERPL CALC-SCNC: 11 MMOL/L — SIGNIFICANT CHANGE UP (ref 5–17)
AST SERPL-CCNC: 14 U/L — LOW (ref 15–37)
BASOPHILS # BLD AUTO: 0.08 K/UL — SIGNIFICANT CHANGE UP (ref 0–0.2)
BASOPHILS NFR BLD AUTO: 0.7 % — SIGNIFICANT CHANGE UP (ref 0–2)
BILIRUB SERPL-MCNC: 2.4 MG/DL — HIGH (ref 0.2–1.2)
BUN SERPL-MCNC: 24 MG/DL — HIGH (ref 7–23)
CALCIUM SERPL-MCNC: 8 MG/DL — LOW (ref 8.5–10.1)
CHLORIDE SERPL-SCNC: 109 MMOL/L — HIGH (ref 96–108)
CO2 SERPL-SCNC: 19 MMOL/L — LOW (ref 22–31)
CREAT SERPL-MCNC: 0.9 MG/DL — SIGNIFICANT CHANGE UP (ref 0.5–1.3)
EOSINOPHIL # BLD AUTO: 0.01 K/UL — SIGNIFICANT CHANGE UP (ref 0–0.5)
EOSINOPHIL NFR BLD AUTO: 0.1 % — SIGNIFICANT CHANGE UP (ref 0–6)
GLUCOSE SERPL-MCNC: 126 MG/DL — HIGH (ref 70–99)
HCT VFR BLD CALC: 21.8 % — LOW (ref 39–50)
HGB BLD-MCNC: 7.3 G/DL — LOW (ref 13–17)
IMM GRANULOCYTES NFR BLD AUTO: 0.3 % — SIGNIFICANT CHANGE UP (ref 0–1.5)
LDH SERPL L TO P-CCNC: 158 U/L — SIGNIFICANT CHANGE UP (ref 50–242)
LYMPHOCYTES # BLD AUTO: 86.3 % — HIGH (ref 13–44)
LYMPHOCYTES # BLD AUTO: 9.3 K/UL — HIGH (ref 1–3.3)
MCHC RBC-ENTMCNC: 31.7 PG — SIGNIFICANT CHANGE UP (ref 27–34)
MCHC RBC-ENTMCNC: 33.5 GM/DL — SIGNIFICANT CHANGE UP (ref 32–36)
MCV RBC AUTO: 94.8 FL — SIGNIFICANT CHANGE UP (ref 80–100)
MONOCYTES # BLD AUTO: 1.1 K/UL — HIGH (ref 0–0.9)
MONOCYTES NFR BLD AUTO: 10.2 % — SIGNIFICANT CHANGE UP (ref 2–14)
NEUTROPHILS # BLD AUTO: 0.26 K/UL — LOW (ref 1.8–7.4)
NEUTROPHILS NFR BLD AUTO: 2.4 % — LOW (ref 43–77)
NRBC # BLD: 0 /100 WBCS — SIGNIFICANT CHANGE UP (ref 0–0)
PLATELET # BLD AUTO: 11 K/UL — CRITICAL LOW (ref 150–400)
POTASSIUM SERPL-MCNC: 3.2 MMOL/L — LOW (ref 3.5–5.3)
POTASSIUM SERPL-SCNC: 3.2 MMOL/L — LOW (ref 3.5–5.3)
PROT SERPL-MCNC: 5.9 G/DL — LOW (ref 6–8.3)
RBC # BLD: 2.3 M/UL — LOW (ref 4.2–5.8)
RBC # FLD: 19 % — HIGH (ref 10.3–14.5)
SODIUM SERPL-SCNC: 139 MMOL/L — SIGNIFICANT CHANGE UP (ref 135–145)
URATE SERPL-MCNC: 1.7 MG/DL — LOW (ref 3.4–8.8)
WBC # BLD: 10.78 K/UL — HIGH (ref 3.8–10.5)
WBC # FLD AUTO: 10.78 K/UL — HIGH (ref 3.8–10.5)

## 2020-03-04 PROCEDURE — 99232 SBSQ HOSP IP/OBS MODERATE 35: CPT | Mod: GC

## 2020-03-04 PROCEDURE — 93306 TTE W/DOPPLER COMPLETE: CPT | Mod: 26

## 2020-03-04 RX ORDER — POTASSIUM CHLORIDE 20 MEQ
40 PACKET (EA) ORAL ONCE
Refills: 0 | Status: COMPLETED | OUTPATIENT
Start: 2020-03-04 | End: 2020-03-04

## 2020-03-04 RX ORDER — POTASSIUM CHLORIDE 20 MEQ
40 PACKET (EA) ORAL EVERY 4 HOURS
Refills: 0 | Status: COMPLETED | OUTPATIENT
Start: 2020-03-04 | End: 2020-03-04

## 2020-03-04 RX ADMIN — Medication 20 MILLIGRAM(S): at 05:03

## 2020-03-04 RX ADMIN — VENETOCLAX 20 MILLIGRAM(S): 100 TABLET, FILM COATED ORAL at 12:37

## 2020-03-04 RX ADMIN — Medication 100 MILLIGRAM(S): at 05:03

## 2020-03-04 RX ADMIN — SODIUM CHLORIDE 100 MILLILITER(S): 9 INJECTION, SOLUTION INTRAVENOUS at 21:18

## 2020-03-04 RX ADMIN — SODIUM CHLORIDE 100 MILLILITER(S): 9 INJECTION, SOLUTION INTRAVENOUS at 11:48

## 2020-03-04 RX ADMIN — Medication 40 MILLIEQUIVALENT(S): at 11:48

## 2020-03-04 RX ADMIN — ENTECAVIR 0.5 MILLIGRAM(S): 0.5 TABLET ORAL at 05:03

## 2020-03-04 RX ADMIN — Medication 40 MILLIEQUIVALENT(S): at 16:01

## 2020-03-04 RX ADMIN — PANTOPRAZOLE SODIUM 40 MILLIGRAM(S): 20 TABLET, DELAYED RELEASE ORAL at 05:03

## 2020-03-04 RX ADMIN — Medication 50 MILLILITER(S): at 05:03

## 2020-03-04 RX ADMIN — Medication 40 MILLIEQUIVALENT(S): at 06:47

## 2020-03-04 RX ADMIN — Medication 100 MILLIGRAM(S): at 21:18

## 2020-03-04 RX ADMIN — Medication 100 MILLIGRAM(S): at 13:11

## 2020-03-04 NOTE — PROGRESS NOTE ADULT - ASSESSMENT
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  Repeat Flow Cytometry still SLL/CLL, but FISH now w 11q-, 17p-, del of chromosome 12 and 13, all poor prognostic factors.]  Repeat PETCT only mildly hypermetabolic LADs w highest SUV 3, largest conglomerate f LNs ~5x2cm prevascular, mild splenomegaly 14cm  Pt was scheduled for Venetoclax/Rituxan second line treatment w admission for ramp up Venetoclax and tumor lysis prophylaxis when found w Influenza A during the 2/2020 adm, Rx w Temaflu, subsequent also sig more anemic/thrombocytopenic.  Was discharged home and admitted again 2/22/20 with  fever and found w pneumonia, continues to require transfusions plts and PRBCs.  Completed course of  Ceftriaxone  Hep B titer negative  Post IVIG    -prednisone started tapered 2/29/20, continue 30mg today and will slowly taper off  -leukocytosis due to CLL/SLL w circulating lymphs exacerbated by steroid effect,  -present clinical condition due to progression of CLL/SLL, worsened by acute illness of Influenza A/pneumonia/+-drug effects  -after discussion w pt and wife on 2/29/20, started Venetoclax 20mg po qd for 7days then increase to 50mg qd for 7days, then 200mg po qD for 7 days, then 400mg po qD. Rituxan to start after 5 weeks.  -continue IV hydration, 1.5-2 liters a day  - monitor for tumor lysis syndrome, check CMP including LDH/calcium/uric acid/phos/renal function/electrolytes, before first dose/4/8/12/24 hours later.  -will d/c ibrutinib once blood indices are stabilized (anticipate 3/3/20)  - no transfusions needed today       3/4 the case was gone over extensively with Dr Stephenson and the wife and the pt had several questions concerning ibrutinib venetoclax and Rituxan. He is improving and less cough and shortness of breath. He completed a full course of ceftriaxone and is on antiviral HEP B therapy with entecavir. The platelet count was about 10701 and there was no bleeding and the hemoglobin was 7.3 lungs were clear. Will continue support with the meds and pt is hopeful about the new drug regimen.

## 2020-03-04 NOTE — PROGRESS NOTE ADULT - PROBLEM SELECTOR PLAN 9
- c/w Nocturnal BiPAP Cr at baseline  - Avoid nephrotoxic medications  - IVF 1.5-2 liter in the setting of chemo therapy to prevent TLS  - Monitor renal function Cr at baseline  - Avoid nephrotoxic medications  - IVF 1.5-2 liters in the setting of chemo therapy to prevent TLS  - Monitor renal function

## 2020-03-04 NOTE — PROVIDER CONTACT NOTE (CRITICAL VALUE NOTIFICATION) - ACTION/TREATMENT ORDERED:
made aware of potassium, platelet, and hgb & hct result.  Said he will put orders of potassium 40MEQ.  Will continue to monitor  made aware of potassium, platelet, and hgb & hct result.  Said he will put orders of potassium 40MEQ.  Will continue to monitor  06:30-As per , said he will take a look into pt chart for further orders for h&h

## 2020-03-04 NOTE — PROGRESS NOTE ADULT - PROBLEM SELECTOR PLAN 2
- Per chart review, platelet count steadily declining since 2018  - poor response to platelets transfusion, suspect ITP component  - s/p one dose of IVIG  - s/p 5 units of platelet transfusion since admission  - plt count 4, will give 2 units of platelets today - Per chart review, platelet count steadily declining since 2018  - poor response to platelets transfusion, suspect ITP component  - s/p one dose of IVIG  - s/p 7 units of platelet transfusion since admission  - received 2 units of platelets yesterday, plt count up to 11 from 4  - seek for Hem/Onc recs - Per chart review, platelet count steadily declining since 2018  - poor response to platelets transfusion, suspect ITP component  - s/p one dose of IVIG  - s/p 7 units of platelet transfusion since admission  - received 2 units of platelets yesterday, plt count up to 11 from 4  - Hem/Onc following, recs appreciated

## 2020-03-04 NOTE — PROGRESS NOTE ADULT - SUBJECTIVE AND OBJECTIVE BOX
Patient seen and examined;  Chart reviewed and events noted;   continues to cough; reports getting better    3/4 the case was gone over extensively with Dr Stephenson and the wife and the pt had several questions concerning ibrutinib venetoclax and Rituxan. He is improving and less cough and shortness of breath. He completed a full course of ceftriaxone and is on antiviral HEP B therapy with entecavir. The platelet count was about 70309 and there was no bleeding and the hemoglobin was 7.3 lungs were clear. Will continue support with the meds and pt is hopeful about the new drug regimen.    MEDICATIONS  (STANDING):  allopurinol 100 milliGRAM(s) Oral every 8 hours  dextrose 5% + sodium chloride 0.45%. 1000 milliLiter(s) (125 mL/Hr) IV Continuous <Continuous>  entecavir 0.5 milliGRAM(s) Oral daily  ibrutinib 420 milliGRAM(s) Oral daily  pantoprazole    Tablet 40 milliGRAM(s) Oral before breakfast  predniSONE   Tablet 30 milliGRAM(s) Oral daily  venetoclax 20 milliGRAM(s) Oral <User Schedule>    MEDICATIONS  (PRN):  acetaminophen   Tablet .. 650 milliGRAM(s) Oral every 6 hours PRN Mild Pain (1 - 3)  acetaminophen   Tablet .. 650 milliGRAM(s) Oral every 6 hours PRN Temp greater or equal to 38C (100.4F)  albuterol/ipratropium for Nebulization 3 milliLiter(s) Nebulizer every 6 hours PRN Shortness of Breath and/or Wheezing  benzonatate 100 milliGRAM(s) Oral three times a day PRN Cough  guaiFENesin   Syrup  (Sugar-Free) 200 milliGRAM(s) Oral every 6 hours PRN Cough    ICU Vital Signs Last 24 Hrs  T(C): 36.5 (04 Mar 2020 13:23), Max: 37.4 (04 Mar 2020 05:04)  T(F): 97.7 (04 Mar 2020 13:23), Max: 99.4 (04 Mar 2020 05:04)  HR: 86 (04 Mar 2020 13:23) (86 - 95)  BP: 136/69 (04 Mar 2020 13:23) (129/63 - 144/72)  BP(mean): --  ABP: --  ABP(mean): --  RR: 18 (04 Mar 2020 13:23) (16 - 18)  SpO2: 98% (04 Mar 2020 13:23) (97% - 100%)    PHYSICAL EXAM  General: adult in NAD but slightly short of breath with rest  HEENT: clear oropharynx, anicteric sclera, pink conjunctivae  Neck: supple  CV: normal S1S2 with no murmur rubs or gallops  Lungs: clear to auscultation, no wheezes, no rales  Abdomen: soft non-tender non-distended, no hepatosplenomegaly  Ext: 2+ edema  Skin: no rashes and no petechiae  Neuro: alert and oriented X3 no focal deficits      LABS:                        7.3    10.78 )-----------( 11       ( 04 Mar 2020 05:13 )             21.8     Hemoglobin: 8.2 g/dL (03-02 @ 03:48)  Hemoglobin: 7.6 g/dL (03-01 @ 06:21)  Hemoglobin: 8.0 g/dL (02-29 @ 05:55)  Hemoglobin: 8.9 g/dL (02-28 @ 08:11)  Hemoglobin: 7.4 g/dL (02-27 @ 06:37)    WBC Count: 91.85 K/uL (03-02 @ 03:48)  WBC Count: 139.97 K/uL (03-01 @ 06:21)  WBC Count: 146.14 K/uL (02-29 @ 05:55)  WBC Count: 120.22 K/uL (02-28 @ 08:11)  WBC Count: 141.98 K/uL (02-27 @ 06:37)    Platelet Count - Automated: 17 K/uL (03-02 @ 03:48)  Platelet Count - Automated: 10 K/uL (03-01 @ 06:21)  Platelet Count - Automated: 21 K/uL (02-29 @ 05:55)  Platelet Count - Automated: 10 K/uL (02-28 @ 08:11)  Platelet Count - Automated: 25 K/uL (02-27 @ 06:37)    03-02    137  |  112<H>  |  23  ----------------------------<  177<H>  3.9   |  16<L>  |  0.84    Ca    7.7<L>      02 Mar 2020 03:19  Phos  2.2     03-02  Mg     2.0     03-02    TPro  6.3  /  Alb  2.4<L>  /  TBili  2.1<H>  /  DBili  x   /  AST  55<H>  /  ALT  38  /  AlkPhos  228<H>  03-02    Lactate Dehydrogenase, Serum: 410 U/L <--208  Uric Acid, Serum: 1.2 mg/dL <--0.6

## 2020-03-04 NOTE — PROGRESS NOTE ADULT - PROBLEM SELECTOR PLAN 8
Cr at baseline  - Avoid nephrotoxic medications  - IVF 1.5-2 liter in the setting of chemo therapy to prevent TLS  - Monitor renal function Pt states that lower ext edema occurs while on prednisone  - Checked venous doppler - negative  - ECHO 2/2018 showed Preserved left ventricular systolic function. Stage I   diastolic dysfunction. EF 65%   - repeat ECHO estimated EF 60-65%  - albumin level has been low might contribute to edema  - edema worsening after IVF with chemo  - Strict I&O's  - will repeat ECHO, F/U results

## 2020-03-04 NOTE — PROGRESS NOTE ADULT - ASSESSMENT
74yo M with PMH of CLL/SLL, AIHA, chronic hepatitis B (on entecavir), KIRTI on CPAP, T2DM (managed with lifestyle changes), HTN (not on any medication) who presents with chief complaint of fever, cough and generalized weakness, admitted with suspected gram-negative multifocal pneumonia, completed a course of IV antibiotics.  Severe anemia s/p transfusion of 5 units of PRBC and severe thrombocytopenia s/p platelet transfusion.  Suspect possible ITP after poor response to initial platelet transfusion. One dose of IVIG was given, s/p 5 units of platelets transfusion. Pt started chemo therapy with venetoclax @ 3/1/2020 74yo M with PMH of CLL/SLL, AIHA, chronic hepatitis B (on entecavir), KIRTI on CPAP, T2DM (managed with lifestyle changes), HTN (not on any medication) who presents with chief complaint of fever, cough and generalized weakness, admitted with suspected gram-negative multifocal pneumonia, completed a course of IV antibiotics.  Severe anemia s/p transfusion of 6 units of PRBC and severe thrombocytopenia s/p platelet transfusion.  Suspect possible ITP after poor response to initial platelet transfusion. One dose of IVIG was given, s/p 7 units of platelets transfusion. Pt started chemo therapy with venetoclax @ 3/1/2020,  increased swelling in b/l lower extremities in the setting of IVF hydration

## 2020-03-04 NOTE — PROGRESS NOTE ADULT - PROBLEM SELECTOR PLAN 5
-unclear why pt's direct bilirubin is the majority fraction   -t bili today to 2.1  -pt has no symptoms or exam findings consistent with obstructed CBD  -RUQ US showing GB with stones but no obstructing stones, and normal caliber CBD  -MRCP did not reveal obstruction  -GI following serum potassium level 3.2  - oral potassium chloride ordered to replete K  - monitor potassium level

## 2020-03-04 NOTE — CHART NOTE - NSCHARTNOTEFT_GEN_A_CORE
nurse notified me of H and H. 7.3/21.8.  Pt currently asymptomatic.  Will hold off on transfusion in AM.  Will have day team reassess.    Vital Signs Last 24 Hrs  T(C): 37.4 (04 Mar 2020 05:04), Max: 37.4 (04 Mar 2020 05:04)  T(F): 99.4 (04 Mar 2020 05:04), Max: 99.4 (04 Mar 2020 05:04)  HR: 91 (04 Mar 2020 05:04) (85 - 95)  BP: 129/63 (04 Mar 2020 05:04) (121/69 - 153/77)  BP(mean): --  RR: 16 (04 Mar 2020 05:04) (16 - 18)  SpO2: 98% (04 Mar 2020 05:04) (97% - 100%)

## 2020-03-04 NOTE — PROGRESS NOTE ADULT - SUBJECTIVE AND OBJECTIVE BOX
Patient is a 75y old  Male who presents with a chief complaint of weakness, anemia (03 Mar 2020 11:39)      INTERVAL HPI/OVERNIGHT EVENTS: Patient seen and examined at bedside. No overnight events occurred. Patient has no complaints at this time.     MEDICATIONS  (STANDING):  allopurinol 100 milliGRAM(s) Oral every 8 hours  entecavir 0.5 milliGRAM(s) Oral daily  lactated ringers. 1000 milliLiter(s) (100 mL/Hr) IV Continuous <Continuous>  pantoprazole    Tablet 40 milliGRAM(s) Oral before breakfast  potassium chloride    Tablet ER 40 milliEquivalent(s) Oral every 4 hours  predniSONE   Tablet 20 milliGRAM(s) Oral daily  venetoclax 20 milliGRAM(s) Oral <User Schedule>    MEDICATIONS  (PRN):  acetaminophen   Tablet .. 650 milliGRAM(s) Oral every 6 hours PRN Mild Pain (1 - 3)  acetaminophen   Tablet .. 650 milliGRAM(s) Oral every 6 hours PRN Temp greater or equal to 38C (100.4F)  albuterol/ipratropium for Nebulization 3 milliLiter(s) Nebulizer every 6 hours PRN Shortness of Breath and/or Wheezing  benzonatate 100 milliGRAM(s) Oral three times a day PRN Cough  guaiFENesin   Syrup  (Sugar-Free) 200 milliGRAM(s) Oral every 6 hours PRN Cough      Allergies    sulfa drugs (Unknown)    Intolerances        REVIEW OF SYSTEMS:  CONSTITUTIONAL: No fever or chills  HEENT:  No headache, no sore throat  RESPIRATORY: No cough, wheezing, or shortness of breath  CARDIOVASCULAR: No chest pain, palpitations  GASTROINTESTINAL: No abd pain, nausea, vomiting, or diarrhea  GENITOURINARY: No dysuria, frequency, or hematuria  NEUROLOGICAL: no focal weakness or dizziness  MUSCULOSKELETAL: no myalgias     Vital Signs Last 24 Hrs  T(C): 37.4 (04 Mar 2020 05:04), Max: 37.4 (04 Mar 2020 05:04)  T(F): 99.4 (04 Mar 2020 05:04), Max: 99.4 (04 Mar 2020 05:04)  HR: 91 (04 Mar 2020 05:04) (85 - 95)  BP: 129/63 (04 Mar 2020 05:04) (121/69 - 153/77)  BP(mean): --  RR: 16 (04 Mar 2020 05:04) (16 - 18)  SpO2: 98% (04 Mar 2020 05:04) (97% - 100%)    PHYSICAL EXAM:  GENERAL: NAD  HEENT:  anicteric, moist mucous membranes  CHEST/LUNG:  CTA b/l, no rales, wheezes, or rhonchi  HEART:  RRR, S1, S2  ABDOMEN:  BS+, soft, nontender, nondistended  EXTREMITIES: no edema, cyanosis, or calf tenderness  NERVOUS SYSTEM: answers questions and follows commands appropriately    LABS:                        7.3    10.78 )-----------( 11       ( 04 Mar 2020 05:13 )             21.8     CBC Full  -  ( 04 Mar 2020 05:13 )  WBC Count : 10.78 K/uL  Hemoglobin : 7.3 g/dL  Hematocrit : 21.8 %  Platelet Count - Automated : 11 K/uL  Mean Cell Volume : 94.8 fl  Mean Cell Hemoglobin : 31.7 pg  Mean Cell Hemoglobin Concentration : 33.5 gm/dL  Auto Neutrophil # : 0.26 K/uL  Auto Lymphocyte # : 9.30 K/uL  Auto Monocyte # : 1.10 K/uL  Auto Eosinophil # : 0.01 K/uL  Auto Basophil # : 0.08 K/uL  Auto Neutrophil % : 2.4 %  Auto Lymphocyte % : 86.3 %  Auto Monocyte % : 10.2 %  Auto Eosinophil % : 0.1 %  Auto Basophil % : 0.7 %    04 Mar 2020 05:13    139    |  109    |  24     ----------------------------<  126    3.2     |  19     |  0.90     Ca    8.0        04 Mar 2020 05:13    TPro  5.9    /  Alb  2.7    /  TBili  2.4    /  DBili  x      /  AST  14     /  ALT  22     /  AlkPhos  149    04 Mar 2020 05:13        CAPILLARY BLOOD GLUCOSE              RADIOLOGY & ADDITIONAL TESTS:    Personally reviewed.     Consultant(s) Notes Reviewed:  [x] YES  [ ] NO Patient is a 75y old  Male who presents with a chief complaint of weakness, anemia (03 Mar 2020 11:39)      INTERVAL HPI/OVERNIGHT EVENTS: Patient seen and examined at bedside. No overnight events occurred. Cough continues improving. Patient has no complaints at this time. Has been tolerating chemotherapy so far. Received 2 units of platelets and 1 unit of PRBC yesterday.    MEDICATIONS  (STANDING):  allopurinol 100 milliGRAM(s) Oral every 8 hours  entecavir 0.5 milliGRAM(s) Oral daily  lactated ringers. 1000 milliLiter(s) (100 mL/Hr) IV Continuous <Continuous>  pantoprazole    Tablet 40 milliGRAM(s) Oral before breakfast  potassium chloride    Tablet ER 40 milliEquivalent(s) Oral every 4 hours  predniSONE   Tablet 20 milliGRAM(s) Oral daily  venetoclax 20 milliGRAM(s) Oral <User Schedule>    MEDICATIONS  (PRN):  acetaminophen   Tablet .. 650 milliGRAM(s) Oral every 6 hours PRN Mild Pain (1 - 3)  acetaminophen   Tablet .. 650 milliGRAM(s) Oral every 6 hours PRN Temp greater or equal to 38C (100.4F)  albuterol/ipratropium for Nebulization 3 milliLiter(s) Nebulizer every 6 hours PRN Shortness of Breath and/or Wheezing  benzonatate 100 milliGRAM(s) Oral three times a day PRN Cough  guaiFENesin   Syrup  (Sugar-Free) 200 milliGRAM(s) Oral every 6 hours PRN Cough    Allergies    sulfa drugs (Unknown)    Intolerances    REVIEW OF SYSTEMS:  CONSTITUTIONAL: No fever or chills  HEENT:  No headache, no sore throat  RESPIRATORY: intermittent cough, no wheezing or shortness of breath  CARDIOVASCULAR: No chest pain, palpitations  GASTROINTESTINAL: No abd pain, nausea, vomiting, or diarrhea  GENITOURINARY: No dysuria, frequency, or hematuria  MUSCULOSKELETAL: legs swollen     Vital Signs Last 24 Hrs  T(C): 37.4 (04 Mar 2020 05:04), Max: 37.4 (04 Mar 2020 05:04)  T(F): 99.4 (04 Mar 2020 05:04), Max: 99.4 (04 Mar 2020 05:04)  HR: 91 (04 Mar 2020 05:04) (85 - 95)  BP: 129/63 (04 Mar 2020 05:04) (121/69 - 153/77)  BP(mean): --  RR: 16 (04 Mar 2020 05:04) (16 - 18)  SpO2: 98% (04 Mar 2020 05:04) (97% - 100%)    PHYSICAL EXAM:  GENERAL: NAD  HEENT:  anicteric, moist mucous membranes  CHEST/LUNG:  CTA b/l, no rales, wheezes, or rhonchi  HEART:  RRR, S1, S2  ABDOMEN:  BS+, soft, nontender, nondistended  EXTREMITIES: pitting edema b/l lower extremities, no cyanosis, or calf tenderness  NERVOUS SYSTEM: answers questions and follows commands appropriately    LABS:                        7.3    10.78 )-----------( 11       ( 04 Mar 2020 05:13 )             21.8     CBC Full  -  ( 04 Mar 2020 05:13 )  WBC Count : 10.78 K/uL  Hemoglobin : 7.3 g/dL  Hematocrit : 21.8 %  Platelet Count - Automated : 11 K/uL  Mean Cell Volume : 94.8 fl  Mean Cell Hemoglobin : 31.7 pg  Mean Cell Hemoglobin Concentration : 33.5 gm/dL  Auto Neutrophil # : 0.26 K/uL  Auto Lymphocyte # : 9.30 K/uL  Auto Monocyte # : 1.10 K/uL  Auto Eosinophil # : 0.01 K/uL  Auto Basophil # : 0.08 K/uL  Auto Neutrophil % : 2.4 %  Auto Lymphocyte % : 86.3 %  Auto Monocyte % : 10.2 %  Auto Eosinophil % : 0.1 %  Auto Basophil % : 0.7 %    04 Mar 2020 05:13    139    |  109    |  24     ----------------------------<  126    3.2     |  19     |  0.90     Ca    8.0        04 Mar 2020 05:13    TPro  5.9    /  Alb  2.7    /  TBili  2.4    /  DBili  x      /  AST  14     /  ALT  22     /  AlkPhos  149    04 Mar 2020 05:13    CAPILLARY BLOOD GLUCOSE    RADIOLOGY & ADDITIONAL TESTS:    Personally reviewed.     Consultant(s) Notes Reviewed:  [x] YES  [ ] NO Patient is a 75y old  Male who presents with a chief complaint of weakness, anemia (03 Mar 2020 11:39)      INTERVAL HPI/OVERNIGHT EVENTS: Patient seen and examined at bedside. No overnight events occurred. Cough continues improving. Patient has no complaints at this time. Has been tolerating chemotherapy so far. Received 2 units of platelets and 1 unit of PRBC yesterday.    MEDICATIONS  (STANDING):  allopurinol 100 milliGRAM(s) Oral every 8 hours  entecavir 0.5 milliGRAM(s) Oral daily  lactated ringers. 1000 milliLiter(s) (100 mL/Hr) IV Continuous <Continuous>  pantoprazole    Tablet 40 milliGRAM(s) Oral before breakfast  potassium chloride    Tablet ER 40 milliEquivalent(s) Oral every 4 hours  predniSONE   Tablet 20 milliGRAM(s) Oral daily  venetoclax 20 milliGRAM(s) Oral <User Schedule>    MEDICATIONS  (PRN):  acetaminophen   Tablet .. 650 milliGRAM(s) Oral every 6 hours PRN Mild Pain (1 - 3)  acetaminophen   Tablet .. 650 milliGRAM(s) Oral every 6 hours PRN Temp greater or equal to 38C (100.4F)  albuterol/ipratropium for Nebulization 3 milliLiter(s) Nebulizer every 6 hours PRN Shortness of Breath and/or Wheezing  benzonatate 100 milliGRAM(s) Oral three times a day PRN Cough  guaiFENesin   Syrup  (Sugar-Free) 200 milliGRAM(s) Oral every 6 hours PRN Cough    Allergies    sulfa drugs (Unknown)    Intolerances    REVIEW OF SYSTEMS:  CONSTITUTIONAL: No fever or chills  HEENT:  No headache, no sore throat  RESPIRATORY: intermittent cough - improving, no wheezing or shortness of breath  CARDIOVASCULAR: No chest pain, palpitations  GASTROINTESTINAL: No abd pain, nausea, vomiting, or diarrhea  GENITOURINARY: No dysuria, frequency, or hematuria  MUSCULOSKELETAL: legs swollen     Vital Signs Last 24 Hrs  T(C): 37.4 (04 Mar 2020 05:04), Max: 37.4 (04 Mar 2020 05:04)  T(F): 99.4 (04 Mar 2020 05:04), Max: 99.4 (04 Mar 2020 05:04)  HR: 91 (04 Mar 2020 05:04) (85 - 95)  BP: 129/63 (04 Mar 2020 05:04) (121/69 - 153/77)  BP(mean): --  RR: 16 (04 Mar 2020 05:04) (16 - 18)  SpO2: 98% (04 Mar 2020 05:04) (97% - 100%)    PHYSICAL EXAM:  GENERAL: NAD, resting comfortably in chair.   HEENT:  anicteric, moist mucous membranes, EOMI  CHEST/LUNG:  CTA b/l, no rales, wheezes, or rhonchi  HEART:  RRR, S1, S2, no murmurs appreciated  ABDOMEN:  BS+, soft, nontender, nondistended  EXTREMITIES: 2+ pitting edema b/l lower extremities, no cyanosis, or calf tenderness  NERVOUS SYSTEM: answers questions and follows commands appropriately    LABS:                        7.3    10.78 )-----------( 11       ( 04 Mar 2020 05:13 )             21.8     CBC Full  -  ( 04 Mar 2020 05:13 )  WBC Count : 10.78 K/uL  Hemoglobin : 7.3 g/dL  Hematocrit : 21.8 %  Platelet Count - Automated : 11 K/uL  Mean Cell Volume : 94.8 fl  Mean Cell Hemoglobin : 31.7 pg  Mean Cell Hemoglobin Concentration : 33.5 gm/dL  Auto Neutrophil # : 0.26 K/uL  Auto Lymphocyte # : 9.30 K/uL  Auto Monocyte # : 1.10 K/uL  Auto Eosinophil # : 0.01 K/uL  Auto Basophil # : 0.08 K/uL  Auto Neutrophil % : 2.4 %  Auto Lymphocyte % : 86.3 %  Auto Monocyte % : 10.2 %  Auto Eosinophil % : 0.1 %  Auto Basophil % : 0.7 %    04 Mar 2020 05:13    139    |  109    |  24     ----------------------------<  126    3.2     |  19     |  0.90     Ca    8.0        04 Mar 2020 05:13    TPro  5.9    /  Alb  2.7    /  TBili  2.4    /  DBili  x      /  AST  14     /  ALT  22     /  AlkPhos  149    04 Mar 2020 05:13    CAPILLARY BLOOD GLUCOSE    RADIOLOGY & ADDITIONAL TESTS:    Personally reviewed.     Consultant(s) Notes Reviewed:  [x] YES  [ ] NO

## 2020-03-04 NOTE — PROGRESS NOTE ADULT - PROBLEM SELECTOR PLAN 1
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  - repeat flow cytometry confirms diagnosis, FISH study suggests poor prognosis, detailed in Hem/Onc notes  - Worsening anemia and thrombocytopenia since discharge ~1 week ago likely in part due to lymphoma/leukemia  - chemo therapy with venetoclax started 3/1 per Hem/Onc  - WBC count continues trending down   - Steroids being tapered - 20mg now  - c/w IVF, allopurinol to prevent tumor lysis syndrome  - follow up with CMP, uric acid level, LDH  - Hem/Onc following 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

## 2020-03-04 NOTE — PROGRESS NOTE ADULT - PROBLEM SELECTOR PLAN 3
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 Was on prednisone 10mg daily -- increase to prednisone 40mg po during hospitalization, tapering started @2/29 - now 20 daily

## 2020-03-04 NOTE — PROGRESS NOTE ADULT - PROBLEM SELECTOR PLAN 6
- Continue Entecavir 0.5mg daily  - ID- Dr. Webber (recs appreciated) -unclear why pt's direct bilirubin is the majority fraction   -t bili today to 2.1  -pt has no symptoms or exam findings consistent with obstructed CBD  -RUQ US showing GB with stones but no obstructing stones, and normal caliber CBD  -MRCP did not reveal obstruction  -GI following

## 2020-03-04 NOTE — CONSULT NOTE ADULT - SUBJECTIVE AND OBJECTIVE BOX
Hematology/Oncology consult     Patient is seen and examined  notes/labs reviewed  pt family is at bedside and d/w family.  consult dictated,  Job #    contact #  son/daughter----  phone #    IMPRESSION:      RECOMMENDATION:              ,thanks for courtsey of this consult,will follow this pt with you for hem/onc issue while pt is in hospital.

## 2020-03-05 DIAGNOSIS — E87.8 OTHER DISORDERS OF ELECTROLYTE AND FLUID BALANCE, NOT ELSEWHERE CLASSIFIED: ICD-10-CM

## 2020-03-05 LAB
ALBUMIN SERPL ELPH-MCNC: 2.8 G/DL — LOW (ref 3.3–5)
ALP SERPL-CCNC: 147 U/L — HIGH (ref 40–120)
ALT FLD-CCNC: 21 U/L — SIGNIFICANT CHANGE UP (ref 12–78)
ANION GAP SERPL CALC-SCNC: 8 MMOL/L — SIGNIFICANT CHANGE UP (ref 5–17)
APTT BLD: 31.7 SEC — SIGNIFICANT CHANGE UP (ref 28.5–37)
AST SERPL-CCNC: 16 U/L — SIGNIFICANT CHANGE UP (ref 15–37)
BASOPHILS # BLD AUTO: 0.03 K/UL — SIGNIFICANT CHANGE UP (ref 0–0.2)
BASOPHILS NFR BLD AUTO: 1 % — SIGNIFICANT CHANGE UP (ref 0–2)
BILIRUB SERPL-MCNC: 3.3 MG/DL — HIGH (ref 0.2–1.2)
BUN SERPL-MCNC: 21 MG/DL — SIGNIFICANT CHANGE UP (ref 7–23)
CALCIUM SERPL-MCNC: 8.3 MG/DL — LOW (ref 8.5–10.1)
CHLORIDE SERPL-SCNC: 109 MMOL/L — HIGH (ref 96–108)
CO2 SERPL-SCNC: 20 MMOL/L — LOW (ref 22–31)
CREAT SERPL-MCNC: 0.96 MG/DL — SIGNIFICANT CHANGE UP (ref 0.5–1.3)
EOSINOPHIL # BLD AUTO: 0 K/UL — SIGNIFICANT CHANGE UP (ref 0–0.5)
EOSINOPHIL NFR BLD AUTO: 0 % — SIGNIFICANT CHANGE UP (ref 0–6)
GLUCOSE SERPL-MCNC: 170 MG/DL — HIGH (ref 70–99)
HCT VFR BLD CALC: 21.3 % — LOW (ref 39–50)
HCT VFR BLD CALC: 24.6 % — LOW (ref 39–50)
HGB BLD-MCNC: 7 G/DL — CRITICAL LOW (ref 13–17)
HGB BLD-MCNC: 8.2 G/DL — LOW (ref 13–17)
INR BLD: 1.32 RATIO — HIGH (ref 0.88–1.16)
LDH SERPL L TO P-CCNC: 146 U/L — SIGNIFICANT CHANGE UP (ref 50–242)
LYMPHOCYTES # BLD AUTO: 1.92 K/UL — SIGNIFICANT CHANGE UP (ref 1–3.3)
LYMPHOCYTES # BLD AUTO: 61 % — HIGH (ref 13–44)
MAGNESIUM SERPL-MCNC: 2.1 MG/DL — SIGNIFICANT CHANGE UP (ref 1.6–2.6)
MCHC RBC-ENTMCNC: 31.4 PG — SIGNIFICANT CHANGE UP (ref 27–34)
MCHC RBC-ENTMCNC: 31.7 PG — SIGNIFICANT CHANGE UP (ref 27–34)
MCHC RBC-ENTMCNC: 32.9 GM/DL — SIGNIFICANT CHANGE UP (ref 32–36)
MCHC RBC-ENTMCNC: 33.3 GM/DL — SIGNIFICANT CHANGE UP (ref 32–36)
MCV RBC AUTO: 95 FL — SIGNIFICANT CHANGE UP (ref 80–100)
MCV RBC AUTO: 95.5 FL — SIGNIFICANT CHANGE UP (ref 80–100)
MONOCYTES # BLD AUTO: 0.06 K/UL — SIGNIFICANT CHANGE UP (ref 0–0.9)
MONOCYTES NFR BLD AUTO: 2 % — SIGNIFICANT CHANGE UP (ref 2–14)
NEUTROPHILS # BLD AUTO: 0.31 K/UL — LOW (ref 1.8–7.4)
NEUTROPHILS NFR BLD AUTO: 9 % — LOW (ref 43–77)
NRBC # BLD: 0 /100 WBCS — SIGNIFICANT CHANGE UP (ref 0–0)
NRBC # BLD: SIGNIFICANT CHANGE UP /100 WBCS (ref 0–0)
PHOSPHATE SERPL-MCNC: 1.2 MG/DL — LOW (ref 2.5–4.5)
PLATELET # BLD AUTO: 27 K/UL — LOW (ref 150–400)
PLATELET # BLD AUTO: 3 K/UL — CRITICAL LOW (ref 150–400)
POTASSIUM SERPL-MCNC: 3.9 MMOL/L — SIGNIFICANT CHANGE UP (ref 3.5–5.3)
POTASSIUM SERPL-SCNC: 3.9 MMOL/L — SIGNIFICANT CHANGE UP (ref 3.5–5.3)
PROT SERPL-MCNC: 6.2 G/DL — SIGNIFICANT CHANGE UP (ref 6–8.3)
PROTHROM AB SERPL-ACNC: 14.9 SEC — HIGH (ref 10–12.9)
RBC # BLD: 2.23 M/UL — LOW (ref 4.2–5.8)
RBC # BLD: 2.59 M/UL — LOW (ref 4.2–5.8)
RBC # FLD: 18 % — HIGH (ref 10.3–14.5)
RBC # FLD: 18.7 % — HIGH (ref 10.3–14.5)
SODIUM SERPL-SCNC: 137 MMOL/L — SIGNIFICANT CHANGE UP (ref 135–145)
URATE SERPL-MCNC: 0.8 MG/DL — LOW (ref 3.4–8.8)
WBC # BLD: 3.14 K/UL — LOW (ref 3.8–10.5)
WBC # BLD: 8.31 K/UL — SIGNIFICANT CHANGE UP (ref 3.8–10.5)
WBC # FLD AUTO: 3.14 K/UL — LOW (ref 3.8–10.5)
WBC # FLD AUTO: 8.31 K/UL — SIGNIFICANT CHANGE UP (ref 3.8–10.5)

## 2020-03-05 PROCEDURE — 99233 SBSQ HOSP IP/OBS HIGH 50: CPT | Mod: GC

## 2020-03-05 RX ORDER — SODIUM,POTASSIUM PHOSPHATES 278-250MG
1 POWDER IN PACKET (EA) ORAL
Refills: 0 | Status: COMPLETED | OUTPATIENT
Start: 2020-03-05 | End: 2020-03-06

## 2020-03-05 RX ADMIN — Medication 100 MILLIGRAM(S): at 05:11

## 2020-03-05 RX ADMIN — SODIUM CHLORIDE 100 MILLILITER(S): 9 INJECTION, SOLUTION INTRAVENOUS at 07:47

## 2020-03-05 RX ADMIN — Medication 1 PACKET(S): at 17:44

## 2020-03-05 RX ADMIN — Medication 200 MILLIGRAM(S): at 23:16

## 2020-03-05 RX ADMIN — Medication 1 PACKET(S): at 10:43

## 2020-03-05 RX ADMIN — PANTOPRAZOLE SODIUM 40 MILLIGRAM(S): 20 TABLET, DELAYED RELEASE ORAL at 05:11

## 2020-03-05 RX ADMIN — Medication 100 MILLIGRAM(S): at 21:42

## 2020-03-05 RX ADMIN — Medication 200 MILLIGRAM(S): at 00:54

## 2020-03-05 RX ADMIN — VENETOCLAX 20 MILLIGRAM(S): 100 TABLET, FILM COATED ORAL at 13:31

## 2020-03-05 RX ADMIN — Medication 100 MILLIGRAM(S): at 13:30

## 2020-03-05 RX ADMIN — Medication 20 MILLIGRAM(S): at 05:11

## 2020-03-05 RX ADMIN — SODIUM CHLORIDE 100 MILLILITER(S): 9 INJECTION, SOLUTION INTRAVENOUS at 21:42

## 2020-03-05 RX ADMIN — ENTECAVIR 0.5 MILLIGRAM(S): 0.5 TABLET ORAL at 05:11

## 2020-03-05 NOTE — PROGRESS NOTE ADULT - SUBJECTIVE AND OBJECTIVE BOX
Patient is a 75y old  Male who presents with a chief complaint of weakness, anemia (05 Mar 2020 08:17)      INTERVAL HPI/OVERNIGHT EVENTS: Patient seen and examined at bedside. No overnight events occurred. Patient has no complaints at this time. Denies fevers, chills, headache, lightheadedness, chest pain, dyspnea, abdominal pain, n/v/d/c.    MEDICATIONS  (STANDING):  allopurinol 100 milliGRAM(s) Oral every 8 hours  entecavir 0.5 milliGRAM(s) Oral daily  lactated ringers. 1000 milliLiter(s) (100 mL/Hr) IV Continuous <Continuous>  pantoprazole    Tablet 40 milliGRAM(s) Oral before breakfast  potassium phosphate / sodium phosphate powder 1 Packet(s) Oral three times a day before meals  predniSONE   Tablet 20 milliGRAM(s) Oral daily  venetoclax 20 milliGRAM(s) Oral <User Schedule>    MEDICATIONS  (PRN):  acetaminophen   Tablet .. 650 milliGRAM(s) Oral every 6 hours PRN Mild Pain (1 - 3)  acetaminophen   Tablet .. 650 milliGRAM(s) Oral every 6 hours PRN Temp greater or equal to 38C (100.4F)  albuterol/ipratropium for Nebulization 3 milliLiter(s) Nebulizer every 6 hours PRN Shortness of Breath and/or Wheezing  benzonatate 100 milliGRAM(s) Oral three times a day PRN Cough  guaiFENesin   Syrup  (Sugar-Free) 200 milliGRAM(s) Oral every 6 hours PRN Cough      Allergies    sulfa drugs (Unknown)    Intolerances        REVIEW OF SYSTEMS:  CONSTITUTIONAL: No fever or chills  HEENT:  No headache, no sore throat  RESPIRATORY: No cough, wheezing, or shortness of breath  CARDIOVASCULAR: No chest pain, palpitations  GASTROINTESTINAL: No abd pain, nausea, vomiting, or diarrhea  GENITOURINARY: No dysuria, frequency, or hematuria  NEUROLOGICAL: no focal weakness or dizziness  MUSCULOSKELETAL: no myalgias     Vital Signs Last 24 Hrs  T(C): 37.2 (05 Mar 2020 04:46), Max: 37.2 (05 Mar 2020 04:46)  T(F): 98.9 (05 Mar 2020 04:46), Max: 98.9 (05 Mar 2020 04:46)  HR: 96 (05 Mar 2020 04:46) (86 - 96)  BP: 141/69 (05 Mar 2020 04:46) (122/57 - 141/69)  BP(mean): --  RR: 18 (05 Mar 2020 04:46) (18 - 18)  SpO2: 98% (05 Mar 2020 04:46) (98% - 98%)    PHYSICAL EXAM:  GENERAL: NAD  HEENT:  anicteric, moist mucous membranes  CHEST/LUNG:  CTA b/l, no rales, wheezes, or rhonchi  HEART:  RRR, S1, S2  ABDOMEN:  BS+, soft, nontender, nondistended  EXTREMITIES: no edema, cyanosis, or calf tenderness  NERVOUS SYSTEM: answers questions and follows commands appropriately    LABS:                        7.0    3.14  )-----------( 3        ( 05 Mar 2020 08:57 )             21.3     CBC Full  -  ( 05 Mar 2020 08:57 )  WBC Count : 3.14 K/uL  Hemoglobin : 7.0 g/dL  Hematocrit : 21.3 %  Platelet Count - Automated : 3 K/uL  Mean Cell Volume : 95.5 fl  Mean Cell Hemoglobin : 31.4 pg  Mean Cell Hemoglobin Concentration : 32.9 gm/dL  Auto Neutrophil # : 0.31 K/uL  Auto Lymphocyte # : 1.92 K/uL  Auto Monocyte # : 0.06 K/uL  Auto Eosinophil # : 0.00 K/uL  Auto Basophil # : 0.03 K/uL  Auto Neutrophil % : 9.0 %  Auto Lymphocyte % : 61.0 %  Auto Monocyte % : 2.0 %  Auto Eosinophil % : 0.0 %  Auto Basophil % : 1.0 %    05 Mar 2020 08:57    137    |  109    |  21     ----------------------------<  170    3.9     |  20     |  0.96     Ca    8.3        05 Mar 2020 08:57  Phos  1.2       05 Mar 2020 08:57  Mg     2.1       05 Mar 2020 08:57    TPro  6.2    /  Alb  2.8    /  TBili  3.3    /  DBili  x      /  AST  16     /  ALT  21     /  AlkPhos  147    05 Mar 2020 08:57        CAPILLARY BLOOD GLUCOSE              RADIOLOGY & ADDITIONAL TESTS:    Personally reviewed.     Consultant(s) Notes Reviewed:  [x] YES  [ ] NO Patient is a 75y old  Male who presents with a chief complaint of weakness, anemia (05 Mar 2020 08:17)      INTERVAL HPI/OVERNIGHT EVENTS: Patient seen and examined at bedside. No overnight events occurred. Pt was sitting on the chair, states feeling tired, didn't sleep well. Denies fevers, chills, headache, lightheadedness, chest pain, dyspnea, abdominal pain, n/v/d/c. AM labs report plt 3, H&H 7.0/21.3    MEDICATIONS  (STANDING):  allopurinol 100 milliGRAM(s) Oral every 8 hours  entecavir 0.5 milliGRAM(s) Oral daily  lactated ringers. 1000 milliLiter(s) (100 mL/Hr) IV Continuous <Continuous>  pantoprazole    Tablet 40 milliGRAM(s) Oral before breakfast  potassium phosphate / sodium phosphate powder 1 Packet(s) Oral three times a day before meals  predniSONE   Tablet 20 milliGRAM(s) Oral daily  venetoclax 20 milliGRAM(s) Oral <User Schedule>    MEDICATIONS  (PRN):  acetaminophen   Tablet .. 650 milliGRAM(s) Oral every 6 hours PRN Mild Pain (1 - 3)  acetaminophen   Tablet .. 650 milliGRAM(s) Oral every 6 hours PRN Temp greater or equal to 38C (100.4F)  albuterol/ipratropium for Nebulization 3 milliLiter(s) Nebulizer every 6 hours PRN Shortness of Breath and/or Wheezing  benzonatate 100 milliGRAM(s) Oral three times a day PRN Cough  guaiFENesin   Syrup  (Sugar-Free) 200 milliGRAM(s) Oral every 6 hours PRN Cough    Allergies    sulfa drugs (Unknown)    Intolerances    REVIEW OF SYSTEMS:  CONSTITUTIONAL: No fever or chills  HEENT:  No headache, no sore throat  RESPIRATORY: intermittent cough, no wheezing, or shortness of breath  CARDIOVASCULAR: No chest pain, palpitations  GASTROINTESTINAL: No abd pain, nausea, vomiting, or diarrhea  GENITOURINARY: No dysuria, frequency, or hematuria  NEUROLOGICAL: + weakness, no dizziness  MUSCULOSKELETAL: no myalgias     Vital Signs Last 24 Hrs  T(C): 37.2 (05 Mar 2020 04:46), Max: 37.2 (05 Mar 2020 04:46)  T(F): 98.9 (05 Mar 2020 04:46), Max: 98.9 (05 Mar 2020 04:46)  HR: 96 (05 Mar 2020 04:46) (86 - 96)  BP: 141/69 (05 Mar 2020 04:46) (122/57 - 141/69)  BP(mean): --  RR: 18 (05 Mar 2020 04:46) (18 - 18)  SpO2: 98% (05 Mar 2020 04:46) (98% - 98%)    PHYSICAL EXAM:  GENERAL: NAD, tired appearing  HEENT:  anicteric, moist mucous membranes  CHEST/LUNG:  CTA b/l, no rales, wheezes, or rhonchi  HEART:  RRR, S1, S2  ABDOMEN:  BS+, soft, nontender, nondistended  EXTREMITIES: 2+ pitting edema on b/l lower extremities, no cyanosis, or calf tenderness  NERVOUS SYSTEM: answers questions and follows commands appropriately    LABS:                        7.0    3.14  )-----------( 3        ( 05 Mar 2020 08:57 )             21.3     CBC Full  -  ( 05 Mar 2020 08:57 )  WBC Count : 3.14 K/uL  Hemoglobin : 7.0 g/dL  Hematocrit : 21.3 %  Platelet Count - Automated : 3 K/uL  Mean Cell Volume : 95.5 fl  Mean Cell Hemoglobin : 31.4 pg  Mean Cell Hemoglobin Concentration : 32.9 gm/dL  Auto Neutrophil # : 0.31 K/uL  Auto Lymphocyte # : 1.92 K/uL  Auto Monocyte # : 0.06 K/uL  Auto Eosinophil # : 0.00 K/uL  Auto Basophil # : 0.03 K/uL  Auto Neutrophil % : 9.0 %  Auto Lymphocyte % : 61.0 %  Auto Monocyte % : 2.0 %  Auto Eosinophil % : 0.0 %  Auto Basophil % : 1.0 %    05 Mar 2020 08:57    137    |  109    |  21     ----------------------------<  170    3.9     |  20     |  0.96     Ca    8.3        05 Mar 2020 08:57  Phos  1.2       05 Mar 2020 08:57  Mg     2.1       05 Mar 2020 08:57    TPro  6.2    /  Alb  2.8    /  TBili  3.3    /  DBili  x      /  AST  16     /  ALT  21     /  AlkPhos  147    05 Mar 2020 08:57      CAPILLARY BLOOD GLUCOSE      RADIOLOGY & ADDITIONAL TESTS:    Personally reviewed.     Consultant(s) Notes Reviewed:  [x] YES  [ ] NO Patient is a 75y old  Male who presents with a chief complaint of weakness, anemia (05 Mar 2020 08:17)      INTERVAL HPI/OVERNIGHT EVENTS: No overnight events occurred. Pt is sitting on the chair, states feeling tired, didn't sleep well. Denies fevers, chills, headache, lightheadedness, chest pain, dyspnea, abdominal pain, n/v/d/c. AM labs report plt 3, H&H 7.0/21.3    MEDICATIONS  (STANDING):  allopurinol 100 milliGRAM(s) Oral every 8 hours  entecavir 0.5 milliGRAM(s) Oral daily  lactated ringers. 1000 milliLiter(s) (100 mL/Hr) IV Continuous <Continuous>  pantoprazole    Tablet 40 milliGRAM(s) Oral before breakfast  potassium phosphate / sodium phosphate powder 1 Packet(s) Oral three times a day before meals  predniSONE   Tablet 20 milliGRAM(s) Oral daily  venetoclax 20 milliGRAM(s) Oral <User Schedule>    MEDICATIONS  (PRN):  acetaminophen   Tablet .. 650 milliGRAM(s) Oral every 6 hours PRN Mild Pain (1 - 3)  acetaminophen   Tablet .. 650 milliGRAM(s) Oral every 6 hours PRN Temp greater or equal to 38C (100.4F)  albuterol/ipratropium for Nebulization 3 milliLiter(s) Nebulizer every 6 hours PRN Shortness of Breath and/or Wheezing  benzonatate 100 milliGRAM(s) Oral three times a day PRN Cough  guaiFENesin   Syrup  (Sugar-Free) 200 milliGRAM(s) Oral every 6 hours PRN Cough    Allergies    sulfa drugs (Unknown)    Intolerances    REVIEW OF SYSTEMS:  CONSTITUTIONAL: No fever or chills  HEENT:  No headache, no sore throat  RESPIRATORY: intermittent cough, no wheezing, or shortness of breath  CARDIOVASCULAR: No chest pain, palpitations  GASTROINTESTINAL: No abd pain, nausea, vomiting, or diarrhea  GENITOURINARY: No dysuria, frequency, or hematuria  NEUROLOGICAL: + weakness, no dizziness  MUSCULOSKELETAL: no myalgias     Vital Signs Last 24 Hrs  T(C): 37.2 (05 Mar 2020 04:46), Max: 37.2 (05 Mar 2020 04:46)  T(F): 98.9 (05 Mar 2020 04:46), Max: 98.9 (05 Mar 2020 04:46)  HR: 96 (05 Mar 2020 04:46) (86 - 96)  BP: 141/69 (05 Mar 2020 04:46) (122/57 - 141/69)  BP(mean): --  RR: 18 (05 Mar 2020 04:46) (18 - 18)  SpO2: 98% (05 Mar 2020 04:46) (98% - 98%)    PHYSICAL EXAM:  GENERAL: NAD, tired appearing  HEENT:  anicteric, moist mucous membranes  CHEST/LUNG:  CTA b/l, no rales, wheezes, or rhonchi  HEART:  RRR, S1, S2  ABDOMEN:  BS+, soft, nontender, nondistended  EXTREMITIES: 2+ pitting edema on b/l lower extremities, no cyanosis, or calf tenderness  NERVOUS SYSTEM: answers questions and follows commands appropriately    LABS:                        7.0    3.14  )-----------( 3        ( 05 Mar 2020 08:57 )             21.3     CBC Full  -  ( 05 Mar 2020 08:57 )  WBC Count : 3.14 K/uL  Hemoglobin : 7.0 g/dL  Hematocrit : 21.3 %  Platelet Count - Automated : 3 K/uL  Mean Cell Volume : 95.5 fl  Mean Cell Hemoglobin : 31.4 pg  Mean Cell Hemoglobin Concentration : 32.9 gm/dL  Auto Neutrophil # : 0.31 K/uL  Auto Lymphocyte # : 1.92 K/uL  Auto Monocyte # : 0.06 K/uL  Auto Eosinophil # : 0.00 K/uL  Auto Basophil # : 0.03 K/uL  Auto Neutrophil % : 9.0 %  Auto Lymphocyte % : 61.0 %  Auto Monocyte % : 2.0 %  Auto Eosinophil % : 0.0 %  Auto Basophil % : 1.0 %    05 Mar 2020 08:57    137    |  109    |  21     ----------------------------<  170    3.9     |  20     |  0.96     Ca    8.3        05 Mar 2020 08:57  Phos  1.2       05 Mar 2020 08:57  Mg     2.1       05 Mar 2020 08:57    TPro  6.2    /  Alb  2.8    /  TBili  3.3    /  DBili  x      /  AST  16     /  ALT  21     /  AlkPhos  147    05 Mar 2020 08:57      CAPILLARY BLOOD GLUCOSE    RADIOLOGY & ADDITIONAL TESTS:     Personally reviewed.     Consultant(s) Notes Reviewed:  [x] YES  [ ] NO Patient is a 75y old  Male who presents with a chief complaint of weakness, anemia (05 Mar 2020 08:17)      INTERVAL HPI/OVERNIGHT EVENTS: No overnight events occurred. Pt is sitting on the chair, states feeling tired, didn't sleep well. Denies fevers, chills, headache, lightheadedness, chest pain, dyspnea, abdominal pain, n/v/d/c. AM labs report plt 3, H&H 7.0/21.3    MEDICATIONS  (STANDING):  allopurinol 100 milliGRAM(s) Oral every 8 hours  entecavir 0.5 milliGRAM(s) Oral daily  lactated ringers. 1000 milliLiter(s) (100 mL/Hr) IV Continuous <Continuous>  pantoprazole    Tablet 40 milliGRAM(s) Oral before breakfast  potassium phosphate / sodium phosphate powder 1 Packet(s) Oral three times a day before meals  predniSONE   Tablet 20 milliGRAM(s) Oral daily  venetoclax 20 milliGRAM(s) Oral <User Schedule>    MEDICATIONS  (PRN):  acetaminophen   Tablet .. 650 milliGRAM(s) Oral every 6 hours PRN Mild Pain (1 - 3)  acetaminophen   Tablet .. 650 milliGRAM(s) Oral every 6 hours PRN Temp greater or equal to 38C (100.4F)  albuterol/ipratropium for Nebulization 3 milliLiter(s) Nebulizer every 6 hours PRN Shortness of Breath and/or Wheezing  benzonatate 100 milliGRAM(s) Oral three times a day PRN Cough  guaiFENesin   Syrup  (Sugar-Free) 200 milliGRAM(s) Oral every 6 hours PRN Cough    Allergies    sulfa drugs (Unknown)    Intolerances    REVIEW OF SYSTEMS:  CONSTITUTIONAL: No fever or chills  HEENT:  No headache, no sore throat  RESPIRATORY: intermittent cough, no wheezing, or shortness of breath  CARDIOVASCULAR: No chest pain, palpitations  GASTROINTESTINAL: No abd pain, nausea, vomiting, or diarrhea  GENITOURINARY: No dysuria, frequency, or hematuria  NEUROLOGICAL: + weakness, no dizziness  MUSCULOSKELETAL: no myalgias     Vital Signs Last 24 Hrs  T(C): 37.2 (05 Mar 2020 04:46), Max: 37.2 (05 Mar 2020 04:46)  T(F): 98.9 (05 Mar 2020 04:46), Max: 98.9 (05 Mar 2020 04:46)  HR: 96 (05 Mar 2020 04:46) (86 - 96)  BP: 141/69 (05 Mar 2020 04:46) (122/57 - 141/69)  BP(mean): --  RR: 18 (05 Mar 2020 04:46) (18 - 18)  SpO2: 98% (05 Mar 2020 04:46) (98% - 98%)    PHYSICAL EXAM:  GENERAL: NAD, tired appearing, resting comfortably in chair  HEENT:  anicteric, moist mucous membranes, EOMI  CHEST/LUNG:  CTA b/l, no rales, wheezes, or rhonchi  HEART:  RRR, S1, S2, no murmurs appreciated  ABDOMEN:  BS+, soft, nontender, nondistended  EXTREMITIES: 2+ pitting edema on b/l lower extremities, no cyanosis, or calf tenderness  NERVOUS SYSTEM: answers questions and follows commands appropriately    LABS:                        7.0    3.14  )-----------( 3        ( 05 Mar 2020 08:57 )             21.3     CBC Full  -  ( 05 Mar 2020 08:57 )  WBC Count : 3.14 K/uL  Hemoglobin : 7.0 g/dL  Hematocrit : 21.3 %  Platelet Count - Automated : 3 K/uL  Mean Cell Volume : 95.5 fl  Mean Cell Hemoglobin : 31.4 pg  Mean Cell Hemoglobin Concentration : 32.9 gm/dL  Auto Neutrophil # : 0.31 K/uL  Auto Lymphocyte # : 1.92 K/uL  Auto Monocyte # : 0.06 K/uL  Auto Eosinophil # : 0.00 K/uL  Auto Basophil # : 0.03 K/uL  Auto Neutrophil % : 9.0 %  Auto Lymphocyte % : 61.0 %  Auto Monocyte % : 2.0 %  Auto Eosinophil % : 0.0 %  Auto Basophil % : 1.0 %    05 Mar 2020 08:57    137    |  109    |  21     ----------------------------<  170    3.9     |  20     |  0.96     Ca    8.3        05 Mar 2020 08:57  Phos  1.2       05 Mar 2020 08:57  Mg     2.1       05 Mar 2020 08:57    TPro  6.2    /  Alb  2.8    /  TBili  3.3    /  DBili  x      /  AST  16     /  ALT  21     /  AlkPhos  147    05 Mar 2020 08:57      CAPILLARY BLOOD GLUCOSE    RADIOLOGY & ADDITIONAL TESTS:     Personally reviewed.     Consultant(s) Notes Reviewed:  [x] YES  [ ] NO

## 2020-03-05 NOTE — PROGRESS NOTE ADULT - PROBLEM SELECTOR PLAN 1
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

## 2020-03-05 NOTE — PROGRESS NOTE ADULT - SUBJECTIVE AND OBJECTIVE BOX
INTERVAL HPI/OVERNIGHT EVENTS:  pt seen and examined  offers no gi complaints  c/o cough    MEDICATIONS  (STANDING):  allopurinol 100 milliGRAM(s) Oral every 8 hours  entecavir 0.5 milliGRAM(s) Oral daily  lactated ringers. 1000 milliLiter(s) (100 mL/Hr) IV Continuous <Continuous>  pantoprazole    Tablet 40 milliGRAM(s) Oral before breakfast  predniSONE   Tablet 20 milliGRAM(s) Oral daily  venetoclax 20 milliGRAM(s) Oral <User Schedule>    MEDICATIONS  (PRN):  acetaminophen   Tablet .. 650 milliGRAM(s) Oral every 6 hours PRN Mild Pain (1 - 3)  acetaminophen   Tablet .. 650 milliGRAM(s) Oral every 6 hours PRN Temp greater or equal to 38C (100.4F)  albuterol/ipratropium for Nebulization 3 milliLiter(s) Nebulizer every 6 hours PRN Shortness of Breath and/or Wheezing  benzonatate 100 milliGRAM(s) Oral three times a day PRN Cough  guaiFENesin   Syrup  (Sugar-Free) 200 milliGRAM(s) Oral every 6 hours PRN Cough      Allergies    sulfa drugs (Unknown)    Intolerances        Review of Systems:    General:  No wt loss, fevers, chills, night sweats, fatigue   Eyes:  Good vision, no reported pain  ENT:  No sore throat, pain, runny nose, dysphagia  CV:  No pain, palpitations, hypo/hypertension  Resp:  cough   GI:  No pain, No nausea, No vomiting, No diarrhea, No constipation, No weight loss, No fever, No pruritis, No rectal bleeding, No melena, No dysphagia  :  No pain, bleeding, incontinence, nocturia  Muscle:  No pain, weakness  Neuro:  No weakness, tingling, memory problems  Psych:  No fatigue, insomnia, mood problems, depression  Endocrine:  No polyuria, polydypsia, cold/heat intolerance  Heme:  No petechiae, ecchymosis, easy bruisability  Skin:  No rash, tattoos, scars, edema      Vital Signs Last 24 Hrs  T(C): 37.2 (05 Mar 2020 04:46), Max: 37.2 (05 Mar 2020 04:46)  T(F): 98.9 (05 Mar 2020 04:46), Max: 98.9 (05 Mar 2020 04:46)  HR: 96 (05 Mar 2020 04:46) (86 - 96)  BP: 141/69 (05 Mar 2020 04:46) (122/57 - 141/69)  BP(mean): --  RR: 18 (05 Mar 2020 04:46) (18 - 18)  SpO2: 98% (05 Mar 2020 04:46) (98% - 98%)    PHYSICAL EXAM:    Constitutional: lying in bed  HEENT: ncat  Gastrointestinal: soft nt mild dt  Extremities: edema  Neurological: Awake alert responds appropriately      LABS:                        7.3    10.78 )-----------( 11       ( 04 Mar 2020 05:13 )             21.8     03-04    139  |  109<H>  |  24<H>  ----------------------------<  126<H>  3.2<L>   |  19<L>  |  0.90    Ca    8.0<L>      04 Mar 2020 05:13  Phos  2.6     03-03  Mg     2.0     03-03    TPro  5.9<L>  /  Alb  2.7<L>  /  TBili  2.4<H>  /  DBili  x   /  AST  14<L>  /  ALT  22  /  AlkPhos  149<H>  03-04          RADIOLOGY & ADDITIONAL TESTS:

## 2020-03-05 NOTE — PROGRESS NOTE ADULT - PROBLEM SELECTOR PLAN 9
Cr at baseline  - Avoid nephrotoxic medications  - IVF 1.5-2 liters in the setting of chemo therapy to prevent TLS  - Monitor renal function

## 2020-03-05 NOTE — PROGRESS NOTE ADULT - ASSESSMENT
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  Repeat Flow Cytometry still SLL/CLL, but FISH now w 11q-, 17p-, del of chromosome 12 and 13, all poor prognostic factors.]  Repeat PETCT only mildly hypermetabolic LADs w highest SUV 3, largest conglomerate f LNs ~5x2cm prevascular, mild splenomegaly 14cm  Pt was scheduled for Venetoclax/Rituxan second line treatment w admission for ramp up Venetoclax and tumor lysis prophylaxis when found w Influenza A during the 2/2020 adm, Rx w Temaflu, subsequent also sig more anemic/thrombocytopenic.  Was discharged home and admitted again 2/22/20 with  fever and found w pneumonia, continues to require transfusions plts and PRBCs.  Completed course of  Ceftriaxone  Hep B titer negative  Post IVIG    -prednisone started tapered 2/29/20, continue 30mg today and will slowly taper off  -leukocytosis due to CLL/SLL w circulating lymphs exacerbated by steroid effect,  -present clinical condition due to progression of CLL/SLL, worsened by acute illness of Influenza A/pneumonia/+-drug effects  -after discussion w pt and wife on 2/29/20, started Venetoclax 20mg po qd for 7days then increase to 50mg qd for 7days, then 200mg po qD for 7 days, then 400mg po qD. Rituxan to start after 5 weeks.  -continue IV hydration, 1.5-2 liters a day  - monitor for tumor lysis syndrome, check CMP including LDH/calcium/uric acid/phos/renal function/electrolytes, before first dose/4/8/12/24 hours later.  -will d/c ibrutinib once blood indices are stabilized (anticipate 3/3/20)  - no transfusions needed today       3/4 the case was gone over extensively with Dr Stephenson and the wife and the pt had several questions concerning ibrutinib venetoclax and Rituxan. He is improving and less cough and shortness of breath. He completed a full course of ceftriaxone and is on antiviral HEP B therapy with entecavir. The platelet count was about [id number] and there was no bleeding and the hemoglobin was 7.3 lungs were clear. Will continue support with the meds and pt is hopeful about the new drug regimen.    CBC continues to show decrease in WBC, hgb and platelet count    Recommendations  1.  continue venetoclax ramp up and follow CBC closely  2.  transfuse 1 unit PRBC, 1 unit platelets and 2 units FFP (has received multiple transfusions)  3.  observe for fever  discussed with patient and family

## 2020-03-05 NOTE — PROGRESS NOTE ADULT - PROBLEM SELECTOR PLAN 5
serum potassium level 3.2  - oral potassium chloride ordered to replete K  - monitor potassium level low phosphate level   - replete phosphate   - monitor daily lab

## 2020-03-05 NOTE — PROGRESS NOTE ADULT - PROBLEM SELECTOR PLAN 2
- Per chart review, platelet count steadily declining since 2018  - poor response to platelets transfusion, suspect ITP component  - s/p one dose of IVIG  - s/p 7 units of platelet transfusion since admission  - received 2 units of platelets yesterday, plt count up to 11 from 4  - Hem/Onc following, recs appreciated - Per chart review, platelet count steadily declining since 2018  - poor response to platelets transfusion, suspect ITP component  - s/p one dose of IVIG  - s/p 7 units of platelet transfusion since admission  - plt 3 today, will give 2 units of plts  - Hem/Onc following, will follow the recommendation

## 2020-03-05 NOTE — PROGRESS NOTE ADULT - PROBLEM SELECTOR PLAN 8
Pt states that lower ext edema occurs while on prednisone  - Checked venous doppler - negative  - ECHO 2/2018 showed Preserved left ventricular systolic function. Stage I   diastolic dysfunction. EF 65%   - repeat ECHO estimated EF 60-65%  - albumin level has been low might contribute to edema  - edema worsening after IVF with chemo  - Strict I&O's  - will repeat ECHO, F/U results Pt states that lower ext edema occurs while on prednisone  - Checked venous doppler - negative  - ECHO 2/2018 showed Preserved left ventricular systolic function. Stage I   diastolic dysfunction. EF 65%   - repeat ECHO estimated EF 60-65%  - albumin level has been low might contribute to edema  - edema worsening after IVF with chemo  - Strict I&O's  - Repeat ECHO shows normal systolic function with LVEF 65%

## 2020-03-05 NOTE — PROGRESS NOTE ADULT - ASSESSMENT
74yo M with PMH of CLL/SLL, AIHA, chronic hepatitis B (on entecavir), KIRTI on CPAP, T2DM (managed with lifestyle changes), HTN (not on any medication) who presents with chief complaint of fever, cough and generalized weakness, admitted with suspected gram-negative multifocal pneumonia, completed a course of IV antibiotics.  Severe anemia s/p transfusion of 6 units of PRBC and severe thrombocytopenia s/p platelet transfusion.  Suspect possible ITP after poor response to initial platelet transfusion. One dose of IVIG was given, s/p 7 units of platelets transfusion. Pt started chemo therapy with venetoclax @ 3/1/2020,  increased swelling in b/l lower extremities in the setting of IVF hydration 74yo M with PMH of CLL/SLL, AIHA, chronic hepatitis B (on entecavir), KIRTI on CPAP, T2DM (managed with lifestyle changes), HTN (not on any medication) who presents with chief complaint of fever, cough and generalized weakness, admitted with suspected gram-negative multifocal pneumonia, completed a course of IV antibiotics.  Severe anemia s/p transfusion of 6 units of PRBC and severe thrombocytopenia s/p platelet transfusion.  Suspect possible ITP with poor response to platelet transfusion. One dose of IVIG was given, s/p 7 units of platelets transfusion. Pt started chemo therapy with venetoclax @ 3/1/2020,  increased swelling in b/l lower extremities in the setting of IVF hydration

## 2020-03-05 NOTE — PROGRESS NOTE ADULT - SUBJECTIVE AND OBJECTIVE BOX
Interval History:  up in chair with family present.  continues on venetoclax.  ibrutinib stopped    Chart reviewed and events noted;   Overnight events:    MEDICATIONS  (STANDING):  allopurinol 100 milliGRAM(s) Oral every 8 hours  entecavir 0.5 milliGRAM(s) Oral daily  lactated ringers. 1000 milliLiter(s) (100 mL/Hr) IV Continuous <Continuous>  pantoprazole    Tablet 40 milliGRAM(s) Oral before breakfast  potassium phosphate / sodium phosphate powder 1 Packet(s) Oral three times a day before meals  predniSONE   Tablet 20 milliGRAM(s) Oral daily  venetoclax 20 milliGRAM(s) Oral <User Schedule>    MEDICATIONS  (PRN):  acetaminophen   Tablet .. 650 milliGRAM(s) Oral every 6 hours PRN Mild Pain (1 - 3)  acetaminophen   Tablet .. 650 milliGRAM(s) Oral every 6 hours PRN Temp greater or equal to 38C (100.4F)  albuterol/ipratropium for Nebulization 3 milliLiter(s) Nebulizer every 6 hours PRN Shortness of Breath and/or Wheezing  benzonatate 100 milliGRAM(s) Oral three times a day PRN Cough  guaiFENesin   Syrup  (Sugar-Free) 200 milliGRAM(s) Oral every 6 hours PRN Cough      Vital Signs Last 24 Hrs  T(C): 36.7 (05 Mar 2020 12:31), Max: 37.2 (05 Mar 2020 04:46)  T(F): 98.1 (05 Mar 2020 12:31), Max: 98.9 (05 Mar 2020 04:46)  HR: 81 (05 Mar 2020 12:31) (78 - 96)  BP: 119/68 (05 Mar 2020 12:31) (119/68 - 141/69)  BP(mean): --  RR: 18 (05 Mar 2020 12:31) (16 - 18)  SpO2: 99% (05 Mar 2020 12:31) (98% - 99%)    PHYSICAL EXAM  General: adult in NAD  HEENT: clear oropharynx, anicteric sclera, pink conjunctivae  Neck: supple  CV: normal S1S2 with no murmur rubs or gallops  Lungs: clear to auscultation, no wheezes, no rhales  Abdomen: soft non-tender non-distended, no hepato/splenomegaly  Ext: no clubbing cyanosis or edema  Skin: no rashes and no petichiae  Neuro: alert and oriented X3 no focal deficits      LABS:  CBC Full  -  ( 05 Mar 2020 08:57 )  WBC Count : 3.14 K/uL  RBC Count : 2.23 M/uL  Hemoglobin : 7.0 g/dL  Hematocrit : 21.3 %  Platelet Count - Automated : 3 K/uL  Mean Cell Volume : 95.5 fl  Mean Cell Hemoglobin : 31.4 pg  Mean Cell Hemoglobin Concentration : 32.9 gm/dL  Auto Neutrophil # : 0.31 K/uL  Auto Lymphocyte # : 1.92 K/uL  Auto Monocyte # : 0.06 K/uL  Auto Eosinophil # : 0.00 K/uL  Auto Basophil # : 0.03 K/uL  Auto Neutrophil % : 9.0 %  Auto Lymphocyte % : 61.0 %  Auto Monocyte % : 2.0 %  Auto Eosinophil % : 0.0 %  Auto Basophil % : 1.0 %    03-05    137  |  109<H>  |  21  ----------------------------<  170<H>  3.9   |  20<L>  |  0.96    Ca    8.3<L>      05 Mar 2020 08:57  Phos  1.2     03-05  Mg     2.1     03-05    TPro  6.2  /  Alb  2.8<L>  /  TBili  3.3<H>  /  DBili  x   /  AST  16  /  ALT  21  /  AlkPhos  147<H>  03-05    PT/INR - ( 05 Mar 2020 10:49 )   PT: 14.9 sec;   INR: 1.32 ratio         PTT - ( 05 Mar 2020 10:49 )  PTT:31.7 sec    fe studies      WBC trend  3.14 K/uL (03-05-20 @ 08:57)  10.78 K/uL (03-04-20 @ 05:13)  12.74 K/uL (03-03-20 @ 08:22)      Hgb trend  7.0 g/dL (03-05-20 @ 08:57)  7.3 g/dL (03-04-20 @ 05:13)  7.6 g/dL (03-03-20 @ 08:22)      plt trend  3 K/uL (03-05-20 @ 08:57)  11 K/uL (03-04-20 @ 05:13)  4 K/uL (03-03-20 @ 08:22)        RADIOLOGY & ADDITIONAL STUDIES:

## 2020-03-06 LAB
ALBUMIN SERPL ELPH-MCNC: 2.9 G/DL — LOW (ref 3.3–5)
ALP SERPL-CCNC: 126 U/L — HIGH (ref 40–120)
ALT FLD-CCNC: 20 U/L — SIGNIFICANT CHANGE UP (ref 12–78)
ANION GAP SERPL CALC-SCNC: 9 MMOL/L — SIGNIFICANT CHANGE UP (ref 5–17)
AST SERPL-CCNC: 15 U/L — SIGNIFICANT CHANGE UP (ref 15–37)
BILIRUB SERPL-MCNC: 3 MG/DL — HIGH (ref 0.2–1.2)
BUN SERPL-MCNC: 18 MG/DL — SIGNIFICANT CHANGE UP (ref 7–23)
CALCIUM SERPL-MCNC: 8.1 MG/DL — LOW (ref 8.5–10.1)
CHLORIDE SERPL-SCNC: 109 MMOL/L — HIGH (ref 96–108)
CO2 SERPL-SCNC: 19 MMOL/L — LOW (ref 22–31)
CREAT SERPL-MCNC: 0.94 MG/DL — SIGNIFICANT CHANGE UP (ref 0.5–1.3)
GLUCOSE SERPL-MCNC: 128 MG/DL — HIGH (ref 70–99)
HCT VFR BLD CALC: 21.3 % — LOW (ref 39–50)
HGB BLD-MCNC: 7.1 G/DL — LOW (ref 13–17)
LDH SERPL L TO P-CCNC: 166 U/L — SIGNIFICANT CHANGE UP (ref 50–242)
MAGNESIUM SERPL-MCNC: 2 MG/DL — SIGNIFICANT CHANGE UP (ref 1.6–2.6)
MCHC RBC-ENTMCNC: 31.4 PG — SIGNIFICANT CHANGE UP (ref 27–34)
MCHC RBC-ENTMCNC: 33.3 GM/DL — SIGNIFICANT CHANGE UP (ref 32–36)
MCV RBC AUTO: 94.2 FL — SIGNIFICANT CHANGE UP (ref 80–100)
NRBC # BLD: 0 /100 WBCS — SIGNIFICANT CHANGE UP (ref 0–0)
PHOSPHATE SERPL-MCNC: 1.2 MG/DL — LOW (ref 2.5–4.5)
PLATELET # BLD AUTO: 11 K/UL — CRITICAL LOW (ref 150–400)
POTASSIUM SERPL-MCNC: 3.4 MMOL/L — LOW (ref 3.5–5.3)
POTASSIUM SERPL-SCNC: 3.4 MMOL/L — LOW (ref 3.5–5.3)
PROT SERPL-MCNC: 6.2 G/DL — SIGNIFICANT CHANGE UP (ref 6–8.3)
RBC # BLD: 2.26 M/UL — LOW (ref 4.2–5.8)
RBC # FLD: 17.8 % — HIGH (ref 10.3–14.5)
SODIUM SERPL-SCNC: 137 MMOL/L — SIGNIFICANT CHANGE UP (ref 135–145)
URATE SERPL-MCNC: 0.8 MG/DL — LOW (ref 3.4–8.8)
WBC # BLD: 2.52 K/UL — LOW (ref 3.8–10.5)
WBC # FLD AUTO: 2.52 K/UL — LOW (ref 3.8–10.5)

## 2020-03-06 PROCEDURE — 99233 SBSQ HOSP IP/OBS HIGH 50: CPT | Mod: GC

## 2020-03-06 RX ORDER — POTASSIUM CHLORIDE 20 MEQ
40 PACKET (EA) ORAL ONCE
Refills: 0 | Status: COMPLETED | OUTPATIENT
Start: 2020-03-06 | End: 2020-03-06

## 2020-03-06 RX ORDER — SODIUM,POTASSIUM PHOSPHATES 278-250MG
1 POWDER IN PACKET (EA) ORAL ONCE
Refills: 0 | Status: COMPLETED | OUTPATIENT
Start: 2020-03-06 | End: 2020-03-06

## 2020-03-06 RX ADMIN — Medication 20 MILLIGRAM(S): at 06:12

## 2020-03-06 RX ADMIN — Medication 100 MILLIGRAM(S): at 06:11

## 2020-03-06 RX ADMIN — Medication 100 MILLIGRAM(S): at 13:14

## 2020-03-06 RX ADMIN — PANTOPRAZOLE SODIUM 40 MILLIGRAM(S): 20 TABLET, DELAYED RELEASE ORAL at 06:11

## 2020-03-06 RX ADMIN — SODIUM CHLORIDE 100 MILLILITER(S): 9 INJECTION, SOLUTION INTRAVENOUS at 06:53

## 2020-03-06 RX ADMIN — Medication 40 MILLIEQUIVALENT(S): at 10:38

## 2020-03-06 RX ADMIN — Medication 1 PACKET(S): at 11:52

## 2020-03-06 RX ADMIN — Medication 1 PACKET(S): at 06:11

## 2020-03-06 RX ADMIN — ENTECAVIR 0.5 MILLIGRAM(S): 0.5 TABLET ORAL at 06:11

## 2020-03-06 NOTE — PROGRESS NOTE ADULT - PROBLEM SELECTOR PLAN 1
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  - repeat flow cytometry confirms diagnosis, FISH study suggests poor prognosis, detailed in Hem/Onc notes  - Worsening anemia and thrombocytopenia since discharge ~1 week ago likely in part due to lymphoma/leukemia  - chemo therapy with venetoclax started 3/1 per Hem/Onc  - WBC count continues trending down, 3.14 today   - Steroids being tapered - 20mg now  - c/w IVF LR@100/hr, allopurinol to prevent tumor lysis syndrome  - follow up with CMP, uric acid level, LDH  - Hem/Onc following 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 Suspect bone marrow oversuppression. 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 Suspect bone marrow oversuppression.   - DC venetoclax per Hem/Onc   - Steroids being tapered - 20mg now  - Hem/Onc following

## 2020-03-06 NOTE — PROGRESS NOTE ADULT - PROBLEM SELECTOR PLAN 2
- Per chart review, platelet count steadily declining since 2018  - poor response to platelets transfusion, suspect ITP component  - s/p one dose of IVIG  - s/p 7 units of platelet transfusion since admission  - plt 3 today, will give 2 units of plts  - Hem/Onc following, will follow the recommendation - Per chart review, platelet count steadily declining since 2018  - poor response to platelets transfusion, suspect ITP component  - s/p one dose of IVIG  - s/p 9 units of platelet transfusion since admission  - Hem/Onc following, will follow the recommendation - Per chart review, platelet count steadily declining since 2018  - poor response to platelets transfusion, suspect ITP component  - s/p one dose of IVIG  - s/p 9 units of platelet transfusion since admission  - Hem/Onc following, will follow the recommendation - transfuse 1 unit platelet, D/C chemo and observe

## 2020-03-06 NOTE — PROGRESS NOTE ADULT - SUBJECTIVE AND OBJECTIVE BOX
Patient is a 75y old  Male who presents with a chief complaint of weakness, anemia (06 Mar 2020 09:00)      INTERVAL HPI/OVERNIGHT EVENTS: Patient seen and examined at bedside. No overnight events occurred.     MEDICATIONS  (STANDING):  allopurinol 100 milliGRAM(s) Oral every 8 hours  entecavir 0.5 milliGRAM(s) Oral daily  lactated ringers. 1000 milliLiter(s) (100 mL/Hr) IV Continuous <Continuous>  pantoprazole    Tablet 40 milliGRAM(s) Oral before breakfast  potassium phosphate / sodium phosphate powder 1 Packet(s) Oral once  predniSONE   Tablet 20 milliGRAM(s) Oral daily    MEDICATIONS  (PRN):  acetaminophen   Tablet .. 650 milliGRAM(s) Oral every 6 hours PRN Mild Pain (1 - 3)  acetaminophen   Tablet .. 650 milliGRAM(s) Oral every 6 hours PRN Temp greater or equal to 38C (100.4F)  albuterol/ipratropium for Nebulization 3 milliLiter(s) Nebulizer every 6 hours PRN Shortness of Breath and/or Wheezing  benzonatate 100 milliGRAM(s) Oral three times a day PRN Cough  guaiFENesin   Syrup  (Sugar-Free) 200 milliGRAM(s) Oral every 6 hours PRN Cough      Allergies    sulfa drugs (Unknown)    Intolerances        REVIEW OF SYSTEMS:  CONSTITUTIONAL: No fever or chills  HEENT:  No headache, no sore throat  RESPIRATORY: No cough, wheezing, or shortness of breath  CARDIOVASCULAR: No chest pain, palpitations  GASTROINTESTINAL: No abd pain, nausea, vomiting, or diarrhea  GENITOURINARY: No dysuria, frequency, or hematuria  NEUROLOGICAL: no focal weakness or dizziness  MUSCULOSKELETAL: no myalgias     Vital Signs Last 24 Hrs  T(C): 36.4 (06 Mar 2020 05:33), Max: 36.8 (05 Mar 2020 16:00)  T(F): 97.5 (06 Mar 2020 05:33), Max: 98.2 (05 Mar 2020 16:00)  HR: 98 (06 Mar 2020 05:33) (78 - 98)  BP: 118/62 (06 Mar 2020 05:33) (116/76 - 153/78)  BP(mean): --  RR: 17 (06 Mar 2020 05:33) (16 - 18)  SpO2: 98% (06 Mar 2020 05:33) (94% - 99%)    PHYSICAL EXAM:  GENERAL: NAD  HEENT:  anicteric, moist mucous membranes  CHEST/LUNG:  CTA b/l, no rales, wheezes, or rhonchi  HEART:  RRR, S1, S2  ABDOMEN:  BS+, soft, nontender, nondistended  EXTREMITIES: no edema, cyanosis, or calf tenderness  NERVOUS SYSTEM: answers questions and follows commands appropriately    LABS:                        7.1    2.52  )-----------( 11       ( 06 Mar 2020 08:25 )             21.3     CBC Full  -  ( 06 Mar 2020 08:25 )  WBC Count : 2.52 K/uL  Hemoglobin : 7.1 g/dL  Hematocrit : 21.3 %  Platelet Count - Automated : 11 K/uL  Mean Cell Volume : 94.2 fl  Mean Cell Hemoglobin : 31.4 pg  Mean Cell Hemoglobin Concentration : 33.3 gm/dL  Auto Neutrophil # : x  Auto Lymphocyte # : x  Auto Monocyte # : x  Auto Eosinophil # : x  Auto Basophil # : x  Auto Neutrophil % : x  Auto Lymphocyte % : x  Auto Monocyte % : x  Auto Eosinophil % : x  Auto Basophil % : x    06 Mar 2020 08:25    137    |  109    |  18     ----------------------------<  128    3.4     |  19     |  0.94     Ca    8.1        06 Mar 2020 08:25  Phos  1.2       06 Mar 2020 08:25  Mg     2.0       06 Mar 2020 08:25    TPro  6.2    /  Alb  2.9    /  TBili  3.0    /  DBili  1.60   /  AST  15     /  ALT  20     /  AlkPhos  126    06 Mar 2020 08:25    PT/INR - ( 05 Mar 2020 10:49 )   PT: 14.9 sec;   INR: 1.32 ratio         PTT - ( 05 Mar 2020 10:49 )  PTT:31.7 sec    CAPILLARY BLOOD GLUCOSE              RADIOLOGY & ADDITIONAL TESTS:    Personally reviewed.     Consultant(s) Notes Reviewed:  [x] YES  [ ] NO Patient is a 75y old  Male who presents with a chief complaint of weakness, anemia (06 Mar 2020 09:00)      INTERVAL HPI/OVERNIGHT EVENTS: Patient seen and examined at bedside. No overnight events occurred. Denies fevers, chills, headache, lightheadedness, chest pain, dyspnea, abdominal pain, n/v/d/c. Pt received 2 units of platelets, 2 units of FFP and 1 unit of PRBC yesterday. AM labs report plt 11, H&H 7.1/21.3.     MEDICATIONS  (STANDING):  allopurinol 100 milliGRAM(s) Oral every 8 hours  entecavir 0.5 milliGRAM(s) Oral daily  lactated ringers. 1000 milliLiter(s) (100 mL/Hr) IV Continuous <Continuous>  pantoprazole    Tablet 40 milliGRAM(s) Oral before breakfast  potassium phosphate / sodium phosphate powder 1 Packet(s) Oral once  predniSONE   Tablet 20 milliGRAM(s) Oral daily    MEDICATIONS  (PRN):  acetaminophen   Tablet .. 650 milliGRAM(s) Oral every 6 hours PRN Mild Pain (1 - 3)  acetaminophen   Tablet .. 650 milliGRAM(s) Oral every 6 hours PRN Temp greater or equal to 38C (100.4F)  albuterol/ipratropium for Nebulization 3 milliLiter(s) Nebulizer every 6 hours PRN Shortness of Breath and/or Wheezing  benzonatate 100 milliGRAM(s) Oral three times a day PRN Cough  guaiFENesin   Syrup  (Sugar-Free) 200 milliGRAM(s) Oral every 6 hours PRN Cough    Allergies    sulfa drugs (Unknown)    Intolerances    REVIEW OF SYSTEMS:  CONSTITUTIONAL: No fever or chills  HEENT:  No headache, no sore throat  RESPIRATORY: Occasional cough, no wheezing, or shortness of breath  CARDIOVASCULAR: No chest pain, palpitations  GASTROINTESTINAL: No abd pain, nausea, vomiting, or diarrhea  GENITOURINARY: No dysuria, frequency, or hematuria  NEUROLOGICAL: +l weakness, no dizziness  MUSCULOSKELETAL: no myalgias     Vital Signs Last 24 Hrs  T(C): 36.4 (06 Mar 2020 05:33), Max: 36.8 (05 Mar 2020 16:00)  T(F): 97.5 (06 Mar 2020 05:33), Max: 98.2 (05 Mar 2020 16:00)  HR: 98 (06 Mar 2020 05:33) (78 - 98)  BP: 118/62 (06 Mar 2020 05:33) (116/76 - 153/78)  BP(mean): --  RR: 17 (06 Mar 2020 05:33) (16 - 18)  SpO2: 98% (06 Mar 2020 05:33) (94% - 99%)    PHYSICAL EXAM:  GENERAL: NAD, tired appearing  HEENT:  anicteric, moist, pale mucous membranes  CHEST/LUNG:  CTA b/l, no rales, wheezes, or rhonchi  HEART:  RRR, S1, S2, soft murmur noted  ABDOMEN:  BS+, soft, nontender, nondistended  EXTREMITIES: 2+ pitting edema on b/l lower extremities, cyanosis, or calf tenderness  NERVOUS SYSTEM: answers questions and follows commands appropriately    LABS:                        7.1    2.52  )-----------( 11       ( 06 Mar 2020 08:25 )             21.3     CBC Full  -  ( 06 Mar 2020 08:25 )  WBC Count : 2.52 K/uL  Hemoglobin : 7.1 g/dL  Hematocrit : 21.3 %  Platelet Count - Automated : 11 K/uL  Mean Cell Volume : 94.2 fl  Mean Cell Hemoglobin : 31.4 pg  Mean Cell Hemoglobin Concentration : 33.3 gm/dL  Auto Neutrophil # : x  Auto Lymphocyte # : x  Auto Monocyte # : x  Auto Eosinophil # : x  Auto Basophil # : x  Auto Neutrophil % : x  Auto Lymphocyte % : x  Auto Monocyte % : x  Auto Eosinophil % : x  Auto Basophil % : x    06 Mar 2020 08:25    137    |  109    |  18     ----------------------------<  128    3.4     |  19     |  0.94     Ca    8.1        06 Mar 2020 08:25  Phos  1.2       06 Mar 2020 08:25  Mg     2.0       06 Mar 2020 08:25    TPro  6.2    /  Alb  2.9    /  TBili  3.0    /  DBili  1.60   /  AST  15     /  ALT  20     /  AlkPhos  126    06 Mar 2020 08:25    PT/INR - ( 05 Mar 2020 10:49 )   PT: 14.9 sec;   INR: 1.32 ratio         PTT - ( 05 Mar 2020 10:49 )  PTT:31.7 sec    CAPILLARY BLOOD GLUCOSE    RADIOLOGY & ADDITIONAL TESTS:    Personally reviewed.     Consultant(s) Notes Reviewed:  [x] YES  [ ] NO Patient is a 75y old  Male who presents with a chief complaint of weakness, anemia (06 Mar 2020 09:00)      INTERVAL HPI/OVERNIGHT EVENTS: Patient seen and examined at bedside. No overnight events occurred. Denies fevers, chills, headache, lightheadedness, chest pain, dyspnea, abdominal pain, n/v/d/c. Pt received 2 units of platelets, 2 units of FFP and 1 unit of PRBC yesterday. AM labs report plt 11, H&H 7.1/21.3.     MEDICATIONS  (STANDING):  allopurinol 100 milliGRAM(s) Oral every 8 hours  entecavir 0.5 milliGRAM(s) Oral daily  lactated ringers. 1000 milliLiter(s) (100 mL/Hr) IV Continuous <Continuous>  pantoprazole    Tablet 40 milliGRAM(s) Oral before breakfast  potassium phosphate / sodium phosphate powder 1 Packet(s) Oral once  predniSONE   Tablet 20 milliGRAM(s) Oral daily    MEDICATIONS  (PRN):  acetaminophen   Tablet .. 650 milliGRAM(s) Oral every 6 hours PRN Mild Pain (1 - 3)  acetaminophen   Tablet .. 650 milliGRAM(s) Oral every 6 hours PRN Temp greater or equal to 38C (100.4F)  albuterol/ipratropium for Nebulization 3 milliLiter(s) Nebulizer every 6 hours PRN Shortness of Breath and/or Wheezing  benzonatate 100 milliGRAM(s) Oral three times a day PRN Cough  guaiFENesin   Syrup  (Sugar-Free) 200 milliGRAM(s) Oral every 6 hours PRN Cough    Allergies    sulfa drugs (Unknown)    Intolerances    REVIEW OF SYSTEMS:  CONSTITUTIONAL: No fever or chills  HEENT:  No headache, no sore throat  RESPIRATORY: Occasional cough, no wheezing, or shortness of breath  CARDIOVASCULAR: No chest pain, palpitations  GASTROINTESTINAL: No abd pain, nausea, vomiting, or diarrhea  GENITOURINARY: No dysuria, frequency, or hematuria  NEUROLOGICAL: +l weakness, no dizziness  MUSCULOSKELETAL: no myalgias     Vital Signs Last 24 Hrs  T(C): 36.4 (06 Mar 2020 05:33), Max: 36.8 (05 Mar 2020 16:00)  T(F): 97.5 (06 Mar 2020 05:33), Max: 98.2 (05 Mar 2020 16:00)  HR: 98 (06 Mar 2020 05:33) (78 - 98)  BP: 118/62 (06 Mar 2020 05:33) (116/76 - 153/78)  BP(mean): --  RR: 17 (06 Mar 2020 05:33) (16 - 18)  SpO2: 98% (06 Mar 2020 05:33) (94% - 99%)    PHYSICAL EXAM:  GENERAL: NAD, tired appearing, resting comfortably in chair  HEENT:  anicteric, moist, pale mucous membranes, EOMI  CHEST/LUNG:  CTA b/l, no rales, wheezes, or rhonchi  HEART:  RRR, S1, S2, soft murmur noted  ABDOMEN:  BS+, soft, nontender, nondistended  EXTREMITIES: 2+ pitting edema on b/l lower extremities, no cyanosis, or calf tenderness  NERVOUS SYSTEM: answers questions and follows commands appropriately    LABS:                        7.1    2.52  )-----------( 11       ( 06 Mar 2020 08:25 )             21.3     CBC Full  -  ( 06 Mar 2020 08:25 )  WBC Count : 2.52 K/uL  Hemoglobin : 7.1 g/dL  Hematocrit : 21.3 %  Platelet Count - Automated : 11 K/uL  Mean Cell Volume : 94.2 fl  Mean Cell Hemoglobin : 31.4 pg  Mean Cell Hemoglobin Concentration : 33.3 gm/dL  Auto Neutrophil # : x  Auto Lymphocyte # : x  Auto Monocyte # : x  Auto Eosinophil # : x  Auto Basophil # : x  Auto Neutrophil % : x  Auto Lymphocyte % : x  Auto Monocyte % : x  Auto Eosinophil % : x  Auto Basophil % : x    06 Mar 2020 08:25    137    |  109    |  18     ----------------------------<  128    3.4     |  19     |  0.94     Ca    8.1        06 Mar 2020 08:25  Phos  1.2       06 Mar 2020 08:25  Mg     2.0       06 Mar 2020 08:25    TPro  6.2    /  Alb  2.9    /  TBili  3.0    /  DBili  1.60   /  AST  15     /  ALT  20     /  AlkPhos  126    06 Mar 2020 08:25    PT/INR - ( 05 Mar 2020 10:49 )   PT: 14.9 sec;   INR: 1.32 ratio         PTT - ( 05 Mar 2020 10:49 )  PTT:31.7 sec    CAPILLARY BLOOD GLUCOSE    RADIOLOGY & ADDITIONAL TESTS:    Personally reviewed.     Consultant(s) Notes Reviewed:  [x] YES  [ ] NO

## 2020-03-06 NOTE — PROGRESS NOTE ADULT - ASSESSMENT
hepatitis B   elevated lfts   CLL      patient with elevated bilirubin and alk phos at baseline ? leon  mri showed normal liver, no cbd stone/dilatation; hep b pcr neg  ob neg; no s/s overt gib per pt/nursing; monitor cbc daily  transfuse as per heme/onc  diet as tolerated      Advanced care planning was discussed with patient and family.  Advanced care planning forms were reviewed and discussed.  Risks, benefits and alternatives of gastroenterologic procedures were discussed in detail and all questions were answered.    30 minutes spent.

## 2020-03-06 NOTE — PROGRESS NOTE ADULT - ASSESSMENT
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  Repeat Flow Cytometry still SLL/CLL, but FISH now w 11q-, 17p-, del of chromosome 12 and 13, all poor prognostic factors.]  Repeat PETCT only mildly hypermetabolic LADs w highest SUV 3, largest conglomerate f LNs ~5x2cm prevascular, mild splenomegaly 14cm  Pt was scheduled for Venetoclax/Rituxan second line treatment w admission for ramp up Venetoclax and tumor lysis prophylaxis when found w Influenza A during the 2/2020 adm, Rx w Temaflu, subsequent also sig more anemic/thrombocytopenic.  Was discharged home and admitted again 2/22/20 with  fever and found w pneumonia, continues to require transfusions plts and PRBCs.  Completed course of  Ceftriaxone  Hep B titer negative  Post IVIG    -prednisone started tapered 2/29/20, continue 30mg today and will slowly taper off  -leukocytosis due to CLL/SLL w circulating lymphs exacerbated by steroid effect,  -present clinical condition due to progression of CLL/SLL, worsened by acute illness of Influenza A/pneumonia/+-drug effects  -after discussion w pt and wife on 2/29/20, started Venetoclax 20mg po qd for 7days then increase to 50mg qd for 7days, then 200mg po qD for 7 days, then 400mg po qD. Rituxan to start after 5 weeks.  -continue IV hydration, 1.5-2 liters a day  - monitor for tumor lysis syndrome, check CMP including LDH/calcium/uric acid/phos/renal function/electrolytes, before first dose/4/8/12/24 hours later.  -will d/c ibrutinib once blood indices are stabilized (anticipate 3/3/20)      On 3/4 the case was gone over extensively with Dr Stephenson and the wife and the pt had several questions concerning ibrutinib venetoclax and Rituxan. He is improving and less cough and shortness of breath. He completed a full course of ceftriaxone and is on antiviral HEP B therapy with entecavir. The platelet count was about [id number] and there was no bleeding and the hemoglobin was 7.3 lungs were clear. Will continue support with the meds and pt is hopeful about the new drug regimen.    CBC continues to show decrease in WBC, hgb and platelet count    RECOMMEND:   1.  HOLD Venetoclax. Follow WBC. HOLD ramp up. Will determine when to restart.   2.  Transfuse 1 unit PRBC, 1 unit platelets.  Hold FFP transfusion. Pt likely with some degree of liver disease. GIven empiric PO trial of VitK.   3. Continue HBV tx.   4.  Monitor for fever  discussed with patient.

## 2020-03-06 NOTE — CHART NOTE - NSCHARTNOTEFT_GEN_A_CORE
Assessment: Pt seen for nutrition follow up. Chart reviewed, hospital course noted.     Pt reports good appetite/intake, consuming ~75% of meals. Denied N/V/diarrhea/constipation, last BM 3/5. No difficulties chewing/swallowing. Requesting additional written materials on consistent carbohydrate diet. Serum   Diet, Consistent Carbohydrate w/Evening Snack (03-01-20 @ 11:26)    Intake: 75%    Current Weight: Weight (kg): 82.9 (03-02 @ 07:00)  % Weight Change    Pertinent Medications: MEDICATIONS  (STANDING):  allopurinol 100 milliGRAM(s) Oral every 8 hours  entecavir 0.5 milliGRAM(s) Oral daily  lactated ringers. 1000 milliLiter(s) (100 mL/Hr) IV Continuous <Continuous>  pantoprazole    Tablet 40 milliGRAM(s) Oral before breakfast  potassium phosphate / sodium phosphate powder 1 Packet(s) Oral once  predniSONE   Tablet 20 milliGRAM(s) Oral daily    MEDICATIONS  (PRN):  acetaminophen   Tablet .. 650 milliGRAM(s) Oral every 6 hours PRN Mild Pain (1 - 3)  acetaminophen   Tablet .. 650 milliGRAM(s) Oral every 6 hours PRN Temp greater or equal to 38C (100.4F)  albuterol/ipratropium for Nebulization 3 milliLiter(s) Nebulizer every 6 hours PRN Shortness of Breath and/or Wheezing  benzonatate 100 milliGRAM(s) Oral three times a day PRN Cough  guaiFENesin   Syrup  (Sugar-Free) 200 milliGRAM(s) Oral every 6 hours PRN Cough    Pertinent Labs: 03-06 Na137 mmol/L Glu 128 mg/dL<H> K+ 3.4 mmol/L<L> Cr  0.94 mg/dL BUN 18 mg/dL 03-06 Phos 1.2 mg/dL<L> 03-06 Alb 2.9 g/dL<L>     CAPILLARY BLOOD GLUCOSE        Skin: +1 generalized, +2 R arm, +3 b/l leg edema    Estimated Needs:   [X] no change since previous assessment  [ ] recalculated:     Previous Nutrition Diagnosis:   Altered Nutrition Labs    Nutrition Diagnosis is [X] ongoing  [ ] resolved [ ] not applicable     New Nutrition Diagnosis: [X] not applicable       Interventions:   Recommend  [ ] Change Diet To:  [ ] Nutrition Supplement - no indication for oral nutritional supplement at this time; RD to follow per protocol and re-assess need  [ ] Nutrition Support  [X] Other: Encouraged po intake of nutrient dense, carbohydrate controlled meals/snacks. Pt provided with additional written/verbal nutrition education regarding consistent carbohydrate diet. Topics discussed include     Monitoring and Evaluation:   [ ] PO intake [ x ] Tolerance to diet prescription [ x ] weights [ x ] labs[ x ] follow up per protocol  [ ] other: Assessment: Pt seen for nutrition follow up. Chart reviewed, hospital course noted.     Pt reports good appetite/intake, consuming ~75% of meals. Denied N/V/diarrhea/constipation, last BM 3/5. No difficulties chewing/swallowing. Requesting additional written materials on consistent carbohydrate diet as pt states his wife is borderline diabetic. Serum glucose noted to be elevated, on prednisone. Fingersticks not being checked at this time.    Diet, Consistent Carbohydrate w/Evening Snack (03-01-20 @ 11:26)    Intake: 75%    Current Weight: Weight (kg): 82.9 (03-02 @ 07:00)  % Weight Change    Pertinent Medications: MEDICATIONS  (STANDING):  allopurinol 100 milliGRAM(s) Oral every 8 hours  entecavir 0.5 milliGRAM(s) Oral daily  lactated ringers. 1000 milliLiter(s) (100 mL/Hr) IV Continuous <Continuous>  pantoprazole    Tablet 40 milliGRAM(s) Oral before breakfast  potassium phosphate / sodium phosphate powder 1 Packet(s) Oral once  predniSONE   Tablet 20 milliGRAM(s) Oral daily    MEDICATIONS  (PRN):  acetaminophen   Tablet .. 650 milliGRAM(s) Oral every 6 hours PRN Mild Pain (1 - 3)  acetaminophen   Tablet .. 650 milliGRAM(s) Oral every 6 hours PRN Temp greater or equal to 38C (100.4F)  albuterol/ipratropium for Nebulization 3 milliLiter(s) Nebulizer every 6 hours PRN Shortness of Breath and/or Wheezing  benzonatate 100 milliGRAM(s) Oral three times a day PRN Cough  guaiFENesin   Syrup  (Sugar-Free) 200 milliGRAM(s) Oral every 6 hours PRN Cough    Pertinent Labs: 03-06 Na137 mmol/L Glu 128 mg/dL<H> K+ 3.4 mmol/L<L> Cr  0.94 mg/dL BUN 18 mg/dL 03-06 Phos 1.2 mg/dL<L> 03-06 Alb 2.9 g/dL<L>     CAPILLARY BLOOD GLUCOSE        Skin: +1 generalized, +2 R arm, +3 b/l leg edema    Estimated Needs:   [X] no change since previous assessment  [ ] recalculated:     Previous Nutrition Diagnosis:   Altered Nutrition Labs    Nutrition Diagnosis is [X] ongoing  [ ] resolved [ ] not applicable     New Nutrition Diagnosis: [X] not applicable       Interventions:   Recommend  [ ] Change Diet To:  [ ] Nutrition Supplement - no indication for oral nutritional supplement at this time; RD to follow per protocol and re-assess need  [ ] Nutrition Support  [X] Other: Encouraged po intake of nutrient dense, carbohydrate controlled meals/snacks. Consider accuchecks. Pt provided with additional written/verbal nutrition education regarding consistent carbohydrate diet. Topics discussed include sources of carbohydrates and appropriate portion size, pairing protein and carbohydrates at meals/snacks, avoiding concentrated sweets, and increasing intake of fruits/vegetables/whole grains. Pt with good comprehension/teach back.    Monitoring and Evaluation:   [ ] PO intake [ x ] Tolerance to diet prescription [ x ] weights [ x ] labs[ x ] follow up per protocol  [ ] other:

## 2020-03-06 NOTE — PROGRESS NOTE ADULT - SUBJECTIVE AND OBJECTIVE BOX
INTERVAL HPI/OVERNIGHT EVENTS:  pt seen and examined  offers no gi complaints  no s/s gib per nursing  sp prbc, plts, ffp yesterday    MEDICATIONS  (STANDING):  allopurinol 100 milliGRAM(s) Oral every 8 hours  entecavir 0.5 milliGRAM(s) Oral daily  lactated ringers. 1000 milliLiter(s) (100 mL/Hr) IV Continuous <Continuous>  pantoprazole    Tablet 40 milliGRAM(s) Oral before breakfast  predniSONE   Tablet 20 milliGRAM(s) Oral daily    MEDICATIONS  (PRN):  acetaminophen   Tablet .. 650 milliGRAM(s) Oral every 6 hours PRN Mild Pain (1 - 3)  acetaminophen   Tablet .. 650 milliGRAM(s) Oral every 6 hours PRN Temp greater or equal to 38C (100.4F)  albuterol/ipratropium for Nebulization 3 milliLiter(s) Nebulizer every 6 hours PRN Shortness of Breath and/or Wheezing  benzonatate 100 milliGRAM(s) Oral three times a day PRN Cough  guaiFENesin   Syrup  (Sugar-Free) 200 milliGRAM(s) Oral every 6 hours PRN Cough      Allergies    sulfa drugs (Unknown)    Intolerances        Review of Systems:    General:  No wt loss, fevers, chills, night sweats, fatigue   Eyes:  Good vision, no reported pain  ENT:  No sore throat, pain, runny nose, dysphagia  CV:  No pain, palpitations, hypo/hypertension  Resp:  No dyspnea, cough, tachypnea, wheezing  GI:  No pain, No nausea, No vomiting, No diarrhea, No constipation, No weight loss, No fever, No pruritis, No rectal bleeding, No melena, No dysphagia  :  No pain, bleeding, incontinence, nocturia  Muscle:  No pain, weakness  Neuro:  No weakness, tingling, memory problems  Psych:  No fatigue, insomnia, mood problems, depression  Endocrine:  No polyuria, polydypsia, cold/heat intolerance  Heme:  No petechiae, ecchymosis, easy bruisability  Skin:  No rash, tattoos, scars, edema      Vital Signs Last 24 Hrs  T(C): 36.4 (06 Mar 2020 05:33), Max: 36.8 (05 Mar 2020 16:00)  T(F): 97.5 (06 Mar 2020 05:33), Max: 98.2 (05 Mar 2020 16:00)  HR: 98 (06 Mar 2020 05:33) (78 - 98)  BP: 118/62 (06 Mar 2020 05:33) (116/76 - 153/78)  BP(mean): --  RR: 17 (06 Mar 2020 05:33) (16 - 18)  SpO2: 98% (06 Mar 2020 05:33) (94% - 99%)    PHYSICAL EXAM:  Constitutional: oob in recliner  HEENT: ncat  Gastrointestinal: soft nt mild dt  Extremities: edema  Neurological: Awake alert responds appropriately        LABS:                        7.1    2.52  )-----------( x        ( 06 Mar 2020 08:25 )             21.3     03-06    137  |  109<H>  |  18  ----------------------------<  128<H>  3.4<L>   |  19<L>  |  0.94    Ca    8.1<L>      06 Mar 2020 08:25  Phos  1.2     03-06  Mg     2.0     03-06    TPro  6.2  /  Alb  2.9<L>  /  TBili  3.0<H>  /  DBili  1.60<H>  /  AST  15  /  ALT  20  /  AlkPhos  126<H>  03-06    PT/INR - ( 05 Mar 2020 10:49 )   PT: 14.9 sec;   INR: 1.32 ratio         PTT - ( 05 Mar 2020 10:49 )  PTT:31.7 sec      RADIOLOGY & ADDITIONAL TESTS:

## 2020-03-06 NOTE — PROGRESS NOTE ADULT - PROBLEM SELECTOR PLAN 8
Pt states that lower ext edema occurs while on prednisone  - Checked venous doppler - negative  - ECHO 2/2018 showed Preserved left ventricular systolic function. Stage I   diastolic dysfunction. EF 65%   - repeat ECHO estimated EF 60-65%  - albumin level has been low might contribute to edema  - edema worsening after IVF with chemo  - Strict I&O's  - Repeat ECHO shows normal systolic function with LVEF 65%

## 2020-03-06 NOTE — PROGRESS NOTE ADULT - SUBJECTIVE AND OBJECTIVE BOX
[INTERVAL HX: ]  Patient seen and examined;  Chart reviewed and events noted;   No CP, no SOB, no LLAMAS.       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 Has been coughing as he was recently treated for influenza A on last admission. States that when he was discharged from [facility] on 2/15, he was feeling well and had no fevers. Patient developed a fever, T100.9F (oral) today for which he called heme/onc, Dr. Stephenson. Per Dr. Stuart, patient advised to go to ER. He was found to have a fever, T100.6F (rectal) in ER. Denies recent travel or sick contacts. Denies headache, chest pain, sob, palpitations, abdominal pain, diarrhea, melena, hematochezia, dysuria or hematuria.     Of note, patient was recently admitted to [facility] from 2/14-2/15/2020 for chemotherapy with Venetoclax, found to be febrile with similar associated complaints of generalized malaise, cough, and weakness. Patient was found to be Flu A positive during that admission and was treated with tamiflu. Patient was found to be thrombocytopenic to 35k with a Hb of 6.6, transfused 2U PRBCs and 1U platelets. Chemotherapy was held off due to acute illness.      In ED, VS Tmax 100.6 rectal (repeat s/p tylenol 99.5),  -> 99, BP stable, saturating well on RA  CBC significant for .14 (on d/c 56.25), Hb 6.8 (on d/c 8.7), Plt 11 (on d/c 21)  CMP significant for K 3.4, Cr 1.5 (appears to be baseline per chart review), bili 3.3  Type and screen performed. Will be getting 1 unit pRBCs  Flu/RSV negative  CT Chest: Multilobar patchy peribronchovascular airspace opacities and more dense consolidative process in the right middle lobe likely reflecting multifocal pneumonia. Small right and trace left pleural effusion. Mediastinal and hilar lymphadenopathy worsened compared with prior exam from 8/30/2019. Numerous mildly prominent bilateral axillary lymph nodes. Retroperitoneal lymphadenopathy. Mild splenomegaly. Cholelithiasis.  CXR (wet read): noted to have increased opacities in right lower and middle lobes as well as left lower lobe in comparison to CXR on 2/14/2020  EKG: sinus tachycardic    S/p cefepime 1g IV x1, NS bolus 1L x1, duonebs x2, tylenol 650mg PO x1, 1U PRBCs ordered and to be transfused    Dr. tSuart was called and he recommended to only transfused PRBC and not platelets.  He will see pt in the morning. (22 Feb 2020 00:14)            MEDICATIONS  (STANDING):  allopurinol 100 milliGRAM(s) Oral every 8 hours  entecavir 0.5 milliGRAM(s) Oral daily  lactated ringers. 1000 milliLiter(s) (100 mL/Hr) IV Continuous <Continuous>  pantoprazole    Tablet 40 milliGRAM(s) Oral before breakfast  potassium phosphate / sodium phosphate powder 1 Packet(s) Oral once  predniSONE   Tablet 20 milliGRAM(s) Oral daily    MEDICATIONS  (PRN):  acetaminophen   Tablet .. 650 milliGRAM(s) Oral every 6 hours PRN Mild Pain (1 - 3)  acetaminophen   Tablet .. 650 milliGRAM(s) Oral every 6 hours PRN Temp greater or equal to 38C (100.4F)  albuterol/ipratropium for Nebulization 3 milliLiter(s) Nebulizer every 6 hours PRN Shortness of Breath and/or Wheezing  benzonatate 100 milliGRAM(s) Oral three times a day PRN Cough  guaiFENesin   Syrup  (Sugar-Free) 200 milliGRAM(s) Oral every 6 hours PRN Cough      Vital Signs Last 24 Hrs  T(C): 36.4 (06 Mar 2020 05:33), Max: 36.8 (05 Mar 2020 16:00)  T(F): 97.5 (06 Mar 2020 05:33), Max: 98.2 (05 Mar 2020 16:00)  HR: 98 (06 Mar 2020 05:33) (80 - 98)  BP: 118/62 (06 Mar 2020 05:33) (116/76 - 153/78)  BP(mean): --  RR: 17 (06 Mar 2020 05:33) (16 - 18)  SpO2: 98% (06 Mar 2020 05:33) (94% - 99%)      [PHYSICAL EXAM]  General: adult in NAD,  WN,  WD.  HEENT: clear oropharynx, anicteric sclera, pink conjunctivae.  Neck: supple, no masses.  CV: normal S1S2, no murmur, no rubs, no gallops.  Lungs: clear to auscultation, no wheezes, no rales, no rhonchi.  Abdomen: soft, non-tender, non-distended, no hepatosplenomegaly, normal BS, no guarding.  Ext: no clubbing, no cyanosis, no edema.  Skin: no rashes,  no petechiae, no venous stasis changes.  Neuro: alert and oriented X3  , no focal motor deficits.  LN: no SC FERNIE.      [LABS:]                        7.1    2.52  )-----------( 11       ( 06 Mar 2020 08:25 )             21.3     03-06    137  |  109<H>  |  18  ----------------------------<  128<H>  3.4<L>   |  19<L>  |  0.94    Ca    8.1<L>      06 Mar 2020 08:25  Phos  1.2     03-06  Mg     2.0     03-06    TPro  6.2  /  Alb  2.9<L>  /  TBili  3.0<H>  /  DBili  1.60<H>  /  AST  15  /  ALT  20  /  AlkPhos  126<H>  03-06    PT/INR - ( 05 Mar 2020 10:49 )   PT: 14.9 sec;   INR: 1.32 ratio         PTT - ( 05 Mar 2020 10:49 )  PTT:31.7 sec              [RADIOLOGY STUDIES:]

## 2020-03-06 NOTE — PROGRESS NOTE ADULT - PROBLEM SELECTOR PLAN 5
low phosphate level   - replete phosphate   - monitor daily lab low potassium, phosphate level   - replete potassium, phosphate   - monitor daily lab

## 2020-03-06 NOTE — PROGRESS NOTE ADULT - PROBLEM SELECTOR PLAN 3
- Worsening anemia and thrombocytopenia since discharge ~1 week ago likely in part due to lymphoma/leukemia in part due to hemolytic anemia  - s/p 6 units of PRBC  6.8-1u-7.4-7.6-6.7-1u-8.3-7.7-6.9-1u-7.1-7.4-1u-8.9-8.0-7.6-1u-8.2-7.6-1u-7.3-7.0  - No acute s/s of bleeding on admission, continue to monitor, high bilirubin and though a greater direct bili component, suspect this is in large part due to AIHA and the indirect bili is getting conjugated  - suspect due to AIHA. Was on prednisone 10mg daily -- increase to prednisone 40mg po during hospitalization, tapering started @2/29 - now 20 daily  - will transfuse 1 unit of PRBC and 2 units of FFP today, follow CBC, PT/PTT  - F/U CBC @6pm - Worsening anemia and thrombocytopenia since discharge ~1 week ago likely in part due to lymphoma/leukemia in part due to hemolytic anemia  - s/p 7 units of PRBC  6.8-1u-7.4-7.6-6.7-1u-8.3-7.7-6.9-1u-7.1-7.4-1u-8.9-8.0-7.6-1u-8.2-7.6-1u-7.3-7.0-1u-8.2-7.1  - No acute s/s of bleeding on admission, continue to monitor, high bilirubin and though a greater direct bili component, suspect this is in large part due to AIHA and the indirect bili is getting conjugated  - suspect due to AIHA. Was on prednisone 10mg daily -- increase to prednisone 40mg po during hospitalization, tapering started @2/29 - now 20 daily  - follow H&H - Worsening anemia and thrombocytopenia since discharge ~1 week ago likely in part due to lymphoma/leukemia in part due to hemolytic anemia  - s/p 7 units of PRBC  6.8-1u-7.4-7.6-6.7-1u-8.3-7.7-6.9-1u-7.1-7.4-1u-8.9-8.0-7.6-1u-8.2-7.6-1u-7.3-7.0-1u-8.2-7.1  - No acute s/s of bleeding on admission, continue to monitor, high bilirubin and though a greater direct bili component, suspect this is in large part due to AIHA and the indirect bili is getting conjugated  - suspect due to AIHA. Was on prednisone 10mg daily -- increase to prednisone 40mg po during hospitalization, tapering started @2/29 - now 20 daily  - Heme/onc following - will transfuse 1unit PRBC, discontinue chemo, observe  - follow H&H

## 2020-03-06 NOTE — PROGRESS NOTE ADULT - ASSESSMENT
76yo M with PMH of CLL/SLL, AIHA, chronic hepatitis B (on entecavir), KIRTI on CPAP, T2DM (managed with lifestyle changes), HTN (not on any medication) who presents with chief complaint of fever, cough and generalized weakness, admitted with suspected gram-negative multifocal pneumonia, completed a course of IV antibiotics.  Severe anemia s/p transfusion of 6 units of PRBC and severe thrombocytopenia s/p platelet transfusion.  Suspect possible ITP with poor response to platelet transfusion. One dose of IVIG was given, s/p 7 units of platelets transfusion. Pt started chemo therapy with venetoclax @ 3/1/2020,  increased swelling in b/l lower extremities in the setting of IVF hydration 76yo M with PMH of CLL/SLL, AIHA, chronic hepatitis B (on entecavir), KIRTI on CPAP, T2DM (managed with lifestyle changes), HTN (not on any medication) who presents with chief complaint of fever, cough and generalized weakness, admitted with suspected gram-negative multifocal pneumonia, completed a course of IV antibiotics.  Severe anemia s/p transfusion of 7 units of PRBC and severe thrombocytopenia s/p platelet transfusion.  Suspect possible ITP with poor response to platelet transfusion. One dose of IVIG was given, s/p 9 units of platelets transfusion. Pt started chemo therapy with venetoclax @ 3/1/2020,  DC'ed @3/5 due to low WBC suspecting bone marrow oversuppression

## 2020-03-07 LAB
ANION GAP SERPL CALC-SCNC: 11 MMOL/L — SIGNIFICANT CHANGE UP (ref 5–17)
BUN SERPL-MCNC: 16 MG/DL — SIGNIFICANT CHANGE UP (ref 7–23)
CALCIUM SERPL-MCNC: 8.2 MG/DL — LOW (ref 8.5–10.1)
CHLORIDE SERPL-SCNC: 107 MMOL/L — SIGNIFICANT CHANGE UP (ref 96–108)
CO2 SERPL-SCNC: 19 MMOL/L — LOW (ref 22–31)
CREAT SERPL-MCNC: 1 MG/DL — SIGNIFICANT CHANGE UP (ref 0.5–1.3)
GLUCOSE SERPL-MCNC: 169 MG/DL — HIGH (ref 70–99)
HCT VFR BLD CALC: 24.6 % — LOW (ref 39–50)
HGB BLD-MCNC: 8.3 G/DL — LOW (ref 13–17)
MCHC RBC-ENTMCNC: 31.7 PG — SIGNIFICANT CHANGE UP (ref 27–34)
MCHC RBC-ENTMCNC: 33.7 GM/DL — SIGNIFICANT CHANGE UP (ref 32–36)
MCV RBC AUTO: 93.9 FL — SIGNIFICANT CHANGE UP (ref 80–100)
NRBC # BLD: 0 /100 WBCS — SIGNIFICANT CHANGE UP (ref 0–0)
PLATELET # BLD AUTO: 19 K/UL — CRITICAL LOW (ref 150–400)
POTASSIUM SERPL-MCNC: 3.7 MMOL/L — SIGNIFICANT CHANGE UP (ref 3.5–5.3)
POTASSIUM SERPL-SCNC: 3.7 MMOL/L — SIGNIFICANT CHANGE UP (ref 3.5–5.3)
RBC # BLD: 2.62 M/UL — LOW (ref 4.2–5.8)
RBC # FLD: 17 % — HIGH (ref 10.3–14.5)
SODIUM SERPL-SCNC: 137 MMOL/L — SIGNIFICANT CHANGE UP (ref 135–145)
WBC # BLD: 4.25 K/UL — SIGNIFICANT CHANGE UP (ref 3.8–10.5)
WBC # FLD AUTO: 4.25 K/UL — SIGNIFICANT CHANGE UP (ref 3.8–10.5)

## 2020-03-07 PROCEDURE — 99232 SBSQ HOSP IP/OBS MODERATE 35: CPT

## 2020-03-07 RX ADMIN — Medication 100 MILLIGRAM(S): at 06:12

## 2020-03-07 RX ADMIN — Medication 20 MILLIGRAM(S): at 06:11

## 2020-03-07 RX ADMIN — ENTECAVIR 0.5 MILLIGRAM(S): 0.5 TABLET ORAL at 06:11

## 2020-03-07 RX ADMIN — PANTOPRAZOLE SODIUM 40 MILLIGRAM(S): 20 TABLET, DELAYED RELEASE ORAL at 06:11

## 2020-03-07 NOTE — PROGRESS NOTE ADULT - SUBJECTIVE AND OBJECTIVE BOX
INTERVAL HPI/OVERNIGHT EVENTS:  Patient seen and examined at bedside. No acute overnight events, states he feels well, still with lingering cough    MEDICATIONS  (STANDING):  entecavir 0.5 milliGRAM(s) Oral daily  lactated ringers. 1000 milliLiter(s) (100 mL/Hr) IV Continuous <Continuous>  pantoprazole    Tablet 40 milliGRAM(s) Oral before breakfast  predniSONE   Tablet 20 milliGRAM(s) Oral daily    MEDICATIONS  (PRN):  acetaminophen   Tablet .. 650 milliGRAM(s) Oral every 6 hours PRN Mild Pain (1 - 3)  acetaminophen   Tablet .. 650 milliGRAM(s) Oral every 6 hours PRN Temp greater or equal to 38C (100.4F)  albuterol/ipratropium for Nebulization 3 milliLiter(s) Nebulizer every 6 hours PRN Shortness of Breath and/or Wheezing  benzonatate 100 milliGRAM(s) Oral three times a day PRN Cough  guaiFENesin   Syrup  (Sugar-Free) 200 milliGRAM(s) Oral every 6 hours PRN Cough      Allergies    sulfa drugs (Unknown)    Intolerances      ROS:  CONSTITUTIONAL: + weakness, no fevers or chills  EYES/ENT: No visual changes;  No vertigo or throat pain   NECK: No pain or stiffness  RESPIRATORY: + cough, no wheezing, hemoptysis; No shortness of breath  CARDIOVASCULAR: No chest pain or palpitations  GASTROINTESTINAL: No abdominal or epigastric pain. No nausea, vomiting, or hematemesis; No diarrhea or constipation. No melena or hematochezia.  GENITOURINARY: No dysuria, frequency or hematuria  NEUROLOGICAL: No numbness   SKIN: No itching, burning, rashes, or lesions   All other review of systems is negative unless indicated above.    Vital Signs Last 24 Hrs  T(C): 36.5 (07 Mar 2020 13:10), Max: 37.4 (07 Mar 2020 04:56)  T(F): 97.7 (07 Mar 2020 13:10), Max: 99.4 (07 Mar 2020 04:56)  HR: 87 (07 Mar 2020 13:10) (78 - 98)  BP: 114/67 (07 Mar 2020 13:10) (107/61 - 144/73)  BP(mean): --  RR: 16 (07 Mar 2020 13:10) (16 - 18)  SpO2: 99% (07 Mar 2020 13:10) (98% - 99%)    03-06 @ 07:01  -  03-07 @ 07:00  --------------------------------------------------------  IN: 3423 mL / OUT: 0 mL / NET: 3423 mL      Physical Exam:  General: WN/WD NAD  Neurology: A&Ox3, nonfocal, BROWN x 4  Respiratory: CTA B/L  CV: RRR, S1S2  Abdominal: Soft, NT, ND +BS  Extremities: +pitting edema B/L LE, + peripheral pulses      LABS:                        8.3    4.25  )-----------( 19       ( 07 Mar 2020 06:45 )             24.6     03-07    137  |  107  |  16  ----------------------------<  169<H>  3.7   |  19<L>  |  1.00    Ca    8.2<L>      07 Mar 2020 06:46  Phos  1.2     03-06  Mg     2.0     03-06    TPro  6.2  /  Alb  2.9<L>  /  TBili  3.0<H>  /  DBili  1.60<H>  /  AST  15  /  ALT  20  /  AlkPhos  126<H>  03-06          RADIOLOGY & ADDITIONAL TESTS:

## 2020-03-07 NOTE — PROGRESS NOTE ADULT - SUBJECTIVE AND OBJECTIVE BOX
INTERVAL HPI/OVERNIGHT EVENTS:  pt seen and examined, sitting up in chair feels wells  offers no gi complaints  HD stable/ H/H stable     MEDICATIONS  (STANDING):  entecavir 0.5 milliGRAM(s) Oral daily  lactated ringers. 1000 milliLiter(s) (100 mL/Hr) IV Continuous <Continuous>  pantoprazole    Tablet 40 milliGRAM(s) Oral before breakfast  predniSONE   Tablet 20 milliGRAM(s) Oral daily    MEDICATIONS  (PRN):  acetaminophen   Tablet .. 650 milliGRAM(s) Oral every 6 hours PRN Mild Pain (1 - 3)  acetaminophen   Tablet .. 650 milliGRAM(s) Oral every 6 hours PRN Temp greater or equal to 38C (100.4F)  albuterol/ipratropium for Nebulization 3 milliLiter(s) Nebulizer every 6 hours PRN Shortness of Breath and/or Wheezing  benzonatate 100 milliGRAM(s) Oral three times a day PRN Cough  guaiFENesin   Syrup  (Sugar-Free) 200 milliGRAM(s) Oral every 6 hours PRN Cough      Allergies    sulfa drugs (Unknown)    Intolerances          Review of Systems:    General:  No wt loss, fevers, chills, night sweats, fatigue   Eyes:  Good vision, no reported pain  ENT:  No sore throat, pain, runny nose, dysphagia  CV:  No pain, palpitations, hypo/hypertension  Resp:  No dyspnea, cough, tachypnea, wheezing  GI:  No pain, No nausea, No vomiting, No diarrhea, No constipation, No weight loss, No fever, No pruritis, No rectal bleeding, No melena, No dysphagia  :  No pain, bleeding, incontinence, nocturia  Muscle:  No pain, weakness  Neuro:  No weakness, tingling, memory problems  Psych:  No fatigue, insomnia, mood problems, depression  Endocrine:  No polyuria, polydypsia, cold/heat intolerance  Heme:  No petechiae, ecchymosis, easy bruisability  Skin:  No rash, tattoos, scars, edema        Vital Signs Last 24 Hrs  T(C): 37.4 (07 Mar 2020 04:56), Max: 37.4 (07 Mar 2020 04:56)  T(F): 99.4 (07 Mar 2020 04:56), Max: 99.4 (07 Mar 2020 04:56)  HR: 98 (07 Mar 2020 04:56) (78 - 98)  BP: 136/65 (07 Mar 2020 04:56) (107/61 - 144/73)  BP(mean): --  RR: 17 (07 Mar 2020 04:56) (16 - 18)  SpO2: 99% (07 Mar 2020 04:56) (98% - 99%)        PHYSICAL EXAM:  Constitutional: oob in recliner  HEENT: ncat  Gastrointestinal: soft nt mild dt  Extremities: edema  Neurological: Awake alert responds appropriately      LABS:                        8.3    4.25  )-----------( x        ( 07 Mar 2020 06:45 )             24.6     03-07    137  |  107  |  16  ----------------------------<  169<H>  3.7   |  19<L>  |  1.00    Ca    8.2<L>      07 Mar 2020 06:46  Phos  1.2     03-06  Mg     2.0     03-06    TPro  6.2  /  Alb  2.9<L>  /  TBili  3.0<H>  /  DBili  1.60<H>  /  AST  15  /  ALT  20  /  AlkPhos  126<H>  03-06    PT/INR - ( 05 Mar 2020 10:49 )   PT: 14.9 sec;   INR: 1.32 ratio         PTT - ( 05 Mar 2020 10:49 )  PTT:31.7 sec        RADIOLOGY & ADDITIONAL TESTS:

## 2020-03-07 NOTE — PROGRESS NOTE ADULT - ASSESSMENT
74yo M with PMH of CLL/SLL, AIHA, chronic hepatitis B (on entecavir), KIRTI on CPAP, T2DM (managed with lifestyle changes), HTN (not on any medication) who presents with chief complaint of fever, cough and generalized weakness, admitted with suspected gram-negative multifocal pneumonia, completed a course of IV antibiotics.  Severe anemia s/p transfusion of 7 units of PRBC and severe thrombocytopenia s/p platelet transfusion.  Suspect possible ITP with poor response to platelet transfusion. One dose of IVIG was given, s/p 9 units of platelets transfusion. Pt started chemo therapy with venetoclax @ 3/1/2020,  DC'ed @3/5 due to low WBC suspecting bone marrow oversuppression

## 2020-03-07 NOTE — PROGRESS NOTE ADULT - PROBLEM SELECTOR PLAN 2
- Per chart review, platelet count steadily declining since 2018  - poor response to platelets transfusion, suspect ITP component  - s/p one dose of IVIG  - s/p 10 units of platelet transfusion since admission  - Hem/Onc following, will follow the recommendation - platelets stable today- improved to 19k

## 2020-03-07 NOTE — PROGRESS NOTE ADULT - PROBLEM SELECTOR PLAN 6
-unclear why pt's direct bilirubin is the majority fraction   -pt has no symptoms or exam findings consistent with obstructed CBD  -RUQ US showing GB with stones but no obstructing stones, and normal caliber CBD  -MRCP did not reveal obstruction  -GI following

## 2020-03-07 NOTE — PROGRESS NOTE ADULT - ASSESSMENT
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  Repeat Flow Cytometry still SLL/CLL, but FISH now w 11q-, 17p-, del of chromosome 12 and 13, all poor prognostic factors.]  Repeat PETCT only mildly hypermetabolic LADs w highest SUV 3, largest conglomerate f LNs ~5x2cm prevascular, mild splenomegaly 14cm  Pt was scheduled for Venetoclax/Rituxan second line treatment w admission for ramp up Venetoclax and tumor lysis prophylaxis when found w Influenza A during the 2/2020 adm, Rx w Temaflu, subsequent also sig more anemic/thrombocytopenic.  Was discharged home and admitted again 2/22/20 with  fever and found w pneumonia, continues to require transfusions plts and PRBCs.  Completed course of  Ceftriaxone  Hep B titer negative  Post IVIG and started on Venetoxlax 2/29/20 with dramatic drop in WBC    -prednisone started tapered 2/29/20, continue 30mg today and will slowly taper off  -leukocytosis due to CLL/SLL w circulating lymphs exacerbated by steroid effect dramatically resolved after initiation of Venetoclax 20mg 2/29/20 and stopped on 3/5/20 after dramatic drop in WBC; holding for now  -present clinical condition due to progression of CLL/SLL, worsened by acute illness of Influenza A/pneumonia/+-drug effects  - monitor for tumor lysis syndrome, check CMP including LDH/calcium/uric acid/phos/renal function/electrolytes  - no indication  for transfusions  - monitor for fevers/infection  - continue antivirals

## 2020-03-07 NOTE — PROGRESS NOTE ADULT - PROBLEM SELECTOR PLAN 3
- Worsening anemia and thrombocytopenia since discharge ~1 week ago likely in part due to lymphoma/leukemia in part due to hemolytic anemia  - s/p 7 units of PRBC  6.8-1u-7.4-7.6-6.7-1u-8.3-7.7-6.9-1u-7.1-7.4-1u-8.9-8.0-7.6-1u-8.2-7.6-1u-7.3-7.0-1u-8.2-7.1- 8.3  - No acute s/s of bleeding on admission, continue to monitor, high bilirubin and though a greater direct bili component, suspect this is in large part due to AIHA and the indirect bili is getting conjugated  - suspect due to AIHA. Was on prednisone 10mg daily -- increase to prednisone 40mg po during hospitalization, tapering started @2/29 - now 20 daily  - Heme/onc following - discontinue chemo, observe  - follow H&H

## 2020-03-07 NOTE — PROGRESS NOTE ADULT - ASSESSMENT
hepatitis B   elevated lfts   CLL      patient with elevated bilirubin and alk phos at baseline ? leon  mri showed normal liver, no cbd stone/dilatation; hep b pcr neg  ob neg; no s/s overt gib per pt/nursing; monitor cbc daily  transfuse as per heme/onc  diet as tolerated  care as per primary team

## 2020-03-07 NOTE — PROGRESS NOTE ADULT - SUBJECTIVE AND OBJECTIVE BOX
Patient seen and examined;  Chart reviewed and events noted;   wife at bedside; feeling better but still couging at night      MEDICATIONS  (STANDING):  entecavir 0.5 milliGRAM(s) Oral daily  lactated ringers. 1000 milliLiter(s) (100 mL/Hr) IV Continuous <Continuous>  pantoprazole    Tablet 40 milliGRAM(s) Oral before breakfast  predniSONE   Tablet 20 milliGRAM(s) Oral daily    MEDICATIONS  (PRN):  acetaminophen   Tablet .. 650 milliGRAM(s) Oral every 6 hours PRN Mild Pain (1 - 3)  acetaminophen   Tablet .. 650 milliGRAM(s) Oral every 6 hours PRN Temp greater or equal to 38C (100.4F)  albuterol/ipratropium for Nebulization 3 milliLiter(s) Nebulizer every 6 hours PRN Shortness of Breath and/or Wheezing  benzonatate 100 milliGRAM(s) Oral three times a day PRN Cough  guaiFENesin   Syrup  (Sugar-Free) 200 milliGRAM(s) Oral every 6 hours PRN Cough      Vital Signs Last 24 Hrs  T(C): 37.4 (07 Mar 2020 04:56), Max: 37.4 (07 Mar 2020 04:56)  T(F): 99.4 (07 Mar 2020 04:56), Max: 99.4 (07 Mar 2020 04:56)  HR: 98 (07 Mar 2020 04:56) (78 - 98)  BP: 136/65 (07 Mar 2020 04:56) (107/61 - 144/73)  BP(mean): --  RR: 17 (07 Mar 2020 04:56) (16 - 18)  SpO2: 99% (07 Mar 2020 04:56) (98% - 99%)    PHYSICAL EXAM  General: adult in NAD  HEENT: clear oropharynx, anicteric sclera, pink conjunctivae  Neck: supple  CV: normal S1S2 with no murmur rubs or gallops  Lungs: clear to auscultation, no wheezes, no rhales  Abdomen: soft non-tender non-distended, no hepato/splenomegaly  Ext: no clubbing cyanosis or edema  Skin:  bruising on left wrist at IV attempt site  Neuro: alert and oriented X3 no focal deficits      LABS:                        8.3    4.25  )-----------( 19       ( 07 Mar 2020 06:45 )             24.6     Hemoglobin: 8.3 g/dL (03-07 @ 06:45)  Hemoglobin: 7.1 g/dL (03-06 @ 08:25)  Hemoglobin: 8.2 g/dL (03-05 @ 21:57)  Hemoglobin: 7.0 g/dL (03-05 @ 08:57)  Hemoglobin: 7.3 g/dL (03-04 @ 05:13)    WBC Count: 4.25 K/uL (03-07 @ 06:45)  WBC Count: 2.52 K/uL (03-06 @ 08:25)  WBC Count: 8.31 K/uL (03-05 @ 21:57)  WBC Count: 3.14 K/uL (03-05 @ 08:57)  WBC Count: 10.78 K/uL (03-04 @ 05:13)    Platelet Count - Automated: 19 K/uL (03-07 @ 06:45)  Platelet Count - Automated: 11 K/uL (03-06 @ 08:25)  Platelet Count - Automated: 27 K/uL (03-05 @ 21:57)  Platelet Count - Automated: 3 K/uL (03-05 @ 08:57)  Platelet Count - Automated: 11 K/uL (03-04 @ 05:13)    03-07    137  |  107  |  16  ----------------------------<  169<H>  3.7   |  19<L>  |  1.00    Ca    8.2<L>      07 Mar 2020 06:46  Phos  1.2     03-06  Mg     2.0     03-06    TPro  6.2  /  Alb  2.9<L>  /  TBili  3.0<H>  /  DBili  1.60<H>  /  AST  15  /  ALT  20  /  AlkPhos  126<H>  03-06

## 2020-03-07 NOTE — PROGRESS NOTE ADULT - PROBLEM SELECTOR PLAN 1
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 Suspect bone marrow oversuppression.   - DC venetoclax per Hem/Onc for now  - Steroids being tapered - 20mg now  - Hem/Onc following

## 2020-03-08 LAB
ALBUMIN SERPL ELPH-MCNC: 2.9 G/DL — LOW (ref 3.3–5)
ALP SERPL-CCNC: 139 U/L — HIGH (ref 40–120)
ALT FLD-CCNC: 20 U/L — SIGNIFICANT CHANGE UP (ref 12–78)
ANION GAP SERPL CALC-SCNC: 8 MMOL/L — SIGNIFICANT CHANGE UP (ref 5–17)
AST SERPL-CCNC: 15 U/L — SIGNIFICANT CHANGE UP (ref 15–37)
BILIRUB SERPL-MCNC: 2.6 MG/DL — HIGH (ref 0.2–1.2)
BUN SERPL-MCNC: 18 MG/DL — SIGNIFICANT CHANGE UP (ref 7–23)
CALCIUM SERPL-MCNC: 8.1 MG/DL — LOW (ref 8.5–10.1)
CHLORIDE SERPL-SCNC: 109 MMOL/L — HIGH (ref 96–108)
CO2 SERPL-SCNC: 21 MMOL/L — LOW (ref 22–31)
CREAT SERPL-MCNC: 0.97 MG/DL — SIGNIFICANT CHANGE UP (ref 0.5–1.3)
GLUCOSE SERPL-MCNC: 125 MG/DL — HIGH (ref 70–99)
HCT VFR BLD CALC: 22.6 % — LOW (ref 39–50)
HGB BLD-MCNC: 7.6 G/DL — LOW (ref 13–17)
MCHC RBC-ENTMCNC: 31.5 PG — SIGNIFICANT CHANGE UP (ref 27–34)
MCHC RBC-ENTMCNC: 33.6 GM/DL — SIGNIFICANT CHANGE UP (ref 32–36)
MCV RBC AUTO: 93.8 FL — SIGNIFICANT CHANGE UP (ref 80–100)
NRBC # BLD: 0 /100 WBCS — SIGNIFICANT CHANGE UP (ref 0–0)
PLATELET # BLD AUTO: 8 K/UL — CRITICAL LOW (ref 150–400)
POTASSIUM SERPL-MCNC: 3.4 MMOL/L — LOW (ref 3.5–5.3)
POTASSIUM SERPL-SCNC: 3.4 MMOL/L — LOW (ref 3.5–5.3)
PROT SERPL-MCNC: 6.2 G/DL — SIGNIFICANT CHANGE UP (ref 6–8.3)
RBC # BLD: 2.41 M/UL — LOW (ref 4.2–5.8)
RBC # FLD: 16.9 % — HIGH (ref 10.3–14.5)
SODIUM SERPL-SCNC: 138 MMOL/L — SIGNIFICANT CHANGE UP (ref 135–145)
WBC # BLD: 6.15 K/UL — SIGNIFICANT CHANGE UP (ref 3.8–10.5)
WBC # FLD AUTO: 6.15 K/UL — SIGNIFICANT CHANGE UP (ref 3.8–10.5)

## 2020-03-08 PROCEDURE — 99232 SBSQ HOSP IP/OBS MODERATE 35: CPT

## 2020-03-08 RX ORDER — POTASSIUM CHLORIDE 20 MEQ
40 PACKET (EA) ORAL ONCE
Refills: 0 | Status: COMPLETED | OUTPATIENT
Start: 2020-03-08 | End: 2020-03-08

## 2020-03-08 RX ADMIN — Medication 20 MILLIGRAM(S): at 05:53

## 2020-03-08 RX ADMIN — Medication 40 MILLIEQUIVALENT(S): at 11:17

## 2020-03-08 RX ADMIN — SODIUM CHLORIDE 100 MILLILITER(S): 9 INJECTION, SOLUTION INTRAVENOUS at 17:41

## 2020-03-08 RX ADMIN — PANTOPRAZOLE SODIUM 40 MILLIGRAM(S): 20 TABLET, DELAYED RELEASE ORAL at 05:53

## 2020-03-08 RX ADMIN — SODIUM CHLORIDE 100 MILLILITER(S): 9 INJECTION, SOLUTION INTRAVENOUS at 05:53

## 2020-03-08 RX ADMIN — ENTECAVIR 0.5 MILLIGRAM(S): 0.5 TABLET ORAL at 05:53

## 2020-03-08 NOTE — PROGRESS NOTE ADULT - SUBJECTIVE AND OBJECTIVE BOX
INTERVAL HPI/OVERNIGHT EVENTS:  pt seen and examined, sitting up in chair feels wells  no overnight events  offers no gi complaints  HD stable/ H/H stable     MEDICATIONS  (STANDING):  entecavir 0.5 milliGRAM(s) Oral daily  lactated ringers. 1000 milliLiter(s) (100 mL/Hr) IV Continuous <Continuous>  pantoprazole    Tablet 40 milliGRAM(s) Oral before breakfast  predniSONE   Tablet 20 milliGRAM(s) Oral daily    MEDICATIONS  (PRN):  acetaminophen   Tablet .. 650 milliGRAM(s) Oral every 6 hours PRN Mild Pain (1 - 3)  acetaminophen   Tablet .. 650 milliGRAM(s) Oral every 6 hours PRN Temp greater or equal to 38C (100.4F)  albuterol/ipratropium for Nebulization 3 milliLiter(s) Nebulizer every 6 hours PRN Shortness of Breath and/or Wheezing  benzonatate 100 milliGRAM(s) Oral three times a day PRN Cough  guaiFENesin   Syrup  (Sugar-Free) 200 milliGRAM(s) Oral every 6 hours PRN Cough      Allergies    sulfa drugs (Unknown)    Intolerances          Review of Systems:    General:  No wt loss, fevers, chills, night sweats, fatigue   Eyes:  Good vision, no reported pain  ENT:  No sore throat, pain, runny nose, dysphagia  CV:  No pain, palpitations, hypo/hypertension  Resp:  No dyspnea, cough, tachypnea, wheezing  GI:  No pain, No nausea, No vomiting, No diarrhea, No constipation, No weight loss, No fever, No pruritis, No rectal bleeding, No melena, No dysphagia  :  No pain, bleeding, incontinence, nocturia  Muscle:  No pain, weakness  Neuro:  No weakness, tingling, memory problems  Psych:  No fatigue, insomnia, mood problems, depression  Endocrine:  No polyuria, polydypsia, cold/heat intolerance  Heme:  No petechiae, ecchymosis, easy bruisability  Skin:  No rash, tattoos, scars, edema        Vital Signs Last 24 Hrs  T(C): 36.9 (08 Mar 2020 05:05), Max: 36.9 (08 Mar 2020 05:05)  T(F): 98.5 (08 Mar 2020 05:05), Max: 98.5 (08 Mar 2020 05:05)  HR: 101 (08 Mar 2020 05:05) (87 - 101)  BP: 130/67 (08 Mar 2020 05:05) (114/67 - 130/67)  BP(mean): --  RR: 18 (08 Mar 2020 05:05) (16 - 18)  SpO2: 97% (08 Mar 2020 05:05) (97% - 99%)            PHYSICAL EXAM:  Constitutional: oob in recliner  HEENT: ncat  Gastrointestinal: soft nt mild dt  Extremities: edema  Neurological: Awake alert responds appropriately      LABS:                        7.6    6.15  )-----------( 8        ( 08 Mar 2020 07:06 )             22.6     03-08    138  |  109<H>  |  18  ----------------------------<  125<H>  3.4<L>   |  21<L>  |  0.97    Ca    8.1<L>      08 Mar 2020 07:06    TPro  6.2  /  Alb  2.9<L>  /  TBili  2.6<H>  /  DBili  x   /  AST  15  /  ALT  20  /  AlkPhos  139<H>  03-08        RADIOLOGY & ADDITIONAL TESTS:

## 2020-03-08 NOTE — PROGRESS NOTE ADULT - SUBJECTIVE AND OBJECTIVE BOX
INTERVAL HPI/OVERNIGHT EVENTS:  Patient seen and examined at bedside. No acute overnight events. Denies any CP, SOB, abd pain.    MEDICATIONS  (STANDING):  entecavir 0.5 milliGRAM(s) Oral daily  lactated ringers. 1000 milliLiter(s) (100 mL/Hr) IV Continuous <Continuous>  pantoprazole    Tablet 40 milliGRAM(s) Oral before breakfast  predniSONE   Tablet 20 milliGRAM(s) Oral daily    MEDICATIONS  (PRN):  acetaminophen   Tablet .. 650 milliGRAM(s) Oral every 6 hours PRN Mild Pain (1 - 3)  acetaminophen   Tablet .. 650 milliGRAM(s) Oral every 6 hours PRN Temp greater or equal to 38C (100.4F)  albuterol/ipratropium for Nebulization 3 milliLiter(s) Nebulizer every 6 hours PRN Shortness of Breath and/or Wheezing  benzonatate 100 milliGRAM(s) Oral three times a day PRN Cough  guaiFENesin   Syrup  (Sugar-Free) 200 milliGRAM(s) Oral every 6 hours PRN Cough      Allergies    sulfa drugs (Unknown)    Intolerances      ROS:  CONSTITUTIONAL: + weakness, no fevers or chills  EYES/ENT: No visual changes;  No vertigo or throat pain   NECK: No pain or stiffness  RESPIRATORY: + cough, no wheezing, hemoptysis; No shortness of breath  CARDIOVASCULAR: No chest pain or palpitations  GASTROINTESTINAL: No abdominal or epigastric pain. No nausea, vomiting, or hematemesis  GENITOURINARY: No dysuria, frequency or hematuria  NEUROLOGICAL: No numbness   SKIN: No itching, burning, rashes, or lesions   All other review of systems is negative unless indicated above.    Vital Signs Last 24 Hrs  T(C): 36.9 (08 Mar 2020 05:05), Max: 36.9 (08 Mar 2020 05:05)  T(F): 98.5 (08 Mar 2020 05:05), Max: 98.5 (08 Mar 2020 05:05)  HR: 101 (08 Mar 2020 05:05) (99 - 101)  BP: 130/67 (08 Mar 2020 05:05) (128/65 - 130/67)  BP(mean): --  RR: 18 (08 Mar 2020 05:05) (18 - 18)  SpO2: 97% (08 Mar 2020 05:05) (97% - 97%)    03-07 @ 06:01  -  03-08 @ 07:00  --------------------------------------------------------  IN: 3660 mL / OUT: 0 mL / NET: 3660 mL        Physical Exam:  General: WN/WD NAD  Neurology: A&Ox3, nonfocal, BROWN x 4  Respiratory: CTA B/L  CV: RRR, S1S2  Abdominal: Soft, NT, ND +BS  Extremities: +pitting edema B/L LE, + peripheral pulses    LABS:                        7.6    6.15  )-----------( 8        ( 08 Mar 2020 07:06 )             22.6     03-08    138  |  109<H>  |  18  ----------------------------<  125<H>  3.4<L>   |  21<L>  |  0.97    Ca    8.1<L>      08 Mar 2020 07:06    TPro  6.2  /  Alb  2.9<L>  /  TBili  2.6<H>  /  DBili  x   /  AST  15  /  ALT  20  /  AlkPhos  139<H>  03-08          RADIOLOGY & ADDITIONAL TESTS:

## 2020-03-08 NOTE — PROGRESS NOTE ADULT - PROBLEM SELECTOR PLAN 1
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 Suspect bone marrow oversuppression.   - DC venetoclax per Hem/Onc for now  - Steroids being tapered - 20mg now  - Hem/Onc following

## 2020-03-08 NOTE — PROGRESS NOTE ADULT - ASSESSMENT
76yo M with PMH of CLL/SLL, AIHA, chronic hepatitis B (on entecavir), KIRTI on CPAP, T2DM (managed with lifestyle changes), HTN (not on any medication) who presents with chief complaint of fever, cough and generalized weakness, admitted with suspected gram-negative multifocal pneumonia, completed a course of IV antibiotics.  Severe anemia s/p transfusion of 7 units of PRBC and severe thrombocytopenia s/p platelet transfusion.  Suspect possible ITP with poor response to platelet transfusion. One dose of IVIG was given, s/p 9 units of platelets transfusion. Pt started chemo therapy with venetoclax @ 3/1/2020,  DC'ed @3/5 due to low WBC suspecting bone marrow oversuppression

## 2020-03-08 NOTE — PROGRESS NOTE ADULT - PROBLEM SELECTOR PLAN 2
- Per chart review, platelet count steadily declining since 2018  - poor response to platelets transfusion, suspect ITP component  - s/p one dose of IVIG  - s/p 10 units of platelet transfusion since admission  - Hem/Onc following, will follow the recommendation - platelets 8k today- will transfuse 1 unit platelets

## 2020-03-08 NOTE — PROGRESS NOTE ADULT - SUBJECTIVE AND OBJECTIVE BOX
Patient seen and examined;  Chart reviewed and events noted;   feeling better; denies any bleeding    MEDICATIONS  (STANDING):  entecavir 0.5 milliGRAM(s) Oral daily  lactated ringers. 1000 milliLiter(s) (100 mL/Hr) IV Continuous <Continuous>  pantoprazole    Tablet 40 milliGRAM(s) Oral before breakfast  predniSONE   Tablet 20 milliGRAM(s) Oral daily    MEDICATIONS  (PRN):  acetaminophen   Tablet .. 650 milliGRAM(s) Oral every 6 hours PRN Mild Pain (1 - 3)  acetaminophen   Tablet .. 650 milliGRAM(s) Oral every 6 hours PRN Temp greater or equal to 38C (100.4F)  albuterol/ipratropium for Nebulization 3 milliLiter(s) Nebulizer every 6 hours PRN Shortness of Breath and/or Wheezing  benzonatate 100 milliGRAM(s) Oral three times a day PRN Cough  guaiFENesin   Syrup  (Sugar-Free) 200 milliGRAM(s) Oral every 6 hours PRN Cough      Vital Signs Last 24 Hrs  T(C): 36.9 (08 Mar 2020 05:05), Max: 36.9 (08 Mar 2020 05:05)  T(F): 98.5 (08 Mar 2020 05:05), Max: 98.5 (08 Mar 2020 05:05)  HR: 101 (08 Mar 2020 05:05) (87 - 101)  BP: 130/67 (08 Mar 2020 05:05) (114/67 - 130/67)  RR: 18 (08 Mar 2020 05:05) (16 - 18)  SpO2: 97% (08 Mar 2020 05:05) (97% - 99%)    PHYSICAL EXAM  General: adult in NAD  HEENT: clear oropharynx, anicteric sclera, pink conjunctivae  Neck: supple  CV: normal S1S2 with no murmur rubs or gallops  Lungs: clear to auscultation, no wheezes, no rhales  Abdomen: soft non-tender non-distended, no hepato/splenomegaly  Ext: no clubbing cyanosis or edema  Skin: + ecchymoses at IV sites  Neuro: alert and oriented X3 no focal deficits      LABS:                        7.6    6.15  )-----------( 8        ( 08 Mar 2020 07:06 )             22.6     Platelet Count - Automated: 8 K/uL (03-08 @ 07:06)  Platelet Count - Automated: 19 K/uL (03-07 @ 06:45)  Platelet Count - Automated: 11 K/uL (03-06 @ 08:25)  Platelet Count - Automated: 27 K/uL (03-05 @ 21:57)  Platelet Count - Automated: 3 K/uL (03-05 @ 08:57)    03-08    138  |  109<H>  |  18  ----------------------------<  125<H>  3.4<L>   |  21<L>  |  0.97    Ca    8.1<L>      08 Mar 2020 07:06    TPro  6.2  /  Alb  2.9<L>  /  TBili  2.6<H>  /  DBili  x   /  AST  15  /  ALT  20  /  AlkPhos  139<H>  03-08    Uric Acid, Serum: 0.8 mg/dL (03.06.20 @ 08:25)    Lactate Dehydrogenase, Serum: 166 U/L (03.06.20 @ 11:15)

## 2020-03-08 NOTE — PROGRESS NOTE ADULT - PROBLEM SELECTOR PLAN 10
DVT PPx: SCDs- start on ALYSON stockings, no chemical ppx in the setting of symptomatic anemia and thrombocytopenia    11. HTN- not on meds  12. T2DM- stable with lifestyle modifications. Hyperglycemia likely due to steroid use.

## 2020-03-08 NOTE — PROGRESS NOTE ADULT - PROBLEM SELECTOR PLAN 3
- Worsening anemia and thrombocytopenia since discharge ~1 week ago likely in part due to lymphoma/leukemia in part due to hemolytic anemia  - s/p 7 units of PRBC  6.8-1u-7.4-7.6-6.7-1u-8.3-7.7-6.9-1u-7.1-7.4-1u-8.9-8.0-7.6-1u-8.2-7.6-1u-7.3-7.0-1u-8.2-7.1- 8.3-7.6  - No acute s/s of bleeding on admission, continue to monitor, high bilirubin and though a greater direct bili component, suspect this is in large part due to AIHA and the indirect bili is getting conjugated  - suspect due to AIHA. Was on prednisone 10mg daily -- increase to prednisone 40mg po during hospitalization, tapering started @2/29 - now 20 daily  - Heme/onc following - discontinue chemo, observe  - follow H&H

## 2020-03-08 NOTE — PROGRESS NOTE ADULT - ASSESSMENT
76 y/o man w Hepatitis B on Entecavirt, Chronic Lymphocytic Leukemia/Small Lymphocytic Lymphoma 4/2018 when presented w immune hemolytic anemia w partial response to steroids, started on Ibrutinib w heme CR and KS by CT scan w some decrease of lymphadenopathies, until 1/2020 when relapsed w incr WBC, anemia(initially not hemolytic, has chronic Matthew positive due to CLL/SLL), thrombocytopenia.  Repeat Flow Cytometry still SLL/CLL, but FISH now w 11q-, 17p-, del of chromosome 12 and 13, all poor prognostic factors.]  Repeat PETCT only mildly hypermetabolic LADs w highest SUV 3, largest conglomerate f LNs ~5x2cm prevascular, mild splenomegaly 14cm  Pt was scheduled for Venetoclax/Rituxan second line treatment w admission for ramp up Venetoclax and tumor lysis prophylaxis when found w Influenza A during the 2/2020 adm, Rx w Temaflu, subsequent also sig more anemic/thrombocytopenic.  Was discharged home and admitted again 2/22/20 with  fever and found w pneumonia, continues to require transfusions plts and PRBCs.  Completed course of  Ceftriaxone  Hep B titer negative  Post IVIG and started on Venetoxlax 2/29/20 with dramatic drop in WBC    -prednisone started tapered 2/29/20, continue 20mg today and will slowly taper off  -leukocytosis due to CLL/SLL w circulating lymphs exacerbated by steroid effect dramatically resolved after initiation of Venetoclax 20mg 2/29/20 and stopped on 3/5/20 after dramatic drop in WBC; holding for now  -present clinical condition due to progression of CLL/SLL, worsened by acute illness of Influenza A/pneumonia/+-drug effects  - monitor for tumor lysis syndrome, check CMP including LDH/calcium/uric acid/phos/renal function/electrolytes  - noted drop in platelet count to 8; transfuse 1 unit today  - monitor for fevers/infection  - continue antivirals  - case discussed with Dr. Cruz (hospitalist)

## 2020-03-09 LAB
ALBUMIN SERPL ELPH-MCNC: 2.8 G/DL — LOW (ref 3.3–5)
ALP SERPL-CCNC: 143 U/L — HIGH (ref 40–120)
ALT FLD-CCNC: 19 U/L — SIGNIFICANT CHANGE UP (ref 12–78)
ANION GAP SERPL CALC-SCNC: 9 MMOL/L — SIGNIFICANT CHANGE UP (ref 5–17)
AST SERPL-CCNC: 18 U/L — SIGNIFICANT CHANGE UP (ref 15–37)
BASOPHILS # BLD AUTO: 0 K/UL — SIGNIFICANT CHANGE UP (ref 0–0.2)
BASOPHILS NFR BLD AUTO: 0 % — SIGNIFICANT CHANGE UP (ref 0–2)
BILIRUB SERPL-MCNC: 2.5 MG/DL — HIGH (ref 0.2–1.2)
BUN SERPL-MCNC: 15 MG/DL — SIGNIFICANT CHANGE UP (ref 7–23)
CALCIUM SERPL-MCNC: 8.3 MG/DL — LOW (ref 8.5–10.1)
CHLORIDE SERPL-SCNC: 109 MMOL/L — HIGH (ref 96–108)
CO2 SERPL-SCNC: 20 MMOL/L — LOW (ref 22–31)
CREAT SERPL-MCNC: 0.99 MG/DL — SIGNIFICANT CHANGE UP (ref 0.5–1.3)
EOSINOPHIL # BLD AUTO: 0 K/UL — SIGNIFICANT CHANGE UP (ref 0–0.5)
EOSINOPHIL NFR BLD AUTO: 0 % — SIGNIFICANT CHANGE UP (ref 0–6)
GLUCOSE SERPL-MCNC: 149 MG/DL — HIGH (ref 70–99)
HCT VFR BLD CALC: 22.4 % — LOW (ref 39–50)
HGB BLD-MCNC: 7.6 G/DL — LOW (ref 13–17)
LDH SERPL L TO P-CCNC: 177 U/L — SIGNIFICANT CHANGE UP (ref 50–242)
LYMPHOCYTES # BLD AUTO: 2.48 K/UL — SIGNIFICANT CHANGE UP (ref 1–3.3)
LYMPHOCYTES # BLD AUTO: 65 % — HIGH (ref 13–44)
LYMPHOCYTES # SPEC AUTO: 13 % — HIGH (ref 0–0)
MANUAL SMEAR VERIFICATION: SIGNIFICANT CHANGE UP
MCHC RBC-ENTMCNC: 31.7 PG — SIGNIFICANT CHANGE UP (ref 27–34)
MCHC RBC-ENTMCNC: 33.9 GM/DL — SIGNIFICANT CHANGE UP (ref 32–36)
MCV RBC AUTO: 93.3 FL — SIGNIFICANT CHANGE UP (ref 80–100)
MONOCYTES # BLD AUTO: 0.15 K/UL — SIGNIFICANT CHANGE UP (ref 0–0.9)
MONOCYTES NFR BLD AUTO: 4 % — SIGNIFICANT CHANGE UP (ref 2–14)
NEUTROPHILS # BLD AUTO: 0.53 K/UL — LOW (ref 1.8–7.4)
NEUTROPHILS NFR BLD AUTO: 14 % — LOW (ref 43–77)
NRBC # BLD: 0 — SIGNIFICANT CHANGE UP
NRBC # BLD: SIGNIFICANT CHANGE UP /100 WBCS (ref 0–0)
PLAT MORPH BLD: NORMAL — SIGNIFICANT CHANGE UP
PLATELET # BLD AUTO: 19 K/UL — CRITICAL LOW (ref 150–400)
POTASSIUM SERPL-MCNC: 3.5 MMOL/L — SIGNIFICANT CHANGE UP (ref 3.5–5.3)
POTASSIUM SERPL-SCNC: 3.5 MMOL/L — SIGNIFICANT CHANGE UP (ref 3.5–5.3)
PROT SERPL-MCNC: 6.2 G/DL — SIGNIFICANT CHANGE UP (ref 6–8.3)
RBC # BLD: 2.4 M/UL — LOW (ref 4.2–5.8)
RBC # FLD: 17.2 % — HIGH (ref 10.3–14.5)
RBC BLD AUTO: SIGNIFICANT CHANGE UP
SODIUM SERPL-SCNC: 138 MMOL/L — SIGNIFICANT CHANGE UP (ref 135–145)
URATE SERPL-MCNC: 1.1 MG/DL — LOW (ref 3.4–8.8)
VARIANT LYMPHS # BLD: 4 % — SIGNIFICANT CHANGE UP (ref 0–6)
WBC # BLD: 3.81 K/UL — SIGNIFICANT CHANGE UP (ref 3.8–10.5)
WBC # FLD AUTO: 3.81 K/UL — SIGNIFICANT CHANGE UP (ref 3.8–10.5)

## 2020-03-09 PROCEDURE — 99232 SBSQ HOSP IP/OBS MODERATE 35: CPT | Mod: GC

## 2020-03-09 RX ADMIN — Medication 20 MILLIGRAM(S): at 05:07

## 2020-03-09 RX ADMIN — SODIUM CHLORIDE 100 MILLILITER(S): 9 INJECTION, SOLUTION INTRAVENOUS at 05:07

## 2020-03-09 RX ADMIN — PANTOPRAZOLE SODIUM 40 MILLIGRAM(S): 20 TABLET, DELAYED RELEASE ORAL at 06:09

## 2020-03-09 RX ADMIN — ENTECAVIR 0.5 MILLIGRAM(S): 0.5 TABLET ORAL at 05:07

## 2020-03-09 NOTE — PROGRESS NOTE ADULT - PROBLEM SELECTOR PLAN 2
- Per chart review, platelet count steadily declining since 2018  - poor response to platelets transfusion, suspect ITP component  - s/p one dose of IVIG  - s/p 10 units of platelet transfusion since admission  - Hem/Onc following, will follow the recommendation - platelets 8k today- will transfuse 1 unit platelets - Per chart review, platelet count steadily declining since 2018  - poor response to platelets transfusion, suspect ITP component  - s/p one dose of IVIG  - s/p 11 units of platelet transfusion since admission  - Hem/Onc following, will follow the recommendation - platelets 19k today

## 2020-03-09 NOTE — PROGRESS NOTE ADULT - ASSESSMENT
76yo M with PMH of CLL/SLL, AIHA, chronic hepatitis B (on entecavir), KIRTI on CPAP, T2DM (managed with lifestyle changes), HTN (not on any medication) who presents with chief complaint of fever, cough and generalized weakness, admitted with suspected gram-negative multifocal pneumonia, completed a course of IV antibiotics.  Severe anemia s/p transfusion of 7 units of PRBC and severe thrombocytopenia s/p platelet transfusion.  Suspect possible ITP with poor response to platelet transfusion. One dose of IVIG was given, s/p 9 units of platelets transfusion. Pt started chemo therapy with venetoclax @ 3/1/2020,  DC'ed @3/5 due to low WBC suspecting bone marrow oversuppression 74yo M with PMH of CLL/SLL, AIHA, chronic hepatitis B (on entecavir), KIRTI on CPAP, T2DM (managed with lifestyle changes), HTN (not on any medication) who presents with chief complaint of fever, cough and generalized weakness, admitted with suspected gram-negative multifocal pneumonia, completed a course of IV antibiotics.  Severe anemia s/p transfusion of 8 units of PRBC and severe thrombocytopenia s/p platelet transfusion.  Suspect possible ITP with poor response to platelet transfusion. One dose of IVIG was given, s/p 11 units of platelets transfusion. Pt started chemo therapy with venetoclax @ 3/1/2020,  DC'ed @3/5 due to low WBC suspecting bone marrow oversuppression

## 2020-03-09 NOTE — PROGRESS NOTE ADULT - PROBLEM SELECTOR PLAN 1
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 Suspect bone marrow oversuppression.   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 Suspect bone marrow oversuppression.   - DC venetoclax on 3/5 per Hem/Onc  - Steroids being tapered - 20mg now  - Hem/Onc following

## 2020-03-09 NOTE — PROGRESS NOTE ADULT - SUBJECTIVE AND OBJECTIVE BOX
INTERVAL HPI/OVERNIGHT EVENTS:  pt seen and examined,   no overnight events  offers no gi complaints  HD stable/ H/H stable     MEDICATIONS  (STANDING):  entecavir 0.5 milliGRAM(s) Oral daily  lactated ringers. 1000 milliLiter(s) (100 mL/Hr) IV Continuous <Continuous>  pantoprazole    Tablet 40 milliGRAM(s) Oral before breakfast  predniSONE   Tablet 20 milliGRAM(s) Oral daily    MEDICATIONS  (PRN):  acetaminophen   Tablet .. 650 milliGRAM(s) Oral every 6 hours PRN Mild Pain (1 - 3)  acetaminophen   Tablet .. 650 milliGRAM(s) Oral every 6 hours PRN Temp greater or equal to 38C (100.4F)  albuterol/ipratropium for Nebulization 3 milliLiter(s) Nebulizer every 6 hours PRN Shortness of Breath and/or Wheezing  benzonatate 100 milliGRAM(s) Oral three times a day PRN Cough  guaiFENesin   Syrup  (Sugar-Free) 200 milliGRAM(s) Oral every 6 hours PRN Cough      Allergies    sulfa drugs (Unknown)    Intolerances          Review of Systems:    General:  No wt loss, fevers, chills, night sweats, fatigue   Eyes:  Good vision, no reported pain  ENT:  No sore throat, pain, runny nose, dysphagia  CV:  No pain, palpitations, hypo/hypertension  Resp:  No dyspnea, cough, tachypnea, wheezing  GI:  No pain, No nausea, No vomiting, No diarrhea, No constipation, No weight loss, No fever, No pruritis, No rectal bleeding, No melena, No dysphagia  :  No pain, bleeding, incontinence, nocturia  Muscle:  No pain, weakness  Neuro:  No weakness, tingling, memory problems  Psych:  No fatigue, insomnia, mood problems, depression  Endocrine:  No polyuria, polydypsia, cold/heat intolerance  Heme:  No petechiae, ecchymosis, easy bruisability  Skin:  No rash, tattoos, scars, edema        Vital Signs Last 24 Hrs  T(C): 36.9 (08 Mar 2020 05:05), Max: 36.9 (08 Mar 2020 05:05)  T(F): 98.5 (08 Mar 2020 05:05), Max: 98.5 (08 Mar 2020 05:05)  HR: 101 (08 Mar 2020 05:05) (87 - 101)  BP: 130/67 (08 Mar 2020 05:05) (114/67 - 130/67)  BP(mean): --  RR: 18 (08 Mar 2020 05:05) (16 - 18)  SpO2: 97% (08 Mar 2020 05:05) (97% - 99%)            PHYSICAL EXAM:  Constitutional: oob in recliner  HEENT: ncat  Gastrointestinal: soft nt mild dt  Extremities: edema  Neurological: Awake alert responds appropriately      LABS:                        7.6    6.15  )-----------( 8        ( 08 Mar 2020 07:06 )             22.6     03-08    138  |  109<H>  |  18  ----------------------------<  125<H>  3.4<L>   |  21<L>  |  0.97    Ca    8.1<L>      08 Mar 2020 07:06    TPro  6.2  /  Alb  2.9<L>  /  TBili  2.6<H>  /  DBili  x   /  AST  15  /  ALT  20  /  AlkPhos  139<H>  03-08        RADIOLOGY & ADDITIONAL TESTS:

## 2020-03-09 NOTE — PROGRESS NOTE ADULT - SUBJECTIVE AND OBJECTIVE BOX
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 Has been coughing as he was recently treated for influenza A on last admission. States that when he was discharged from [facility] on 2/15, he was feeling well and had no fevers. Patient developed a fever, T100.9F (oral) today for which he called heme/onc, Dr. Stephenson. Per Dr. Stuart, patient advised to go to ER. He was found to have a fever, T100.6F (rectal) in ER. Denies recent travel or sick contacts. Denies headache, chest pain, sob, palpitations, abdominal pain, diarrhea, melena, hematochezia, dysuria or hematuria.     Of note, patient was recently admitted to [facility] from 2/14-2/15/2020 for chemotherapy with Venetoclax, found to be febrile with similar associated complaints of generalized malaise, cough, and weakness. Patient was found to be Flu A positive during that admission and was treated with tamiflu. Patient was found to be thrombocytopenic to 35k with a Hb of 6.6, transfused 2U PRBCs and 1U platelets. Chemotherapy was held off due to acute illness.      Interval events: Patient seen and examined at bedside.     REVIEW OF SYSTEMS:    CONSTITUTIONAL: No weakness, fevers or chills  EYES/ENT: No visual changes, no throat pain   RESPIRATORY: No cough, wheezing, hemoptysis; No shortness of breath  CARDIOVASCULAR: No chest pain or palpitations  GASTROINTESTINAL: No abdominal, nausea, vomiting, or hematemesis; No diarrhea or constipation. No melena or hematochezia.  GENITOURINARY: No dysuria, frequency or hematuria  NEUROLOGICAL: No dizziness, numbness, or weakness  SKIN: No itching, burning, rashes, or lesions   All other review of systems is negative unless indicated above.    VITAL SIGNS:  Vital Signs Last 24 Hrs  T(C): 36.8 (09 Mar 2020 04:50), Max: 36.8 (09 Mar 2020 04:50)  T(F): 98.2 (09 Mar 2020 04:50), Max: 98.2 (09 Mar 2020 04:50)  HR: 103 (09 Mar 2020 04:50) (81 - 103)  BP: 138/64 (09 Mar 2020 04:50) (120/66 - 138/64)  RR: 16 (09 Mar 2020 04:50) (14 - 16)  SpO2: 97% (09 Mar 2020 04:50) (97% - 97%)      PHYSICAL EXAM:   GENERAL: no acute distress  HEENT: NC/AT, EOMI, neck supple, MMM  RESPIRATORY: LCTAB/L, no rhonchi, rales, or wheezing  CARDIOVASCULAR: RRR, no murmurs, gallops, rubs  ABDOMINAL: soft, non-tender, non-distended, positive bowel sounds   EXTREMITIES: no clubbing, cyanosis, or edema  NEUROLOGICAL: alert and oriented x 3, non-focal  SKIN: no rashes or lesions   MUSCULOSKELETAL: no gross joint deformity    Labs:                    7.6    6.15  )-----------( 8        ( 08 Mar 2020 07:06 )             22.6     03-08    138  |  109<H>  |  18  ----------------------------<  125<H>  3.4<L>   |  21<L>  |  0.97    Ca    8.1<L>      08 Mar 2020 07:06    TPro  6.2  /  Alb  2.9<L>  /  TBili  2.6<H>  /  DBili  x   /  AST  15  /  ALT  20  /  AlkPhos  139<H>  03-08      CAPILLARY BLOOD GLUCOSE      MEDICATIONS  (STANDING):  entecavir 0.5 milliGRAM(s) Oral daily  lactated ringers. 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 Has been coughing as he was recently treated for influenza A on last admission. States that when he was discharged from [facility] on 2/15, he was feeling well and had no fevers. Patient developed a fever, T100.9F (oral) today for which he called heme/onc, Dr. Stephenson. Per Dr. Stuart, patient advised to go to ER. He was found to have a fever, T100.6F (rectal) in ER. Denies recent travel or sick contacts. Denies headache, chest pain, sob, palpitations, abdominal pain, diarrhea, melena, hematochezia, dysuria or hematuria.     Of note, patient was recently admitted to [facility] from 2/14-2/15/2020 for chemotherapy with Venetoclax, found to be febrile with similar associated complaints of generalized malaise, cough, and weakness. Patient was found to be Flu A positive during that admission and was treated with tamiflu. Patient was found to be thrombocytopenic to 35k with a Hb of 6.6, transfused 2U PRBCs and 1U platelets. Chemotherapy was held off due to acute illness.      Interval events: Patient seen and examined at bedside. Complains of persistent cough.     REVIEW OF SYSTEMS:  CONSTITUTIONAL: No weakness, fevers or chills  EYES/ENT: No visual changes, no throat pain   RESPIRATORY: No cough, wheezing, hemoptysis; No shortness of breath  CARDIOVASCULAR: No chest pain or palpitations  GASTROINTESTINAL: No abdominal, nausea, vomiting, or hematemesis; No diarrhea or constipation. No melena or hematochezia.  GENITOURINARY: No dysuria, frequency or hematuria  NEUROLOGICAL: No dizziness, numbness, or weakness  SKIN: No itching, burning, rashes, or lesions   All other review of systems is negative unless indicated above.    VITAL SIGNS:  Vital Signs Last 24 Hrs  T(C): 36.8 (09 Mar 2020 04:50), Max: 36.8 (09 Mar 2020 04:50)  T(F): 98.2 (09 Mar 2020 04:50), Max: 98.2 (09 Mar 2020 04:50)  HR: 103 (09 Mar 2020 04:50) (81 - 103)  BP: 138/64 (09 Mar 2020 04:50) (120/66 - 138/64)  RR: 16 (09 Mar 2020 04:50) (14 - 16)  SpO2: 97% (09 Mar 2020 04:50) (97% - 97%)    PHYSICAL EXAM:   GENERAL: no acute distress  HEENT: NC/AT, EOMI, neck supple, MMM  RESPIRATORY: LCTAB/L, no rhonchi, rales, or wheezing  CARDIOVASCULAR: RRR, no murmurs, gallops, rubs  ABDOMINAL: soft, non-tender, non-distended, positive bowel sounds   EXTREMITIES: no clubbing, cyanosis, or edema  NEUROLOGICAL: alert and oriented x 3, non-focal  SKIN: no rashes or lesions   MUSCULOSKELETAL: no gross joint deformity    Labs:                    7.6    6.15  )-----------( 8        ( 08 Mar 2020 07:06 )             22.6     03-08    138  |  109<H>  |  18  ----------------------------<  125<H>  3.4<L>   |  21<L>  |  0.97    Ca    8.1<L>      08 Mar 2020 07:06    TPro  6.2  /  Alb  2.9<L>  /  TBili  2.6<H>  /  DBili  x   /  AST  15  /  ALT  20  /  AlkPhos  139<H>  03-08      CAPILLARY BLOOD GLUCOSE      MEDICATIONS  (STANDING):  entecavir 0.5 milliGRAM(s) Oral daily  lactated ringers. 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 Has been coughing as he was recently treated for influenza A on last admission. States that when he was discharged from [facility] on 2/15, he was feeling well and had no fevers. Patient developed a fever, T100.9F (oral) today for which he called heme/onc, Dr. Stephenson. Per Dr. Stuart, patient advised to go to ER. He was found to have a fever, T100.6F (rectal) in ER. Denies recent travel or sick contacts. Denies headache, chest pain, sob, palpitations, abdominal pain, diarrhea, melena, hematochezia, dysuria or hematuria.     Of note, patient was recently admitted to [facility] from 2/14-2/15/2020 for chemotherapy with Venetoclax, found to be febrile with similar associated complaints of generalized malaise, cough, and weakness. Patient was found to be Flu A positive during that admission and was treated with tamiflu. Patient was found to be thrombocytopenic to 35k with a Hb of 6.6, transfused 2U PRBCs and 1U platelets. Chemotherapy was held off due to acute illness.      Interval events: Patient seen and examined at bedside. Complains of persistent cough.     REVIEW OF SYSTEMS:  CONSTITUTIONAL: No weakness, fevers or chills  EYES/ENT: No visual changes, no throat pain   RESPIRATORY: No cough, wheezing, hemoptysis; No shortness of breath  CARDIOVASCULAR: No chest pain or palpitations  GASTROINTESTINAL: No abdominal, nausea, vomiting, or hematemesis; No diarrhea or constipation. No melena or hematochezia.  GENITOURINARY: No dysuria, frequency or hematuria  NEUROLOGICAL: No dizziness, numbness, or weakness  SKIN: No itching, burning, rashes, or lesions   All other review of systems is negative unless indicated above.    VITAL SIGNS:  Vital Signs Last 24 Hrs  T(C): 36.8 (09 Mar 2020 04:50), Max: 36.8 (09 Mar 2020 04:50)  T(F): 98.2 (09 Mar 2020 04:50), Max: 98.2 (09 Mar 2020 04:50)  HR: 103 (09 Mar 2020 04:50) (81 - 103)  BP: 138/64 (09 Mar 2020 04:50) (120/66 - 138/64)  RR: 16 (09 Mar 2020 04:50) (14 - 16)  SpO2: 97% (09 Mar 2020 04:50) (97% - 97%)    PHYSICAL EXAM:   GENERAL: no acute distress  HEENT: NC/AT, EOMI, MMM  RESPIRATORY: LCTAB/L, no rhonchi, rales, or wheezing  CARDIOVASCULAR: RRR, no murmurs, gallops, rubs  ABDOMINAL: soft, non-tender, non-distended, positive bowel sounds   EXTREMITIES: 2+ pitting edema of b/l lower ext, compression stockings in place  NEUROLOGICAL: alert and oriented x 3, non-focal  SKIN: no rashes or lesions   MUSCULOSKELETAL: no gross joint deformity    Labs:                                7.6    3.81  )-----------( 19       ( 09 Mar 2020 08:31 )             22.4     09 Mar 2020 08:31    138    |  109    |  15     ----------------------------<  149    3.5     |  20     |  0.99     Ca    8.3        09 Mar 2020 08:31    TPro  6.2    /  Alb  2.8    /  TBili  2.5    /  DBili  x      /  AST  18     /  ALT  19     /  AlkPhos  143    09 Mar 2020 08:31    LIVER FUNCTIONS - ( 09 Mar 2020 08:31 )  Alb: 2.8 g/dL / Pro: 6.2 g/dL / ALK PHOS: 143 U/L / ALT: 19 U/L / AST: 18 U/L / GGT: x             CAPILLARY BLOOD GLUCOSE              CAPILLARY BLOOD GLUCOSE      MEDICATIONS  (STANDING):  entecavir 0.5 milliGRAM(s) Oral daily  lactated ringers. 1000 milliLiter(s) (100 mL/Hr) IV Continuous <Continuous>  pantoprazole    Tablet 40 milliGRAM(s) Oral before breakfast  predniSONE   Tablet 20 milliGRAM(s) Oral daily

## 2020-03-09 NOTE — PROGRESS NOTE ADULT - PROBLEM SELECTOR PLAN 7
- Continue Entecavir 0.5mg daily  - ID- Dr. Webber (recs appreciated) - Continue Entecavir 0.5mg daily

## 2020-03-09 NOTE — PROGRESS NOTE ADULT - ASSESSMENT
76 y/o man w Hepatitis B on Entecavirt, Chronic Lymphocytic Leukemia/Small Lymphocytic Lymphoma 4/2018 when presented w immune hemolytic anemia w partial response to steroids, started on Ibrutinib w heme CR and OH by CT scan w some decrease of lymphadenopathies, until 1/2020 when relapsed w incr WBC, anemia(initially not hemolytic, has chronic Matthew positive due to CLL/SLL), thrombocytopenia.  Repeat Flow Cytometry still SLL/CLL, but FISH now w 11q-, 17p-, del of chromosome 12 and 13, all poor prognostic factors.]  Repeat PETCT only mildly hypermetabolic LADs w highest SUV 3, largest conglomerate f LNs ~5x2cm prevascular, mild splenomegaly 14cm  Pt was scheduled for Venetoclax/Rituxan second line treatment w admission for ramp up Venetoclax and tumor lysis prophylaxis when found w Influenza A during the 2/2020 adm, Rx w Temaflu, subsequent also sig more anemic/thrombocytopenic.  Was discharged home and admitted again 2/22/20 with  fever and found w pneumonia, continues to require transfusions plts and PRBCs.  Completed course of  Ceftriaxone  Hep B titer negative  Post IVIG and started on Venetoxlax 2/29/20 with dramatic drop in WBC (held as of 3/5/20)    -prednisone started tapered 2/29/20, continue 20mg today and will slowly taper off  -leukocytosis due to CLL/SLL w circulating lymphs exacerbated by steroid effect dramatically resolved after initiation of Venetoclax 20mg 2/29/20 and stopped on 3/5/20 after dramatic drop in WBC; holding for now  - present clinical condition due to progression of CLL/SLL, worsened by acute illness of Influenza A/pneumonia/+-drug effects  - tumor lysis labs normal  - blood indices stable; no indication for transfusion  - monitor for fevers/infection  - continue antivirals  - case discussed with Dr. Cruz (hospitalist)

## 2020-03-09 NOTE — PROGRESS NOTE ADULT - PROBLEM SELECTOR PLAN 3
- Worsening anemia and thrombocytopenia since discharge ~1 week ago likely in part due to lymphoma/leukemia in part due to hemolytic anemia  - s/p 7 units of PRBC  6.8-1u-7.4-7.6-6.7-1u-8.3-7.7-6.9-1u-7.1-7.4-1u-8.9-8.0-7.6-1u-8.2-7.6-1u-7.3-7.0-1u-8.2-7.1- 8.3-7.6  - No acute s/s of bleeding on admission, continue to monitor, high bilirubin and though a greater direct bili component, suspect this is in large part due to AIHA and the indirect bili is getting conjugated  - suspect due to AIHA. Was on prednisone 10mg daily -- increase to prednisone 40mg po during hospitalization, tapering started @2/29 - now 20 daily  - Heme/onc following - discontinue chemo, observe  - follow H&H - Worsening anemia and thrombocytopenia since discharge ~1 week ago likely in part due to lymphoma/leukemia in part due to hemolytic anemia  - s/p 7 units of PRBC  6.8-1u-7.4-7.6-6.7-1u-8.3-7.7-6.9-1u-7.1-7.4-1u-8.9-8.0-7.6-1u-8.2-7.6-1u-7.3-7.0-1u-8.2-7.1- 8.3-7.6-7.6  - No acute s/s of bleeding on admission, continue to monitor, high bilirubin and though a greater direct bili component, suspect this is in large part due to AIHA and the indirect bili is getting conjugated  - suspect due to AIHA. Was on prednisone 10mg daily -- increase to prednisone 40mg po during hospitalization, tapering started @2/29 - now 20 daily  - Heme/onc following - discontinue chemo, observe  - follow H&H

## 2020-03-09 NOTE — PROGRESS NOTE ADULT - PROBLEM SELECTOR PLAN 4
- CT Chest showing multilobar patchy peribronchovascular airspace opacities and more dense consolidative process in the right middle lobe likely reflecting multifocal pneumonia, trace b/l pleural effusions  - nares swab negative for MRSA/MSSA  - urine legionella Ag and strep pneumo Ag negative   - sputum Cx: normal resp marnie   - RVP negative  - clinically improved, completed course of abx as per ID recs - No additional abx needed.   - fever resolving, tylenol PRN for fever  - duonebs PRN for wheezing  - WBC very labile and difficult to gauge utility  - ID Dr. Webber consulted, recs appreciated  - Tessalon Perle and Robitussin for cough - CT Chest showing multilobar patchy peribronchovascular airspace opacities and more dense consolidative process in the right middle lobe likely reflecting multifocal pneumonia, trace b/l pleural effusions  - nares swab negative for MRSA/MSSA  - urine legionella Ag and strep pneumo Ag negative   - sputum Cx: normal resp marnie   - RVP negative  - clinically improved, completed course of abx as per ID recs - No additional abx needed.   - fever resolving, tylenol PRN for fever  - duonebs PRN for wheezing  - WBC very labile and difficult to gauge utility  - Robitussin for cough prn. Added hycodan qhs prn

## 2020-03-09 NOTE — PROGRESS NOTE ADULT - PROBLEM SELECTOR PLAN 5
low potassium, phosphate level   - replete potassium, phosphate   - monitor daily lab Stable  - monitor daily lab

## 2020-03-09 NOTE — PROGRESS NOTE ADULT - SUBJECTIVE AND OBJECTIVE BOX
Patient seen and examined;  Chart reviewed and events noted;   continues to cough  venetoclax on hold    MEDICATIONS  (STANDING):  entecavir 0.5 milliGRAM(s) Oral daily  pantoprazole    Tablet 40 milliGRAM(s) Oral before breakfast  predniSONE   Tablet 20 milliGRAM(s) Oral daily    MEDICATIONS  (PRN):  acetaminophen   Tablet .. 650 milliGRAM(s) Oral every 6 hours PRN Mild Pain (1 - 3)  acetaminophen   Tablet .. 650 milliGRAM(s) Oral every 6 hours PRN Temp greater or equal to 38C (100.4F)  albuterol/ipratropium for Nebulization 3 milliLiter(s) Nebulizer every 6 hours PRN Shortness of Breath and/or Wheezing  guaiFENesin   Syrup  (Sugar-Free) 200 milliGRAM(s) Oral every 6 hours PRN Cough  hydrocodone/homatropine Syrup 5 milliLiter(s) Oral at bedtime PRN Cough      Vital Signs Last 24 Hrs  T(C): 36.8 (09 Mar 2020 04:50), Max: 36.8 (09 Mar 2020 04:50)  T(F): 98.2 (09 Mar 2020 04:50), Max: 98.2 (09 Mar 2020 04:50)  HR: 103 (09 Mar 2020 04:50) (81 - 103)  BP: 138/64 (09 Mar 2020 04:50) (120/66 - 138/64)  RR: 16 (09 Mar 2020 04:50) (14 - 16)  SpO2: 97% (09 Mar 2020 04:50) (97% - 97%)    PHYSICAL EXAM  General: adult in NAD  HEENT: clear oropharynx, anicteric sclera, pink conjunctivae  Neck: supple  CV: normal S1S2 with no murmur rubs or gallops  Lungs: few crackles at bases; overall improved  Abdomen: soft non-tender non-distended, no hepato/splenomegaly  Skin: patchy petichiae on bilateral flanks; ecchymoses on bilateral upper arms  Neuro: alert and oriented X3 no focal deficits      LABS:                        7.6    3.81  )-----------( 19       ( 09 Mar 2020 08:31 )             22.4     Hemoglobin: 7.6 g/dL (03-09 @ 08:31)  Hemoglobin: 7.6 g/dL (03-08 @ 07:06)  Hemoglobin: 8.3 g/dL (03-07 @ 06:45)  Hemoglobin: 7.1 g/dL (03-06 @ 08:25)  Hemoglobin: 8.2 g/dL (03-05 @ 21:57)    WBC Count: 3.81 K/uL (03-09 @ 08:31)  WBC Count: 6.15 K/uL (03-08 @ 07:06)  WBC Count: 4.25 K/uL (03-07 @ 06:45)  WBC Count: 2.52 K/uL (03-06 @ 08:25)  WBC Count: 8.31 K/uL (03-05 @ 21:57)    Platelet Count - Automated: 19 K/uL (03-09 @ 08:31)  Platelet Count - Automated: 8 K/uL (03-08 @ 07:06)  Platelet Count - Automated: 19 K/uL (03-07 @ 06:45)  Platelet Count - Automated: 11 K/uL (03-06 @ 08:25)  Platelet Count - Automated: 27 K/uL (03-05 @ 21:57)      Uric Acid, Serum: 1.1 mg/dL (03.09.20 @ 08:31)    Lactate Dehydrogenase, Serum: 166 U/L (03.06.20 @ 11:15)      03-09    138  |  109<H>  |  15  ----------------------------<  149<H>  3.5   |  20<L>  |  0.99    Ca    8.3<L>      09 Mar 2020 08:31    TPro  6.2  /  Alb  2.8<L>  /  TBili  2.5<H>  /  DBili  x   /  AST  18  /  ALT  19  /  AlkPhos  143<H>  03-09

## 2020-03-10 LAB
ANION GAP SERPL CALC-SCNC: 12 MMOL/L — SIGNIFICANT CHANGE UP (ref 5–17)
BUN SERPL-MCNC: 15 MG/DL — SIGNIFICANT CHANGE UP (ref 7–23)
CALCIUM SERPL-MCNC: 8.1 MG/DL — LOW (ref 8.5–10.1)
CHLORIDE SERPL-SCNC: 107 MMOL/L — SIGNIFICANT CHANGE UP (ref 96–108)
CO2 SERPL-SCNC: 18 MMOL/L — LOW (ref 22–31)
CREAT SERPL-MCNC: 1.1 MG/DL — SIGNIFICANT CHANGE UP (ref 0.5–1.3)
GLUCOSE SERPL-MCNC: 171 MG/DL — HIGH (ref 70–99)
HCT VFR BLD CALC: 23 % — LOW (ref 39–50)
HGB BLD-MCNC: 7.7 G/DL — LOW (ref 13–17)
MAGNESIUM SERPL-MCNC: 1.8 MG/DL — SIGNIFICANT CHANGE UP (ref 1.6–2.6)
MCHC RBC-ENTMCNC: 31.4 PG — SIGNIFICANT CHANGE UP (ref 27–34)
MCHC RBC-ENTMCNC: 33.5 GM/DL — SIGNIFICANT CHANGE UP (ref 32–36)
MCV RBC AUTO: 93.9 FL — SIGNIFICANT CHANGE UP (ref 80–100)
NRBC # BLD: 0 /100 WBCS — SIGNIFICANT CHANGE UP (ref 0–0)
PHOSPHATE SERPL-MCNC: 0.5 MG/DL — CRITICAL LOW (ref 2.5–4.5)
PLATELET # BLD AUTO: 10 K/UL — CRITICAL LOW (ref 150–400)
POTASSIUM SERPL-MCNC: 3.1 MMOL/L — LOW (ref 3.5–5.3)
POTASSIUM SERPL-SCNC: 3.1 MMOL/L — LOW (ref 3.5–5.3)
RBC # BLD: 2.45 M/UL — LOW (ref 4.2–5.8)
RBC # FLD: 17 % — HIGH (ref 10.3–14.5)
SODIUM SERPL-SCNC: 137 MMOL/L — SIGNIFICANT CHANGE UP (ref 135–145)
WBC # BLD: 5.76 K/UL — SIGNIFICANT CHANGE UP (ref 3.8–10.5)
WBC # FLD AUTO: 5.76 K/UL — SIGNIFICANT CHANGE UP (ref 3.8–10.5)

## 2020-03-10 PROCEDURE — 99232 SBSQ HOSP IP/OBS MODERATE 35: CPT | Mod: GC

## 2020-03-10 RX ORDER — POTASSIUM CHLORIDE 20 MEQ
40 PACKET (EA) ORAL ONCE
Refills: 0 | Status: COMPLETED | OUTPATIENT
Start: 2020-03-10 | End: 2020-03-10

## 2020-03-10 RX ORDER — POTASSIUM CHLORIDE 20 MEQ
40 PACKET (EA) ORAL EVERY 4 HOURS
Refills: 0 | Status: DISCONTINUED | OUTPATIENT
Start: 2020-03-10 | End: 2020-03-10

## 2020-03-10 RX ORDER — POTASSIUM PHOSPHATE, MONOBASIC POTASSIUM PHOSPHATE, DIBASIC 236; 224 MG/ML; MG/ML
30 INJECTION, SOLUTION INTRAVENOUS ONCE
Refills: 0 | Status: COMPLETED | OUTPATIENT
Start: 2020-03-10 | End: 2020-03-10

## 2020-03-10 RX ADMIN — Medication 40 MILLIEQUIVALENT(S): at 09:37

## 2020-03-10 RX ADMIN — PANTOPRAZOLE SODIUM 40 MILLIGRAM(S): 20 TABLET, DELAYED RELEASE ORAL at 05:12

## 2020-03-10 RX ADMIN — Medication 40 MILLIEQUIVALENT(S): at 12:13

## 2020-03-10 RX ADMIN — Medication 20 MILLIGRAM(S): at 05:12

## 2020-03-10 RX ADMIN — ENTECAVIR 0.5 MILLIGRAM(S): 0.5 TABLET ORAL at 05:12

## 2020-03-10 RX ADMIN — POTASSIUM PHOSPHATE, MONOBASIC POTASSIUM PHOSPHATE, DIBASIC 83.33 MILLIMOLE(S): 236; 224 INJECTION, SOLUTION INTRAVENOUS at 10:27

## 2020-03-10 NOTE — PROGRESS NOTE ADULT - ASSESSMENT
74yo M with PMH of CLL/SLL, AIHA, chronic hepatitis B (on entecavir), KIRTI on CPAP, T2DM (managed with lifestyle changes), HTN (not on any medication) who presents with chief complaint of fever, cough and generalized weakness, admitted with suspected gram-negative multifocal pneumonia, completed a course of IV antibiotics.  Severe anemia s/p transfusion of 8 units of PRBC and severe thrombocytopenia s/p platelet transfusion.  Suspect possible ITP with poor response to platelet transfusion. One dose of IVIG was given, s/p 11 units of platelets transfusion. Pt started chemo therapy with venetoclax @ 3/1/2020,  DC'ed @3/5 due to low WBC suspecting bone marrow oversuppression

## 2020-03-10 NOTE — PROGRESS NOTE ADULT - ASSESSMENT
-74 y/o man w Hepatitis B on Entecavirt, Chronic Lymphocytic Leukemia/Small Lymphocytic Lymphoma 4/2018 when presented w immune hemolytic anemia w partial response to steroids, started on Ibrutinib w heme CR and [place] by CT scan w some decrease of lymphadenopathies, until 1/2020 when relapsed w incr WBC, anemia(initially not hemolytic, has chronic Matthew positive due to CLL/SLL), thrombocytopenia.  Repeat Flow Cytometry still SLL/CLL, but FISH now w 11q-, 17p-, del of chromosome 12 and 13, all poor prognostic factors.]  Repeat PETCT only mildly hypermetabolic LADs w highest SUV 3, largest conglomerate f LNs ~5x2cm prevascular, mild splenomegaly 14cm  Pt was scheduled for Venetoclax/Rituxan second line treatment w admission for ramp up Venetoclax and tumor lysis prophylaxis when found w Influenza A during the 2/2020 adm, Rx w Temaflu, subsequent also sig more anemic/thrombocytopenic.  Was discharged home and admitted again 2/22/20 with  fever and found w pneumonia, post course of Ceftriaxone, continues to require transfusions plts and PRBCs.  Hep B titer negative  Post IVIG   Due to continue worsening of SLL/CLL w assoc adverse clinical condition/labs, after discussion w pt and wife, started ramp up Venetoclax 3/1/20(planned Venetoclax/Rituxan)  Pt and wife aware of high risk for adverse side effects due to poor performance status and state of disease, risk for tumor lysis due to sig number of circulating CLL/SLL cells in periphery  Sig decr of WBC since Venetoclax, on hold since 3/5(pt's own supply)    -WBC appear stabilizing, if still >5 tomorrow consider give another dose of 20mg Venetoclax, if again sig decr then need to dose reduce to 10mg(will then need new prescription and order through pt's outpatient specialty pharmacy)  -still PRBC and Platelets transfusion dependent, no tx today based on CBC and clinical asx, if plts <10k or symptomatic then need platelet transfusion  -hypokalemic/hypophos today-on repletion  -continue taper prednisone, to 10mg qD today  -continue present management    discussed w pt.    -

## 2020-03-10 NOTE — PROGRESS NOTE ADULT - ASSESSMENT
hepatitis B   elevated lfts   CLL      patient with elevated bilirubin and alk phos at baseline ? leon  mri showed normal liver, no cbd stone/dilatation; hep b pcr neg  ob neg; no s/s overt gib per pt; monitor cbc daily  transfuse as per heme/onc  correct elytes prn  cont ppi qd  diet as tolerated  care as per primary team     Advanced care planning was discussed with patient and family.  Advanced care planning forms were reviewed and discussed.  Risks, benefits and alternatives of gastroenterologic procedures were discussed in detail and all questions were answered.    30 minutes spent.

## 2020-03-10 NOTE — PROGRESS NOTE ADULT - PROBLEM SELECTOR PLAN 1
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 Suspect bone marrow oversuppression.   - DC venetoclax on 3/5 per Hem/Onc  - Steroids being tapered - 10mg now  - Hem/Onc following

## 2020-03-10 NOTE — PROGRESS NOTE ADULT - PROBLEM SELECTOR PLAN 4
- CT Chest showing multilobar patchy peribronchovascular airspace opacities and more dense consolidative process in the right middle lobe likely reflecting multifocal pneumonia, trace b/l pleural effusions  - nares swab negative for MRSA/MSSA  - urine legionella Ag and strep pneumo Ag negative   - sputum Cx: normal resp marnie   - RVP negative  - clinically improved, completed course of abx as per ID recs - No additional abx needed.   - fever resolving, tylenol PRN for fever  - duonebs PRN for wheezing  - WBC very labile and difficult to gauge utility  - Robitussin for cough prn. Added hycodan qhs prn

## 2020-03-10 NOTE — PROGRESS NOTE ADULT - SUBJECTIVE AND OBJECTIVE BOX
INTERVAL HPI/OVERNIGHT EVENTS:  pt seen and examined  denies n/v/d/c/abd pain  denies melena/brbpr    MEDICATIONS  (STANDING):  entecavir 0.5 milliGRAM(s) Oral daily  pantoprazole    Tablet 40 milliGRAM(s) Oral before breakfast  potassium chloride    Tablet ER 40 milliEquivalent(s) Oral every 4 hours  potassium phosphate IVPB 30 milliMole(s) IV Intermittent once  predniSONE   Tablet 10 milliGRAM(s) Oral daily    MEDICATIONS  (PRN):  acetaminophen   Tablet .. 650 milliGRAM(s) Oral every 6 hours PRN Mild Pain (1 - 3)  acetaminophen   Tablet .. 650 milliGRAM(s) Oral every 6 hours PRN Temp greater or equal to 38C (100.4F)  albuterol/ipratropium for Nebulization 3 milliLiter(s) Nebulizer every 6 hours PRN Shortness of Breath and/or Wheezing  guaiFENesin   Syrup  (Sugar-Free) 200 milliGRAM(s) Oral every 6 hours PRN Cough  hydrocodone/homatropine Syrup 5 milliLiter(s) Oral at bedtime PRN Cough      Allergies    sulfa drugs (Unknown)    Intolerances        Review of Systems:    General:  No wt loss, fevers, chills, night sweats, fatigue   Eyes:  Good vision, no reported pain  ENT:  No sore throat, pain, runny nose, dysphagia  CV:  No pain, palpitations, hypo/hypertension  Resp:  No dyspnea, cough, tachypnea, wheezing  GI:  No pain, No nausea, No vomiting, No diarrhea, No constipation, No weight loss, No fever, No pruritis, No rectal bleeding, No melena, No dysphagia  :  No pain, bleeding, incontinence, nocturia  Muscle:  No pain, weakness  Neuro:  No weakness, tingling, memory problems  Psych:  No fatigue, insomnia, mood problems, depression  Endocrine:  No polyuria, polydypsia, cold/heat intolerance  Heme:  No petechiae, ecchymosis, easy bruisability  Skin:  No rash, tattoos, scars, edema      Vital Signs Last 24 Hrs  T(C): 36.6 (10 Mar 2020 04:26), Max: 36.6 (09 Mar 2020 12:35)  T(F): 97.9 (10 Mar 2020 04:26), Max: 97.9 (09 Mar 2020 12:35)  HR: 107 (10 Mar 2020 04:26) (93 - 107)  BP: 127/61 (10 Mar 2020 04:26) (127/61 - 135/73)  BP(mean): --  RR: 17 (10 Mar 2020 04:26) (16 - 17)  SpO2: 97% (10 Mar 2020 04:26) (97% - 99%)    PHYSICAL EXAM:    Constitutional: sitting up in bed  HEENT: ncat  Gastrointestinal: soft nt mild dt  Extremities: edema  Neurological: Awake alert responds appropriately      LABS:                        7.7    5.76  )-----------( 10       ( 10 Mar 2020 08:12 )             23.0     03-10    137  |  107  |  15  ----------------------------<  171<H>  3.1<L>   |  18<L>  |  1.10    Ca    8.1<L>      10 Mar 2020 08:12  Phos  0.5     03-10  Mg     1.8     03-10    TPro  6.2  /  Alb  2.8<L>  /  TBili  2.5<H>  /  DBili  x   /  AST  18  /  ALT  19  /  AlkPhos  143<H>  03-09          RADIOLOGY & ADDITIONAL TESTS:

## 2020-03-10 NOTE — PROGRESS NOTE ADULT - PROBLEM SELECTOR PLAN 2
- Per chart review, platelet count steadily declining since 2018  - poor response to platelets transfusion, suspect ITP component  - s/p one dose of IVIG  - s/p 11 units of platelet transfusion since admission  - Hem/Onc following, will follow the recommendation - platelets 10k today. Per Heme, transfuse if <10k  - follow up AM CBC

## 2020-03-10 NOTE — PROGRESS NOTE ADULT - SUBJECTIVE AND OBJECTIVE BOX
All interim records and events noted.    slept better last night  remains w fatigue but not worse  no active bleeds      MEDICATIONS  (STANDING):  entecavir 0.5 milliGRAM(s) Oral daily  pantoprazole    Tablet 40 milliGRAM(s) Oral before breakfast  potassium chloride    Tablet ER 40 milliEquivalent(s) Oral every 4 hours  potassium phosphate IVPB 30 milliMole(s) IV Intermittent once  predniSONE   Tablet 10 milliGRAM(s) Oral daily    MEDICATIONS  (PRN):  acetaminophen   Tablet .. 650 milliGRAM(s) Oral every 6 hours PRN Mild Pain (1 - 3)  acetaminophen   Tablet .. 650 milliGRAM(s) Oral every 6 hours PRN Temp greater or equal to 38C (100.4F)  albuterol/ipratropium for Nebulization 3 milliLiter(s) Nebulizer every 6 hours PRN Shortness of Breath and/or Wheezing  guaiFENesin   Syrup  (Sugar-Free) 200 milliGRAM(s) Oral every 6 hours PRN Cough  hydrocodone/homatropine Syrup 5 milliLiter(s) Oral at bedtime PRN Cough      Vital Signs Last 24 Hrs  T(C): 36.6 (10 Mar 2020 04:26), Max: 36.6 (09 Mar 2020 12:35)  T(F): 97.9 (10 Mar 2020 04:26), Max: 97.9 (09 Mar 2020 12:35)  HR: 107 (10 Mar 2020 04:26) (93 - 107)  BP: 127/61 (10 Mar 2020 04:26) (127/61 - 135/73)  BP(mean): --  RR: 17 (10 Mar 2020 04:26) (16 - 17)  SpO2: 97% (10 Mar 2020 04:26) (97% - 99%)    PHYSICAL EXAM  General: well developed  well nourished, but frail appearing man, in no acute distress  Head: atraumatic, normocephalic  ENT: sclera anicteric, buccal mucosa moist  Neck: supple, trachea midline  CV: S1 S2, regular rate and rhythm  Lungs: clear to auscultation, no wheezes/rhonchi  Abdomen: soft, nontender, bowel sounds present, pouchy  Extrem: blair legs in support stockings  Skin: no significant increased ecchymosis/petechiae  Neuro: alert and oriented X3,  no focal deficits      LABS:             7.7    5.76  )-----------( 10       ( 03-10 @ 08:12 )             23.0                7.6    3.81  )-----------( 19       ( 03-09 @ 08:31 )             22.4                7.6    6.15  )-----------( 8        ( 03-08 @ 07:06 )             22.6       03-10    137  |  107  |  15  ----------------------------<  171<H>  3.1<L>   |  18<L>  |  1.10    Ca    8.1<L>      10 Mar 2020 08:12  Phos  0.5     03-10  Mg     1.8     03-10    TPro  6.2  /  Alb  2.8<L>  /  TBili  2.5<H>  /  DBili  x   /  AST  18  /  ALT  19  /  AlkPhos  143<H>  03-09 03-05 @ 10:49  PT14.9 INR1.32  PTT31.7      RADIOLOGY & ADDITIONAL STUDIES:    IMPRESSION/RECOMMENDATIONS:

## 2020-03-10 NOTE — PROGRESS NOTE ADULT - SUBJECTIVE AND OBJECTIVE BOX
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 Has been coughing as he was recently treated for influenza A on last admission. States that when he was discharged from [facility] on 2/15, he was feeling well and had no fevers. Patient developed a fever, T100.9F (oral) today for which he called heme/onc, Dr. Stephenson. Per Dr. Stuart, patient advised to go to ER. He was found to have a fever, T100.6F (rectal) in ER. Denies recent travel or sick contacts. Denies headache, chest pain, sob, palpitations, abdominal pain, diarrhea, melena, hematochezia, dysuria or hematuria.     Of note, patient was recently admitted to [facility] from 2/14-2/15/2020 for chemotherapy with Venetoclax, found to be febrile with similar associated complaints of generalized malaise, cough, and weakness. Patient was found to be Flu A positive during that admission and was treated with tamiflu. Patient was found to be thrombocytopenic to 35k with a Hb of 6.6, transfused 2U PRBCs and 1U platelets. Chemotherapy was held off due to acute illness.      Interval events: Patient seen and examined at bedside. No complaints today. Discussed labwork.     REVIEW OF SYSTEMS:  CONSTITUTIONAL: No weakness, fevers or chills  EYES/ENT: No visual changes, no throat pain   RESPIRATORY: No cough, wheezing, hemoptysis; No shortness of breath  CARDIOVASCULAR: No chest pain or palpitations  GASTROINTESTINAL: No abdominal, nausea, vomiting, or hematemesis; No diarrhea or constipation. No melena or hematochezia.  GENITOURINARY: No dysuria, frequency or hematuria  NEUROLOGICAL: No dizziness, numbness, or weakness  SKIN: No itching, burning, rashes, or lesions   All other review of systems is negative unless indicated above.    Vital Signs Last 24 Hrs  T(C): 36.6 (10 Mar 2020 04:26), Max: 36.6 (09 Mar 2020 12:35)  T(F): 97.9 (10 Mar 2020 04:26), Max: 97.9 (09 Mar 2020 12:35)  HR: 107 (10 Mar 2020 04:26) (93 - 107)  BP: 127/61 (10 Mar 2020 04:26) (127/61 - 135/73)  RR: 17 (10 Mar 2020 04:26) (16 - 17)  SpO2: 97% (10 Mar 2020 04:26) (97% - 99%)    PHYSICAL EXAM:   GENERAL: no acute distress  HEENT: NC/AT, EOMI, MMM  RESPIRATORY: LCTAB/L, no rhonchi, rales, or wheezing  CARDIOVASCULAR: RRR, no murmurs, gallops, rubs  ABDOMINAL: soft, non-tender, non-distended, positive bowel sounds   EXTREMITIES: 2+ pitting edema of b/l lower ext, compression stockings in place  NEUROLOGICAL: alert and oriented x 3, non-focal  SKIN: no rashes or lesions   MUSCULOSKELETAL: no gross joint deformity    Labs:                               7.7    5.76  )-----------( 10       ( 10 Mar 2020 08:12 )             23.0     10 Mar 2020 08:12    137    |  107    |  15     ----------------------------<  171    3.1     |  18     |  1.10     Ca    8.1        10 Mar 2020 08:12  Phos  0.5       10 Mar 2020 08:12  Mg     1.8       10 Mar 2020 08:12    TPro  6.2    /  Alb  2.8    /  TBili  2.5    /  DBili  x      /  AST  18     /  ALT  19     /  AlkPhos  143    09 Mar 2020 08:31    LIVER FUNCTIONS - ( 09 Mar 2020 08:31 )  Alb: 2.8 g/dL / Pro: 6.2 g/dL / ALK PHOS: 143 U/L / ALT: 19 U/L / AST: 18 U/L / GGT: x             CAPILLARY BLOOD GLUCOSE  MEDICATIONS  (STANDING):  entecavir 0.5 milliGRAM(s) Oral daily  pantoprazole    Tablet 40 milliGRAM(s) Oral before breakfast  predniSONE   Tablet 10 milliGRAM(s) Oral daily    MEDICATIONS  (PRN):  acetaminophen   Tablet .. 650 milliGRAM(s) Oral every 6 hours PRN Mild Pain (1 - 3)  acetaminophen   Tablet .. 650 milliGRAM(s) Oral every 6 hours PRN Temp greater or equal to 38C (100.4F)  albuterol/ipratropium for Nebulization 3 milliLiter(s) Nebulizer every 6 hours PRN Shortness of Breath and/or Wheezing  guaiFENesin   Syrup  (Sugar-Free) 200 milliGRAM(s) Oral every 6 hours PRN Cough  hydrocodone/homatropine Syrup 5 milliLiter(s) Oral at bedtime PRN Cough

## 2020-03-10 NOTE — PROGRESS NOTE ADULT - PROBLEM SELECTOR PLAN 3
- Worsening anemia and thrombocytopenia since discharge ~1 week ago likely in part due to lymphoma/leukemia in part due to hemolytic anemia  - s/p 7 units of PRBC  6.8-1u-7.4-7.6-6.7-1u-8.3-7.7-6.9-1u-7.1-7.4-1u-8.9-8.0-7.6-1u-8.2-7.6-1u-7.3-7.0-1u-8.2-7.1- 8.3-7.6-7.6  - No acute s/s of bleeding on admission, continue to monitor, high bilirubin and though a greater direct bili component, suspect this is in large part due to AIHA and the indirect bili is getting conjugated  - suspect due to AIHA. Was on prednisone 10mg daily -- increase to prednisone 40mg po during hospitalization, tapering started @2/29 - now 10 daily  - Heme/onc following - discontinue chemo, observe  - follow H&H - Worsening anemia and thrombocytopenia since discharge ~1 week ago likely in part due to lymphoma/leukemia in part due to hemolytic anemia  - s/p 7 units of PRBC  6.8-1u-7.4-7.6-6.7-1u-8.3-7.7-6.9-1u-7.1-7.4-1u-8.9-8.0-7.6-1u-8.2-7.6-1u-7.3-7.0-1u-8.2-7.1- 8.3-7.6-7.6- 7.7  - No acute s/s of bleeding on admission, continue to monitor, high bilirubin and though a greater direct bili component, suspect this is in large part due to AIHA and the indirect bili is getting conjugated  - suspect due to AIHA. Was on prednisone 10mg daily -- increase to prednisone 40mg po during hospitalization, tapering started @2/29 - now 10mg daily  - Heme/onc following - discontinue chemo, observe  - follow H&H

## 2020-03-11 LAB
ANION GAP SERPL CALC-SCNC: 8 MMOL/L — SIGNIFICANT CHANGE UP (ref 5–17)
ANISOCYTOSIS BLD QL: SLIGHT — SIGNIFICANT CHANGE UP
BASOPHILS # BLD AUTO: 0 K/UL — SIGNIFICANT CHANGE UP (ref 0–0.2)
BASOPHILS NFR BLD AUTO: 0 % — SIGNIFICANT CHANGE UP (ref 0–2)
BUN SERPL-MCNC: 19 MG/DL — SIGNIFICANT CHANGE UP (ref 7–23)
BURR CELLS BLD QL SMEAR: PRESENT — SIGNIFICANT CHANGE UP
CALCIUM SERPL-MCNC: 8.3 MG/DL — LOW (ref 8.5–10.1)
CHLORIDE SERPL-SCNC: 110 MMOL/L — HIGH (ref 96–108)
CO2 SERPL-SCNC: 19 MMOL/L — LOW (ref 22–31)
CREAT SERPL-MCNC: 1.3 MG/DL — SIGNIFICANT CHANGE UP (ref 0.5–1.3)
DACRYOCYTES BLD QL SMEAR: SLIGHT — SIGNIFICANT CHANGE UP
EOSINOPHIL # BLD AUTO: 0 K/UL — SIGNIFICANT CHANGE UP (ref 0–0.5)
EOSINOPHIL NFR BLD AUTO: 0 % — SIGNIFICANT CHANGE UP (ref 0–6)
GLUCOSE SERPL-MCNC: 131 MG/DL — HIGH (ref 70–99)
HCT VFR BLD CALC: 22 % — LOW (ref 39–50)
HGB BLD-MCNC: 7.3 G/DL — LOW (ref 13–17)
LYMPHOCYTES # BLD AUTO: 48 % — HIGH (ref 13–44)
LYMPHOCYTES # BLD AUTO: 5.21 K/UL — HIGH (ref 1–3.3)
LYMPHOCYTES # SPEC AUTO: 15 % — HIGH (ref 0–0)
MACROCYTES BLD QL: SLIGHT — SIGNIFICANT CHANGE UP
MAGNESIUM SERPL-MCNC: 1.7 MG/DL — SIGNIFICANT CHANGE UP (ref 1.6–2.6)
MANUAL SMEAR VERIFICATION: SIGNIFICANT CHANGE UP
MCHC RBC-ENTMCNC: 31.1 PG — SIGNIFICANT CHANGE UP (ref 27–34)
MCHC RBC-ENTMCNC: 33.2 GM/DL — SIGNIFICANT CHANGE UP (ref 32–36)
MCV RBC AUTO: 93.6 FL — SIGNIFICANT CHANGE UP (ref 80–100)
METAMYELOCYTES # FLD: 1 % — HIGH (ref 0–0)
MONOCYTES # BLD AUTO: 0.76 K/UL — SIGNIFICANT CHANGE UP (ref 0–0.9)
MONOCYTES NFR BLD AUTO: 7 % — SIGNIFICANT CHANGE UP (ref 2–14)
NEUTROPHILS # BLD AUTO: 0.98 K/UL — LOW (ref 1.8–7.4)
NEUTROPHILS NFR BLD AUTO: 8 % — LOW (ref 43–77)
NEUTS BAND # BLD: 1 % — SIGNIFICANT CHANGE UP (ref 0–8)
NRBC # BLD: 0 — SIGNIFICANT CHANGE UP
NRBC # BLD: SIGNIFICANT CHANGE UP /100 WBCS (ref 0–0)
PLAT MORPH BLD: NORMAL — SIGNIFICANT CHANGE UP
PLATELET # BLD AUTO: 8 K/UL — CRITICAL LOW (ref 150–400)
POIKILOCYTOSIS BLD QL AUTO: SLIGHT — SIGNIFICANT CHANGE UP
POLYCHROMASIA BLD QL SMEAR: SLIGHT — SIGNIFICANT CHANGE UP
POTASSIUM SERPL-MCNC: 3.4 MMOL/L — LOW (ref 3.5–5.3)
POTASSIUM SERPL-SCNC: 3.4 MMOL/L — LOW (ref 3.5–5.3)
RBC # BLD: 2.35 M/UL — LOW (ref 4.2–5.8)
RBC # FLD: 17.6 % — HIGH (ref 10.3–14.5)
RBC BLD AUTO: ABNORMAL
SODIUM SERPL-SCNC: 137 MMOL/L — SIGNIFICANT CHANGE UP (ref 135–145)
VARIANT LYMPHS # BLD: 20 % — HIGH (ref 0–6)
WBC # BLD: 10.85 K/UL — HIGH (ref 3.8–10.5)
WBC # FLD AUTO: 10.85 K/UL — HIGH (ref 3.8–10.5)

## 2020-03-11 PROCEDURE — 99233 SBSQ HOSP IP/OBS HIGH 50: CPT | Mod: GC

## 2020-03-11 RX ORDER — VENETOCLAX 100 MG/1
20 TABLET, FILM COATED ORAL
Refills: 0 | Status: DISCONTINUED | OUTPATIENT
Start: 2020-03-11 | End: 2020-03-13

## 2020-03-11 RX ORDER — POTASSIUM CHLORIDE 20 MEQ
40 PACKET (EA) ORAL ONCE
Refills: 0 | Status: COMPLETED | OUTPATIENT
Start: 2020-03-11 | End: 2020-03-11

## 2020-03-11 RX ORDER — SODIUM,POTASSIUM PHOSPHATES 278-250MG
1 POWDER IN PACKET (EA) ORAL ONCE
Refills: 0 | Status: COMPLETED | OUTPATIENT
Start: 2020-03-11 | End: 2020-03-11

## 2020-03-11 RX ADMIN — Medication 10 MILLIGRAM(S): at 05:37

## 2020-03-11 RX ADMIN — Medication 1 PACKET(S): at 11:57

## 2020-03-11 RX ADMIN — VENETOCLAX 20 MILLIGRAM(S): 100 TABLET, FILM COATED ORAL at 13:18

## 2020-03-11 RX ADMIN — ENTECAVIR 0.5 MILLIGRAM(S): 0.5 TABLET ORAL at 05:37

## 2020-03-11 RX ADMIN — PANTOPRAZOLE SODIUM 40 MILLIGRAM(S): 20 TABLET, DELAYED RELEASE ORAL at 06:08

## 2020-03-11 RX ADMIN — Medication 40 MILLIEQUIVALENT(S): at 11:57

## 2020-03-11 NOTE — PROGRESS NOTE ADULT - SUBJECTIVE AND OBJECTIVE BOX
All interim records and events noted.    slept better last night  remains w fatigue but not worse  no active bleeds  3/11 the pt looks better than last week and the platelet count was 8000 and I ordered a unit of single donor platelets and since the white cell count was noted to be 10.85 he was started again on the venetoclax again at 20 mg a day.    MEDICATIONS  (STANDING):  entecavir 0.5 milliGRAM(s) Oral daily  pantoprazole    Tablet 40 milliGRAM(s) Oral before breakfast  potassium chloride    Tablet ER 40 milliEquivalent(s) Oral every 4 hours  potassium phosphate IVPB 30 milliMole(s) IV Intermittent once  predniSONE   Tablet 10 milliGRAM(s) Oral daily    MEDICATIONS  (PRN):  acetaminophen   Tablet .. 650 milliGRAM(s) Oral every 6 hours PRN Mild Pain (1 - 3)  acetaminophen   Tablet .. 650 milliGRAM(s) Oral every 6 hours PRN Temp greater or equal to 38C (100.4F)  albuterol/ipratropium for Nebulization 3 milliLiter(s) Nebulizer every 6 hours PRN Shortness of Breath and/or Wheezing  guaiFENesin   Syrup  (Sugar-Free) 200 milliGRAM(s) Oral every 6 hours PRN Cough  hydrocodone/homatropine Syrup 5 milliLiter(s) Oral at bedtime PRN Cough      Vital Signs Last 24 Hrs  T(C): 36.6 (10 Mar 2020 04:26), Max: 36.6 (09 Mar 2020 12:35)  T(F): 97.9 (10 Mar 2020 04:26), Max: 97.9 (09 Mar 2020 12:35)  HR: 107 (10 Mar 2020 04:26) (93 - 107)  BP: 127/61 (10 Mar 2020 04:26) (127/61 - 135/73)  BP(mean): --  RR: 17 (10 Mar 2020 04:26) (16 - 17)  SpO2: 97% (10 Mar 2020 04:26) (97% - 99%)    PHYSICAL EXAM  General: well developed  well nourished, but frail appearing man, in no acute distress  Head: atraumatic, normocephalic  ENT: sclera anicteric, buccal mucosa moist  Neck: supple, trachea midline  CV: S1 S2, regular rate and rhythm  Lungs: clear to auscultation, no wheezes/rhonchi  Abdomen: soft, nontender, bowel sounds present, pouchy  Extrem: blair legs in support stockings  Skin: no significant increased ecchymosis/petechiae  Neuro: alert and oriented X3,  no focal deficits      LABS:             7.7    5.76  )-----------( 10       ( 03-10 @ 08:12 )             23.0                7.6    3.81  )-----------( 19       ( 03-09 @ 08:31 )             22.4                7.6    6.15  )-----------( 8        ( 03-08 @ 07:06 )             22.6       03-10    137  |  107  |  15  ----------------------------<  171<H>  3.1<L>   |  18<L>  |  1.10    Ca    8.1<L>      10 Mar 2020 08:12  Phos  0.5     03-10  Mg     1.8     03-10    TPro  6.2  /  Alb  2.8<L>  /  TBili  2.5<H>  /  DBili  x   /  AST  18  /  ALT  19  /  AlkPhos  143<H>  03-09    03-05 @ 10:49  PT14.9 INR1.32  PTT31.7      RADIOLOGY & ADDITIONAL STUDIES:    IMPRESSION/RECOMMENDATIONS:

## 2020-03-11 NOTE — PROGRESS NOTE ADULT - SUBJECTIVE AND OBJECTIVE BOX
Patient is a 75y old  Male who presents with a chief complaint of weakness, anemia (11 Mar 2020 12:35)      INTERVAL HPI/OVERNIGHT EVENTS: Patient examined at bedside. Patient denies N/V/D, chest pain, SOB, abdominal pain. No other complaints at this time. No acute overnight events appreciated.    T(C): 36.3 (03-11-20 @ 12:20), Max: 37.2 (03-11-20 @ 04:44)  HR: 89 (03-11-20 @ 12:20) (84 - 104)  BP: 113/58 (03-11-20 @ 12:20) (113/58 - 138/71)  RR: 15 (03-11-20 @ 12:20) (15 - 17)  SpO2: 99% (03-11-20 @ 12:20) (96% - 99%)  Wt(kg): --  I&O's Summary    10 Mar 2020 07:01  -  11 Mar 2020 07:00  --------------------------------------------------------  IN: 500 mL / OUT: 0 mL / NET: 500 mL    11 Mar 2020 07:01  -  11 Mar 2020 13:55  --------------------------------------------------------  IN: 300 mL / OUT: 0 mL / NET: 300 mL        LABS:                        7.3    10.85 )-----------( 8        ( 11 Mar 2020 08:15 )             22.0     03-11    137  |  110<H>  |  19  ----------------------------<  131<H>  3.4<L>   |  19<L>  |  1.30    Ca    8.3<L>      11 Mar 2020 08:15  Phos  0.7     03-11  Mg     1.7     03-11          CAPILLARY BLOOD GLUCOSE                MEDICATIONS  (STANDING):  entecavir 0.5 milliGRAM(s) Oral daily  pantoprazole    Tablet 40 milliGRAM(s) Oral before breakfast  predniSONE   Tablet 10 milliGRAM(s) Oral daily  venetoclax 20 milliGRAM(s) Oral <User Schedule>    MEDICATIONS  (PRN):  acetaminophen   Tablet .. 650 milliGRAM(s) Oral every 6 hours PRN Mild Pain (1 - 3)  acetaminophen   Tablet .. 650 milliGRAM(s) Oral every 6 hours PRN Temp greater or equal to 38C (100.4F)  albuterol/ipratropium for Nebulization 3 milliLiter(s) Nebulizer every 6 hours PRN Shortness of Breath and/or Wheezing  guaiFENesin   Syrup  (Sugar-Free) 200 milliGRAM(s) Oral every 6 hours PRN Cough  hydrocodone/homatropine Syrup 5 milliLiter(s) Oral at bedtime PRN Cough      REVIEW OF SYSTEMS:  CONSTITUTIONAL: No weakness, fevers or chills   RESPIRATORY: No cough, wheezing, hemoptysis; No shortness of breath  CARDIOVASCULAR: No chest pain or palpitations  GASTROINTESTINAL: No abdominal, nausea, vomiting, or hematemesis; No diarrhea or constipation. No melena or hematochezia.  NEUROLOGICAL: No dizziness, numbness, or weakness  Heme/Onc: no obvious bleeding      PHYSICAL EXAM:   GENERAL: no acute distress  HEENT: NC/AT, EOMI, MMM  RESPIRATORY: LCTAB/L, no rhonchi, rales, or wheezing  CARDIOVASCULAR: RRR, no murmurs, gallops, rubs  ABDOMINAL: soft, non-tender, non-distended, positive bowel sounds   EXTREMITIES: 2+ pitting edema of b/l lower ext, compression stockings in place  NEUROLOGICAL: alert and oriented x 3, non-focal  SKIN: no rashes or lesions   MUSCULOSKELETAL: no gross joint deformity

## 2020-03-11 NOTE — PROGRESS NOTE ADULT - ASSESSMENT
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  Repeat Flow Cytometry still SLL/CLL, but FISH now w 11q-, 17p-, del of chromosome 12 and 13, all poor prognostic factors.]  Repeat PETCT only mildly hypermetabolic LADs w highest SUV 3, largest conglomerate f LNs ~5x2cm prevascular, mild splenomegaly 14cm  Pt was scheduled for Venetoclax/Rituxan second line treatment w admission for ramp up Venetoclax and tumor lysis prophylaxis when found w Influenza A during the 2/2020 adm, Rx w Temaflu, subsequent also sig more anemic/thrombocytopenic.  Was discharged home and admitted again 2/22/20 with  fever and found w pneumonia, post course of Ceftriaxone, continues to require transfusions plts and PRBCs.  Hep B titer negative  Post IVIG   Due to continue worsening of SLL/CLL w assoc adverse clinical condition/labs, after discussion w pt and wife, started ramp up Venetoclax 3/1/20(planned Venetoclax/Rituxan)  Pt and wife aware of high risk for adverse side effects due to poor performance status and state of disease, risk for tumor lysis due to sig number of circulating CLL/SLL cells in periphery  Sig decr of WBC since Venetoclax, on hold since 3/5(pt's own supply)    -WBC appear stabilizing, if still >5 tomorrow consider give another dose of 20mg Venetoclax, if again sig decr then need to dose reduce to 10mg(will then need new prescription and order through pt's outpatient specialty pharmacy)  -still PRBC and Platelets transfusion dependent, no tx today based on CBC and clinical asx, if plts <10k or symptomatic then need platelet transfusion  -hypokalemic/hypophos today-on repletion  -continue taper prednisone, to 10mg qD today  -continue present management  3/11 the pt looks better than last week and the platelet count was 8000 and I ordered a unit of single donor platelets and since the white cell count was noted to be 10.85 he was started again on the venetoclax again at 20 mg a day.        -

## 2020-03-11 NOTE — PROGRESS NOTE ADULT - PROBLEM SELECTOR PLAN 1
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 Suspect bone marrow oversuppression.   - DC venetoclax on 3/5 per Hem/Onc  - Steroids being tapered - 10mg now  - Hem/Onc following, recs appreciated

## 2020-03-11 NOTE — PROGRESS NOTE ADULT - PROBLEM SELECTOR PLAN 6
-unclear why pt's direct bilirubin is the majority fraction   -pt has no symptoms or exam findings consistent with obstructed CBD  -RUQ US showing GB with stones but no obstructing stones, and normal caliber CBD  -MRCP did not reveal obstruction  -GI following, recs appreciated  -Continue to monitor daily cmp

## 2020-03-11 NOTE — PROGRESS NOTE ADULT - PROBLEM SELECTOR PLAN 3
- Worsening anemia and thrombocytopenia since discharge ~1 week ago likely in part due to lymphoma/leukemia in part due to hemolytic anemia  - s/p 7 units of PRBC  6.8-1u-7.4-7.6-6.7-1u-8.3-7.7-6.9-1u-7.1-7.4-1u-8.9-8.0-7.6-1u-8.2-7.6-1u-7.3-7.0-1u-8.2-7.1- 8.3-7.6-7.6- 7.7  - No acute s/s of bleeding on admission, continue to monitor, high bilirubin and though a greater direct bili component, suspect this is in large part due to AIHA and the indirect bili is getting conjugated  - suspect due to AIHA. Was on prednisone 10mg daily -- increase to prednisone 40mg po during hospitalization, tapering started @2/29 - now 10mg daily  - Heme/onc following - discontinue chemo, observe  - follow H&H  -Platelets 8 today, will give one unit platelets.

## 2020-03-11 NOTE — CHART NOTE - NSCHARTNOTEFT_GEN_A_CORE
Assessment: patient seen for follow up. states with good PO intake. breakfast tray seen by RD with 90% of meal taken. patients food preferences noted. patient states with BM yesterday. K and Phos supplemented     Factors impacting intake: [x ] none [ ] nausea  [ ] vomiting [ ] diarrhea [ ] constipation  [ ]chewing problems [ ] swallowing issues  [ ] other:     Diet Presciption: Diet, Consistent Carbohydrate w/Evening Snack (03-01-20 @ 11:26)    Intake: good % per flow sheet     Current Weight: 3/11 167.3#  + 2 edema and +1 generalized edema      Pertinent Medications: MEDICATIONS  (STANDING):  entecavir 0.5 milliGRAM(s) Oral daily  pantoprazole    Tablet 40 milliGRAM(s) Oral before breakfast  potassium chloride    Tablet ER 40 milliEquivalent(s) Oral once  predniSONE   Tablet 10 milliGRAM(s) Oral daily    MEDICATIONS  (PRN):  acetaminophen   Tablet .. 650 milliGRAM(s) Oral every 6 hours PRN Mild Pain (1 - 3)  acetaminophen   Tablet .. 650 milliGRAM(s) Oral every 6 hours PRN Temp greater or equal to 38C (100.4F)  albuterol/ipratropium for Nebulization 3 milliLiter(s) Nebulizer every 6 hours PRN Shortness of Breath and/or Wheezing  guaiFENesin   Syrup  (Sugar-Free) 200 milliGRAM(s) Oral every 6 hours PRN Cough  hydrocodone/homatropine Syrup 5 milliLiter(s) Oral at bedtime PRN Cough    Pertinent Labs: 03-11 Na137 mmol/L Glu 131 mg/dL<H> K+ 3.4 mmol/L<L> Cr  1.30 mg/dL BUN 19 mg/dL 03-10 Phos 0.5 mg/dL<LL> 03-09 Alb 2.8 g/dL<L>        Skin: ilana 20   Estimated Needs:   [x ] no change since previous assessment  [ ] recalculated:     Previous Nutrition Diagnosis:   [ ] Inadequate Energy Intake [ ]Inadequate Oral Intake [ ] Excessive Energy Intake   [ ] Underweight [ ] Increased Nutrient Needs [ ] Overweight/Obesity   [ ] Altered GI Function [ ] Unintended Weight Loss [ ] Food & Nutrition Related Knowledge Deficit [ ] Malnutrition   (X) altered nutrition related labs  Nutrition Diagnosis is [ x] ongoing with patient on prednisone and with history DM  [ ] resolved [ ] not applicable     New Nutrition Diagnosis: [x ] not applicable       Interventions:   Recommend  [ ] Change Diet To:  [ ] Nutrition Supplement  [ ] Nutrition Support  [x ] Other: continue diet as ordered follow Phos and K levels follow diet tolerance and PO intake    Monitoring and Evaluation:   [x ] PO intake [ x ] Tolerance to diet prescription [ x ] weights [ x ] labs[ x ] follow up per protocol  [ ] other:

## 2020-03-12 LAB
ALBUMIN SERPL ELPH-MCNC: 2.9 G/DL — LOW (ref 3.3–5)
ALP SERPL-CCNC: 159 U/L — HIGH (ref 40–120)
ALT FLD-CCNC: 19 U/L — SIGNIFICANT CHANGE UP (ref 12–78)
ANION GAP SERPL CALC-SCNC: 12 MMOL/L — SIGNIFICANT CHANGE UP (ref 5–17)
AST SERPL-CCNC: 40 U/L — HIGH (ref 15–37)
BASOPHILS # BLD AUTO: 0.04 K/UL — SIGNIFICANT CHANGE UP (ref 0–0.2)
BASOPHILS NFR BLD AUTO: 0.3 % — SIGNIFICANT CHANGE UP (ref 0–2)
BILIRUB SERPL-MCNC: 2.5 MG/DL — HIGH (ref 0.2–1.2)
BUN SERPL-MCNC: 21 MG/DL — SIGNIFICANT CHANGE UP (ref 7–23)
CALCIUM SERPL-MCNC: 8.5 MG/DL — SIGNIFICANT CHANGE UP (ref 8.5–10.1)
CHLORIDE SERPL-SCNC: 106 MMOL/L — SIGNIFICANT CHANGE UP (ref 96–108)
CO2 SERPL-SCNC: 19 MMOL/L — LOW (ref 22–31)
CREAT SERPL-MCNC: 1.3 MG/DL — SIGNIFICANT CHANGE UP (ref 0.5–1.3)
EOSINOPHIL # BLD AUTO: 0.01 K/UL — SIGNIFICANT CHANGE UP (ref 0–0.5)
EOSINOPHIL NFR BLD AUTO: 0.1 % — SIGNIFICANT CHANGE UP (ref 0–6)
GLUCOSE SERPL-MCNC: 126 MG/DL — HIGH (ref 70–99)
HCT VFR BLD CALC: 21.7 % — LOW (ref 39–50)
HGB BLD-MCNC: 7.2 G/DL — LOW (ref 13–17)
IMM GRANULOCYTES NFR BLD AUTO: 0.2 % — SIGNIFICANT CHANGE UP (ref 0–1.5)
LYMPHOCYTES # BLD AUTO: 10.08 K/UL — HIGH (ref 1–3.3)
LYMPHOCYTES # BLD AUTO: 77.5 % — HIGH (ref 13–44)
MAGNESIUM SERPL-MCNC: 2 MG/DL — SIGNIFICANT CHANGE UP (ref 1.6–2.6)
MCHC RBC-ENTMCNC: 31.4 PG — SIGNIFICANT CHANGE UP (ref 27–34)
MCHC RBC-ENTMCNC: 33.2 GM/DL — SIGNIFICANT CHANGE UP (ref 32–36)
MCV RBC AUTO: 94.8 FL — SIGNIFICANT CHANGE UP (ref 80–100)
MONOCYTES # BLD AUTO: 2.3 K/UL — HIGH (ref 0–0.9)
MONOCYTES NFR BLD AUTO: 17.7 % — HIGH (ref 2–14)
NEUTROPHILS # BLD AUTO: 0.55 K/UL — LOW (ref 1.8–7.4)
NEUTROPHILS NFR BLD AUTO: 4.2 % — LOW (ref 43–77)
NRBC # BLD: 0 /100 WBCS — SIGNIFICANT CHANGE UP (ref 0–0)
PHOSPHATE SERPL-MCNC: 1.3 MG/DL — LOW (ref 2.5–4.5)
PLATELET # BLD AUTO: 22 K/UL — LOW (ref 150–400)
POTASSIUM SERPL-MCNC: 4 MMOL/L — SIGNIFICANT CHANGE UP (ref 3.5–5.3)
POTASSIUM SERPL-SCNC: 4 MMOL/L — SIGNIFICANT CHANGE UP (ref 3.5–5.3)
PROT SERPL-MCNC: 6.4 G/DL — SIGNIFICANT CHANGE UP (ref 6–8.3)
RBC # BLD: 2.29 M/UL — LOW (ref 4.2–5.8)
RBC # FLD: 18.4 % — HIGH (ref 10.3–14.5)
SODIUM SERPL-SCNC: 137 MMOL/L — SIGNIFICANT CHANGE UP (ref 135–145)
WBC # BLD: 13.01 K/UL — HIGH (ref 3.8–10.5)
WBC # FLD AUTO: 13.01 K/UL — HIGH (ref 3.8–10.5)

## 2020-03-12 PROCEDURE — 99233 SBSQ HOSP IP/OBS HIGH 50: CPT | Mod: GC

## 2020-03-12 RX ORDER — POTASSIUM PHOSPHATE, MONOBASIC POTASSIUM PHOSPHATE, DIBASIC 236; 224 MG/ML; MG/ML
30 INJECTION, SOLUTION INTRAVENOUS ONCE
Refills: 0 | Status: COMPLETED | OUTPATIENT
Start: 2020-03-12 | End: 2020-03-12

## 2020-03-12 RX ADMIN — Medication 10 MILLIGRAM(S): at 05:36

## 2020-03-12 RX ADMIN — VENETOCLAX 20 MILLIGRAM(S): 100 TABLET, FILM COATED ORAL at 13:08

## 2020-03-12 RX ADMIN — POTASSIUM PHOSPHATE, MONOBASIC POTASSIUM PHOSPHATE, DIBASIC 83.33 MILLIMOLE(S): 236; 224 INJECTION, SOLUTION INTRAVENOUS at 15:06

## 2020-03-12 RX ADMIN — ENTECAVIR 0.5 MILLIGRAM(S): 0.5 TABLET ORAL at 05:36

## 2020-03-12 RX ADMIN — PANTOPRAZOLE SODIUM 40 MILLIGRAM(S): 20 TABLET, DELAYED RELEASE ORAL at 05:36

## 2020-03-12 NOTE — PROGRESS NOTE ADULT - SUBJECTIVE AND OBJECTIVE BOX
All interim records and events noted.    slept better last night  remains w fatigue but not worse  no active bleeds  3/11 the pt looks better than last week and the platelet count was 8000 and I ordered a unit of single donor platelets and since the white cell count was noted to be 10.85 he was started again on the venetoclax again at 20 mg a day.  3/12  pt tolerating the venetoclax well and the white cell count was noted to be 13.01 and platelet count was 40873. Packed red cells were given and there was no fever and pt subjectively feels slightly better.  MEDICATIONS  (STANDING):  entecavir 0.5 milliGRAM(s) Oral daily  pantoprazole    Tablet 40 milliGRAM(s) Oral before breakfast  predniSONE   Tablet 10 milliGRAM(s) Oral daily  venetoclax 20 milliGRAM(s) Oral <User Schedule>  ICU Vital Signs Last 24 Hrs  T(C): 36.5 (12 Mar 2020 14:56), Max: 37.4 (11 Mar 2020 20:08)  T(F): 97.7 (12 Mar 2020 14:56), Max: 99.3 (11 Mar 2020 20:08)  HR: 87 (12 Mar 2020 14:56) (87 - 108)  BP: 127/67 (12 Mar 2020 14:56) (111/67 - 135/69)  BP(mean): --  ABP: --  ABP(mean): --  RR: 17 (12 Mar 2020 14:56) (17 - 19)  SpO2: 99% (12 Mar 2020 14:56) (97% - 99%)      PHYSICAL EXAM  General: well developed  well nourished, but frail appearing man, in no acute distress  Head: atraumatic, normocephalic  ENT: sclera anicteric, buccal mucosa moist  Neck: supple, trachea midline  CV: S1 S2, regular rate and rhythm  Lungs: clear to auscultation, no wheezes/rhonchi  Abdomen: soft, nontender, bowel sounds present, pouchy  Extrem: blair legs in support stockings  Skin: no significant increased ecchymosis/petechiae  Neuro: alert and oriented X3,  no focal deficits      LABS:                         7.2    13.01 )-----------( 22       ( 12 Mar 2020 06:38 )             21.7                7.6    3.81  )-----------( 19       ( 03-09 @ 08:31 )             22.4                7.6    6.15  )-----------( 8        ( 03-08 @ 07:06 )             22.6       03-10    137  |  107  |  15  ----------------------------<  171<H>  3.1<L>   |  18<L>  |  1.10    Ca    8.1<L>      10 Mar 2020 08:12  Phos  0.5     03-10  Mg     1.8     03-10    TPro  6.2  /  Alb  2.8<L>  /  TBili  2.5<H>  /  DBili  x   /  AST  18  /  ALT  19  /  AlkPhos  143<H>  03-09    03-05 @ 10:49  PT14.9 INR1.32  PTT31.7      RADIOLOGY & ADDITIONAL STUDIES:    IMPRESSION/RECOMMENDATIONS:

## 2020-03-12 NOTE — PROGRESS NOTE ADULT - ASSESSMENT
hepatitis B   elevated lfts   CLL      patient with elevated bilirubin and alk phos at baseline ? leon  mri showed normal liver, no cbd stone/dilatation; hep b pcr neg  ob neg; no s/s overt gib per pt; monitor cbc daily  transfuse as per heme/onc  correct elytes prn  cont ppi qd  diet as tolerated  trend lfts periodically   care as per primary team     Advanced care planning was discussed with patient and family.  Advanced care planning forms were reviewed and discussed.  Risks, benefits and alternatives of gastroenterologic procedures were discussed in detail and all questions were answered.    30 minutes spent.

## 2020-03-12 NOTE — PROGRESS NOTE ADULT - SUBJECTIVE AND OBJECTIVE BOX
Patient is a 75y old  Male who presents with a chief complaint of weakness, anemia (12 Mar 2020 08:40)      INTERVAL HPI/OVERNIGHT EVENTS: Patient examined at bedside. Patient admits to feeling well. Admits to continued cough that was present upon admission. Patient denies N/V/D, chest pain, SOB, abdominal pain. No other complaints at this time. No acute overnight events appreciated.    T(C): 36.8 (03-12-20 @ 12:18), Max: 37.4 (03-11-20 @ 20:08)  HR: 99 (03-12-20 @ 12:18) (98 - 108)  BP: 135/69 (03-12-20 @ 12:18) (111/67 - 135/69)  RR: 18 (03-12-20 @ 12:18) (17 - 19)  SpO2: 97% (03-12-20 @ 12:18) (97% - 98%)  Wt(kg): --  I&O's Summary    11 Mar 2020 07:01  -  12 Mar 2020 07:00  --------------------------------------------------------  IN: 300 mL / OUT: 0 mL / NET: 300 mL    12 Mar 2020 07:01  -  12 Mar 2020 14:31  --------------------------------------------------------  IN: 300 mL / OUT: 0 mL / NET: 300 mL        LABS:                        7.2    13.01 )-----------( 22       ( 12 Mar 2020 06:38 )             21.7     03-12    137  |  106  |  21  ----------------------------<  126<H>  4.0   |  19<L>  |  1.30    Ca    8.5      12 Mar 2020 06:38  Phos  1.3     03-12  Mg     2.0     03-12    TPro  6.4  /  Alb  2.9<L>  /  TBili  2.5<H>  /  DBili  x   /  AST  40<H>  /  ALT  19  /  AlkPhos  159<H>  03-12        CAPILLARY BLOOD GLUCOSE                MEDICATIONS  (STANDING):  entecavir 0.5 milliGRAM(s) Oral daily  pantoprazole    Tablet 40 milliGRAM(s) Oral before breakfast  potassium phosphate IVPB 30 milliMole(s) IV Intermittent once  predniSONE   Tablet 10 milliGRAM(s) Oral daily  venetoclax 20 milliGRAM(s) Oral <User Schedule>    MEDICATIONS  (PRN):  acetaminophen   Tablet .. 650 milliGRAM(s) Oral every 6 hours PRN Mild Pain (1 - 3)  acetaminophen   Tablet .. 650 milliGRAM(s) Oral every 6 hours PRN Temp greater or equal to 38C (100.4F)  albuterol/ipratropium for Nebulization 3 milliLiter(s) Nebulizer every 6 hours PRN Shortness of Breath and/or Wheezing  guaiFENesin   Syrup  (Sugar-Free) 200 milliGRAM(s) Oral every 6 hours PRN Cough  hydrocodone/homatropine Syrup 5 milliLiter(s) Oral at bedtime PRN Cough      REVIEW OF SYSTEMS:  CONSTITUTIONAL: No weakness, fevers or chills   RESPIRATORY: + cough, no wheezing, hemoptysis; No shortness of breath  CARDIOVASCULAR: No chest pain or palpitations  GASTROINTESTINAL: No abdominal, nausea, vomiting, or hematemesis; No diarrhea or constipation. No melena or hematochezia.  NEUROLOGICAL: No dizziness, numbness, or weakness  Heme/Onc: no obvious bleeding      PHYSICAL EXAM:   GENERAL: no acute distress  HEENT: NC/AT, EOMI, MMM  RESPIRATORY: LCTAB/L, no rhonchi, rales, or wheezing  CARDIOVASCULAR: RRR, no murmurs, gallops, rubs  ABDOMINAL: soft, non-tender, non-distended, positive bowel sounds   EXTREMITIES: 2+ pitting edema of b/l lower ext, compression stockings in place  NEUROLOGICAL: alert and oriented x 3, non-focal  MUSCULOSKELETAL: no gross joint deformity

## 2020-03-12 NOTE — PROGRESS NOTE ADULT - PROBLEM SELECTOR PLAN 2
- Per chart review, platelet count steadily declining since 2018  - poor response to platelets transfusion, suspect ITP component  - s/p one dose of IVIG  - s/p 11 units of platelet transfusion since admission  - Hem/Onc following, will follow the recommendation - platelets 10k yesterday. Per Heme, transfuse if <10k.  -Platelet count 22 today s/p transfusion, continue to monitor.   - follow up AM CBC

## 2020-03-12 NOTE — PROGRESS NOTE ADULT - PROBLEM SELECTOR PLAN 1
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   - WBC stable, 13 today.   - Steroids being tapered - 10mg now  - Hem/Onc following, recs appreciated

## 2020-03-12 NOTE — PROGRESS NOTE ADULT - SUBJECTIVE AND OBJECTIVE BOX
INTERVAL HPI/OVERNIGHT EVENTS:  pt seen and examined  offers no gi complaints  no melena hematochezia or abd pain  sp plts yesterday    MEDICATIONS  (STANDING):  entecavir 0.5 milliGRAM(s) Oral daily  pantoprazole    Tablet 40 milliGRAM(s) Oral before breakfast  potassium phosphate IVPB 30 milliMole(s) IV Intermittent once  predniSONE   Tablet 10 milliGRAM(s) Oral daily  venetoclax 20 milliGRAM(s) Oral <User Schedule>    MEDICATIONS  (PRN):  acetaminophen   Tablet .. 650 milliGRAM(s) Oral every 6 hours PRN Mild Pain (1 - 3)  acetaminophen   Tablet .. 650 milliGRAM(s) Oral every 6 hours PRN Temp greater or equal to 38C (100.4F)  albuterol/ipratropium for Nebulization 3 milliLiter(s) Nebulizer every 6 hours PRN Shortness of Breath and/or Wheezing  guaiFENesin   Syrup  (Sugar-Free) 200 milliGRAM(s) Oral every 6 hours PRN Cough  hydrocodone/homatropine Syrup 5 milliLiter(s) Oral at bedtime PRN Cough      Allergies    sulfa drugs (Unknown)    Intolerances        Review of Systems:    General:  No wt loss, fevers, chills, night sweats, fatigue   Eyes:  Good vision, no reported pain  ENT:  No sore throat, pain, runny nose, dysphagia  CV:  No pain, palpitations, hypo/hypertension  Resp:  No dyspnea, cough, tachypnea, wheezing  GI:  No pain, No nausea, No vomiting, No diarrhea, No constipation, No weight loss, No fever, No pruritis, No rectal bleeding, No melena, No dysphagia  :  No pain, bleeding, incontinence, nocturia  Muscle:  No pain, weakness  Neuro:  No weakness, tingling, memory problems  Psych:  No fatigue, insomnia, mood problems, depression  Endocrine:  No polyuria, polydypsia, cold/heat intolerance  Heme:  No petechiae, ecchymosis, easy bruisability  Skin:  No rash, tattoos, scars, edema      Vital Signs Last 24 Hrs  T(C): 36.8 (12 Mar 2020 05:14), Max: 37.4 (11 Mar 2020 20:08)  T(F): 98.2 (12 Mar 2020 05:14), Max: 99.3 (11 Mar 2020 20:08)  HR: 98 (12 Mar 2020 05:14) (89 - 108)  BP: 114/65 (12 Mar 2020 05:14) (111/67 - 128/73)  BP(mean): --  RR: 17 (12 Mar 2020 05:14) (15 - 17)  SpO2: 98% (12 Mar 2020 05:14) (98% - 99%)    PHYSICAL EXAM:    Constitutional: in nad  HEENT: ncat  Gastrointestinal: soft nt mild dt  Extremities: edema  Neurological: Awake alert responds appropriately    LABS:                        7.2    13.01 )-----------( 22       ( 12 Mar 2020 06:38 )             21.7     03-12    137  |  106  |  21  ----------------------------<  126<H>  4.0   |  19<L>  |  1.30    Ca    8.5      12 Mar 2020 06:38  Phos  1.3     03-12  Mg     2.0     03-12    TPro  6.4  /  Alb  2.9<L>  /  TBili  2.5<H>  /  DBili  x   /  AST  40<H>  /  ALT  19  /  AlkPhos  159<H>  03-12          RADIOLOGY & ADDITIONAL TESTS:

## 2020-03-12 NOTE — PROGRESS NOTE ADULT - ASSESSMENT
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  Repeat Flow Cytometry still SLL/CLL, but FISH now w 11q-, 17p-, del of chromosome 12 and 13, all poor prognostic factors.]  Repeat PETCT only mildly hypermetabolic LADs w highest SUV 3, largest conglomerate f LNs ~5x2cm prevascular, mild splenomegaly 14cm  Pt was scheduled for Venetoclax/Rituxan second line treatment w admission for ramp up Venetoclax and tumor lysis prophylaxis when found w Influenza A during the 2/2020 adm, Rx w Temaflu, subsequent also sig more anemic/thrombocytopenic.  Was discharged home and admitted again 2/22/20 with  fever and found w pneumonia, post course of Ceftriaxone, continues to require transfusions plts and PRBCs.  Hep B titer negative  Post IVIG   Due to continue worsening of SLL/CLL w assoc adverse clinical condition/labs, after discussion w pt and wife, started ramp up Venetoclax 3/1/20(planned Venetoclax/Rituxan)  Pt and wife aware of high risk for adverse side effects due to poor performance status and state of disease, risk for tumor lysis due to sig number of circulating CLL/SLL cells in periphery  Sig decr of WBC since Venetoclax, on hold since 3/5(pt's own supply)    -WBC appear stabilizing, if still >5 tomorrow consider give another dose of 20mg Venetoclax, if again sig decr then need to dose reduce to 10mg(will then need new prescription and order through pt's outpatient specialty pharmacy)  -still PRBC and Platelets transfusion dependent, no tx today based on CBC and clinical asx, if plts <10k or symptomatic then need platelet transfusion  -hypokalemic/hypophos today-on repletion  -continue taper prednisone, to 10mg qD today  -continue present management  3/11 the pt looks better than last week and the platelet count was 8000 and I ordered a unit of single donor platelets and since the white cell count was noted to be 10.85 he was started again on the venetoclax again at 20 mg a day.  3/12  pt tolerating the venetoclax well and the white cell count was noted to be 13.01 and platelet count was 13345. Packed red cells were given and there was no fever and pt subjectively feels slightly better.        -

## 2020-03-12 NOTE — PROGRESS NOTE ADULT - PROBLEM SELECTOR PLAN 3
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 Was on prednisone 10mg daily -- increase to prednisone 40mg po during hospitalization, tapering started @2/29 - now 10mg daily  - Heme/onc following  - follow H&H  -Platelets 22

## 2020-03-13 LAB
ALBUMIN SERPL ELPH-MCNC: 2.8 G/DL — LOW (ref 3.3–5)
ALP SERPL-CCNC: 175 U/L — HIGH (ref 40–120)
ALT FLD-CCNC: 21 U/L — SIGNIFICANT CHANGE UP (ref 12–78)
ANION GAP SERPL CALC-SCNC: 10 MMOL/L — SIGNIFICANT CHANGE UP (ref 5–17)
AST SERPL-CCNC: 39 U/L — HIGH (ref 15–37)
BILIRUB SERPL-MCNC: 3 MG/DL — HIGH (ref 0.2–1.2)
BUN SERPL-MCNC: 23 MG/DL — SIGNIFICANT CHANGE UP (ref 7–23)
CALCIUM SERPL-MCNC: 8.1 MG/DL — LOW (ref 8.5–10.1)
CHLORIDE SERPL-SCNC: 108 MMOL/L — SIGNIFICANT CHANGE UP (ref 96–108)
CO2 SERPL-SCNC: 17 MMOL/L — LOW (ref 22–31)
CREAT SERPL-MCNC: 1.3 MG/DL — SIGNIFICANT CHANGE UP (ref 0.5–1.3)
GLUCOSE SERPL-MCNC: 116 MG/DL — HIGH (ref 70–99)
MAGNESIUM SERPL-MCNC: 1.9 MG/DL — SIGNIFICANT CHANGE UP (ref 1.6–2.6)
PHOSPHATE SERPL-MCNC: 1.5 MG/DL — LOW (ref 2.5–4.5)
POTASSIUM SERPL-MCNC: 4.2 MMOL/L — SIGNIFICANT CHANGE UP (ref 3.5–5.3)
POTASSIUM SERPL-SCNC: 4.2 MMOL/L — SIGNIFICANT CHANGE UP (ref 3.5–5.3)
PROT SERPL-MCNC: 6.4 G/DL — SIGNIFICANT CHANGE UP (ref 6–8.3)
SODIUM SERPL-SCNC: 135 MMOL/L — SIGNIFICANT CHANGE UP (ref 135–145)

## 2020-03-13 PROCEDURE — 99233 SBSQ HOSP IP/OBS HIGH 50: CPT | Mod: GC

## 2020-03-13 RX ORDER — POTASSIUM PHOSPHATE, MONOBASIC POTASSIUM PHOSPHATE, DIBASIC 236; 224 MG/ML; MG/ML
30 INJECTION, SOLUTION INTRAVENOUS ONCE
Refills: 0 | Status: COMPLETED | OUTPATIENT
Start: 2020-03-13 | End: 2020-03-13

## 2020-03-13 RX ADMIN — ENTECAVIR 0.5 MILLIGRAM(S): 0.5 TABLET ORAL at 05:26

## 2020-03-13 RX ADMIN — POTASSIUM PHOSPHATE, MONOBASIC POTASSIUM PHOSPHATE, DIBASIC 83.33 MILLIMOLE(S): 236; 224 INJECTION, SOLUTION INTRAVENOUS at 11:55

## 2020-03-13 RX ADMIN — Medication 10 MILLIGRAM(S): at 05:26

## 2020-03-13 RX ADMIN — PANTOPRAZOLE SODIUM 40 MILLIGRAM(S): 20 TABLET, DELAYED RELEASE ORAL at 05:26

## 2020-03-13 NOTE — PROGRESS NOTE ADULT - SUBJECTIVE AND OBJECTIVE BOX
Patient is a 75y old  Male who presents with a chief complaint of weakness, anemia (13 Mar 2020 11:38)      INTERVAL HPI/OVERNIGHT EVENTS: Patient examined at bedside. Patient admits to feeling well. Admits to continued cough that was present upon admission. Patient denies N/V/D, chest pain, SOB, abdominal pain. No other complaints at this time. No acute overnight events appreciated.    T(C): 36.6 (03-13-20 @ 11:24), Max: 37.1 (03-13-20 @ 10:54)  HR: 89 (03-13-20 @ 11:24) (87 - 100)  BP: 128/71 (03-13-20 @ 11:24) (113/67 - 133/72)  RR: 18 (03-13-20 @ 11:24) (17 - 18)  SpO2: 98% (03-13-20 @ 11:24) (96% - 99%)  Wt(kg): --  I&O's Summary    12 Mar 2020 07:01  -  13 Mar 2020 07:00  --------------------------------------------------------  IN: 800 mL / OUT: 0 mL / NET: 800 mL    13 Mar 2020 07:01  -  13 Mar 2020 13:50  --------------------------------------------------------  IN: 1008 mL / OUT: 0 mL / NET: 1008 mL        LABS:                        7.8    3.88  )-----------( 8        ( 13 Mar 2020 09:37 )             23.2     03-13    135  |  108  |  23  ----------------------------<  116<H>  4.2   |  17<L>  |  1.30    Ca    8.1<L>      13 Mar 2020 07:07  Phos  1.5     03-13  Mg     1.9     03-13    TPro  6.4  /  Alb  2.8<L>  /  TBili  3.0<H>  /  DBili  x   /  AST  39<H>  /  ALT  21  /  AlkPhos  175<H>  03-13        CAPILLARY BLOOD GLUCOSE                MEDICATIONS  (STANDING):  entecavir 0.5 milliGRAM(s) Oral daily  pantoprazole    Tablet 40 milliGRAM(s) Oral before breakfast  predniSONE   Tablet 10 milliGRAM(s) Oral daily    MEDICATIONS  (PRN):  acetaminophen   Tablet .. 650 milliGRAM(s) Oral every 6 hours PRN Mild Pain (1 - 3)  acetaminophen   Tablet .. 650 milliGRAM(s) Oral every 6 hours PRN Temp greater or equal to 38C (100.4F)  albuterol/ipratropium for Nebulization 3 milliLiter(s) Nebulizer every 6 hours PRN Shortness of Breath and/or Wheezing  guaiFENesin   Syrup  (Sugar-Free) 200 milliGRAM(s) Oral every 6 hours PRN Cough  hydrocodone/homatropine Syrup 5 milliLiter(s) Oral at bedtime PRN Cough      REVIEW OF SYSTEMS:  CONSTITUTIONAL: No weakness, fevers or chills   RESPIRATORY: + cough, no wheezing, hemoptysis; No shortness of breath  CARDIOVASCULAR: No chest pain or palpitations  GASTROINTESTINAL: No abdominal, nausea, vomiting, or hematemesis; No diarrhea or constipation. No melena or hematochezia.  NEUROLOGICAL: No dizziness, numbness, or weakness  Heme/Onc: no obvious bleeding      PHYSICAL EXAM:   GENERAL: no acute distress  HEENT: NC/AT, EOMI, MMM  RESPIRATORY: LCTAB/L, no rhonchi, rales, or wheezing  CARDIOVASCULAR: RRR, no murmurs, gallops, rubs  ABDOMINAL: soft, non-tender, non-distended, positive bowel sounds   EXTREMITIES: 2+ pitting edema of b/l lower ext, compression stockings in place  NEUROLOGICAL: alert and oriented x 3, non-focal  MUSCULOSKELETAL: no gross joint

## 2020-03-13 NOTE — PROGRESS NOTE ADULT - PROBLEM SELECTOR PLAN 3
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 Was on prednisone 10mg daily -- increase to prednisone 40mg po during hospitalization, tapering started @2/29 - now 10mg daily  - Heme/onc following  - follow H&H  -Platelets 8

## 2020-03-13 NOTE — PROGRESS NOTE ADULT - ASSESSMENT
-76 y/o man w Hepatitis B on Entecavirt, Chronic Lymphocytic Leukemia/Small Lymphocytic Lymphoma 4/2018 when presented w immune hemolytic anemia w partial response to steroids, started on Ibrutinib w heme CR and RI by CT scan w some decrease of lymphadenopathies, until 1/2020 when relapsed w incr WBC, anemia(initially not hemolytic, has chronic Matthew positive due to CLL/SLL), thrombocytopenia.  Repeat Flow Cytometry still SLL/CLL, but FISH now w 11q-, 17p-, del of chromosome 12 and 13, all poor prognostic factors.]  Repeat PETCT only mildly hypermetabolic LADs w highest SUV 3, largest conglomerate f LNs ~5x2cm prevascular, mild splenomegaly 14cm  Pt was scheduled for Venetoclax/Rituxan second line treatment w admission for ramp up Venetoclax and tumor lysis prophylaxis when found w Influenza A during the 2/2020 adm, Rx w Temaflu, subsequent also sig more anemic/thrombocytopenic.  Was discharged home and admitted again 2/22/20 with  fever and found w pneumonia, post course of Ceftriaxone, continues to require transfusions plts and PRBCs.  Hep B titer negative  Post IVIG     Due to continue worsening of SLL/CLL w assoc adverse clinical condition/labs, after discussion w pt and wife, started ramp up Venetoclax 3/1/20(planned Venetoclax/Rituxan)  Pt and wife aware of high risk for adverse side effects due to poor performance status and state of disease, risk for tumor lysis due to sig number of circulating CLL/SLL cells in periphery  Sig decr of WBC since Venetoclax, on hold since 3/5(pt's own supply)    -WBC appear stabilizing  s/p 20mg Venetoclax 03/11 - 03/12  , if again sig decr then need to dose reduce to 10mg(will then need new prescription and order through pt's outpatient specialty pharmacy)    -still PRBC and Platelets transfusion dependent, no tx today based on CBC and clinical asx, if plts <10k or symptomatic then need platelet transfusion  -hypokalemic/hypophos today-on repletion  -continue taper prednisone, to 10mg qD today    RECOMMEND  continue present management  Plt transfusion today  hold Venetoclax  Consider dose reduction to 10mg when count increase.   Continue HBV tx.   Supportive measures      -

## 2020-03-13 NOTE — PROGRESS NOTE ADULT - PROBLEM SELECTOR PLAN 2
- Per chart review, platelet count steadily declining since 2018  - poor response to platelets transfusion, suspect ITP component  - s/p one dose of IVIG  - s/p 11 units of platelet transfusion since admission  - Hem/Onc following, will follow the recommendation - platelets 10k yesterday. Per Heme, transfuse if <10k.  -Platelet count 8 today   -Will transfuse one unit of platelets today.   -continue to monitor.   - follow up AM CBC

## 2020-03-13 NOTE — PROGRESS NOTE ADULT - PROBLEM SELECTOR PLAN 1
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 as per heme/onc.   - WBC approx. 3 today.   - Steroids being tapered - 10mg now  - Hem/Onc following, recs appreciated

## 2020-03-13 NOTE — PROGRESS NOTE ADULT - SUBJECTIVE AND OBJECTIVE BOX
[INTERVAL HX: ]  Patient seen and examined;  Chart reviewed and events noted;   Restarted on Venetocals, s/p 2 doses 3/11 -3/12      Patient is a 75y Male with a known history of :  Leukemia not having achieved remission (C95.90) [Active]  KIRTI (obstructive sleep apnea) (G47.33) [Active]  Hypertension (I10) [Active]  Diabetes (E11.9) [Active]  Hepatitis B (B19.10) [Active]  Lymphoma (C85.90) [Active]  AIHA (autoimmune hemolytic anemia) (D59.1) [Active]  Leukemia (C95.90) [Inactive]  Diabetes (E11.9) [Inactive]  HTN (hypertension) (I10) [Inactive]  H/O lithotripsy (Z98.890) [Active]  No significant past surgical history (613637225) [Inactive]    HPI:  Patient is a 76 y/o M with 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 Has been coughing as he was recently treated for influenza A on last admission. States that when he was discharged from \[facility]\"" on 2/15, he was feeling well and had no fevers. Patient developed a fever, T100.9F (oral) today for which he called heme/onc, Dr. Stephenson. Per Dr. Stuart, patient advised to go to ER. He was found to have a fever, T100.6F (rectal) in ER. Denies recent travel or sick contacts. Denies headache, chest pain, sob, palpitations, abdominal pain, diarrhea, melena, hematochezia, dysuria or hematuria.     Of note, patient was recently admitted to [facility] from 2/14-2/15/2020 for chemotherapy with Venetoclax, found to be febrile with similar associated complaints of generalized malaise, cough, and weakness. Patient was found to be Flu A positive during that admission and was treated with tamiflu. Patient was found to be thrombocytopenic to 35k with a Hb of 6.6, transfused 2U PRBCs and 1U platelets. Chemotherapy was held off due to acute illness.      In ED, VS Tmax 100.6 rectal (repeat s/p tylenol 99.5),  -> 99, BP stable, saturating well on RA  CBC significant for .14 (on d/c 56.25), Hb 6.8 (on d/c 8.7), Plt 11 (on d/c 21)  CMP significant for K 3.4, Cr 1.5 (appears to be baseline per chart review), bili 3.3  Type and screen performed. Will be getting 1 unit pRBCs  Flu/RSV negative  CT Chest: Multilobar patchy peribronchovascular airspace opacities and more dense consolidative process in the right middle lobe likely reflecting multifocal pneumonia. Small right and trace left pleural effusion. Mediastinal and hilar lymphadenopathy worsened compared with prior exam from 8/30/2019. Numerous mildly prominent bilateral axillary lymph nodes. Retroperitoneal lymphadenopathy. Mild splenomegaly. Cholelithiasis.  CXR (wet read): noted to have increased opacities in right lower and middle lobes as well as left lower lobe in comparison to CXR on 2/14/2020  EKG: sinus tachycardic    S/p cefepime 1g IV x1, NS bolus 1L x1, duonebs x2, tylenol 650mg PO x1, 1U PRBCs ordered and to be transfused    Dr. Stuart was called and he recommended to only transfused PRBC and not platelets.  He will see pt in the morning. (22 Feb 2020 00:14)            MEDICATIONS  (STANDING):  entecavir 0.5 milliGRAM(s) Oral daily  pantoprazole    Tablet 40 milliGRAM(s) Oral before breakfast  potassium phosphate IVPB 30 milliMole(s) IV Intermittent once  predniSONE   Tablet 10 milliGRAM(s) Oral daily    MEDICATIONS  (PRN):  acetaminophen   Tablet .. 650 milliGRAM(s) Oral every 6 hours PRN Mild Pain (1 - 3)  acetaminophen   Tablet .. 650 milliGRAM(s) Oral every 6 hours PRN Temp greater or equal to 38C (100.4F)  albuterol/ipratropium for Nebulization 3 milliLiter(s) Nebulizer every 6 hours PRN Shortness of Breath and/or Wheezing  guaiFENesin   Syrup  (Sugar-Free) 200 milliGRAM(s) Oral every 6 hours PRN Cough  hydrocodone/homatropine Syrup 5 milliLiter(s) Oral at bedtime PRN Cough      Vital Signs Last 24 Hrs  T(C): 36.6 (13 Mar 2020 11:24), Max: 36.8 (12 Mar 2020 12:18)  T(F): 97.8 (13 Mar 2020 11:24), Max: 98.2 (12 Mar 2020 12:18)  HR: 89 (13 Mar 2020 11:24) (87 - 100)  BP: 128/71 (13 Mar 2020 11:24) (127/67 - 135/69)  BP(mean): --  RR: 18 (13 Mar 2020 11:24) (17 - 18)  SpO2: 98% (13 Mar 2020 11:24) (96% - 99%)      [PHYSICAL EXAM]  General: adult in NAD,  WN,  WD.  HEENT: clear oropharynx, anicteric sclera, pink conjunctivae.  Neck: supple, no masses.  CV: normal S1S2, no murmur, no rubs, no gallops.  Lungs: clear to auscultation, no wheezes, no rales, no rhonchi.  Abdomen: soft, non-tender, non-distended, no hepatosplenomegaly, normal BS, no guarding.  Ext: no clubbing, no cyanosis, no edema.  Skin: no rashes,  no petechiae, no venous stasis changes.  Neuro: alert and oriented X3  , no focal motor deficits.  LN: no SC FERNIE.      [LABS:]                        7.8    3.88  )-----------( 8        ( 13 Mar 2020 09:37 )             23.2     03-13    135  |  108  |  23  ----------------------------<  116<H>  4.2   |  17<L>  |  1.30    Ca    8.1<L>      13 Mar 2020 07:07  Phos  1.5     03-13  Mg     1.9     03-13    TPro  6.4  /  Alb  2.8<L>  /  TBili  3.0<H>  /  DBili  x   /  AST  39<H>  /  ALT  21  /  AlkPhos  175<H>  03-13                  [RADIOLOGY STUDIES:]

## 2020-03-13 NOTE — PROGRESS NOTE ADULT - SUBJECTIVE AND OBJECTIVE BOX
INTERVAL HPI/OVERNIGHT EVENTS:  pt seen and examined  offers no gi complaints, no abd pain  no acute overnight events per rn  sp 1u prbc yesterday  cmp noted  afebrile     MEDICATIONS  (STANDING):  entecavir 0.5 milliGRAM(s) Oral daily  pantoprazole    Tablet 40 milliGRAM(s) Oral before breakfast  potassium phosphate IVPB 30 milliMole(s) IV Intermittent once  predniSONE   Tablet 10 milliGRAM(s) Oral daily  venetoclax 20 milliGRAM(s) Oral <User Schedule>    MEDICATIONS  (PRN):  acetaminophen   Tablet .. 650 milliGRAM(s) Oral every 6 hours PRN Mild Pain (1 - 3)  acetaminophen   Tablet .. 650 milliGRAM(s) Oral every 6 hours PRN Temp greater or equal to 38C (100.4F)  albuterol/ipratropium for Nebulization 3 milliLiter(s) Nebulizer every 6 hours PRN Shortness of Breath and/or Wheezing  guaiFENesin   Syrup  (Sugar-Free) 200 milliGRAM(s) Oral every 6 hours PRN Cough  hydrocodone/homatropine Syrup 5 milliLiter(s) Oral at bedtime PRN Cough      Allergies    sulfa drugs (Unknown)    Intolerances        Review of Systems:    General:  No wt loss, fevers, chills, night sweats, fatigue   Eyes:  Good vision, no reported pain  ENT:  No sore throat, pain, runny nose, dysphagia  CV:  No pain, palpitations, hypo/hypertension  Resp:  No dyspnea, cough, tachypnea, wheezing  GI:  No pain, No nausea, No vomiting, No diarrhea, No constipation, No weight loss, No fever, No pruritis, No rectal bleeding, No melena, No dysphagia  :  No pain, bleeding, incontinence, nocturia  Muscle:  No pain, weakness  Neuro:  No weakness, tingling, memory problems  Psych:  No fatigue, insomnia, mood problems, depression  Endocrine:  No polyuria, polydypsia, cold/heat intolerance  Heme:  No petechiae, ecchymosis, easy bruisability  Skin:  No rash, tattoos, scars, edema      Vital Signs Last 24 Hrs  T(C): 36.7 (13 Mar 2020 04:34), Max: 36.8 (12 Mar 2020 12:18)  T(F): 98.1 (13 Mar 2020 04:34), Max: 98.2 (12 Mar 2020 12:18)  HR: 100 (13 Mar 2020 04:34) (87 - 100)  BP: 129/70 (13 Mar 2020 04:34) (113/67 - 135/69)  BP(mean): --  RR: 17 (13 Mar 2020 04:34) (17 - 19)  SpO2: 96% (13 Mar 2020 04:34) (96% - 99%)    PHYSICAL EXAM:    Constitutional: oob in recliner in nad  HEENT: ncat  Gastrointestinal: soft nt mild dt  Extremities: edema  Neurological: Awake alert responds appropriately        LABS:                        7.2    13.01 )-----------( 22       ( 12 Mar 2020 06:38 )             21.7     03-13    135  |  108  |  23  ----------------------------<  116<H>  4.2   |  17<L>  |  1.30    Ca    8.1<L>      13 Mar 2020 07:07  Phos  1.5     03-13  Mg     1.9     03-13    TPro  6.4  /  Alb  2.8<L>  /  TBili  3.0<H>  /  DBili  x   /  AST  39<H>  /  ALT  21  /  AlkPhos  175<H>  03-13          RADIOLOGY & ADDITIONAL TESTS:

## 2020-03-13 NOTE — PROGRESS NOTE ADULT - ASSESSMENT
76yo M with PMH of CLL/SLL, AIHA, chronic hepatitis B (on entecavir), KIRTI on CPAP, T2DM (managed with lifestyle changes), HTN (not on any medication) who presents with chief complaint of fever, cough and generalized weakness, admitted with suspected gram-negative multifocal pneumonia, completed a course of IV antibiotics.  Severe anemia s/p transfusion of 8 units of PRBC and severe thrombocytopenia s/p platelet transfusion.  Suspect possible ITP with poor response to platelet transfusion. One dose of IVIG was given, s/p 11 units of platelets transfusion. Pt started chemo therapy with venetoclax @ 3/1/2020,  DC'ed @3/5 due to low WBC suspecting bone marrow oversuppression

## 2020-03-13 NOTE — PROGRESS NOTE ADULT - ASSESSMENT
hepatitis B   elevated lfts   CLL      f/u am cbc  patient with elevated bilirubin and alk phos at baseline ? leon  mri showed normal liver, no cbd stone/dilatation; hep b pcr neg  ob neg; no s/s overt gib per pt; monitor cbc daily  transfuse as per heme/onc  correct elytes prn  cont ppi qd  diet as tolerated  trend lfts periodically   care as per primary team     Advanced care planning was discussed with patient and family.  Advanced care planning forms were reviewed and discussed.  Risks, benefits and alternatives of gastroenterologic procedures were discussed in detail and all questions were answered.    30 minutes spent.

## 2020-03-14 LAB
ALBUMIN SERPL ELPH-MCNC: 2.7 G/DL — LOW (ref 3.3–5)
ALP SERPL-CCNC: 151 U/L — HIGH (ref 40–120)
ALT FLD-CCNC: 19 U/L — SIGNIFICANT CHANGE UP (ref 12–78)
ANION GAP SERPL CALC-SCNC: 9 MMOL/L — SIGNIFICANT CHANGE UP (ref 5–17)
AST SERPL-CCNC: 30 U/L — SIGNIFICANT CHANGE UP (ref 15–37)
BASOPHILS # BLD AUTO: 0 K/UL — SIGNIFICANT CHANGE UP (ref 0–0.2)
BASOPHILS NFR BLD AUTO: 0 % — SIGNIFICANT CHANGE UP (ref 0–2)
BILIRUB SERPL-MCNC: 3.1 MG/DL — HIGH (ref 0.2–1.2)
BUN SERPL-MCNC: 19 MG/DL — SIGNIFICANT CHANGE UP (ref 7–23)
CALCIUM SERPL-MCNC: 8.1 MG/DL — LOW (ref 8.5–10.1)
CHLORIDE SERPL-SCNC: 108 MMOL/L — SIGNIFICANT CHANGE UP (ref 96–108)
CO2 SERPL-SCNC: 18 MMOL/L — LOW (ref 22–31)
CREAT SERPL-MCNC: 1.2 MG/DL — SIGNIFICANT CHANGE UP (ref 0.5–1.3)
EOSINOPHIL # BLD AUTO: 0 K/UL — SIGNIFICANT CHANGE UP (ref 0–0.5)
EOSINOPHIL NFR BLD AUTO: 0 % — SIGNIFICANT CHANGE UP (ref 0–6)
GLUCOSE SERPL-MCNC: 146 MG/DL — HIGH (ref 70–99)
HCT VFR BLD CALC: 21.4 % — LOW (ref 39–50)
HGB BLD-MCNC: 7.1 G/DL — LOW (ref 13–17)
LYMPHOCYTES # BLD AUTO: 1.8 K/UL — SIGNIFICANT CHANGE UP (ref 1–3.3)
LYMPHOCYTES # BLD AUTO: 50 % — HIGH (ref 13–44)
MAGNESIUM SERPL-MCNC: 1.9 MG/DL — SIGNIFICANT CHANGE UP (ref 1.6–2.6)
MCHC RBC-ENTMCNC: 30.7 PG — SIGNIFICANT CHANGE UP (ref 27–34)
MCHC RBC-ENTMCNC: 33.2 GM/DL — SIGNIFICANT CHANGE UP (ref 32–36)
MCV RBC AUTO: 92.6 FL — SIGNIFICANT CHANGE UP (ref 80–100)
MONOCYTES # BLD AUTO: 0.07 K/UL — SIGNIFICANT CHANGE UP (ref 0–0.9)
MONOCYTES NFR BLD AUTO: 2 % — SIGNIFICANT CHANGE UP (ref 2–14)
NEUTROPHILS # BLD AUTO: 0.43 K/UL — LOW (ref 1.8–7.4)
NEUTROPHILS NFR BLD AUTO: 10 % — LOW (ref 43–77)
NRBC # BLD: SIGNIFICANT CHANGE UP /100 WBCS (ref 0–0)
PHOSPHATE SERPL-MCNC: 1.7 MG/DL — LOW (ref 2.5–4.5)
PLATELET # BLD AUTO: 10 K/UL — CRITICAL LOW (ref 150–400)
POTASSIUM SERPL-MCNC: 3.4 MMOL/L — LOW (ref 3.5–5.3)
POTASSIUM SERPL-SCNC: 3.4 MMOL/L — LOW (ref 3.5–5.3)
PROT SERPL-MCNC: 6 G/DL — SIGNIFICANT CHANGE UP (ref 6–8.3)
RBC # BLD: 2.31 M/UL — LOW (ref 4.2–5.8)
RBC # FLD: 19.3 % — HIGH (ref 10.3–14.5)
SODIUM SERPL-SCNC: 135 MMOL/L — SIGNIFICANT CHANGE UP (ref 135–145)
WBC # BLD: 3.59 K/UL — LOW (ref 3.8–10.5)
WBC # FLD AUTO: 3.59 K/UL — LOW (ref 3.8–10.5)

## 2020-03-14 PROCEDURE — 99233 SBSQ HOSP IP/OBS HIGH 50: CPT

## 2020-03-14 RX ORDER — DIPHENHYDRAMINE HCL 50 MG
12.5 CAPSULE ORAL ONCE
Refills: 0 | Status: COMPLETED | OUTPATIENT
Start: 2020-03-14 | End: 2020-03-14

## 2020-03-14 RX ORDER — POTASSIUM CHLORIDE 20 MEQ
40 PACKET (EA) ORAL ONCE
Refills: 0 | Status: COMPLETED | OUTPATIENT
Start: 2020-03-14 | End: 2020-03-14

## 2020-03-14 RX ORDER — ACETAMINOPHEN 500 MG
325 TABLET ORAL ONCE
Refills: 0 | Status: COMPLETED | OUTPATIENT
Start: 2020-03-14 | End: 2020-03-14

## 2020-03-14 RX ORDER — POTASSIUM PHOSPHATE, MONOBASIC POTASSIUM PHOSPHATE, DIBASIC 236; 224 MG/ML; MG/ML
30 INJECTION, SOLUTION INTRAVENOUS ONCE
Refills: 0 | Status: COMPLETED | OUTPATIENT
Start: 2020-03-14 | End: 2020-03-14

## 2020-03-14 RX ADMIN — ENTECAVIR 0.5 MILLIGRAM(S): 0.5 TABLET ORAL at 05:23

## 2020-03-14 RX ADMIN — Medication 10 MILLIGRAM(S): at 05:23

## 2020-03-14 RX ADMIN — PANTOPRAZOLE SODIUM 40 MILLIGRAM(S): 20 TABLET, DELAYED RELEASE ORAL at 05:23

## 2020-03-14 RX ADMIN — Medication 325 MILLIGRAM(S): at 16:35

## 2020-03-14 RX ADMIN — Medication 40 MILLIEQUIVALENT(S): at 12:25

## 2020-03-14 RX ADMIN — Medication 325 MILLIGRAM(S): at 17:01

## 2020-03-14 RX ADMIN — Medication 325 MILLIGRAM(S): at 12:18

## 2020-03-14 RX ADMIN — Medication 12.5 MILLIGRAM(S): at 12:24

## 2020-03-14 RX ADMIN — Medication 325 MILLIGRAM(S): at 13:07

## 2020-03-14 RX ADMIN — POTASSIUM PHOSPHATE, MONOBASIC POTASSIUM PHOSPHATE, DIBASIC 83.33 MILLIMOLE(S): 236; 224 INJECTION, SOLUTION INTRAVENOUS at 17:15

## 2020-03-14 NOTE — PROGRESS NOTE ADULT - ASSESSMENT
hepatitis B   elevated lfts   CLL      patient with elevated bilirubin and alk phos at baseline ? leon  LFTs slowly improving  mri showed normal liver, no cbd stone/dilatation; hep b pcr neg  ob neg; no s/s overt gib per pt; monitor cbc daily  transfuse as per heme/onc; care appreciated   f/u post-transfusion cbc   correct elytes prn  cont ppi qd  diet as tolerated  trend lfts periodically   care as per primary team     Advanced care planning was discussed with patient and family.  Advanced care planning forms were reviewed and discussed.  Risks, benefits and alternatives of gastroenterologic procedures were discussed in detail and all questions were answered.    30 minutes spent.

## 2020-03-14 NOTE — PROGRESS NOTE ADULT - SUBJECTIVE AND OBJECTIVE BOX
INTERVAL HPI/OVERNIGHT EVENTS:  pt seen and examined  receiving 1u platelets and 1uprbc today   tolerating PO  denies n/v abd pain   LFTs improving  avss     MEDICATIONS  (STANDING):  entecavir 0.5 milliGRAM(s) Oral daily  pantoprazole    Tablet 40 milliGRAM(s) Oral before breakfast  potassium phosphate IVPB 30 milliMole(s) IV Intermittent once  predniSONE   Tablet 10 milliGRAM(s) Oral daily  venetoclax 20 milliGRAM(s) Oral <User Schedule>    MEDICATIONS  (PRN):  acetaminophen   Tablet .. 650 milliGRAM(s) Oral every 6 hours PRN Mild Pain (1 - 3)  acetaminophen   Tablet .. 650 milliGRAM(s) Oral every 6 hours PRN Temp greater or equal to 38C (100.4F)  albuterol/ipratropium for Nebulization 3 milliLiter(s) Nebulizer every 6 hours PRN Shortness of Breath and/or Wheezing  guaiFENesin   Syrup  (Sugar-Free) 200 milliGRAM(s) Oral every 6 hours PRN Cough  hydrocodone/homatropine Syrup 5 milliLiter(s) Oral at bedtime PRN Cough      Allergies    sulfa drugs (Unknown)    Intolerances        Review of Systems:    General:  No wt loss, fevers, chills, night sweats, fatigue   Eyes:  Good vision, no reported pain  ENT:  No sore throat, pain, runny nose, dysphagia  CV:  No pain, palpitations, hypo/hypertension  Resp:  No dyspnea, cough, tachypnea, wheezing  GI:  No pain, No nausea, No vomiting, No diarrhea, No constipation, No weight loss, No fever, No pruritis, No rectal bleeding, No melena, No dysphagia  :  No pain, bleeding, incontinence, nocturia  Muscle:  No pain, weakness  Neuro:  No weakness, tingling, memory problems  Psych:  No fatigue, insomnia, mood problems, depression  Endocrine:  No polyuria, polydypsia, cold/heat intolerance  Heme:  No petechiae, ecchymosis, easy bruisability  Skin:  No rash, tattoos, scars, edema      Vital Signs Last 24 Hrs  T(C): 36.7 (13 Mar 2020 04:34), Max: 36.8 (12 Mar 2020 12:18)  T(F): 98.1 (13 Mar 2020 04:34), Max: 98.2 (12 Mar 2020 12:18)  HR: 100 (13 Mar 2020 04:34) (87 - 100)  BP: 129/70 (13 Mar 2020 04:34) (113/67 - 135/69)  BP(mean): --  RR: 17 (13 Mar 2020 04:34) (17 - 19)  SpO2: 96% (13 Mar 2020 04:34) (96% - 99%)    PHYSICAL EXAM:    Constitutional: oob in recliner in nad  HEENT: ncat  Gastrointestinal: soft nt mild dt  Extremities: edema  Neurological: Awake alert responds appropriately        LABS:                        7.2    13.01 )-----------( 22       ( 12 Mar 2020 06:38 )             21.7     03-13    135  |  108  |  23  ----------------------------<  116<H>  4.2   |  17<L>  |  1.30    Ca    8.1<L>      13 Mar 2020 07:07  Phos  1.5     03-13  Mg     1.9     03-13    TPro  6.4  /  Alb  2.8<L>  /  TBili  3.0<H>  /  DBili  x   /  AST  39<H>  /  ALT  21  /  AlkPhos  175<H>  03-13          RADIOLOGY & ADDITIONAL TESTS:

## 2020-03-14 NOTE — PROGRESS NOTE ADULT - ASSESSMENT
-76 y/o man w Hepatitis B on Entecavirt, Chronic Lymphocytic Leukemia/Small Lymphocytic Lymphoma 4/2018 when presented w immune hemolytic anemia w partial response to steroids, started on Ibrutinib w heme CR and CA by CT scan w some decrease of lymphadenopathies, until 1/2020 when relapsed w incr WBC, anemia(initially not hemolytic, has chronic Matthew positive due to CLL/SLL), thrombocytopenia.  Repeat Flow Cytometry still SLL/CLL, but FISH now w 11q-, 17p-, del of chromosome 12 and 13, all poor prognostic factors.]  Repeat PETCT only mildly hypermetabolic LADs w highest SUV 3, largest conglomerate f LNs ~5x2cm prevascular, mild splenomegaly 14cm  Pt was scheduled for Venetoclax/Rituxan second line treatment w admission for ramp up Venetoclax and tumor lysis prophylaxis when found w Influenza A during the 2/2020 adm, Rx w Temaflu, subsequent also sig more anemic/thrombocytopenic.  Was discharged home and admitted again 2/22/20 with  fever and found w pneumonia, post course of Ceftriaxone, continues to require transfusions plts and PRBCs.  Hep B titer negative  Post IVIG   Due to continue worsening of SLL/CLL w assoc adverse clinical condition/labs, after discussion w pt and wife, started ramp up Venetoclax 3/1/20(planned Venetoclax/Rituxan)  Pt and wife aware of high risk for adverse side effects due to poor performance status and state of disease, risk for tumor lysis due to sig number of circulating CLL/SLL cells in periphery  Sig decr of WBC since Venetoclax, on hold since 3/5(pt's own supply)  WBC from >100 to 3.     Noted increase in WBC to 13, restarted Venetoclax 20mg x2d with WBC declined to 3.   03/11 WBC 10.8, Venetoclax started.   03/12 WBC 13.1, Venetoclax given  03/13 WBC 3.88 Venetoclad HELD, and DC  Stopped again yesterday 03/13/20 03/14 WBC 3.59      still PRBC and Platelets transfusion dependent, no tx today based on CBC and clinical asx, if plts <10k or symptomatic then need platelet transfusion  -hypokalemic/hypophos    On prednisone taper to 10mg qD today      RECOMMEND:   Hgb and plts low. WBC adequate, stable.  Txfuse 1U SDP, 1U PRBC  Hold Venetoclax.   Dosing may need to be adjusted   May need to dose reduce to 10mg (will then need new prescription and order through pt's outpatient specialty pharmacy).   OOB as darvin

## 2020-03-14 NOTE — PROGRESS NOTE ADULT - SUBJECTIVE AND OBJECTIVE BOX
[INTERVAL HX: ]  Patient seen and examined;  Chart reviewed and events noted;   Pt with no new complaints     Patient is a 75y Male with a known history of :  Leukemia not having achieved remission (C95.90) [Active]  KIRTI (obstructive sleep apnea) (G47.33) [Active]  Hypertension (I10) [Active]  Diabetes (E11.9) [Active]  Hepatitis B (B19.10) [Active]  Lymphoma (C85.90) [Active]  AIHA (autoimmune hemolytic anemia) (D59.1) [Active]  Leukemia (C95.90) [Inactive]  Diabetes (E11.9) [Inactive]  HTN (hypertension) (I10) [Inactive]  H/O lithotripsy (Z98.890) [Active]  No significant past surgical history ([id number]) [Inactive]        MEDICATIONS  (STANDING):  acetaminophen   Tablet .. 325 milliGRAM(s) Oral once  diphenhydrAMINE   Elixir 12.5 milliGRAM(s) Oral once  entecavir 0.5 milliGRAM(s) Oral daily  pantoprazole    Tablet 40 milliGRAM(s) Oral before breakfast  predniSONE   Tablet 10 milliGRAM(s) Oral daily    MEDICATIONS  (PRN):  acetaminophen   Tablet .. 650 milliGRAM(s) Oral every 6 hours PRN Mild Pain (1 - 3)  acetaminophen   Tablet .. 650 milliGRAM(s) Oral every 6 hours PRN Temp greater or equal to 38C (100.4F)  albuterol/ipratropium for Nebulization 3 milliLiter(s) Nebulizer every 6 hours PRN Shortness of Breath and/or Wheezing  guaiFENesin   Syrup  (Sugar-Free) 200 milliGRAM(s) Oral every 6 hours PRN Cough  hydrocodone/homatropine Syrup 5 milliLiter(s) Oral at bedtime PRN Cough      Vital Signs Last 24 Hrs  T(C): 37.4 (14 Mar 2020 04:38), Max: 37.4 (14 Mar 2020 04:38)  T(F): 99.3 (14 Mar 2020 04:38), Max: 99.3 (14 Mar 2020 04:38)  HR: 101 (14 Mar 2020 04:38) (85 - 108)  BP: 123/65 (14 Mar 2020 04:38) (109/65 - 146/63)  BP(mean): --  RR: 17 (14 Mar 2020 04:38) (17 - 18)  SpO2: 97% (14 Mar 2020 04:38) (97% - 99%)      [PHYSICAL EXAM]  General: adult in NAD,  WN,  WD.  HEENT: clear oropharynx, anicteric sclera, pink conjunctivae.  Neck: supple, no masses.  CV: normal S1S2, no murmur, no rubs, no gallops.  Lungs: clear to auscultation, no wheezes, no rales, no rhonchi.  Abdomen: soft, non-tender, non-distended, no hepatosplenomegaly, normal BS, no guarding.  Ext: no clubbing, no cyanosis, no edema.  Skin: no rashes,  no petechiae, no venous stasis changes.  Neuro: alert and oriented X  , no focal motor deficits.  LN: no SC FERNIE.      [LABS:]                        7.1    3.59  )-----------( 10       ( 14 Mar 2020 09:40 )             21.4     03-14    135  |  108  |  19  ----------------------------<  146<H>  3.4<L>   |  18<L>  |  1.20    Ca    8.1<L>      14 Mar 2020 09:40  Phos  1.7     03-14  Mg     1.9     03-14    TPro  6.0  /  Alb  2.7<L>  /  TBili  3.1<H>  /  DBili  x   /  AST  30  /  ALT  19  /  AlkPhos  151<H>  03-14                  [RADIOLOGY STUDIES:]

## 2020-03-14 NOTE — PROGRESS NOTE ADULT - SUBJECTIVE AND OBJECTIVE BOX
CHIEF COMPLAINT/INTERVAL HISTORY:  Pt. seen and evaluated for thrombocytopenia and anemia.  Pt. is in no distress.  Reports feeling tired.  Denies having any headache or bleeding.      REVIEW OF SYSTEMS:  No fever, CP, SOB, or abdominal pain.     Vital Signs Last 24 Hrs  T(C): 37.4 (14 Mar 2020 04:38), Max: 37.4 (14 Mar 2020 04:38)  T(F): 99.3 (14 Mar 2020 04:38), Max: 99.3 (14 Mar 2020 04:38)  HR: 101 (14 Mar 2020 04:38) (85 - 108)  BP: 123/65 (14 Mar 2020 04:38) (109/65 - 146/63)  BP(mean): --  RR: 17 (14 Mar 2020 04:38) (17 - 18)  SpO2: 97% (14 Mar 2020 04:38) (97% - 99%)    PHYSICAL EXAM:  GENERAL: NAD  HEENT: EOMI, hearing normal, conjunctiva and sclera clear  Chest: CTA bilaterally, no wheezing  CV: S1S2, RRR,   GI: soft, +BS, NT/ND  Musculoskeletal: + LE pitting edema  Psychiatric: affect nL, mood nL  Skin: warm and dry    LABS:                        7.1    3.59  )-----------( 10       ( 14 Mar 2020 09:40 )             21.4     03-14    135  |  108  |  19  ----------------------------<  146<H>  3.4<L>   |  18<L>  |  1.20    Ca    8.1<L>      14 Mar 2020 09:40  Phos  1.7     03-14  Mg     1.9     03-14    TPro  6.0  /  Alb  2.7<L>  /  TBili  3.1<H>  /  DBili  x   /  AST  30  /  ALT  19  /  AlkPhos  151<H>  03-14    Assessment and Plan:  -CLL/SLL:  Venetoclax held per Heme/Onc  -Thrombocytopenia:  Will give 1 unit SDP today.  Will continue to monitor platelet counts.  -Anemia:  Possible AIHA.  Continue Prednisone 10mg PO daily. Pt. to receive 1 unit PRBC transfusion today.  -Multifocal PNA:  completed course of antibiotics.  Continue Hycodan PRN, Guaifenesin PRN, and Duoneb tx Q6h PRN  -Hyperbilirubinemia:  Abdominal US with multiple gallstones and no evidence of acute cholecystitis.  MRCP with no biliary dilatation or CBD stone.  Pt. without abdominal pain.  GI f/u  -Hepatitis B:  continue Entecavir 0.5mg PO daily  -LE edema:  LE dopplers negative for DVT.  EF 65%.  -CKD stage 3:  stable.  Continue to monitor  -HTN:  Normotensive off any antihypertensive medications  -Type 2 DM:  diet controlled.   -VTE ppx: SCD

## 2020-03-15 LAB
ALBUMIN SERPL ELPH-MCNC: 2.6 G/DL — LOW (ref 3.3–5)
ALP SERPL-CCNC: 150 U/L — HIGH (ref 40–120)
ALT FLD-CCNC: 22 U/L — SIGNIFICANT CHANGE UP (ref 12–78)
ANION GAP SERPL CALC-SCNC: 8 MMOL/L — SIGNIFICANT CHANGE UP (ref 5–17)
AST SERPL-CCNC: 36 U/L — SIGNIFICANT CHANGE UP (ref 15–37)
BILIRUB SERPL-MCNC: 3.4 MG/DL — HIGH (ref 0.2–1.2)
BUN SERPL-MCNC: 20 MG/DL — SIGNIFICANT CHANGE UP (ref 7–23)
CALCIUM SERPL-MCNC: 8.4 MG/DL — LOW (ref 8.5–10.1)
CHLORIDE SERPL-SCNC: 110 MMOL/L — HIGH (ref 96–108)
CO2 SERPL-SCNC: 18 MMOL/L — LOW (ref 22–31)
CREAT SERPL-MCNC: 1.2 MG/DL — SIGNIFICANT CHANGE UP (ref 0.5–1.3)
GLUCOSE SERPL-MCNC: 104 MG/DL — HIGH (ref 70–99)
HCT VFR BLD CALC: 23 % — LOW (ref 39–50)
HCT VFR BLD CALC: 23.3 % — LOW (ref 39–50)
HGB BLD-MCNC: 7.6 G/DL — LOW (ref 13–17)
HGB BLD-MCNC: 7.7 G/DL — LOW (ref 13–17)
MAGNESIUM SERPL-MCNC: 1.8 MG/DL — SIGNIFICANT CHANGE UP (ref 1.6–2.6)
MCHC RBC-ENTMCNC: 29.9 PG — SIGNIFICANT CHANGE UP (ref 27–34)
MCHC RBC-ENTMCNC: 30.4 PG — SIGNIFICANT CHANGE UP (ref 27–34)
MCHC RBC-ENTMCNC: 33 GM/DL — SIGNIFICANT CHANGE UP (ref 32–36)
MCHC RBC-ENTMCNC: 33 GM/DL — SIGNIFICANT CHANGE UP (ref 32–36)
MCV RBC AUTO: 90.6 FL — SIGNIFICANT CHANGE UP (ref 80–100)
MCV RBC AUTO: 92.1 FL — SIGNIFICANT CHANGE UP (ref 80–100)
NRBC # BLD: 0 /100 WBCS — SIGNIFICANT CHANGE UP (ref 0–0)
NRBC # BLD: 0 /100 WBCS — SIGNIFICANT CHANGE UP (ref 0–0)
PHOSPHATE SERPL-MCNC: 1.3 MG/DL — LOW (ref 2.5–4.5)
PLATELET # BLD AUTO: 22 K/UL — LOW (ref 150–400)
PLATELET # BLD AUTO: 8 K/UL — CRITICAL LOW (ref 150–400)
POTASSIUM SERPL-MCNC: 4.2 MMOL/L — SIGNIFICANT CHANGE UP (ref 3.5–5.3)
POTASSIUM SERPL-SCNC: 4.2 MMOL/L — SIGNIFICANT CHANGE UP (ref 3.5–5.3)
PROT SERPL-MCNC: 5.8 G/DL — LOW (ref 6–8.3)
RBC # BLD: 2.53 M/UL — LOW (ref 4.2–5.8)
RBC # BLD: 2.54 M/UL — LOW (ref 4.2–5.8)
RBC # FLD: 18.8 % — HIGH (ref 10.3–14.5)
RBC # FLD: 19.2 % — HIGH (ref 10.3–14.5)
SODIUM SERPL-SCNC: 136 MMOL/L — SIGNIFICANT CHANGE UP (ref 135–145)
WBC # BLD: 2.53 K/UL — LOW (ref 3.8–10.5)
WBC # BLD: 6.71 K/UL — SIGNIFICANT CHANGE UP (ref 3.8–10.5)
WBC # FLD AUTO: 2.53 K/UL — LOW (ref 3.8–10.5)
WBC # FLD AUTO: 6.71 K/UL — SIGNIFICANT CHANGE UP (ref 3.8–10.5)

## 2020-03-15 PROCEDURE — 99233 SBSQ HOSP IP/OBS HIGH 50: CPT

## 2020-03-15 RX ORDER — POTASSIUM PHOSPHATE, MONOBASIC POTASSIUM PHOSPHATE, DIBASIC 236; 224 MG/ML; MG/ML
30 INJECTION, SOLUTION INTRAVENOUS ONCE
Refills: 0 | Status: COMPLETED | OUTPATIENT
Start: 2020-03-15 | End: 2020-03-15

## 2020-03-15 RX ORDER — ACETAMINOPHEN 500 MG
650 TABLET ORAL ONCE
Refills: 0 | Status: COMPLETED | OUTPATIENT
Start: 2020-03-15 | End: 2020-03-15

## 2020-03-15 RX ORDER — DIPHENHYDRAMINE HCL 50 MG
25 CAPSULE ORAL ONCE
Refills: 0 | Status: COMPLETED | OUTPATIENT
Start: 2020-03-15 | End: 2020-03-15

## 2020-03-15 RX ADMIN — PANTOPRAZOLE SODIUM 40 MILLIGRAM(S): 20 TABLET, DELAYED RELEASE ORAL at 05:05

## 2020-03-15 RX ADMIN — Medication 25 MILLIGRAM(S): at 07:59

## 2020-03-15 RX ADMIN — Medication 650 MILLIGRAM(S): at 07:59

## 2020-03-15 RX ADMIN — ENTECAVIR 0.5 MILLIGRAM(S): 0.5 TABLET ORAL at 05:05

## 2020-03-15 RX ADMIN — POTASSIUM PHOSPHATE, MONOBASIC POTASSIUM PHOSPHATE, DIBASIC 83.33 MILLIMOLE(S): 236; 224 INJECTION, SOLUTION INTRAVENOUS at 12:27

## 2020-03-15 RX ADMIN — Medication 650 MILLIGRAM(S): at 08:30

## 2020-03-15 RX ADMIN — Medication 10 MILLIGRAM(S): at 05:05

## 2020-03-15 NOTE — PROGRESS NOTE ADULT - SUBJECTIVE AND OBJECTIVE BOX
CHIEF COMPLAINT/INTERVAL HISTORY:  Pt. seen and evaluated for thrombocytopenia.  Pt. is in no distress.  Reports feeling fatigued.  Denies having any headache or bleeding.      REVIEW OF SYSTEMS:  No fever, CP, SOB, or abdominal pain    Vital Signs Last 24 Hrs  T(C): 37.6 (15 Mar 2020 04:53), Max: 37.6 (14 Mar 2020 20:28)  T(F): 99.7 (15 Mar 2020 04:53), Max: 99.7 (15 Mar 2020 04:53)  HR: 93 (15 Mar 2020 04:53) (85 - 121)  BP: 118/66 (15 Mar 2020 04:53) (112/63 - 168/69)  BP(mean): --  RR: 17 (15 Mar 2020 04:53) (17 - 18)  SpO2: 97% (15 Mar 2020 04:53) (96% - 99%)    PHYSICAL EXAM:  GENERAL: NAD  HEENT: EOMI, hearing normal, conjunctiva and sclera clear  Chest: CTA bilaterally, no wheezing  CV: S1S2, RRR,   GI: soft, +BS, NT/ND  Musculoskeletal: + LE pitting edema  Psychiatric: affect nL, mood nL  Skin: warm and dry    LABS:                        7.6    6.71  )-----------( 8        ( 15 Mar 2020 06:30 )             23.0     03-15    136  |  110<H>  |  20  ----------------------------<  104<H>  4.2   |  18<L>  |  1.20    Ca    8.4<L>      15 Mar 2020 06:30  Phos  1.3     03-15  Mg     1.8     03-15    TPro  5.8<L>  /  Alb  2.6<L>  /  TBili  3.4<H>  /  DBili  x   /  AST  36  /  ALT  22  /  AlkPhos  150<H>  03-15      Assessment and Plan:  -CLL/SLL:  Venetoclax held per Heme/Onc  -Thrombocytopenia:  Will give 1 unit SDP today.  Will continue to monitor platelet counts.  -Anemia:  Possible AIHA.  Continue Prednisone 10mg PO daily.  -Multifocal PNA:  completed course of antibiotics.  Continue Hycodan PRN, Guaifenesin PRN, and Duoneb tx Q6h PRN  -Hyperbilirubinemia:  Abdominal US with multiple gallstones and no evidence of acute cholecystitis.  MRCP with no biliary dilatation or CBD stone.  Pt. without abdominal pain.  GI f/u  -Hepatitis B:  continue Entecavir 0.5mg PO daily  -LE edema:  LE dopplers negative for DVT.  EF 65%.  -CKD stage 3:  stable.  Continue to monitor  -HTN:  Normotensive off any antihypertensive medications  -Type 2 DM:  diet controlled.   -VTE ppx: SCD

## 2020-03-15 NOTE — PROGRESS NOTE ADULT - SUBJECTIVE AND OBJECTIVE BOX
INTERVAL HPI/OVERNIGHT EVENTS:  pt seen and examined  s/p plt transfusion today  tolerating PO  denies n/v abd pain   alk phos plateuing   avss     MEDICATIONS  (STANDING):  entecavir 0.5 milliGRAM(s) Oral daily  pantoprazole    Tablet 40 milliGRAM(s) Oral before breakfast  potassium phosphate IVPB 30 milliMole(s) IV Intermittent once  predniSONE   Tablet 10 milliGRAM(s) Oral daily  venetoclax 20 milliGRAM(s) Oral <User Schedule>    MEDICATIONS  (PRN):  acetaminophen   Tablet .. 650 milliGRAM(s) Oral every 6 hours PRN Mild Pain (1 - 3)  acetaminophen   Tablet .. 650 milliGRAM(s) Oral every 6 hours PRN Temp greater or equal to 38C (100.4F)  albuterol/ipratropium for Nebulization 3 milliLiter(s) Nebulizer every 6 hours PRN Shortness of Breath and/or Wheezing  guaiFENesin   Syrup  (Sugar-Free) 200 milliGRAM(s) Oral every 6 hours PRN Cough  hydrocodone/homatropine Syrup 5 milliLiter(s) Oral at bedtime PRN Cough      Allergies    sulfa drugs (Unknown)    Intolerances        Review of Systems:    General:  No wt loss, fevers, chills, night sweats, fatigue   Eyes:  Good vision, no reported pain  ENT:  No sore throat, pain, runny nose, dysphagia  CV:  No pain, palpitations, hypo/hypertension  Resp:  No dyspnea, cough, tachypnea, wheezing  GI:  No pain, No nausea, No vomiting, No diarrhea, No constipation, No weight loss, No fever, No pruritis, No rectal bleeding, No melena, No dysphagia  :  No pain, bleeding, incontinence, nocturia  Muscle:  No pain, weakness  Neuro:  No weakness, tingling, memory problems  Psych:  No fatigue, insomnia, mood problems, depression  Endocrine:  No polyuria, polydypsia, cold/heat intolerance  Heme:  No petechiae, ecchymosis, easy bruisability  Skin:  No rash, tattoos, scars, edema      Vital Signs Last 24 Hrs  T(C): 36.7 (13 Mar 2020 04:34), Max: 36.8 (12 Mar 2020 12:18)  T(F): 98.1 (13 Mar 2020 04:34), Max: 98.2 (12 Mar 2020 12:18)  HR: 100 (13 Mar 2020 04:34) (87 - 100)  BP: 129/70 (13 Mar 2020 04:34) (113/67 - 135/69)  BP(mean): --  RR: 17 (13 Mar 2020 04:34) (17 - 19)  SpO2: 96% (13 Mar 2020 04:34) (96% - 99%)    PHYSICAL EXAM:    Constitutional: oob in recliner in nad  HEENT: ncat  Gastrointestinal: soft nt mild dt  Extremities: edema  Neurological: Awake alert responds appropriately        LABS:                        7.2    13.01 )-----------( 22       ( 12 Mar 2020 06:38 )             21.7     03-13    135  |  108  |  23  ----------------------------<  116<H>  4.2   |  17<L>  |  1.30    Ca    8.1<L>      13 Mar 2020 07:07  Phos  1.5     03-13  Mg     1.9     03-13    TPro  6.4  /  Alb  2.8<L>  /  TBili  3.0<H>  /  DBili  x   /  AST  39<H>  /  ALT  21  /  AlkPhos  175<H>  03-13          RADIOLOGY & ADDITIONAL TESTS:

## 2020-03-15 NOTE — PROGRESS NOTE ADULT - SUBJECTIVE AND OBJECTIVE BOX
[INTERVAL HX: ]  Patient seen and examined;  Chart reviewed and events noted;     feels well.  No dizziness    Plts txfused yest.      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 Has been coughing as he was recently treated for influenza A on last admission. States that when he was discharged from [facility] on 2/15, he was feeling well and had no fevers. Patient developed a fever, T100.9F (oral) today for which he called heme/onc, Dr. Stephenson. Per Dr. Stuart, patient advised to go to ER. He was found to have a fever, T100.6F (rectal) in ER. Denies recent travel or sick contacts. Denies headache, chest pain, sob, palpitations, abdominal pain, diarrhea, melena, hematochezia, dysuria or hematuria.     Of note, patient was recently admitted to [facility] from 2/14-2/15/2020 for chemotherapy with Venetoclax, found to be febrile with similar associated complaints of generalized malaise, cough, and weakness. Patient was found to be Flu A positive during that admission and was treated with tamiflu. Patient was found to be thrombocytopenic to 35k with a Hb of 6.6, transfused 2U PRBCs and 1U platelets. Chemotherapy was held off due to acute illness.      In ED, VS Tmax 100.6 rectal (repeat s/p tylenol 99.5),  -> 99, BP stable, saturating well on RA  CBC significant for .14 (on d/c 56.25), Hb 6.8 (on d/c 8.7), Plt 11 (on d/c 21)  CMP significant for K 3.4, Cr 1.5 (appears to be baseline per chart review), bili 3.3  Type and screen performed. Will be getting 1 unit pRBCs  Flu/RSV negative  CT Chest: Multilobar patchy peribronchovascular airspace opacities and more dense consolidative process in the right middle lobe likely reflecting multifocal pneumonia. Small right and trace left pleural effusion. Mediastinal and hilar lymphadenopathy worsened compared with prior exam from 8/30/2019. Numerous mildly prominent bilateral axillary lymph nodes. Retroperitoneal lymphadenopathy. Mild splenomegaly. Cholelithiasis.  CXR (wet read): noted to have increased opacities in right lower and middle lobes as well as left lower lobe in comparison to CXR on 2/14/2020  EKG: sinus tachycardic    S/p cefepime 1g IV x1, NS bolus 1L x1, duonebs x2, tylenol 650mg PO x1, 1U PRBCs ordered and to be transfused    Dr. Stuart was called and he recommended to only transfused PRBC and not platelets.  He will see pt in the morning. (22 Feb 2020 00:14)            MEDICATIONS  (STANDING):  entecavir 0.5 milliGRAM(s) Oral daily  pantoprazole    Tablet 40 milliGRAM(s) Oral before breakfast  potassium phosphate IVPB 30 milliMole(s) IV Intermittent once  predniSONE   Tablet 10 milliGRAM(s) Oral daily    MEDICATIONS  (PRN):  acetaminophen   Tablet .. 650 milliGRAM(s) Oral every 6 hours PRN Mild Pain (1 - 3)  acetaminophen   Tablet .. 650 milliGRAM(s) Oral every 6 hours PRN Temp greater or equal to 38C (100.4F)  albuterol/ipratropium for Nebulization 3 milliLiter(s) Nebulizer every 6 hours PRN Shortness of Breath and/or Wheezing  guaiFENesin   Syrup  (Sugar-Free) 200 milliGRAM(s) Oral every 6 hours PRN Cough  hydrocodone/homatropine Syrup 5 milliLiter(s) Oral at bedtime PRN Cough      Vital Signs Last 24 Hrs  T(C): 37.6 (15 Mar 2020 04:53), Max: 37.6 (14 Mar 2020 20:28)  T(F): 99.7 (15 Mar 2020 04:53), Max: 99.7 (15 Mar 2020 04:53)  HR: 93 (15 Mar 2020 04:53) (85 - 121)  BP: 118/66 (15 Mar 2020 04:53) (112/63 - 168/69)  BP(mean): --  RR: 17 (15 Mar 2020 04:53) (17 - 18)  SpO2: 97% (15 Mar 2020 04:53) (96% - 99%)      [PHYSICAL EXAM]  General: adult in NAD,  WN,  WD. pale  HEENT: clear oropharynx, anicteric sclera, pink conjunctivae.  Neck: supple, no masses.  CV: normal S1S2, no murmur, no rubs, no gallops.  Lungs: clear to auscultation, no wheezes, no rales, no rhonchi.  Abdomen: soft, non-tender, non-distended, no hepatosplenomegaly, normal BS, no guarding.  Ext: no clubbing, no cyanosis, no edema.  Skin: no rashes,  no petechiae, no venous stasis changes.  Neuro: alert and oriented X3  , no focal motor deficits.  LN: no SC FERNIE.      [LABS:]                        7.6    6.71  )-----------( 8        ( 15 Mar 2020 06:30 )             23.0     03-15    136  |  110<H>  |  20  ----------------------------<  104<H>  4.2   |  18<L>  |  1.20    Ca    8.4<L>      15 Mar 2020 06:30  Phos  1.3     03-15  Mg     1.8     03-15    TPro  5.8<L>  /  Alb  2.6<L>  /  TBili  3.4<H>  /  DBili  x   /  AST  36  /  ALT  22  /  AlkPhos  150<H>  03-15                  [RADIOLOGY STUDIES:]

## 2020-03-15 NOTE — PROGRESS NOTE ADULT - ASSESSMENT
-74 y/o man w Hepatitis B on Entecavirt, Chronic Lymphocytic Leukemia/Small Lymphocytic Lymphoma 4/2018 when presented w immune hemolytic anemia w partial response to steroids, started on Ibrutinib w heme CR and VT by CT scan w some decrease of lymphadenopathies, until 1/2020 when relapsed w incr WBC, anemia(initially not hemolytic, has chronic Matthew positive due to CLL/SLL), thrombocytopenia.  Repeat Flow Cytometry still SLL/CLL, but FISH now w 11q-, 17p-, del of chromosome 12 and 13, all poor prognostic factors.]  Repeat PETCT only mildly hypermetabolic LADs w highest SUV 3, largest conglomerate f LNs ~5x2cm prevascular, mild splenomegaly 14cm  Pt was scheduled for Venetoclax/Rituxan second line treatment w admission for ramp up Venetoclax and tumor lysis prophylaxis when found w Influenza A during the 2/2020 adm, Rx w Temaflu, subsequent also sig more anemic/thrombocytopenic.  Was discharged home and admitted again 2/22/20 with  fever and found w pneumonia, post course of Ceftriaxone, continues to require transfusions plts and PRBCs.  Hep B titer negative  Post IVIG   Due to continue worsening of SLL/CLL w assoc adverse clinical condition/labs, after discussion w pt and wife, started ramp up Venetoclax 3/1/20(planned Venetoclax/Rituxan)  Pt and wife aware of high risk for adverse side effects due to poor performance status and state of disease, risk for tumor lysis due to sig number of circulating CLL/SLL cells in periphery  Sig decr of WBC since Venetoclax, on hold since 3/5(pt's own supply)  WBC from >100 to 3.     Noted increase in WBC to 13, restarted Venetoclax 20mg x2d with WBC declined to 3.   03/11 WBC 10.8, Venetoclax started.   03/12 WBC 13.1, Venetoclax given  03/13 WBC 3.88 Venetoclad HELD, and DC  Stopped again yesterday 03/13/20 03/14 WBC 3.59      still PRBC and Platelets transfusion dependent, no tx today based on CBC and clinical asx, if plts <10k or symptomatic then need platelet transfusion  -hypokalemic/hypophos    On prednisone taper to 10mg qD today      RECOMMEND:   Hgb and plts low. WBC adequate, stable.  Txfuse 1U SDP, 1U PRBC  Hold Venetoclax today. Consider single dose 20mg tomorrow or 10mg x2d if available or pill can be cut.   Dosing will need to be adjusted based on WBC    Plan to dose reduce to 10mg (will then need new prescription and order through pt's outpatient specialty pharmacy).     OOB as darvin    Transfuse 1U SDP  Check CBC post transfusion.        Long term prognosis guarded.   Remains transfusion dependent.

## 2020-03-16 LAB
ALBUMIN SERPL ELPH-MCNC: 2.4 G/DL — LOW (ref 3.3–5)
ALP SERPL-CCNC: 141 U/L — HIGH (ref 40–120)
ALT FLD-CCNC: 21 U/L — SIGNIFICANT CHANGE UP (ref 12–78)
ANION GAP SERPL CALC-SCNC: 9 MMOL/L — SIGNIFICANT CHANGE UP (ref 5–17)
AST SERPL-CCNC: 44 U/L — HIGH (ref 15–37)
BASOPHILS # BLD AUTO: 0 K/UL — SIGNIFICANT CHANGE UP (ref 0–0.2)
BASOPHILS # BLD AUTO: 0 K/UL — SIGNIFICANT CHANGE UP (ref 0–0.2)
BASOPHILS NFR BLD AUTO: 0 % — SIGNIFICANT CHANGE UP (ref 0–2)
BASOPHILS NFR BLD AUTO: 0 % — SIGNIFICANT CHANGE UP (ref 0–2)
BILIRUB SERPL-MCNC: 3.4 MG/DL — HIGH (ref 0.2–1.2)
BUN SERPL-MCNC: 22 MG/DL — SIGNIFICANT CHANGE UP (ref 7–23)
CALCIUM SERPL-MCNC: 7.8 MG/DL — LOW (ref 8.5–10.1)
CHLORIDE SERPL-SCNC: 109 MMOL/L — HIGH (ref 96–108)
CK MB BLD-MCNC: <5.3 % — HIGH (ref 0–3.5)
CK MB CFR SERPL CALC: <1 NG/ML — SIGNIFICANT CHANGE UP (ref 0–3.6)
CK SERPL-CCNC: 19 U/L — LOW (ref 26–308)
CO2 SERPL-SCNC: 16 MMOL/L — LOW (ref 22–31)
CREAT SERPL-MCNC: 1.2 MG/DL — SIGNIFICANT CHANGE UP (ref 0.5–1.3)
EOSINOPHIL # BLD AUTO: 0 K/UL — SIGNIFICANT CHANGE UP (ref 0–0.5)
EOSINOPHIL # BLD AUTO: 0 K/UL — SIGNIFICANT CHANGE UP (ref 0–0.5)
EOSINOPHIL NFR BLD AUTO: 0 % — SIGNIFICANT CHANGE UP (ref 0–6)
EOSINOPHIL NFR BLD AUTO: 0 % — SIGNIFICANT CHANGE UP (ref 0–6)
GLUCOSE SERPL-MCNC: 128 MG/DL — HIGH (ref 70–99)
HCT VFR BLD CALC: 20.6 % — CRITICAL LOW (ref 39–50)
HCT VFR BLD CALC: 22 % — LOW (ref 39–50)
HGB BLD-MCNC: 6.8 G/DL — CRITICAL LOW (ref 13–17)
HGB BLD-MCNC: 7.5 G/DL — LOW (ref 13–17)
LYMPHOCYTES # BLD AUTO: 1.04 K/UL — SIGNIFICANT CHANGE UP (ref 1–3.3)
LYMPHOCYTES # BLD AUTO: 1.27 K/UL — SIGNIFICANT CHANGE UP (ref 1–3.3)
LYMPHOCYTES # BLD AUTO: 34 % — SIGNIFICANT CHANGE UP (ref 13–44)
LYMPHOCYTES # BLD AUTO: 37 % — SIGNIFICANT CHANGE UP (ref 13–44)
MAGNESIUM SERPL-MCNC: 1.9 MG/DL — SIGNIFICANT CHANGE UP (ref 1.6–2.6)
MAGNESIUM SERPL-MCNC: 1.9 MG/DL — SIGNIFICANT CHANGE UP (ref 1.6–2.6)
MCHC RBC-ENTMCNC: 30 PG — SIGNIFICANT CHANGE UP (ref 27–34)
MCHC RBC-ENTMCNC: 30.9 PG — SIGNIFICANT CHANGE UP (ref 27–34)
MCHC RBC-ENTMCNC: 33 GM/DL — SIGNIFICANT CHANGE UP (ref 32–36)
MCHC RBC-ENTMCNC: 34.1 GM/DL — SIGNIFICANT CHANGE UP (ref 32–36)
MCV RBC AUTO: 90.5 FL — SIGNIFICANT CHANGE UP (ref 80–100)
MCV RBC AUTO: 90.7 FL — SIGNIFICANT CHANGE UP (ref 80–100)
MONOCYTES # BLD AUTO: 0.12 K/UL — SIGNIFICANT CHANGE UP (ref 0–0.9)
MONOCYTES # BLD AUTO: 0.41 K/UL — SIGNIFICANT CHANGE UP (ref 0–0.9)
MONOCYTES NFR BLD AUTO: 12 % — SIGNIFICANT CHANGE UP (ref 2–14)
MONOCYTES NFR BLD AUTO: 4 % — SIGNIFICANT CHANGE UP (ref 2–14)
NEUTROPHILS # BLD AUTO: 0.15 K/UL — LOW (ref 1.8–7.4)
NEUTROPHILS # BLD AUTO: 0.27 K/UL — LOW (ref 1.8–7.4)
NEUTROPHILS NFR BLD AUTO: 4 % — LOW (ref 43–77)
NEUTROPHILS NFR BLD AUTO: 7 % — LOW (ref 43–77)
NRBC # BLD: SIGNIFICANT CHANGE UP /100 WBCS (ref 0–0)
NRBC # BLD: SIGNIFICANT CHANGE UP /100 WBCS (ref 0–0)
PHOSPHATE SERPL-MCNC: 1.5 MG/DL — LOW (ref 2.5–4.5)
PHOSPHATE SERPL-MCNC: 2 MG/DL — LOW (ref 2.5–4.5)
PLATELET # BLD AUTO: 11 K/UL — CRITICAL LOW (ref 150–400)
PLATELET # BLD AUTO: 8 K/UL — CRITICAL LOW (ref 150–400)
POTASSIUM SERPL-MCNC: 3.3 MMOL/L — LOW (ref 3.5–5.3)
POTASSIUM SERPL-SCNC: 3.3 MMOL/L — LOW (ref 3.5–5.3)
PROT SERPL-MCNC: 5.5 G/DL — LOW (ref 6–8.3)
RBC # BLD: 2.27 M/UL — LOW (ref 4.2–5.8)
RBC # BLD: 2.43 M/UL — LOW (ref 4.2–5.8)
RBC # FLD: 19.3 % — HIGH (ref 10.3–14.5)
RBC # FLD: 19.6 % — HIGH (ref 10.3–14.5)
SODIUM SERPL-SCNC: 134 MMOL/L — LOW (ref 135–145)
TROPONIN I SERPL-MCNC: 0.02 NG/ML — SIGNIFICANT CHANGE UP (ref 0.01–0.04)
WBC # BLD: 3.05 K/UL — LOW (ref 3.8–10.5)
WBC # BLD: 3.42 K/UL — LOW (ref 3.8–10.5)
WBC # FLD AUTO: 3.05 K/UL — LOW (ref 3.8–10.5)
WBC # FLD AUTO: 3.42 K/UL — LOW (ref 3.8–10.5)

## 2020-03-16 PROCEDURE — 99233 SBSQ HOSP IP/OBS HIGH 50: CPT

## 2020-03-16 PROCEDURE — 71045 X-RAY EXAM CHEST 1 VIEW: CPT | Mod: 26

## 2020-03-16 PROCEDURE — 93010 ELECTROCARDIOGRAM REPORT: CPT

## 2020-03-16 RX ORDER — IBUPROFEN 200 MG
600 TABLET ORAL ONCE
Refills: 0 | Status: DISCONTINUED | OUTPATIENT
Start: 2020-03-16 | End: 2020-03-16

## 2020-03-16 RX ORDER — CEFEPIME 1 G/1
INJECTION, POWDER, FOR SOLUTION INTRAMUSCULAR; INTRAVENOUS
Refills: 0 | Status: DISCONTINUED | OUTPATIENT
Start: 2020-03-16 | End: 2020-03-22

## 2020-03-16 RX ORDER — CEFEPIME 1 G/1
2000 INJECTION, POWDER, FOR SOLUTION INTRAMUSCULAR; INTRAVENOUS EVERY 12 HOURS
Refills: 0 | Status: DISCONTINUED | OUTPATIENT
Start: 2020-03-17 | End: 2020-03-22

## 2020-03-16 RX ORDER — ACETAMINOPHEN 500 MG
650 TABLET ORAL ONCE
Refills: 0 | Status: COMPLETED | OUTPATIENT
Start: 2020-03-16 | End: 2020-03-16

## 2020-03-16 RX ORDER — SODIUM CHLORIDE 9 MG/ML
1000 INJECTION, SOLUTION INTRAVENOUS
Refills: 0 | Status: DISCONTINUED | OUTPATIENT
Start: 2020-03-16 | End: 2020-03-16

## 2020-03-16 RX ORDER — CEFEPIME 1 G/1
2000 INJECTION, POWDER, FOR SOLUTION INTRAMUSCULAR; INTRAVENOUS ONCE
Refills: 0 | Status: COMPLETED | OUTPATIENT
Start: 2020-03-16 | End: 2020-03-16

## 2020-03-16 RX ORDER — SODIUM CHLORIDE 9 MG/ML
1000 INJECTION, SOLUTION INTRAVENOUS ONCE
Refills: 0 | Status: COMPLETED | OUTPATIENT
Start: 2020-03-16 | End: 2020-03-16

## 2020-03-16 RX ORDER — METOPROLOL TARTRATE 50 MG
2.5 TABLET ORAL ONCE
Refills: 0 | Status: COMPLETED | OUTPATIENT
Start: 2020-03-16 | End: 2020-03-16

## 2020-03-16 RX ORDER — VANCOMYCIN HCL 1 G
1250 VIAL (EA) INTRAVENOUS EVERY 12 HOURS
Refills: 0 | Status: DISCONTINUED | OUTPATIENT
Start: 2020-03-16 | End: 2020-03-19

## 2020-03-16 RX ORDER — DIPHENHYDRAMINE HCL 50 MG
25 CAPSULE ORAL ONCE
Refills: 0 | Status: COMPLETED | OUTPATIENT
Start: 2020-03-16 | End: 2020-03-16

## 2020-03-16 RX ADMIN — SODIUM CHLORIDE 1 MILLILITER(S): 9 INJECTION, SOLUTION INTRAVENOUS at 18:01

## 2020-03-16 RX ADMIN — CEFEPIME 100 MILLIGRAM(S): 1 INJECTION, POWDER, FOR SOLUTION INTRAMUSCULAR; INTRAVENOUS at 18:00

## 2020-03-16 RX ADMIN — Medication 2.5 MILLIGRAM(S): at 18:30

## 2020-03-16 RX ADMIN — Medication 650 MILLIGRAM(S): at 17:15

## 2020-03-16 RX ADMIN — Medication 25 MILLIGRAM(S): at 21:26

## 2020-03-16 RX ADMIN — Medication 650 MILLIGRAM(S): at 21:15

## 2020-03-16 RX ADMIN — Medication 10 MILLIGRAM(S): at 05:02

## 2020-03-16 RX ADMIN — PANTOPRAZOLE SODIUM 40 MILLIGRAM(S): 20 TABLET, DELAYED RELEASE ORAL at 05:03

## 2020-03-16 RX ADMIN — SODIUM CHLORIDE 1000 MILLILITER(S): 9 INJECTION, SOLUTION INTRAVENOUS at 20:00

## 2020-03-16 RX ADMIN — Medication 166.67 MILLIGRAM(S): at 21:12

## 2020-03-16 RX ADMIN — ENTECAVIR 0.5 MILLIGRAM(S): 0.5 TABLET ORAL at 05:03

## 2020-03-16 RX ADMIN — Medication 650 MILLIGRAM(S): at 17:53

## 2020-03-16 RX ADMIN — Medication 650 MILLIGRAM(S): at 21:45

## 2020-03-16 NOTE — PROGRESS NOTE ADULT - ASSESSMENT
74 y/o man w Hepatitis B on Entecavirt, Chronic Lymphocytic Leukemia/Small Lymphocytic Lymphoma 4/2018 when presented w immune hemolytic anemia w partial response to steroids, started on Ibrutinib w heme CR and NC by CT scan w some decrease of lymphadenopathies, until 1/2020 when relapsed w incr WBC, anemia(initially not hemolytic, has chronic Matthew positive due to CLL/SLL), thrombocytopenia.  Repeat Flow Cytometry still SLL/CLL, but FISH now w 11q-, 17p-, del of chromosome 12 and 13, all poor prognostic factors.]  Repeat PETCT only mildly hypermetabolic LADs w highest SUV 3, largest conglomerate f LNs ~5x2cm prevascular, mild splenomegaly 14cm  Pt was scheduled for Venetoclax/Rituxan second line treatment w admission for ramp up Venetoclax and tumor lysis prophylaxis when found w Influenza A during the 2/2020 adm, Rx w Temaflu, subsequent also sig more anemic/thrombocytopenic.  Was discharged home and admitted again 2/22/20 with  fever and found w pneumonia, post course of Ceftriaxone, continues to require transfusions plts and PRBCs.  Hep B titer negative  Post IVIG   Due to continue worsening of SLL/CLL w assoc adverse clinical condition/labs, after discussion w pt and wife, started ramp up Venetoclax 3/1/20(planned Venetoclax/Rituxan)  Pt and wife aware of high risk for adverse side effects due to poor performance status and state of disease, risk for tumor lysis due to sig number of circulating CLL/SLL cells in periphery  Sig decr of WBC since Venetoclax, on hold since 3/5(pt's own supply)  WBC from >100 to 3.     Noted increase in WBC to 13, restarted Venetoclax 20mg x2d with WBC declined to 3.   03/11 WBC 10.8, Venetoclax started.   03/12 WBC 13.1, Venetoclax given  03/13 WBC 3.88 Venetoclad HELD, and DC  Stopped again yesterday 03/13/20 03/14 WBC 3.59      -still PRBC and Platelets transfusion dependent, would give 1 unit platelets and 1 unit pRBC  -On prednisone taper to 10mg qD today  - Hold Venetoclax today; when blood indices slightly better would consider resuming venetoclax at dose 20mg every other day  - Long term prognosis poor  - left message for hospitalist (Dr. Santiago) to discuss care

## 2020-03-16 NOTE — PROGRESS NOTE ADULT - SUBJECTIVE AND OBJECTIVE BOX
INTERVAL HPI/OVERNIGHT EVENTS:  pt seen and examined  offers no gi complaints  frustrated that still in hospital  sp plts yesterday    MEDICATIONS  (STANDING):  entecavir 0.5 milliGRAM(s) Oral daily  pantoprazole    Tablet 40 milliGRAM(s) Oral before breakfast  predniSONE   Tablet 10 milliGRAM(s) Oral daily    MEDICATIONS  (PRN):  acetaminophen   Tablet .. 650 milliGRAM(s) Oral every 6 hours PRN Mild Pain (1 - 3)  acetaminophen   Tablet .. 650 milliGRAM(s) Oral every 6 hours PRN Temp greater or equal to 38C (100.4F)  albuterol/ipratropium for Nebulization 3 milliLiter(s) Nebulizer every 6 hours PRN Shortness of Breath and/or Wheezing  guaiFENesin   Syrup  (Sugar-Free) 200 milliGRAM(s) Oral every 6 hours PRN Cough  hydrocodone/homatropine Syrup 5 milliLiter(s) Oral at bedtime PRN Cough      Allergies    sulfa drugs (Unknown)    Intolerances        Review of Systems:    General:  No wt loss, fevers, chills, night sweats, fatigue   Eyes:  Good vision, no reported pain  ENT:  No sore throat, pain, runny nose, dysphagia  CV:  No pain, palpitations, hypo/hypertension  Resp:  No dyspnea, cough, tachypnea, wheezing  GI:  No pain, No nausea, No vomiting, No diarrhea, No constipation, No weight loss, No fever, No pruritis, No rectal bleeding, No melena, No dysphagia  :  No pain, bleeding, incontinence, nocturia  Muscle:  No pain, weakness  Neuro:  No weakness, tingling, memory problems  Psych:  No fatigue, insomnia, mood problems, depression  Endocrine:  No polyuria, polydypsia, cold/heat intolerance  Heme:  No petechiae, ecchymosis, easy bruisability  Skin:  No rash, tattoos, scars, edema      Vital Signs Last 24 Hrs  T(C): 37.1 (16 Mar 2020 04:53), Max: 37.1 (15 Mar 2020 20:20)  T(F): 98.8 (16 Mar 2020 04:53), Max: 98.8 (15 Mar 2020 20:20)  HR: 99 (16 Mar 2020 04:53) (79 - 103)  BP: 119/58 (16 Mar 2020 04:53) (109/63 - 119/58)  BP(mean): --  RR: 17 (16 Mar 2020 04:53) (17 - 18)  SpO2: 98% (16 Mar 2020 04:53) (98% - 99%)    PHYSICAL EXAM:    Constitutional: nad  HEENT: ncat  Gastrointestinal: soft nt mild dt  Extremities: edema  Neurological: Awake alert responds appropriately    LABS:                        7.7    2.53  )-----------( 22       ( 15 Mar 2020 14:02 )             23.3     03-15    136  |  110<H>  |  20  ----------------------------<  104<H>  4.2   |  18<L>  |  1.20    Ca    8.4<L>      15 Mar 2020 06:30  Phos  1.3     03-15  Mg     1.8     03-15    TPro  5.8<L>  /  Alb  2.6<L>  /  TBili  3.4<H>  /  DBili  x   /  AST  36  /  ALT  22  /  AlkPhos  150<H>  03-15          RADIOLOGY & ADDITIONAL TESTS: INTERVAL HPI/OVERNIGHT EVENTS:  pt seen and examined  offers no gi complaints  frustrated that still in hospital  no s/s gib per nursing  sp plts yesterday    MEDICATIONS  (STANDING):  entecavir 0.5 milliGRAM(s) Oral daily  pantoprazole    Tablet 40 milliGRAM(s) Oral before breakfast  predniSONE   Tablet 10 milliGRAM(s) Oral daily    MEDICATIONS  (PRN):  acetaminophen   Tablet .. 650 milliGRAM(s) Oral every 6 hours PRN Mild Pain (1 - 3)  acetaminophen   Tablet .. 650 milliGRAM(s) Oral every 6 hours PRN Temp greater or equal to 38C (100.4F)  albuterol/ipratropium for Nebulization 3 milliLiter(s) Nebulizer every 6 hours PRN Shortness of Breath and/or Wheezing  guaiFENesin   Syrup  (Sugar-Free) 200 milliGRAM(s) Oral every 6 hours PRN Cough  hydrocodone/homatropine Syrup 5 milliLiter(s) Oral at bedtime PRN Cough      Allergies    sulfa drugs (Unknown)    Intolerances        Review of Systems:    General:  No wt loss, fevers, chills, night sweats, fatigue   Eyes:  Good vision, no reported pain  ENT:  No sore throat, pain, runny nose, dysphagia  CV:  No pain, palpitations, hypo/hypertension  Resp:  No dyspnea, cough, tachypnea, wheezing  GI:  No pain, No nausea, No vomiting, No diarrhea, No constipation, No weight loss, No fever, No pruritis, No rectal bleeding, No melena, No dysphagia  :  No pain, bleeding, incontinence, nocturia  Muscle:  No pain, weakness  Neuro:  No weakness, tingling, memory problems  Psych:  No fatigue, insomnia, mood problems, depression  Endocrine:  No polyuria, polydypsia, cold/heat intolerance  Heme:  No petechiae, ecchymosis, easy bruisability  Skin:  No rash, tattoos, scars, edema      Vital Signs Last 24 Hrs  T(C): 37.1 (16 Mar 2020 04:53), Max: 37.1 (15 Mar 2020 20:20)  T(F): 98.8 (16 Mar 2020 04:53), Max: 98.8 (15 Mar 2020 20:20)  HR: 99 (16 Mar 2020 04:53) (79 - 103)  BP: 119/58 (16 Mar 2020 04:53) (109/63 - 119/58)  BP(mean): --  RR: 17 (16 Mar 2020 04:53) (17 - 18)  SpO2: 98% (16 Mar 2020 04:53) (98% - 99%)    PHYSICAL EXAM:    Constitutional: nad  HEENT: ncat  Gastrointestinal: soft nt mild dt  Extremities: edema  Neurological: Awake alert responds appropriately    LABS:                        7.7    2.53  )-----------( 22       ( 15 Mar 2020 14:02 )             23.3     03-15    136  |  110<H>  |  20  ----------------------------<  104<H>  4.2   |  18<L>  |  1.20    Ca    8.4<L>      15 Mar 2020 06:30  Phos  1.3     03-15  Mg     1.8     03-15    TPro  5.8<L>  /  Alb  2.6<L>  /  TBili  3.4<H>  /  DBili  x   /  AST  36  /  ALT  22  /  AlkPhos  150<H>  03-15          RADIOLOGY & ADDITIONAL TESTS:

## 2020-03-16 NOTE — CHART NOTE - NSCHARTNOTEFT_GEN_A_CORE
Assessment: Patient seen for nutrition f/u. Chart reviewed, hospital course noted.    Patient alert and oriented. Not feeling well during time of visit, +shivers and weakness. States appetite remains good as he knows how important adequate intake is. Does c/o alteration in taste (medication related). Denied N/V/diarrhea/constipation, last BM today per pt.    Factors impacting intake: [ ] none [ ] nausea  [ ] vomiting [ ] diarrhea [ ] constipation  [ ]chewing problems [ ] swallowing issues  [X] other: changes in taste related to cancer medication    Diet Presciption: Diet, Consistent Carbohydrate w/Evening Snack (03-01-20 @ 11:26)    Intake: %    Current Weight: 166 pounds (3/16)  % Weight Change    Pertinent Medications: MEDICATIONS  (STANDING):  entecavir 0.5 milliGRAM(s) Oral daily  pantoprazole    Tablet 40 milliGRAM(s) Oral before breakfast  predniSONE   Tablet 10 milliGRAM(s) Oral daily    MEDICATIONS  (PRN):  acetaminophen   Tablet .. 650 milliGRAM(s) Oral every 6 hours PRN Mild Pain (1 - 3)  acetaminophen   Tablet .. 650 milliGRAM(s) Oral every 6 hours PRN Temp greater or equal to 38C (100.4F)  albuterol/ipratropium for Nebulization 3 milliLiter(s) Nebulizer every 6 hours PRN Shortness of Breath and/or Wheezing  guaiFENesin   Syrup  (Sugar-Free) 200 milliGRAM(s) Oral every 6 hours PRN Cough  hydrocodone/homatropine Syrup 5 milliLiter(s) Oral at bedtime PRN Cough    Pertinent Labs: 03-16 Na134 mmol/L<L> Glu 128 mg/dL<H> K+ 3.3 mmol/L<L> Cr  1.20 mg/dL BUN 22 mg/dL 03-16 Phos 1.5 mg/dL<L> 03-16 Alb 2.4 g/dL<L>     CAPILLARY BLOOD GLUCOSE        Skin: +2 b/l leg edema    Estimated Needs:   [X] no change since previous assessment  [ ] recalculated:     Previous Nutrition Diagnosis:   [X] Altered Nutrition Labs    Nutrition Diagnosis is [X] ongoing  [ ] resolved [ ] not applicable     New Nutrition Diagnosis: [X] not applicable       Interventions:   Recommend  [ ] Change Diet To:  [X] Nutrition Supplement: Trial of Glucerna shake with dinner.  [ ] Nutrition Support  [X] Other: Encourage po intake. Replete electrolytes prn.    Monitoring and Evaluation:   [ ] PO intake [ x ] Tolerance to diet prescription [ x ] weights [ x ] labs[ x ] follow up per protocol  [ ] other:

## 2020-03-16 NOTE — CHART NOTE - NSCHARTNOTEFT_GEN_A_CORE
Resident Rapid Response Note    Patient is a 75y old  Male who presents with a chief complaint of weakness, anemia (16 Mar 2020 11:25)      Rapid response was called at on this 75y Male patient for temp 106, tachycardia    Patient was seen and examined at the bedside by the rapid response team and primary team. ICU fellow at bedside. Patient reports that he feels well and has no current complaints. He denies shortness of breath, chest pain, palpitations, dizziness, fevers, diaphoresis, chills, cough.    Rapid Response Vital Signs:  BP: 151/76  HR: 168  RR: 20  SpO2: 98 % on RA   Temp: 106 rectal    Vital Signs Last 24 Hrs  T(C): 41.2 (16 Mar 2020 16:56), Max: 41.2 (16 Mar 2020 16:56)  T(F): 106.1 (16 Mar 2020 16:56), Max: 106.1 (16 Mar 2020 16:56)  HR: 163 (16 Mar 2020 16:56) (99 - 163)  BP: 151/76 (16 Mar 2020 16:56) (115/62 - 151/76)  BP(mean): --  RR: 18 (16 Mar 2020 16:56) (17 - 18)  SpO2: 98% (16 Mar 2020 16:56) (98% - 100%)    Physical Exam:  General: Well developed, well nourished, in no acute distress  HEENT: NCAT, PERRLA, EOMI bl, moist mucous membranes   Neck: Supple, nontender, no mass  Neurology: A&Ox3, nonfocal, CN II-XII grossly intact, sensation intact, no gait abnormalities   Respiratory: CTA B/L, No wheezing, rales, or rhonchi  CV: RRR, S1/S2 present, no murmurs, rubs, or gallops  Abdominal: Soft, nontender, non-distended, normoactive bowel sounds  Extremities: No C/C/E, peripheral pulses present  MSK: Normal ROM, no joint erythema or warmth, no joint swelling   Skin: warm, dry, normal color, no obvious rash or abnormal lesions    LABS:                        6.8    3.42  )-----------( 8        ( 16 Mar 2020 09:51 )             20.6     16 Mar 2020 09:51    134    |  109    |  22     ----------------------------<  128    3.3     |  16     |  1.20     Ca    7.8        16 Mar 2020 09:51  Phos  1.5       16 Mar 2020 09:51  Mg     1.9       16 Mar 2020 09:51    TPro  5.5    /  Alb  2.4    /  TBili  3.4    /  DBili  x      /  AST  44     /  ALT  21     /  AlkPhos  141    16 Mar 2020 09:51    CAPILLARY BLOOD GLUCOSE    POCT Blood Glucose.: 189 mg/dL (16 Mar 2020 17:01)    RADIOLOGY & ADDITIONAL TESTS: No new imaging    Assessment/Plan: 76 y/o man w Hepatitis B on Entecavirt, 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 RRT called for fever and tachycardia.    - S/p tylenol, 1L LR, cooling blanket, 2.5 lopressor  - Vanc/Cefepime started  - EKG showing atrial fibrillation w/ RVR at   - CXR stat grossly unchanged from prior, f/u Radiology read  - F/u blood cultures, urine cultures  - Infectious Disease Consulted, Dr. Webber  - Cardiology consulted, Dr. Dawkins group  -Will continue to follow, RN to call if any changes. Resident Rapid Response Note    Patient is a 75y old  Male who presents with a chief complaint of weakness, anemia (16 Mar 2020 11:25)      Rapid response was called at on this 75y Male patient for temp 106, tachycardia    Patient was seen and examined at the bedside by the rapid response team and primary team. ICU fellow at bedside. Patient reports that he feels well and has no current complaints. He denies shortness of breath, chest pain, palpitations, dizziness, fevers, diaphoresis, chills, cough.    Rapid Response Vital Signs:  BP: 151/76  HR: 168  RR: 20  SpO2: 98 % on RA   Temp: 106 rectal    Vital Signs Last 24 Hrs  T(C): 41.2 (16 Mar 2020 16:56), Max: 41.2 (16 Mar 2020 16:56)  T(F): 106.1 (16 Mar 2020 16:56), Max: 106.1 (16 Mar 2020 16:56)  HR: 163 (16 Mar 2020 16:56) (99 - 163)  BP: 151/76 (16 Mar 2020 16:56) (115/62 - 151/76)  BP(mean): --  RR: 18 (16 Mar 2020 16:56) (17 - 18)  SpO2: 98% (16 Mar 2020 16:56) (98% - 100%)    Physical Exam:  General: Well developed, well nourished, in no acute distress  HEENT: NCAT, PERRLA, EOMI bl, moist mucous membranes   Neck: Supple, nontender, no mass  Neurology: A&Ox3, nonfocal, CN II-XII grossly intact, sensation intact, no gait abnormalities   Respiratory: CTA B/L, No wheezing, rales, or rhonchi  CV: RRR, S1/S2 present, no murmurs, rubs, or gallops  Abdominal: Soft, nontender, non-distended, normoactive bowel sounds  Extremities: No C/C/E, peripheral pulses present  MSK: Normal ROM, no joint erythema or warmth, no joint swelling   Skin: warm, dry, normal color, no obvious rash or abnormal lesions    LABS:                        6.8    3.42  )-----------( 8        ( 16 Mar 2020 09:51 )             20.6     16 Mar 2020 09:51    134    |  109    |  22     ----------------------------<  128    3.3     |  16     |  1.20     Ca    7.8        16 Mar 2020 09:51  Phos  1.5       16 Mar 2020 09:51  Mg     1.9       16 Mar 2020 09:51    TPro  5.5    /  Alb  2.4    /  TBili  3.4    /  DBili  x      /  AST  44     /  ALT  21     /  AlkPhos  141    16 Mar 2020 09:51    CAPILLARY BLOOD GLUCOSE    POCT Blood Glucose.: 189 mg/dL (16 Mar 2020 17:01)    RADIOLOGY & ADDITIONAL TESTS: No new imaging    Assessment/Plan: 76 y/o man w Hepatitis B on Entecavirt, Chronic Lymphocytic Leukemia/Small Lymphocytic Lymphoma 4/2018 when presented w immune hemolytic anemia w partial response to steroids, started on Ibrutinib w heme CR and SC by CT scan w some decrease of lymphadenopathies, until 1/2020 when relapsed w incr WBC, anemia(initially not hemolytic, has chronic Matthew positive due to CLL/SLL), thrombocytopenia. RRT called for neutropenic fever and tachycardia.    - S/p tylenol, 1L LR, cooling blanket, 2.5 lopressor  - Vanc/Cefepime started  - EKG showing atrial fibrillation w/ RVR at   - Transfer to telemetry for monitoring  - CXR stat grossly unchanged from prior, f/u Radiology read  - F/u blood cultures, urine cultures  - Infectious Disease Consulted, Dr. Webber  - Cardiology consulted, Dr. Dawkins group  -Will continue to follow, RN to call if any changes.

## 2020-03-16 NOTE — CHART NOTE - NSCHARTNOTEFT_GEN_A_CORE
RAPID RESPONSE FOLLOW UP NOTE    Patient seen and examined at bedside. RR called for fever of 106 and tachycardia. Patient reassessed approximately two hours later. Patient seen and examined at bedside. He states that he is feeling good. He denies feeling any fever, chills, headache, shortness of breath, chest pain, nausea, abdominal pain. He complains of lingering cough, which has been a chronic issue during his hospitalization.     Vital Signs Last 24 Hrs  T(C): 37.6 (16 Mar 2020 19:14), Max: 41.2 (16 Mar 2020 16:56)  T(F): 99.7 (16 Mar 2020 19:14), Max: 106.1 (16 Mar 2020 16:56)  HR: 112 (16 Mar 2020 19:14) (99 - 163)  BP: 110/57 (16 Mar 2020 19:14) (110/57 - 151/76)  BP(mean): --  RR: 19 (16 Mar 2020 19:14) (17 - 19)  SpO2: 98% (16 Mar 2020 19:14) (98% - 100%)    PHYSICAL EXAM:  GENERAL: NAD, lying in bed comfortably  HEAD:  Atraumatic, Normocephalic  EYES: EOMI, conjunctiva and sclera clear  ENT: Moist mucous membranes  NECK: Supple, No JVD  CHEST/LUNG: Clear to auscultation bilaterally; No rales, rhonchi, wheezing, or rubs. Unlabored respirations  HEART: Regular rate and rhythm; No murmurs, rubs, or gallops  ABDOMEN: Bowel sounds present; Soft, Nontender, Nondistended   EXTREMITIES:  1+ pitting edema in lower extremities bilaterally.  NERVOUS SYSTEM:  Alert & Oriented X3, speech clear. No facial droop, tongue protrusion midline.      LABS:                        7.5    3.05  )-----------( 11       ( 16 Mar 2020 17:39 )             22.0       03-16    134<L>  |  109<H>  |  22  ----------------------------<  128<H>  3.3<L>   |  16<L>  |  1.20    Ca    7.8<L>      16 Mar 2020 09:51  Phos  2.0     03-16  Mg     1.9     03-16    TPro  5.5<L>  /  Alb  2.4<L>  /  TBili  3.4<H>  /  DBili  x   /  AST  44<H>  /  ALT  21  /  AlkPhos  141<H>  03-16      LIVER FUNCTIONS - ( 16 Mar 2020 09:51 )  Alb: 2.4 g/dL / Pro: 5.5 g/dL / ALK PHOS: 141 U/L / ALT: 21 U/L / AST: 44 U/L / GGT: x             CAPILLARY BLOOD GLUCOSE      POCT Blood Glucose.: 189 mg/dL (16 Mar 2020 17:01)        CARDIAC MARKERS ( 16 Mar 2020 17:42 )  .017 ng/mL / x     / 19 U/L / x     / <1.0 ng/mL        I&O's Summary    15 Mar 2020 07:01  -  16 Mar 2020 07:00  --------------------------------------------------------  IN: 500 mL / OUT: 0 mL / NET: 500 mL    16 Mar 2020 07:01  -  16 Mar 2020 21:12  --------------------------------------------------------  IN: 1288 mL / OUT: 0 mL / NET: 1288 mL      Assessment/Plan: 74 y/o man w Hepatitis B on Entecavirt, Chronic Lymphocytic Leukemia/Small Lymphocytic Lymphoma 4/2018 when presented w immune hemolytic anemia w partial response to steroids, started on Ibrutinib w heme CR and TN by CT scan w some decrease of lymphadenopathies, until 1/2020 when relapsed w incr WBC, anemia(initially not hemolytic, has chronic Matthew positive due to CLL/SLL), thrombocytopenia. RRT called for neutropenic fever and tachycardia.    - S/p tylenol, 1L LR, cooling blanket, 2.5 lopressor  - Vanc/Cefepime started  - EKG showing atrial fibrillation w/ RVR at   - Patient now on telemetry for monitoring  - CXR stat grossly unchanged from prior, f/u Radiology read  - Clinically improved, patient largely asymptomatic currently.   - F/u blood cultures, urine cultures  - Patient still due for 1uPRBC today, will give when fever subsides.   - Infectious Disease Consulted, Dr. Webber  - Cardiology consulted, Dr. Dawkins group  - Will continue to follow, RN to call if any changes.

## 2020-03-16 NOTE — PROGRESS NOTE ADULT - SUBJECTIVE AND OBJECTIVE BOX
Patient is a 75y old  Male who presents with a chief complaint of weakness, anemia (16 Mar 2020 11:25)      SUBJECTIVE / OVERNIGHT EVENTS:pt seen and examined. was febrile and also had plt and prbc transfusion.        Vital Signs Last 24 Hrs  T(C): 37.6 (16 Mar 2020 19:14), Max: 41.2 (16 Mar 2020 16:56)  T(F): 99.7 (16 Mar 2020 19:14), Max: 106.1 (16 Mar 2020 16:56)  HR: 112 (16 Mar 2020 19:14) (99 - 163)  BP: 110/57 (16 Mar 2020 19:14) (110/57 - 151/76)  BP(mean): --  RR: 19 (16 Mar 2020 19:14) (17 - 19)  SpO2: 98% (16 Mar 2020 19:14) (98% - 100%)  I&O's Summary    15 Mar 2020 07:01  -  16 Mar 2020 07:00  --------------------------------------------------------  IN: 500 mL / OUT: 0 mL / NET: 500 mL    16 Mar 2020 07:01  -  16 Mar 2020 21:57  --------------------------------------------------------  IN: 1288 mL / OUT: 0 mL / NET: 1288 mL        PHYSICAL EXAM:  GENERAL: NAD, Comfortable  HEAD:  Atraumatic, Normocephalic  EYES: EOMI, PERRLA, conjunctiva and sclera clear  NECK: Supple, No JVD  CHEST/LUNG: dec bs at bases  HEART: Regular rate and rhythm; No murmurs, rubs, or gallops  ABDOMEN: Soft, Nontender, Nondistended; Bowel sounds present  Neuro: AAO x 3, no focal deficit, 5/5 b/l extremities  EXTREMITIES:  2+ Peripheral Pulses, No clubbing, cyanosis, or edema  SKIN: No rashes or lesions    LABS:                        7.5    3.05  )-----------( 11       ( 16 Mar 2020 17:39 )             22.0     03-16    134<L>  |  109<H>  |  22  ----------------------------<  128<H>  3.3<L>   |  16<L>  |  1.20    Ca    7.8<L>      16 Mar 2020 09:51  Phos  2.0     03-16  Mg     1.9     03-16    TPro  5.5<L>  /  Alb  2.4<L>  /  TBili  3.4<H>  /  DBili  x   /  AST  44<H>  /  ALT  21  /  AlkPhos  141<H>  03-16      CAPILLARY BLOOD GLUCOSE      POCT Blood Glucose.: 189 mg/dL (16 Mar 2020 17:01)    CARDIAC MARKERS ( 16 Mar 2020 17:42 )  .017 ng/mL / x     / 19 U/L / x     / <1.0 ng/mL          RADIOLOGY & ADDITIONAL TESTS:    Imaging Personally Reviewed:  [x] YES  [ ] NO    Consultant(s) Notes Reviewed:  [x] YES  [ ] NO      MEDICATIONS  (STANDING):  cefepime   IVPB      entecavir 0.5 milliGRAM(s) Oral daily  pantoprazole    Tablet 40 milliGRAM(s) Oral before breakfast  predniSONE   Tablet 10 milliGRAM(s) Oral daily  vancomycin  IVPB 1250 milliGRAM(s) IV Intermittent every 12 hours    MEDICATIONS  (PRN):  acetaminophen   Tablet .. 650 milliGRAM(s) Oral every 6 hours PRN Mild Pain (1 - 3)  acetaminophen   Tablet .. 650 milliGRAM(s) Oral every 6 hours PRN Temp greater or equal to 38C (100.4F)  albuterol/ipratropium for Nebulization 3 milliLiter(s) Nebulizer every 6 hours PRN Shortness of Breath and/or Wheezing  guaiFENesin   Syrup  (Sugar-Free) 200 milliGRAM(s) Oral every 6 hours PRN Cough  hydrocodone/homatropine Syrup 5 milliLiter(s) Oral at bedtime PRN Cough      Care Discussed with Consultants/Other Providers [x] YES  [ ] NO    HEALTH ISSUES - PROBLEM Dx:  Electrolyte abnormality: Electrolyte abnormality  Hypokalemia: Hypokalemia  Pneumonia due to infectious organism, unspecified laterality, unspecified part of lung: Pneumonia due to infectious organism, unspecified laterality, unspecified part of lung  Pneumonia: Pneumonia  CLL (chronic lymphocytic leukemia): CLL (chronic lymphocytic leukemia)  Hyperbilirubinemia: Hyperbilirubinemia  KIRTI (obstructive sleep apnea): KIRTI (obstructive sleep apnea)  CKD (chronic kidney disease), stage III: CKD (chronic kidney disease), stage III  Lower extremity edema: Lower extremity edema  Thrombocytopenia: Thrombocytopenia  Need for prophylactic measure: Need for prophylactic measure  Diabetes: Diabetes  Hypertension: Hypertension  Hepatitis B: Hepatitis B  Anemia: Anemia  Fever: Fever  Lymphoma: Lymphoma  Multifocal pneumonia: Multifocal pneumonia

## 2020-03-16 NOTE — PHARMACOTHERAPY INTERVENTION NOTE - COMMENTS
On IV pantoprazole 40mg push daily: No apparent GIB, tolerating PO meds.  D/w dr Clement.  Converted to oral per telephone order.
Described all medications and indications with patient and wife at bedside.  Wrote list of these medications so they can review with their pharmacist son.
Provider would like to start patient on Cefepime and Vancomycin. Recommended Vancomycin 1250mg Q12hrs and Cefepime 2g Q12hrs based on weight/renal function; provider agreed.

## 2020-03-16 NOTE — PROGRESS NOTE ADULT - SUBJECTIVE AND OBJECTIVE BOX
Patient seen and examined;  Chart reviewed and events noted;   feeling more tired    MEDICATIONS  (STANDING):  entecavir 0.5 milliGRAM(s) Oral daily  pantoprazole    Tablet 40 milliGRAM(s) Oral before breakfast  predniSONE   Tablet 10 milliGRAM(s) Oral daily    MEDICATIONS  (PRN):  acetaminophen   Tablet .. 650 milliGRAM(s) Oral every 6 hours PRN Mild Pain (1 - 3)  acetaminophen   Tablet .. 650 milliGRAM(s) Oral every 6 hours PRN Temp greater or equal to 38C (100.4F)  albuterol/ipratropium for Nebulization 3 milliLiter(s) Nebulizer every 6 hours PRN Shortness of Breath and/or Wheezing  guaiFENesin   Syrup  (Sugar-Free) 200 milliGRAM(s) Oral every 6 hours PRN Cough  hydrocodone/homatropine Syrup 5 milliLiter(s) Oral at bedtime PRN Cough      Vital Signs Last 24 Hrs  T(C): 37.1 (16 Mar 2020 04:53), Max: 37.1 (15 Mar 2020 20:20)  T(F): 98.8 (16 Mar 2020 04:53), Max: 98.8 (15 Mar 2020 20:20)  HR: 99 (16 Mar 2020 04:53) (79 - 103)  BP: 119/58 (16 Mar 2020 04:53) (109/63 - 119/58)  RR: 17 (16 Mar 2020 04:53) (17 - 18)  SpO2: 98% (16 Mar 2020 04:53) (98% - 99%)    PHYSICAL EXAM  General: adult in NAD  HEENT: clear oropharynx, anicteric sclera, pink conjunctivae  Neck: supple  CV: normal S1S2 with no murmur rubs or gallops  Lungs: clear to auscultation, no wheezes, no rhales  Abdomen: soft non-tender non-distended, no hepato/splenomegaly  Ext: no clubbing cyanosis or edema  Skin: + bruising  Neuro: alert and oriented X3 no focal deficits      LABS:                        6.8    3.42  )-----------( 8        ( 16 Mar 2020 09:51 )             20.6     Hemoglobin: 6.8 g/dL (03-16 @ 09:51)  Hemoglobin: 7.7 g/dL (03-15 @ 14:02)  Hemoglobin: 7.6 g/dL (03-15 @ 06:30)  Hemoglobin: 7.1 g/dL (03-14 @ 09:40)  Hemoglobin: 7.8 g/dL (03-13 @ 09:37)    Platelet Count - Automated: 8 K/uL (03-16 @ 09:51)  Platelet Count - Automated: 22 K/uL (03-15 @ 14:02)  Platelet Count - Automated: 8 K/uL (03-15 @ 06:30)  Platelet Count - Automated: 10 K/uL (03-14 @ 09:40)  Platelet Count - Automated: 8 K/uL (03-13 @ 09:37)    WBC Count: 3.42 K/uL (03-16 @ 09:51)  WBC Count: 2.53 K/uL (03-15 @ 14:02)  WBC Count: 6.71 K/uL (03-15 @ 06:30)  WBC Count: 3.59 K/uL (03-14 @ 09:40)  WBC Count: 3.88 K/uL (03-13 @ 09:37)    03-16    134<L>  |  109<H>  |  22  ----------------------------<  128<H>  3.3<L>   |  16<L>  |  1.20    Ca    7.8<L>      16 Mar 2020 09:51  Phos  1.5     03-16  Mg     1.9     03-16    TPro  5.5<L>  /  Alb  2.4<L>  /  TBili  3.4<H>  /  DBili  x   /  AST  44<H>  /  ALT  21  /  AlkPhos  141<H>  03-16

## 2020-03-16 NOTE — PROGRESS NOTE ADULT - ASSESSMENT
Assessment and Plan:    Fever s/p chemo ??neutorpenic fever:  blood cx stta, urine cx, cxr, vancomycin and cefepime stat. ID [email or web address]  tried calling [facility] awaiting call back. i can be reached at [phone].    Hypophospahtemia:  replace orally    -CLL/SLL:  Venetoclax held per Heme/Onc    -Thrombocytopenia:  Will give 1 unit SDP today.  Will continue to monitor platelet counts. tarnsfuse as per heme    -Anemia:  Possible AIHA.  Continue Prednisone 10mg PO daily.transfuse as per heme    -Multifocal PNA:  completed course of antibiotics.  Continue Hycodan PRN, Guaifenesin PRN, and Duoneb tx Q6h PRN    -Hyperbilirubinemia:  Abdominal US with multiple gallstones and no evidence of acute cholecystitis.  MRCP with no biliary dilatation or CBD stone.  Pt. without abdominal pain.  GI f/u- cont ot monitor savana    -Hepatitis B:  continue Entecavir 0.5mg PO daily    -LE edema:  LE dopplers negative for DVT.  EF 65%.    -CKD stage 3:  stable.  Continue to monitor    -HTN:  Normotensive off any antihypertensive medications    -Type 2 DM:  diet controlled.     -VTE ppx: SCD

## 2020-03-17 LAB
ANION GAP SERPL CALC-SCNC: 10 MMOL/L — SIGNIFICANT CHANGE UP (ref 5–17)
APPEARANCE UR: ABNORMAL
BASOPHILS # BLD AUTO: 0 K/UL — SIGNIFICANT CHANGE UP (ref 0–0.2)
BASOPHILS NFR BLD AUTO: 0 % — SIGNIFICANT CHANGE UP (ref 0–2)
BILIRUB UR-MCNC: NEGATIVE — SIGNIFICANT CHANGE UP
BUN SERPL-MCNC: 23 MG/DL — SIGNIFICANT CHANGE UP (ref 7–23)
CALCIUM SERPL-MCNC: 8 MG/DL — LOW (ref 8.5–10.1)
CHLORIDE SERPL-SCNC: 111 MMOL/L — HIGH (ref 96–108)
CO2 SERPL-SCNC: 17 MMOL/L — LOW (ref 22–31)
COLOR SPEC: YELLOW — SIGNIFICANT CHANGE UP
CREAT SERPL-MCNC: 1.2 MG/DL — SIGNIFICANT CHANGE UP (ref 0.5–1.3)
DIFF PNL FLD: ABNORMAL
EOSINOPHIL # BLD AUTO: 0 K/UL — SIGNIFICANT CHANGE UP (ref 0–0.5)
EOSINOPHIL NFR BLD AUTO: 0 % — SIGNIFICANT CHANGE UP (ref 0–6)
GLUCOSE SERPL-MCNC: 113 MG/DL — HIGH (ref 70–99)
GLUCOSE UR QL: 1000 MG/DL
HCT VFR BLD CALC: 21 % — CRITICAL LOW (ref 39–50)
HGB BLD-MCNC: 7.3 G/DL — LOW (ref 13–17)
KETONES UR-MCNC: ABNORMAL
LEUKOCYTE ESTERASE UR-ACNC: ABNORMAL
LYMPHOCYTES # BLD AUTO: 1.48 K/UL — SIGNIFICANT CHANGE UP (ref 1–3.3)
LYMPHOCYTES # BLD AUTO: 42 % — SIGNIFICANT CHANGE UP (ref 13–44)
MCHC RBC-ENTMCNC: 31.3 PG — SIGNIFICANT CHANGE UP (ref 27–34)
MCHC RBC-ENTMCNC: 34.8 GM/DL — SIGNIFICANT CHANGE UP (ref 32–36)
MCV RBC AUTO: 90.1 FL — SIGNIFICANT CHANGE UP (ref 80–100)
MONOCYTES # BLD AUTO: 0.18 K/UL — SIGNIFICANT CHANGE UP (ref 0–0.9)
MONOCYTES NFR BLD AUTO: 5 % — SIGNIFICANT CHANGE UP (ref 2–14)
NEUTROPHILS # BLD AUTO: 0.11 K/UL — LOW (ref 1.8–7.4)
NEUTROPHILS NFR BLD AUTO: 3 % — LOW (ref 43–77)
NITRITE UR-MCNC: POSITIVE
NRBC # BLD: SIGNIFICANT CHANGE UP /100 WBCS (ref 0–0)
PH UR: 6 — SIGNIFICANT CHANGE UP (ref 5–8)
PLATELET # BLD AUTO: 3 K/UL — CRITICAL LOW (ref 150–400)
POTASSIUM SERPL-MCNC: 3.3 MMOL/L — LOW (ref 3.5–5.3)
POTASSIUM SERPL-SCNC: 3.3 MMOL/L — LOW (ref 3.5–5.3)
PROT UR-MCNC: 500 MG/DL
RBC # BLD: 2.33 M/UL — LOW (ref 4.2–5.8)
RBC # FLD: 18.3 % — HIGH (ref 10.3–14.5)
SODIUM SERPL-SCNC: 138 MMOL/L — SIGNIFICANT CHANGE UP (ref 135–145)
SP GR SPEC: 1.02 — SIGNIFICANT CHANGE UP (ref 1.01–1.02)
UROBILINOGEN FLD QL: 1
WBC # BLD: 3.52 K/UL — LOW (ref 3.8–10.5)
WBC # FLD AUTO: 3.52 K/UL — LOW (ref 3.8–10.5)

## 2020-03-17 PROCEDURE — 71045 X-RAY EXAM CHEST 1 VIEW: CPT | Mod: 26

## 2020-03-17 PROCEDURE — 99232 SBSQ HOSP IP/OBS MODERATE 35: CPT

## 2020-03-17 PROCEDURE — 99222 1ST HOSP IP/OBS MODERATE 55: CPT

## 2020-03-17 RX ORDER — POTASSIUM CHLORIDE 20 MEQ
20 PACKET (EA) ORAL
Refills: 0 | Status: DISCONTINUED | OUTPATIENT
Start: 2020-03-17 | End: 2020-04-04

## 2020-03-17 RX ADMIN — Medication 166.67 MILLIGRAM(S): at 22:00

## 2020-03-17 RX ADMIN — ENTECAVIR 0.5 MILLIGRAM(S): 0.5 TABLET ORAL at 05:37

## 2020-03-17 RX ADMIN — CEFEPIME 100 MILLIGRAM(S): 1 INJECTION, POWDER, FOR SOLUTION INTRAMUSCULAR; INTRAVENOUS at 07:13

## 2020-03-17 RX ADMIN — Medication 20 MILLIEQUIVALENT(S): at 18:33

## 2020-03-17 RX ADMIN — Medication 166.67 MILLIGRAM(S): at 09:35

## 2020-03-17 RX ADMIN — CEFEPIME 100 MILLIGRAM(S): 1 INJECTION, POWDER, FOR SOLUTION INTRAMUSCULAR; INTRAVENOUS at 18:34

## 2020-03-17 RX ADMIN — Medication 10 MILLIGRAM(S): at 05:37

## 2020-03-17 RX ADMIN — PANTOPRAZOLE SODIUM 40 MILLIGRAM(S): 20 TABLET, DELAYED RELEASE ORAL at 05:36

## 2020-03-17 RX ADMIN — Medication 650 MILLIGRAM(S): at 15:01

## 2020-03-17 NOTE — PROGRESS NOTE ADULT - SUBJECTIVE AND OBJECTIVE BOX
INTERVAL HPI/OVERNIGHT EVENTS:  pt seen and examined  interim events noted, sp rrt, febrile to 106.1, had chills  currently denies n/v/d/c/abd pain  tolerating po  no s/s gib per nursing  sp prbc and plts yesterday    MEDICATIONS  (STANDING):  cefepime   IVPB 2000 milliGRAM(s) IV Intermittent every 12 hours  cefepime   IVPB      entecavir 0.5 milliGRAM(s) Oral daily  pantoprazole    Tablet 40 milliGRAM(s) Oral before breakfast  predniSONE   Tablet 10 milliGRAM(s) Oral daily  vancomycin  IVPB 1250 milliGRAM(s) IV Intermittent every 12 hours    MEDICATIONS  (PRN):  acetaminophen   Tablet .. 650 milliGRAM(s) Oral every 6 hours PRN Mild Pain (1 - 3)  acetaminophen   Tablet .. 650 milliGRAM(s) Oral every 6 hours PRN Temp greater or equal to 38C (100.4F)  albuterol/ipratropium for Nebulization 3 milliLiter(s) Nebulizer every 6 hours PRN Shortness of Breath and/or Wheezing  guaiFENesin   Syrup  (Sugar-Free) 200 milliGRAM(s) Oral every 6 hours PRN Cough  hydrocodone/homatropine Syrup 5 milliLiter(s) Oral at bedtime PRN Cough      Allergies    sulfa drugs (Unknown)    Intolerances        Review of Systems:    General:   chills   Eyes:  Good vision, no reported pain  ENT:  No sore throat, pain, runny nose, dysphagia  CV:  No pain, palpitations, hypo/hypertension  Resp:  No dyspnea, cough, tachypnea, wheezing  GI:  No pain, No nausea, No vomiting, No diarrhea, No constipation, No weight loss, No fever, No pruritis, No rectal bleeding, No melena, No dysphagia  :  No pain, bleeding, incontinence, nocturia  Muscle:  No pain, weakness  Neuro:  No weakness, tingling, memory problems  Psych:  No fatigue, insomnia, mood problems, depression  Endocrine:  No polyuria, polydypsia, cold/heat intolerance  Heme:  No petechiae, ecchymosis, easy bruisability  Skin:  No rash, tattoos, scars, edema      Vital Signs Last 24 Hrs  T(C): 36.7 (17 Mar 2020 07:25), Max: 41.2 (16 Mar 2020 16:56)  T(F): 98 (17 Mar 2020 07:25), Max: 106.1 (16 Mar 2020 16:56)  HR: 98 (17 Mar 2020 07:25) (76 - 163)  BP: 119/63 (17 Mar 2020 07:25) (109/69 - 151/76)  BP(mean): --  RR: 19 (17 Mar 2020 07:25) (18 - 19)  SpO2: 98% (17 Mar 2020 07:25) (97% - 100%)    PHYSICAL EXAM:    Constitutional: nad  HEENT: ncat  Gastrointestinal: soft nt to deep palpation no guarding mild dt  Extremities: edema  Neurological: Awake alert responds appropriately      LABS:                        7.5    3.05  )-----------( 11       ( 16 Mar 2020 17:39 )             22.0     03-17    138  |  111<H>  |  23  ----------------------------<  113<H>  3.3<L>   |  17<L>  |  1.20    Ca    8.0<L>      17 Mar 2020 07:55  Phos  2.0     03-16  Mg     1.9     03-16    TPro  5.5<L>  /  Alb  2.4<L>  /  TBili  3.4<H>  /  DBili  x   /  AST  44<H>  /  ALT  21  /  AlkPhos  141<H>  03-16          RADIOLOGY & ADDITIONAL TESTS:

## 2020-03-17 NOTE — PROGRESS NOTE ADULT - ASSESSMENT
74 y/o man w Hepatitis B on Entecavirt, Chronic Lymphocytic Leukemia/Small Lymphocytic Lymphoma 4/2018 when presented w immune hemolytic anemia w partial response to steroids, started on Ibrutinib w heme CR and AR by CT scan w some decrease of lymphadenopathies, until 1/2020 when relapsed w incr WBC, anemia(initially not hemolytic, has chronic Matthew positive due to CLL/SLL), thrombocytopenia.  Repeat Flow Cytometry still SLL/CLL, but FISH now w 11q-, 17p-, del of chromosome 12 and 13, all poor prognostic factors.]  Repeat PETCT only mildly hypermetabolic LADs w highest SUV 3, largest conglomerate f LNs ~5x2cm prevascular, mild splenomegaly 14cm  Pt was scheduled for Venetoclax/Rituxan second line treatment w admission for ramp up Venetoclax and tumor lysis prophylaxis when found w Influenza A during the 2/2020 adm, Rx w Temaflu, subsequent also sig more anemic/thrombocytopenic.  Was discharged home and admitted again 2/22/20 with  fever and found w pneumonia, post course of Ceftriaxone, continues to require transfusions plts and PRBCs.  Hep B titer negative  Post IVIG   Due to continue worsening of SLL/CLL w assoc adverse clinical condition/labs, after discussion w pt and wife, started ramp up Venetoclax 3/1/20(planned Venetoclax/Rituxan)  Pt and wife aware of high risk for adverse side effects due to poor performance status and state of disease, risk for tumor lysis due to sig number of circulating CLL/SLL cells in periphery  Sig decr of WBC since Venetoclax, on hold since 3/5(pt's own supply)  WBC from >100 to 3.     Noted increase in WBC to 13, restarted Venetoclax 20mg x2d with WBC declined to 3.   03/11 WBC 10.8, Venetoclax started.   03/12 WBC 13.1, Venetoclax given  03/13 WBC 3.88 Venetoclad HELD, and DC  Stopped again yesterday 03/13/20 03/14 WBC 3.59      -still PRBC and Platelets transfusion dependent, would give 1 unit platelets and 1 unit pRBC  -On prednisone taper to 10mg qD today  - Hold Venetoclax today; when blood indices slightly better would consider resuming venetoclax at dose 20mg every other day  - Long term prognosis poor  - left message for hospitalist (Dr. Santiago) to discuss care  3/17 coughing still a problem at this point and the plalelet count was only 3000 and I ordered platelets. The venetoclax is still on hold as the white cell count is only 3.36.

## 2020-03-17 NOTE — PROGRESS NOTE ADULT - ASSESSMENT
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

## 2020-03-17 NOTE — PROGRESS NOTE ADULT - SUBJECTIVE AND OBJECTIVE BOX
Patient is a 75y old  Male who presents with a chief complaint of weakness, anemia (17 Mar 2020 12:47)      SUBJECTIVE / OVERNIGHT EVENTS:  pt seen and examined. afebrile. plts noted to be 3, will transfuse.      Vital Signs Last 24 Hrs  T(C): 36.9 (17 Mar 2020 13:20), Max: 41.2 (16 Mar 2020 16:56)  T(F): 98.4 (17 Mar 2020 13:20), Max: 106.1 (16 Mar 2020 16:56)  HR: 98 (17 Mar 2020 13:20) (76 - 163)  BP: 114/67 (17 Mar 2020 13:20) (108/63 - 151/76)  BP(mean): --  RR: 18 (17 Mar 2020 13:20) (16 - 19)  SpO2: 94% (17 Mar 2020 13:20) (94% - 100%)  I&O's Summary    16 Mar 2020 07:01  -  17 Mar 2020 07:00  --------------------------------------------------------  IN: 1288 mL / OUT: 900 mL / NET: 388 mL        PHYSICAL EXAM:  GENERAL: NAD, Comfortable  HEAD:  Atraumatic, Normocephalic  EYES: EOMI, PERRLA, conjunctiva and sclera clear  NECK: Supple, No JVD  CHEST/LUNG: Clear to auscultation bilaterally; No wheeze  HEART: Regular rate and rhythm; No murmurs, rubs, or gallops  ABDOMEN: Soft, Nontender, Nondistended; Bowel sounds present  Neuro: AAO x 3, no focal deficit, 5/5 b/l extremities  EXTREMITIES:  2+ Peripheral Pulses, No clubbing, cyanosis, or edema  SKIN: No rashes or lesions    LABS:                        7.3    3.52  )-----------( 3        ( 17 Mar 2020 07:55 )             21.0     03-17    138  |  111<H>  |  23  ----------------------------<  113<H>  3.3<L>   |  17<L>  |  1.20    Ca    8.0<L>      17 Mar 2020 07:55  Phos  2.0     03-16  Mg     1.9     03-16    TPro  5.5<L>  /  Alb  2.4<L>  /  TBili  3.4<H>  /  DBili  x   /  AST  44<H>  /  ALT  21  /  AlkPhos  141<H>  03-16      CAPILLARY BLOOD GLUCOSE      POCT Blood Glucose.: 189 mg/dL (16 Mar 2020 17:01)    CARDIAC MARKERS ( 16 Mar 2020 17:42 )  .017 ng/mL / x     / 19 U/L / x     / <1.0 ng/mL          RADIOLOGY & ADDITIONAL TESTS:    Imaging Personally Reviewed:  [x] YES  [ ] NO    Consultant(s) Notes Reviewed:  [x] YES  [ ] NO      MEDICATIONS  (STANDING):  cefepime   IVPB 2000 milliGRAM(s) IV Intermittent every 12 hours  cefepime   IVPB      entecavir 0.5 milliGRAM(s) Oral daily  pantoprazole    Tablet 40 milliGRAM(s) Oral before breakfast  predniSONE   Tablet 10 milliGRAM(s) Oral daily  vancomycin  IVPB 1250 milliGRAM(s) IV Intermittent every 12 hours    MEDICATIONS  (PRN):  acetaminophen   Tablet .. 650 milliGRAM(s) Oral every 6 hours PRN Mild Pain (1 - 3)  acetaminophen   Tablet .. 650 milliGRAM(s) Oral every 6 hours PRN Temp greater or equal to 38C (100.4F)  albuterol/ipratropium for Nebulization 3 milliLiter(s) Nebulizer every 6 hours PRN Shortness of Breath and/or Wheezing  guaiFENesin   Syrup  (Sugar-Free) 200 milliGRAM(s) Oral every 6 hours PRN Cough  hydrocodone/homatropine Syrup 5 milliLiter(s) Oral at bedtime PRN Cough      Care Discussed with Consultants/Other Providers [x] YES  [ ] NO    HEALTH ISSUES - PROBLEM Dx:  Electrolyte abnormality: Electrolyte abnormality  Hypokalemia: Hypokalemia  Pneumonia due to infectious organism, unspecified laterality, unspecified part of lung: Pneumonia due to infectious organism, unspecified laterality, unspecified part of lung  Pneumonia: Pneumonia  CLL (chronic lymphocytic leukemia): CLL (chronic lymphocytic leukemia)  Hyperbilirubinemia: Hyperbilirubinemia  KIRTI (obstructive sleep apnea): KIRTI (obstructive sleep apnea)  CKD (chronic kidney disease), stage III: CKD (chronic kidney disease), stage III  Lower extremity edema: Lower extremity edema  Thrombocytopenia: Thrombocytopenia  Need for prophylactic measure: Need for prophylactic measure  Diabetes: Diabetes  Hypertension: Hypertension  Hepatitis B: Hepatitis B  Anemia: Anemia  Fever: Fever  Lymphoma: Lymphoma  Multifocal pneumonia: Multifocal pneumonia

## 2020-03-17 NOTE — PROGRESS NOTE ADULT - SUBJECTIVE AND OBJECTIVE BOX
Patient seen and examined;  Chart reviewed and events noted;   feeling more tired     3/17 coughing still a problem at this point and the plalelet count was only 3000 and I ordered platelets. The venetoclax is still on hold as the white cell count is only 3.36.  MEDICATIONS  (STANDING):  cefepime   IVPB 2000 milliGRAM(s) IV Intermittent every 12 hours  cefepime   IVPB      entecavir 0.5 milliGRAM(s) Oral daily  pantoprazole    Tablet 40 milliGRAM(s) Oral before breakfast  predniSONE   Tablet 10 milliGRAM(s) Oral daily  vancomycin  IVPB 1250 milliGRAM(s) IV Intermittent every 12 hours  PHYSICAL EXAM  General: adult in NAD  HEENT: clear oropharynx, anicteric sclera, pink conjunctivae  Neck: supple  CV: normal S1S2 with no murmur rubs or gallops  Lungs: clear to auscultation, no wheezes, no rhales  Abdomen: soft non-tender non-distended, no hepato/splenomegaly  Ext: no clubbing cyanosis or edema  Skin: + bruising  Neuro: alert and oriented X3 no focal deficits  ICU Vital Signs Last 24 Hrs  T(C): 36.9 (17 Mar 2020 12:01), Max: 41.2 (16 Mar 2020 16:56)  T(F): 98.4 (17 Mar 2020 12:01), Max: 106.1 (16 Mar 2020 16:56)  HR: 97 (17 Mar 2020 12:01) (76 - 163)  BP: 108/63 (17 Mar 2020 12:01) (108/63 - 151/76)  BP(mean): --  ABP: --  ABP(mean): --  RR: 16 (17 Mar 2020 12:01) (16 - 19)  SpO2: 98% (17 Mar 2020 12:01) (97% - 100%)      LABS:                         7.3    3.52  )-----------( 3        ( 17 Mar 2020 07:55 )             21.0     Hemoglobin: 6.8 g/dL (03-16 @ 09:51)  Hemoglobin: 7.7 g/dL (03-15 @ 14:02)  Hemoglobin: 7.6 g/dL (03-15 @ 06:30)  Hemoglobin: 7.1 g/dL (03-14 @ 09:40)  Hemoglobin: 7.8 g/dL (03-13 @ 09:37)    Platelet Count - Automated: 8 K/uL (03-16 @ 09:51)  Platelet Count - Automated: 22 K/uL (03-15 @ 14:02)  Platelet Count - Automated: 8 K/uL (03-15 @ 06:30)  Platelet Count - Automated: 10 K/uL (03-14 @ 09:40)  Platelet Count - Automated: 8 K/uL (03-13 @ 09:37)    WBC Count: 3.42 K/uL (03-16 @ 09:51)  WBC Count: 2.53 K/uL (03-15 @ 14:02)  WBC Count: 6.71 K/uL (03-15 @ 06:30)  WBC Count: 3.59 K/uL (03-14 @ 09:40)  WBC Count: 3.88 K/uL (03-13 @ 09:37)    03-16    134<L>  |  109<H>  |  22  ----------------------------<  128<H>  3.3<L>   |  16<L>  |  1.20    Ca    7.8<L>      16 Mar 2020 09:51  Phos  1.5     03-16  Mg     1.9     03-16    TPro  5.5<L>  /  Alb  2.4<L>  /  TBili  3.4<H>  /  DBili  x   /  AST  44<H>  /  ALT  21  /  AlkPhos  141<H>  03-16

## 2020-03-17 NOTE — CONSULT NOTE ADULT - ASSESSMENT
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 Tachy likely reactive  - No signs of AF  - Cont tele for 24 hours  - Echo on 3/20 with normal LV function.   - F/U heme onc.   - Cont Abx for PNA. Cont supplemental O2  - No signs of significant ischemia or volume overload.   - Monitor and replete electrolytes. Keep K>4.0 and Mg>2.0.  - Further cardiac workup will depend on clinical course.   - All other workup per primary team. Will followup.

## 2020-03-17 NOTE — PROGRESS NOTE ADULT - SUBJECTIVE AND OBJECTIVE BOX
[facility], Division of Infectious Diseases  [address] [facility] [address] [facility]  [phone]    Name: DIONICIO SULLIVAN  Age: 75y  Gender: Male  MRN: [id number]    Interval History--  Notes reviewed  states he was told he has a fever and he didnt know  denies abd pain, no diarrhea, no dysuria, no headache  no dyspnea, cough is chronic    Past Medical History--  KIRTI (obstructive sleep apnea)  Hypertension  Diabetes  Hepatitis B  Lymphoma  AIHA (autoimmune hemolytic anemia)  Leukemia  Diabetes  HTN (hypertension)  H/O lithotripsy      For details regarding the patient's social history, family history, and other miscellaneous elements, please refer the initial infectious diseases consultation and/or the admitting history and physical examination for this admission.    Allergies    sulfa drugs (Unknown)    Intolerances        Medications--  Antibiotics:  cefepime   IVPB 2000 milliGRAM(s) IV Intermittent every 12 hours  cefepime   IVPB      entecavir 0.5 milliGRAM(s) Oral daily  vancomycin  IVPB 1250 milliGRAM(s) IV Intermittent every 12 hours    Immunologic:    Other:  acetaminophen   Tablet .. PRN  acetaminophen   Tablet .. PRN  albuterol/ipratropium for Nebulization PRN  guaiFENesin   Syrup  (Sugar-Free) PRN  hydrocodone/homatropine Syrup PRN  pantoprazole    Tablet  predniSONE   Tablet      Review of Systems--  A 10-point review of systems was obtained.     Pertinent positives and negatives--  Constitutional: No fevers. No Chills. No Rigors.   Cardiovascular: No chest pain. No palpitations.  Respiratory: No shortness of breath. No cough.  Gastrointestinal: No nausea or vomiting. No diarrhea or constipation.   Psychiatric: no anxiety.    Review of systems otherwise negative except as previously noted.    Physical Examination--  Vital Signs: T(F): 98.4 (03-17-20 @ 13:20), Max: 106.1 (03-16-20 @ 16:56)  HR: 98 (03-17-20 @ 13:20)  BP: 114/67 (03-17-20 @ 13:20)  RR: 18 (03-17-20 @ 13:20)  SpO2: 94% (03-17-20 @ 13:20)  Wt(kg): --  General: Nontoxic-appearing Male in no acute distress.  HEENT: AT/NC. . Anicteric.. Oropharynx clear.   Neck: Not rigid. No sense of mass.  Nodes: None palpable.  Lungs: Clear bilaterally without rales, wheezing or rhonchi  Heart: Regular rate and rhythm. No Murmur.   Abdomen: Bowel sounds present and normoactive. Soft. Nondistended. Nontender.   Back: No spinal tenderness. No costovertebral angle tenderness.   Extremities: No cyanosis or clubbing. No edema.   Skin: Warm. Dry. Good turgor. ecchymosis  Psychiatric: Appropriate affect and mood for situation.         Laboratory Studies--  CBC                        7.3    3.52  )-----------( 3        ( 17 Mar 2020 07:55 )             21.0       Chemistries  03-17    138  |  111<H>  |  23  ----------------------------<  113<H>  3.3<L>   |  17<L>  |  1.20    Ca    8.0<L>      17 Mar 2020 07:55  Phos  2.0     03-16  Mg     1.9     03-16    TPro  5.5<L>  /  Alb  2.4<L>  /  TBili  3.4<H>  /  DBili  x   /  AST  44<H>  /  ALT  21  /  AlkPhos  141<H>  03-16      Culture Data    Streptococcus Pneumoniae Ag Urine (02.23.20 @ 09:29)    Streptococcus Pneumoniae Ag Urine: Negative: Presumptive negative for pneumococcal pneumonia, suggesting  no current or recent infection.  Infection due to S.  pneumoniae cannot be ruled out since the antigen present in  the sample may be below detection limit of the test.  -------------------ADDITIONAL INFORMATION-------------------  This assay was performed using the FDA-cleared BinaxNOW  Streptococcus pneumoniae Antigen test, a rapid  immunochromatographic assay.  Test Performed by:  [facility]  [address] [place] [address]  : Loco Garcia M.D. Ph.D.; CLIA# [id number]        < from: Xray Chest 1 View- PORTABLE-Routine (03.17.20 @ 12:19) >    EXAM:  XR CHEST PORTABLE ROUTINE 1V                            PROCEDURE DATE:  03/17/2020          INTERPRETATION:  CLINICAL STATEMENT: Follow-up chest pain.    TECHNIQUE: AP view of the chest.    COMPARISON: 3/16/2020    FINDINGS/  IMPRESSION:  Nonspecific opacity right midlung zone without significant change.    No significant pleural effusion    Heart size cannot be accurately assessed in this projection.    < end of copied text >

## 2020-03-17 NOTE — PROGRESS NOTE ADULT - ASSESSMENT
hepatitis B   elevated lfts   CLL  pancytopenia  fever      patient with elevated bilirubin and alk phos at baseline ? leon  mri showed normal liver, no cbd stone/dilatation; hep b pcr neg  ob neg; no s/s overt gib; monitor cbc daily  diet as tolerated; correct elytes prn  trend lfts periodically   cont ppi qd  abx per primary; f/u cxs  ID and heme/onc follow up  to follow    Advanced care planning was discussed with patient and family.  Advanced care planning forms were reviewed and discussed.  Risks, benefits and alternatives of gastroenterologic procedures were discussed in detail and all questions were answered.    30 minutes spent.

## 2020-03-17 NOTE — CONSULT NOTE ADULT - SUBJECTIVE AND OBJECTIVE BOX
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 Has been coughing as he was recently treated for influenza A on last admission. States that when he was discharged from [facility] on 2/15, he was feeling well and had no fevers. Patient developed a fever, T100.9F (oral) today for which he called heme/onc, Dr. Stephenson. Per Dr. Stuart, patient advised to go to ER. He was found to have a fever, T100.6F (rectal) in ER. Denies recent travel or sick contacts. Denies headache, chest pain, sob, palpitations, abdominal pain, diarrhea, melena, hematochezia, dysuria or hematuria.     Of note, patient was recently admitted to [facility] from 2/14-2/15/2020 for chemotherapy with Venetoclax, found to be febrile with similar associated complaints of generalized malaise, cough, and weakness. Patient was found to be Flu A positive during that admission and was treated with tamiflu. Patient was found to be thrombocytopenic to 35k with a Hb of 6.6, transfused 2U PRBCs and 1U platelets. Chemotherapy was held off due to acute illness.      In ED, VS Tmax 100.6 rectal (repeat s/p tylenol 99.5),  -> 99, BP stable, saturating well on RA  CBC significant for .14 (on d/c 56.25), Hb 6.8 (on d/c 8.7), Plt 11 (on d/c 21)  CMP significant for K 3.4, Cr 1.5 (appears to be baseline per chart review), bili 3.3  Type and screen performed. Will be getting 1 unit pRBCs  Flu/RSV negative  CT Chest: Multilobar patchy peribronchovascular airspace opacities and more dense consolidative process in the right middle lobe likely reflecting multifocal pneumonia. Small right and trace left pleural effusion. Mediastinal and hilar lymphadenopathy worsened compared with prior exam from 8/30/2019. Numerous mildly prominent bilateral axillary lymph nodes. Retroperitoneal lymphadenopathy. Mild splenomegaly. Cholelithiasis.  CXR (wet read): noted to have increased opacities in right lower and middle lobes as well as left lower lobe in comparison to CXR on 2/14/2020  EKG: sinus tachycardic    S/p cefepime 1g IV x1, NS bolus 1L x1, duonebs x2, tylenol 650mg PO x1, 1U PRBCs ordered and to be transfused      Interval hisotry: last night had an RRT for fever 106 and tachycardia. Noted to have EKG reading AF. On review likely ST with freq Apcs  Denies any chest pain, dyspnea, palpitations, PND, orthopnea, near syncope, syncope, lower extremity edema, stroke like symptoms.       EKG: ST with freq APCs    REVIEW OF SYSTEMS:   All other review of systems are negative unless indicated above    PAST MEDICAL & SURGICAL HISTORY:  KIRTI (obstructive sleep apnea): on nocturnal cpap  Hypertension: not on any medications  Diabetes: not on any medications  Hepatitis B  Lymphoma: Small cell B cell  AIHA (autoimmune hemolytic anemia)  H/O lithotripsy      SOCIAL HISTORY:  No tobacco, ethanol, or drug abuse.    FAMILY HISTORY:  Family history of hypertension in mother    No family history of acute MI or sudden cardiac death.    MEDICATIONS  (STANDING):  cefepime   IVPB 2000 milliGRAM(s) IV Intermittent every 12 hours  cefepime   IVPB      entecavir 0.5 milliGRAM(s) Oral daily  pantoprazole    Tablet 40 milliGRAM(s) Oral before breakfast  predniSONE   Tablet 10 milliGRAM(s) Oral daily  vancomycin  IVPB 1250 milliGRAM(s) IV Intermittent every 12 hours    MEDICATIONS  (PRN):  acetaminophen   Tablet .. 650 milliGRAM(s) Oral every 6 hours PRN Mild Pain (1 - 3)  acetaminophen   Tablet .. 650 milliGRAM(s) Oral every 6 hours PRN Temp greater or equal to 38C (100.4F)  albuterol/ipratropium for Nebulization 3 milliLiter(s) Nebulizer every 6 hours PRN Shortness of Breath and/or Wheezing  guaiFENesin   Syrup  (Sugar-Free) 200 milliGRAM(s) Oral every 6 hours PRN Cough  hydrocodone/homatropine Syrup 5 milliLiter(s) Oral at bedtime PRN Cough      Allergies    sulfa drugs (Unknown)    Intolerances        Home meds:  Home Medications:  allopurinol 100 mg oral tablet: 1 tab(s) orally 3 times a day (22 Feb 2020 11:41)  entecavir 0.5 mg oral tablet: 1 tab(s) orally once a day (22 Feb 2020 11:41)  folic acid 1 mg oral tablet: 1 tab(s) orally once a day (22 Feb 2020 11:41)  Imbruvica 420 mg oral tablet: 1 tab(s) orally once a day (22 Feb 2020 11:41)  predniSONE 10 mg oral tablet: 1 tab(s) orally once a day (22 Feb 2020 11:41)  valsartan 80 mg oral tablet: 1 tab(s) orally once a day (22 Feb 2020 11:41)  Vitamin B12 1000 mcg oral tablet: 1 tab(s) orally once a day (22 Feb 2020 11:41)        VITAL SIGNS:   Vital Signs Last 24 Hrs  T(C): 36.9 (17 Mar 2020 13:20), Max: 41.2 (16 Mar 2020 16:56)  T(F): 98.4 (17 Mar 2020 13:20), Max: 106.1 (16 Mar 2020 16:56)  HR: 98 (17 Mar 2020 13:20) (76 - 163)  BP: 114/67 (17 Mar 2020 13:20) (108/63 - 151/76)  BP(mean): --  RR: 18 (17 Mar 2020 13:20) (16 - 19)  SpO2: 94% (17 Mar 2020 13:20) (94% - 100%)    I&O's Summary    16 Mar 2020 07:01  -  17 Mar 2020 07:00  --------------------------------------------------------  IN: 1288 mL / OUT: 900 mL / NET: 388 mL        On Exam:  TELE:   Constitutional: NAD, awake    HEENT: Moist Mucous Membranes, Anicteric  Pulmonary: Decreased breath sounds b/l. No rales, crackles or wheeze appreciated.   Cardiovascular: Regular, S1 and S2, No murmurs, rubs, gallops or clicks  Gastrointestinal: Bowel Sounds present, soft, nontender.   Lymph: No peripheral edema. No lymphadenopathy.  Skin: No visible rashes or ulcers.  Psych:  Mood & affect appropriate    LABS: All Labs Reviewed:                        7.3    3.52  )-----------( 3        ( 17 Mar 2020 07:55 )             21.0                         7.5    3.05  )-----------( 11       ( 16 Mar 2020 17:39 )             22.0                         6.8    3.42  )-----------( 8        ( 16 Mar 2020 09:51 )             20.6     17 Mar 2020 07:55    138    |  111    |  23     ----------------------------<  113    3.3     |  17     |  1.20   16 Mar 2020 09:51    134    |  109    |  22     ----------------------------<  128    3.3     |  16     |  1.20   15 Mar 2020 06:30    136    |  110    |  20     ----------------------------<  104    4.2     |  18     |  1.20     Ca    8.0        17 Mar 2020 07:55  Ca    7.8        16 Mar 2020 09:51  Ca    8.4        15 Mar 2020 06:30  Phos  2.0       16 Mar 2020 17:42  Phos  1.5       16 Mar 2020 09:51  Phos  1.3       15 Mar 2020 06:30  Mg     1.9       16 Mar 2020 17:42  Mg     1.9       16 Mar 2020 09:51  Mg     1.8       15 Mar 2020 06:30    TPro  5.5    /  Alb  2.4    /  TBili  3.4    /  DBili  x      /  AST  44     /  ALT  21     /  AlkPhos  141    16 Mar 2020 09:51  TPro  5.8    /  Alb  2.6    /  TBili  3.4    /  DBili  x      /  AST  36     /  ALT  22     /  AlkPhos  150    15 Mar 2020 06:30      CARDIAC MARKERS ( 16 Mar 2020 17:42 )  .017 ng/mL / x     / 19 U/L / x     / <1.0 ng/mL      Blood Culture:         RADIOLOGY:           < from: TTE Echo Complete w/o contrast w/ Doppler (03.04.20 @ 09:54) >     EXAM:  ECHO TTE WO CON COMP W DOPP         PROCEDURE DATE:  03/04/2020        INTERPRETATION:  INDICATION: edema  Sonographer PH    Blood Pressure 129/63    Height 165 cm     Weight 82.9 kg       BSA 1.9 sq m    Dimensions:    LA 2.9       Normal Values: 2.0 - 4.0 cm    Ao 2.9        Normal Values: 2.0 - 3.8 cm  SEPTUM 0.9       Normal Values: 0.6 - 1.2 cm  PWT 0.9       Normal Values: 0.6 - 1.1 cm  LVIDd 4.1         Normal Values: 3.0 - 5.6 cm  LVIDs 2.3         Normal Values: 1.8 - 4.0 cm      OBSERVATIONS:    Mitral Valve: normal, trace physiologic MR.  Aortic Valve/Aorta: Normal trileaflet aortic valve with normal opening.  Tricuspid Valve: normal with trace TR.  Pulmonic Valve: normal  Left Atrium: normal  Right Atrium: normal  Left Ventricle: normal LV size and systolic function, estimated LVEF of 65%.  Right Ventricle: Grossly normal size and systolic function.  Pericardium/Pleura: normal, no significant pericardial effusion.      Conclusion:   Normal left ventricular internal dimensions and systolic function, estimated LVEF of 65%.   Grossly normal RV size and systolic function.     Normal trileaflet aortic valve, without AI.   Trace physiologic MR and TR.                      KALPANA GUERRERO   This document has been electronically signed. Mar  5 2020  7:32AM                < end of copied text >

## 2020-03-17 NOTE — PROGRESS NOTE ADULT - PROBLEM SELECTOR PLAN 1
exam benign  cxr without new consolidation  f/u blood cx pending  empiric cefepime and vancomycin until cx back  if further fever check ct chest/abd/pelvis

## 2020-03-17 NOTE — PROGRESS NOTE ADULT - ASSESSMENT
Assessment and Plan:    Fever s/p chemo ??neutorpenic fever:  blood cx stta, urine cx, cxr, vancomycin and cefepime stat. ID maggieal  tried calling heme awaiting call back.    Hypophospahtemia:  replace orally    -CLL/SLL:  Venetoclax held per Heme/Onc    -Thrombocytopenia:  s/p 1 u plt yesterday and will give 1 today.  Will continue to monitor platelet counts. tarnsfuse as per heme    -Anemia:  Possible AIHA.  Continue Prednisone 10mg PO daily.transfuse as per heme    -Multifocal PNA:  completed course of antibiotics.  Continue Hycodan PRN, Guaifenesin PRN, and Duoneb tx Q6h PRN. febrile again yestersay, follow cultures.    -Hyperbilirubinemia:  Abdominal US with multiple gallstones and no evidence of acute cholecystitis.  MRCP with no biliary dilatation or CBD stone.  Pt. without abdominal pain.  GI f/u- cont ot monitor savana    -Hepatitis B:  continue Entecavir 0.5mg PO daily    -LE edema:  LE dopplers negative for DVT.  EF 65%.    -CKD stage 3:  stable.  Continue to monitor    -HTN:  Normotensive off any antihypertensive medications    -Type 2 DM:  diet controlled.     -VTE ppx: SCD

## 2020-03-18 LAB
ANION GAP SERPL CALC-SCNC: 9 MMOL/L — SIGNIFICANT CHANGE UP (ref 5–17)
APPEARANCE UR: ABNORMAL
BASOPHILS # BLD AUTO: 0.01 K/UL — SIGNIFICANT CHANGE UP (ref 0–0.2)
BASOPHILS NFR BLD AUTO: 0.2 % — SIGNIFICANT CHANGE UP (ref 0–2)
BILIRUB UR-MCNC: NEGATIVE — SIGNIFICANT CHANGE UP
BUN SERPL-MCNC: 23 MG/DL — SIGNIFICANT CHANGE UP (ref 7–23)
CALCIUM SERPL-MCNC: 7.9 MG/DL — LOW (ref 8.5–10.1)
CHLORIDE SERPL-SCNC: 110 MMOL/L — HIGH (ref 96–108)
CO2 SERPL-SCNC: 17 MMOL/L — LOW (ref 22–31)
COLOR SPEC: YELLOW — SIGNIFICANT CHANGE UP
CREAT SERPL-MCNC: 1.1 MG/DL — SIGNIFICANT CHANGE UP (ref 0.5–1.3)
DIFF PNL FLD: ABNORMAL
EOSINOPHIL # BLD AUTO: 0 K/UL — SIGNIFICANT CHANGE UP (ref 0–0.5)
EOSINOPHIL NFR BLD AUTO: 0 % — SIGNIFICANT CHANGE UP (ref 0–6)
GLUCOSE SERPL-MCNC: 108 MG/DL — HIGH (ref 70–99)
GLUCOSE UR QL: 1000 MG/DL
HCT VFR BLD CALC: 19.7 % — CRITICAL LOW (ref 39–50)
HCT VFR BLD CALC: 25.7 % — LOW (ref 39–50)
HGB BLD-MCNC: 6.6 G/DL — CRITICAL LOW (ref 13–17)
HGB BLD-MCNC: 8.6 G/DL — LOW (ref 13–17)
IMM GRANULOCYTES NFR BLD AUTO: 0.2 % — SIGNIFICANT CHANGE UP (ref 0–1.5)
KETONES UR-MCNC: ABNORMAL
LEUKOCYTE ESTERASE UR-ACNC: NEGATIVE — SIGNIFICANT CHANGE UP
LYMPHOCYTES # BLD AUTO: 4.64 K/UL — HIGH (ref 1–3.3)
LYMPHOCYTES # BLD AUTO: 76.2 % — HIGH (ref 13–44)
MCHC RBC-ENTMCNC: 30 PG — SIGNIFICANT CHANGE UP (ref 27–34)
MCHC RBC-ENTMCNC: 30.3 PG — SIGNIFICANT CHANGE UP (ref 27–34)
MCHC RBC-ENTMCNC: 33.5 GM/DL — SIGNIFICANT CHANGE UP (ref 32–36)
MCHC RBC-ENTMCNC: 33.5 GM/DL — SIGNIFICANT CHANGE UP (ref 32–36)
MCV RBC AUTO: 89.5 FL — SIGNIFICANT CHANGE UP (ref 80–100)
MCV RBC AUTO: 90.4 FL — SIGNIFICANT CHANGE UP (ref 80–100)
MONOCYTES # BLD AUTO: 1.33 K/UL — HIGH (ref 0–0.9)
MONOCYTES NFR BLD AUTO: 21.8 % — HIGH (ref 2–14)
NEUTROPHILS # BLD AUTO: 0.1 K/UL — LOW (ref 1.8–7.4)
NEUTROPHILS NFR BLD AUTO: 1.6 % — LOW (ref 43–77)
NITRITE UR-MCNC: NEGATIVE — SIGNIFICANT CHANGE UP
NRBC # BLD: 0 /100 WBCS — SIGNIFICANT CHANGE UP (ref 0–0)
NRBC # BLD: 0 /100 WBCS — SIGNIFICANT CHANGE UP (ref 0–0)
PH UR: 6 — SIGNIFICANT CHANGE UP (ref 5–8)
PLATELET # BLD AUTO: 2 K/UL — CRITICAL LOW (ref 150–400)
PLATELET # BLD AUTO: 34 K/UL — LOW (ref 150–400)
POTASSIUM SERPL-MCNC: 3 MMOL/L — LOW (ref 3.5–5.3)
POTASSIUM SERPL-SCNC: 3 MMOL/L — LOW (ref 3.5–5.3)
PROT UR-MCNC: 100
RBC # BLD: 2.18 M/UL — LOW (ref 4.2–5.8)
RBC # BLD: 2.87 M/UL — LOW (ref 4.2–5.8)
RBC # FLD: 18.6 % — HIGH (ref 10.3–14.5)
RBC # FLD: 19.3 % — HIGH (ref 10.3–14.5)
SODIUM SERPL-SCNC: 136 MMOL/L — SIGNIFICANT CHANGE UP (ref 135–145)
SP GR SPEC: 1.01 — SIGNIFICANT CHANGE UP (ref 1.01–1.02)
UROBILINOGEN FLD QL: 1
VANCOMYCIN TROUGH SERPL-MCNC: 16.6 UG/ML — SIGNIFICANT CHANGE UP (ref 10–20)
WBC # BLD: 4.45 K/UL — SIGNIFICANT CHANGE UP (ref 3.8–10.5)
WBC # BLD: 6.09 K/UL — SIGNIFICANT CHANGE UP (ref 3.8–10.5)
WBC # FLD AUTO: 4.45 K/UL — SIGNIFICANT CHANGE UP (ref 3.8–10.5)
WBC # FLD AUTO: 6.09 K/UL — SIGNIFICANT CHANGE UP (ref 3.8–10.5)

## 2020-03-18 PROCEDURE — 86077 PHYS BLOOD BANK SERV XMATCH: CPT

## 2020-03-18 PROCEDURE — 99232 SBSQ HOSP IP/OBS MODERATE 35: CPT

## 2020-03-18 PROCEDURE — 99233 SBSQ HOSP IP/OBS HIGH 50: CPT

## 2020-03-18 PROCEDURE — 93010 ELECTROCARDIOGRAM REPORT: CPT

## 2020-03-18 RX ORDER — ACETAMINOPHEN 500 MG
1000 TABLET ORAL ONCE
Refills: 0 | Status: COMPLETED | OUTPATIENT
Start: 2020-03-18 | End: 2020-03-18

## 2020-03-18 RX ORDER — POTASSIUM CHLORIDE 20 MEQ
40 PACKET (EA) ORAL ONCE
Refills: 0 | Status: COMPLETED | OUTPATIENT
Start: 2020-03-18 | End: 2020-03-18

## 2020-03-18 RX ORDER — ACETAMINOPHEN 500 MG
650 TABLET ORAL ONCE
Refills: 0 | Status: COMPLETED | OUTPATIENT
Start: 2020-03-18 | End: 2020-03-18

## 2020-03-18 RX ADMIN — Medication 166.67 MILLIGRAM(S): at 08:47

## 2020-03-18 RX ADMIN — Medication 166.67 MILLIGRAM(S): at 21:44

## 2020-03-18 RX ADMIN — Medication 650 MILLIGRAM(S): at 19:17

## 2020-03-18 RX ADMIN — Medication 10 MILLIGRAM(S): at 05:20

## 2020-03-18 RX ADMIN — Medication 1000 MILLIGRAM(S): at 22:41

## 2020-03-18 RX ADMIN — CEFEPIME 100 MILLIGRAM(S): 1 INJECTION, POWDER, FOR SOLUTION INTRAMUSCULAR; INTRAVENOUS at 05:19

## 2020-03-18 RX ADMIN — Medication 40 MILLIEQUIVALENT(S): at 08:47

## 2020-03-18 RX ADMIN — CEFEPIME 100 MILLIGRAM(S): 1 INJECTION, POWDER, FOR SOLUTION INTRAMUSCULAR; INTRAVENOUS at 18:16

## 2020-03-18 RX ADMIN — PANTOPRAZOLE SODIUM 40 MILLIGRAM(S): 20 TABLET, DELAYED RELEASE ORAL at 05:20

## 2020-03-18 RX ADMIN — Medication 20 MILLIEQUIVALENT(S): at 18:16

## 2020-03-18 RX ADMIN — ENTECAVIR 0.5 MILLIGRAM(S): 0.5 TABLET ORAL at 05:20

## 2020-03-18 RX ADMIN — Medication 20 MILLIEQUIVALENT(S): at 05:20

## 2020-03-18 RX ADMIN — Medication 400 MILLIGRAM(S): at 22:26

## 2020-03-18 NOTE — PROGRESS NOTE ADULT - PROBLEM SELECTOR PROBLEM 4
Daniella Ko who is the assistant to the nurse  for the worker's comp called in  Request to have the disability form dated 7/12/19 faxed      Fax# [phone]    Faxed and confirmed recevied Fever

## 2020-03-18 NOTE — PROGRESS NOTE ADULT - SUBJECTIVE AND OBJECTIVE BOX
INTERVAL HPI/OVERNIGHT EVENTS:  pt seen and examined  interim events noted,       MEDICATIONS  (STANDING):  cefepime   IVPB 2000 milliGRAM(s) IV Intermittent every 12 hours  cefepime   IVPB      entecavir 0.5 milliGRAM(s) Oral daily  pantoprazole    Tablet 40 milliGRAM(s) Oral before breakfast  predniSONE   Tablet 10 milliGRAM(s) Oral daily  vancomycin  IVPB 1250 milliGRAM(s) IV Intermittent every 12 hours    MEDICATIONS  (PRN):  acetaminophen   Tablet .. 650 milliGRAM(s) Oral every 6 hours PRN Mild Pain (1 - 3)  acetaminophen   Tablet .. 650 milliGRAM(s) Oral every 6 hours PRN Temp greater or equal to 38C (100.4F)  albuterol/ipratropium for Nebulization 3 milliLiter(s) Nebulizer every 6 hours PRN Shortness of Breath and/or Wheezing  guaiFENesin   Syrup  (Sugar-Free) 200 milliGRAM(s) Oral every 6 hours PRN Cough  hydrocodone/homatropine Syrup 5 milliLiter(s) Oral at bedtime PRN Cough      Allergies    sulfa drugs (Unknown)    Intolerances        Review of Systems:    General:   chills   Eyes:  Good vision, no reported pain  ENT:  No sore throat, pain, runny nose, dysphagia  CV:  No pain, palpitations, hypo/hypertension  Resp:  No dyspnea, cough, tachypnea, wheezing  GI:  No pain, No nausea, No vomiting, No diarrhea, No constipation, No weight loss, No fever, No pruritis, No rectal bleeding, No melena, No dysphagia  :  No pain, bleeding, incontinence, nocturia  Muscle:  No pain, weakness  Neuro:  No weakness, tingling, memory problems  Psych:  No fatigue, insomnia, mood problems, depression  Endocrine:  No polyuria, polydypsia, cold/heat intolerance  Heme:  No petechiae, ecchymosis, easy bruisability  Skin:  No rash, tattoos, scars, edema      Vital Signs Last 24 Hrs  T(C): 36.7 (17 Mar 2020 07:25), Max: 41.2 (16 Mar 2020 16:56)  T(F): 98 (17 Mar 2020 07:25), Max: 106.1 (16 Mar 2020 16:56)  HR: 98 (17 Mar 2020 07:25) (76 - 163)  BP: 119/63 (17 Mar 2020 07:25) (109/69 - 151/76)  BP(mean): --  RR: 19 (17 Mar 2020 07:25) (18 - 19)  SpO2: 98% (17 Mar 2020 07:25) (97% - 100%)    PHYSICAL EXAM:    Constitutional: nad  HEENT: ncat  Gastrointestinal: soft nt to deep palpation no guarding mild dt  Extremities: edema  Neurological: Awake alert responds appropriately      LABS:                        7.5    3.05  )-----------( 11       ( 16 Mar 2020 17:39 )             22.0     03-17    138  |  111<H>  |  23  ----------------------------<  113<H>  3.3<L>   |  17<L>  |  1.20    Ca    8.0<L>      17 Mar 2020 07:55  Phos  2.0     03-16  Mg     1.9     03-16    TPro  5.5<L>  /  Alb  2.4<L>  /  TBili  3.4<H>  /  DBili  x   /  AST  44<H>  /  ALT  21  /  AlkPhos  141<H>  03-16          RADIOLOGY & ADDITIONAL TESTS:

## 2020-03-18 NOTE — PROGRESS NOTE ADULT - ASSESSMENT
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 Tachy likely reactive  -tele overnight sr 94 with 12 beats wct in setting of hypokalemia   -Monitor and replete electrolytes. Keep K>4.0 and Mg>2.0.    LE edema  -Doppler negative   -chronic as per patient   -Albumin 2.4  -Echo 2/24/2020 revealing trace AI trace TR ef 60-65%    HTN  -130/70 not on any antihypertensives      ID  - Cont Abx for PNA  Cont supplemental O2  -Follow up hem onc ,  -PRBC transfusion as per hem onc hgb 6.6    Hypokalemia  -K 3.0- supplement for goal K > 4.0    Dm  -management as per primary      - Further cardiac workup will depend on clinical course.   - All other workup per primary team. Will followup.   Cathy Baker FNP-C  Cardiology NP  [id number] [phone]

## 2020-03-18 NOTE — PROGRESS NOTE ADULT - ASSESSMENT
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

## 2020-03-18 NOTE — PROGRESS NOTE ADULT - ASSESSMENT
76 y/o man w Hepatitis B on Entecavirt, 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  Repeat Flow Cytometry still SLL/CLL, but FISH now w 11q-, 17p-, del of chromosome 12 and 13, all poor prognostic factors.]  Repeat PETCT only mildly hypermetabolic LADs w highest SUV 3, largest conglomerate f LNs ~5x2cm prevascular, mild splenomegaly 14cm  Pt was scheduled for Venetoclax/Rituxan second line treatment w admission for ramp up Venetoclax and tumor lysis prophylaxis when found w Influenza A during the 2/2020 adm, Rx w Temaflu, subsequent also sig more anemic/thrombocytopenic.  Was discharged home and admitted again 2/22/20 with  fever and found w pneumonia, post course of Ceftriaxone, continues to require transfusions plts and PRBCs.  Hep B titer negative  Post IVIG   Due to continue worsening of SLL/CLL w assoc adverse clinical condition/labs, after discussion w pt and wife, started ramp up Venetoclax 3/1/20(planned Venetoclax/Rituxan)  Pt and wife aware of high risk for adverse side effects due to poor performance status and state of disease, risk for tumor lysis due to sig number of circulating CLL/SLL cells in periphery  Sig decr of WBC since Venetoclax, on hold since 3/5(pt's own supply)  WBC from >100 to 3.     Noted increase in WBC to 13, restarted Venetoclax 20mg x2d with WBC declined to 3.   03/11 WBC 10.8, Venetoclax started.   03/12 WBC 13.1, Venetoclax given  03/13 WBC 3.88 Venetoclad HELD, and DC  Stopped again yesterday 03/13/20 03/14 WBC 3.59      -still PRBC and Platelets transfusion dependent, would give 1 unit platelets and 1 unit pRBC  -On prednisone taper to 10mg qD today  - Hold Venetoclax today; when blood indices slightly better would consider resuming venetoclax at dose 20mg every other day  - Long term prognosis poor  - left message for hospitalist (Dr. Santiago) to discuss care  3/17 coughing still a problem at this point and the plalelet count was only 3000 and I ordered platelets. The venetoclax is still on hold as the white cell count is only 3.36. . 3/18 pt had low grade fever yesterday and is on broad spectrum antibiotics and platelet count was only 2000 and platelet transfusion was ordered by me. Venetoclax still on hold for now.

## 2020-03-18 NOTE — PROGRESS NOTE ADULT - PROBLEM SELECTOR PLAN 2
- Per chart review, platelet count steadily declining since 2018  - poor response to platelets transfusion, suspect ITP component  - s/p one dose of IVIG  - s/p 11 units of platelet transfusion since admission  - Hem/Onc following, will follow the recommendation - platelets 10k yesterday. Per Heme, transfuse if <10k.  -Platelet count 8 today   -Will transfuse one unit of platelets today.   -continue to monitor.   - follow up AM CBC - Per chart review, platelet count steadily declining since 2018  - poor response to platelets transfusion, suspect ITP component  - s/p one dose of IVIG  - s/p 12 units of platelet transfusion since admission  - Hem/Onc following, will follow the recommendation   -Platelet count 2 today   -Will transfuse one unit of platelets today.   -continue to monitor.   - follow up PM CBC

## 2020-03-18 NOTE — PROGRESS NOTE ADULT - SUBJECTIVE AND OBJECTIVE BOX
INTERVAL HPI/OVERNIGHT EVENTS:  Patient seen and examined at bedside. No acute overnight events, did have fever 101F yesterday afternoon. Patient denies any CP, SOB, abd pain. States cough is improving, but complains of generalized fatigue.    MEDICATIONS  (STANDING):  cefepime   IVPB 2000 milliGRAM(s) IV Intermittent every 12 hours  cefepime   IVPB      entecavir 0.5 milliGRAM(s) Oral daily  pantoprazole    Tablet 40 milliGRAM(s) Oral before breakfast  potassium chloride    Tablet ER 20 milliEquivalent(s) Oral two times a day  predniSONE   Tablet 10 milliGRAM(s) Oral daily  vancomycin  IVPB 1250 milliGRAM(s) IV Intermittent every 12 hours    MEDICATIONS  (PRN):  acetaminophen   Tablet .. 650 milliGRAM(s) Oral every 6 hours PRN Mild Pain (1 - 3)  acetaminophen   Tablet .. 650 milliGRAM(s) Oral every 6 hours PRN Temp greater or equal to 38C (100.4F)  albuterol/ipratropium for Nebulization 3 milliLiter(s) Nebulizer every 6 hours PRN Shortness of Breath and/or Wheezing  guaiFENesin   Syrup  (Sugar-Free) 200 milliGRAM(s) Oral every 6 hours PRN Cough  hydrocodone/homatropine Syrup 5 milliLiter(s) Oral at bedtime PRN Cough      Allergies    sulfa drugs (Unknown)    Intolerances      ROS:  CONSTITUTIONAL: + weakness, no fevers or chills  EYES/ENT: No visual changes;  No vertigo or throat pain   NECK: No pain or stiffness  RESPIRATORY: + cough, no wheezing, hemoptysis; No shortness of breath  CARDIOVASCULAR: No chest pain or palpitations  GASTROINTESTINAL: No abdominal or epigastric pain. No nausea, vomiting, or hematemesis  GENITOURINARY: No dysuria, frequency or hematuria  NEUROLOGICAL: No numbness   SKIN: No itching, burning, rashes, or lesions   All other review of systems is negative unless indicated above.      Vital Signs Last 24 Hrs  T(C): 37.1 (18 Mar 2020 07:50), Max: 38.3 (17 Mar 2020 15:00)  T(F): 98.8 (18 Mar 2020 07:50), Max: 101 (17 Mar 2020 15:00)  HR: 90 (18 Mar 2020 07:50) (90 - 125)  BP: 115/69 (18 Mar 2020 07:50) (108/62 - 136/68)  BP(mean): --  RR: 16 (18 Mar 2020 07:50) (16 - 20)  SpO2: 97% (18 Mar 2020 07:50) (94% - 99%)      Physical Exam:  General: WN/WD NAD  Neurology: A&Ox3, nonfocal, BROWN x 4  Respiratory: CTA B/L  CV: RRR, S1S2  Abdominal: Soft, NT, ND +BS  Extremities: +pitting edema B/L LE, + peripheral pulses      LABS:                        6.6    6.09  )-----------( 2        ( 18 Mar 2020 06:41 )             19.7     03-18    136  |  110<H>  |  23  ----------------------------<  108<H>  3.0<L>   |  17<L>  |  1.10    Ca    7.9<L>      18 Mar 2020 06:41  Phos  2.0     03-16  Mg     1.9     03-16    TPro  5.5<L>  /  Alb  2.4<L>  /  TBili  3.4<H>  /  DBili  x   /  AST  44<H>  /  ALT  21  /  AlkPhos  141<H>  03-16      Urinalysis Basic - ( 17 Mar 2020 22:47 )    Color: Yellow / Appearance: Slightly Turbid / S.025 / pH: x  Gluc: x / Ketone: Trace  / Bili: Negative / Urobili: 1   Blood: x / Protein: 500 mg/dL / Nitrite: Positive   Leuk Esterase: Moderate / RBC: x / WBC 0-2   Sq Epi: x / Non Sq Epi: Occasional / Bacteria: Few        RADIOLOGY & ADDITIONAL TESTS:

## 2020-03-18 NOTE — CHART NOTE - NSCHARTNOTEFT_GEN_A_CORE
Resident Rapid Response Note    Patient is a 75y old  Male who presents with a chief complaint of weakness, anemia (18 Mar 2020 14:51)    Rapid response was called 6:58pm on this 75y Male patient for  sinus tachycardia (160s), fever and rigors 2 hr prior.     Patient re-evaluated at this time. Temp is still elevated 104.7.       T(C): 40.5 (1820 @ 20:45), Max: 40.5 (20 @ 20:45)  HR: 84 (20 @ 20:34) (84 - 160)  BP: 150/79 (20 @ 20:34) (108/62 - 176/84)  RR: 20 (20 @ 20:34) (14 - 20)  SpO2: 99% (20 @ 20:34) (97% - 99%)  Wt(kg): --    Physical Exam:  General: Well developed, mild distress due to rigors  HEENT: NCAT, MMM  Neurology: A&Ox3  Respiratory: CTA B/L, No wheezing, rales, or rhonchi  CV: tachycardic, S1/S2  Abdominal: Soft, nontender, non-distended, normoactive bowel sounds  Extremities: 2+ pitting edema of b/l lower ext  MSK: moving all extremities spontaneously  Skin: petechia and ecchymosis on b/l arms    LABS:                        8.6    4.45  )-----------( 34       ( 18 Mar 2020 18:35 )             25.7     18 Mar 2020 06:41    136    |  110    |  23     ----------------------------<  108    3.0     |  17     |  1.10     Ca    7.9        18 Mar 2020 06:41        Urinalysis Basic - ( 17 Mar 2020 22:47 )    Color: Yellow / Appearance: Slightly Turbid / S.025 / pH: x  Gluc: x / Ketone: Trace  / Bili: Negative / Urobili: 1   Blood: x / Protein: 500 mg/dL / Nitrite: Positive   Leuk Esterase: Moderate / RBC: x / WBC 0-2   Sq Epi: x / Non Sq Epi: Occasional / Bacteria: Few    CAPILLARY BLOOD GLUCOSE  POCT Blood Glucose.: 145 mg/dL (18 Mar 2020 19:01)    RADIOLOGY & ADDITIONAL TESTS: no additional imaging    Assessment/Plan: 74yo M with PMH of CLL/SLL, AIHA, chronic hepatitis B (on entecavir), KIRTI on CPAP, T2DM (managed with lifestyle changes), HTN (not on any medication) admitted with suspected gram-negative multifocal pneumonia, completed a course of IV antibiotics.  Severe anemia s/p transfusion of 8 units of PRBC and severe thrombocytopenia s/p platelet transfusion. ITP with poor response to platelet transfusion. RRT called for rigors and tachycardia. Likely due to neutropenic fever in the setting of progressive CLL. Consider 2/2 UTI. Low suspicion for transfusion reaction.  - will give stat 1000mg IV tylenol  - continue cooling blanket  - cooling packs to groin and under arms  - Continue Cefepime empirically treated for UTI  - Will hold off on IVF as patient hypertensive, hx of CHF and b/l pitting edema.   - HR is --.  -Will continue to follow, RN to call if any changes. Resident Rapid Response Note    Patient is a 75y old  Male who presents with a chief complaint of weakness, anemia (18 Mar 2020 14:51)    Rapid response was called 6:58pm on this 75y Male patient for  sinus tachycardia (160s), fever and rigors 2 hr prior.     Patient re-evaluated at this time. Temp is still elevated 104.7.       T(C): 40.5 (1820 @ 20:45), Max: 40.5 (20 @ 20:45)  HR: 84 (20 @ 20:34) (84 - 160)  BP: 150/79 (20 @ 20:34) (108/62 - 176/84)  RR: 20 (20 @ 20:34) (14 - 20)  SpO2: 99% (20 @ 20:34) (97% - 99%)  Wt(kg): --    Physical Exam:  General: Well developed, ill appearing, patient looks visibly tired  HEENT: NCAT, EOMI  Neurology: A&Ox3  Respiratory: CTA B/L, No wheezing, rales, or rhonchi  CV: tachycardic, S1/S2  Abdominal: Soft, nontender, non-distended, normoactive bowel sounds  Extremities: 2+ pitting edema of b/l lower ext  MSK: moving all extremities spontaneously  Skin: petechia and ecchymosis on b/l arms    LABS:                        8.6    4.45  )-----------( 34       ( 18 Mar 2020 18:35 )             25.7     18 Mar 2020 06:41    136    |  110    |  23     ----------------------------<  108    3.0     |  17     |  1.10     Ca    7.9        18 Mar 2020 06:41        Urinalysis Basic - ( 17 Mar 2020 22:47 )    Color: Yellow / Appearance: Slightly Turbid / S.025 / pH: x  Gluc: x / Ketone: Trace  / Bili: Negative / Urobili: 1   Blood: x / Protein: 500 mg/dL / Nitrite: Positive   Leuk Esterase: Moderate / RBC: x / WBC 0-2   Sq Epi: x / Non Sq Epi: Occasional / Bacteria: Few    CAPILLARY BLOOD GLUCOSE  POCT Blood Glucose.: 145 mg/dL (18 Mar 2020 19:01)    RADIOLOGY & ADDITIONAL TESTS: no additional imaging    Assessment/Plan: 74yo M with PMH of CLL/SLL, AIHA, chronic hepatitis B (on entecavir), KIRTI on CPAP, T2DM (managed with lifestyle changes), HTN (not on any medication) admitted with suspected gram-negative multifocal pneumonia, completed a course of IV antibiotics.  Severe anemia s/p transfusion of 8 units of PRBC and severe thrombocytopenia s/p platelet transfusion. ITP with poor response to platelet transfusion. RRT called for rigors and tachycardia. Likely due to neutropenic fever in the setting of progressive CLL. Consider 2/2 UTI. Low suspicion for transfusion reaction.  - will give stat 1000mg IV tylenol  - continue cooling blanket  - cooling packs to groin and under arms  - Continue Cefepime empirically treated for UTI  - Will hold off on IVF as patient hypertensive, hx of CHF and b/l pitting edema.   - CT abdomen. chest ordered. F/U results  - Will continue to follow, RN to call if any changes. Resident Rapid Response Note    Patient is a 75y old  Male who presents with a chief complaint of weakness, anemia (18 Mar 2020 14:51)    Rapid response was called 6:58pm on this 75y Male patient for  sinus tachycardia (160s), fever and rigors 2 hr prior.     Patient re-evaluated at this time. Temp is still elevated 104.7. Patient states that he feels "tired but okay." He admits still feeling chills. He denies headache, chest pain, shortness of breath, nausea, vomiting, abdominal pain, diarrhea, constipation. Patient is nervous about his overall prognosis.       T(C): 40.5 (18-20 @ 20:45), Max: 40.5 (20 @ 20:45)  HR: 84 (20 @ 20:34) (84 - 160)  BP: 150/79 (20 @ 20:34) (108/62 - 176/84)  RR: 20 (20 @ 20:34) (14 - 20)  SpO2: 99% (20 @ 20:34) (97% - 99%)  Wt(kg): --    Physical Exam:  General: Well developed, ill appearing, patient looks visibly tired  HEENT: NCAT, EOMI  Neurology: A&Ox3  Respiratory: CTA B/L, No wheezing, rales, or rhonchi  CV: tachycardic, S1/S2  Abdominal: Soft, nontender, non-distended, normoactive bowel sounds  Extremities: 2+ pitting edema of b/l lower ext  MSK: moving all extremities spontaneously  Skin: petechia and ecchymosis on b/l arms    LABS:                        8.6    4.45  )-----------( 34       ( 18 Mar 2020 18:35 )             25.7     18 Mar 2020 06:41    136    |  110    |  23     ----------------------------<  108    3.0     |  17     |  1.10     Ca    7.9        18 Mar 2020 06:41        Urinalysis Basic - ( 17 Mar 2020 22:47 )    Color: Yellow / Appearance: Slightly Turbid / S.025 / pH: x  Gluc: x / Ketone: Trace  / Bili: Negative / Urobili: 1   Blood: x / Protein: 500 mg/dL / Nitrite: Positive   Leuk Esterase: Moderate / RBC: x / WBC 0-2   Sq Epi: x / Non Sq Epi: Occasional / Bacteria: Few    CAPILLARY BLOOD GLUCOSE  POCT Blood Glucose.: 145 mg/dL (18 Mar 2020 19:01)    RADIOLOGY & ADDITIONAL TESTS: no additional imaging    Assessment/Plan: 74yo M with PMH of CLL/SLL, AIHA, chronic hepatitis B (on entecavir), KIRTI on CPAP, T2DM (managed with lifestyle changes), HTN (not on any medication) admitted with suspected gram-negative multifocal pneumonia, completed a course of IV antibiotics.  Severe anemia s/p transfusion of 8 units of PRBC and severe thrombocytopenia s/p platelet transfusion. ITP with poor response to platelet transfusion. RRT called for rigors and tachycardia. Likely due to neutropenic fever in the setting of progressive CLL. Consider 2/2 UTI. Low suspicion for transfusion reaction.  - will give stat 1000mg IV tylenol  - continue cooling blanket  - cooling packs to groin and under arms  - Continue Cefepime empirically treated for UTI  - Will hold off on IVF as patient hypertensive, hx of CHF and b/l pitting edema.   - CT abdomen. chest ordered. F/U results  - Will continue to follow, RN to call if any changes.

## 2020-03-18 NOTE — PROGRESS NOTE ADULT - SUBJECTIVE AND OBJECTIVE BOX
[facility] -- Cori Dawkins, Travis Villagomez, Ryland Wright Savella  Office # [phone]      Follow Up:      Subjective/Observations:       REVIEW OF SYSTEMS: All other review of systems is negative unless indicated above    PAST MEDICAL & SURGICAL HISTORY:  KIRTI (obstructive sleep apnea): on nocturnal cpap  Hypertension: not on any medications  Diabetes: not on any medications  Hepatitis B  Lymphoma: Small cell B cell  AIHA (autoimmune hemolytic anemia)  H/O lithotripsy      MEDICATIONS  (STANDING):  cefepime   IVPB 2000 milliGRAM(s) IV Intermittent every 12 hours  cefepime   IVPB      entecavir 0.5 milliGRAM(s) Oral daily  pantoprazole    Tablet 40 milliGRAM(s) Oral before breakfast  potassium chloride    Tablet ER 20 milliEquivalent(s) Oral two times a day  predniSONE   Tablet 10 milliGRAM(s) Oral daily  vancomycin  IVPB 1250 milliGRAM(s) IV Intermittent every 12 hours    MEDICATIONS  (PRN):  acetaminophen   Tablet .. 650 milliGRAM(s) Oral every 6 hours PRN Mild Pain (1 - 3)  acetaminophen   Tablet .. 650 milliGRAM(s) Oral every 6 hours PRN Temp greater or equal to 38C (100.4F)  albuterol/ipratropium for Nebulization 3 milliLiter(s) Nebulizer every 6 hours PRN Shortness of Breath and/or Wheezing  guaiFENesin   Syrup  (Sugar-Free) 200 milliGRAM(s) Oral every 6 hours PRN Cough  hydrocodone/homatropine Syrup 5 milliLiter(s) Oral at bedtime PRN Cough      Allergies    sulfa drugs (Unknown)    Intolerances        Vital Signs Last 24 Hrs  T(C): 36.7 (18 Mar 2020 11:35), Max: 38.3 (17 Mar 2020 15:00)  T(F): 98 (18 Mar 2020 11:35), Max: 101 (17 Mar 2020 15:00)  HR: 95 (18 Mar 2020 11:35) (90 - 125)  BP: 130/70 (18 Mar 2020 11:35) (108/62 - 136/68)  BP(mean): --  RR: 18 (18 Mar 2020 11:35) (16 - 20)  SpO2: 97% (18 Mar 2020 11:35) (94% - 99%)    I&O's Summary        PHYSICAL EXAM:  TELE:   Constitutional: NAD, awake and alert, well-developed  HEENT: Moist Mucous Membranes, Anicteric  Pulmonary: Non-labored, breath sounds are clear bilaterally, No wheezing, crackles or rhonchi  Cardiovascular: Regular, S1 and S2 nl, No murmurs, rubs, gallops or clicks  Gastrointestinal: Bowel Sounds present, soft, nontender.   Lymph: No lymphadenopathy. No peripheral edema.  Skin: No visible rashes or ulcers.  Psych:  Mood & affect appropriate    LABS: All Labs Reviewed:                        6.6    6.09  )-----------( 2        ( 18 Mar 2020 06:41 )             19.7                         7.3    3.52  )-----------( 3        ( 17 Mar 2020 07:55 )             21.0                         7.5    3.05  )-----------( 11       ( 16 Mar 2020 17:39 )             22.0     18 Mar 2020 06:41    136    |  110    |  23     ----------------------------<  108    3.0     |  17     |  1.10   17 Mar 2020 07:55    138    |  111    |  23     ----------------------------<  113    3.3     |  17     |  1.20   16 Mar 2020 09:51    134    |  109    |  22     ----------------------------<  128    3.3     |  16     |  1.20     Ca    7.9        18 Mar 2020 06:41  Ca    8.0        17 Mar 2020 07:55  Ca    7.8        16 Mar 2020 09:51  Phos  2.0       16 Mar 2020 17:42  Phos  1.5       16 Mar 2020 09:51  Mg     1.9       16 Mar 2020 17:42  Mg     1.9       16 Mar 2020 09:51    TPro  5.5    /  Alb  2.4    /  TBili  3.4    /  DBili  x      /  AST  44     /  ALT  21     /  AlkPhos  141    16 Mar 2020 09:51      CARDIAC MARKERS ( 16 Mar 2020 17:42 )  .017 ng/mL / x     / 19 U/L / x     / <1.0 ng/mL         ECG:  < from: 12 Lead ECG (03.16.20 @ 17:10) >    Ventricular Rate 145 BPM    Atrial Rate 144 BPM    QRS Duration 64 ms    Q-T Interval 336 ms    QTC Calculation(Bezet) 521 ms    R Axis 27 degrees    T Axis 47 degrees    Diagnosis Line St with APCs  Nonspecific T wave abnormality  Abnormal ECG    < end of copied text >      Echo:  < from: TTE Echo Doppler w/o Cont (02.24.20 @ 17:01) >  OBSERVATIONS:    Mitral Valve: normal, trace physiologic MR.  Aortic Valve/Aorta: normal trileaflet aortic valve. Trace AI  Tricuspid Valve: normal with trace TR.  Pulmonic Valve: normal  Left Atrium: normal  Right Atrium: normal  Left Ventricle: normal LV size and systolic function, estimated LVEF of 60-65%.  Right Ventricle: normal size and systolic function.  Pericardium/Pleura: normal, no significant pericardial effusion.  Pulmonary/RV Pressure: estimated PA systolic pressure of 9 mmHgassuming an RA pressure of 3 mmHg.  Right pleural effusion    Conclusion:   Normal left ventricular internal dimensions and systolic function, estimated LVEF of 60-65%.   Normal RV size and systolic function.    Normal trileaflet aortic valve, without AI.   Trace physiologic MR and TR.     No significant pericardial effusion.        < end of copied text >    Radiology:  < from: Xray Chest 1 View- PORTABLE-Routine (03.17.20 @ 12:19) >  COMPARISON: 3/16/2020    FINDINGS/  IMPRESSION:  Nonspecific opacity right midlung zone without significant change.    No significant pleural effusion    Heart size cannot be accurately assessed in this projection.    < end of copied text >

## 2020-03-18 NOTE — PROGRESS NOTE ADULT - PROBLEM SELECTOR PLAN 3
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 Was on prednisone 10mg daily -- increase to prednisone 40mg po during hospitalization, tapering started @2/29 - now 10mg daily  - Heme/onc following  - follow H&H- hgb 6.6 today, will transfuse 1 unit PRBC today

## 2020-03-18 NOTE — PROGRESS NOTE ADULT - SUBJECTIVE AND OBJECTIVE BOX
Patient seen and examined;  Chart reviewed and events noted;   feeling more tired     3/17 coughing still a problem at this point and the plalelet count was only 3000 and I ordered platelets. The venetoclax is still on hold as the white cell count is only 3.36. 3/18 pt had low grade fever yesterday and is on broad spectrum antibiotics and platelet count was only 2000 and platelet transfusion was ordered by me. Venetoclax still on hold for now.  MEDICATIONS  (STANDING):  cefepime   IVPB 2000 milliGRAM(s) IV Intermittent every 12 hours  cefepime   IVPB      entecavir 0.5 milliGRAM(s) Oral daily  pantoprazole    Tablet 40 milliGRAM(s) Oral before breakfast  predniSONE   Tablet 10 milliGRAM(s) Oral daily  vancomycin  IVPB 1250 milliGRAM(s) IV Intermittent every 12 hours  PHYSICAL EXAM  General: adult in NAD pt being cared for by the staff at time of my visit x 2 and platelets were ordered.  HEENT: clear oropharynx, anicteric sclera, pink conjunctivae  Neck: supple  CV: normal S1S2 with no murmur rubs or gallops  Lungs: clear to auscultation, no wheezes, no rales  Abdomen: soft non-tender non-distended, no hepatosplenomegaly  Ext: no clubbing cyanosis or edema  Skin: + bruising  Neuro: alert and oriented X3 no focal deficits  ICU Vital Signs Last 24 Hrs  T(C): 36.9 (17 Mar 2020 12:01), Max: 41.2 (16 Mar 2020 16:56)  T(F): 98.4 (17 Mar 2020 12:01), Max: 106.1 (16 Mar 2020 16:56)  HR: 97 (17 Mar 2020 12:01) (76 - 163)  BP: 108/63 (17 Mar 2020 12:01) (108/63 - 151/76)  BP(mean): --  ABP: --  ABP(mean): --  RR: 16 (17 Mar 2020 12:01) (16 - 19)  SpO2: 98% (17 Mar 2020 12:01) (97% - 100%)      LABS:                         6.6    6.09  )-----------( 2        ( 18 Mar 2020 06:41 )             19.7     Hemoglobin: 6.8 g/dL (03-16 @ 09:51)  Hemoglobin: 7.7 g/dL (03-15 @ 14:02)  Hemoglobin: 7.6 g/dL (03-15 @ 06:30)  Hemoglobin: 7.1 g/dL (03-14 @ 09:40)  Hemoglobin: 7.8 g/dL (03-13 @ 09:37)    Platelet Count - Automated: 8 K/uL (03-16 @ 09:51)  Platelet Count - Automated: 22 K/uL (03-15 @ 14:02)  Platelet Count - Automated: 8 K/uL (03-15 @ 06:30)  Platelet Count - Automated: 10 K/uL (03-14 @ 09:40)  Platelet Count - Automated: 8 K/uL (03-13 @ 09:37)    WBC Count: 3.42 K/uL (03-16 @ 09:51)  WBC Count: 2.53 K/uL (03-15 @ 14:02)  WBC Count: 6.71 K/uL (03-15 @ 06:30)  WBC Count: 3.59 K/uL (03-14 @ 09:40)  WBC Count: 3.88 K/uL (03-13 @ 09:37)    03-16    134<L>  |  109<H>  |  22  ----------------------------<  128<H>  3.3<L>   |  16<L>  |  1.20    Ca    7.8<L>      16 Mar 2020 09:51  Phos  1.5     03-16  Mg     1.9     03-16    TPro  5.5<L>  /  Alb  2.4<L>  /  TBili  3.4<H>  /  DBili  x   /  AST  44<H>  /  ALT  21  /  AlkPhos  141<H>  03-16

## 2020-03-18 NOTE — PROGRESS NOTE ADULT - PROBLEM SELECTOR PLAN 1
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 as per heme/onc.   - WBC approx. 3 today.   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 as per heme/onc.   - WBC approx. 6 today.   - Steroids being tapered - 10mg now  - Hem/Onc following, recs appreciated

## 2020-03-18 NOTE — PROGRESS NOTE ADULT - PROBLEM SELECTOR PLAN 4
-Patient initially treated for multifocal PNA  -Patient developed high fever on 3/16/20, ID consulted  -Blood cx neg, UA suspicious for UTI, continue IV abx as per ID recs  -Patient also with tachycardia while febrile, Cardio was consulted for concern for afib- no afib noted

## 2020-03-18 NOTE — CHART NOTE - NSCHARTNOTEFT_GEN_A_CORE
Resident Rapid Response Note    Patient is a 75y old  Male who presents with a chief complaint of weakness, anemia (18 Mar 2020 14:51)      Rapid response was called at on this 75y Male patient for  sinus tachycardia (160s), fever and rigors     Patient was seen and examined at the bedside by the rapid response team and primary team. Dr. Marcano at bedside.    Rapid Response Vital Signs:  BP: 172/71  HR: 164  RR: 40  SpO2: % on   Temp: 101.9  FS: 154    Vital Signs Last 24 Hrs  T(C): 38.6 (18 Mar 2020 18:52), Max: 38.6 (18 Mar 2020 18:52)  T(F): 101.4 (18 Mar 2020 18:52), Max: 101.4 (18 Mar 2020 18:52)  HR: 160 (18 Mar 2020 18:52) (84 - 160)  BP: 176/84 (18 Mar 2020 18:52) (108/62 - 176/84)  BP(mean): --  RR: 20 (18 Mar 2020 18:44) (14 - 20)  SpO2: 98% (18 Mar 2020 18:44) (97% - 99%)    Physical Exam:  General: Well developed, well nourished, in no acute distress  HEENT: NCAT, PERRLA, EOMI bl, moist mucous membranes   Neck: Supple, nontender, no mass  Neurology: A&Ox3, nonfocal, CN II-XII grossly intact, sensation intact, no gait abnormalities   Respiratory: CTA B/L, No wheezing, rales, or rhonchi  CV: RRR, S1/S2 present, no murmurs, rubs, or gallops  Abdominal: Soft, nontender, non-distended, normoactive bowel sounds  Extremities: No C/C/E, peripheral pulses present  MSK: Normal ROM, no joint erythema or warmth, no joint swelling   Skin: warm, dry, normal color, no obvious rash or abnormal lesions    LABS:                        8.6    4.45  )-----------( 34       ( 18 Mar 2020 18:35 )             25.7     18 Mar 2020 06:41    136    |  110    |  23     ----------------------------<  108    3.0     |  17     |  1.10     Ca    7.9        18 Mar 2020 06:41        Urinalysis Basic - ( 17 Mar 2020 22:47 )    Color: Yellow / Appearance: Slightly Turbid / S.025 / pH: x  Gluc: x / Ketone: Trace  / Bili: Negative / Urobili: 1   Blood: x / Protein: 500 mg/dL / Nitrite: Positive   Leuk Esterase: Moderate / RBC: x / WBC 0-2   Sq Epi: x / Non Sq Epi: Occasional / Bacteria: Few      CAPILLARY BLOOD GLUCOSE      POCT Blood Glucose.: 145 mg/dL (18 Mar 2020 19:01)        RADIOLOGY & ADDITIONAL TESTS:    Imaging Personally Reviewed:  [ ] YES  [ ] NO    Consultant(s) Notes Reviewed:  [ ] YES  [ ] NO    Care Discussed with Consultants/Other Providers [ ] YES  [ ] NO    Critical care time spent at bedside and coordinating care for patient:    Assessment/Plan:   - Tylenol for fever  - EKG   - ABG         -Will continue to follow, RN to call if any changes. Resident Rapid Response Note    Patient is a 75y old  Male who presents with a chief complaint of weakness, anemia (18 Mar 2020 14:51)      Rapid response was called at on this 75y Male patient for  sinus tachycardia (160s), fever and rigors     Patient was seen and examined at the bedside by the rapid response team and primary team. Dr. Marcano at bedside.    Rapid Response Vital Signs:  BP: 172/71  HR: 164  RR: 40  SpO2: 99% on RA  Temp: 101.9  FS: 154    Vital Signs Last 24 Hrs  T(C): 38.6 (18 Mar 2020 18:52), Max: 38.6 (18 Mar 2020 18:52)  T(F): 101.4 (18 Mar 2020 18:52), Max: 101.4 (18 Mar 2020 18:52)  HR: 160 (18 Mar 2020 18:52) (84 - 160)  BP: 176/84 (18 Mar 2020 18:52) (108/62 - 176/84)  BP(mean): --  RR: 20 (18 Mar 2020 18:44) (14 - 20)  SpO2: 98% (18 Mar 2020 18:44) (97% - 99%)    Physical Exam:  General: Well developed, well nourished, in no acute distress  HEENT: NCAT, PERRLA, EOMI bl, moist mucous membranes   Neck: Supple, nontender, no mass  Neurology: A&Ox3, nonfocal, CN II-XII grossly intact, sensation intact, no gait abnormalities   Respiratory: CTA B/L, No wheezing, rales, or rhonchi  CV: RRR, S1/S2 present, no murmurs, rubs, or gallops  Abdominal: Soft, nontender, non-distended, normoactive bowel sounds  Extremities: No C/C/E, peripheral pulses present  MSK: Normal ROM, no joint erythema or warmth, no joint swelling   Skin: warm, dry, normal color, no obvious rash or abnormal lesions    LABS:                        8.6    4.45  )-----------( 34       ( 18 Mar 2020 18:35 )             25.7     18 Mar 2020 06:41    136    |  110    |  23     ----------------------------<  108    3.0     |  17     |  1.10     Ca    7.9        18 Mar 2020 06:41        Urinalysis Basic - ( 17 Mar 2020 22:47 )    Color: Yellow / Appearance: Slightly Turbid / S.025 / pH: x  Gluc: x / Ketone: Trace  / Bili: Negative / Urobili: 1   Blood: x / Protein: 500 mg/dL / Nitrite: Positive   Leuk Esterase: Moderate / RBC: x / WBC 0-2   Sq Epi: x / Non Sq Epi: Occasional / Bacteria: Few      CAPILLARY BLOOD GLUCOSE      POCT Blood Glucose.: 145 mg/dL (18 Mar 2020 19:01)        RADIOLOGY & ADDITIONAL TESTS:    Imaging Personally Reviewed:  [ ] YES  [ ] NO    Consultant(s) Notes Reviewed:  [ ] YES  [ ] NO    Care Discussed with Consultants/Other Providers [ ] YES  [ ] NO    Critical care time spent at bedside and coordinating care for patient:    Assessment/Plan: 76yo M with PMH of CLL/SLL, AIHA, chronic hepatitis B (on entecavir), KIRTI on CPAP, T2DM (managed with lifestyle changes), HTN (not on any medication) admitted with suspected gram-negative multifocal pneumonia, completed a course of IV antibiotics.  Severe anemia s/p transfusion of 8 units of PRBC and severe thrombocytopenia s/p platelet transfusion.   ITP with poor response to platelet transfusion.  - Tylenol for fever  - EKG   - ABG         -Will continue to follow, RN to call if any changes. Resident Rapid Response Note    Patient is a 75y old  Male who presents with a chief complaint of weakness, anemia (18 Mar 2020 14:51)    Rapid response was called 6:58pm on this 75y Male patient for  sinus tachycardia (160s), fever and rigors.     Patient was seen and examined at the bedside by the rapid response team and ICU PA. Attending physician, Dr. Cruz at bedside. Patient states that he feels chills and is shaking. Patient with RRT from 3/16 for new onset fever, likely neutropenic fever. Patient recently transfused with 2 units platelets and 1 unit pRBCs for anemia and thrombocytopenia. Patient is on Cefepime for likely UTI. Unlikely a transfusion reaction as patient has received multiple transfusions during this admission without incident.      Rapid Response Vital Signs:  BP: 172/71  HR: 164  RR: 40  SpO2: 99% on RA  Temp: 101.9 Rectal (100.9F orally)  FS: 154    Vital Signs Last 24 Hrs  T(C): 38.6 (18 Mar 2020 18:52), Max: 38.6 (18 Mar 2020 18:52)  T(F): 101.4 (18 Mar 2020 18:52), Max: 101.4 (18 Mar 2020 18:52)  HR: 160 (18 Mar 2020 18:52) (84 - 160)  BP: 176/84 (18 Mar 2020 18:52) (108/62 - 176/84)  RR: 20 (18 Mar 2020 18:44) (14 - 20)  SpO2: 98% (18 Mar 2020 18:44) (97% - 99%)    Physical Exam:  General: Well developed, mild distress due to rigors  HEENT: NCAT, MMM  Neurology: A&Ox3  Respiratory: CTA B/L, No wheezing, rales, or rhonchi  CV: tachycardic, S1/S2  Abdominal: Soft, nontender, non-distended, normoactive bowel sounds  Extremities: 2+ pitting edema of b/l lower ext  MSK: moving all extremities spontaneously  Skin: petechia and ecchymosis on b/l arms    LABS:                        8.6    4.45  )-----------( 34       ( 18 Mar 2020 18:35 )             25.7     18 Mar 2020 06:41    136    |  110    |  23     ----------------------------<  108    3.0     |  17     |  1.10     Ca    7.9        18 Mar 2020 06:41        Urinalysis Basic - ( 17 Mar 2020 22:47 )    Color: Yellow / Appearance: Slightly Turbid / S.025 / pH: x  Gluc: x / Ketone: Trace  / Bili: Negative / Urobili: 1   Blood: x / Protein: 500 mg/dL / Nitrite: Positive   Leuk Esterase: Moderate / RBC: x / WBC 0-2   Sq Epi: x / Non Sq Epi: Occasional / Bacteria: Few    CAPILLARY BLOOD GLUCOSE  POCT Blood Glucose.: 145 mg/dL (18 Mar 2020 19:01)    RADIOLOGY & ADDITIONAL TESTS: no additional imaging    Assessment/Plan: 76yo M with PMH of CLL/SLL, AIHA, chronic hepatitis B (on entecavir), KIRTI on CPAP, T2DM (managed with lifestyle changes), HTN (not on any medication) admitted with suspected gram-negative multifocal pneumonia, completed a course of IV antibiotics.  Severe anemia s/p transfusion of 8 units of PRBC and severe thrombocytopenia s/p platelet transfusion. ITP with poor response to platelet transfusion. RRT called for rigors and tachycardia. Likely due to neutropenic fever in the setting of progressive CLL. Consider 2/2 UTI. Low suspicion for transfusion reaction.  - Continue Cefepime empirically treated for UTI  - Tylenol rectally for fever  - Would hold off on IVF as patient hypertensive, hx of CHF and b/l pitting edema.   - EKG difficult to obtain due to rigors but showed sinus tach, 's. Would repeat at follow up.   - Cardio following- Dr. Villagomez  - Attending physician, Dr. Cruz at bedside and aware.  -Will continue to follow, RN to call if any changes.

## 2020-03-18 NOTE — PROGRESS NOTE ADULT - PROBLEM SELECTOR PLAN 1
exam benign  cxr without new consolidation  f/u blood cx ntd  empiric cefepime and vancomycin until cx back at least 48 hours then will tailor  if further fever check ct chest/abd/pelvis

## 2020-03-19 LAB
ANION GAP SERPL CALC-SCNC: 13 MMOL/L — SIGNIFICANT CHANGE UP (ref 5–17)
BUN SERPL-MCNC: 27 MG/DL — HIGH (ref 7–23)
CALCIUM SERPL-MCNC: 7.7 MG/DL — LOW (ref 8.5–10.1)
CHLORIDE SERPL-SCNC: 111 MMOL/L — HIGH (ref 96–108)
CO2 SERPL-SCNC: 15 MMOL/L — LOW (ref 22–31)
CREAT SERPL-MCNC: 1.1 MG/DL — SIGNIFICANT CHANGE UP (ref 0.5–1.3)
CULTURE RESULTS: NO GROWTH — SIGNIFICANT CHANGE UP
CULTURE RESULTS: SIGNIFICANT CHANGE UP
GLUCOSE SERPL-MCNC: 126 MG/DL — HIGH (ref 70–99)
HCT VFR BLD CALC: 19.8 % — CRITICAL LOW (ref 39–50)
HCT VFR BLD CALC: 24.4 % — LOW (ref 39–50)
HGB BLD-MCNC: 6.7 G/DL — CRITICAL LOW (ref 13–17)
HGB BLD-MCNC: 8.3 G/DL — LOW (ref 13–17)
MCHC RBC-ENTMCNC: 29.7 PG — SIGNIFICANT CHANGE UP (ref 27–34)
MCHC RBC-ENTMCNC: 30 PG — SIGNIFICANT CHANGE UP (ref 27–34)
MCHC RBC-ENTMCNC: 33.8 GM/DL — SIGNIFICANT CHANGE UP (ref 32–36)
MCHC RBC-ENTMCNC: 34 GM/DL — SIGNIFICANT CHANGE UP (ref 32–36)
MCV RBC AUTO: 87.5 FL — SIGNIFICANT CHANGE UP (ref 80–100)
MCV RBC AUTO: 88.8 FL — SIGNIFICANT CHANGE UP (ref 80–100)
NRBC # BLD: 0 /100 WBCS — SIGNIFICANT CHANGE UP (ref 0–0)
NRBC # BLD: 0 /100 WBCS — SIGNIFICANT CHANGE UP (ref 0–0)
PLATELET # BLD AUTO: 2 K/UL — CRITICAL LOW (ref 150–400)
PLATELET # BLD AUTO: 3 K/UL — CRITICAL LOW (ref 150–400)
POTASSIUM SERPL-MCNC: 3.2 MMOL/L — LOW (ref 3.5–5.3)
POTASSIUM SERPL-SCNC: 3.2 MMOL/L — LOW (ref 3.5–5.3)
RBC # BLD: 2.23 M/UL — LOW (ref 4.2–5.8)
RBC # BLD: 2.79 M/UL — LOW (ref 4.2–5.8)
RBC # FLD: 18.6 % — HIGH (ref 10.3–14.5)
RBC # FLD: 19.4 % — HIGH (ref 10.3–14.5)
SODIUM SERPL-SCNC: 139 MMOL/L — SIGNIFICANT CHANGE UP (ref 135–145)
SPECIMEN SOURCE: SIGNIFICANT CHANGE UP
SPECIMEN SOURCE: SIGNIFICANT CHANGE UP
WBC # BLD: 3.62 K/UL — LOW (ref 3.8–10.5)
WBC # BLD: 4 K/UL — SIGNIFICANT CHANGE UP (ref 3.8–10.5)
WBC # FLD AUTO: 3.62 K/UL — LOW (ref 3.8–10.5)
WBC # FLD AUTO: 4 K/UL — SIGNIFICANT CHANGE UP (ref 3.8–10.5)

## 2020-03-19 PROCEDURE — 99232 SBSQ HOSP IP/OBS MODERATE 35: CPT

## 2020-03-19 PROCEDURE — 74176 CT ABD & PELVIS W/O CONTRAST: CPT | Mod: 26

## 2020-03-19 PROCEDURE — 99233 SBSQ HOSP IP/OBS HIGH 50: CPT

## 2020-03-19 PROCEDURE — 71250 CT THORAX DX C-: CPT | Mod: 26

## 2020-03-19 RX ORDER — POTASSIUM CHLORIDE 20 MEQ
40 PACKET (EA) ORAL ONCE
Refills: 0 | Status: COMPLETED | OUTPATIENT
Start: 2020-03-19 | End: 2020-03-19

## 2020-03-19 RX ADMIN — CEFEPIME 100 MILLIGRAM(S): 1 INJECTION, POWDER, FOR SOLUTION INTRAMUSCULAR; INTRAVENOUS at 06:10

## 2020-03-19 RX ADMIN — Medication 166.67 MILLIGRAM(S): at 10:17

## 2020-03-19 RX ADMIN — Medication 20 MILLIEQUIVALENT(S): at 17:21

## 2020-03-19 RX ADMIN — CEFEPIME 100 MILLIGRAM(S): 1 INJECTION, POWDER, FOR SOLUTION INTRAMUSCULAR; INTRAVENOUS at 17:21

## 2020-03-19 RX ADMIN — Medication 40 MILLIEQUIVALENT(S): at 11:43

## 2020-03-19 RX ADMIN — PANTOPRAZOLE SODIUM 40 MILLIGRAM(S): 20 TABLET, DELAYED RELEASE ORAL at 06:10

## 2020-03-19 RX ADMIN — Medication 20 MILLIEQUIVALENT(S): at 06:10

## 2020-03-19 RX ADMIN — Medication 10 MILLIGRAM(S): at 06:10

## 2020-03-19 RX ADMIN — ENTECAVIR 0.5 MILLIGRAM(S): 0.5 TABLET ORAL at 06:11

## 2020-03-19 NOTE — PROGRESS NOTE ADULT - SUBJECTIVE AND OBJECTIVE BOX
Interval History:  remains weak  off venetoclax    Chart reviewed and events noted;   Overnight events:    MEDICATIONS  (STANDING):  cefepime   IVPB 2000 milliGRAM(s) IV Intermittent every 12 hours  cefepime   IVPB      entecavir 0.5 milliGRAM(s) Oral daily  pantoprazole    Tablet 40 milliGRAM(s) Oral before breakfast  potassium chloride    Tablet ER 20 milliEquivalent(s) Oral two times a day  predniSONE   Tablet 10 milliGRAM(s) Oral daily  vancomycin  IVPB 1250 milliGRAM(s) IV Intermittent every 12 hours    MEDICATIONS  (PRN):  acetaminophen   Tablet .. 650 milliGRAM(s) Oral every 6 hours PRN Mild Pain (1 - 3)  acetaminophen   Tablet .. 650 milliGRAM(s) Oral every 6 hours PRN Temp greater or equal to 38C (100.4F)  albuterol/ipratropium for Nebulization 3 milliLiter(s) Nebulizer every 6 hours PRN Shortness of Breath and/or Wheezing  guaiFENesin   Syrup  (Sugar-Free) 200 milliGRAM(s) Oral every 6 hours PRN Cough  hydrocodone/homatropine Syrup 5 milliLiter(s) Oral at bedtime PRN Cough      Vital Signs Last 24 Hrs  T(C): 36.4 (19 Mar 2020 11:01), Max: 40.5 (18 Mar 2020 20:45)  T(F): 97.6 (19 Mar 2020 11:01), Max: 104.9 (18 Mar 2020 20:45)  HR: 84 (19 Mar 2020 11:01) (81 - 160)  BP: 102/70 (19 Mar 2020 11:01) (102/70 - 176/84)  BP(mean): --  RR: 18 (19 Mar 2020 11:01) (14 - 20)  SpO2: 98% (19 Mar 2020 11:01) (97% - 100%)    PHYSICAL EXAM  General: adult in NAD  HEENT: clear oropharynx, anicteric sclera, pink conjunctivae  Neck: supple  CV: normal S1S2 with no murmur rubs or gallops  Lungs: clear to auscultation, no wheezes, no rhales  Abdomen: soft non-tender non-distended, no hepato/splenomegaly  Ext: no clubbing cyanosis or edema  Skin: no rashes and no petichiae  Neuro: alert and oriented X3 no focal deficits      LABS:  CBC Full  -  ( 19 Mar 2020 06:20 )  WBC Count : 4.00 K/uL  RBC Count : 2.23 M/uL  Hemoglobin : 6.7 g/dL  Hematocrit : 19.8 %  Platelet Count - Automated : 3 K/uL  Mean Cell Volume : 88.8 fl  Mean Cell Hemoglobin : 30.0 pg  Mean Cell Hemoglobin Concentration : 33.8 gm/dL  Auto Neutrophil # : x  Auto Lymphocyte # : x  Auto Monocyte # : x  Auto Eosinophil # : x  Auto Basophil # : x  Auto Neutrophil % : x  Auto Lymphocyte % : x  Auto Monocyte % : x  Auto Eosinophil % : x  Auto Basophil % : x    03-19    139  |  111<H>  |  27<H>  ----------------------------<  126<H>  3.2<L>   |  15<L>  |  1.10    Ca    7.7<L>      19 Mar 2020 06:20          fe studies      WBC trend  4.00 K/uL (03-19-20 @ 06:20)  4.45 K/uL (03-18-20 @ 18:35)  6.09 K/uL (03-18-20 @ 06:41)  3.52 K/uL (03-17-20 @ 07:55)  3.05 K/uL (03-16-20 @ 17:39)      Hgb trend  6.7 g/dL (03-19-20 @ 06:20)  8.6 g/dL (03-18-20 @ 18:35)  6.6 g/dL (03-18-20 @ 06:41)  7.3 g/dL (03-17-20 @ 07:55)  7.5 g/dL (03-16-20 @ 17:39)      plt trend  3 K/uL (03-19-20 @ 06:20)  34 K/uL (03-18-20 @ 18:35)  2 K/uL (03-18-20 @ 06:41)  3 K/uL (03-17-20 @ 07:55)  11 K/uL (03-16-20 @ 17:39)        RADIOLOGY & ADDITIONAL STUDIES:

## 2020-03-19 NOTE — PROGRESS NOTE ADULT - PROBLEM SELECTOR PLAN 1
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 as per heme/onc.   - WBC approx. 4 today.   - Steroids being tapered - 10mg now  - Hem/Onc following, recs appreciated

## 2020-03-19 NOTE — PROGRESS NOTE ADULT - ASSESSMENT
hepatitis B   elevated lfts   CLL  pancytopenia  fever      ct cap pending given fevers  patient with elevated bilirubin and alk phos at baseline ? leon  mri showed normal liver, no cbd stone/dilatation; hep b pcr neg  ob neg; no s/s overt gib; monitor cbc daily, transfuse per heme/onc  diet as tolerated; correct elytes prn  cont ppi ppx  abx per id  heme/onc follow up  to follow    Advanced care planning was discussed with patient and family.  Advanced care planning forms were reviewed and discussed.  Risks, benefits and alternatives of gastroenterologic procedures were discussed in detail and all questions were answered.    30 minutes spent.

## 2020-03-19 NOTE — PROGRESS NOTE ADULT - SUBJECTIVE AND OBJECTIVE BOX
[facility] -- Cori Dawkins, Travis Villagomez, Ryland Wright Savella  Office # [phone]      Follow Up:    abnormal ECG  Subjective/Observations:   No events overnight resting comfortably in bed.  No complaints of chest pain, dyspnea, or palpitations reported. No signs of orthopnea or PND.     REVIEW OF SYSTEMS: All other review of systems is negative unless indicated above    PAST MEDICAL & SURGICAL HISTORY:  KIRTI (obstructive sleep apnea): on nocturnal cpap  Hypertension: not on any medications  Diabetes: not on any medications  Hepatitis B  Lymphoma: Small cell B cell  AIHA (autoimmune hemolytic anemia)  H/O lithotripsy      MEDICATIONS  (STANDING):  cefepime   IVPB 2000 milliGRAM(s) IV Intermittent every 12 hours  cefepime   IVPB      entecavir 0.5 milliGRAM(s) Oral daily  pantoprazole    Tablet 40 milliGRAM(s) Oral before breakfast  potassium chloride    Tablet ER 20 milliEquivalent(s) Oral two times a day  potassium chloride    Tablet ER 40 milliEquivalent(s) Oral once  predniSONE   Tablet 10 milliGRAM(s) Oral daily  vancomycin  IVPB 1250 milliGRAM(s) IV Intermittent every 12 hours    MEDICATIONS  (PRN):  acetaminophen   Tablet .. 650 milliGRAM(s) Oral every 6 hours PRN Mild Pain (1 - 3)  acetaminophen   Tablet .. 650 milliGRAM(s) Oral every 6 hours PRN Temp greater or equal to 38C (100.4F)  albuterol/ipratropium for Nebulization 3 milliLiter(s) Nebulizer every 6 hours PRN Shortness of Breath and/or Wheezing  guaiFENesin   Syrup  (Sugar-Free) 200 milliGRAM(s) Oral every 6 hours PRN Cough  hydrocodone/homatropine Syrup 5 milliLiter(s) Oral at bedtime PRN Cough      Allergies    sulfa drugs (Unknown)    Intolerances        Vital Signs Last 24 Hrs  T(C): 36.3 (19 Mar 2020 05:37), Max: 40.5 (18 Mar 2020 20:45)  T(F): 97.3 (19 Mar 2020 05:37), Max: 104.9 (18 Mar 2020 20:45)  HR: 81 (19 Mar 2020 05:37) (81 - 160)  BP: 114/68 (19 Mar 2020 05:37) (113/69 - 176/84)  BP(mean): --  RR: 18 (19 Mar 2020 05:37) (14 - 20)  SpO2: 99% (19 Mar 2020 05:37) (97% - 100%)    I&O's Summary    18 Mar 2020 07:01  -  19 Mar 2020 07:00  --------------------------------------------------------  IN: 886 mL / OUT: 0 mL / NET: 886 mL    19 Mar 2020 07:01  -  19 Mar 2020 10:10  --------------------------------------------------------  IN: 220 mL / OUT: 0 mL / NET: 220 mL          PHYSICAL EXAM:  TELE:   Constitutional: NAD, awake and alert, well-developed  HEENT: Moist Mucous Membranes, Anicteric  Pulmonary: Non-labored, breath sounds are clear bilaterally, No wheezing, crackles or rhonchi  Cardiovascular: Regular, S1 and S2 nl, No murmurs, rubs, gallops or clicks  Gastrointestinal: Bowel Sounds present, soft, nontender.   Lymph: No lymphadenopathy. + Bilat LE peripheral edema.   Skin: No visible rashes or ulcers.  Psych:  Mood & affect appropriate    LABS: All Labs Reviewed:                        6.7    4.00  )-----------( 3        ( 19 Mar 2020 06:20 )             19.8                         8.6    4.45  )-----------( 34       ( 18 Mar 2020 18:35 )             25.7                         6.6    6.09  )-----------( 2        ( 18 Mar 2020 06:41 )             19.7     19 Mar 2020 06:20    139    |  111    |  27     ----------------------------<  126    3.2     |  15     |  1.10   18 Mar 2020 06:41    136    |  110    |  23     ----------------------------<  108    3.0     |  17     |  1.10   17 Mar 2020 07:55    138    |  111    |  23     ----------------------------<  113    3.3     |  17     |  1.20     Ca    7.7        19 Mar 2020 06:20  Ca    7.9        18 Mar 2020 06:41  Ca    8.0        17 Mar 2020 07:55  Phos  2.0       16 Mar 2020 17:42  Mg     1.9       16 Mar 2020 17:42               ECG:  < from: 12 Lead ECG (03.16.20 @ 17:10) >  Ventricular Rate 145 BPM    Atrial Rate 144 BPM    QRS Duration 64 ms    Q-T Interval 336 ms    QTC Calculation(Bezet) 521 ms    R Axis 27 degrees    T Axis 47 degrees    Diagnosis Line St with APCs  Nonspecific T wave abnormality  Abnormal ECG     Confirmed by CANDI SCHREIBER ([id number]) on 3/17/2020 1:45:18 PM    < end of copied text >    Echo:  < from: TTE Echo Complete w/o contrast w/ Doppler (03.04.20 @ 09:54) >  EXAM:  ECHO TTE WO CON COMP W DOPP         PROCEDURE DATE:  03/04/2020        INTERPRETATION:  INDICATION: edema  Sonographer PH    Blood Pressure 129/63    Height 165 cm     Weight 82.9 kg       BSA 1.9 sq m    Dimensions:    LA 2.9       Normal Values: 2.0 - 4.0 cm    Ao 2.9        Normal Values: 2.0 - 3.8 cm  SEPTUM 0.9       Normal Values: 0.6 - 1.2 cm  PWT 0.9       Normal Values: 0.6 - 1.1 cm  LVIDd 4.1         Normal Values: 3.0 - 5.6 cm  LVIDs 2.3         Normal Values: 1.8 - 4.0 cm      OBSERVATIONS:    Mitral Valve: normal, trace physiologic MR.  Aortic Valve/Aorta: Normal trileaflet aortic valve with normal opening.  Tricuspid Valve: normal with trace TR.  Pulmonic Valve: normal  Left Atrium: normal  Right Atrium: normal  Left Ventricle: normal LV size and systolic function, estimated LVEF of 65%.  Right Ventricle: Grossly normal size and systolic function.  Pericardium/Pleura: normal, no significant pericardial effusion.      Conclusion:   Normal left ventricular internal dimensions and systolic function, estimated LVEF of 65%.   Grossly normal RV size and systolic function.     Normal trileaflet aortic valve, without AI.   Trace physiologic MR and TR.          KALPANA GUERRERO   This document has been electronically signed. Mar  5 2020  7:32AM    < end of copied text >    Radiology: [facility] -- Cori Dawkins, Travis Villagomez, Ryland Wright Savella  Office # [phone]      Follow Up:    abnormal ECG    Subjective/Observations:   No events overnight resting comfortably in bed.  No complaints of chest pain, dyspnea, or palpitations reported. No signs of orthopnea or PND.     REVIEW OF SYSTEMS: All other review of systems is negative unless indicated above    PAST MEDICAL & SURGICAL HISTORY:  KIRTI (obstructive sleep apnea): on nocturnal cpap  Hypertension: not on any medications  Diabetes: not on any medications  Hepatitis B  Lymphoma: Small cell B cell  AIHA (autoimmune hemolytic anemia)  H/O lithotripsy      MEDICATIONS  (STANDING):  cefepime   IVPB 2000 milliGRAM(s) IV Intermittent every 12 hours  cefepime   IVPB      entecavir 0.5 milliGRAM(s) Oral daily  pantoprazole    Tablet 40 milliGRAM(s) Oral before breakfast  potassium chloride    Tablet ER 20 milliEquivalent(s) Oral two times a day  potassium chloride    Tablet ER 40 milliEquivalent(s) Oral once  predniSONE   Tablet 10 milliGRAM(s) Oral daily  vancomycin  IVPB 1250 milliGRAM(s) IV Intermittent every 12 hours    MEDICATIONS  (PRN):  acetaminophen   Tablet .. 650 milliGRAM(s) Oral every 6 hours PRN Mild Pain (1 - 3)  acetaminophen   Tablet .. 650 milliGRAM(s) Oral every 6 hours PRN Temp greater or equal to 38C (100.4F)  albuterol/ipratropium for Nebulization 3 milliLiter(s) Nebulizer every 6 hours PRN Shortness of Breath and/or Wheezing  guaiFENesin   Syrup  (Sugar-Free) 200 milliGRAM(s) Oral every 6 hours PRN Cough  hydrocodone/homatropine Syrup 5 milliLiter(s) Oral at bedtime PRN Cough      Allergies    sulfa drugs (Unknown)    Intolerances        Vital Signs Last 24 Hrs  T(C): 36.3 (19 Mar 2020 05:37), Max: 40.5 (18 Mar 2020 20:45)  T(F): 97.3 (19 Mar 2020 05:37), Max: 104.9 (18 Mar 2020 20:45)  HR: 81 (19 Mar 2020 05:37) (81 - 160)  BP: 114/68 (19 Mar 2020 05:37) (113/69 - 176/84)  BP(mean): --  RR: 18 (19 Mar 2020 05:37) (14 - 20)  SpO2: 99% (19 Mar 2020 05:37) (97% - 100%)    I&O's Summary    18 Mar 2020 07:01  -  19 Mar 2020 07:00  --------------------------------------------------------  IN: 886 mL / OUT: 0 mL / NET: 886 mL    19 Mar 2020 07:01  -  19 Mar 2020 10:10  --------------------------------------------------------  IN: 220 mL / OUT: 0 mL / NET: 220 mL          PHYSICAL EXAM:  TELE:   Constitutional: NAD, awake and alert, well-developed  HEENT: Moist Mucous Membranes, Anicteric  Pulmonary: Non-labored, breath sounds are clear bilaterally, No wheezing, crackles or rhonchi  Cardiovascular: Regular, S1 and S2 nl, No murmurs, rubs, gallops or clicks  Gastrointestinal: Bowel Sounds present, soft, nontender.   Lymph: No lymphadenopathy. + Bilat LE peripheral edema.   Skin: No visible rashes or ulcers.  Psych:  Mood & affect appropriate    LABS: All Labs Reviewed:                        6.7    4.00  )-----------( 3        ( 19 Mar 2020 06:20 )             19.8                         8.6    4.45  )-----------( 34       ( 18 Mar 2020 18:35 )             25.7                         6.6    6.09  )-----------( 2        ( 18 Mar 2020 06:41 )             19.7     19 Mar 2020 06:20    139    |  111    |  27     ----------------------------<  126    3.2     |  15     |  1.10   18 Mar 2020 06:41    136    |  110    |  23     ----------------------------<  108    3.0     |  17     |  1.10   17 Mar 2020 07:55    138    |  111    |  23     ----------------------------<  113    3.3     |  17     |  1.20     Ca    7.7        19 Mar 2020 06:20  Ca    7.9        18 Mar 2020 06:41  Ca    8.0        17 Mar 2020 07:55  Phos  2.0       16 Mar 2020 17:42  Mg     1.9       16 Mar 2020 17:42               ECG:  < from: 12 Lead ECG (03.16.20 @ 17:10) >  Ventricular Rate 145 BPM    Atrial Rate 144 BPM    QRS Duration 64 ms    Q-T Interval 336 ms    QTC Calculation(Bezet) 521 ms    R Axis 27 degrees    T Axis 47 degrees    Diagnosis Line St with APCs  Nonspecific T wave abnormality  Abnormal ECG     Confirmed by CANDI SCHREIBER ([id number]) on 3/17/2020 1:45:18 PM    < end of copied text >    Echo:  < from: TTE Echo Complete w/o contrast w/ Doppler (03.04.20 @ 09:54) >  EXAM:  ECHO TTE WO CON COMP W DOPP         PROCEDURE DATE:  03/04/2020        INTERPRETATION:  INDICATION: edema  Sonographer PH    Blood Pressure 129/63    Height 165 cm     Weight 82.9 kg       BSA 1.9 sq m    Dimensions:    LA 2.9       Normal Values: 2.0 - 4.0 cm    Ao 2.9        Normal Values: 2.0 - 3.8 cm  SEPTUM 0.9       Normal Values: 0.6 - 1.2 cm  PWT 0.9       Normal Values: 0.6 - 1.1 cm  LVIDd 4.1         Normal Values: 3.0 - 5.6 cm  LVIDs 2.3         Normal Values: 1.8 - 4.0 cm      OBSERVATIONS:    Mitral Valve: normal, trace physiologic MR.  Aortic Valve/Aorta: Normal trileaflet aortic valve with normal opening.  Tricuspid Valve: normal with trace TR.  Pulmonic Valve: normal  Left Atrium: normal  Right Atrium: normal  Left Ventricle: normal LV size and systolic function, estimated LVEF of 65%.  Right Ventricle: Grossly normal size and systolic function.  Pericardium/Pleura: normal, no significant pericardial effusion.      Conclusion:   Normal left ventricular internal dimensions and systolic function, estimated LVEF of 65%.   Grossly normal RV size and systolic function.     Normal trileaflet aortic valve, without AI.   Trace physiologic MR and TR.          KALPANA GUERRERO   This document has been electronically signed. Mar  5 2020  7:32AM    < end of copied text >    Radiology:

## 2020-03-19 NOTE — PROGRESS NOTE ADULT - SUBJECTIVE AND OBJECTIVE BOX
INTERVAL HPI/OVERNIGHT EVENTS:  pt seen and examined  interim events noted, sp rrt  denies n/v/d/c/abd pain  no melena/brbpr  no bm overnight but passing gas  tolerating some po  sp 1u prbc and 2 plt  yesterday    MEDICATIONS  (STANDING):  cefepime   IVPB 2000 milliGRAM(s) IV Intermittent every 12 hours  cefepime   IVPB      entecavir 0.5 milliGRAM(s) Oral daily  pantoprazole    Tablet 40 milliGRAM(s) Oral before breakfast  potassium chloride    Tablet ER 20 milliEquivalent(s) Oral two times a day  predniSONE   Tablet 10 milliGRAM(s) Oral daily  vancomycin  IVPB 1250 milliGRAM(s) IV Intermittent every 12 hours    MEDICATIONS  (PRN):  acetaminophen   Tablet .. 650 milliGRAM(s) Oral every 6 hours PRN Mild Pain (1 - 3)  acetaminophen   Tablet .. 650 milliGRAM(s) Oral every 6 hours PRN Temp greater or equal to 38C (100.4F)  albuterol/ipratropium for Nebulization 3 milliLiter(s) Nebulizer every 6 hours PRN Shortness of Breath and/or Wheezing  guaiFENesin   Syrup  (Sugar-Free) 200 milliGRAM(s) Oral every 6 hours PRN Cough  hydrocodone/homatropine Syrup 5 milliLiter(s) Oral at bedtime PRN Cough      Allergies    sulfa drugs (Unknown)    Intolerances        Review of Systems:    General:  f/c  Eyes:  Good vision, no reported pain  ENT:  No sore throat, pain, runny nose, dysphagia  CV:  No pain, palpitations, hypo/hypertension  Resp:  No dyspnea, cough, tachypnea, wheezing  GI:  No pain, No nausea, No vomiting, No diarrhea, No constipation, No weight loss, No fever, No pruritis, No rectal bleeding, No melena, No dysphagia  :  No pain, bleeding, incontinence, nocturia  Muscle:  No pain, weakness  Neuro:  No weakness, tingling, memory problems  Psych:  No fatigue, insomnia, mood problems, depression  Endocrine:  No polyuria, polydypsia, cold/heat intolerance  Heme:  No petechiae, ecchymosis, easy bruisability  Skin:  No rash, tattoos, scars, edema      Vital Signs Last 24 Hrs  T(C): 36.3 (19 Mar 2020 05:37), Max: 40.5 (18 Mar 2020 20:45)  T(F): 97.3 (19 Mar 2020 05:37), Max: 104.9 (18 Mar 2020 20:45)  HR: 81 (19 Mar 2020 05:37) (81 - 160)  BP: 114/68 (19 Mar 2020 05:37) (113/69 - 176/84)  BP(mean): --  RR: 18 (19 Mar 2020 05:37) (14 - 20)  SpO2: 99% (19 Mar 2020 05:37) (97% - 100%)    PHYSICAL EXAM:    Constitutional: nad  HEENT: ncat  Gastrointestinal: soft nt to deep palpation no guarding mild dt  Extremities: edema  Neurological: Awake alert responds appropriately      LABS:                        6.7    4.00  )-----------( 3        ( 19 Mar 2020 06:20 )             19.8     03-19    139  |  111<H>  |  27<H>  ----------------------------<  126<H>  3.2<L>   |  15<L>  |  1.10    Ca    7.7<L>      19 Mar 2020 06:20        Urinalysis Basic - ( 18 Mar 2020 20:35 )    Color: Yellow / Appearance: Slightly Turbid / S.015 / pH: x  Gluc: x / Ketone: Trace  / Bili: Negative / Urobili: 1   Blood: x / Protein: 100 / Nitrite: Negative   Leuk Esterase: Negative / RBC: 3-5 /HPF / WBC 0-2   Sq Epi: x / Non Sq Epi: Occasional / Bacteria: Moderate        RADIOLOGY & ADDITIONAL TESTS:

## 2020-03-19 NOTE — PROGRESS NOTE ADULT - PROBLEM SELECTOR PLAN 1
exam benign  cxr without new consolidation  ct improved no acute pna  f/u blood cx ntd  empiric cefepime and d/c vancomycin -- no mrsa    does not appear to be infectious   no skin lesions or rash

## 2020-03-19 NOTE — PROGRESS NOTE ADULT - SUBJECTIVE AND OBJECTIVE BOX
[facility], Division of Infectious Diseases  ELLIOT [address] A. Lee  [phone]    Name: DIONICIO SULLIVAN  Age: 75y  Gender: Male  MRN: [id number]    Interval History--  Notes reviewed  pt states he feels ok, + cough, no dysuria,  no diarrhea,   + fever      Past Medical History--  KIRTI (obstructive sleep apnea)  Hypertension  Diabetes  Hepatitis B  Lymphoma  AIHA (autoimmune hemolytic anemia)  Leukemia  Diabetes  HTN (hypertension)  H/O lithotripsy      For details regarding the patient's social history, family history, and other miscellaneous elements, please refer the initial infectious diseases consultation and/or the admitting history and physical examination for this admission.    Allergies    sulfa drugs (Unknown)    Intolerances        Medications--  Antibiotics:  cefepime   IVPB 2000 milliGRAM(s) IV Intermittent every 12 hours  cefepime   IVPB      entecavir 0.5 milliGRAM(s) Oral daily    Immunologic:    Other:  acetaminophen   Tablet .. PRN  acetaminophen   Tablet .. PRN  albuterol/ipratropium for Nebulization PRN  guaiFENesin   Syrup  (Sugar-Free) PRN  hydrocodone/homatropine Syrup PRN  pantoprazole    Tablet  potassium chloride    Tablet ER  predniSONE   Tablet      Review of Systems--  A 10-point review of systems was obtained.     Pertinent positives and negatives--  Constitutional: No fevers. No Chills. No Rigors.   Cardiovascular: No chest pain. No palpitations.  Respiratory: No shortness of breath. No cough.  Gastrointestinal: No nausea or vomiting. No diarrhea or constipation.   Psychiatric: no depresssion    Review of systems otherwise negative except as previously noted.    Physical Examination--  Vital Signs: T(F): 97.6 (03-19-20 @ 11:01), Max: 104.9 (03-18-20 @ 20:45)  HR: 84 (03-19-20 @ 11:01)  BP: 102/70 (03-19-20 @ 11:01)  RR: 18 (03-19-20 @ 11:01)  SpO2: 98% (03-19-20 @ 11:01)  Wt(kg): --  General: Nontoxic-appearing Male in no acute distress.  HEENT: AT/NC. . Anicteric.. Oropharynx clear.   Neck: Not rigid. No sense of mass.  Nodes: None palpable.  Lungs: Clear bilaterally without rales, wheezing or rhonchi  Heart: Regular rate and rhythm. No Murmur.   Abdomen: Bowel sounds present and normoactive. Soft. Nondistended. Nontender.  Back: No spinal tenderness. No costovertebral angle tenderness.   Extremities: No cyanosis or clubbing. No edema.   Skin: Warm. Dry. Good turgor. No rash. No vasculitic stigmata.  Psychiatric: Appropriate affect and mood for situation.         Laboratory Studies--  CBC                        6.7    4.00  )-----------( 3        ( 19 Mar 2020 06:20 )             19.8       Chemistries  03-19    139  |  111<H>  |  27<H>  ----------------------------<  126<H>  3.2<L>   |  15<L>  |  1.10    Ca    7.7<L>      19 Mar 2020 06:20        Culture Data    Culture - Urine (collected 18 Mar 2020 09:00)  Source: .Urine Clean Catch (Midstream)  Final Report (19 Mar 2020 05:06):    No growth    Culture - Blood (collected 16 Mar 2020 22:03)  Source: .Blood Blood  Preliminary Report (17 Mar 2020 23:02):    No growth to date.    Culture - Blood (collected 16 Mar 2020 22:03)  Source: .Blood Blood  Preliminary Report (17 Mar 2020 23:02):    No growth to date.    < from: CT Chest No Cont (03.19.20 @ 09:40) >    EXAM:  CT CHEST                            PROCEDURE DATE:  03/19/2020          INTERPRETATION:  CLINICAL INFORMATION: CLL, fever    COMPARISON: 2/21/2020    PROCEDURE:   CT of the Chest, Abdomen and Pelvis was performed without intravenous contrast.   Intravenous contrast: None.  Oral contrast: None.  Sagittal and coronal reformats were performed.    FINDINGS:  Lack of IV contrast limits evaluation diana, vascular structures and solid organ parenchyma  CHEST:     LUNGS AND LARGE AIRWAYS: Patentcentral airways. Scattered bilateral patchy, nodular and groundglass opacities slightly improved at the lung bases compared to prior. Findings could all be inflammatory/infectious. Neoplastic component not excluded.  PLEURA: Interval increase in small left and small-to-moderate right pleural effusion.  VESSELS: Nonaneurysmal  HEART: Heart size is normal. Coronary artery calcination. Decrease cardiac chamber blood pool attenuation suggests an anemic state. Trace pericardial effusion.  MEDIASTINUM AND DIANA: Multistation mediastinal  supraclavicular, bilateral hilar and bilateral axillary adenopathy similar to prior  CHEST WALL AND LOWER NECK: Within normal limits.    ABDOMEN AND PELVIS:    LIVER: Hepatomegaly similar to prior  BILE DUCTS: Normal caliber.  GALLBLADDER: Cholelithiasis.  SPLEEN: Splenomegaly similar to prior  PANCREAS: Within normal limits.  ADRENALS: Within normal limits.  KIDNEYS/URETERS: Within normal limits.    BLADDER: Within normal limits.  REPRODUCTIVE ORGANS: Unremarkable    BOWEL: Evaluation limited due to limited by nonopacification underdistention. No gross active bowel inflammation. No bowel obstruction.. Appendix no appendicitis  PERITONEUM: Trace ascites. No extraluminal gas or organized collections.  VESSELS: Nonaneurysmal.  RETROPERITONEUM/LYMPH NODES: Stable periportal and retroperitoneal, mesenteric pelvic and bilateral inguinal adenopathy.    ABDOMINAL WALL: Mild anasarca.  BONES: Stable    IMPRESSION:     Improvement in the scattered bilateral parenchymal opacities as described.  Slight increase in bilateral pleural effusions.  Stable multistation adenopathy chest abdomen and pelvis as described  No acute findings abdomen and pelvis    < end of copied text >

## 2020-03-19 NOTE — PROGRESS NOTE ADULT - PROBLEM SELECTOR PLAN 2
- Per chart review, platelet count steadily declining since 2018  - poor response to platelets transfusion, suspect ITP component  - s/p one dose of IVIG  - s/p 12 units of platelet transfusion since admission  - Hem/Onc following, will follow the recommendation   -Platelet count 3 today   -Will transfuse one unit of platelets today.   -continue to monitor.   - follow up PM CBC

## 2020-03-19 NOTE — PROGRESS NOTE ADULT - PROBLEM SELECTOR PLAN 4
-Patient initially treated for multifocal PNA  -Patient developed high fever on 3/16/20, ID consulted  -Blood cx neg, UA suspicious for UTI, continue IV abx as per ID recs  -Patient also with tachycardia while febrile, Cardio was consulted for concern for afib- no afib noted  -Patient again febrile to 104.7 overnight, will check CT c/a/p

## 2020-03-19 NOTE — PROGRESS NOTE ADULT - ASSESSMENT
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 Tachy likely reactive  -tele overnight sr 94 with no further episodes of wct   -Remains hypokalemic  -Monitor and replete electrolytes. Keep K>4.0 and Mg>2.0.    LE edema  -Doppler negative   -chronic as per patient   -Albumin 2.4  -Echo 2/24/2020 revealing trace AI trace TR ef 60-65%    HTN  -130/70 not on any antihypertensives      ID  - Cont Abx for PNA  Cont supplemental O2  -Follow up hem onc ,  -PRBC transfusion as per hem onc hgb 6.7    Hypokalemia  -K 3.2- supplement for goal K > 4.0    Dm  -management as per primary      - Further cardiac workup will depend on clinical course.   - All other workup per primary team. Will followup.   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 Tachy likely reactive  -tele overnight sr 94 with no further episodes of wct   -Remains hypokalemic  -Monitor and replete electrolytes. Keep K>4.0 and Mg>2.0.    LE edema  -Doppler negative   -chronic as per patient   -Albumin 2.4  -Echo 2/24/2020 revealing trace AI trace TR ef 60-65%    HTN  -130/70 not on any antihypertensives      ID  - Cont Abx for PNA  Cont supplemental O2  -Follow up hem onc ,  -PRBC and platelet transfusion as per hem onc      Hypokalemia  -K 3.2- supplement for goal K > 4.0    Dm  -management as per primary      - Further cardiac workup will depend on clinical course.   - All other workup per primary team. Will followup.   Guerrero Capps [facility]  Cardiology   Spectra #[phone]

## 2020-03-19 NOTE — PROGRESS NOTE ADULT - PROBLEM SELECTOR PLAN 3
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 Was on prednisone 10mg daily -- increase to prednisone 40mg po during hospitalization, tapering started @2/29 - now 10mg daily  - Heme/onc following  - follow H&H- hgb 6.7 today, will transfuse 1 unit PRBC today

## 2020-03-19 NOTE — PROGRESS NOTE ADULT - SUBJECTIVE AND OBJECTIVE BOX
INTERVAL HPI/OVERNIGHT EVENTS:  Patient seen and examined at bedside. Overnight patient had RRT for chills/fever/tachycardia. Patient was given tylenol and cooling blanket. Patient's fever was as high as 104.7F (rectally). This AM, patient states he feels better, denies any chills, palpitations. Patient again noted to have low platelets and low Hgb- will transfuse 1 unit platelets and 1 unit pRBCs.    MEDICATIONS  (STANDING):  cefepime   IVPB 2000 milliGRAM(s) IV Intermittent every 12 hours  cefepime   IVPB      entecavir 0.5 milliGRAM(s) Oral daily  pantoprazole    Tablet 40 milliGRAM(s) Oral before breakfast  potassium chloride    Tablet ER 20 milliEquivalent(s) Oral two times a day  predniSONE   Tablet 10 milliGRAM(s) Oral daily  vancomycin  IVPB 1250 milliGRAM(s) IV Intermittent every 12 hours    MEDICATIONS  (PRN):  acetaminophen   Tablet .. 650 milliGRAM(s) Oral every 6 hours PRN Mild Pain (1 - 3)  acetaminophen   Tablet .. 650 milliGRAM(s) Oral every 6 hours PRN Temp greater or equal to 38C (100.4F)  albuterol/ipratropium for Nebulization 3 milliLiter(s) Nebulizer every 6 hours PRN Shortness of Breath and/or Wheezing  guaiFENesin   Syrup  (Sugar-Free) 200 milliGRAM(s) Oral every 6 hours PRN Cough  hydrocodone/homatropine Syrup 5 milliLiter(s) Oral at bedtime PRN Cough      Allergies    sulfa drugs (Unknown)    Intolerances      ROS:  CONSTITUTIONAL: + weakness, fevers or chills  EYES/ENT: No visual changes;  No vertigo or throat pain   NECK: No pain or stiffness  RESPIRATORY: No cough, wheezing, hemoptysis; No shortness of breath  CARDIOVASCULAR: No chest pain or palpitations  GASTROINTESTINAL: No abdominal or epigastric pain. No nausea, vomiting, or hematemesis  GENITOURINARY: No dysuria, frequency or hematuria  NEUROLOGICAL: No numbness   SKIN: No itching, burning, rashes, or lesions   All other review of systems is negative unless indicated above.    Vital Signs Last 24 Hrs  T(C): 36.3 (19 Mar 2020 05:37), Max: 40.5 (18 Mar 2020 20:45)  T(F): 97.3 (19 Mar 2020 05:37), Max: 104.9 (18 Mar 2020 20:45)  HR: 81 (19 Mar 2020 05:37) (81 - 160)  BP: 114/68 (19 Mar 2020 05:37) (113/69 - 176/84)  BP(mean): --  RR: 18 (19 Mar 2020 05:37) (14 - 20)  SpO2: 99% (19 Mar 2020 05:37) (97% - 100%)    03-18 @ 07:01  -  03-19 @ 07:00  --------------------------------------------------------  IN: 886 mL / OUT: 0 mL / NET: 886 mL      Physical Exam:  General: WN/WD NAD  Neurology: A&Ox3, nonfocal, BROWN x 4  Respiratory: CTA B/L  CV: RRR, S1S2  Abdominal: Soft, NT, ND +BS  Extremities: +pitting edema B/L LE, + peripheral pulses      LABS:                        6.7    4.00  )-----------( 3        ( 19 Mar 2020 06:20 )             19.8     03-19    139  |  111<H>  |  27<H>  ----------------------------<  126<H>  3.2<L>   |  15<L>  |  1.10    Ca    7.7<L>      19 Mar 2020 06:20        Urinalysis Basic - ( 18 Mar 2020 20:35 )    Color: Yellow / Appearance: Slightly Turbid / S.015 / pH: x  Gluc: x / Ketone: Trace  / Bili: Negative / Urobili: 1   Blood: x / Protein: 100 / Nitrite: Negative   Leuk Esterase: Negative / RBC: 3-5 /HPF / WBC 0-2   Sq Epi: x / Non Sq Epi: Occasional / Bacteria: Moderate        RADIOLOGY & ADDITIONAL TESTS:

## 2020-03-19 NOTE — PROGRESS NOTE ADULT - PROBLEM SELECTOR PLAN 6
-unclear why pt's direct bilirubin is the majority fraction   -pt has no symptoms or exam findings consistent with obstructed CBD  -RUQ US showing GB with stones but no obstructing stones, and normal caliber CBD  -MRCP did not reveal obstruction  -GI following, recs appreciated

## 2020-03-19 NOTE — PROGRESS NOTE ADULT - ASSESSMENT
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  Repeat Flow Cytometry still SLL/CLL, but FISH now w 11q-, 17p-, del of chromosome 12 and 13, all poor prognostic factors.]  Repeat PETCT only mildly hypermetabolic LADs w highest SUV 3, largest conglomerate f LNs ~5x2cm prevascular, mild splenomegaly 14cm  Pt was scheduled for Venetoclax/Rituxan second line treatment w admission for ramp up Venetoclax and tumor lysis prophylaxis when found w Influenza A during the 2/2020 adm, Rx w Temaflu, subsequent also sig more anemic/thrombocytopenic.  Was discharged home and admitted again 2/22/20 with  fever and found w pneumonia, post course of Ceftriaxone, continues to require transfusions plts and PRBCs.  Hep B titer negative  Post IVIG   Due to continue worsening of SLL/CLL w assoc adverse clinical condition/labs, after discussion w pt and wife, started ramp up Venetoclax 3/1/20(planned Venetoclax/Rituxan)  Pt and wife aware of high risk for adverse side effects due to poor performance status and state of disease, risk for tumor lysis due to sig number of circulating CLL/SLL cells in periphery  Sig decr of WBC since Venetoclax, on hold since 3/5(pt's own supply)  WBC from >100 to 3.     Noted increase in WBC to 13, restarted Venetoclax 20mg x2d with WBC declined to 3.   03/11 WBC 10.8, Venetoclax started.   03/12 WBC 13.1, Venetoclax given  03/13 WBC 3.88 Venetoclad HELD, and DC  Stopped again yesterday 03/13/20 03/14 WBC 3.59      -still PRBC and Platelets transfusion dependent, would give 1 unit platelets and 1 unit pRBC  -On prednisone taper to 10mg qD today  - continue to hold venetoclax  - Long term prognosis poor

## 2020-03-20 LAB
ALBUMIN SERPL ELPH-MCNC: 2.6 G/DL — LOW (ref 3.3–5)
ALP SERPL-CCNC: 193 U/L — HIGH (ref 40–120)
ALT FLD-CCNC: 29 U/L — SIGNIFICANT CHANGE UP (ref 12–78)
ANION GAP SERPL CALC-SCNC: 12 MMOL/L — SIGNIFICANT CHANGE UP (ref 5–17)
AST SERPL-CCNC: 65 U/L — HIGH (ref 15–37)
BASOPHILS # BLD AUTO: 0 K/UL — SIGNIFICANT CHANGE UP (ref 0–0.2)
BASOPHILS NFR BLD AUTO: 0 % — SIGNIFICANT CHANGE UP (ref 0–2)
BILIRUB DIRECT SERPL-MCNC: 2.1 MG/DL — HIGH (ref 0.05–0.2)
BILIRUB INDIRECT FLD-MCNC: 1.2 MG/DL — HIGH (ref 0.2–1)
BILIRUB SERPL-MCNC: 3.3 MG/DL — HIGH (ref 0.2–1.2)
BUN SERPL-MCNC: 23 MG/DL — SIGNIFICANT CHANGE UP (ref 7–23)
CALCIUM SERPL-MCNC: 7.8 MG/DL — LOW (ref 8.5–10.1)
CHLORIDE SERPL-SCNC: 111 MMOL/L — HIGH (ref 96–108)
CO2 SERPL-SCNC: 14 MMOL/L — LOW (ref 22–31)
CREAT SERPL-MCNC: 1.1 MG/DL — SIGNIFICANT CHANGE UP (ref 0.5–1.3)
EOSINOPHIL # BLD AUTO: 0 K/UL — SIGNIFICANT CHANGE UP (ref 0–0.5)
EOSINOPHIL NFR BLD AUTO: 0 % — SIGNIFICANT CHANGE UP (ref 0–6)
GLUCOSE SERPL-MCNC: 96 MG/DL — SIGNIFICANT CHANGE UP (ref 70–99)
HCT VFR BLD CALC: 22.1 % — LOW (ref 39–50)
HCT VFR BLD CALC: 23.2 % — LOW (ref 39–50)
HGB BLD-MCNC: 7.4 G/DL — LOW (ref 13–17)
HGB BLD-MCNC: 7.8 G/DL — LOW (ref 13–17)
LYMPHOCYTES # BLD AUTO: 3.1 K/UL — SIGNIFICANT CHANGE UP (ref 1–3.3)
LYMPHOCYTES # BLD AUTO: 57 % — HIGH (ref 13–44)
MCHC RBC-ENTMCNC: 29.6 PG — SIGNIFICANT CHANGE UP (ref 27–34)
MCHC RBC-ENTMCNC: 29.8 PG — SIGNIFICANT CHANGE UP (ref 27–34)
MCHC RBC-ENTMCNC: 33.5 GM/DL — SIGNIFICANT CHANGE UP (ref 32–36)
MCHC RBC-ENTMCNC: 33.6 GM/DL — SIGNIFICANT CHANGE UP (ref 32–36)
MCV RBC AUTO: 88.4 FL — SIGNIFICANT CHANGE UP (ref 80–100)
MCV RBC AUTO: 88.5 FL — SIGNIFICANT CHANGE UP (ref 80–100)
MONOCYTES # BLD AUTO: 0.16 K/UL — SIGNIFICANT CHANGE UP (ref 0–0.9)
MONOCYTES NFR BLD AUTO: 3 % — SIGNIFICANT CHANGE UP (ref 2–14)
NEUTROPHILS # BLD AUTO: 0.22 K/UL — LOW (ref 1.8–7.4)
NEUTROPHILS NFR BLD AUTO: 4 % — LOW (ref 43–77)
NRBC # BLD: 0 /100 WBCS — SIGNIFICANT CHANGE UP (ref 0–0)
NRBC # BLD: SIGNIFICANT CHANGE UP /100 WBCS (ref 0–0)
PLATELET # BLD AUTO: 2 K/UL — CRITICAL LOW (ref 150–400)
PLATELET # BLD AUTO: 8 K/UL — CRITICAL LOW (ref 150–400)
POTASSIUM SERPL-MCNC: 3.2 MMOL/L — LOW (ref 3.5–5.3)
POTASSIUM SERPL-SCNC: 3.2 MMOL/L — LOW (ref 3.5–5.3)
PROT SERPL-MCNC: 5.5 G/DL — LOW (ref 6–8.3)
RBC # BLD: 2.5 M/UL — LOW (ref 4.2–5.8)
RBC # BLD: 2.62 M/UL — LOW (ref 4.2–5.8)
RBC # FLD: 18.6 % — HIGH (ref 10.3–14.5)
RBC # FLD: 18.8 % — HIGH (ref 10.3–14.5)
SODIUM SERPL-SCNC: 137 MMOL/L — SIGNIFICANT CHANGE UP (ref 135–145)
WBC # BLD: 2.36 K/UL — LOW (ref 3.8–10.5)
WBC # BLD: 5.43 K/UL — SIGNIFICANT CHANGE UP (ref 3.8–10.5)
WBC # FLD AUTO: 2.36 K/UL — LOW (ref 3.8–10.5)
WBC # FLD AUTO: 5.43 K/UL — SIGNIFICANT CHANGE UP (ref 3.8–10.5)

## 2020-03-20 PROCEDURE — 99232 SBSQ HOSP IP/OBS MODERATE 35: CPT

## 2020-03-20 RX ADMIN — Medication 10 MILLIGRAM(S): at 05:39

## 2020-03-20 RX ADMIN — Medication 20 MILLIEQUIVALENT(S): at 19:12

## 2020-03-20 RX ADMIN — ENTECAVIR 0.5 MILLIGRAM(S): 0.5 TABLET ORAL at 05:39

## 2020-03-20 RX ADMIN — CEFEPIME 100 MILLIGRAM(S): 1 INJECTION, POWDER, FOR SOLUTION INTRAMUSCULAR; INTRAVENOUS at 19:12

## 2020-03-20 RX ADMIN — Medication 20 MILLIEQUIVALENT(S): at 05:39

## 2020-03-20 RX ADMIN — PANTOPRAZOLE SODIUM 40 MILLIGRAM(S): 20 TABLET, DELAYED RELEASE ORAL at 05:39

## 2020-03-20 RX ADMIN — CEFEPIME 100 MILLIGRAM(S): 1 INJECTION, POWDER, FOR SOLUTION INTRAMUSCULAR; INTRAVENOUS at 05:39

## 2020-03-20 NOTE — PROGRESS NOTE ADULT - PROBLEM SELECTOR PLAN 2
- Per chart review, platelet count steadily declining since 2018  - poor response to platelets transfusion, suspect ITP component  - s/p one dose of IVIG  - s/p 12 units of platelet transfusion since admission  - Hem/Onc following, will follow the recommendation   -Platelet count 2 today   -Will transfuse one unit of platelets today.   -continue to monitor.

## 2020-03-20 NOTE — PROGRESS NOTE ADULT - SUBJECTIVE AND OBJECTIVE BOX
[facility] -- Cori Dawkins, Travis Villagomez, Ryland Wright Savella  Office # [phone]      Follow Up:    htn     Subjective/Observations:   Seen at bedside, sleeping but arousable     REVIEW OF SYSTEMS: All other review of systems is negative unless indicated above    PAST MEDICAL & SURGICAL HISTORY:  KIRTI (obstructive sleep apnea): on nocturnal cpap  Hypertension: not on any medications  Diabetes: not on any medications  Hepatitis B  Lymphoma: Small cell B cell  AIHA (autoimmune hemolytic anemia)  H/O lithotripsy      MEDICATIONS  (STANDING):  cefepime   IVPB 2000 milliGRAM(s) IV Intermittent every 12 hours  cefepime   IVPB      entecavir 0.5 milliGRAM(s) Oral daily  pantoprazole    Tablet 40 milliGRAM(s) Oral before breakfast  potassium chloride    Tablet ER 20 milliEquivalent(s) Oral two times a day  predniSONE   Tablet 10 milliGRAM(s) Oral daily    MEDICATIONS  (PRN):  acetaminophen   Tablet .. 650 milliGRAM(s) Oral every 6 hours PRN Mild Pain (1 - 3)  acetaminophen   Tablet .. 650 milliGRAM(s) Oral every 6 hours PRN Temp greater or equal to 38C (100.4F)  albuterol/ipratropium for Nebulization 3 milliLiter(s) Nebulizer every 6 hours PRN Shortness of Breath and/or Wheezing  guaiFENesin   Syrup  (Sugar-Free) 200 milliGRAM(s) Oral every 6 hours PRN Cough  hydrocodone/homatropine Syrup 5 milliLiter(s) Oral at bedtime PRN Cough      Allergies    sulfa drugs (Unknown)    Intolerances        Vital Signs Last 24 Hrs  T(C): 37.1 (20 Mar 2020 08:16), Max: 37.4 (20 Mar 2020 00:12)  T(F): 98.8 (20 Mar 2020 08:16), Max: 99.3 (20 Mar 2020 00:12)  HR: 93 (20 Mar 2020 08:16) (84 - 98)  BP: 118/61 (20 Mar 2020 08:16) (102/70 - 133/71)  BP(mean): --  RR: 16 (20 Mar 2020 08:16) (16 - 18)  SpO2: 96% (20 Mar 2020 08:16) (94% - 99%)    I&O's Summary    19 Mar 2020 07:01  -  20 Mar 2020 07:00  --------------------------------------------------------  IN: 2030 mL / OUT: 700 mL / NET: 1330 mL          PHYSICAL EXAM:  TELE: Sr 80  Constitutional: NAD, awake and alert, well-developed  HEENT: Moist Mucous Membranes, Anicteric  Pulmonary: Non-labored, breath sounds are dim   Cardiovascular: Regular, S1 and S2 nl, No murmurs, rubs, gallops or clicks  Gastrointestinal: Bowel Sounds present, soft, nontender.   Lymph: trace edema   Skin: petechial rash   Psych:  Mood & affect appropriate    LABS: All Labs Reviewed:                        7.4    5.43  )-----------( 2        ( 20 Mar 2020 06:31 )             22.1                         8.3    3.62  )-----------( 2        ( 19 Mar 2020 18:22 )             24.4                         6.7    4.00  )-----------( 3        ( 19 Mar 2020 06:20 )             19.8     20 Mar 2020 06:31    137    |  111    |  23     ----------------------------<  96     3.2     |  14     |  1.10   19 Mar 2020 06:20    139    |  111    |  27     ----------------------------<  126    3.2     |  15     |  1.10   18 Mar 2020 06:41    136    |  110    |  23     ----------------------------<  108    3.0     |  17     |  1.10     Ca    7.8        20 Mar 2020 06:31  Ca    7.7        19 Mar 2020 06:20  Ca    7.9        18 Mar 2020 06:41    TPro  5.5    /  Alb  2.6    /  TBili  3.3    /  DBili  2.10   /  AST  65     /  ALT  29     /  AlkPhos  193    20 Mar 2020 06:31             ECG:  < from: 12 Lead ECG (03.18.20 @ 19:13) >    Ventricular Rate 167 BPM    Atrial Rate 178 BPM    QRS Duration 64 ms    Q-T Interval 296 ms    QTC Calculation(Bezet) 493 ms    R Axis 32 degrees    T Axis 61 degrees    Diagnosis Line *** Poor data quality, interpretation may be adversely affected  likely st with apcs  Nonspecific ST and T wave abnormality  Abnormal ECG  When compared with ECG of 16-MAR-2020 17:10,  Previous ECG has undetermined rhythm, needs review  Nonspecific T wave abnormality now evident in Anterior leads    < end of copied text >      Echo:  < from: TTE Echo Complete w/o contrast w/ Doppler (03.04.20 @ 09:54) >  OBSERVATIONS:    Mitral Valve: normal, trace physiologic MR.  Aortic Valve/Aorta: Normal trileaflet aortic valve with normal opening.  Tricuspid Valve: normal with trace TR.  Pulmonic Valve: normal  Left Atrium: normal  Right Atrium: normal  Left Ventricle: normal LV size and systolic function, estimated LVEF of 65%.  Right Ventricle: Grossly normal size and systolic function.  Pericardium/Pleura: normal, no significant pericardial effusion.      Conclusion:   Normal left ventricular internal dimensions and systolic function, estimated LVEF of 65%.   Grossly normal RV size and systolic function.     Normal trileaflet aortic valve, without AI.   Trace physiologic MR and TR.            < end of copied text >    Radiology:

## 2020-03-20 NOTE — PROGRESS NOTE ADULT - ASSESSMENT
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    Was discharged home and admitted again 2/22/20 with  fever and found w pneumonia, post course of Ceftriaxone, continues to require transfusions plts and PRBCs.  Hep B titer negative  Post IVIG     Due to continue worsening of SLL/CLL w assoc adverse clinical condition/labs, after discussion w pt and wife, started ramp up Venetoclax 3/1/20(planned Venetoclax/Rituxan)  Pt and wife aware of high risk for adverse side effects due to poor performance status and state of disease, risk for tumor lysis due to sig number of circulating CLL/SLL cells in periphery  Sig decr of WBC since Venetoclax, on hold since 3/5(pt's own supply)  WBC from >100 to 3.     Noted increase in WBC to 13, restarted Venetoclax 20mg x2d with WBC declined to 3.   03/11 WBC 10.8, Venetoclax started.   03/12 WBC 13.1, Venetoclax given  03/13 WBC 3.88 Venetoclax HELD, and DC  Stopped again 03/13/20 03/14 WBC 3.59    RECOMMENDATIONS  - still PRBC and Platelets transfusion dependent, would give 1 unit platelets. Possible 1 unit pRBC tomorrow.   - On prednisone taper to 10mg qD today  - continue to hold venetoclax.   - Hold ibrutinib.     Check CBC and plts post transfusion.     - Long term prognosis poor

## 2020-03-20 NOTE — PROGRESS NOTE ADULT - ASSESSMENT
hepatitis B   elevated lfts   CLL  pancytopenia  fever      f/u am labs  patient with elevated bilirubin and alk phos at baseline ? leon  mri showed normal liver, no cbd stone/dilatation; hep b pcr neg  ob neg; no s/s overt gib; monitor cbc daily, transfuse per heme/onc  diet as tolerated; correct elytes prn  cont ppi ppx  abx per id  heme/onc follow up  to follow    Advanced care planning was discussed with patient and family.  Advanced care planning forms were reviewed and discussed.  Risks, benefits and alternatives of gastroenterologic procedures were discussed in detail and all questions were answered.    30 minutes spent.

## 2020-03-20 NOTE — PROGRESS NOTE ADULT - SUBJECTIVE AND OBJECTIVE BOX
INTERVAL HPI/OVERNIGHT EVENTS:  pt seen and examined  resting in bed  per rn no acute overnight events, no gib  sp 1 u prbc and 2u plt yesterday  ct reviewed    MEDICATIONS  (STANDING):  cefepime   IVPB 2000 milliGRAM(s) IV Intermittent every 12 hours  cefepime   IVPB      entecavir 0.5 milliGRAM(s) Oral daily  pantoprazole    Tablet 40 milliGRAM(s) Oral before breakfast  potassium chloride    Tablet ER 20 milliEquivalent(s) Oral two times a day  predniSONE   Tablet 10 milliGRAM(s) Oral daily    MEDICATIONS  (PRN):  acetaminophen   Tablet .. 650 milliGRAM(s) Oral every 6 hours PRN Mild Pain (1 - 3)  acetaminophen   Tablet .. 650 milliGRAM(s) Oral every 6 hours PRN Temp greater or equal to 38C (100.4F)  albuterol/ipratropium for Nebulization 3 milliLiter(s) Nebulizer every 6 hours PRN Shortness of Breath and/or Wheezing  guaiFENesin   Syrup  (Sugar-Free) 200 milliGRAM(s) Oral every 6 hours PRN Cough  hydrocodone/homatropine Syrup 5 milliLiter(s) Oral at bedtime PRN Cough      Allergies    sulfa drugs (Unknown)    Intolerances        Review of Systems:    General:  No wt loss, fevers, chills, night sweats, fatigue   Eyes:  Good vision, no reported pain  ENT:  No sore throat, pain, runny nose, dysphagia  CV:  No pain, palpitations, hypo/hypertension  Resp:  No dyspnea, cough, tachypnea, wheezing  GI:  No pain, No nausea, No vomiting, No diarrhea, No constipation, No weight loss, No fever, No pruritis, No rectal bleeding, No melena, No dysphagia  :  No pain, bleeding, incontinence, nocturia  Muscle:  No pain, weakness  Neuro:  No weakness, tingling, memory problems  Psych:  No fatigue, insomnia, mood problems, depression  Endocrine:  No polyuria, polydypsia, cold/heat intolerance  Heme:  No petechiae, ecchymosis, easy bruisability  Skin:  No rash, tattoos, scars, edema      Vital Signs Last 24 Hrs  T(C): 37.3 (20 Mar 2020 04:55), Max: 37.4 (20 Mar 2020 00:12)  T(F): 99.1 (20 Mar 2020 04:55), Max: 99.3 (20 Mar 2020 00:12)  HR: 94 (20 Mar 2020 04:55) (84 - 98)  BP: 126/68 (20 Mar 2020 04:55) (102/70 - 133/71)  BP(mean): --  RR: 16 (20 Mar 2020 04:55) (16 - 18)  SpO2: 94% (20 Mar 2020 04:55) (94% - 99%)    PHYSICAL EXAM:    Constitutional: nad  HEENT: ncat  Gastrointestinal: soft nt mild dt  Extremities: edema  Neurological: Awake alert responds appropriately      LABS:                        7.4    x     )-----------( x        ( 20 Mar 2020 06:31 )             22.1     03-20    137  |  111<H>  |  23  ----------------------------<  96  3.2<L>   |  14<L>  |  1.10    Ca    7.8<L>      20 Mar 2020 06:31    TPro  5.5<L>  /  Alb  2.6<L>  /  TBili  3.3<H>  /  DBili  2.10<H>  /  AST  65<H>  /  ALT  29  /  AlkPhos  193<H>  03-20      Urinalysis Basic - ( 18 Mar 2020 20:35 )    Color: Yellow / Appearance: Slightly Turbid / S.015 / pH: x  Gluc: x / Ketone: Trace  / Bili: Negative / Urobili: 1   Blood: x / Protein: 100 / Nitrite: Negative   Leuk Esterase: Negative / RBC: 3-5 /HPF / WBC 0-2   Sq Epi: x / Non Sq Epi: Occasional / Bacteria: Moderate        RADIOLOGY & ADDITIONAL TESTS:  < from: CT Chest No Cont (20 @ 09:40) >    EXAM:  CT CHEST                            PROCEDURE DATE:  2020          INTERPRETATION:  CLINICAL INFORMATION: CLL, fever    COMPARISON: 2020    PROCEDURE:   CT of the Chest, Abdomen and Pelvis was performed without intravenous contrast.   Intravenous contrast: None.  Oral contrast: None.  Sagittal and coronal reformats were performed.    FINDINGS:  Lack of IV contrast limits evaluation fabian, vascular structures and solid organ parenchyma  CHEST:     LUNGS AND LARGE AIRWAYS: Patentcentral airways. Scattered bilateral patchy, nodular and groundglass opacities slightly improved at the lung bases compared to prior. Findings could all be inflammatory/infectious. Neoplastic component not excluded.  PLEURA: Interval increase in small left and small-to-moderate right pleural effusion.  VESSELS: Nonaneurysmal  HEART: Heart size is normal. Coronary artery calcination. Decrease cardiac chamber blood pool attenuation suggests an anemic state. Trace pericardial effusion.  MEDIASTINUM AND FABIAN: Multistation mediastinal  supraclavicular, bilateral hilar and bilateral axillary adenopathy similar to prior  CHEST WALL AND LOWER NECK: Within normal limits.    ABDOMEN AND PELVIS:    LIVER: Hepatomegaly similar to prior  BILE DUCTS: Normal caliber.  GALLBLADDER: Cholelithiasis.  SPLEEN: Splenomegaly similar to prior  PANCREAS: Within normal limits.  ADRENALS: Within normal limits.  KIDNEYS/URETERS: Within normal limits.    BLADDER: Within normal limits.  REPRODUCTIVE ORGANS: Unremarkable    BOWEL: Evaluation limited due to limited by nonopacification underdistention. No gross active bowel inflammation. No bowel obstruction.. Appendix no appendicitis  PERITONEUM: Trace ascites. No extraluminal gas or organized collections.  VESSELS: Nonaneurysmal.  RETROPERITONEUM/LYMPH NODES: Stable periportal and retroperitoneal, mesenteric pelvic and bilateral inguinal adenopathy.    ABDOMINAL WALL: Mild anasarca.  BONES: Stable    IMPRESSION:     Improvement in the scattered bilateral parenchymal opacities as described.  Slight increase in bilateral pleural effusions.  Stable multistation adenopathy chest abdomen and pelvis as described  No acute findings abdomen and pelvis  Additional findings as discussed                  PANTERA CALVILLO M.D., ATTENDING RADIOLOGIST  This document has been electronically signed. Mar 19 2020 10:26AM                < end of copied text >

## 2020-03-20 NOTE — PROGRESS NOTE ADULT - PROBLEM SELECTOR PLAN 4
-Patient initially treated for multifocal PNA  -Patient developed high fever on 3/16/20, ID consulted  -Blood cx neg, UA suspicious for UTI, continue IV abx as per ID recs  -Patient also with tachycardia while febrile, Cardio was consulted for concern for afib- no afib noted  -Patient again febrile to 104.7 overnight, will check CT c/a/p -Patient initially treated for multifocal PNA  -Patient developed high fever on 3/16/20, ID consulted  -Blood cx neg, UA suspicious for UTI, continue IV abx as per ID recs  -Patient also with tachycardia while febrile, Cardio was consulted for concern for afib- no afib noted  -CT chest/abd/pelvis noted- nonspecific findings similar to prior  -Check RVP

## 2020-03-20 NOTE — PROGRESS NOTE ADULT - SUBJECTIVE AND OBJECTIVE BOX
[INTERVAL HX: ]  Patient seen and examined;  Chart reviewed and events noted;   Plts 2    feels weeks  no fever, no cough, no SOB  undergoing transfusion.        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 Has been coughing as he was recently treated for influenza A on last admission. States that when he was discharged from [facility] on 2/15, he was feeling well and had no fevers. Patient developed a fever, T100.9F (oral) today for which he called heme/onc, Dr. Stephenson. Per Dr. Stuart, patient advised to go to ER. He was found to have a fever, T100.6F (rectal) in ER. Denies recent travel or sick contacts. Denies headache, chest pain, sob, palpitations, abdominal pain, diarrhea, melena, hematochezia, dysuria or hematuria.     Of note, patient was recently admitted to [facility] from 2/14-2/15/2020 for chemotherapy with Venetoclax, found to be febrile with similar associated complaints of generalized malaise, cough, and weakness. Patient was found to be Flu A positive during that admission and was treated with tamiflu. Patient was found to be thrombocytopenic to 35k with a Hb of 6.6, transfused 2U PRBCs and 1U platelets. Chemotherapy was held off due to acute illness.      In ED, VS Tmax 100.6 rectal (repeat s/p tylenol 99.5),  -> 99, BP stable, saturating well on RA  CBC significant for .14 (on d/c 56.25), Hb 6.8 (on d/c 8.7), Plt 11 (on d/c 21)  CMP significant for K 3.4, Cr 1.5 (appears to be baseline per chart review), bili 3.3  Type and screen performed. Will be getting 1 unit pRBCs  Flu/RSV negative  CT Chest: Multilobar patchy peribronchovascular airspace opacities and more dense consolidative process in the right middle lobe likely reflecting multifocal pneumonia. Small right and trace left pleural effusion. Mediastinal and hilar lymphadenopathy worsened compared with prior exam from 8/30/2019. Numerous mildly prominent bilateral axillary lymph nodes. Retroperitoneal lymphadenopathy. Mild splenomegaly. Cholelithiasis.  CXR (wet read): noted to have increased opacities in right lower and middle lobes as well as left lower lobe in comparison to CXR on 2/14/2020  EKG: sinus tachycardic    S/p cefepime 1g IV x1, NS bolus 1L x1, duonebs x2, tylenol 650mg PO x1, 1U PRBCs ordered and to be transfused    Dr. Stuart was called and he recommended to only transfused PRBC and not platelets.  He will see pt in the morning. (22 Feb 2020 00:14)      MEDICATIONS  (STANDING):  cefepime   IVPB 2000 milliGRAM(s) IV Intermittent every 12 hours  cefepime   IVPB      entecavir 0.5 milliGRAM(s) Oral daily  pantoprazole    Tablet 40 milliGRAM(s) Oral before breakfast  potassium chloride    Tablet ER 20 milliEquivalent(s) Oral two times a day  predniSONE   Tablet 10 milliGRAM(s) Oral daily    MEDICATIONS  (PRN):  acetaminophen   Tablet .. 650 milliGRAM(s) Oral every 6 hours PRN Mild Pain (1 - 3)  acetaminophen   Tablet .. 650 milliGRAM(s) Oral every 6 hours PRN Temp greater or equal to 38C (100.4F)  albuterol/ipratropium for Nebulization 3 milliLiter(s) Nebulizer every 6 hours PRN Shortness of Breath and/or Wheezing  guaiFENesin   Syrup  (Sugar-Free) 200 milliGRAM(s) Oral every 6 hours PRN Cough  hydrocodone/homatropine Syrup 5 milliLiter(s) Oral at bedtime PRN Cough      Vital Signs Last 24 Hrs  T(C): 37.1 (20 Mar 2020 08:16), Max: 37.4 (20 Mar 2020 00:12)  T(F): 98.8 (20 Mar 2020 08:16), Max: 99.3 (20 Mar 2020 00:12)  HR: 93 (20 Mar 2020 08:16) (84 - 98)  BP: 118/61 (20 Mar 2020 08:16) (103/70 - 133/71)  BP(mean): --  RR: 16 (20 Mar 2020 08:16) (16 - 18)  SpO2: 96% (20 Mar 2020 08:16) (94% - 99%)      [PHYSICAL EXAM]  General: adult in NAD,  WN,  WD.  HEENT: clear oropharynx, anicteric sclera, pink conjunctivae.  Neck: supple, no masses.  CV: normal S1S2, no murmur, no rubs, no gallops.  Lungs: clear to auscultation, no wheezes, no rales, no rhonchi.  Abdomen: soft, non-tender, non-distended, no hepatosplenomegaly, normal BS, no guarding.  Ext: no clubbing, no cyanosis, no edema.  Skin: no rashes,  no petechiae, no venous stasis changes.  Neuro: alert and oriented X2  , no focal motor deficits.  LN: no SC FERNIE.      [LABS:]                        7.4    5.43  )-----------( 2        ( 20 Mar 2020 06:31 )             22.1     03-20    137  |  111<H>  |  23  ----------------------------<  96  3.2<L>   |  14<L>  |  1.10    Ca    7.8<L>      20 Mar 2020 06:31    TPro  5.5<L>  /  Alb  2.6<L>  /  TBili  3.3<H>  /  DBili  2.10<H>  /  AST  65<H>  /  ALT  29  /  AlkPhos  193<H>  03-20            [RADIOLOGY STUDIES:]

## 2020-03-20 NOTE — PROGRESS NOTE ADULT - ASSESSMENT
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 Tachy likely reactive  -tele overnight sr 94 with no further episodes of wct   -Monitor and replete electrolytes. Keep K>4.0 and Mg>2.0.    LE edema  -Doppler negative   -Albumin 2.4 likely contributing to his LE edema   -Echo 2/24/2020 revealing trace AI trace TR ef 60-65%    HTN  -118/61 not on any antihypertensives    ID  - Cont Abx for PNA  Cont supplemental O2  -Follow up hem onc ,  -PRBC and platelet transfusion as per hem onc      Hypokalemia  -K 3.2- supplement for goal K > 4.0    Dm  -management as per primary      - Further cardiac workup will depend on clinical course.   - All other workup per primary team. Will followup.     Cathy Baker FNP-C  Cardiology NP  [phone]

## 2020-03-20 NOTE — PROGRESS NOTE ADULT - SUBJECTIVE AND OBJECTIVE BOX
INTERVAL HPI/OVERNIGHT EVENTS:  Patient seen and examined at bedside. States he feels fine, denies any CP, SOB, abd pain.    MEDICATIONS  (STANDING):  cefepime   IVPB 2000 milliGRAM(s) IV Intermittent every 12 hours  cefepime   IVPB      entecavir 0.5 milliGRAM(s) Oral daily  pantoprazole    Tablet 40 milliGRAM(s) Oral before breakfast  potassium chloride    Tablet ER 20 milliEquivalent(s) Oral two times a day  predniSONE   Tablet 10 milliGRAM(s) Oral daily    MEDICATIONS  (PRN):  acetaminophen   Tablet .. 650 milliGRAM(s) Oral every 6 hours PRN Mild Pain (1 - 3)  acetaminophen   Tablet .. 650 milliGRAM(s) Oral every 6 hours PRN Temp greater or equal to 38C (100.4F)  albuterol/ipratropium for Nebulization 3 milliLiter(s) Nebulizer every 6 hours PRN Shortness of Breath and/or Wheezing  guaiFENesin   Syrup  (Sugar-Free) 200 milliGRAM(s) Oral every 6 hours PRN Cough  hydrocodone/homatropine Syrup 5 milliLiter(s) Oral at bedtime PRN Cough      Allergies    sulfa drugs (Unknown)    Intolerances      ROS:  CONSTITUTIONAL: + weakness, no fevers or chills  EYES/ENT: No visual changes;  No vertigo or throat pain   NECK: No pain or stiffness  RESPIRATORY: No cough, wheezing, hemoptysis; No shortness of breath  CARDIOVASCULAR: No chest pain or palpitations  GASTROINTESTINAL: No abdominal or epigastric pain. No nausea, vomiting, or hematemesis  GENITOURINARY: No dysuria, frequency or hematuria  NEUROLOGICAL: No numbness  SKIN: No itching, burning, rashes, or lesions   All other review of systems is negative unless indicated above.    Vital Signs Last 24 Hrs  T(C): 37.7 (20 Mar 2020 19:19), Max: 37.7 (20 Mar 2020 19:19)  T(F): 99.9 (20 Mar 2020 19:19), Max: 99.9 (20 Mar 2020 19:19)  HR: 93 (20 Mar 2020 19:19) (86 - 98)  BP: 130/67 (20 Mar 2020 19:19) (117/70 - 149/81)  BP(mean): --  RR: 18 (20 Mar 2020 19:19) (16 - 18)  SpO2: 95% (20 Mar 2020 19:19) (94% - 99%)     @ 07:  -   @ 07:00  --------------------------------------------------------  IN: 2030 mL / OUT: 700 mL / NET: 1330 mL     @ 07:01   @ 19:45  --------------------------------------------------------  IN: 267 mL / OUT: 0 mL / NET: 267 mL        Physical Exam:  General: WN/WD NAD  Neurology: A&Ox3, nonfocal, BROWN x 4  Respiratory: CTA B/L  CV: RRR, S1S2  Abdominal: Soft, NT, ND +BS  Extremities: +pitting edema B/L LE, + peripheral pulses      LABS:                        7.8    2.36  )-----------( 8        ( 20 Mar 2020 13:35 )             23.2     03-20    137  |  111<H>  |  23  ----------------------------<  96  3.2<L>   |  14<L>  |  1.10    Ca    7.8<L>      20 Mar 2020 06:31    TPro  5.5<L>  /  Alb  2.6<L>  /  TBili  3.3<H>  /  DBili  2.10<H>  /  AST  65<H>  /  ALT  29  /  AlkPhos  193<H>        Urinalysis Basic - ( 18 Mar 2020 20:35 )    Color: Yellow / Appearance: Slightly Turbid / S.015 / pH: x  Gluc: x / Ketone: Trace  / Bili: Negative / Urobili: 1   Blood: x / Protein: 100 / Nitrite: Negative   Leuk Esterase: Negative / RBC: 3-5 /HPF / WBC 0-2   Sq Epi: x / Non Sq Epi: Occasional / Bacteria: Moderate        RADIOLOGY & ADDITIONAL TESTS:

## 2020-03-21 LAB
ALBUMIN SERPL ELPH-MCNC: 2.5 G/DL — LOW (ref 3.3–5)
ALP SERPL-CCNC: 178 U/L — HIGH (ref 40–120)
ALT FLD-CCNC: 26 U/L — SIGNIFICANT CHANGE UP (ref 12–78)
ANION GAP SERPL CALC-SCNC: 12 MMOL/L — SIGNIFICANT CHANGE UP (ref 5–17)
AST SERPL-CCNC: 57 U/L — HIGH (ref 15–37)
BASOPHILS # BLD AUTO: 0 K/UL — SIGNIFICANT CHANGE UP (ref 0–0.2)
BASOPHILS NFR BLD AUTO: 0 % — SIGNIFICANT CHANGE UP (ref 0–2)
BILIRUB SERPL-MCNC: 3.1 MG/DL — HIGH (ref 0.2–1.2)
BUN SERPL-MCNC: 22 MG/DL — SIGNIFICANT CHANGE UP (ref 7–23)
CALCIUM SERPL-MCNC: 7.6 MG/DL — LOW (ref 8.5–10.1)
CHLORIDE SERPL-SCNC: 110 MMOL/L — HIGH (ref 96–108)
CO2 SERPL-SCNC: 15 MMOL/L — LOW (ref 22–31)
CREAT SERPL-MCNC: 0.98 MG/DL — SIGNIFICANT CHANGE UP (ref 0.5–1.3)
CULTURE RESULTS: SIGNIFICANT CHANGE UP
CULTURE RESULTS: SIGNIFICANT CHANGE UP
EOSINOPHIL # BLD AUTO: 0 K/UL — SIGNIFICANT CHANGE UP (ref 0–0.5)
EOSINOPHIL NFR BLD AUTO: 0 % — SIGNIFICANT CHANGE UP (ref 0–6)
GLUCOSE SERPL-MCNC: 92 MG/DL — SIGNIFICANT CHANGE UP (ref 70–99)
HCT VFR BLD CALC: 19.6 % — CRITICAL LOW (ref 39–50)
HGB BLD-MCNC: 6.6 G/DL — CRITICAL LOW (ref 13–17)
INR BLD: 1.27 RATIO — HIGH (ref 0.88–1.16)
LYMPHOCYTES # BLD AUTO: 3.72 K/UL — HIGH (ref 1–3.3)
LYMPHOCYTES # BLD AUTO: 94 % — HIGH (ref 13–44)
MCHC RBC-ENTMCNC: 29.7 PG — SIGNIFICANT CHANGE UP (ref 27–34)
MCHC RBC-ENTMCNC: 33.7 GM/DL — SIGNIFICANT CHANGE UP (ref 32–36)
MCV RBC AUTO: 88.3 FL — SIGNIFICANT CHANGE UP (ref 80–100)
MONOCYTES # BLD AUTO: 0.08 K/UL — SIGNIFICANT CHANGE UP (ref 0–0.9)
MONOCYTES NFR BLD AUTO: 2 % — SIGNIFICANT CHANGE UP (ref 2–14)
NEUTROPHILS # BLD AUTO: 0.16 K/UL — LOW (ref 1.8–7.4)
NEUTROPHILS NFR BLD AUTO: 4 % — LOW (ref 43–77)
NRBC # BLD: SIGNIFICANT CHANGE UP /100 WBCS (ref 0–0)
PLATELET # BLD AUTO: 2 K/UL — CRITICAL LOW (ref 150–400)
POTASSIUM SERPL-MCNC: 3.4 MMOL/L — LOW (ref 3.5–5.3)
POTASSIUM SERPL-SCNC: 3.4 MMOL/L — LOW (ref 3.5–5.3)
PROT SERPL-MCNC: 5.1 G/DL — LOW (ref 6–8.3)
PROTHROM AB SERPL-ACNC: 14.3 SEC — HIGH (ref 10–12.9)
RBC # BLD: 2.22 M/UL — LOW (ref 4.2–5.8)
RBC # FLD: 18.6 % — HIGH (ref 10.3–14.5)
SODIUM SERPL-SCNC: 137 MMOL/L — SIGNIFICANT CHANGE UP (ref 135–145)
SPECIMEN SOURCE: SIGNIFICANT CHANGE UP
SPECIMEN SOURCE: SIGNIFICANT CHANGE UP
WBC # BLD: 3.96 K/UL — SIGNIFICANT CHANGE UP (ref 3.8–10.5)
WBC # FLD AUTO: 3.96 K/UL — SIGNIFICANT CHANGE UP (ref 3.8–10.5)

## 2020-03-21 PROCEDURE — 99233 SBSQ HOSP IP/OBS HIGH 50: CPT

## 2020-03-21 PROCEDURE — 99232 SBSQ HOSP IP/OBS MODERATE 35: CPT

## 2020-03-21 RX ORDER — ACETAMINOPHEN 500 MG
650 TABLET ORAL ONCE
Refills: 0 | Status: COMPLETED | OUTPATIENT
Start: 2020-03-21 | End: 2020-03-21

## 2020-03-21 RX ORDER — DIPHENHYDRAMINE HCL 50 MG
25 CAPSULE ORAL ONCE
Refills: 0 | Status: COMPLETED | OUTPATIENT
Start: 2020-03-21 | End: 2020-03-21

## 2020-03-21 RX ADMIN — Medication 25 MILLIGRAM(S): at 06:50

## 2020-03-21 RX ADMIN — CEFEPIME 100 MILLIGRAM(S): 1 INJECTION, POWDER, FOR SOLUTION INTRAMUSCULAR; INTRAVENOUS at 17:09

## 2020-03-21 RX ADMIN — Medication 10 MILLIGRAM(S): at 05:51

## 2020-03-21 RX ADMIN — PANTOPRAZOLE SODIUM 40 MILLIGRAM(S): 20 TABLET, DELAYED RELEASE ORAL at 05:51

## 2020-03-21 RX ADMIN — CEFEPIME 100 MILLIGRAM(S): 1 INJECTION, POWDER, FOR SOLUTION INTRAMUSCULAR; INTRAVENOUS at 05:50

## 2020-03-21 RX ADMIN — Medication 20 MILLIEQUIVALENT(S): at 05:51

## 2020-03-21 RX ADMIN — Medication 650 MILLIGRAM(S): at 06:49

## 2020-03-21 RX ADMIN — ENTECAVIR 0.5 MILLIGRAM(S): 0.5 TABLET ORAL at 05:51

## 2020-03-21 RX ADMIN — Medication 650 MILLIGRAM(S): at 07:41

## 2020-03-21 RX ADMIN — Medication 20 MILLIEQUIVALENT(S): at 17:08

## 2020-03-21 NOTE — PROGRESS NOTE ADULT - ASSESSMENT
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 Tachy likely reactive  -tele overnight sr 94 with no further episodes of wct   -Monitor and replete electrolytes. Keep K>4.0 and Mg>2.0.    Anemia, Thrombocytopenia   -hem/onc following   - Would transfuse to keep Hb>8 and watch volume status     LE edema  -Doppler negative   -Albumin 2.4 likely contributing to his LE edema   -Echo 2/24/2020 revealing trace AI trace TR ef 60-65%    HTN  -118/61 not on any antihypertensives    ID  - Cont Abx for PNA  Cont supplemental O2  -Follow up hem onc   -PRBC and platelet transfusion as per hem onc      Hypokalemia  -K 3.4- supplement for goal K > 4.0    Dm  -management as per primary      - Further cardiac workup will depend on clinical course.   - All other workup per primary team. Will followup.     Guerrero Capps [facility]  Cardiology   [facility] #[phone]

## 2020-03-21 NOTE — PROGRESS NOTE ADULT - PROBLEM SELECTOR PLAN 4
-Patient initially treated for multifocal PNA  -Patient developed high fever on 3/16/20, ID consulted  -Blood cx neg, UA suspicious for UTI, continue IV abx as per ID recs  -Patient also with tachycardia while febrile, Cardio was consulted for concern for afib- no afib noted  -CT chest/abd/pelvis noted- nonspecific findings similar to prior  -Check RVP- pending  -ID stopped abx

## 2020-03-21 NOTE — PROGRESS NOTE ADULT - SUBJECTIVE AND OBJECTIVE BOX
INTERVAL HPI/OVERNIGHT EVENTS:  Patient seen and examined at bedside. States he feels well, denies any CP, SOB, abd pain.      MEDICATIONS  (STANDING):  cefepime   IVPB 2000 milliGRAM(s) IV Intermittent every 12 hours  cefepime   IVPB      entecavir 0.5 milliGRAM(s) Oral daily  pantoprazole    Tablet 40 milliGRAM(s) Oral before breakfast  potassium chloride    Tablet ER 20 milliEquivalent(s) Oral two times a day  predniSONE   Tablet 10 milliGRAM(s) Oral daily    MEDICATIONS  (PRN):  acetaminophen   Tablet .. 650 milliGRAM(s) Oral every 6 hours PRN Mild Pain (1 - 3)  acetaminophen   Tablet .. 650 milliGRAM(s) Oral every 6 hours PRN Temp greater or equal to 38C (100.4F)  albuterol/ipratropium for Nebulization 3 milliLiter(s) Nebulizer every 6 hours PRN Shortness of Breath and/or Wheezing  guaiFENesin   Syrup  (Sugar-Free) 200 milliGRAM(s) Oral every 6 hours PRN Cough  hydrocodone/homatropine Syrup 5 milliLiter(s) Oral at bedtime PRN Cough      Allergies    sulfa drugs (Unknown)    Intolerances      ROS:  CONSTITUTIONAL: + weakness, no fevers or chills  EYES/ENT: No visual changes;  No vertigo or throat pain   NECK: No pain or stiffness  RESPIRATORY: No cough, wheezing, hemoptysis; No shortness of breath  CARDIOVASCULAR: No chest pain or palpitations  GASTROINTESTINAL: No abdominal or epigastric pain. No nausea, vomiting, or hematemesis  GENITOURINARY: No dysuria, frequency or hematuria  NEUROLOGICAL: No numbness  SKIN: No itching, burning, rashes, or lesions   All other review of systems is negative unless indicated above.    Vital Signs Last 24 Hrs  T(C): 36.8 (21 Mar 2020 16:05), Max: 37.7 (20 Mar 2020 19:19)  T(F): 98.3 (21 Mar 2020 16:05), Max: 99.9 (20 Mar 2020 19:19)  HR: 91 (21 Mar 2020 16:05) (80 - 98)  BP: 118/70 (21 Mar 2020 16:05) (116/69 - 130/67)  BP(mean): --  RR: 18 (21 Mar 2020 16:05) (18 - 18)  SpO2: 99% (21 Mar 2020 16:05) (95% - 99%)    03-20 @ 07:01  -  03-21 @ 07:00  --------------------------------------------------------  IN: 267 mL / OUT: 0 mL / NET: 267 mL        Physical Exam:  General: WN/WD NAD  Neurology: A&Ox3, nonfocal, BROWN x 4  Respiratory: CTA B/L  CV: RRR, S1S2  Abdominal: Soft, NT, ND +BS  Extremities: +pitting edema B/L LE, + peripheral pulses      LABS:                        6.6    3.96  )-----------( 2        ( 21 Mar 2020 05:48 )             19.6     03-21    137  |  110<H>  |  22  ----------------------------<  92  3.4<L>   |  15<L>  |  0.98    Ca    7.6<L>      21 Mar 2020 05:48    TPro  5.1<L>  /  Alb  2.5<L>  /  TBili  3.1<H>  /  DBili  x   /  AST  57<H>  /  ALT  26  /  AlkPhos  178<H>  03-21    PT/INR - ( 21 Mar 2020 05:48 )   PT: 14.3 sec;   INR: 1.27 ratio               RADIOLOGY & ADDITIONAL TESTS:

## 2020-03-21 NOTE — PROGRESS NOTE ADULT - SUBJECTIVE AND OBJECTIVE BOX
[facility], Division of Infectious Diseases  ELLIOT [facility]. [facility]  [phone]    -Name: DIONICIO SULLIVAN  Age: 75y  Gender: Male  MRN: [id number]    Interval History--  Notes reviewed  pt pleasant concerned about his temp  no cough, no sob, no dyspnea.    Past Medical History--  KIRTI (obstructive sleep apnea)  Hypertension  Diabetes  Hepatitis B  Lymphoma  AIHA (autoimmune hemolytic anemia)  Leukemia  Diabetes  HTN (hypertension)  H/O lithotripsy      For details regarding the patient's social history, family history, and other miscellaneous elements, please refer the initial infectious diseases consultation and/or the admitting history and physical examination for this admission.    Allergies    sulfa drugs (Unknown)    Intolerances        Medications--  Antibiotics:  cefepime   IVPB 2000 milliGRAM(s) IV Intermittent every 12 hours  cefepime   IVPB      entecavir 0.5 milliGRAM(s) Oral daily    Immunologic:    Other:  acetaminophen   Tablet .. PRN  acetaminophen   Tablet .. PRN  albuterol/ipratropium for Nebulization PRN  guaiFENesin   Syrup  (Sugar-Free) PRN  hydrocodone/homatropine Syrup PRN  pantoprazole    Tablet  potassium chloride    Tablet ER  predniSONE   Tablet      Review of Systems--  A 10-point review of systems was obtained.     Pertinent positives and negatives--  Constitutional: No fevers. No Chills. No Rigors.   Cardiovascular: No chest pain. No palpitations.  Respiratory: No shortness of breath. No cough.  Gastrointestinal: No nausea or vomiting. No diarrhea or constipation.   Psychiatric: no anxiety    Review of systems otherwise negative except as previously noted.    Physical Examination--  Vital Signs: T(F): 97.5 (03-21-20 @ 11:27), Max: 99.9 (03-20-20 @ 19:19)  HR: 80 (03-21-20 @ 11:27)  BP: 116/74 (03-21-20 @ 11:27)  RR: 18 (03-21-20 @ 11:27)  SpO2: 98% (03-21-20 @ 11:27)  Wt(kg): --  General: Nontoxic-appearing Male in no acute distress.  HEENT: AT/NC. anicteric. Conjunctiva pink and moist.  Neck: Not rigid. No sense of mass.  Nodes: None palpable.  Lungs: Clear bilaterally without rales, wheezing or rhonchi  Heart: Regular rate and rhythm. No Murmur.   Abdomen: Bowel sounds present and normoactive. Soft. Nondistended. Nontender.   Extremities: No cyanosis or clubbing. No edema.   Skin: Warm. Dry. Good turgor. No rash. No vasculitic stigmata.  Psychiatric: Appropriate affect and mood for situation.         Laboratory Studies--  CBC                        6.6    3.96  )-----------( 2        ( 21 Mar 2020 05:48 )             19.6       Chemistries  03-21    137  |  110<H>  |  22  ----------------------------<  92  3.4<L>   |  15<L>  |  0.98    Ca    7.6<L>      21 Mar 2020 05:48    TPro  5.1<L>  /  Alb  2.5<L>  /  TBili  3.1<H>  /  DBili  x   /  AST  57<H>  /  ALT  26  /  AlkPhos  178<H>  03-21      Culture Data    Culture - Urine (collected 19 Mar 2020 00:32)  Source: .Urine Clean Catch (Midstream)  Final Report (19 Mar 2020 19:24):    <10,000 CFU/mL Normal Urogenital Criss    Culture - Urine (collected 18 Mar 2020 09:00)  Source: .Urine Clean Catch (Midstream)  Final Report (19 Mar 2020 05:06):    No growth    Culture - Blood (collected 16 Mar 2020 22:03)  Source: .Blood Blood  Preliminary Report (17 Mar 2020 23:02):    No growth to date.    Culture - Blood (collected 16 Mar 2020 22:03)  Source: .Blood Blood  Preliminary Report (17 Mar 2020 23:02):    No growth to date.      < from: CT Chest No Cont (03.19.20 @ 09:40) >    EXAM:  CT CHEST                            PROCEDURE DATE:  03/19/2020          INTERPRETATION:  CLINICAL INFORMATION: CLL, fever    COMPARISON: 2/21/2020    PROCEDURE:   CT of the Chest, Abdomen and Pelvis was performed without intravenous contrast.   Intravenous contrast: None.  Oral contrast: None.  Sagittal and coronal reformats were performed.    FINDINGS:  Lack of IV contrast limits evaluation diana, vascular structures and solid organ parenchyma  CHEST:     LUNGS AND LARGE AIRWAYS: Patentcentral airways. Scattered bilateral patchy, nodular and groundglass opacities slightly improved at the lung bases compared to prior. Findings could all be inflammatory/infectious. Neoplastic component not excluded.  PLEURA: Interval increase in small left and small-to-moderate right pleural effusion.  VESSELS: Nonaneurysmal  HEART: Heart size is normal. Coronary artery calcination. Decrease cardiac chamber blood pool attenuation suggests an anemic state. Trace pericardial effusion.  MEDIASTINUM AND DIANA: Multistation mediastinal  supraclavicular, bilateral hilar and bilateral axillary adenopathy similar to prior  CHEST WALL AND LOWER NECK: Within normal limits.    ABDOMEN AND PELVIS:    LIVER: Hepatomegaly similar to prior  BILE DUCTS: Normal caliber.  GALLBLADDER: Cholelithiasis.  SPLEEN: Splenomegaly similar to prior  PANCREAS: Within normal limits.  ADRENALS: Within normal limits.  KIDNEYS/URETERS: Within normal limits.    BLADDER: Within normal limits.  REPRODUCTIVE ORGANS: Unremarkable    BOWEL: Evaluation limited due to limited by nonopacification underdistention. No gross active bowel inflammation. No bowel obstruction.. Appendix no appendicitis  PERITONEUM: Trace ascites. No extraluminal gas or organized collections.  VESSELS: Nonaneurysmal.  RETROPERITONEUM/LYMPH NODES: Stable periportal and retroperitoneal, mesenteric pelvic and bilateral inguinal adenopathy.    ABDOMINAL WALL: Mild anasarca.  BONES: Stable    IMPRESSION:     Improvement in the scattered bilateral parenchymal opacities as described.  Slight increase in bilateral pleural effusions.  Stable multistation adenopathy chest abdomen and pelvis as described  No acute findings abdomen and pelvis    < end of copied text >

## 2020-03-21 NOTE — PROGRESS NOTE ADULT - ASSESSMENT
hepatitis B   elevated lfts   CLL  pancytopenia        patient with elevated bilirubin and alk phos at baseline ? leon  mri showed normal liver, no cbd stone/dilatation; hep b pcr neg  ob neg; no s/s overt gib; monitor cbc daily, transfuse per heme/onc  diet as tolerated; correct elytes prn  cont ppi ppx  abx per id  heme/onc follow up  to follow    Advanced care planning was discussed with patient and family.  Advanced care planning forms were reviewed and discussed.  Risks, benefits and alternatives of gastroenterologic procedures were discussed in detail and all questions were answered.    30 minutes spent.

## 2020-03-21 NOTE — PROGRESS NOTE ADULT - PROBLEM SELECTOR PLAN 1
afeb > 48 housrs  exam benign  cxr without new consolidation  ct improved no acute pna  f/u blood cx ntd  no infectious etiology isolated  d/c antibx

## 2020-03-21 NOTE — PROGRESS NOTE ADULT - SUBJECTIVE AND OBJECTIVE BOX
[facility] -- Cori Dawkins, Travis Villagomez, Ryland Wright Savella  Office # [phone]      Follow Up:    HTN  Subjective/Observations:   No events overnight resting comfortably in bed.  No complaints of chest pain, dyspnea, or palpitations reported. No signs of orthopnea or PND.     REVIEW OF SYSTEMS: All other review of systems is negative unless indicated above    PAST MEDICAL & SURGICAL HISTORY:  KIRTI (obstructive sleep apnea): on nocturnal cpap  Hypertension: not on any medications  Diabetes: not on any medications  Hepatitis B  Lymphoma: Small cell B cell  AIHA (autoimmune hemolytic anemia)  H/O lithotripsy      MEDICATIONS  (STANDING):  cefepime   IVPB 2000 milliGRAM(s) IV Intermittent every 12 hours  cefepime   IVPB      entecavir 0.5 milliGRAM(s) Oral daily  pantoprazole    Tablet 40 milliGRAM(s) Oral before breakfast  potassium chloride    Tablet ER 20 milliEquivalent(s) Oral two times a day  predniSONE   Tablet 10 milliGRAM(s) Oral daily    MEDICATIONS  (PRN):  acetaminophen   Tablet .. 650 milliGRAM(s) Oral every 6 hours PRN Mild Pain (1 - 3)  acetaminophen   Tablet .. 650 milliGRAM(s) Oral every 6 hours PRN Temp greater or equal to 38C (100.4F)  albuterol/ipratropium for Nebulization 3 milliLiter(s) Nebulizer every 6 hours PRN Shortness of Breath and/or Wheezing  guaiFENesin   Syrup  (Sugar-Free) 200 milliGRAM(s) Oral every 6 hours PRN Cough  hydrocodone/homatropine Syrup 5 milliLiter(s) Oral at bedtime PRN Cough      Allergies    sulfa drugs (Unknown)    Intolerances        Vital Signs Last 24 Hrs  T(C): 37.2 (21 Mar 2020 04:44), Max: 37.7 (20 Mar 2020 19:19)  T(F): 99 (21 Mar 2020 04:44), Max: 99.9 (20 Mar 2020 19:19)  HR: 98 (21 Mar 2020 04:44) (87 - 98)  BP: 127/65 (21 Mar 2020 04:44) (117/70 - 149/81)  BP(mean): --  RR: 18 (21 Mar 2020 04:44) (16 - 18)  SpO2: 96% (21 Mar 2020 04:44) (95% - 98%)    I&O's Summary    20 Mar 2020 07:01  -  21 Mar 2020 07:00  --------------------------------------------------------  IN: 267 mL / OUT: 0 mL / NET: 267 mL          PHYSICAL EXAM:  TELE: ST  Constitutional: NAD, awake and alert, well-developed  HEENT: Moist Mucous Membranes, Anicteric  Pulmonary: Non-labored, breath sounds are clear bilaterally, No wheezing, crackles or rhonchi  Cardiovascular: Regular, S1 and S2 nl, No murmurs, rubs, gallops or clicks  Gastrointestinal: Bowel Sounds present, soft, nontender.   Lymph: No lymphadenopathy. + Bilat LE peripheral edema.    Skin: No visible rashes or ulcers.  Psych:  Mood & affect appropriate    LABS: All Labs Reviewed:                        6.6    3.96  )-----------( 2        ( 21 Mar 2020 05:48 )             19.6                         7.8    2.36  )-----------( 8        ( 20 Mar 2020 13:35 )             23.2                         7.4    5.43  )-----------( 2        ( 20 Mar 2020 06:31 )             22.1     21 Mar 2020 05:48    137    |  110    |  22     ----------------------------<  92     3.4     |  15     |  0.98   20 Mar 2020 06:31    137    |  111    |  23     ----------------------------<  96     3.2     |  14     |  1.10   19 Mar 2020 06:20    139    |  111    |  27     ----------------------------<  126    3.2     |  15     |  1.10     Ca    7.6        21 Mar 2020 05:48  Ca    7.8        20 Mar 2020 06:31  Ca    7.7        19 Mar 2020 06:20    TPro  5.1    /  Alb  2.5    /  TBili  3.1    /  DBili  x      /  AST  57     /  ALT  26     /  AlkPhos  178    21 Mar 2020 05:48  TPro  5.5    /  Alb  2.6    /  TBili  3.3    /  DBili  2.10   /  AST  65     /  ALT  29     /  AlkPhos  193    20 Mar 2020 06:31    PT/INR - ( 21 Mar 2020 05:48 )   PT: 14.3 sec;   INR: 1.27 ratio                ECG:  < from: 12 Lead ECG (03.18.20 @ 19:13) >    Ventricular Rate 167 BPM    Atrial Rate 178 BPM    QRS Duration 64 ms    Q-T Interval 296 ms    QTC Calculation(Bezet) 493 ms    R Axis 32 degrees    T Axis 61 degrees    Diagnosis Line *** Poor data quality, interpretation may be adversely affected  likely st with apcs  Nonspecific ST and T wave abnormality  Abnormal ECG  When compared with ECG of 16-MAR-2020 17:10,  Previous ECG has undetermined rhythm, needs review  Nonspecific T wave abnormality now evident in Anterior leads    < end of copied text >      Echo:  < from: TTE Echo Complete w/o contrast w/ Doppler (03.04.20 @ 09:54) >  OBSERVATIONS:    Mitral Valve: normal, trace physiologic MR.  Aortic Valve/Aorta: Normal trileaflet aortic valve with normal opening.  Tricuspid Valve: normal with trace TR.  Pulmonic Valve: normal  Left Atrium: normal  Right Atrium: normal  Left Ventricle: normal LV size and systolic function, estimated LVEF of 65%.  Right Ventricle: Grossly normal size and systolic function.  Pericardium/Pleura: normal, no significant pericardial effusion.      Conclusion:   Normal left ventricular internal dimensions and systolic function, estimated LVEF of 65%.   Grossly normal RV size and systolic function.     Normal trileaflet aortic valve, without AI.   Trace physiologic MR and TR.            < end of copied text >

## 2020-03-21 NOTE — PROGRESS NOTE ADULT - PROBLEM SELECTOR PLAN 3
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 Was on prednisone 10mg daily -- increase to prednisone 40mg po during hospitalization, tapering started @2/29 - now 10mg daily  - Heme/onc following  - follow H&H- hgb 6.6 today, will transfuse 1 unit PRBC today

## 2020-03-21 NOTE — PROGRESS NOTE ADULT - PROBLEM SELECTOR PLAN 2
- Per chart review, platelet count steadily declining since 2018  - poor response to platelets transfusion, suspect ITP component  - s/p one dose of IVIG  - Hem/Onc following, will follow the recommendation   -Platelet count 2 today  -Will transfuse one unit of platelets today.   -continue to monitor.

## 2020-03-21 NOTE — PROGRESS NOTE ADULT - ASSESSMENT
76 y/o man w Hepatitis B on Entecavirt, Chronic Lymphocytic Leukemia/Small Lymphocytic Lymphoma 4/2018 when presented w immune hemolytic anemia w partial response to steroids, started on Ibrutinib w heme CR and SC by CT scan w some decrease of lymphadenopathies, until 1/2020 when relapsed w incr WBC, anemia(initially not hemolytic, has chronic Matthew positive due to CLL/SLL), thrombocytopenia.    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    Was discharged home and admitted again 2/22/20 with  fever and found w pneumonia, post course of Ceftriaxone, continues to require transfusions plts and PRBCs.  Hep B titer negative  Post IVIG     Due to continue worsening of SLL/CLL w assoc adverse clinical condition/labs, after discussion w pt and wife, started ramp up Venetoclax 3/1/20(planned Venetoclax/Rituxan)  Pt and wife aware of high risk for adverse side effects due to poor performance status and state of disease, risk for tumor lysis due to sig number of circulating CLL/SLL cells in periphery  Sig decr of WBC since Venetoclax, on hold since 3/5(pt's own supply)  WBC from >100 to 3.     Noted increase in WBC to 13, restarted Venetoclax 20mg x2d with WBC declined to 3.   03/11 WBC 10.8, Venetoclax started.   03/12 WBC 13.1, Venetoclax given  03/13 WBC 3.88 Venetoclax HELD, and DC  Stopped again 03/13/20 03/14 WBC 3.59    remains pancytopenic and PRBC and plt transfusion dependetn    RECOMMENDATIONS  - still PRBC and Platelets transfusion dependent, would give 1 unit platelets. Possible 1 unit pRBC today  - On prednisone taper to 10mg qD today  - continue to hold venetoclax.   - Hold ibrutinib.     Check CBC and plts post transfusion.   continue abx as per med/ID    - Long term prognosis poor

## 2020-03-21 NOTE — PROGRESS NOTE ADULT - SUBJECTIVE AND OBJECTIVE BOX
Interval History:  remains weak. breathing stable. Tmax 99.9    Chart reviewed and events noted;   Overnight events:    MEDICATIONS  (STANDING):  cefepime   IVPB 2000 milliGRAM(s) IV Intermittent every 12 hours  cefepime   IVPB      entecavir 0.5 milliGRAM(s) Oral daily  pantoprazole    Tablet 40 milliGRAM(s) Oral before breakfast  potassium chloride    Tablet ER 20 milliEquivalent(s) Oral two times a day  predniSONE   Tablet 10 milliGRAM(s) Oral daily    MEDICATIONS  (PRN):  acetaminophen   Tablet .. 650 milliGRAM(s) Oral every 6 hours PRN Mild Pain (1 - 3)  acetaminophen   Tablet .. 650 milliGRAM(s) Oral every 6 hours PRN Temp greater or equal to 38C (100.4F)  albuterol/ipratropium for Nebulization 3 milliLiter(s) Nebulizer every 6 hours PRN Shortness of Breath and/or Wheezing  guaiFENesin   Syrup  (Sugar-Free) 200 milliGRAM(s) Oral every 6 hours PRN Cough  hydrocodone/homatropine Syrup 5 milliLiter(s) Oral at bedtime PRN Cough      Vital Signs Last 24 Hrs  T(C): 36.4 (21 Mar 2020 07:10), Max: 37.7 (20 Mar 2020 19:19)  T(F): 97.5 (21 Mar 2020 07:10), Max: 99.9 (20 Mar 2020 19:19)  HR: 92 (21 Mar 2020 07:10) (87 - 98)  BP: 116/69 (21 Mar 2020 07:10) (116/69 - 149/81)  BP(mean): --  RR: 18 (21 Mar 2020 07:10) (18 - 18)  SpO2: 98% (21 Mar 2020 07:10) (95% - 98%)    PHYSICAL EXAM (exam from chart)  General: adult in NAD  HEENT: clear oropharynx, anicteric sclera, pink conjunctivae  Neck: supple  CV: normal S1S2 with no murmur rubs or gallops  Lungs: clear to auscultation, no wheezes, no rhales  Abdomen: soft non-tender non-distended, no hepato/splenomegaly  Ext: no clubbing cyanosis or edema  Skin: no rashes and no petichiae  Neuro: alert and oriented X3 no focal deficits      LABS:  CBC Full  -  ( 21 Mar 2020 05:48 )  WBC Count : 3.96 K/uL  RBC Count : 2.22 M/uL  Hemoglobin : 6.6 g/dL  Hematocrit : 19.6 %  Platelet Count - Automated : 2 K/uL  Mean Cell Volume : 88.3 fl  Mean Cell Hemoglobin : 29.7 pg  Mean Cell Hemoglobin Concentration : 33.7 gm/dL  Auto Neutrophil # : 0.16 K/uL  Auto Lymphocyte # : 3.72 K/uL  Auto Monocyte # : 0.08 K/uL  Auto Eosinophil # : 0.00 K/uL  Auto Basophil # : 0.00 K/uL  Auto Neutrophil % : 4.0 %  Auto Lymphocyte % : 94.0 %  Auto Monocyte % : 2.0 %  Auto Eosinophil % : 0.0 %  Auto Basophil % : 0.0 %    03-21    137  |  110<H>  |  22  ----------------------------<  92  3.4<L>   |  15<L>  |  0.98    Ca    7.6<L>      21 Mar 2020 05:48    TPro  5.1<L>  /  Alb  2.5<L>  /  TBili  3.1<H>  /  DBili  x   /  AST  57<H>  /  ALT  26  /  AlkPhos  178<H>  03-21    PT/INR - ( 21 Mar 2020 05:48 )   PT: 14.3 sec;   INR: 1.27 ratio             fe studies      WBC trend  3.96 K/uL (03-21-20 @ 05:48)  2.36 K/uL (03-20-20 @ 13:35)  5.43 K/uL (03-20-20 @ 06:31)  3.62 K/uL (03-19-20 @ 18:22)  4.00 K/uL (03-19-20 @ 06:20)  4.45 K/uL (03-18-20 @ 18:35)      Hgb trend  6.6 g/dL (03-21-20 @ 05:48)  7.8 g/dL (03-20-20 @ 13:35)  7.4 g/dL (03-20-20 @ 06:31)  8.3 g/dL (03-19-20 @ 18:22)  6.7 g/dL (03-19-20 @ 06:20)  8.6 g/dL (03-18-20 @ 18:35)      plt trend  2 K/uL (03-21-20 @ 05:48)  8 K/uL (03-20-20 @ 13:35)  2 K/uL (03-20-20 @ 06:31)  2 K/uL (03-19-20 @ 18:22)  3 K/uL (03-19-20 @ 06:20)  34 K/uL (03-18-20 @ 18:35)        RADIOLOGY & ADDITIONAL STUDIES:

## 2020-03-21 NOTE — PROGRESS NOTE ADULT - SUBJECTIVE AND OBJECTIVE BOX
INTERVAL HPI/OVERNIGHT EVENTS:  pt seen and examined  denies n/v/abd pain  tolerating po  no gib per rn   sp 1 u plts yesterday, to receive addtl unit today + prbc    MEDICATIONS  (STANDING):  cefepime   IVPB 2000 milliGRAM(s) IV Intermittent every 12 hours  cefepime   IVPB      entecavir 0.5 milliGRAM(s) Oral daily  pantoprazole    Tablet 40 milliGRAM(s) Oral before breakfast  potassium chloride    Tablet ER 20 milliEquivalent(s) Oral two times a day  predniSONE   Tablet 10 milliGRAM(s) Oral daily    MEDICATIONS  (PRN):  acetaminophen   Tablet .. 650 milliGRAM(s) Oral every 6 hours PRN Mild Pain (1 - 3)  acetaminophen   Tablet .. 650 milliGRAM(s) Oral every 6 hours PRN Temp greater or equal to 38C (100.4F)  albuterol/ipratropium for Nebulization 3 milliLiter(s) Nebulizer every 6 hours PRN Shortness of Breath and/or Wheezing  guaiFENesin   Syrup  (Sugar-Free) 200 milliGRAM(s) Oral every 6 hours PRN Cough  hydrocodone/homatropine Syrup 5 milliLiter(s) Oral at bedtime PRN Cough      Allergies    sulfa drugs (Unknown)    Intolerances        Review of Systems:    General:  No wt loss, fevers, chills, night sweats, fatigue   Eyes:  Good vision, no reported pain  ENT:  No sore throat, pain, runny nose, dysphagia  CV:  No pain, palpitations, hypo/hypertension  Resp:  No dyspnea, cough, tachypnea, wheezing  GI:  No pain, No nausea, No vomiting, No diarrhea, No constipation, No weight loss, No fever, No pruritis, No rectal bleeding, No melena, No dysphagia  :  No pain, bleeding, incontinence, nocturia  Muscle:  No pain, weakness  Neuro:  No weakness, tingling, memory problems  Psych:  No fatigue, insomnia, mood problems, depression  Endocrine:  No polyuria, polydypsia, cold/heat intolerance  Heme:  No petechiae, ecchymosis, easy bruisability  Skin:  No rash, tattoos, scars, edema      Vital Signs Last 24 Hrs  T(C): 36.4 (21 Mar 2020 07:10), Max: 37.7 (20 Mar 2020 19:19)  T(F): 97.5 (21 Mar 2020 07:10), Max: 99.9 (20 Mar 2020 19:19)  HR: 92 (21 Mar 2020 07:10) (87 - 98)  BP: 116/69 (21 Mar 2020 07:10) (116/69 - 149/81)  BP(mean): --  RR: 18 (21 Mar 2020 07:10) (16 - 18)  SpO2: 98% (21 Mar 2020 07:10) (95% - 98%)    PHYSICAL EXAM:    Constitutional: nad sitting up in bed  HEENT: ncat  Gastrointestinal: soft nt mild dt  Extremities: edema  Neurological: Awake alert responds appropriately    LABS:                        6.6    3.96  )-----------( 2        ( 21 Mar 2020 05:48 )             19.6     03-21    137  |  110<H>  |  22  ----------------------------<  92  3.4<L>   |  15<L>  |  0.98    Ca    7.6<L>      21 Mar 2020 05:48    TPro  5.1<L>  /  Alb  2.5<L>  /  TBili  3.1<H>  /  DBili  x   /  AST  57<H>  /  ALT  26  /  AlkPhos  178<H>  03-21    PT/INR - ( 21 Mar 2020 05:48 )   PT: 14.3 sec;   INR: 1.27 ratio               RADIOLOGY & ADDITIONAL TESTS:

## 2020-03-22 LAB
ALBUMIN SERPL ELPH-MCNC: 2.4 G/DL — LOW (ref 3.3–5)
ALP SERPL-CCNC: 177 U/L — HIGH (ref 40–120)
ALT FLD-CCNC: 23 U/L — SIGNIFICANT CHANGE UP (ref 12–78)
ANION GAP SERPL CALC-SCNC: 8 MMOL/L — SIGNIFICANT CHANGE UP (ref 5–17)
AST SERPL-CCNC: 49 U/L — HIGH (ref 15–37)
BASOPHILS # BLD AUTO: 0.01 K/UL — SIGNIFICANT CHANGE UP (ref 0–0.2)
BASOPHILS NFR BLD AUTO: 0.3 % — SIGNIFICANT CHANGE UP (ref 0–2)
BILIRUB SERPL-MCNC: 3.2 MG/DL — HIGH (ref 0.2–1.2)
BUN SERPL-MCNC: 21 MG/DL — SIGNIFICANT CHANGE UP (ref 7–23)
CALCIUM SERPL-MCNC: 7.6 MG/DL — LOW (ref 8.5–10.1)
CHLORIDE SERPL-SCNC: 111 MMOL/L — HIGH (ref 96–108)
CO2 SERPL-SCNC: 18 MMOL/L — LOW (ref 22–31)
CREAT SERPL-MCNC: 1 MG/DL — SIGNIFICANT CHANGE UP (ref 0.5–1.3)
EOSINOPHIL # BLD AUTO: 0 K/UL — SIGNIFICANT CHANGE UP (ref 0–0.5)
EOSINOPHIL NFR BLD AUTO: 0 % — SIGNIFICANT CHANGE UP (ref 0–6)
GLUCOSE SERPL-MCNC: 100 MG/DL — HIGH (ref 70–99)
HCT VFR BLD CALC: 22 % — LOW (ref 39–50)
HCT VFR BLD CALC: 23 % — LOW (ref 39–50)
HGB BLD-MCNC: 7.4 G/DL — LOW (ref 13–17)
HGB BLD-MCNC: 7.7 G/DL — LOW (ref 13–17)
IMM GRANULOCYTES NFR BLD AUTO: 0.3 % — SIGNIFICANT CHANGE UP (ref 0–1.5)
INR BLD: 1.26 RATIO — HIGH (ref 0.88–1.16)
LYMPHOCYTES # BLD AUTO: 2.86 K/UL — SIGNIFICANT CHANGE UP (ref 1–3.3)
LYMPHOCYTES # BLD AUTO: 81.7 % — HIGH (ref 13–44)
MCHC RBC-ENTMCNC: 28.7 PG — SIGNIFICANT CHANGE UP (ref 27–34)
MCHC RBC-ENTMCNC: 29 PG — SIGNIFICANT CHANGE UP (ref 27–34)
MCHC RBC-ENTMCNC: 33.5 GM/DL — SIGNIFICANT CHANGE UP (ref 32–36)
MCHC RBC-ENTMCNC: 33.6 GM/DL — SIGNIFICANT CHANGE UP (ref 32–36)
MCV RBC AUTO: 85.8 FL — SIGNIFICANT CHANGE UP (ref 80–100)
MCV RBC AUTO: 86.3 FL — SIGNIFICANT CHANGE UP (ref 80–100)
MONOCYTES # BLD AUTO: 0.48 K/UL — SIGNIFICANT CHANGE UP (ref 0–0.9)
MONOCYTES NFR BLD AUTO: 13.7 % — SIGNIFICANT CHANGE UP (ref 2–14)
NEUTROPHILS # BLD AUTO: 0.14 K/UL — LOW (ref 1.8–7.4)
NEUTROPHILS NFR BLD AUTO: 4 % — LOW (ref 43–77)
NRBC # BLD: 0 /100 WBCS — SIGNIFICANT CHANGE UP (ref 0–0)
PLATELET # BLD AUTO: 2 K/UL — CRITICAL LOW (ref 150–400)
POTASSIUM SERPL-MCNC: 3.9 MMOL/L — SIGNIFICANT CHANGE UP (ref 3.5–5.3)
POTASSIUM SERPL-SCNC: 3.9 MMOL/L — SIGNIFICANT CHANGE UP (ref 3.5–5.3)
PROT SERPL-MCNC: 5.3 G/DL — LOW (ref 6–8.3)
PROTHROM AB SERPL-ACNC: 14.2 SEC — HIGH (ref 10–12.9)
RBC # BLD: 2.55 M/UL — LOW (ref 4.2–5.8)
RBC # BLD: 2.68 M/UL — LOW (ref 4.2–5.8)
RBC # FLD: 18.8 % — HIGH (ref 10.3–14.5)
RBC # FLD: 19.1 % — HIGH (ref 10.3–14.5)
SODIUM SERPL-SCNC: 137 MMOL/L — SIGNIFICANT CHANGE UP (ref 135–145)
WBC # BLD: 3.5 K/UL — LOW (ref 3.8–10.5)
WBC # BLD: 3.71 K/UL — LOW (ref 3.8–10.5)
WBC # FLD AUTO: 3.5 K/UL — LOW (ref 3.8–10.5)
WBC # FLD AUTO: 3.71 K/UL — LOW (ref 3.8–10.5)

## 2020-03-22 PROCEDURE — 99232 SBSQ HOSP IP/OBS MODERATE 35: CPT

## 2020-03-22 RX ORDER — CEFEPIME 1 G/1
INJECTION, POWDER, FOR SOLUTION INTRAMUSCULAR; INTRAVENOUS
Refills: 0 | Status: DISCONTINUED | OUTPATIENT
Start: 2020-03-23 | End: 2020-03-25

## 2020-03-22 RX ORDER — CEFEPIME 1 G/1
2000 INJECTION, POWDER, FOR SOLUTION INTRAMUSCULAR; INTRAVENOUS ONCE
Refills: 0 | Status: COMPLETED | OUTPATIENT
Start: 2020-03-22 | End: 2020-03-22

## 2020-03-22 RX ORDER — METOPROLOL TARTRATE 50 MG
5 TABLET ORAL ONCE
Refills: 0 | Status: COMPLETED | OUTPATIENT
Start: 2020-03-22 | End: 2020-03-22

## 2020-03-22 RX ORDER — CEFEPIME 1 G/1
2000 INJECTION, POWDER, FOR SOLUTION INTRAMUSCULAR; INTRAVENOUS EVERY 12 HOURS
Refills: 0 | Status: DISCONTINUED | OUTPATIENT
Start: 2020-03-23 | End: 2020-03-25

## 2020-03-22 RX ADMIN — Medication 5 MILLIGRAM(S): at 16:51

## 2020-03-22 RX ADMIN — PANTOPRAZOLE SODIUM 40 MILLIGRAM(S): 20 TABLET, DELAYED RELEASE ORAL at 05:07

## 2020-03-22 RX ADMIN — CEFEPIME 100 MILLIGRAM(S): 1 INJECTION, POWDER, FOR SOLUTION INTRAMUSCULAR; INTRAVENOUS at 23:00

## 2020-03-22 RX ADMIN — Medication 650 MILLIGRAM(S): at 19:21

## 2020-03-22 RX ADMIN — Medication 10 MILLIGRAM(S): at 05:07

## 2020-03-22 RX ADMIN — ENTECAVIR 0.5 MILLIGRAM(S): 0.5 TABLET ORAL at 05:06

## 2020-03-22 RX ADMIN — Medication 20 MILLIEQUIVALENT(S): at 16:52

## 2020-03-22 RX ADMIN — CEFEPIME 100 MILLIGRAM(S): 1 INJECTION, POWDER, FOR SOLUTION INTRAMUSCULAR; INTRAVENOUS at 05:06

## 2020-03-22 RX ADMIN — Medication 20 MILLIEQUIVALENT(S): at 05:07

## 2020-03-22 NOTE — CHART NOTE - NSCHARTNOTEFT_GEN_A_CORE
Called by RN for Pt with fever of 102F. Pt last fever was on March 18th. Patient was seen and examined at bedside. Pt was sitting up in bed and resting comfortably. He denied SOB, cough, CP, fevers/chills, dysuria.        T(C): 38 (03-22-20 @ 21:05), Max: 38.9 (03-22-20 @ 19:11)  HR: 103 (03-22-20 @ 19:11) (82 - 103)  BP: 119/64 (03-22-20 @ 19:11) (103/64 - 161/77)  RR: 18 (03-22-20 @ 19:11) (17 - 19)  SpO2: 98% (03-22-20 @ 19:11) (98% - 99%)  Wt(kg): --    Physical :  Gen- NAD, ncat  Cardio - s+1,s+2, rrr, no murmur  Lung - diminished BS, no wheeze, no rhonchi, no rales   Abdomen- +BS, NT/ND, no guarding, no rebound, no masses  Ext- no edema, 2+ pulses b/l  Neuro- CN grossly intact    LABS:                        7.7    3.50  )-----------( 2        ( 22 Mar 2020 06:56 )             23.0     03-22    137  |  111<H>  |  21  ----------------------------<  100<H>  3.9   |  18<L>  |  1.00    Ca    7.6<L>      22 Mar 2020 06:56    TPro  5.3<L>  /  Alb  2.4<L>  /  TBili  3.2<H>  /  DBili  x   /  AST  49<H>  /  ALT  23  /  AlkPhos  177<H>  03-22    PT/INR - ( 22 Mar 2020 06:56 )   PT: 14.2 sec;   INR: 1.26 ratio                     Assessment/Plan  75yMale with pmhx of CLL/SLL, AIHA, chronic hepatitis B (on entecavir), KIRTI on CPAP, T2DM (managed with lifestyle changes), HTN (not on any medication) admitted for admitted with suspected gram-negative multifocal pneumonia, completed a course of IV antibiotics. No with fever of 102F    1. Suspect fever 2/2 leukopenic fever.   2. Pt has been afebrile since 3/18. Hemodynamically stable with no acute complaints.  3. Tylenol ordered for fever  4. Stat BCx X2, UA, UC, CBC ordered --will f/u  5. Pt started on cefepime   6. Dr. Cruz and agrees with j luis    -kaleigh  PGY1  [phone]

## 2020-03-22 NOTE — PROGRESS NOTE ADULT - ASSESSMENT
76 y/o man w Hepatitis B on Entecavirt, Chronic Lymphocytic Leukemia/Small Lymphocytic Lymphoma 4/2018 when presented w immune hemolytic anemia w partial response to steroids, started on Ibrutinib w heme CR and [place] by CT scan w some decrease of lymphadenopathies, until 1/2020 when relapsed w incr WBC, anemia(initially not hemolytic, has chronic Matthew positive due to CLL/SLL), thrombocytopenia.    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    Was discharged home and admitted again 2/22/20 with  fever and found w pneumonia, post course of Ceftriaxone, continues to require transfusions plts and PRBCs.  Hep B titer negative  Post IVIG     Due to continue worsening of SLL/CLL w assoc adverse clinical condition/labs, after discussion w pt and wife, started ramp up Venetoclax 3/1/20(planned Venetoclax/Rituxan)  Pt and wife aware of high risk for adverse side effects due to poor performance status and state of disease, risk for tumor lysis due to sig number of circulating CLL/SLL cells in periphery  Sig decr of WBC since Venetoclax, on hold since 3/5(pt's own supply)  WBC from >100 to 3.     Noted increase in WBC to 13, restarted Venetoclax 20mg x2d with WBC declined to 3.   03/11 WBC 10.8, Venetoclax started.   03/12 WBC 13.1, Venetoclax given  03/13 WBC 3.88 Venetoclax HELD, and DC  Stopped again 03/13/20 03/14 WBC 3.59    remains pancytopenic and PRBC and plt transfusion dependent  received 1 unti PRBC and 1 unit plts on 3/21/2020    RECOMMENDATIONS  - still PRBC and Platelets transfusion dependent, would give 1 unit platelets, today and hold PRBC  - On prednisone taper to 10mg qD today  - continue to hold venetoclax.   - Hold ibrutinib.     Check CBC and plts post transfusion.   continue abx as per med/ID    - Long term prognosis poor

## 2020-03-22 NOTE — PROGRESS NOTE ADULT - ASSESSMENT
hepatitis B   elevated lfts   CLL  anemia  thrombocytopenia       f/u am labs  patient with elevated bilirubin and alk phos at baseline ? leon  mri showed normal liver, no cbd stone/dilatation; hep b pcr neg  ob neg; no s/s overt gib; monitor cbc daily, transfuse per heme/onc  diet as tolerated; correct elytes prn  cont ppi ppx  id recs dcing abx  heme/onc follow up  to follow    Advanced care planning was discussed with patient and family.  Advanced care planning forms were reviewed and discussed.  Risks, benefits and alternatives of gastroenterologic procedures were discussed in detail and all questions were answered.    30 minutes spent.

## 2020-03-22 NOTE — PROGRESS NOTE ADULT - SUBJECTIVE AND OBJECTIVE BOX
Interval History:  remains weak and transfusion dependent. no further fever.   Chart reviewed and events noted;   Overnight events:    MEDICATIONS  (STANDING):  cefepime   IVPB 2000 milliGRAM(s) IV Intermittent every 12 hours  cefepime   IVPB      entecavir 0.5 milliGRAM(s) Oral daily  pantoprazole    Tablet 40 milliGRAM(s) Oral before breakfast  potassium chloride    Tablet ER 20 milliEquivalent(s) Oral two times a day  predniSONE   Tablet 10 milliGRAM(s) Oral daily    MEDICATIONS  (PRN):  acetaminophen   Tablet .. 650 milliGRAM(s) Oral every 6 hours PRN Mild Pain (1 - 3)  acetaminophen   Tablet .. 650 milliGRAM(s) Oral every 6 hours PRN Temp greater or equal to 38C (100.4F)  albuterol/ipratropium for Nebulization 3 milliLiter(s) Nebulizer every 6 hours PRN Shortness of Breath and/or Wheezing  guaiFENesin   Syrup  (Sugar-Free) 200 milliGRAM(s) Oral every 6 hours PRN Cough      Vital Signs Last 24 Hrs  T(C): 36.7 (22 Mar 2020 07:50), Max: 37.2 (21 Mar 2020 21:06)  T(F): 98.1 (22 Mar 2020 07:50), Max: 98.9 (21 Mar 2020 21:06)  HR: 89 (22 Mar 2020 07:50) (87 - 96)  BP: 116/68 (22 Mar 2020 07:50) (112/63 - 123/72)  BP(mean): --  RR: 17 (22 Mar 2020 07:50) (17 - 18)  SpO2: 98% (22 Mar 2020 07:50) (98% - 99%)    PHYSICAL EXAM  General: adult in NAD  HEENT: clear oropharynx, anicteric sclera, pink conjunctivae  Neck: supple  CV: normal S1S2 with no murmur rubs or gallops  Lungs: clear to auscultation, no wheezes, no rhales  Abdomen: soft non-tender non-distended, no hepato/splenomegaly  Ext: no clubbing cyanosis or edema  Skin: no rashes and no petichiae  Neuro: alert and oriented X3 no focal deficits      LABS:  CBC Full  -  ( 22 Mar 2020 06:56 )  WBC Count : 3.50 K/uL  RBC Count : 2.68 M/uL  Hemoglobin : 7.7 g/dL  Hematocrit : 23.0 %  Platelet Count - Automated : 2 K/uL  Mean Cell Volume : 85.8 fl  Mean Cell Hemoglobin : 28.7 pg  Mean Cell Hemoglobin Concentration : 33.5 gm/dL  Auto Neutrophil # : x  Auto Lymphocyte # : x  Auto Monocyte # : x  Auto Eosinophil # : x  Auto Basophil # : x  Auto Neutrophil % : x  Auto Lymphocyte % : x  Auto Monocyte % : x  Auto Eosinophil % : x  Auto Basophil % : x    03-22    137  |  111<H>  |  21  ----------------------------<  100<H>  3.9   |  18<L>  |  1.00    Ca    7.6<L>      22 Mar 2020 06:56    TPro  5.3<L>  /  Alb  2.4<L>  /  TBili  3.2<H>  /  DBili  x   /  AST  49<H>  /  ALT  23  /  AlkPhos  177<H>  03-22    PT/INR - ( 22 Mar 2020 06:56 )   PT: 14.2 sec;   INR: 1.26 ratio             fe studies      WBC trend  3.50 K/uL (03-22-20 @ 06:56)  3.96 K/uL (03-21-20 @ 05:48)  2.36 K/uL (03-20-20 @ 13:35)  5.43 K/uL (03-20-20 @ 06:31)  3.62 K/uL (03-19-20 @ 18:22)      Hgb trend  7.7 g/dL (03-22-20 @ 06:56)  6.6 g/dL (03-21-20 @ 05:48)  7.8 g/dL (03-20-20 @ 13:35)  7.4 g/dL (03-20-20 @ 06:31)  8.3 g/dL (03-19-20 @ 18:22)      plt trend  2 K/uL (03-22-20 @ 06:56)  2 K/uL (03-21-20 @ 05:48)  8 K/uL (03-20-20 @ 13:35)  2 K/uL (03-20-20 @ 06:31)  2 K/uL (03-19-20 @ 18:22)        RADIOLOGY & ADDITIONAL STUDIES:

## 2020-03-22 NOTE — PROGRESS NOTE ADULT - PROBLEM SELECTOR PLAN 1
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 as per heme/onc.   - WBC approx. 3.5 today.   - Steroids being tapered - 10mg now  - Hem/Onc following, recs appreciated

## 2020-03-22 NOTE — PROGRESS NOTE ADULT - SUBJECTIVE AND OBJECTIVE BOX
INTERVAL HPI/OVERNIGHT EVENTS:  Patient seen and examined at bedside. States he feels well, denies any CP, SOB, abd pain.    MEDICATIONS  (STANDING):  entecavir 0.5 milliGRAM(s) Oral daily  pantoprazole    Tablet 40 milliGRAM(s) Oral before breakfast  potassium chloride    Tablet ER 20 milliEquivalent(s) Oral two times a day  predniSONE   Tablet 10 milliGRAM(s) Oral daily    MEDICATIONS  (PRN):  acetaminophen   Tablet .. 650 milliGRAM(s) Oral every 6 hours PRN Mild Pain (1 - 3)  acetaminophen   Tablet .. 650 milliGRAM(s) Oral every 6 hours PRN Temp greater or equal to 38C (100.4F)  albuterol/ipratropium for Nebulization 3 milliLiter(s) Nebulizer every 6 hours PRN Shortness of Breath and/or Wheezing  guaiFENesin   Syrup  (Sugar-Free) 200 milliGRAM(s) Oral every 6 hours PRN Cough      Allergies    sulfa drugs (Unknown)    Intolerances      ROS:  CONSTITUTIONAL: + weakness, no fevers or chills  EYES/ENT: No visual changes;  No vertigo or throat pain   NECK: No pain or stiffness  RESPIRATORY: No cough, wheezing, hemoptysis; No shortness of breath  CARDIOVASCULAR: No chest pain or palpitations  GASTROINTESTINAL: No abdominal or epigastric pain. No nausea, vomiting, or hematemesis  GENITOURINARY: No dysuria, frequency or hematuria  NEUROLOGICAL: No numbness  SKIN: No itching, burning, rashes, or lesions   All other review of systems is negative unless indicated above.    Vital Signs Last 24 Hrs  T(C): 36.7 (22 Mar 2020 11:55), Max: 37.2 (21 Mar 2020 21:06)  T(F): 98.1 (22 Mar 2020 11:55), Max: 98.9 (21 Mar 2020 21:06)  HR: 82 (22 Mar 2020 11:55) (82 - 96)  BP: 103/64 (22 Mar 2020 11:55) (103/64 - 123/72)  BP(mean): --  RR: 17 (22 Mar 2020 11:55) (17 - 18)  SpO2: 99% (22 Mar 2020 11:55) (98% - 99%)    03-21 @ 07:01  -  03-22 @ 07:00  --------------------------------------------------------  IN: 350 mL / OUT: 0 mL / NET: 350 mL        Physical Exam:  General: WN/WD NAD  Neurology: A&Ox3, nonfocal, BROWN x 4  Respiratory: CTA B/L  CV: RRR, S1S2  Abdominal: Soft, NT, ND +BS  Extremities: +pitting edema B/L LE, + peripheral pulses      LABS:                        7.7    3.50  )-----------( 2        ( 22 Mar 2020 06:56 )             23.0     03-22    137  |  111<H>  |  21  ----------------------------<  100<H>  3.9   |  18<L>  |  1.00    Ca    7.6<L>      22 Mar 2020 06:56    TPro  5.3<L>  /  Alb  2.4<L>  /  TBili  3.2<H>  /  DBili  x   /  AST  49<H>  /  ALT  23  /  AlkPhos  177<H>  03-22    PT/INR - ( 22 Mar 2020 06:56 )   PT: 14.2 sec;   INR: 1.26 ratio               RADIOLOGY & ADDITIONAL TESTS:

## 2020-03-22 NOTE — PROGRESS NOTE ADULT - SUBJECTIVE AND OBJECTIVE BOX
INTERVAL HPI/OVERNIGHT EVENTS:  pt seen and examined  offers no gi complaints, no s/s gib  tolerating po  afebrile  sp 1u plts and 1 u prbc yesterday per rn    MEDICATIONS  (STANDING):  cefepime   IVPB 2000 milliGRAM(s) IV Intermittent every 12 hours  cefepime   IVPB      entecavir 0.5 milliGRAM(s) Oral daily  pantoprazole    Tablet 40 milliGRAM(s) Oral before breakfast  potassium chloride    Tablet ER 20 milliEquivalent(s) Oral two times a day  predniSONE   Tablet 10 milliGRAM(s) Oral daily    MEDICATIONS  (PRN):  acetaminophen   Tablet .. 650 milliGRAM(s) Oral every 6 hours PRN Mild Pain (1 - 3)  acetaminophen   Tablet .. 650 milliGRAM(s) Oral every 6 hours PRN Temp greater or equal to 38C (100.4F)  albuterol/ipratropium for Nebulization 3 milliLiter(s) Nebulizer every 6 hours PRN Shortness of Breath and/or Wheezing  guaiFENesin   Syrup  (Sugar-Free) 200 milliGRAM(s) Oral every 6 hours PRN Cough      Allergies    sulfa drugs (Unknown)    Intolerances        Review of Systems:    General:  No wt loss, fevers, chills, night sweats, fatigue   Eyes:  Good vision, no reported pain  ENT:  No sore throat, pain, runny nose, dysphagia  CV:  No pain, palpitations, hypo/hypertension  Resp:  No dyspnea, cough, tachypnea, wheezing  GI:  No pain, No nausea, No vomiting, No diarrhea, No constipation, No weight loss, No fever, No pruritis, No rectal bleeding, No melena, No dysphagia  :  No pain, bleeding, incontinence, nocturia  Muscle:  No pain, weakness  Neuro:  No weakness, tingling, memory problems  Psych:  No fatigue, insomnia, mood problems, depression  Endocrine:  No polyuria, polydypsia, cold/heat intolerance  Heme:  No petechiae, ecchymosis, easy bruisability  Skin:  No rash, tattoos, scars, edema      Vital Signs Last 24 Hrs  T(C): 36.7 (22 Mar 2020 07:50), Max: 37.2 (21 Mar 2020 21:06)  T(F): 98.1 (22 Mar 2020 07:50), Max: 98.9 (21 Mar 2020 21:06)  HR: 89 (22 Mar 2020 07:50) (80 - 96)  BP: 116/68 (22 Mar 2020 07:50) (112/63 - 123/72)  BP(mean): --  RR: 17 (22 Mar 2020 07:50) (17 - 18)  SpO2: 98% (22 Mar 2020 07:50) (98% - 99%)    PHYSICAL EXAM:  Constitutional: nad sitting up in bed  HEENT: ncat  Gastrointestinal: soft nt mild dt  Extremities: edema  Neurological: Awake alert responds appropriately    LABS:                        6.6    3.96  )-----------( 2        ( 21 Mar 2020 05:48 )             19.6     03-22    137  |  111<H>  |  21  ----------------------------<  100<H>  3.9   |  18<L>  |  1.00    Ca    7.6<L>      22 Mar 2020 06:56    TPro  5.3<L>  /  Alb  2.4<L>  /  TBili  3.2<H>  /  DBili  x   /  AST  49<H>  /  ALT  23  /  AlkPhos  177<H>  03-22    PT/INR - ( 22 Mar 2020 06:56 )   PT: 14.2 sec;   INR: 1.26 ratio               RADIOLOGY & ADDITIONAL TESTS:

## 2020-03-22 NOTE — PROGRESS NOTE ADULT - SUBJECTIVE AND OBJECTIVE BOX
[facility] -- Cori Dawkins, Travis Villagomez Pannella, Patel, Savella  Office # [phone]      Follow Up:  HTN    Subjective/Observations: Seen and examined.  Sitting up in bed with no new complaints.  He denies cp, sob or palpitations.  Continues with weakness but does continue to have assistance to walk to the .  [facility].        REVIEW OF SYSTEMS: All other review of systems is negative unless indicated above    PAST MEDICAL & SURGICAL HISTORY:  KIRTI (obstructive sleep apnea): on nocturnal cpap  Hypertension: not on any medications  Diabetes: not on any medications  Hepatitis B  Lymphoma: Small cell B cell  AIHA (autoimmune hemolytic anemia)  H/O lithotripsy      MEDICATIONS  (STANDING):  cefepime   IVPB 2000 milliGRAM(s) IV Intermittent every 12 hours  cefepime   IVPB      entecavir 0.5 milliGRAM(s) Oral daily  pantoprazole    Tablet 40 milliGRAM(s) Oral before breakfast  potassium chloride    Tablet ER 20 milliEquivalent(s) Oral two times a day  predniSONE   Tablet 10 milliGRAM(s) Oral daily    MEDICATIONS  (PRN):  acetaminophen   Tablet .. 650 milliGRAM(s) Oral every 6 hours PRN Mild Pain (1 - 3)  acetaminophen   Tablet .. 650 milliGRAM(s) Oral every 6 hours PRN Temp greater or equal to 38C (100.4F)  albuterol/ipratropium for Nebulization 3 milliLiter(s) Nebulizer every 6 hours PRN Shortness of Breath and/or Wheezing  guaiFENesin   Syrup  (Sugar-Free) 200 milliGRAM(s) Oral every 6 hours PRN Cough      Allergies    sulfa drugs (Unknown)    Intolerances            Vital Signs Last 24 Hrs  T(C): 36.7 (22 Mar 2020 07:50), Max: 37.2 (21 Mar 2020 21:06)  T(F): 98.1 (22 Mar 2020 07:50), Max: 98.9 (21 Mar 2020 21:06)  HR: 89 (22 Mar 2020 07:50) (80 - 96)  BP: 116/68 (22 Mar 2020 07:50) (112/63 - 123/72)  BP(mean): --  RR: 17 (22 Mar 2020 07:50) (17 - 18)  SpO2: 98% (22 Mar 2020 07:50) (98% - 99%)    I&O's Summary    21 Mar 2020 07:01  -  22 Mar 2020 07:00  --------------------------------------------------------  IN: 350 mL / OUT: 0 mL / NET: 350 mL          PHYSICAL EXAM:  TELE: SR 90s to ST 114s  Constitutional: NAD, awake and alert, well-developed but weak   HEENT: Moist Mucous Membranes, Anicteric  Pulmonary: Non-labored, breath sounds are clear bilaterally, No wheezing, rales or rhonchi  Cardiovascular: Regular, S1 and S2, No murmurs, rubs, gallops or clicks  Gastrointestinal: Bowel Sounds present, soft, nontender.   Lymph: +2 peripheral edema. No lymphadenopathy.  Skin: No visible rashes or ulcers.  Psych:  Mood & affect appropriate flat    LABS: All Labs Reviewed:                        7.7    3.50  )-----------( 2        ( 22 Mar 2020 06:56 )             23.0                         6.6    3.96  )-----------( 2        ( 21 Mar 2020 05:48 )             19.6                         7.8    2.36  )-----------( 8        ( 20 Mar 2020 13:35 )             23.2     22 Mar 2020 06:56    137    |  111    |  21     ----------------------------<  100    3.9     |  18     |  1.00   21 Mar 2020 05:48    137    |  110    |  22     ----------------------------<  92     3.4     |  15     |  0.98   20 Mar 2020 06:31    137    |  111    |  23     ----------------------------<  96     3.2     |  14     |  1.10     Ca    7.6        22 Mar 2020 06:56  Ca    7.6        21 Mar 2020 05:48  Ca    7.8        20 Mar 2020 06:31    TPro  5.3    /  Alb  2.4    /  TBili  3.2    /  DBili  x      /  AST  49     /  ALT  23     /  AlkPhos  177    22 Mar 2020 06:56  TPro  5.1    /  Alb  2.5    /  TBili  3.1    /  DBili  x      /  AST  57     /  ALT  26     /  AlkPhos  178    21 Mar 2020 05:48  TPro  5.5    /  Alb  2.6    /  TBili  3.3    /  DBili  2.10   /  AST  65     /  ALT  29     /  AlkPhos  193    20 Mar 2020 06:31    PT/INR - ( 22 Mar 2020 06:56 )   PT: 14.2 sec;   INR: 1.26 ratio      < from: 12 Lead ECG (03.18.20 @ 19:13) >  Ventricular Rate 167 BPM    Atrial Rate 178 BPM    QRS Duration 64 ms    Q-T Interval 296 ms    QTC Calculation(Bezet) 493 ms    R Axis 32 degrees    T Axis 61 degrees    Diagnosis Line *** Poor data quality, interpretation may be adversely affected  likely st with apcs  Nonspecific ST and T wave abnormality  Abnormal ECG  When compared with ECG of 16-MAR-2020 17:10,  Previous ECG has undetermined rhythm, needs review  Nonspecific T wave abnormality now evident in Anterior leads  Confirmed by CANDI SCHREIBER ([id number]) on 3/19/2020 5:29:35 PM    < end of copied text >    < from: TTE Echo Complete w/o contrast w/ Doppler (03.04.20 @ 09:54) >     EXAM:  ECHO TTE WO CON COMP W DOPP         PROCEDURE DATE:  03/04/2020        INTERPRETATION:  INDICATION: edema  Sonographer PH    Blood Pressure 129/63    Height 165 cm     Weight 82.9 kg       BSA 1.9 sq m    Dimensions:    LA 2.9       Normal Values: 2.0 - 4.0 cm    Ao 2.9        Normal Values: 2.0 - 3.8 cm  SEPTUM 0.9       Normal Values: 0.6 - 1.2 cm  PWT 0.9       Normal Values: 0.6 - 1.1 cm  LVIDd 4.1         Normal Values: 3.0 - 5.6 cm  LVIDs 2.3         Normal Values: 1.8 - 4.0 cm      OBSERVATIONS:    Mitral Valve: normal, trace physiologic MR.  Aortic Valve/Aorta: Normal trileaflet aortic valve with normal opening.  Tricuspid Valve: normal with trace TR.  Pulmonic Valve: normal  Left Atrium: normal  Right Atrium: normal  Left Ventricle: normal LV size and systolic function, estimated LVEF of 65%.  Right Ventricle: Grossly normal size and systolic function.  Pericardium/Pleura: normal, no significant pericardial effusion.      Conclusion:   Normal left ventricular internal dimensions and systolic function, estimated LVEF of 65%.   Grossly normal RV size and systolic function.     Normal trileaflet aortic valve, without AI.   Trace physiologic MR and TR.                      KALPANA GUERRERO   This document has been electronically signed. Mar  5 2020  7:32AM                < end of copied text >     < from: CT Chest No Cont (03.19.20 @ 09:40) >  EXAM:  CT CHEST                            PROCEDURE DATE:  03/19/2020          INTERPRETATION:  CLINICAL INFORMATION: CLL, fever    COMPARISON: 2/21/2020    PROCEDURE:   CT of the Chest, Abdomen and Pelvis was performed without intravenous contrast.   Intravenous contrast: None.  Oral contrast: None.  Sagittal and coronal reformats were performed.    FINDINGS:  Lack of IV contrast limits evaluation diana, vascular structures and solid organ parenchyma  CHEST:     LUNGS AND LARGE AIRWAYS: Patentcentral airways. Scattered bilateral patchy, nodular and groundglass opacities slightly improved at the lung bases compared to prior. Findings could all be inflammatory/infectious. Neoplastic component not excluded.  PLEURA: Interval increase in small left and small-to-moderate right pleural effusion.  VESSELS: Nonaneurysmal  HEART: Heart size is normal. Coronary artery calcination. Decrease cardiac chamber blood pool attenuation suggests an anemic state. Trace pericardial effusion.  MEDIASTINUM AND DIANA: Multistation mediastinal  supraclavicular, bilateral hilar and bilateral axillary adenopathy similar to prior  CHEST WALL AND LOWER NECK: Within normal limits.    ABDOMEN AND PELVIS:    LIVER: Hepatomegaly similar to prior  BILE DUCTS: Normal caliber.  GALLBLADDER: Cholelithiasis.  SPLEEN: Splenomegaly similar to prior  PANCREAS: Within normal limits.  ADRENALS: Within normal limits.  KIDNEYS/URETERS: Within normal limits.    BLADDER: Within normal limits.  REPRODUCTIVE ORGANS: Unremarkable    BOWEL: Evaluation limited due to limited by nonopacification underdistention. No gross active bowel inflammation. No bowel obstruction.. Appendix no appendicitis  PERITONEUM: Trace ascites. No extraluminal gas or organized collections.  VESSELS: Nonaneurysmal.  RETROPERITONEUM/LYMPH NODES: Stable periportal and retroperitoneal, mesenteric pelvic and bilateral inguinal adenopathy.    ABDOMINAL WALL: Mild anasarca.  BONES: Stable    IMPRESSION:     Improvement in the scattered bilateral parenchymal opacities as described.  Slight increase in bilateral pleural effusions.  Stable multistation adenopathy chest abdomen and pelvis as described  No acute findings abdomen and pelvis  Additional findings as discussed                  PANTERA CALVILLO M.D., ATTENDING RADIOLOGIST  This document has been electronically signed. Mar 19 2020 10:26AM                < end of copied text >

## 2020-03-22 NOTE — PROGRESS NOTE ADULT - ASSESSMENT
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 Tachy likely reactive  - tele overnight sr 90s with occ PACs and intermittently to 114 with no further episodes of wct   - Monitor and replete electrolytes. Keep K>4.0 and Mg>2.0.    Pancytopenia   -hem/onc following   - Multiple tx of PRBC last tx (3/21), Platelets and Plasma last tx (3/5) Last tx of platelets today for level = 2  Hg 7.7   - Would transfuse to keep Hb>8 and watch volume status     LE edema  -Doppler negative   -Albumin 2.4 likely contributing to his LE edema   -Echo 2/24/2020 revealing trace AI trace TR ef 60-65%    HTN  - Well controlled not on any antihypertensives  - Continue Routine Hemodynamic monitoring    PNA  - Cont on  Abx for PNA as per ID with CT showing no acute pna ID recs noted to D/C ABX  - Cont supplemental O2  - Afebrile   - no infectious etiology ID signed off    Hypokalemia  -K 3.4- supplement for goal K > 4.0    DM  -management as per primary      - Further cardiac workup will depend on clinical course.   - All other workup per primary team. Will followup.     Philomena Hoang, HARITHA-BC  Cardiology   Spectra #[phone]

## 2020-03-22 NOTE — PROGRESS NOTE ADULT - PROBLEM SELECTOR PLAN 3
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 Was on prednisone 10mg daily -- increase to prednisone 40mg po during hospitalization, tapering started @2/29 - now 10mg daily  - Heme/onc following  - follow H&H- hgb 7.7 today

## 2020-03-22 NOTE — PROGRESS NOTE ADULT - REASON FOR ADMISSION
Patient:   KERI NELSON            MRN: [id number]            FIN: [id number]              Age:   82 years     Sex:  MALE     :  41   Associated Diagnoses:   None   Author:   DIONISIO TOLEDO     Subjective   Chief complaint No acute events overnight. REMAINS DISORIENTED.       Health Status   Allergies:    Allergic Reactions (All)  NKA   Current medications:    Medications (19) Active  Scheduled: (6)  AmLODIPine 10 mg tab  10 mg 1 tab, Oral, Daily  Carvedilol 25 mg tab  25 mg 1 tab, Oral, Q12H  Heparin 5,000 unit/1 mL inj  5,000 unit 1 mL, Subcutaneous, Q12H  HydrALAZINE 25 mg tab  25 mg 1 tab, Oral, Q8H  Insulin human lispro 1 unit/0.01 mL inj  2-12 units, Subcutaneous, QID [with meals & HS]  piperacillin-tazobactam  3.375 gm, IVPB, Q12H  Continuous: (1)  Sodium Chloride 0.9% 1,000 mL  1,000 mL, IV, 80 mL/hr  PRN: (12)  Acetaminophen 325 mg tab  650 mg 2 tab, Oral, Q4H  Aluminum-magnesium hydrox-simethicone SS 30 mL oral susp repack  30 mL, Oral, QID  Dextrose (glucose) 40% 15 gm/37.5 gm oral gel UD  15 gm, Oral, As Directed PRN  Dextrose (glucose) 50% 25 gm/50 mL syringe  12.5 gm 25 mL, IV Push, As Directed PRN  Glucagon 1 mg/1 mL emergency kit SDV  1 mg 1 mL, IM, As Directed PRN  Guaifenesin--20 mg/10 mL oral liquid UD  10 mL, Oral, Q6H  HydrALAZINE 20 mg/1 mL inj SDV  10 mg 0.5 mL, Slow IV Push, Q6H  Melatonin 3 mg tab  6 mg 2 tab, Oral, Q Bedtime  Ondansetron 4 mg/2 mL inj SDV  4 mg 2 mL, Slow IV Push, Q6H  Ondansetron 4 mg/2 mL inj SDV  4 mg 2 mL, Slow IV Push, One Time (unscheduled)  Senna-docusate sodium 8.6-50 mg tab  1 tab, Oral, BID  TraMADol 50 mg tab  50 mg 1 tab, Oral, Q6H      Objective   VS/Measurements     Vitals between:   21-MAR-2020 11:21:20   TO   22-MAR-2020 11:21:20                   LAST RESULT MINIMUM MAXIMUM  Temperature 36.1 36.1 36.6  Heart Rate 72 67 73  Respiratory Rate 16 16 18  NISBP           159 148 177  NIDBP           80 65 80  NIMBP           95 95  95  SpO2                    99 95 100    General:  No acute distress.    Eye:  Pupils are equal, round and reactive to light, Extraocular movements are intact.   Neck:  Supple, No jugular venous distention.    Respiratory:  Respirations are non-labored, Slightly diminished BS at bases.   Cardiovascular:  Normal rate, Regular rhythm, No murmur, No gallop.   Gastrointestinal:  Soft, Non-tender, Non-distended, Normal bowel sounds.   Integumentary:  No rash, + Right foot dressing.    Neurologic:  Alert, No focal deficits.      Results Review   General results     Impression and Plan   Assessment and Plan:       Diagnosis: 1. ESRD.  The patient on hemodialysis Monday, Wednesday, Friday as an outpatient.   -Plan HD again Monday as per outpatient schedule  -Lytes stable   2. Hypertension.  Monitor for improvement after HD today, and if remains elevated will titrate dose Hydralazine to 100mg TID  3. Type 2 diabetes mellitus.  4. Gangrene of the right foot with severe peripheral vascular disease.  Cardiology, Podiatry, and Infectious Disease following.   -MRI of the right foot to rule out osteomyelitis pending  -Plan debridement right foot wound today as per Podiatry  5. Chronic diastolic congestive heart failure, well compensated at this time  6. Secondary hyperparathyroidism of renal origin.  Phos levels at target  7. Status post left upper extremity AV fistula.  8. History of benign prostatic hypertrophy.  9. Obesity.  10. History of COPD.  11. Anemia of chronic kidney disease.  Hemoglobin currently at target.  12.COGNITIVE IMPAIRMENT   .   weakness, anemia

## 2020-03-23 LAB
ALBUMIN SERPL ELPH-MCNC: 2.6 G/DL — LOW (ref 3.3–5)
ALP SERPL-CCNC: 180 U/L — HIGH (ref 40–120)
ALT FLD-CCNC: 22 U/L — SIGNIFICANT CHANGE UP (ref 12–78)
ANION GAP SERPL CALC-SCNC: 10 MMOL/L — SIGNIFICANT CHANGE UP (ref 5–17)
AST SERPL-CCNC: 47 U/L — HIGH (ref 15–37)
BASOPHILS # BLD AUTO: 0.01 K/UL — SIGNIFICANT CHANGE UP (ref 0–0.2)
BASOPHILS NFR BLD AUTO: 0.3 % — SIGNIFICANT CHANGE UP (ref 0–2)
BILIRUB SERPL-MCNC: 3.5 MG/DL — HIGH (ref 0.2–1.2)
BUN SERPL-MCNC: 24 MG/DL — HIGH (ref 7–23)
CALCIUM SERPL-MCNC: 7.9 MG/DL — LOW (ref 8.5–10.1)
CHLORIDE SERPL-SCNC: 112 MMOL/L — HIGH (ref 96–108)
CO2 SERPL-SCNC: 17 MMOL/L — LOW (ref 22–31)
CREAT SERPL-MCNC: 0.97 MG/DL — SIGNIFICANT CHANGE UP (ref 0.5–1.3)
EOSINOPHIL # BLD AUTO: 0 K/UL — SIGNIFICANT CHANGE UP (ref 0–0.5)
EOSINOPHIL NFR BLD AUTO: 0 % — SIGNIFICANT CHANGE UP (ref 0–6)
GLUCOSE SERPL-MCNC: 104 MG/DL — HIGH (ref 70–99)
HCT VFR BLD CALC: 21.1 % — LOW (ref 39–50)
HGB BLD-MCNC: 7.1 G/DL — LOW (ref 13–17)
IMM GRANULOCYTES NFR BLD AUTO: 0.3 % — SIGNIFICANT CHANGE UP (ref 0–1.5)
LYMPHOCYTES # BLD AUTO: 3.11 K/UL — SIGNIFICANT CHANGE UP (ref 1–3.3)
LYMPHOCYTES # BLD AUTO: 83.8 % — HIGH (ref 13–44)
MCHC RBC-ENTMCNC: 28.9 PG — SIGNIFICANT CHANGE UP (ref 27–34)
MCHC RBC-ENTMCNC: 33.6 GM/DL — SIGNIFICANT CHANGE UP (ref 32–36)
MCV RBC AUTO: 85.8 FL — SIGNIFICANT CHANGE UP (ref 80–100)
MONOCYTES # BLD AUTO: 0.41 K/UL — SIGNIFICANT CHANGE UP (ref 0–0.9)
MONOCYTES NFR BLD AUTO: 11.1 % — SIGNIFICANT CHANGE UP (ref 2–14)
NEUTROPHILS # BLD AUTO: 0.17 K/UL — LOW (ref 1.8–7.4)
NEUTROPHILS NFR BLD AUTO: 4.5 % — LOW (ref 43–77)
NRBC # BLD: 0 /100 WBCS — SIGNIFICANT CHANGE UP (ref 0–0)
NRBC # BLD: 0 /100 WBCS — SIGNIFICANT CHANGE UP (ref 0–0)
PLATELET # BLD AUTO: 2 K/UL — CRITICAL LOW (ref 150–400)
PLATELET # BLD AUTO: 9 K/UL — CRITICAL LOW (ref 150–400)
POTASSIUM SERPL-MCNC: 4.4 MMOL/L — SIGNIFICANT CHANGE UP (ref 3.5–5.3)
POTASSIUM SERPL-SCNC: 4.4 MMOL/L — SIGNIFICANT CHANGE UP (ref 3.5–5.3)
PROT SERPL-MCNC: 5.5 G/DL — LOW (ref 6–8.3)
RBC # BLD: 2.46 M/UL — LOW (ref 4.2–5.8)
RBC # FLD: 18.9 % — HIGH (ref 10.3–14.5)
SODIUM SERPL-SCNC: 139 MMOL/L — SIGNIFICANT CHANGE UP (ref 135–145)
WBC # BLD: 2.41 K/UL — LOW (ref 3.8–10.5)
WBC # FLD AUTO: 2.41 K/UL — LOW (ref 3.8–10.5)

## 2020-03-23 PROCEDURE — 99232 SBSQ HOSP IP/OBS MODERATE 35: CPT

## 2020-03-23 PROCEDURE — 99233 SBSQ HOSP IP/OBS HIGH 50: CPT

## 2020-03-23 RX ADMIN — CEFEPIME 100 MILLIGRAM(S): 1 INJECTION, POWDER, FOR SOLUTION INTRAMUSCULAR; INTRAVENOUS at 12:11

## 2020-03-23 RX ADMIN — CEFEPIME 100 MILLIGRAM(S): 1 INJECTION, POWDER, FOR SOLUTION INTRAMUSCULAR; INTRAVENOUS at 22:50

## 2020-03-23 RX ADMIN — ENTECAVIR 0.5 MILLIGRAM(S): 0.5 TABLET ORAL at 05:32

## 2020-03-23 RX ADMIN — Medication 650 MILLIGRAM(S): at 19:47

## 2020-03-23 RX ADMIN — Medication 10 MILLIGRAM(S): at 05:32

## 2020-03-23 RX ADMIN — Medication 20 MILLIEQUIVALENT(S): at 05:32

## 2020-03-23 RX ADMIN — PANTOPRAZOLE SODIUM 40 MILLIGRAM(S): 20 TABLET, DELAYED RELEASE ORAL at 05:32

## 2020-03-23 RX ADMIN — Medication 650 MILLIGRAM(S): at 23:40

## 2020-03-23 RX ADMIN — Medication 20 MILLIEQUIVALENT(S): at 22:50

## 2020-03-23 NOTE — PROGRESS NOTE ADULT - PROBLEM SELECTOR PLAN 4
-Patient initially treated for multifocal PNA  -Patient developed high fever on 3/16/20, ID consulted  -Blood cx neg, UA suspicious for UTI, continue IV abx as per ID recs  -Patient also with tachycardia while febrile, Cardio was consulted for concern for afib- no afib noted  -CT chest/abd/pelvis noted- nonspecific findings similar to prior  -Check RVP- pending  -ID stopped abx, however, patient again with fever, check fever workup, restated abx

## 2020-03-23 NOTE — PROGRESS NOTE ADULT - SUBJECTIVE AND OBJECTIVE BOX
[facility] -- Cori Dawkins, Travis Villagomez, Ryland Wright Savella  Office # [phone]      Follow Up:    tachycardia  Subjective/Observations:    No complaints of chest pain, dyspnea, or palpitations reported. No signs of orthopnea or PND.  + generalized fatigue       REVIEW OF SYSTEMS: All other review of systems is negative unless indicated above    PAST MEDICAL & SURGICAL HISTORY:  KIRTI (obstructive sleep apnea): on nocturnal cpap  Hypertension: not on any medications  Diabetes: not on any medications  Hepatitis B  Lymphoma: Small cell B cell  AIHA (autoimmune hemolytic anemia)  H/O lithotripsy      MEDICATIONS  (STANDING):  cefepime   IVPB 2000 milliGRAM(s) IV Intermittent every 12 hours  cefepime   IVPB      entecavir 0.5 milliGRAM(s) Oral daily  pantoprazole    Tablet 40 milliGRAM(s) Oral before breakfast  potassium chloride    Tablet ER 20 milliEquivalent(s) Oral two times a day  predniSONE   Tablet 10 milliGRAM(s) Oral daily    MEDICATIONS  (PRN):  acetaminophen   Tablet .. 650 milliGRAM(s) Oral every 6 hours PRN Mild Pain (1 - 3)  acetaminophen   Tablet .. 650 milliGRAM(s) Oral every 6 hours PRN Temp greater or equal to 38C (100.4F)  albuterol/ipratropium for Nebulization 3 milliLiter(s) Nebulizer every 6 hours PRN Shortness of Breath and/or Wheezing  guaiFENesin   Syrup  (Sugar-Free) 200 milliGRAM(s) Oral every 6 hours PRN Cough      Allergies    sulfa drugs (Unknown)    Intolerances        Vital Signs Last 24 Hrs  T(C): 37.3 (23 Mar 2020 08:03), Max: 38.9 (22 Mar 2020 19:11)  T(F): 99.2 (23 Mar 2020 08:03), Max: 102 (22 Mar 2020 19:11)  HR: 109 (23 Mar 2020 08:03) (82 - 109)  BP: 126/66 (23 Mar 2020 08:03) (103/64 - 161/77)  BP(mean): --  RR: 19 (23 Mar 2020 08:03) (17 - 19)  SpO2: 97% (23 Mar 2020 08:03) (97% - 99%)    I&O's Summary    22 Mar 2020 07:01  -  23 Mar 2020 07:00  --------------------------------------------------------  IN: 570 mL / OUT: 0 mL / NET: 570 mL          PHYSICAL EXAM:  TELE: ST  Constitutional: NAD, awake and alert, well-developed  HEENT: Moist Mucous Membranes, Anicteric  Pulmonary: Non-labored, breath sounds are clear bilaterally, No wheezing, crackles or rhonchi  Cardiovascular: tachycardia  S1 and S2 nl, No murmurs, rubs, gallops or clicks  Gastrointestinal: Bowel Sounds present, soft, nontender  Lymph: No lymphadenopathy. +edema bilaterally   Skin: petechial lrash   Psych:  Mood & affect appropriate    LABS: All Labs Reviewed:                        7.1    2.41  )-----------( 9        ( 23 Mar 2020 06:06 )             21.1                         7.4    3.71  )-----------( 2        ( 22 Mar 2020 22:47 )             22.0                         7.7    3.50  )-----------( 2        ( 22 Mar 2020 06:56 )             23.0     23 Mar 2020 06:06    139    |  112    |  24     ----------------------------<  104    4.4     |  17     |  0.97   22 Mar 2020 06:56    137    |  111    |  21     ----------------------------<  100    3.9     |  18     |  1.00   21 Mar 2020 05:48    137    |  110    |  22     ----------------------------<  92     3.4     |  15     |  0.98     Ca    7.9        23 Mar 2020 06:06  Ca    7.6        22 Mar 2020 06:56  Ca    7.6        21 Mar 2020 05:48    TPro  5.5    /  Alb  2.6    /  TBili  3.5    /  DBili  x      /  AST  47     /  ALT  22     /  AlkPhos  180    23 Mar 2020 06:06  TPro  5.3    /  Alb  2.4    /  TBili  3.2    /  DBili  x      /  AST  49     /  ALT  23     /  AlkPhos  177    22 Mar 2020 06:56  TPro  5.1    /  Alb  2.5    /  TBili  3.1    /  DBili  x      /  AST  57     /  ALT  26     /  AlkPhos  178    21 Mar 2020 05:48    PT/INR - ( 22 Mar 2020 06:56 )   PT: 14.2 sec;   INR: 1.26 ratio                  ECG:  < from: 12 Lead ECG (03.18.20 @ 19:13) >    Ventricular Rate 167 BPM    Atrial Rate 178 BPM    QRS Duration 64 ms    Q-T Interval 296 ms    QTC Calculation(Bezet) 493 ms    R Axis 32 degrees    T Axis 61 degrees    Diagnosis Line *** Poor data quality, interpretation may be adversely affected  likely st with apcs  Nonspecific ST and T wave abnormality  Abnormal ECG  When compared with ECG of 16-MAR-2020 17:10,  Previous ECG has undetermined rhythm, needs review  Nonspecific T wave abnormality now evident in Anterior leads    < end of copied text >      Echo:  < from: TTE Echo Complete w/o contrast w/ Doppler (03.04.20 @ 09:54) >  Mitral Valve: normal, trace physiologic MR.  Aortic Valve/Aorta: Normal trileaflet aortic valve with normal opening.  Tricuspid Valve: normal with trace TR.  Pulmonic Valve: normal  Left Atrium: normal  Right Atrium: normal  Left Ventricle: normal LV size and systolic function, estimated LVEF of 65%.  Right Ventricle: Grossly normal size and systolic function.  Pericardium/Pleura: normal, no significant pericardial effusion.      Conclusion:   Normal left ventricular internal dimensions and systolic function, estimated LVEF of 65%.   Grossly normal RV size and systolic function.     Normal trileaflet aortic valve, without AI.   Trace physiologic MR and TR.            < end of copied text >    Radiology:

## 2020-03-23 NOTE — PROGRESS NOTE ADULT - PROBLEM SELECTOR PLAN 3
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 Was on prednisone 10mg daily -- increase to prednisone 40mg po during hospitalization, tapering started @2/29 - now 10mg daily  - Heme/onc following  - follow H&H- hgb 7.1 today- will give one unit packed RBCs

## 2020-03-23 NOTE — PROGRESS NOTE ADULT - ASSESSMENT
hepatitis B   elevated lfts   CLL  anemia  thrombocytopenia   fever      fever w/u in progress; f/u cxs; id following  patient with elevated bilirubin and alk phos at baseline ? leon  mri showed normal liver, no cbd stone/dilatation; hep b pcr neg  ob neg; no s/s overt gib; monitor cbc daily, transfuse per heme/onc  diet as tolerated; correct elytes prn  cont ppi ppx  heme/onc follow up  to follow    Advanced care planning was discussed with patient and family.  Advanced care planning forms were reviewed and discussed.  Risks, benefits and alternatives of gastroenterologic procedures were discussed in detail and all questions were answered.    30 minutes spent.

## 2020-03-23 NOTE — PROGRESS NOTE ADULT - ASSESSMENT
74 y/o man w Hepatitis B on Entecavirt, Chronic Lymphocytic Leukemia/Small Lymphocytic Lymphoma 4/2018 when presented w immune hemolytic anemia w partial response to steroids, started on Ibrutinib w heme CR and RI by CT scan w some decrease of lymphadenopathies, until 1/2020 when relapsed w incr WBC, anemia(initially not hemolytic, has chronic Matthew positive due to CLL/SLL), thrombocytopenia.    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    Was discharged home and admitted again 2/22/20 with  fever and found w pneumonia, post course of Ceftriaxone, continues to require transfusions plts and PRBCs.  Hep B titer negative  Post IVIG in late February 2020  Had trial of Venetoclax started 3/1/20 and received 4 days of treatment which was held on 3/5/20 due to pancytopenia    Remains pancytopenic and transfusion dependant; suspect element of hemolysis as well      RECOMMENDATIONS  - still PRBC and Platelets transfusion dependent,  - to receive 1 unit pRBC and 1 unit platelets today; has essentially become refractory to transfusions  - remains prednisone taper to 10mg qD today  - hold venetoclax and ibrutinib as well   - being ruled out for viral infections - RVP panel sent and pending; will discuss with primary team regarding rule out for COVID-19 as well  - Long term prognosis poor  - left message for Dr. Huynh (hospitalist) to discuss case

## 2020-03-23 NOTE — PROGRESS NOTE ADULT - PROBLEM SELECTOR PLAN 2
- Per chart review, platelet count steadily declining since 2018  - poor response to platelets transfusion, suspect ITP component  - s/p one dose of IVIG  - Hem/Onc following, will follow the recommendation   -Platelet count 9 today  -Will transfuse one unit of platelets today.   -continue to monitor.

## 2020-03-23 NOTE — PROGRESS NOTE ADULT - ASSESSMENT
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 Tachy likely reactive  - tele overnight sr 90s with occ PACs and intermittently to 114 with no further episodes of wct   - Monitor and replete electrolytes. Keep K>4.0 and Mg>2.0.    Pancytopenia   -hem/onc following   - Multiple tx of PRBC last tx (3/21), Platelets and Plasma last tx (3/5) Last tx of platelets today for level = 2  Hg 7.7   - Would transfuse to keep Hb>8 and watch volume status     LE edema  -Doppler negative   -Albumin 2.4 likely contributing to his LE edema   -Echo 2/24/2020 revealing trace AI trace TR ef 60-65%    ID  fever overnight work up as per primary     Monitor and replete electrolytes. Keep K>4.0 and Mg>2.0.      DM  -management as per primary      - Further cardiac workup will depend on clinical course.   - All other workup per primary team. Will followup.     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 Tachy likely reactive  - tele overnight sr 90s with occ PACs and intermittently to 114 with no further episodes of wct   - Monitor and replete electrolytes. Keep K>4.0 and Mg>2.0.    Pancytopenia   -hem/onc following   - Multiple tx of PRBC last tx (3/21), Platelets and Plasma last tx (3/5) Last tx of platelets today for level = 2  Hg 7.7   - Would transfuse to keep Hb>8 and watch volume status     LE edema  -Doppler negative   -Albumin 2.4 likely contributing to his LE edema   -Echo 2/24/2020 revealing trace AI trace TR ef 60-65%    ID  fever overnight work up as per primary     Monitor and replete electrolytes. Keep K>4.0 and Mg>2.0.      DM  -management as per primary      - Further cardiac workup will depend on clinical course.   - All other workup per primary team. Will followup.     Cathy Baker FNP-C  Cardiology NP  [phone]

## 2020-03-23 NOTE — PROGRESS NOTE ADULT - SUBJECTIVE AND OBJECTIVE BOX
INTERVAL HPI/OVERNIGHT EVENTS:  Patient seen and examined at bedside. States he feels fine. Patient febrile overnight, fever workup ordered. Patient denies any CP, SOB, abd pain.    MEDICATIONS  (STANDING):  cefepime   IVPB 2000 milliGRAM(s) IV Intermittent every 12 hours  cefepime   IVPB      entecavir 0.5 milliGRAM(s) Oral daily  pantoprazole    Tablet 40 milliGRAM(s) Oral before breakfast  potassium chloride    Tablet ER 20 milliEquivalent(s) Oral two times a day  predniSONE   Tablet 10 milliGRAM(s) Oral daily    MEDICATIONS  (PRN):  acetaminophen   Tablet .. 650 milliGRAM(s) Oral every 6 hours PRN Mild Pain (1 - 3)  acetaminophen   Tablet .. 650 milliGRAM(s) Oral every 6 hours PRN Temp greater or equal to 38C (100.4F)  albuterol/ipratropium for Nebulization 3 milliLiter(s) Nebulizer every 6 hours PRN Shortness of Breath and/or Wheezing  guaiFENesin   Syrup  (Sugar-Free) 200 milliGRAM(s) Oral every 6 hours PRN Cough      Allergies    sulfa drugs (Unknown)    Intolerances      ROS:  CONSTITUTIONAL: + weakness, no fevers or chills  EYES/ENT: No visual changes;  No vertigo or throat pain   NECK: No pain or stiffness  RESPIRATORY: No cough, wheezing, hemoptysis; No shortness of breath  CARDIOVASCULAR: No chest pain or palpitations  GASTROINTESTINAL: No abdominal or epigastric pain. No nausea, vomiting, or hematemesis  GENITOURINARY: No dysuria, frequency or hematuria  NEUROLOGICAL: No numbness  SKIN: No itching, burning, rashes, or lesions   All other review of systems is negative unless indicated above.    Vital Signs Last 24 Hrs  T(C): 36.5 (23 Mar 2020 16:20), Max: 38.9 (22 Mar 2020 19:11)  T(F): 97.7 (23 Mar 2020 16:20), Max: 102 (22 Mar 2020 19:11)  HR: 86 (23 Mar 2020 16:20) (83 - 109)  BP: 132/73 (23 Mar 2020 16:20) (109/66 - 136/74)  BP(mean): --  RR: 18 (23 Mar 2020 16:20) (18 - 19)  SpO2: 98% (23 Mar 2020 16:20) (97% - 99%)    03-22 @ 07:01  -  03-23 @ 07:00  --------------------------------------------------------  IN: 570 mL / OUT: 0 mL / NET: 570 mL    03-23 @ 07:01  -  03-23 @ 19:07  --------------------------------------------------------  IN: 494 mL / OUT: 0 mL / NET: 494 mL        Physical Exam:  General: WN/WD NAD  Neurology: A&Ox3, nonfocal, BROWN x 4  Respiratory: CTA B/L  CV: RRR, S1S2  Abdominal: Soft, NT, ND +BS  Extremities: +pitting edema B/L LE, + peripheral pulses      LABS:                        7.1    2.41  )-----------( 9        ( 23 Mar 2020 06:06 )             21.1     03-23    139  |  112<H>  |  24<H>  ----------------------------<  104<H>  4.4   |  17<L>  |  0.97    Ca    7.9<L>      23 Mar 2020 06:06    TPro  5.5<L>  /  Alb  2.6<L>  /  TBili  3.5<H>  /  DBili  x   /  AST  47<H>  /  ALT  22  /  AlkPhos  180<H>  03-23    PT/INR - ( 22 Mar 2020 06:56 )   PT: 14.2 sec;   INR: 1.26 ratio               RADIOLOGY & ADDITIONAL TESTS:

## 2020-03-23 NOTE — PROGRESS NOTE ADULT - PROBLEM SELECTOR PLAN 1
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 as per heme/onc.   - WBC approx. 2.4 today.   - Steroids being tapered - 10mg now  - Hem/Onc following, recs appreciated

## 2020-03-23 NOTE — PROGRESS NOTE ADULT - SUBJECTIVE AND OBJECTIVE BOX
Patient seen and examined;  Chart reviewed and events noted;   continues to be febrile; now more confused    MEDICATIONS  (STANDING):  cefepime   IVPB 2000 milliGRAM(s) IV Intermittent every 12 hours  entecavir 0.5 milliGRAM(s) Oral daily  pantoprazole    Tablet 40 milliGRAM(s) Oral before breakfast  potassium chloride    Tablet ER 20 milliEquivalent(s) Oral two times a day  predniSONE   Tablet 10 milliGRAM(s) Oral daily    MEDICATIONS  (PRN):  acetaminophen   Tablet .. 650 milliGRAM(s) Oral every 6 hours PRN Mild Pain (1 - 3)  acetaminophen   Tablet .. 650 milliGRAM(s) Oral every 6 hours PRN Temp greater or equal to 38C (100.4F)  albuterol/ipratropium for Nebulization 3 milliLiter(s) Nebulizer every 6 hours PRN Shortness of Breath and/or Wheezing  guaiFENesin   Syrup  (Sugar-Free) 200 milliGRAM(s) Oral every 6 hours PRN Cough      Vital Signs Last 24 Hrs  T(C): 37.3 (23 Mar 2020 08:03), Max: 38.9 (22 Mar 2020 19:11)  T(F): 99.2 (23 Mar 2020 08:03), Max: 102 (22 Mar 2020 19:11)  HR: 109 (23 Mar 2020 08:03) (82 - 109)  BP: 126/66 (23 Mar 2020 08:03) (103/64 - 161/77)  BP(mean): --  RR: 19 (23 Mar 2020 08:03) (17 - 19)  SpO2: 97% (23 Mar 2020 08:03) (97% - 99%)    PHYSICAL EXAM  General: adult in NAD  HEENT: + scleral icterus  Neck: supple  CV: normal S1S2 with no murmur rubs or gallops  Lungs: clear to auscultation, no wheezes, no rhales  Abdomen: soft non-tender non-distended, no hepato/splenomegaly  Ext: 2+ edema  Skin: no rashes and no petichiae  Neuro: alert and cooperative but confused    LABS:                        7.1    2.41  )-----------( 9        ( 23 Mar 2020 06:06 )             21.1     03-23    139  |  112<H>  |  24<H>  ----------------------------<  104<H>  4.4   |  17<L>  |  0.97    Ca    7.9<L>      23 Mar 2020 06:06    TPro  5.5<L>  /  Alb  2.6<L>  /  TBili  3.5<H>  /  DBili  x   /  AST  47<H>  /  ALT  22  /  AlkPhos  180<H>  03-23    PT/INR - ( 22 Mar 2020 06:56 )   PT: 14.2 sec;   INR: 1.26 ratio

## 2020-03-23 NOTE — PROGRESS NOTE ADULT - SUBJECTIVE AND OBJECTIVE BOX
INTERVAL HPI/OVERNIGHT EVENTS:  pt seen and examined  denies n/v/d/c/abd pain  febrile overnight  sp plts yesterday and overnight per rn, no overt gib    MEDICATIONS  (STANDING):  cefepime   IVPB 2000 milliGRAM(s) IV Intermittent every 12 hours  cefepime   IVPB      entecavir 0.5 milliGRAM(s) Oral daily  pantoprazole    Tablet 40 milliGRAM(s) Oral before breakfast  potassium chloride    Tablet ER 20 milliEquivalent(s) Oral two times a day  predniSONE   Tablet 10 milliGRAM(s) Oral daily    MEDICATIONS  (PRN):  acetaminophen   Tablet .. 650 milliGRAM(s) Oral every 6 hours PRN Mild Pain (1 - 3)  acetaminophen   Tablet .. 650 milliGRAM(s) Oral every 6 hours PRN Temp greater or equal to 38C (100.4F)  albuterol/ipratropium for Nebulization 3 milliLiter(s) Nebulizer every 6 hours PRN Shortness of Breath and/or Wheezing  guaiFENesin   Syrup  (Sugar-Free) 200 milliGRAM(s) Oral every 6 hours PRN Cough      Allergies    sulfa drugs (Unknown)    Intolerances        Review of Systems:    General:  fever  Eyes:  Good vision, no reported pain  ENT:  No sore throat, pain, runny nose, dysphagia  CV:  No pain, palpitations, hypo/hypertension  Resp:  No dyspnea, cough, tachypnea, wheezing  GI:  No pain, No nausea, No vomiting, No diarrhea, No constipation, No weight loss, No fever, No pruritis, No rectal bleeding, No melena, No dysphagia  :  No pain, bleeding, incontinence, nocturia  Muscle:  No pain, weakness  Neuro:  No weakness, tingling, memory problems  Psych:  No fatigue, insomnia, mood problems, depression  Endocrine:  No polyuria, polydypsia, cold/heat intolerance  Heme:  No petechiae, ecchymosis, easy bruisability  Skin:  No rash, tattoos, scars, edema      Vital Signs Last 24 Hrs  T(C): 36.6 (23 Mar 2020 05:20), Max: 38.9 (22 Mar 2020 19:11)  T(F): 97.9 (23 Mar 2020 05:20), Max: 102 (22 Mar 2020 19:11)  HR: 105 (23 Mar 2020 05:20) (82 - 105)  BP: 136/74 (23 Mar 2020 05:20) (103/64 - 161/77)  BP(mean): --  RR: 18 (23 Mar 2020 05:20) (17 - 19)  SpO2: 98% (23 Mar 2020 05:20) (98% - 99%)    PHYSICAL EXAM:    Constitutional: nad sitting up in bed  HEENT: ncat  Gastrointestinal: soft nt mild dt  Extremities: edema  Neurological: Awake alert responds appropriately    LABS:                        7.1    2.41  )-----------( x        ( 23 Mar 2020 06:06 )             21.1     03-23    139  |  112<H>  |  24<H>  ----------------------------<  104<H>  4.4   |  17<L>  |  0.97    Ca    7.9<L>      23 Mar 2020 06:06    TPro  5.5<L>  /  Alb  2.6<L>  /  TBili  3.5<H>  /  DBili  x   /  AST  47<H>  /  ALT  22  /  AlkPhos  180<H>  03-23    PT/INR - ( 22 Mar 2020 06:56 )   PT: 14.2 sec;   INR: 1.26 ratio               RADIOLOGY & ADDITIONAL TESTS: INTERVAL HPI/OVERNIGHT EVENTS:  pt seen and examined  denies n/v/d/c/abd pain  febrile overnight  sp plts yesterday and overnight per rn, no overt gib    MEDICATIONS  (STANDING):  cefepime   IVPB 2000 milliGRAM(s) IV Intermittent every 12 hours  cefepime   IVPB      entecavir 0.5 milliGRAM(s) Oral daily  pantoprazole    Tablet 40 milliGRAM(s) Oral before breakfast  potassium chloride    Tablet ER 20 milliEquivalent(s) Oral two times a day  predniSONE   Tablet 10 milliGRAM(s) Oral daily    MEDICATIONS  (PRN):  acetaminophen   Tablet .. 650 milliGRAM(s) Oral every 6 hours PRN Mild Pain (1 - 3)  acetaminophen   Tablet .. 650 milliGRAM(s) Oral every 6 hours PRN Temp greater or equal to 38C (100.4F)  albuterol/ipratropium for Nebulization 3 milliLiter(s) Nebulizer every 6 hours PRN Shortness of Breath and/or Wheezing  guaiFENesin   Syrup  (Sugar-Free) 200 milliGRAM(s) Oral every 6 hours PRN Cough      Allergies    sulfa drugs (Unknown)    Intolerances        Review of Systems:    General:  fever  Eyes:  Good vision, no reported pain  ENT:  No sore throat, pain, runny nose, dysphagia  CV:  No pain, palpitations, hypo/hypertension  Resp:  No dyspnea, cough, tachypnea, wheezing  GI:  No pain, No nausea, No vomiting, No diarrhea, No constipation, No weight loss, No fever, No pruritis, No rectal bleeding, No melena, No dysphagia  :  No pain, bleeding, incontinence, nocturia  Muscle:  No pain, weakness  Neuro:  No weakness, tingling, memory problems  Psych:  No fatigue, insomnia, mood problems, depression  Endocrine:  No polyuria, polydypsia, cold/heat intolerance  Heme:  No petechiae, ecchymosis, easy bruisability  Skin:  No rash, tattoos, scars, edema      Vital Signs Last 24 Hrs  T(C): 36.6 (23 Mar 2020 05:20), Max: 38.9 (22 Mar 2020 19:11)  T(F): 97.9 (23 Mar 2020 05:20), Max: 102 (22 Mar 2020 19:11)  HR: 105 (23 Mar 2020 05:20) (82 - 105)  BP: 136/74 (23 Mar 2020 05:20) (103/64 - 161/77)  BP(mean): --  RR: 18 (23 Mar 2020 05:20) (17 - 19)  SpO2: 98% (23 Mar 2020 05:20) (98% - 99%)    PHYSICAL EXAM:    Constitutional: nad sitting up in bed  HEENT: ncat  Gastrointestinal: soft nt mild dt  Extremities: edema  Neurological: Awake alert appropriate ?confusion    LABS:                        7.1    2.41  )-----------( x        ( 23 Mar 2020 06:06 )             21.1     03-23    139  |  112<H>  |  24<H>  ----------------------------<  104<H>  4.4   |  17<L>  |  0.97    Ca    7.9<L>      23 Mar 2020 06:06    TPro  5.5<L>  /  Alb  2.6<L>  /  TBili  3.5<H>  /  DBili  x   /  AST  47<H>  /  ALT  22  /  AlkPhos  180<H>  03-23    PT/INR - ( 22 Mar 2020 06:56 )   PT: 14.2 sec;   INR: 1.26 ratio               RADIOLOGY & ADDITIONAL TESTS:

## 2020-03-24 LAB
ALBUMIN SERPL ELPH-MCNC: 2.4 G/DL — LOW (ref 3.3–5)
ALP SERPL-CCNC: 155 U/L — HIGH (ref 40–120)
ALT FLD-CCNC: 28 U/L — SIGNIFICANT CHANGE UP (ref 12–78)
ANION GAP SERPL CALC-SCNC: 11 MMOL/L — SIGNIFICANT CHANGE UP (ref 5–17)
AST SERPL-CCNC: 71 U/L — HIGH (ref 15–37)
BILIRUB DIRECT SERPL-MCNC: 2.9 MG/DL — HIGH (ref 0.05–0.2)
BILIRUB INDIRECT FLD-MCNC: 1.5 MG/DL — HIGH (ref 0.2–1)
BILIRUB SERPL-MCNC: 4.4 MG/DL — HIGH (ref 0.2–1.2)
BILIRUB SERPL-MCNC: 4.4 MG/DL — HIGH (ref 0.2–1.2)
BUN SERPL-MCNC: 24 MG/DL — HIGH (ref 7–23)
CALCIUM SERPL-MCNC: 7.7 MG/DL — LOW (ref 8.5–10.1)
CHLORIDE SERPL-SCNC: 111 MMOL/L — HIGH (ref 96–108)
CO2 SERPL-SCNC: 17 MMOL/L — LOW (ref 22–31)
CREAT SERPL-MCNC: 1 MG/DL — SIGNIFICANT CHANGE UP (ref 0.5–1.3)
GLUCOSE SERPL-MCNC: 97 MG/DL — SIGNIFICANT CHANGE UP (ref 70–99)
HAPTOGLOB SERPL-MCNC: <20 MG/DL — LOW (ref 34–200)
HCT VFR BLD CALC: 20.9 % — CRITICAL LOW (ref 39–50)
HGB BLD-MCNC: 6.9 G/DL — CRITICAL LOW (ref 13–17)
LDH SERPL L TO P-CCNC: 601 U/L — HIGH (ref 50–242)
MCHC RBC-ENTMCNC: 28.8 PG — SIGNIFICANT CHANGE UP (ref 27–34)
MCHC RBC-ENTMCNC: 33 GM/DL — SIGNIFICANT CHANGE UP (ref 32–36)
MCV RBC AUTO: 87.1 FL — SIGNIFICANT CHANGE UP (ref 80–100)
NRBC # BLD: 0 /100 WBCS — SIGNIFICANT CHANGE UP (ref 0–0)
PLATELET # BLD AUTO: 1 K/UL — CRITICAL LOW (ref 150–400)
POTASSIUM SERPL-MCNC: 3.6 MMOL/L — SIGNIFICANT CHANGE UP (ref 3.5–5.3)
POTASSIUM SERPL-SCNC: 3.6 MMOL/L — SIGNIFICANT CHANGE UP (ref 3.5–5.3)
PROT SERPL-MCNC: 5.2 G/DL — LOW (ref 6–8.3)
RAPID RVP RESULT: SIGNIFICANT CHANGE UP
RBC # BLD: 2.4 M/UL — LOW (ref 4.2–5.8)
RBC # BLD: 2.4 M/UL — LOW (ref 4.2–5.8)
RBC # FLD: 18.5 % — HIGH (ref 10.3–14.5)
RETICS #: 4.8 K/UL — LOW (ref 25–125)
RETICS/RBC NFR: 0.2 % — LOW (ref 0.5–2.5)
SODIUM SERPL-SCNC: 139 MMOL/L — SIGNIFICANT CHANGE UP (ref 135–145)
WBC # BLD: 3.27 K/UL — LOW (ref 3.8–10.5)
WBC # FLD AUTO: 3.27 K/UL — LOW (ref 3.8–10.5)

## 2020-03-24 PROCEDURE — 99497 ADVNCD CARE PLAN 30 MIN: CPT

## 2020-03-24 PROCEDURE — 99233 SBSQ HOSP IP/OBS HIGH 50: CPT

## 2020-03-24 PROCEDURE — 99232 SBSQ HOSP IP/OBS MODERATE 35: CPT

## 2020-03-24 RX ORDER — VENETOCLAX 100 MG/1
10 TABLET, FILM COATED ORAL
Refills: 0 | Status: DISCONTINUED | OUTPATIENT
Start: 2020-03-24 | End: 2020-03-29

## 2020-03-24 RX ADMIN — Medication 20 MILLIEQUIVALENT(S): at 17:12

## 2020-03-24 RX ADMIN — CEFEPIME 100 MILLIGRAM(S): 1 INJECTION, POWDER, FOR SOLUTION INTRAMUSCULAR; INTRAVENOUS at 22:13

## 2020-03-24 RX ADMIN — Medication 10 MILLIGRAM(S): at 06:22

## 2020-03-24 RX ADMIN — ENTECAVIR 0.5 MILLIGRAM(S): 0.5 TABLET ORAL at 09:34

## 2020-03-24 RX ADMIN — PANTOPRAZOLE SODIUM 40 MILLIGRAM(S): 20 TABLET, DELAYED RELEASE ORAL at 06:22

## 2020-03-24 RX ADMIN — CEFEPIME 100 MILLIGRAM(S): 1 INJECTION, POWDER, FOR SOLUTION INTRAMUSCULAR; INTRAVENOUS at 09:38

## 2020-03-24 RX ADMIN — Medication 20 MILLIEQUIVALENT(S): at 06:22

## 2020-03-24 RX ADMIN — Medication 650 MILLIGRAM(S): at 00:29

## 2020-03-24 RX ADMIN — VENETOCLAX 10 MILLIGRAM(S): 100 TABLET, FILM COATED ORAL at 22:16

## 2020-03-24 NOTE — PROGRESS NOTE ADULT - SUBJECTIVE AND OBJECTIVE BOX
[facility] -- Cori Dawkins, Travis Villagomez, Ryland Wright Savella  Office # [phone]      Follow Up:    Tachycardia  Subjective/Observations:   No events overnight resting comfortably in bed.  No complaints of chest pain, dyspnea, or palpitations reported. No signs of orthopnea or PND.     REVIEW OF SYSTEMS: All other review of systems is negative unless indicated above    PAST MEDICAL & SURGICAL HISTORY:  KIRTI (obstructive sleep apnea): on nocturnal cpap  Hypertension: not on any medications  Diabetes: not on any medications  Hepatitis B  Lymphoma: Small cell B cell  AIHA (autoimmune hemolytic anemia)  H/O lithotripsy      MEDICATIONS  (STANDING):  cefepime   IVPB 2000 milliGRAM(s) IV Intermittent every 12 hours  cefepime   IVPB      entecavir 0.5 milliGRAM(s) Oral daily  pantoprazole    Tablet 40 milliGRAM(s) Oral before breakfast  potassium chloride    Tablet ER 20 milliEquivalent(s) Oral two times a day  predniSONE   Tablet 10 milliGRAM(s) Oral daily    MEDICATIONS  (PRN):  acetaminophen   Tablet .. 650 milliGRAM(s) Oral every 6 hours PRN Mild Pain (1 - 3)  acetaminophen   Tablet .. 650 milliGRAM(s) Oral every 6 hours PRN Temp greater or equal to 38C (100.4F)  albuterol/ipratropium for Nebulization 3 milliLiter(s) Nebulizer every 6 hours PRN Shortness of Breath and/or Wheezing  guaiFENesin   Syrup  (Sugar-Free) 200 milliGRAM(s) Oral every 6 hours PRN Cough      Allergies    sulfa drugs (Unknown)    Intolerances        Vital Signs Last 24 Hrs  T(C): 36.8 (24 Mar 2020 07:30), Max: 37.9 (23 Mar 2020 23:08)  T(F): 98.2 (24 Mar 2020 07:30), Max: 100.3 (23 Mar 2020 23:08)  HR: 84 (24 Mar 2020 07:30) (84 - 93)  BP: 120/68 (24 Mar 2020 07:30) (110/64 - 149/77)  BP(mean): --  RR: 19 (24 Mar 2020 07:30) (17 - 19)  SpO2: 99% (24 Mar 2020 07:30) (96% - 99%)    I&O's Summary    23 Mar 2020 07:01  -  24 Mar 2020 07:00  --------------------------------------------------------  IN: 544 mL / OUT: 0 mL / NET: 544 mL          PHYSICAL EXAM:  TELE:   Constitutional: NAD, awake and alert, well-developed  HEENT: Moist Mucous Membranes, Anicteric  Pulmonary: Non-labored, breath sounds are clear bilaterally, No wheezing, crackles or rhonchi  Cardiovascular: Regular, S1 and S2 nl, No murmurs, rubs, gallops or clicks  Gastrointestinal: Bowel Sounds present, soft, nontender.   Lymph: No lymphadenopathy. +2 Bilat LE peripheral edema.   Skin: No visible rashes or ulcers.  Psych:  Mood & affect appropriate    LABS: All Labs Reviewed:                        6.9    3.27  )-----------( 1        ( 24 Mar 2020 06:19 )             20.9                         7.1    2.41  )-----------( 9        ( 23 Mar 2020 06:06 )             21.1                         7.4    3.71  )-----------( 2        ( 22 Mar 2020 22:47 )             22.0     24 Mar 2020 06:19    139    |  111    |  24     ----------------------------<  97     3.6     |  17     |  1.00   23 Mar 2020 06:06    139    |  112    |  24     ----------------------------<  104    4.4     |  17     |  0.97   22 Mar 2020 06:56    137    |  111    |  21     ----------------------------<  100    3.9     |  18     |  1.00     Ca    7.7        24 Mar 2020 06:19  Ca    7.9        23 Mar 2020 06:06  Ca    7.6        22 Mar 2020 06:56    TPro  5.2    /  Alb  2.4    /  TBili  4.4    /  DBili  2.90   /  AST  71     /  ALT  28     /  AlkPhos  155    24 Mar 2020 06:19  TPro  5.5    /  Alb  2.6    /  TBili  3.5    /  DBili  x      /  AST  47     /  ALT  22     /  AlkPhos  180    23 Mar 2020 06:06  TPro  5.3    /  Alb  2.4    /  TBili  3.2    /  DBili  x      /  AST  49     /  ALT  23     /  AlkPhos  177    22 Mar 2020 06:56        ECG:  < from: 12 Lead ECG (03.18.20 @ 19:13) >    Ventricular Rate 167 BPM    Atrial Rate 178 BPM    QRS Duration 64 ms    Q-T Interval 296 ms    QTC Calculation(Bezet) 493 ms    R Axis 32 degrees    T Axis 61 degrees    Diagnosis Line *** Poor data quality, interpretation may be adversely affected  likely st with apcs  Nonspecific ST and T wave abnormality  Abnormal ECG  When compared with ECG of 16-MAR-2020 17:10,  Previous ECG has undetermined rhythm, needs review  Nonspecific T wave abnormality now evident in Anterior leads    < end of copied text >      Echo:  < from: TTE Echo Complete w/o contrast w/ Doppler (03.04.20 @ 09:54) >  Mitral Valve: normal, trace physiologic MR.  Aortic Valve/Aorta: Normal trileaflet aortic valve with normal opening.  Tricuspid Valve: normal with trace TR.  Pulmonic Valve: normal  Left Atrium: normal  Right Atrium: normal  Left Ventricle: normal LV size and systolic function, estimated LVEF of 65%.  Right Ventricle: Grossly normal size and systolic function.  Pericardium/Pleura: normal, no significant pericardial effusion.      Conclusion:   Normal left ventricular internal dimensions and systolic function, estimated LVEF of 65%.   Grossly normal RV size and systolic function.     Normal trileaflet aortic valve, without AI.   Trace physiologic MR and TR.            < end of copied text >    Radiology:

## 2020-03-24 NOTE — PROVIDER CONTACT NOTE (OTHER) - ASSESSMENT
Patient noted to have slightly raised red spot on left arm. Patient has pain to touch to area.  Patient stating feels pulling when stretching arm

## 2020-03-24 NOTE — PROGRESS NOTE ADULT - SUBJECTIVE AND OBJECTIVE BOX
Patient seen and examined;  Chart reviewed and events noted;   anxious about running out of entecavir (has been taking own supply)  wife spoke with Dr. Stephenson regarding his current situation on 3/23/20    MEDICATIONS  (STANDING):  cefepime   IVPB 2000 milliGRAM(s) IV Intermittent every 12 hours   entecavir 0.5 milliGRAM(s) Oral daily  pantoprazole    Tablet 40 milliGRAM(s) Oral before breakfast  potassium chloride    Tablet ER 20 milliEquivalent(s) Oral two times a day  predniSONE   Tablet 10 milliGRAM(s) Oral daily  venetoclax 10 milliGRAM(s) Oral <User Schedule>    MEDICATIONS  (PRN):  acetaminophen   Tablet .. 650 milliGRAM(s) Oral every 6 hours PRN Mild Pain (1 - 3)  acetaminophen   Tablet .. 650 milliGRAM(s) Oral every 6 hours PRN Temp greater or equal to 38C (100.4F)  albuterol/ipratropium for Nebulization 3 milliLiter(s) Nebulizer every 6 hours PRN Shortness of Breath and/or Wheezing  guaiFENesin   Syrup  (Sugar-Free) 200 milliGRAM(s) Oral every 6 hours PRN Cough      Vital Signs Last 24 Hrs  T(C): 36.8 (24 Mar 2020 07:30), Max: 37.9 (23 Mar 2020 23:08)  T(F): 98.2 (24 Mar 2020 07:30), Max: 100.3 (23 Mar 2020 23:08)  HR: 84 (24 Mar 2020 07:30) (84 - 93)  BP: 120/68 (24 Mar 2020 07:30) (110/64 - 149/77)  RR: 19 (24 Mar 2020 07:30) (17 - 19)  SpO2: 99% (24 Mar 2020 07:30) (96% - 99%)    PHYSICAL EXAM  General: adult in NAD  HEENT: clear oropharynx, anicteric sclera, pink conjunctivae  Neck: supple  CV: normal S1S2 with no murmur rubs or gallops  Lungs: reduced breath sounds at bases  Abdomen: soft non-tender non-distended, no hepato/splenomegaly  Ext: trace edema  Skin: no rashes and no petichiae  Neuro: alert and oriented X3 no focal deficits      LABS:                        6.9    3.27  )-----------( 1        ( 24 Mar 2020 06:19 )             20.9     Hemoglobin: 6.9 g/dL (03-24 @ 06:19)  Hemoglobin: 7.1 g/dL (03-23 @ 06:06)  Hemoglobin: 7.4 g/dL (03-22 @ 22:47)  Hemoglobin: 7.7 g/dL (03-22 @ 06:56)  Hemoglobin: 6.6 g/dL (03-21 @ 05:48)    WBC Count: 3.27 K/uL (03-24 @ 06:19)  WBC Count: 2.41 K/uL (03-23 @ 06:06)  WBC Count: 3.71 K/uL (03-22 @ 22:47)  WBC Count: 3.50 K/uL (03-22 @ 06:56)  WBC Count: 3.96 K/uL (03-21 @ 05:48)    Platelet Count - Automated: 1 K/uL (03-24 @ 06:19)  Platelet Count - Automated: 9 K/uL (03-23 @ 06:06)  Platelet Count - Automated: 2 K/uL (03-22 @ 22:47)  Platelet Count - Automated: 2 K/uL (03-22 @ 06:56)  Platelet Count - Automated: 2 K/uL (03-21 @ 05:48)    03-24    139  |  111<H>  |  24<H>  ----------------------------<  97  3.6   |  17<L>  |  1.00    Ca    7.7<L>      24 Mar 2020 06:19    TPro  5.2<L>  /  Alb  2.4<L>  /  TBili  4.4<H>  /  DBili  2.90<H>  /  AST  71<H>  /  ALT  28  /  AlkPhos  155<H>  03-24

## 2020-03-24 NOTE — PROGRESS NOTE ADULT - PROBLEM SELECTOR PLAN 1
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 as per heme/onc.   - Steroids being tapered - 10mg now  - Hem/Onc following, recs appreciated- will restart venetoclax today- patient with poor overall prognosis, but as per patient, he wants to try all options.

## 2020-03-24 NOTE — PROGRESS NOTE ADULT - ASSESSMENT
hepatitis B   elevated lfts   CLL  pancytopenia  fever      follow cxs; f/u hemolysis labs; on abx; id following  patient with elevated bilirubin and alk phos at baseline ? leon  mri showed normal liver, no cbd stone/dilatation; hep b pcr neg  ob neg; no s/s overt gib; monitor cbc daily, transfuse per heme/onc  diet as tolerated; correct elytes prn  cont ppi ppx  further care per heme/onc and primary    Advanced care planning was discussed with patient and family.  Advanced care planning forms were reviewed and discussed.  Risks, benefits and alternatives of gastroenterologic procedures were discussed in detail and all questions were answered.    30 minutes spent.

## 2020-03-24 NOTE — PROGRESS NOTE ADULT - PROBLEM SELECTOR PLAN 4
-Patient initially treated for multifocal PNA  -Patient developed high fever on 3/16/20, ID consulted  -Blood cx neg, UA suspicious for UTI, continue IV abx as per ID recs  -Patient also with tachycardia while febrile, Cardio was consulted for concern for afib- no afib noted  -CT chest/abd/pelvis noted- nonspecific findings similar to prior  -Check RVP- pending  -ID stopped abx, however, patient again with fever, check fever workup, restated abx- ID signed off. Patient in hospital for a month, unknown is patient had COVID exposure, however given COVID testing would not change current management and patient without any symptoms besides occasional fever, would defer testing at this time.

## 2020-03-24 NOTE — PROGRESS NOTE ADULT - PROBLEM SELECTOR PLAN 10
DVT PPx: SCDs- start on ALYSON stockings, no chemical ppx in the setting of symptomatic anemia and thrombocytopenia    11. HTN- not on meds  12. T2DM- stable with lifestyle modifications. Hyperglycemia likely due to steroid use- now improved as steroids tapered

## 2020-03-24 NOTE — PROGRESS NOTE ADULT - ASSESSMENT
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 Tachy likely reactive  - tele overnight sr 90s with occ PACs and intermittently to 110 with no further episodes of wct   - Monitor and replete electrolytes. Keep K>4.0 and Mg>2.0.    Pancytopenia   -hem/onc following   - Multiple tx of PRBC last tx (3/21), Platelets and Plasma last tx (3/5) Last tx of platelets today for level = 1  Hg 6.9   - Would transfuse to keep Hb>8 and watch volume status     LE edema  -Doppler negative   -Albumin 2.4 likely contributing to his LE edema   -Echo 2/24/2020 revealing trace AI trace TR ef 60-65%    ID  fever overnight work up as per primary     Monitor and replete electrolytes. Keep K>4.0 and Mg>2.0.      DM  -management as per primary      - Further cardiac workup will depend on clinical course.   - All other workup per primary team. Will followup.     Guerrero Capps [facility]  Cardiology   Spectra #[phone]

## 2020-03-24 NOTE — PROGRESS NOTE ADULT - SUBJECTIVE AND OBJECTIVE BOX
INTERVAL HPI/OVERNIGHT EVENTS:  pt seen and examined  denies n/v/abd pain  no bm but denies feeling constipated  states now w dec po  low grade fever overnight  no s/s gib per nursing  sp plts/prbc yesterday    MEDICATIONS  (STANDING):  cefepime   IVPB 2000 milliGRAM(s) IV Intermittent every 12 hours  cefepime   IVPB      entecavir 0.5 milliGRAM(s) Oral daily  pantoprazole    Tablet 40 milliGRAM(s) Oral before breakfast  potassium chloride    Tablet ER 20 milliEquivalent(s) Oral two times a day  predniSONE   Tablet 10 milliGRAM(s) Oral daily    MEDICATIONS  (PRN):  acetaminophen   Tablet .. 650 milliGRAM(s) Oral every 6 hours PRN Mild Pain (1 - 3)  acetaminophen   Tablet .. 650 milliGRAM(s) Oral every 6 hours PRN Temp greater or equal to 38C (100.4F)  albuterol/ipratropium for Nebulization 3 milliLiter(s) Nebulizer every 6 hours PRN Shortness of Breath and/or Wheezing  guaiFENesin   Syrup  (Sugar-Free) 200 milliGRAM(s) Oral every 6 hours PRN Cough      Allergies    sulfa drugs (Unknown)    Intolerances        Review of Systems:    General:  fever dec po  Eyes:  Good vision, no reported pain  ENT:  No sore throat, pain, runny nose, dysphagia  CV:  No pain, palpitations, hypo/hypertension  Resp:  No dyspnea, cough, tachypnea, wheezing  GI:  No pain, No nausea, No vomiting, No diarrhea, No constipation, No weight loss, No fever, No pruritis, No rectal bleeding, No melena, No dysphagia  :  No pain, bleeding, incontinence, nocturia  Muscle:  No pain, weakness  Neuro:  No weakness, tingling, memory problems  Psych:  No fatigue, insomnia, mood problems, depression  Endocrine:  No polyuria, polydypsia, cold/heat intolerance  Heme:  No petechiae, ecchymosis, easy bruisability  Skin:  No rash, tattoos, scars, edema      Vital Signs Last 24 Hrs  T(C): 36.8 (24 Mar 2020 07:30), Max: 37.9 (23 Mar 2020 23:08)  T(F): 98.2 (24 Mar 2020 07:30), Max: 100.3 (23 Mar 2020 23:08)  HR: 84 (24 Mar 2020 07:30) (84 - 93)  BP: 120/68 (24 Mar 2020 07:30) (110/64 - 149/77)  BP(mean): --  RR: 19 (24 Mar 2020 07:30) (17 - 19)  SpO2: 99% (24 Mar 2020 07:30) (96% - 99%)    PHYSICAL EXAM:    Constitutional: nad sitting up in bed  HEENT: ncat  Gastrointestinal: soft nt mild dt  Extremities: edema  Skin: jaundice  Neurological: Awake alert appropriate ?confusion        LABS:                        6.9    3.27  )-----------( 1        ( 24 Mar 2020 06:19 )             20.9     03-24    139  |  111<H>  |  24<H>  ----------------------------<  97  3.6   |  17<L>  |  1.00    Ca    7.7<L>      24 Mar 2020 06:19    TPro  5.2<L>  /  Alb  2.4<L>  /  TBili  4.4<H>  /  DBili  2.90<H>  /  AST  71<H>  /  ALT  28  /  AlkPhos  155<H>  03-24          RADIOLOGY & ADDITIONAL TESTS:

## 2020-03-24 NOTE — PROGRESS NOTE ADULT - SUBJECTIVE AND OBJECTIVE BOX
INTERVAL HPI/OVERNIGHT EVENTS:  Patient seen and examined at bedside. States he feels well, denies any CP, SOB, abd pain.    MEDICATIONS  (STANDING):  cefepime   IVPB 2000 milliGRAM(s) IV Intermittent every 12 hours  cefepime   IVPB      entecavir 0.5 milliGRAM(s) Oral daily  pantoprazole    Tablet 40 milliGRAM(s) Oral before breakfast  potassium chloride    Tablet ER 20 milliEquivalent(s) Oral two times a day  predniSONE   Tablet 10 milliGRAM(s) Oral daily  venetoclax 10 milliGRAM(s) Oral <User Schedule>    MEDICATIONS  (PRN):  acetaminophen   Tablet .. 650 milliGRAM(s) Oral every 6 hours PRN Mild Pain (1 - 3)  acetaminophen   Tablet .. 650 milliGRAM(s) Oral every 6 hours PRN Temp greater or equal to 38C (100.4F)  albuterol/ipratropium for Nebulization 3 milliLiter(s) Nebulizer every 6 hours PRN Shortness of Breath and/or Wheezing  guaiFENesin   Syrup  (Sugar-Free) 200 milliGRAM(s) Oral every 6 hours PRN Cough      Allergies    sulfa drugs (Unknown)    Intolerances        ROS:  CONSTITUTIONAL: + weakness, no fevers or chills  EYES/ENT: No visual changes;  No vertigo or throat pain   NECK: No pain or stiffness  RESPIRATORY: No cough, wheezing, hemoptysis; No shortness of breath  CARDIOVASCULAR: No chest pain or palpitations  GASTROINTESTINAL: No abdominal or epigastric pain. No nausea, vomiting, or hematemesis  GENITOURINARY: No dysuria, frequency or hematuria  NEUROLOGICAL: No numbness  SKIN: No itching, burning, rashes, or lesions   All other review of systems is negative unless indicated above.    Vital Signs Last 24 Hrs  T(C): 37.4 (24 Mar 2020 19:14), Max: 37.9 (23 Mar 2020 23:08)  T(F): 99.4 (24 Mar 2020 19:14), Max: 100.3 (23 Mar 2020 23:08)  HR: 95 (24 Mar 2020 19:14) (84 - 95)  BP: 136/71 (24 Mar 2020 19:14) (119/64 - 149/77)  BP(mean): --  RR: 18 (24 Mar 2020 19:14) (17 - 19)  SpO2: 95% (24 Mar 2020 19:14) (94% - 99%)    03-23 @ 07:01  -  03-24 @ 07:00  --------------------------------------------------------  IN: 544 mL / OUT: 0 mL / NET: 544 mL        Physical Exam:  General: WN/WD NAD  Neurology: A&Ox3, nonfocal, BROWN x 4  Respiratory: CTA B/L  CV: RRR, S1S2  Abdominal: Soft, NT, ND +BS  Extremities: +pitting edema B/L LE, + peripheral pulses      LABS:                        6.9    3.27  )-----------( 1        ( 24 Mar 2020 06:19 )             20.9     03-24    139  |  111<H>  |  24<H>  ----------------------------<  97  3.6   |  17<L>  |  1.00    Ca    7.7<L>      24 Mar 2020 06:19    TPro  5.2<L>  /  Alb  2.4<L>  /  TBili  4.4<H>  /  DBili  2.90<H>  /  AST  71<H>  /  ALT  28  /  AlkPhos  155<H>  03-24          RADIOLOGY & ADDITIONAL TESTS:

## 2020-03-24 NOTE — PROGRESS NOTE ADULT - PROBLEM SELECTOR PLAN 2
- Per chart review, platelet count steadily declining since 2018  - poor response to platelets transfusion, suspect ITP component  - s/p one dose of IVIG  - Hem/Onc following, will follow the recommendation   -Platelet count 1 today  -Will transfuse one unit of platelets today.   -continue to monitor.

## 2020-03-24 NOTE — PROGRESS NOTE ADULT - PROBLEM SELECTOR PLAN 3
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 Was on prednisone 10mg daily -- increase to prednisone 40mg po during hospitalization, tapering started @2/29 - now 10mg daily  - Heme/onc following  - follow H&H- hgb 6.9 today- will give one unit packed RBCs

## 2020-03-24 NOTE — PROGRESS NOTE ADULT - ASSESSMENT
74 y/o man w Hepatitis B on Entecavirt, Chronic Lymphocytic Leukemia/Small Lymphocytic Lymphoma 4/2018 when presented w immune hemolytic anemia w partial response to steroids, started on Ibrutinib w heme CR and AR by CT scan w some decrease of lymphadenopathies, until 1/2020 when relapsed w incr WBC, anemia(initially not hemolytic, has chronic Matthew positive due to CLL/SLL), thrombocytopenia.    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    Was discharged home and admitted again 2/22/20 with  fever and found w pneumonia, post course of Ceftriaxone, continues to require transfusions plts and PRBCs.  Hep B titer negative  Post IVIG in late February 2020  Had trial of Venetoclax started 3/1/20 and received 4 days of treatment which was held on 3/5/20 due to pancytopenia    Remains pancytopenic and transfusion dependant; suspect element of hemolysis as well      RECOMMENDATIONS  - still PRBC and Platelets transfusion dependent,  - to receive 1 unit pRBC and 1 unit platelets today  - remains prednisone taper to 10mg qD today for chronic hemolysis  - being ruled out for viral infections - RVP panel sent and pending; discussed with primary team regarding rule out for COVID-19 as well  - Long term prognosis poor  - patient and wife understand that his condition is likely to worsen. Willing to try low dose Venetoclax 10mg daily which he will start today  - discussed with pharmacy about ordering entecavir for patient who will be running out of his home supply  - case discussed with Dr. Huynh (hospitalist)

## 2020-03-24 NOTE — PROVIDER CONTACT NOTE (OTHER) - SITUATION
Patient c/o red spot on left arm hurting with movement.  Patient states " its wet not dry like the other spots"

## 2020-03-25 LAB
ALBUMIN SERPL ELPH-MCNC: 2.5 G/DL — LOW (ref 3.3–5)
ALP SERPL-CCNC: 151 U/L — HIGH (ref 40–120)
ALT FLD-CCNC: 23 U/L — SIGNIFICANT CHANGE UP (ref 12–78)
ANION GAP SERPL CALC-SCNC: 10 MMOL/L — SIGNIFICANT CHANGE UP (ref 5–17)
AST SERPL-CCNC: 53 U/L — HIGH (ref 15–37)
BILIRUB SERPL-MCNC: 3.6 MG/DL — HIGH (ref 0.2–1.2)
BUN SERPL-MCNC: 21 MG/DL — SIGNIFICANT CHANGE UP (ref 7–23)
CALCIUM SERPL-MCNC: 7.9 MG/DL — LOW (ref 8.5–10.1)
CHLORIDE SERPL-SCNC: 110 MMOL/L — HIGH (ref 96–108)
CO2 SERPL-SCNC: 18 MMOL/L — LOW (ref 22–31)
CREAT SERPL-MCNC: 0.85 MG/DL — SIGNIFICANT CHANGE UP (ref 0.5–1.3)
GLUCOSE SERPL-MCNC: 93 MG/DL — SIGNIFICANT CHANGE UP (ref 70–99)
HCT VFR BLD CALC: 22.4 % — LOW (ref 39–50)
HGB BLD-MCNC: 7.6 G/DL — LOW (ref 13–17)
MCHC RBC-ENTMCNC: 29.6 PG — SIGNIFICANT CHANGE UP (ref 27–34)
MCHC RBC-ENTMCNC: 33.9 GM/DL — SIGNIFICANT CHANGE UP (ref 32–36)
MCV RBC AUTO: 87.2 FL — SIGNIFICANT CHANGE UP (ref 80–100)
NRBC # BLD: 0 /100 WBCS — SIGNIFICANT CHANGE UP (ref 0–0)
PLATELET # BLD AUTO: 3 K/UL — CRITICAL LOW (ref 150–400)
POTASSIUM SERPL-MCNC: 4.4 MMOL/L — SIGNIFICANT CHANGE UP (ref 3.5–5.3)
POTASSIUM SERPL-SCNC: 4.4 MMOL/L — SIGNIFICANT CHANGE UP (ref 3.5–5.3)
PROT SERPL-MCNC: 5.3 G/DL — LOW (ref 6–8.3)
RBC # BLD: 2.57 M/UL — LOW (ref 4.2–5.8)
RBC # FLD: 18.3 % — HIGH (ref 10.3–14.5)
SODIUM SERPL-SCNC: 138 MMOL/L — SIGNIFICANT CHANGE UP (ref 135–145)
WBC # BLD: 3.53 K/UL — LOW (ref 3.8–10.5)
WBC # FLD AUTO: 3.53 K/UL — LOW (ref 3.8–10.5)

## 2020-03-25 PROCEDURE — 99232 SBSQ HOSP IP/OBS MODERATE 35: CPT

## 2020-03-25 PROCEDURE — 93971 EXTREMITY STUDY: CPT | Mod: 26,LT

## 2020-03-25 RX ORDER — CEFEPIME 1 G/1
2000 INJECTION, POWDER, FOR SOLUTION INTRAMUSCULAR; INTRAVENOUS EVERY 12 HOURS
Refills: 0 | Status: DISCONTINUED | OUTPATIENT
Start: 2020-03-25 | End: 2020-03-26

## 2020-03-25 RX ADMIN — ENTECAVIR 0.5 MILLIGRAM(S): 0.5 TABLET ORAL at 06:03

## 2020-03-25 RX ADMIN — PANTOPRAZOLE SODIUM 40 MILLIGRAM(S): 20 TABLET, DELAYED RELEASE ORAL at 06:03

## 2020-03-25 RX ADMIN — VENETOCLAX 10 MILLIGRAM(S): 100 TABLET, FILM COATED ORAL at 22:26

## 2020-03-25 RX ADMIN — CEFEPIME 100 MILLIGRAM(S): 1 INJECTION, POWDER, FOR SOLUTION INTRAMUSCULAR; INTRAVENOUS at 22:11

## 2020-03-25 RX ADMIN — CEFEPIME 100 MILLIGRAM(S): 1 INJECTION, POWDER, FOR SOLUTION INTRAMUSCULAR; INTRAVENOUS at 10:25

## 2020-03-25 RX ADMIN — Medication 20 MILLIEQUIVALENT(S): at 17:05

## 2020-03-25 RX ADMIN — Medication 10 MILLIGRAM(S): at 06:03

## 2020-03-25 RX ADMIN — Medication 20 MILLIEQUIVALENT(S): at 06:03

## 2020-03-25 NOTE — CHART NOTE - NSCHARTNOTEFT_GEN_A_CORE
Called by RN for pt complaint for tenderness and bruise on left forearm. Pt was seen and examined at bedside. Per patient he feel the bruise has gotten a little larger. On examination Called by RN for pt complaint for tenderness and bruise on left forearm. Pt was seen and examined at bedside. Per patient he feels the bruise has gotten a little larger. On examination there 2cm by 2cm ecchymosis mobile, firm to touch, and with mild tenderness to palpation. US of left UE extremity was ordered. Will follow up.    -RN to call with changes    -eufemia  [id number]

## 2020-03-25 NOTE — PROGRESS NOTE ADULT - PROBLEM SELECTOR PLAN 9
Cr at baseline  - Avoid nephrotoxic medications  - Monitor renal function  -BUN/Creatinine today: 21/0.85

## 2020-03-25 NOTE — PROGRESS NOTE ADULT - ASSESSMENT
76 yo male with pmhx of Hepatitis B on entecavirt, CLL/SLL, recent Flu diagnosis in February 2020, with an overnight temperature of 100.6.  Pt fatigued and per heme/onc, prognosis is poor. Will continue to monitor. 76 yo male with pmhx of Hepatitis B on entecavirt, CLL/SLL, recent Flu diagnosis in February 2020, with an overnight temperature of 100.6.  Pt fatigued and per heme/onc, prognosis is poor. Will continue to monitor.   Doppler to left arm: No DVT, small hematoma.  Will start warm compresses.  No evidence of infection

## 2020-03-25 NOTE — PROGRESS NOTE ADULT - PROBLEM SELECTOR PLAN 6
-unclear why pt's direct bilirubin is the majority fraction   -pt has no symptoms or exam findings consistent with obstructed CBD  -RUQ US showing GB with stones but no obstructing stones, and normal caliber CBD  -MRCP did not reveal obstruction  -GI following, recs appreciated -unclear why pt's direct bilirubin is the majority fraction  -pt has no symptoms or exam findings consistent with obstructed CBD  -RUQ US showing GB with stones but no obstructing stones, and normal caliber CBD  -MRCP did not reveal obstruction  -GI following, recs appreciated

## 2020-03-25 NOTE — PROGRESS NOTE ADULT - ASSESSMENT
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 Tachy likely reactive no resolved tele overnight NSR 86 - can discontinue tele   - Monitor and replete electrolytes. Keep K>4.0 and Mg>2.0.    Pancytopenia   -hem/onc following   - Multiple tx of PRBC last tx (3/21), Platelets and Plasma last tx (3/5) Last tx of platelets today for level = 1  Hg 6.9   - Would transfuse to keep Hb>8 and watch volume status     LE edema  -Doppler negative   -Albumin 2.4 likely contributing to his LE edema   -Echo 2/24/2020 revealing trace AI trace TR ef 60-65%    ID  -work up as per primary     Monitor and replete electrolytes. Keep K>4.0 and Mg>2.0.      DM  -management as per primary      - Further cardiac workup will depend on clinical course.   - All other workup per primary team. Will followup.     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 Tachy likely reactive no resolved tele overnight NSR 86 - can discontinue tele   - Monitor and replete electrolytes. Keep K>4.0 and Mg>2.0.    Pancytopenia   -hem/onc following   - Multiple tx of PRBC last tx (3/21), Platelets and Plasma last tx (3/5) Last tx of platelets today for level = 1  Hg 6.9   - Would transfuse to keep Hb>8 and watch volume status     LE edema  -Doppler negative   -Albumin 2.4 likely contributing to his LE edema   -Echo 2/24/2020 revealing trace AI trace TR ef 60-65%    ID  -work up as per primary     Monitor and replete electrolytes. Keep K>4.0 and Mg>2.0.      DM  -management as per primary     Doppler no evidence of DVT, small hematoma of anterior left forearm- as per primary      - Further cardiac workup will depend on clinical course.   - All other workup per primary team. Will followup.     Cathy Baker FNP-C  Cardiology NP  [facility] [phone]

## 2020-03-25 NOTE — PROGRESS NOTE ADULT - SUBJECTIVE AND OBJECTIVE BOX
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 Has been coughing as he was recently treated for influenza A on last admission. States that when he was discharged from [facility] on 2/15, he was feeling well and had no fevers. Patient developed a fever, T100.9F (oral) today for which he called heme/onc, Dr. Stephenson. Per Dr. Stuart, patient advised to go to ER. He was found to have a fever, T100.6F (rectal) in ER. Denies recent travel or sick contacts. Denies headache, chest pain, sob, palpitations, abdominal pain, diarrhea, melena, hematochezia, dysuria or hematuria.     Pt seen and examined at bedside today.  States he is very tired as he didn't sleep well last night.  Pt also complaining about a eli on his left arm that is raised, and ecchymotic appearing. Pt is tolerating diet, ambulating with assistance ( as was advised to do so) Overnight patient had a temperature of 100.6.  Pt without any further complaints at this time. Denies SOB, chest pain, back pain, urinary issues, current fevers, chills, abdominal pain, changes in bowel habits.    REVIEW OF SYSTEMS:    CONSTITUTIONAL: Some weakness.    EYES/ENT: No visual changes, no throat pain   RESPIRATORY: No cough, wheezing, hemoptysis; No shortness of breath  CARDIOVASCULAR: No chest pain or palpitations  GASTROINTESTINAL: No abdominal, nausea, vomiting, or hematemesis; No diarrhea or constipation. No melena or hematochezia.  GENITOURINARY: No dysuria, frequency or hematuria  NEUROLOGICAL: No dizziness, numbness, or weakness  SKIN: An ecchymotic round raised area just inferior to antecubital area. Multiple spots of ecchymosis on body.  All other review of systems is negative unless indicated above.    VITAL SIGNS:  ICU Vital Signs Last 24 Hrs  T(C): 36.9 (25 Mar 2020 07:25), Max: 38.1 (24 Mar 2020 23:30)  T(F): 98.4 (25 Mar 2020 07:25), Max: 100.6 (24 Mar 2020 23:30)  HR: 91 (25 Mar 2020 07:25) (85 - 98)  BP: 125/69 (25 Mar 2020 07:25) (113/68 - 136/71)  BP(mean): --  ABP: --  ABP(mean): --  RR: 19 (25 Mar 2020 07:25) (17 - 19)  SpO2: 95% (25 Mar 2020 07:25) (94% - 98%)      PHYSICAL EXAM:     GENERAL: no acute distress  HEENT: NC/AT, EOMI, neck supple  RESPIRATORY: CTA B/L no wheezes no rales no rhonchi  CARDIOVASCULAR: RRR, no murmurs, gallops, rubs  ABDOMINAL: soft, non-tender, non-distended, positive bowel sounds   SKIN: Multiple areas of ecchymotic appearing spots.  Left forearm with a 2x2cm raised ecchymotic appearing lesions without drainage, streaking, surrounding erythema and no warmth to touch.  No evidence of infection  MUSCULOSKELETAL: no gross joint deformity 5/5 lower extremity strength. Negative homans sign.                           7.6    3.53  )-----------( 3        ( 25 Mar 2020 07:53 )             22.4     03-25    138  |  110<H>  |  21  ----------------------------<  93  4.4   |  18<L>  |  0.85    Ca    7.9<L>      25 Mar 2020 07:53    TPro  5.3<L>  /  Alb  2.5<L>  /  TBili  3.6<H>  /  DBili  x   /  AST  53<H>  /  ALT  23  /  AlkPhos  151<H>  03-25      CAPILLARY BLOOD GLUCOSE          MEDICATIONS  (STANDING):  cefepime   IVPB 2000 milliGRAM(s) IV Intermittent every 12 hours  cefepime   IVPB      entecavir 0.5 milliGRAM(s) Oral daily  pantoprazole    Tablet 40 milliGRAM(s) Oral before breakfast  potassium chloride    Tablet ER 20 milliEquivalent(s) Oral two times a day  predniSONE   Tablet 10 milliGRAM(s) Oral daily  venetoclax 10 milliGRAM(s) Oral <User Schedule> HPI:  Patient is a 76 y/o M with 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 Has been coughing as he was recently treated for influenza A on last admission. States that when he was discharged from [facility] on 2/15, he was feeling well and had no fevers. Patient developed a fever, T100.9F (oral) today for which he called heme/onc, Dr. Stephenson. Per Dr. Stuart, patient advised to go to ER. He was found to have a fever, T100.6F (rectal) in ER. Denies recent travel or sick contacts. Denies headache, chest pain, sob, palpitations, abdominal pain, diarrhea, melena, hematochezia, dysuria or hematuria.     Pt seen and examined at bedside today.  States he is very tired as he didn't sleep well last night.  Pt also complaining about a eli on his left arm that is raised, and ecchymotic appearing. Doppler overnight- small hematoma.  No evidence of DVT.   Pt is tolerating diet, ambulating with assistance ( as was advised to do so) Overnight patient had a temperature of 100.6.  Pt without any further complaints at this time. Denies SOB, chest pain, back pain, urinary issues, current fevers, chills, abdominal pain, changes in bowel habits.    REVIEW OF SYSTEMS:    CONSTITUTIONAL: Some weakness.    EYES/ENT: No visual changes, no throat pain   RESPIRATORY: No cough, wheezing, hemoptysis; No shortness of breath  CARDIOVASCULAR: No chest pain or palpitations  GASTROINTESTINAL: No abdominal, nausea, vomiting, or hematemesis; No diarrhea or constipation. No melena or hematochezia.  GENITOURINARY: No dysuria, frequency or hematuria  NEUROLOGICAL: No dizziness, numbness, or weakness  SKIN: An ecchymotic round raised area just inferior to antecubital area. Multiple spots of ecchymosis on body.  All other review of systems is negative unless indicated above.    VITAL SIGNS:  ICU Vital Signs Last 24 Hrs  T(C): 36.9 (25 Mar 2020 07:25), Max: 38.1 (24 Mar 2020 23:30)  T(F): 98.4 (25 Mar 2020 07:25), Max: 100.6 (24 Mar 2020 23:30)  HR: 91 (25 Mar 2020 07:25) (85 - 98)  BP: 125/69 (25 Mar 2020 07:25) (113/68 - 136/71)  BP(mean): --  ABP: --  ABP(mean): --  RR: 19 (25 Mar 2020 07:25) (17 - 19)  SpO2: 95% (25 Mar 2020 07:25) (94% - 98%)      PHYSICAL EXAM:     GENERAL: no acute distress  HEENT: NC/AT, EOMI, neck supple  RESPIRATORY: CTA B/L no wheezes no rales no rhonchi  CARDIOVASCULAR: RRR, no murmurs, gallops, rubs  ABDOMINAL: soft, non-tender, non-distended, positive bowel sounds   SKIN: Multiple areas of ecchymotic appearing spots.  Left forearm with a 2x2cm raised ecchymotic appearing lesions without drainage, streaking, surrounding erythema and no warmth to touch.  No evidence of infection  MUSCULOSKELETAL: no gross joint deformity 5/5 lower extremity strength. Negative homans sign.                           7.6    3.53  )-----------( 3        ( 25 Mar 2020 07:53 )             22.4     03-25    138  |  110<H>  |  21  ----------------------------<  93  4.4   |  18<L>  |  0.85    Ca    7.9<L>      25 Mar 2020 07:53    TPro  5.3<L>  /  Alb  2.5<L>  /  TBili  3.6<H>  /  DBili  x   /  AST  53<H>  /  ALT  23  /  AlkPhos  151<H>  03-25      CAPILLARY BLOOD GLUCOSE          MEDICATIONS  (STANDING):  cefepime   IVPB 2000 milliGRAM(s) IV Intermittent every 12 hours  cefepime   IVPB      entecavir 0.5 milliGRAM(s) Oral daily  pantoprazole    Tablet 40 milliGRAM(s) Oral before breakfast  potassium chloride    Tablet ER 20 milliEquivalent(s) Oral two times a day  predniSONE   Tablet 10 milliGRAM(s) Oral daily  venetoclax 10 milliGRAM(s) Oral <User Schedule> HPI:  Patient is a 76 y/o M with 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 Has been coughing as he was recently treated for influenza A on last admission. States that when he was discharged from [facility] on 2/15, he was feeling well and had no fevers. Patient developed a fever, T100.9F (oral) today for which he called heme/onc, Dr. Stephenson. Per Dr. Stuart, patient advised to go to ER. He was found to have a fever, T100.6F (rectal) in ER. Denies recent travel or sick contacts. Denies headache, chest pain, sob, palpitations, abdominal pain, diarrhea, melena, hematochezia, dysuria or hematuria.     Pt seen and examined at bedside today.  States he is very tired as he didn't sleep well last night.  Pt also complaining about a eli on his left arm that is raised, and ecchymotic appearing. Doppler overnight- small hematoma.  No evidence of DVT.   Pt is tolerating diet, ambulating with assistance ( as was advised to do so) Overnight patient had a temperature of 100.6.  Pt without any further complaints at this time. Denies SOB, chest pain, back pain, urinary issues, current fevers, chills, abdominal pain, changes in bowel habits.    REVIEW OF SYSTEMS:    CONSTITUTIONAL: Some weakness.    EYES/ENT: No visual changes, no throat pain   RESPIRATORY: No cough, wheezing, hemoptysis; No shortness of breath  CARDIOVASCULAR: No chest pain or palpitations  GASTROINTESTINAL: No abdominal pain, nausea, vomiting, or hematemesis; No diarrhea or constipation. No melena or hematochezia.  GENITOURINARY: No dysuria, frequency or hematuria  NEUROLOGICAL: No dizziness, numbness, or weakness  SKIN: An ecchymotic round raised area just inferior to antecubital area. Multiple spots of ecchymosis on body.  All other review of systems is negative unless indicated above.    VITAL SIGNS:  ICU Vital Signs Last 24 Hrs  T(C): 36.9 (25 Mar 2020 07:25), Max: 38.1 (24 Mar 2020 23:30)  T(F): 98.4 (25 Mar 2020 07:25), Max: 100.6 (24 Mar 2020 23:30)  HR: 91 (25 Mar 2020 07:25) (85 - 98)  BP: 125/69 (25 Mar 2020 07:25) (113/68 - 136/71)  BP(mean): --  ABP: --  ABP(mean): --  RR: 19 (25 Mar 2020 07:25) (17 - 19)  SpO2: 95% (25 Mar 2020 07:25) (94% - 98%)      PHYSICAL EXAM:     GENERAL: no acute distress  HEENT: NC/AT, EOMI, neck supple  RESPIRATORY: CTA B/L no wheezes no rales no rhonchi  CARDIOVASCULAR: RRR, no murmurs, gallops, rubs  ABDOMINAL: soft, non-tender, non-distended, positive bowel sounds   SKIN: Multiple areas of ecchymotic appearing spots.  Left forearm with a 2x2cm raised ecchymotic appearing lesions without drainage, streaking, surrounding erythema and no warmth to touch.  No evidence of infection  MUSCULOSKELETAL: no gross joint deformity 5/5 lower extremity strength. Negative homans sign.                           7.6    3.53  )-----------( 3        ( 25 Mar 2020 07:53 )             22.4     03-25    138  |  110<H>  |  21  ----------------------------<  93  4.4   |  18<L>  |  0.85    Ca    7.9<L>      25 Mar 2020 07:53    TPro  5.3<L>  /  Alb  2.5<L>  /  TBili  3.6<H>  /  DBili  x   /  AST  53<H>  /  ALT  23  /  AlkPhos  151<H>  03-25      CAPILLARY BLOOD GLUCOSE          MEDICATIONS  (STANDING):  cefepime   IVPB 2000 milliGRAM(s) IV Intermittent every 12 hours  cefepime   IVPB      entecavir 0.5 milliGRAM(s) Oral daily  pantoprazole    Tablet 40 milliGRAM(s) Oral before breakfast  potassium chloride    Tablet ER 20 milliEquivalent(s) Oral two times a day  predniSONE   Tablet 10 milliGRAM(s) Oral daily  venetoclax 10 milliGRAM(s) Oral <User Schedule>

## 2020-03-25 NOTE — PROGRESS NOTE ADULT - PROBLEM SELECTOR PLAN 4
-Patient initially treated for multifocal PNA  -Patient developed high fever on 3/16/20, ID consulted  -Blood cx neg, UA suspicious for UTI, continue IV abx as per ID recs  -Patient also with tachycardia while febrile, Cardio was consulted for concern for afib- no afib noted  -CT chest/abd/pelvis noted- nonspecific findings similar to prior  -Check RVP- not detected  -ID stopped abx, however, patient again with fever, check fever workup, restated abx- ID signed off. Patient in hospital for a month, unknown is patient had COVID exposure, however given COVID testing would not change current management and patient without any symptoms besides occasional fever, would defer testing at this time.  -Currently on cefepime 2000mg [id number].  -Most recent blood cultures 3/23- prelimary negative.  Will continue to await final results.    -Urine culture- negative. Previous blood culture negative.   -FluA, FluB, RPV, RSV- negative. -Patient initially treated for multifocal PNA  -Patient developed high fever on 3/16/20, ID consulted and he was treated with Cefipime. After stopping, he spiked high fevers and abx were restarted, but further infectious workup was unrevealing. Will reconsult ID.  -Patient also with tachycardia while febrile, Cardio was consulted for concern for afib- no afib noted  -CT chest/abd/pelvis noted- nonspecific findings similar to prior  -Currently on cefepime 2000mg [id number].  -Most recent blood cultures 3/23- prelimary negative.  Will continue to await final results.    -Urine culture- negative. Previous blood culture negative.

## 2020-03-25 NOTE — PROGRESS NOTE ADULT - PROBLEM SELECTOR PLAN 10
DVT PPx: SCDs- start on ALYSON stockings, no chemical ppx in the setting of symptomatic anemia and thrombocytopenia  -Ambulate with assist/OOB with assist.    11. HTN- not on meds  12. T2DM- stable with lifestyle modifications. Hyperglycemia likely due to steroid use- now improved as steroids tapered

## 2020-03-25 NOTE — PROGRESS NOTE ADULT - PROBLEM SELECTOR PLAN 1
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 as per heme/onc.   - Steroids being tapered - 10mg now  - Hem/Onc following, recs appreciated- will restart venetoclax today- patient with poor overall prognosis, but as per patient, he wants to try all options.  -Possible palliative consult.

## 2020-03-25 NOTE — PROGRESS NOTE ADULT - PROBLEM SELECTOR PLAN 7
- Continue Entecavir 0.5mg daily- currently taking home medications, will see if pharmacy can order per heme/onc

## 2020-03-25 NOTE — PROGRESS NOTE ADULT - PROBLEM SELECTOR PLAN 2
- Per chart review, platelet count steadily declining since 2018  - poor response to platelets transfusion, suspect ITP component  - s/p one dose of IVIG  - Hem/Onc following, will follow the recommendation   -Platelet count 3 today- received 1 unit platelets yesterday. 1 unit PRBC  -Awaiting heme/onc recommendations for today.  -continue to monitor.

## 2020-03-25 NOTE — PROGRESS NOTE ADULT - ASSESSMENT
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    Was discharged home and admitted again 2/22/20 with  fever and found w pneumonia, post course of Ceftriaxone, continues to require transfusions plts and PRBCs.  Hep B titer negative  Post IVIG in late February 2020  Had trial of Venetoclax started 3/1/20 and received 4 days of treatment which was held on 3/5/20 due to pancytopenia    Remains pancytopenic and transfusion dependant; suspect element of hemolysis as well      RECOMMENDATIONS  - still PRBC and Platelets transfusion dependent,  - to receive 1 unit platelets today  - remains prednisone taper to 10mg qD today for chronic hemolysis  - being ruled out for viral infections - repeat RVP panel sent and pending; discussed with primary team regarding rule out for COVID-19 as well  - Long term prognosis poor  - patient and wife understand that his condition is likely to worsen. Willing to try low dose Venetoclax 10mg daily - started on 3/24/20  - case discussed with Dr. Arnoldo Wiley (hospitalist)

## 2020-03-25 NOTE — PROGRESS NOTE ADULT - PROBLEM SELECTOR PLAN 3
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 Was on prednisone 10mg daily -- increase to prednisone 40mg po during hospitalization, tapering started @2/29 - now 10mg daily  - Heme/onc following  - H&H today of 7.6/22.4- will continue to monitor

## 2020-03-25 NOTE — PROGRESS NOTE ADULT - SUBJECTIVE AND OBJECTIVE BOX
Patient seen and examined;  Chart reviewed and events noted;   feeling better today; resumed on low dose venetoclax  remains with low grade fever      MEDICATIONS  (STANDING):  cefepime   IVPB 2000 milliGRAM(s) IV Intermittent every 12 hours    entecavir 0.5 milliGRAM(s) Oral daily  pantoprazole    Tablet 40 milliGRAM(s) Oral before breakfast  potassium chloride    Tablet ER 20 milliEquivalent(s) Oral two times a day  predniSONE   Tablet 10 milliGRAM(s) Oral daily  venetoclax 10 milliGRAM(s) Oral     MEDICATIONS  (PRN):  acetaminophen   Tablet .. 650 milliGRAM(s) Oral every 6 hours PRN Mild Pain (1 - 3)  acetaminophen   Tablet .. 650 milliGRAM(s) Oral every 6 hours PRN Temp greater or equal to 38C (100.4F)  albuterol/ipratropium for Nebulization 3 milliLiter(s) Nebulizer every 6 hours PRN Shortness of Breath and/or Wheezing  guaiFENesin   Syrup  (Sugar-Free) 200 milliGRAM(s) Oral every 6 hours PRN Cough      Vital Signs Last 24 Hrs  T(C): 37 (25 Mar 2020 11:39), Max: 38.1 (24 Mar 2020 23:30)  T(F): 98.6 (25 Mar 2020 11:39), Max: 100.6 (24 Mar 2020 23:30)  HR: 94 (25 Mar 2020 11:39) (86 - 98)  BP: 120/69 (25 Mar 2020 11:39) (113/68 - 136/71)  RR: 18 (25 Mar 2020 11:39) (17 - 19)  SpO2: 95% (25 Mar 2020 11:39) (95% - 98%)    PHYSICAL EXAM  General: adult in NAD  HEENT: clear oropharynx, anicteric sclera, pink conjunctivae  Neck: supple  CV: normal S1S2 with no murmur rubs or gallops  Lungs: clear to auscultation, no wheezes, no rhales  Abdomen: soft non-tender non-distended, no hepato/splenomegaly  Ext: no clubbing cyanosis or edema  Skin: + bruising/petichiae on right shoulder and legs and arms  Neuro: alert and oriented X3 no focal deficits      LABS:                        7.6    3.53  )-----------( 3        ( 25 Mar 2020 07:53 )             22.4     Hemoglobin: 7.6 g/dL (03-25 @ 07:53)  Hemoglobin: 6.9 g/dL (03-24 @ 06:19)  Hemoglobin: 7.1 g/dL (03-23 @ 06:06)  Hemoglobin: 7.4 g/dL (03-22 @ 22:47)  Hemoglobin: 7.7 g/dL (03-22 @ 06:56)    WBC Count: 3.53 K/uL (03-25 @ 07:53)  WBC Count: 3.27 K/uL (03-24 @ 06:19)  WBC Count: 2.41 K/uL (03-23 @ 06:06)  WBC Count: 3.71 K/uL (03-22 @ 22:47)  WBC Count: 3.50 K/uL (03-22 @ 06:56)    Platelet Count - Automated: 3 K/uL (03-25 @ 07:53)  Platelet Count - Automated: 1 K/uL (03-24 @ 06:19)  Platelet Count - Automated: 9 K/uL (03-23 @ 06:06)  Platelet Count - Automated: 2 K/uL (03-22 @ 22:47)  Platelet Count - Automated: 2 K/uL (03-22 @ 06:56)    03-25    138  |  110<H>  |  21  ----------------------------<  93  4.4   |  18<L>  |  0.85    Ca    7.9<L>      25 Mar 2020 07:53    TPro  5.3<L>  /  Alb  2.5<L>  /  TBili  3.6<H>  /  DBili  x   /  AST  53<H>  /  ALT  23  /  AlkPhos  151<H>  03-25

## 2020-03-25 NOTE — PROGRESS NOTE ADULT - SUBJECTIVE AND OBJECTIVE BOX
INTERVAL HPI/OVERNIGHT EVENTS:  pt seen and examined  denies n/v/abd pain      MEDICATIONS  (STANDING):  cefepime   IVPB 2000 milliGRAM(s) IV Intermittent every 12 hours  cefepime   IVPB      entecavir 0.5 milliGRAM(s) Oral daily  pantoprazole    Tablet 40 milliGRAM(s) Oral before breakfast  potassium chloride    Tablet ER 20 milliEquivalent(s) Oral two times a day  predniSONE   Tablet 10 milliGRAM(s) Oral daily    MEDICATIONS  (PRN):  acetaminophen   Tablet .. 650 milliGRAM(s) Oral every 6 hours PRN Mild Pain (1 - 3)  acetaminophen   Tablet .. 650 milliGRAM(s) Oral every 6 hours PRN Temp greater or equal to 38C (100.4F)  albuterol/ipratropium for Nebulization 3 milliLiter(s) Nebulizer every 6 hours PRN Shortness of Breath and/or Wheezing  guaiFENesin   Syrup  (Sugar-Free) 200 milliGRAM(s) Oral every 6 hours PRN Cough      Allergies    sulfa drugs (Unknown)    Intolerances        Review of Systems:    General:  fever dec po  Eyes:  Good vision, no reported pain  ENT:  No sore throat, pain, runny nose, dysphagia  CV:  No pain, palpitations, hypo/hypertension  Resp:  No dyspnea, cough, tachypnea, wheezing  GI:  No pain, No nausea, No vomiting, No diarrhea, No constipation, No weight loss, No fever, No pruritis, No rectal bleeding, No melena, No dysphagia  :  No pain, bleeding, incontinence, nocturia  Muscle:  No pain, weakness  Neuro:  No weakness, tingling, memory problems  Psych:  No fatigue, insomnia, mood problems, depression  Endocrine:  No polyuria, polydypsia, cold/heat intolerance  Heme:  No petechiae, ecchymosis, easy bruisability  Skin:  No rash, tattoos, scars, edema      Vital Signs Last 24 Hrs  T(C): 36.8 (24 Mar 2020 07:30), Max: 37.9 (23 Mar 2020 23:08)  T(F): 98.2 (24 Mar 2020 07:30), Max: 100.3 (23 Mar 2020 23:08)  HR: 84 (24 Mar 2020 07:30) (84 - 93)  BP: 120/68 (24 Mar 2020 07:30) (110/64 - 149/77)  BP(mean): --  RR: 19 (24 Mar 2020 07:30) (17 - 19)  SpO2: 99% (24 Mar 2020 07:30) (96% - 99%)    PHYSICAL EXAM:    Constitutional: nad sitting up in bed  HEENT: ncat  Gastrointestinal: soft nt mild dt  Extremities: edema  Skin: jaundice  Neurological: Awake alert appropriate ?confusion        LABS:                        6.9    3.27  )-----------( 1        ( 24 Mar 2020 06:19 )             20.9     03-24    139  |  111<H>  |  24<H>  ----------------------------<  97  3.6   |  17<L>  |  1.00    Ca    7.7<L>      24 Mar 2020 06:19    TPro  5.2<L>  /  Alb  2.4<L>  /  TBili  4.4<H>  /  DBili  2.90<H>  /  AST  71<H>  /  ALT  28  /  AlkPhos  155<H>  03-24          RADIOLOGY & ADDITIONAL TESTS:

## 2020-03-26 LAB
ALBUMIN SERPL ELPH-MCNC: 2.6 G/DL — LOW (ref 3.3–5)
ALP SERPL-CCNC: 167 U/L — HIGH (ref 40–120)
ALT FLD-CCNC: 20 U/L — SIGNIFICANT CHANGE UP (ref 12–78)
ANION GAP SERPL CALC-SCNC: 8 MMOL/L — SIGNIFICANT CHANGE UP (ref 5–17)
AST SERPL-CCNC: 46 U/L — HIGH (ref 15–37)
BILIRUB SERPL-MCNC: 3.2 MG/DL — HIGH (ref 0.2–1.2)
BUN SERPL-MCNC: 22 MG/DL — SIGNIFICANT CHANGE UP (ref 7–23)
CALCIUM SERPL-MCNC: 7.7 MG/DL — LOW (ref 8.5–10.1)
CHLORIDE SERPL-SCNC: 112 MMOL/L — HIGH (ref 96–108)
CO2 SERPL-SCNC: 18 MMOL/L — LOW (ref 22–31)
CREAT SERPL-MCNC: 0.88 MG/DL — SIGNIFICANT CHANGE UP (ref 0.5–1.3)
GLUCOSE SERPL-MCNC: 102 MG/DL — HIGH (ref 70–99)
HCT VFR BLD CALC: 22.2 % — LOW (ref 39–50)
HGB BLD-MCNC: 7.4 G/DL — LOW (ref 13–17)
MCHC RBC-ENTMCNC: 28.8 PG — SIGNIFICANT CHANGE UP (ref 27–34)
MCHC RBC-ENTMCNC: 33.3 GM/DL — SIGNIFICANT CHANGE UP (ref 32–36)
MCV RBC AUTO: 86.4 FL — SIGNIFICANT CHANGE UP (ref 80–100)
NRBC # BLD: 0 /100 WBCS — SIGNIFICANT CHANGE UP (ref 0–0)
PLATELET # BLD AUTO: 1 K/UL — CRITICAL LOW (ref 150–400)
POTASSIUM SERPL-MCNC: 4.2 MMOL/L — SIGNIFICANT CHANGE UP (ref 3.5–5.3)
POTASSIUM SERPL-SCNC: 4.2 MMOL/L — SIGNIFICANT CHANGE UP (ref 3.5–5.3)
PROT SERPL-MCNC: 5.4 G/DL — LOW (ref 6–8.3)
RBC # BLD: 2.57 M/UL — LOW (ref 4.2–5.8)
RBC # FLD: 18.6 % — HIGH (ref 10.3–14.5)
SODIUM SERPL-SCNC: 138 MMOL/L — SIGNIFICANT CHANGE UP (ref 135–145)
WBC # BLD: 3.58 K/UL — LOW (ref 3.8–10.5)
WBC # FLD AUTO: 3.58 K/UL — LOW (ref 3.8–10.5)

## 2020-03-26 PROCEDURE — 99232 SBSQ HOSP IP/OBS MODERATE 35: CPT

## 2020-03-26 RX ADMIN — CEFEPIME 100 MILLIGRAM(S): 1 INJECTION, POWDER, FOR SOLUTION INTRAMUSCULAR; INTRAVENOUS at 10:22

## 2020-03-26 RX ADMIN — PANTOPRAZOLE SODIUM 40 MILLIGRAM(S): 20 TABLET, DELAYED RELEASE ORAL at 06:15

## 2020-03-26 RX ADMIN — Medication 20 MILLIEQUIVALENT(S): at 06:15

## 2020-03-26 RX ADMIN — Medication 10 MILLIGRAM(S): at 06:15

## 2020-03-26 RX ADMIN — ENTECAVIR 0.5 MILLIGRAM(S): 0.5 TABLET ORAL at 06:15

## 2020-03-26 RX ADMIN — Medication 20 MILLIEQUIVALENT(S): at 17:17

## 2020-03-26 NOTE — PROGRESS NOTE ADULT - PROBLEM SELECTOR PLAN 2
- Per chart review, platelet count steadily declining since 2018  - poor response to platelets transfusion, suspect ITP component  - s/p one dose of IVIG  - Hem/Onc following, will follow the recommendation   -Platelet count 1 today- received 1 unit platelets yesterday.  -To receive 1 unit of platelets today.    -Heme/Onc- continue supportive care.  Dr Stephenson to be in over the weekend, this is patients personal hematologist.  -continue to monitor.

## 2020-03-26 NOTE — PROGRESS NOTE ADULT - PROBLEM SELECTOR PLAN 1
afeb now 24 hours  with occ fever spikes   exam benign  cxr without new consolidation  ct improved no acute pna  f/u blood cx ntd  no infectious etiology isolated  d/c antibx    check covid pcr certainly at risk

## 2020-03-26 NOTE — PROGRESS NOTE ADULT - PROBLEM SELECTOR PLAN 3
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 Was on prednisone 10mg daily -- increase to prednisone 40mg po during hospitalization, tapering started @2/29 - now 10mg daily  - Heme/onc following  - H&H today of 7.4/22.2- will continue to monitor

## 2020-03-26 NOTE — PROGRESS NOTE ADULT - ASSESSMENT
74 yo male with pmhx of Hepatitis B on entecavirt, CLL/SLL, recent Flu diagnosis in February 2020, with an overnight temperature of 100.6.  Pt fatigued and per heme/onc, prognosis is poor. Will continue to monitor.   Doppler to left arm: No DVT, small hematoma.  Will start warm compresses.  No evidence of infection.  Due to poor prognosis, will continue supportive care.

## 2020-03-26 NOTE — PROGRESS NOTE ADULT - SUBJECTIVE AND OBJECTIVE BOX
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 Has been coughing as he was recently treated for influenza A on last admission. States that when he was discharged from [facility] on 2/15, he was feeling well and had no fevers. Patient developed a fever, T100.9F (oral) today for which he called heme/onc, Dr. Stephenson. Per Dr. Stuart, patient advised to go to ER. He was found to have a fever, T100.6F (rectal) in ER. Denies recent travel or sick contacts. Denies headache, chest pain, sob, palpitations, abdominal pain, diarrhea, melena, hematochezia, dysuria or hematuria.     Pt seen and examined at bedside today.  Pt is sitting up right and getting ready to eat breakfast.  Pt had just been seen by Heme/onc and was getting blood drawn.  Pt still complaining about left forearm hematoma (per US)States it is getting bigger.  Had only had a warm compress on it once- advised continuing warm compresses to area.  Pt states area is NT.  Pt tolerating diet, had last bowel movement two days ago- normal per patient.  No fevers x24 hours.  No headaches, no SOB, no chest pain, no back pain.  Pt would like to ambulate.  Advised will need to walk with assistance.      REVIEW OF SYSTEMS:    CONSTITUTIONAL: Some weakness.    EYES/ENT: No visual changes, no throat pain   RESPIRATORY: No cough, wheezing, hemoptysis; No shortness of breath  CARDIOVASCULAR: No chest pain or palpitations  GASTROINTESTINAL: No abdominal pain, nausea, vomiting, or hematemesis; No diarrhea or constipation. No melena or hematochezia.  GENITOURINARY: No dysuria, frequency or hematuria  NEUROLOGICAL: No dizziness, numbness, or weakness  SKIN: An ecchymotic round raised area just inferior to antecubital area. Multiple spots of ecchymosis on body.  All other review of systems is negative unless indicated above.    VITAL SIGNS:  ICU Vital Signs Last 24 Hrs  ICU Vital Signs Last 24 Hrs  T(C): 36.7 (26 Mar 2020 07:40), Max: 37.2 (25 Mar 2020 23:30)  T(F): 98.1 (26 Mar 2020 07:40), Max: 98.9 (25 Mar 2020 23:30)  HR: 88 (26 Mar 2020 07:40) (77 - 106)  BP: 103/62 (26 Mar 2020 07:40) (103/62 - 146/74)  BP(mean): --  ABP: --  ABP(mean): --  RR: 18 (26 Mar 2020 07:40) (18 - 18)  SpO2: 98% (26 Mar 2020 07:40) (95% - 98%)    PHYSICAL EXAM:     GENERAL: no acute distress  HEENT: NC/AT, EOMI, neck supple  RESPIRATORY: CTA B/L no wheezes no rales no rhonchi  CARDIOVASCULAR: RRR, no murmurs, gallops, rubs  ABDOMINAL: soft, non-tender, non-distended, positive bowel sounds   SKIN: Multiple areas of ecchymotic appearing spots.  Left forearm with a raised ecchymotic appearing lesions without drainage, streaking, surrounding erythema and no warmth to            touch.  No evidence of infection.  MUSCULOSKELETAL: no gross joint deformity 5/5 lower extremity strength. Negative homans sign.       LABS:                        7.4    3.58  )-----------( 1        ( 26 Mar 2020 06:33 )             22.2     03-26    138  |  112<H>  |  22  ----------------------------<  102<H>  4.2   |  18<L>  |  0.88    Ca    7.7<L>      26 Mar 2020 06:33    TPro  5.4<L>  /  Alb  2.6<L>  /  TBili  3.2<H>  /  DBili  x   /  AST  46<H>  /  ALT  20  /  AlkPhos  167<H>  03-26        MEDICATIONS  (STANDING):  cefepime   IVPB 2000 milliGRAM(s) IV Intermittent every 12 hours  cefepime   IVPB      entecavir 0.5 milliGRAM(s) Oral daily  pantoprazole    Tablet 40 milliGRAM(s) Oral before breakfast  potassium chloride    Tablet ER 20 milliEquivalent(s) Oral two times a day  predniSONE   Tablet 10 milliGRAM(s) Oral daily  venetoclax 10 milliGRAM(s) Oral <User Schedule>

## 2020-03-26 NOTE — PROGRESS NOTE ADULT - PROBLEM SELECTOR PLAN 1
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 as per heme/onc.   - Steroids being tapered - 10mg now  - Hem/Onc following, recs appreciated- venetoclax restarted Tuesday March 25. - patient with poor overall prognosis, but as per patient, he wants to try all options.  -Possible palliative consult.

## 2020-03-26 NOTE — PROGRESS NOTE ADULT - ASSESSMENT
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

## 2020-03-26 NOTE — PROGRESS NOTE ADULT - SUBJECTIVE AND OBJECTIVE BOX
[facility], Division of Infectious Diseases  [address] [facility] [address] [facility]  [phone]    Name: DIONICIO SULLIVAN  Age: 75y  Gender: Male  MRN: [id number]    Interval History--  Notes reviewed-- called for persistent fevers  pt states weak, and tired  no cough, no dyspnea, no diarrhea, no dysuria.    Past Medical History--  KIRTI (obstructive sleep apnea)  Hypertension  Diabetes  Hepatitis B  Lymphoma  AIHA (autoimmune hemolytic anemia)  Leukemia  Diabetes  HTN (hypertension)  H/O lithotripsy  No significant past surgical history      For details regarding the patient's social history, family history, and other miscellaneous elements, please refer the initial infectious diseases consultation and/or the admitting history and physical examination for this admission.    Allergies    sulfa drugs (Unknown)    Intolerances        Medications--  Antibiotics:  cefepime   IVPB 2000 milliGRAM(s) IV Intermittent every 12 hours  entecavir 0.5 milliGRAM(s) Oral daily    Immunologic:    Other:  acetaminophen   Tablet .. PRN  acetaminophen   Tablet .. PRN  albuterol/ipratropium for Nebulization PRN  guaiFENesin   Syrup  (Sugar-Free) PRN  pantoprazole    Tablet  potassium chloride    Tablet ER  predniSONE   Tablet  venetoclax      Review of Systems--  A 10-point review of systems was obtained.     Pertinent positives and negatives--  Constitutional: No fevers. No Chills. No Rigors.   Cardiovascular: No chest pain. No palpitations.  Respiratory: No shortness of breath. No cough.  Gastrointestinal: No nausea or vomiting. No diarrhea or constipation.   Psychiatric: no anxiety    Review of systems otherwise negative except as previously noted.    Physical Examination--  Vital Signs: T(F): 98.3 (03-26-20 @ 11:16), Max: 98.9 (03-25-20 @ 23:30)  HR: 91 (03-26-20 @ 11:16)  BP: 125/72 (03-26-20 @ 11:16)  RR: 18 (03-26-20 @ 11:16)  SpO2: 97% (03-26-20 @ 11:16)  Wt(kg): --  General: Nontoxic-appearing Male in no acute distress.  HEENT: AT/NC.  Anicteric. mouth with ecchymosis/dried blood  Neck: Not rigid. No sense of mass.  Nodes: None palpable.  Lungs: Clear bilaterally without rales, wheezing or rhonchi  Heart: Regular rate and rhythm.   Abdomen: Bowel sounds present and normoactive. Soft. Nondistended.   Extremities: No cyanosis or clubbing. No edema.   Skin: Warm. Dry. Good turgor. No rash ecchymosis  Psychiatric: Appropriate affect and mood for situation.         Laboratory Studies--  CBC                        7.4    3.58  )-----------( 1        ( 26 Mar 2020 06:33 )             22.2       Chemistries  03-26    138  |  112<H>  |  22  ----------------------------<  102<H>  4.2   |  18<L>  |  0.88    Ca    7.7<L>      26 Mar 2020 06:33    TPro  5.4<L>  /  Alb  2.6<L>  /  TBili  3.2<H>  /  DBili  x   /  AST  46<H>  /  ALT  20  /  AlkPhos  167<H>  03-26      Culture Data    Culture - Blood (collected 23 Mar 2020 08:54)  Source: .Blood Blood  Preliminary Report (24 Mar 2020 09:02):    No growth to date.    Culture - Blood (collected 23 Mar 2020 08:54)  Source: .Blood Blood  Preliminary Report (24 Mar 2020 09:02):    No growth to date.  < from: CT Chest No Cont (03.19.20 @ 09:40) >    EXAM:  CT CHEST                            PROCEDURE DATE:  03/19/2020          INTERPRETATION:  CLINICAL INFORMATION: CLL, fever    COMPARISON: 2/21/2020    PROCEDURE:   CT of the Chest, Abdomen and Pelvis was performed without intravenous contrast.   Intravenous contrast: None.  Oral contrast: None.  Sagittal and coronal reformats were performed.    FINDINGS:  Lack of IV contrast limits evaluation diana, vascular structures and solid organ parenchyma  CHEST:     LUNGS AND LARGE AIRWAYS: Patentcentral airways. Scattered bilateral patchy, nodular and groundglass opacities slightly improved at the lung bases compared to prior. Findings could all be inflammatory/infectious. Neoplastic component not excluded.  PLEURA: Interval increase in small left and small-to-moderate right pleural effusion.  VESSELS: Nonaneurysmal  HEART: Heart size is normal. Coronary artery calcination. Decrease cardiac chamber blood pool attenuation suggests an anemic state. Trace pericardial effusion.  MEDIASTINUM AND DIANA: Multistation mediastinal  supraclavicular, bilateral hilar and bilateral axillary adenopathy similar to prior  CHEST WALL AND LOWER NECK: Within normal limits.    ABDOMEN AND PELVIS:    LIVER: Hepatomegaly similar to prior  BILE DUCTS: Normal caliber.  GALLBLADDER: Cholelithiasis.  SPLEEN: Splenomegaly similar to prior  PANCREAS: Within normal limits.  ADRENALS: Within normal limits.  KIDNEYS/URETERS: Within normal limits.    BLADDER: Within normal limits.  REPRODUCTIVE ORGANS: Unremarkable    BOWEL: Evaluation limited due to limited by nonopacification underdistention. No gross active bowel inflammation. No bowel obstruction.. Appendix no appendicitis  PERITONEUM: Trace ascites. No extraluminal gas or organized collections.  VESSELS: Nonaneurysmal.  RETROPERITONEUM/LYMPH NODES: Stable periportal and retroperitoneal, mesenteric pelvic and bilateral inguinal adenopathy.    ABDOMINAL WALL: Mild anasarca.  BONES: Stable    IMPRESSION:     Improvement in the scattered bilateral parenchymal opacities as described.  Slight increase in bilateral pleural effusions.  Stable multistation adenopathy chest abdomen and pelvis as described  No acute findings abdomen and pelvis    < end of copied text >        Urine Microscopic-Add On (NC) (03.18.20 @ 20:35)    Red Blood Cell - Urine: 3-5 /HPF    Comment - Urine: Few Amorphous Precipitates    Bacteria: Moderate    White Blood Cell - Urine: 0-2    Epithelial Cells: Occasional      Rapid Respiratory Viral Panel (03.20.20 @ 20:28)    Rapid RVP Result: NotDetec: This Respiratory Panel does NOT detect the 2019 novel coronavirus  (2019-nCoV) involved with the outbreak originating in [facility].  This Respiratory Panel uses polymerase chain reaction (PCR) to detect for  adenovirus; coronavirus (HKU1, NL63, 229E, OC43); human metapneumovirus  (hMPV); human enterovirus/rhinovirus (Entero/RV); influenza A; influenza  A/H1; influenza A/H3; influenza A/H1-2009; influenza B; parainfluenza  viruses 1, 2, 3, 4; respiratory syncytial virus; Mycoplasma pneumoniae;  and Chlamydophila pneumoniae.

## 2020-03-26 NOTE — PROGRESS NOTE ADULT - PROBLEM SELECTOR PLAN 4
-Patient initially treated for multifocal PNA  -Patient developed high fever on 3/16/20, ID consulted and he was treated with Cefipime. After stopping, he spiked high fevers and abx were restarted, but further infectious workup was unrevealing. Will reconsult ID.  -Patient also with tachycardia while febrile, Cardio was consulted for concern for afib- no afib noted  -CT chest/abd/pelvis noted- nonspecific findings similar to prior  -Currently on cefepime 2000mg [id number].  -Most recent blood cultures 3/23- preliminaryy negative.  Will continue to await final results.    -Urine culture- negative. Previous blood culture negative.  -Currently on cefepime- possibly Discontinue as questionable source of infection?  -ID reconsulted.  Afebrile x24 hours. -Patient initially treated for multifocal PNA  -Patient developed high fever on 3/16/20, ID consulted and he was treated with Cefipime. After stopping, he spiked high fevers and abx were restarted, but further infectious workup was unrevealing. Will reconsult ID.  -Patient also with tachycardia while febrile, Cardio was consulted for concern for afib- no afib noted  -CT chest/abd/pelvis noted- nonspecific findings similar to prior  -Currently on cefepime 2000mg [id number].  -Most recent blood cultures 3/23- preliminary negative.  Will continue to await final results.    -Urine culture- negative. Previous blood culture negative.  -Currently on cefepime- possibly Discontinue as questionable source of infection?  -ID reconsulted.  Afebrile x24 hours.

## 2020-03-26 NOTE — PROGRESS NOTE ADULT - PROBLEM SELECTOR PLAN 9
Cr at baseline  - Avoid nephrotoxic medications  - Monitor renal function  -BUN/Creatinine today: 22/0.88

## 2020-03-26 NOTE — PROGRESS NOTE ADULT - SUBJECTIVE AND OBJECTIVE BOX
[facility] -- Cori Dawkins, Travis Villagomez Pannella, Patel, Savella  Office # [phone]      Follow Up:    tachycardia     Subjective/Observations:   Seen at bedside, alert offers no complaints     REVIEW OF SYSTEMS: All other review of systems is negative unless indicated above    PAST MEDICAL & SURGICAL HISTORY:  KIRTI (obstructive sleep apnea): on nocturnal cpap  Hypertension: not on any medications  Diabetes: not on any medications  Hepatitis B  Lymphoma: Small cell B cell  AIHA (autoimmune hemolytic anemia)  H/O lithotripsy      MEDICATIONS  (STANDING):  cefepime   IVPB 2000 milliGRAM(s) IV Intermittent every 12 hours  entecavir 0.5 milliGRAM(s) Oral daily  pantoprazole    Tablet 40 milliGRAM(s) Oral before breakfast  potassium chloride    Tablet ER 20 milliEquivalent(s) Oral two times a day  predniSONE   Tablet 10 milliGRAM(s) Oral daily  venetoclax 10 milliGRAM(s) Oral <User Schedule>    MEDICATIONS  (PRN):  acetaminophen   Tablet .. 650 milliGRAM(s) Oral every 6 hours PRN Mild Pain (1 - 3)  acetaminophen   Tablet .. 650 milliGRAM(s) Oral every 6 hours PRN Temp greater or equal to 38C (100.4F)  albuterol/ipratropium for Nebulization 3 milliLiter(s) Nebulizer every 6 hours PRN Shortness of Breath and/or Wheezing  guaiFENesin   Syrup  (Sugar-Free) 200 milliGRAM(s) Oral every 6 hours PRN Cough      Allergies    sulfa drugs (Unknown)    Intolerances        Vital Signs Last 24 Hrs  T(C): 36.7 (26 Mar 2020 07:40), Max: 37.2 (25 Mar 2020 23:30)  T(F): 98.1 (26 Mar 2020 07:40), Max: 98.9 (25 Mar 2020 23:30)  HR: 88 (26 Mar 2020 07:40) (77 - 106)  BP: 103/62 (26 Mar 2020 07:40) (103/62 - 146/74)  BP(mean): --  RR: 18 (26 Mar 2020 07:40) (18 - 18)  SpO2: 98% (26 Mar 2020 07:40) (95% - 98%)    I&O's Summary    25 Mar 2020 07:01  -  26 Mar 2020 07:00  --------------------------------------------------------  IN: 240 mL / OUT: 0 mL / NET: 240 mL          PHYSICAL EXAM:  TELE: SR 90  Constitutional: NAD, awake and alert, well-developed  HEENT: Moist Mucous Membranes, Anicteric  Pulmonary: Non-labored, breath sounds are clear bilaterally, No wheezing, crackles or rhonchi  Cardiovascular: Regular, S1 and S2 nl, No murmurs, rubs, gallops or clicks  Gastrointestinal: Bowel Sounds present, soft, nontender.   Lymph: No lymphadenopathy. No peripheral edema.  Psych:  Mood & affect appropriate    LABS: All Labs Reviewed:                        7.4    3.58  )-----------( 1        ( 26 Mar 2020 06:33 )             22.2                         7.6    3.53  )-----------( 3        ( 25 Mar 2020 07:53 )             22.4                         6.9    3.27  )-----------( 1        ( 24 Mar 2020 06:19 )             20.9     26 Mar 2020 06:33    138    |  112    |  22     ----------------------------<  102    4.2     |  18     |  0.88   25 Mar 2020 07:53    138    |  110    |  21     ----------------------------<  93     4.4     |  18     |  0.85   24 Mar 2020 06:19    139    |  111    |  24     ----------------------------<  97     3.6     |  17     |  1.00     Ca    7.7        26 Mar 2020 06:33  Ca    7.9        25 Mar 2020 07:53  Ca    7.7        24 Mar 2020 06:19    TPro  5.4    /  Alb  2.6    /  TBili  3.2    /  DBili  x      /  AST  46     /  ALT  20     /  AlkPhos  167    26 Mar 2020 06:33  TPro  5.3    /  Alb  2.5    /  TBili  3.6    /  DBili  x      /  AST  53     /  ALT  23     /  AlkPhos  151    25 Mar 2020 07:53  TPro  5.2    /  Alb  2.4    /  TBili  4.4    /  DBili  2.90   /  AST  71     /  ALT  28     /  AlkPhos  155    24 Mar 2020 06:19             ECG:  < from: 12 Lead ECG (03.18.20 @ 19:13) >    Ventricular Rate 167 BPM    Atrial Rate 178 BPM    QRS Duration 64 ms    Q-T Interval 296 ms    QTC Calculation(Bezet) 493 ms    R Axis 32 degrees    T Axis 61 degrees    Diagnosis Line *** Poor data quality, interpretation may be adversely affected  likely st with apcs  Nonspecific ST and T wave abnormality  Abnormal ECG  When compared with ECG of 16-MAR-2020 17:10,  Previous ECG has undetermined rhythm, needs review  Nonspecific T wave abnormality now evident in Anterior leads    < end of copied text >      Echo:    Radiology: [facility] -- Cori Dawkins, Travis Villagomez, Ryland Wright Savella  Office # [phone]      Follow Up:    tachycardia     Subjective/Observations:   Patient seen and examined. Events noted. Resting comfortably in bed. No complaints of chest pain, dyspnea, or palpitations reported. No signs of orthopnea or PND.     REVIEW OF SYSTEMS: All other review of systems is negative unless indicated above    PAST MEDICAL & SURGICAL HISTORY:  KIRTI (obstructive sleep apnea): on nocturnal cpap  Hypertension: not on any medications  Diabetes: not on any medications  Hepatitis B  Lymphoma: Small cell B cell  AIHA (autoimmune hemolytic anemia)  H/O lithotripsy      MEDICATIONS  (STANDING):  cefepime   IVPB 2000 milliGRAM(s) IV Intermittent every 12 hours  entecavir 0.5 milliGRAM(s) Oral daily  pantoprazole    Tablet 40 milliGRAM(s) Oral before breakfast  potassium chloride    Tablet ER 20 milliEquivalent(s) Oral two times a day  predniSONE   Tablet 10 milliGRAM(s) Oral daily  venetoclax 10 milliGRAM(s) Oral <User Schedule>    MEDICATIONS  (PRN):  acetaminophen   Tablet .. 650 milliGRAM(s) Oral every 6 hours PRN Mild Pain (1 - 3)  acetaminophen   Tablet .. 650 milliGRAM(s) Oral every 6 hours PRN Temp greater or equal to 38C (100.4F)  albuterol/ipratropium for Nebulization 3 milliLiter(s) Nebulizer every 6 hours PRN Shortness of Breath and/or Wheezing  guaiFENesin   Syrup  (Sugar-Free) 200 milliGRAM(s) Oral every 6 hours PRN Cough      Allergies    sulfa drugs (Unknown)    Intolerances        Vital Signs Last 24 Hrs  T(C): 36.7 (26 Mar 2020 07:40), Max: 37.2 (25 Mar 2020 23:30)  T(F): 98.1 (26 Mar 2020 07:40), Max: 98.9 (25 Mar 2020 23:30)  HR: 88 (26 Mar 2020 07:40) (77 - 106)  BP: 103/62 (26 Mar 2020 07:40) (103/62 - 146/74)  BP(mean): --  RR: 18 (26 Mar 2020 07:40) (18 - 18)  SpO2: 98% (26 Mar 2020 07:40) (95% - 98%)    I&O's Summary    25 Mar 2020 07:01  -  26 Mar 2020 07:00  --------------------------------------------------------  IN: 240 mL / OUT: 0 mL / NET: 240 mL          PHYSICAL EXAM:  TELE: SR 90  Constitutional: NAD, awake   HEENT: Moist Mucous Membranes, Anicteric  Pulmonary: Non-labored, breath sounds are clear bilaterally, No wheezing, crackles or rhonchi  Cardiovascular: Regular, S1 and S2 nl, No murmurs, rubs, gallops or clicks  Gastrointestinal: Bowel Sounds present, soft, nontender.   Lymph: No lymphadenopathy. No peripheral edema.  Psych:  Mood & affect appropriate    LABS: All Labs Reviewed:                        7.4    3.58  )-----------( 1        ( 26 Mar 2020 06:33 )             22.2                         7.6    3.53  )-----------( 3        ( 25 Mar 2020 07:53 )             22.4                         6.9    3.27  )-----------( 1        ( 24 Mar 2020 06:19 )             20.9     26 Mar 2020 06:33    138    |  112    |  22     ----------------------------<  102    4.2     |  18     |  0.88   25 Mar 2020 07:53    138    |  110    |  21     ----------------------------<  93     4.4     |  18     |  0.85   24 Mar 2020 06:19    139    |  111    |  24     ----------------------------<  97     3.6     |  17     |  1.00     Ca    7.7        26 Mar 2020 06:33  Ca    7.9        25 Mar 2020 07:53  Ca    7.7        24 Mar 2020 06:19    TPro  5.4    /  Alb  2.6    /  TBili  3.2    /  DBili  x      /  AST  46     /  ALT  20     /  AlkPhos  167    26 Mar 2020 06:33  TPro  5.3    /  Alb  2.5    /  TBili  3.6    /  DBili  x      /  AST  53     /  ALT  23     /  AlkPhos  151    25 Mar 2020 07:53  TPro  5.2    /  Alb  2.4    /  TBili  4.4    /  DBili  2.90   /  AST  71     /  ALT  28     /  AlkPhos  155    24 Mar 2020 06:19             ECG:  < from: 12 Lead ECG (03.18.20 @ 19:13) >    Ventricular Rate 167 BPM    Atrial Rate 178 BPM    QRS Duration 64 ms    Q-T Interval 296 ms    QTC Calculation(Bezet) 493 ms    R Axis 32 degrees    T Axis 61 degrees    Diagnosis Line *** Poor data quality, interpretation may be adversely affected  likely st with apcs  Nonspecific ST and T wave abnormality  Abnormal ECG  When compared with ECG of 16-MAR-2020 17:10,  Previous ECG has undetermined rhythm, needs review  Nonspecific T wave abnormality now evident in Anterior leads    < end of copied text >      Echo:    Radiology:

## 2020-03-26 NOTE — PROGRESS NOTE ADULT - ASSESSMENT
76 y/o man w Hepatitis B on Entecavirt, Chronic Lymphocytic Leukemia/Small Lymphocytic Lymphoma 4/2018 when presented w immune hemolytic anemia w partial response to steroids, started on Ibrutinib w heme CR and ND by CT scan w some decrease of lymphadenopathies, until 1/2020 when relapsed w incr WBC, anemia(initially not hemolytic, has chronic Matthew positive due to CLL/SLL), thrombocytopenia.    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    Was discharged home and admitted again 2/22/20 with  fever and found w pneumonia, post course of Ceftriaxone, continues to require transfusions plts and PRBCs.  Hep B titer negative  Post IVIG in late February 2020  Had trial of Venetoclax started 3/1/20 and received 4 days of treatment which was held on 3/5/20 due to pancytopenia    Remains pancytopenic and transfusion dependant; suspect element of hemolysis as well      RECOMMENDATIONS  - still PRBC and Platelets transfusion dependent,  - to receive 1 unit platelets today  - remains prednisone taper to 10mg qD today for chronic hemolysis  - being ruled out for viral infections - repeat RVP panel sent and pending; discussed with primary team regarding rule out for COVID-19 as well (ID to follow)  - Long term prognosis poor  - patient and wife understand that his condition is likely to worsen. will continue low dose Venetoclax 10mg daily - started on 3/24/20  - case discussed with hospitalist PA today

## 2020-03-26 NOTE — PROGRESS NOTE ADULT - ASSESSMENT
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 Tachy likely reactive now resolved tele overnight NSR 90bpm,  can discontinue tele   - Monitor and replete electrolytes. Keep K>4.0 and Mg>2.0.    Pancytopenia   -hem/onc following   - Multiple tx of PRBC last tx (3/21), Platelets and Plasma last tx (3/5) Last tx of platelets today for level = 1  Hg 6.9   - Would transfuse to keep Hb>8 and watch volume status     LE edema  -Doppler negative   -Albumin 2.4 likely contributing to his LE edema   -Echo 2/24/2020 revealing trace AI trace TR ef 60-65%    ID  -work up as per primary   -RVP pending, should consider COVID rule out     Monitor and replete electrolytes. Keep K>4.0 and Mg>2.0.      DM  -management as per primary     Doppler no evidence of DVT, small hematoma of anterior left forearm- as per primary      - Further cardiac workup will depend on clinical course.   - All other workup per primary team. Will followup.     Cathy [id number]-C  Cardiology NP  [phone]

## 2020-03-26 NOTE — PROGRESS NOTE ADULT - ASSESSMENT
hepatitis B   elevated lfts   CLL  pancytopenia  intermittent fever      patient with elevated bilirubin and alk phos at baseline ? leon  mri showed normal liver, no cbd stone/dilatation; hep b pcr neg  ob neg; no s/s overt gib; monitor cbc daily, transfuse per heme/onc  diet as tolerated; correct elytes prn  cont ppi ppx  further care per heme/onc and primary    Advanced care planning was discussed with patient and family.  Advanced care planning forms were reviewed and discussed.  Risks, benefits and alternatives of gastroenterologic procedures were discussed in detail and all questions were answered.    30 minutes spent. hepatitis B   elevated lfts   CLL  pancytopenia  intermittent fever      patient with elevated bilirubin and alk phos at baseline ? leon  mri showed normal liver, no cbd stone/dilatation; hep b pcr neg  ob neg; no s/s overt gib; monitor cbc daily, transfuse per heme/onc  diet as tolerated; correct elytes prn  cont ppi ppx  abx per primary  further care per heme/onc and primary    Advanced care planning was discussed with patient and family.  Advanced care planning forms were reviewed and discussed.  Risks, benefits and alternatives of gastroenterologic procedures were discussed in detail and all questions were answered.    30 minutes spent.

## 2020-03-26 NOTE — PROGRESS NOTE ADULT - SUBJECTIVE AND OBJECTIVE BOX
INTERVAL HPI/OVERNIGHT EVENTS:  pt seen and examined  denies n/v/d/c/abd pain  no gib per nursing  sp plts yesterday    MEDICATIONS  (STANDING):  cefepime   IVPB 2000 milliGRAM(s) IV Intermittent every 12 hours  entecavir 0.5 milliGRAM(s) Oral daily  pantoprazole    Tablet 40 milliGRAM(s) Oral before breakfast  potassium chloride    Tablet ER 20 milliEquivalent(s) Oral two times a day  predniSONE   Tablet 10 milliGRAM(s) Oral daily  venetoclax 10 milliGRAM(s) Oral <User Schedule>    MEDICATIONS  (PRN):  acetaminophen   Tablet .. 650 milliGRAM(s) Oral every 6 hours PRN Mild Pain (1 - 3)  acetaminophen   Tablet .. 650 milliGRAM(s) Oral every 6 hours PRN Temp greater or equal to 38C (100.4F)  albuterol/ipratropium for Nebulization 3 milliLiter(s) Nebulizer every 6 hours PRN Shortness of Breath and/or Wheezing  guaiFENesin   Syrup  (Sugar-Free) 200 milliGRAM(s) Oral every 6 hours PRN Cough      Allergies    sulfa drugs (Unknown)    Intolerances        Review of Systems:    General:  No wt loss, fevers, chills, night sweats, fatigue   Eyes:  Good vision, no reported pain  ENT:  No sore throat, pain, runny nose, dysphagia  CV:  No pain, palpitations, hypo/hypertension  Resp:  No dyspnea, cough, tachypnea, wheezing  GI:  No pain, No nausea, No vomiting, No diarrhea, No constipation, No weight loss, No fever, No pruritis, No rectal bleeding, No melena, No dysphagia  :  No pain, bleeding, incontinence, nocturia  Muscle:  No pain, weakness  Neuro:  No weakness, tingling, memory problems  Psych:  No fatigue, insomnia, mood problems, depression  Endocrine:  No polyuria, polydypsia, cold/heat intolerance  Heme:  No petechiae, ecchymosis, easy bruisability  Skin:  No rash, tattoos, scars, edema      Vital Signs Last 24 Hrs  T(C): 36.7 (26 Mar 2020 07:40), Max: 37.2 (25 Mar 2020 23:30)  T(F): 98.1 (26 Mar 2020 07:40), Max: 98.9 (25 Mar 2020 23:30)  HR: 88 (26 Mar 2020 07:40) (77 - 106)  BP: 103/62 (26 Mar 2020 07:40) (103/62 - 146/74)  BP(mean): --  RR: 18 (26 Mar 2020 07:40) (18 - 18)  SpO2: 98% (26 Mar 2020 07:40) (95% - 98%)    PHYSICAL EXAM:    Constitutional: lying in bed  HEENT: ncat  Gastrointestinal: soft nt mild dt  Extremities: edema  Skin: jaundice  Neurological: Awake alert appropriate ?confusion    LABS:                        7.4    3.58  )-----------( 1        ( 26 Mar 2020 06:33 )             22.2     03-26    138  |  112<H>  |  22  ----------------------------<  102<H>  4.2   |  18<L>  |  0.88    Ca    7.7<L>      26 Mar 2020 06:33    TPro  5.4<L>  /  Alb  2.6<L>  /  TBili  3.2<H>  /  DBili  x   /  AST  46<H>  /  ALT  20  /  AlkPhos  167<H>  03-26          RADIOLOGY & ADDITIONAL TESTS:

## 2020-03-26 NOTE — PROGRESS NOTE ADULT - PROBLEM SELECTOR PLAN 6
-unclear why pt's direct bilirubin is the majority fraction  -pt has no symptoms or exam findings consistent with obstructed CBD  -RUQ US showing GB with stones but no obstructing stones, and normal caliber CBD  -MRCP did not reveal obstruction  -GI following, recs appreciated  -Hyperbilirubinemia steadily decreasing and improving- will continue to monitor

## 2020-03-26 NOTE — PROGRESS NOTE ADULT - SUBJECTIVE AND OBJECTIVE BOX
Patient seen and examined;  Chart reviewed and events noted;   has some dried blood on lips  feels better today; more interactive    MEDICATIONS  (STANDING):  cefepime   IVPB 2000 milliGRAM(s) IV Intermittent every 12 hours  entecavir 0.5 milliGRAM(s) Oral daily  pantoprazole    Tablet 40 milliGRAM(s) Oral before breakfast  potassium chloride    Tablet ER 20 milliEquivalent(s) Oral two times a day  predniSONE   Tablet 10 milliGRAM(s) Oral daily  venetoclax 10 milliGRAM(s) Oral <User Schedule>    MEDICATIONS  (PRN):  acetaminophen   Tablet .. 650 milliGRAM(s) Oral every 6 hours PRN Mild Pain (1 - 3)  acetaminophen   Tablet .. 650 milliGRAM(s) Oral every 6 hours PRN Temp greater or equal to 38C (100.4F)  albuterol/ipratropium for Nebulization 3 milliLiter(s) Nebulizer every 6 hours PRN Shortness of Breath and/or Wheezing  guaiFENesin   Syrup  (Sugar-Free) 200 milliGRAM(s) Oral every 6 hours PRN Cough      Vital Signs Last 24 Hrs  T(C): 36.7 (26 Mar 2020 07:40), Max: 37.2 (25 Mar 2020 23:30)  T(F): 98.1 (26 Mar 2020 07:40), Max: 98.9 (25 Mar 2020 23:30)  HR: 88 (26 Mar 2020 07:40) (77 - 106)  BP: 103/62 (26 Mar 2020 07:40) (103/62 - 146/74)  RR: 18 (26 Mar 2020 07:40) (18 - 18)  SpO2: 98% (26 Mar 2020 07:40) (95% - 98%)    PHYSICAL EXAM  General: adult male in NAD  HEENT: + dried blood on lower lip; anicteric sclera  Neck: supple  CV: normal S1S2 with no murmur rubs or gallops  Lungs: clear to auscultation, no wheezes, no rhales  Abdomen: soft non-tender non-distended, no hepato/splenomegaly  Ext: no clubbing cyanosis or edema  Skin: + petichiae and echymoses on right shoulder, neck, bilateral lower extremities  Neuro: alert and oriented X3 no focal deficits      LABS:                        7.4    3.58  )-----------( 1        ( 26 Mar 2020 06:33 )             22.2     Hemoglobin: 7.4 g/dL (03-26 @ 06:33)  Hemoglobin: 7.6 g/dL (03-25 @ 07:53)  Hemoglobin: 6.9 g/dL (03-24 @ 06:19)  Hemoglobin: 7.1 g/dL (03-23 @ 06:06)  Hemoglobin: 7.4 g/dL (03-22 @ 22:47)    WBC Count: 3.58 K/uL (03-26 @ 06:33)  WBC Count: 3.53 K/uL (03-25 @ 07:53)  WBC Count: 3.27 K/uL (03-24 @ 06:19)  WBC Count: 2.41 K/uL (03-23 @ 06:06)  WBC Count: 3.71 K/uL (03-22 @ 22:47)    Platelet Count - Automated: 1 K/uL (03-26 @ 06:33)  Platelet Count - Automated: 3 K/uL (03-25 @ 07:53) - transfused  Platelet Count - Automated: 1 K/uL (03-24 @ 06:19) - transfused  Platelet Count - Automated: 9 K/uL (03-23 @ 06:06)  Platelet Count - Automated: 2 K/uL (03-22 @ 22:47) - transfused    03-26    138  |  112<H>  |  22  ----------------------------<  102<H>  4.2   |  18<L>  |  0.88    Ca    7.7<L>      26 Mar 2020 06:33    TPro  5.4<L>  /  Alb  2.6<L>  /  TBili  3.2<H>  /  DBili  x   /  AST  46<H>  /  ALT  20  /  AlkPhos  167<H>  03-26

## 2020-03-27 LAB
ALBUMIN SERPL ELPH-MCNC: 2.6 G/DL — LOW (ref 3.3–5)
ALP SERPL-CCNC: 150 U/L — HIGH (ref 40–120)
ALT FLD-CCNC: 18 U/L — SIGNIFICANT CHANGE UP (ref 12–78)
ANION GAP SERPL CALC-SCNC: 7 MMOL/L — SIGNIFICANT CHANGE UP (ref 5–17)
AST SERPL-CCNC: 32 U/L — SIGNIFICANT CHANGE UP (ref 15–37)
BILIRUB SERPL-MCNC: 3.2 MG/DL — HIGH (ref 0.2–1.2)
BUN SERPL-MCNC: 22 MG/DL — SIGNIFICANT CHANGE UP (ref 7–23)
CALCIUM SERPL-MCNC: 7.9 MG/DL — LOW (ref 8.5–10.1)
CHLORIDE SERPL-SCNC: 112 MMOL/L — HIGH (ref 96–108)
CO2 SERPL-SCNC: 17 MMOL/L — LOW (ref 22–31)
CREAT SERPL-MCNC: 0.78 MG/DL — SIGNIFICANT CHANGE UP (ref 0.5–1.3)
GLUCOSE SERPL-MCNC: 174 MG/DL — HIGH (ref 70–99)
HCT VFR BLD CALC: 19.5 % — CRITICAL LOW (ref 39–50)
HGB BLD-MCNC: 6.6 G/DL — CRITICAL LOW (ref 13–17)
MCHC RBC-ENTMCNC: 29.5 PG — SIGNIFICANT CHANGE UP (ref 27–34)
MCHC RBC-ENTMCNC: 33.8 GM/DL — SIGNIFICANT CHANGE UP (ref 32–36)
MCV RBC AUTO: 87.1 FL — SIGNIFICANT CHANGE UP (ref 80–100)
NRBC # BLD: 0 /100 WBCS — SIGNIFICANT CHANGE UP (ref 0–0)
PLATELET # BLD AUTO: 3 K/UL — CRITICAL LOW (ref 150–400)
POTASSIUM SERPL-MCNC: 4.3 MMOL/L — SIGNIFICANT CHANGE UP (ref 3.5–5.3)
POTASSIUM SERPL-SCNC: 4.3 MMOL/L — SIGNIFICANT CHANGE UP (ref 3.5–5.3)
PROT SERPL-MCNC: 5.4 G/DL — LOW (ref 6–8.3)
RBC # BLD: 2.24 M/UL — LOW (ref 4.2–5.8)
RBC # FLD: 18.6 % — HIGH (ref 10.3–14.5)
SARS-COV-2 RNA SPEC QL NAA+PROBE: SIGNIFICANT CHANGE UP
SODIUM SERPL-SCNC: 136 MMOL/L — SIGNIFICANT CHANGE UP (ref 135–145)
WBC # BLD: 1.75 K/UL — LOW (ref 3.8–10.5)
WBC # FLD AUTO: 1.75 K/UL — LOW (ref 3.8–10.5)

## 2020-03-27 PROCEDURE — 99232 SBSQ HOSP IP/OBS MODERATE 35: CPT

## 2020-03-27 PROCEDURE — 99222 1ST HOSP IP/OBS MODERATE 55: CPT

## 2020-03-27 RX ADMIN — VENETOCLAX 10 MILLIGRAM(S): 100 TABLET, FILM COATED ORAL at 00:47

## 2020-03-27 RX ADMIN — VENETOCLAX 10 MILLIGRAM(S): 100 TABLET, FILM COATED ORAL at 21:32

## 2020-03-27 RX ADMIN — Medication 20 MILLIEQUIVALENT(S): at 18:12

## 2020-03-27 RX ADMIN — ENTECAVIR 0.5 MILLIGRAM(S): 0.5 TABLET ORAL at 05:10

## 2020-03-27 RX ADMIN — Medication 10 MILLIGRAM(S): at 05:10

## 2020-03-27 RX ADMIN — PANTOPRAZOLE SODIUM 40 MILLIGRAM(S): 20 TABLET, DELAYED RELEASE ORAL at 05:10

## 2020-03-27 RX ADMIN — Medication 20 MILLIEQUIVALENT(S): at 05:10

## 2020-03-27 NOTE — PROGRESS NOTE ADULT - PROBLEM SELECTOR PLAN 3
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 Was on prednisone 10mg daily -- increase to prednisone 40mg po during hospitalization, tapering started @2/29 - now 10mg daily  - Heme/onc following  - H&H today of 6.6/19.5- 1 unit PRBCs ordered  - will continue to monitor

## 2020-03-27 NOTE — PROGRESS NOTE ADULT - SUBJECTIVE AND OBJECTIVE BOX
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 Has been coughing as he was recently treated for influenza A on last admission. States that when he was discharged from [facility] on 2/15, he was feeling well and had no fevers. Patient developed a fever, T100.9F (oral) today for which he called heme/onc, Dr. Stephenson. Per Dr. Stuart, patient advised to go to ER. He was found to have a fever, T100.6F (rectal) in ER. Denies recent travel or sick contacts. Denies headache, chest pain, sob, palpitations, abdominal pain, diarrhea, melena, hematochezia, dysuria or hematuria.     Pt seen and examined at bedside today.  Pt is sitting up right and getting ready to eat lunch.  Platelets and PRBCs ordered, have yet to be started.  Pt with persistent concern about left forearm hematoma, denies tenderness to the area.  Tolerating diet, denies N/V/D.  Has remained afebrile for 48 hours.  Denies headaches, SOB/CP/LLAMAS/palpitations, dizziness/lightheadedness.  Tolerating diet.  Pt has been ambulating for toileting.  Reiterated, will need to walk with assistance.      REVIEW OF SYSTEMS:    CONSTITUTIONAL: Some weakness.    EYES/ENT: No visual changes, no throat pain   RESPIRATORY: No cough, wheezing, hemoptysis; No shortness of breath  CARDIOVASCULAR: No chest pain or palpitations  GASTROINTESTINAL: No abdominal pain, nausea, vomiting, or hematemesis; No diarrhea or constipation. No melena or hematochezia.  GENITOURINARY: No dysuria, frequency or hematuria  NEUROLOGICAL: No dizziness, numbness, or weakness  SKIN: An ecchymotic round raised area just inferior to antecubital area. Multiple spots of ecchymosis on body.  All other review of systems is negative unless indicated above.    VITAL SIGNS:  ICU Vital Signs Last 24 Hrs  ICU Vital Signs Last 24 Hrs  T(C): 36.7 (26 Mar 2020 07:40), Max: 37.2 (25 Mar 2020 23:30)  T(F): 98.1 (26 Mar 2020 07:40), Max: 98.9 (25 Mar 2020 23:30)  HR: 88 (26 Mar 2020 07:40) (77 - 106)  BP: 103/62 (26 Mar 2020 07:40) (103/62 - 146/74)  BP(mean): --  ABP: --  ABP(mean): --  RR: 18 (26 Mar 2020 07:40) (18 - 18)  SpO2: 98% (26 Mar 2020 07:40) (95% - 98%)    PHYSICAL EXAM:     GENERAL: no acute distress  HEENT: NC/AT, EOMI, neck supple  RESPIRATORY: CTA B/L no wheezes no rales no rhonchi  CARDIOVASCULAR: RRR, no murmurs, gallops, rubs  ABDOMINAL: soft, non-tender, non-distended, positive bowel sounds   NEURO/SKIN: Multiple areas of ecchymotic appearing spots.  Left forearm with moderately tense hematoma without drainage, streaking, surrounding erythema and no warmth to            touch.  Compartments soft.  Sensation to light touch grossly intact and symmetric through all distributions bilat UE/LE.  No evidence of infection.  VASCULAR: Distal pulses 2+ bilat UE/LE.  Calves soft, NT bilat LE's.   MUSCULOSKELETAL: no gross joint deformity 5/5 lower extremity strength.      LABS:                        7.4    3.58  )-----------( 1        ( 26 Mar 2020 06:33 )             22.2     03-26    138  |  112<H>  |  22  ----------------------------<  102<H>  4.2   |  18<L>  |  0.88    Ca    7.7<L>      26 Mar 2020 06:33    TPro  5.4<L>  /  Alb  2.6<L>  /  TBili  3.2<H>  /  DBili  x   /  AST  46<H>  /  ALT  20  /  AlkPhos  167<H>  03-26        MEDICATIONS  (STANDING):  cefepime   IVPB 2000 milliGRAM(s) IV Intermittent every 12 hours  cefepime   IVPB      entecavir 0.5 milliGRAM(s) Oral daily  pantoprazole    Tablet 40 milliGRAM(s) Oral before breakfast  potassium chloride    Tablet ER 20 milliEquivalent(s) Oral two times a day  predniSONE   Tablet 10 milliGRAM(s) Oral daily  venetoclax 10 milliGRAM(s) Oral <User Schedule> ADMISSION HPI:  Patient is a 74 y/o M with 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 Has been coughing as he was recently treated for influenza A on last admission. States that when he was discharged from [facility] on 2/15, he was feeling well and had no fevers. Patient developed a fever, T100.9F (oral) today for which he called heme/onc, Dr. Stephenson. Per Dr. Stuart, patient advised to go to ER. He was found to have a fever, T100.6F (rectal) in ER. Denies recent travel or sick contacts. Denies headache, chest pain, sob, palpitations, abdominal pain, diarrhea, melena, hematochezia, dysuria or hematuria.     INTERVAL HPI:  Pt seen and examined at bedside today.  Pt is sitting up right and getting ready to eat lunch.  Platelets and PRBCs ordered, have yet to be started.  Pt with persistent concern about left forearm hematoma, denies tenderness to the area.  Tolerating diet, denies N/V/D.  Has remained afebrile for 48 hours.  Denies headaches, SOB/CP/LLAMAS/palpitations, dizziness/lightheadedness.  Tolerating diet.  Pt has been ambulating for toileting.  Reiterated, will need to walk with assistance.      REVIEW OF SYSTEMS:    CONSTITUTIONAL: Some weakness.    EYES/ENT: No visual changes, no throat pain   RESPIRATORY: No cough, wheezing, hemoptysis; No shortness of breath  CARDIOVASCULAR: No chest pain or palpitations  GASTROINTESTINAL: No abdominal pain, nausea, vomiting, or hematemesis; No diarrhea or constipation. No melena or hematochezia.  GENITOURINARY: No dysuria, frequency or hematuria  NEUROLOGICAL: No dizziness, numbness, or weakness  SKIN: An ecchymotic round raised area just inferior to antecubital area. Multiple spots of ecchymosis on body.  All other review of systems is negative unless indicated above.    Vital Signs Last 24 Hrs  T(C): 36.6 (27 Mar 2020 13:33), Max: 37.2 (26 Mar 2020 22:02)  T(F): 97.8 (27 Mar 2020 13:33), Max: 99 (26 Mar 2020 22:02)  HR: 86 (27 Mar 2020 13:33) (80 - 93)  BP: 123/70 (27 Mar 2020 13:33) (101/62 - 136/84)  BP(mean): --  RR: 16 (27 Mar 2020 13:33) (16 - 18)  SpO2: 97% (27 Mar 2020 13:33) (96% - 100%)  PHYSICAL EXAM:     GENERAL: no acute distress  HEENT: NC/AT, EOMI, neck supple  RESPIRATORY: CTA B/L no wheezes no rales no rhonchi  CARDIOVASCULAR: RRR, no murmurs, gallops, rubs  ABDOMINAL: soft, non-tender, non-distended, positive bowel sounds   NEURO/SKIN: Multiple areas of ecchymotic appearing spots.  Left forearm with moderately tense hematoma without drainage, streaking, surrounding erythema and no warmth to            touch.  Compartments soft.  Sensation to light touch grossly intact and symmetric through all distributions bilat UE/LE.  No evidence of infection.  VASCULAR: Distal pulses 2+ bilat UE/LE.  Calves soft, NT bilat LE's.   MUSCULOSKELETAL: no gross joint deformity 5/5 lower extremity strength.      LABS:                          6.6    1.75  )-----------( 3        ( 27 Mar 2020 10:36 )             19.5   03-27    136  |  112<H>  |  22  ----------------------------<  174<H>  4.3   |  17<L>  |  0.78    Ca    7.9<L>      27 Mar 2020 10:36    TPro  5.4<L>  /  Alb  2.6<L>  /  TBili  3.2<H>  /  DBili  x   /  AST  32  /  ALT  18  /  AlkPhos  150<H>  03-27        MEDICATIONS  (STANDING):  entecavir 0.5 milliGRAM(s) Oral daily  pantoprazole    Tablet 40 milliGRAM(s) Oral before breakfast  potassium chloride    Tablet ER 20 milliEquivalent(s) Oral two times a day  predniSONE   Tablet 10 milliGRAM(s) Oral daily  venetoclax 10 milliGRAM(s) Oral <User Schedule>    MEDICATIONS  (PRN):  acetaminophen   Tablet .. 650 milliGRAM(s) Oral every 6 hours PRN Mild Pain (1 - 3)  acetaminophen   Tablet .. 650 milliGRAM(s) Oral every 6 hours PRN Temp greater or equal to 38C (100.4F)  albuterol/ipratropium for Nebulization 3 milliLiter(s) Nebulizer every 6 hours PRN Shortness of Breath and/or Wheezing  guaiFENesin   Syrup  (Sugar-Free) 200 milliGRAM(s) Oral every 6 hours PRN Cough

## 2020-03-27 NOTE — PROGRESS NOTE ADULT - ASSESSMENT
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

## 2020-03-27 NOTE — PROGRESS NOTE ADULT - ASSESSMENT
hepatitis B   elevated lfts   CLL  pancytopenia  intermittent fever      f/u am labs  patient with elevated bilirubin and alk phos at baseline ? leon  mri showed normal liver, no cbd stone/dilatation; hep b pcr neg  ob neg; no s/s overt gib; monitor cbc daily, transfuse per heme/onc  diet as tolerated; correct elytes prn  cont ppi ppx  further care per heme/onc and primary    Advanced care planning was discussed with patient and family.  Advanced care planning forms were reviewed and discussed.  Risks, benefits and alternatives of gastroenterologic procedures were discussed in detail and all questions were answered.    30 minutes spent.

## 2020-03-27 NOTE — PROGRESS NOTE ADULT - SUBJECTIVE AND OBJECTIVE BOX
INTERVAL HPI/OVERNIGHT EVENTS:  pt seen  feels ok, offers no gi complaints  covid neg  sp plts yesterday  afebrile    MEDICATIONS  (STANDING):  entecavir 0.5 milliGRAM(s) Oral daily  pantoprazole    Tablet 40 milliGRAM(s) Oral before breakfast  potassium chloride    Tablet ER 20 milliEquivalent(s) Oral two times a day  predniSONE   Tablet 10 milliGRAM(s) Oral daily  venetoclax 10 milliGRAM(s) Oral <User Schedule>    MEDICATIONS  (PRN):  acetaminophen   Tablet .. 650 milliGRAM(s) Oral every 6 hours PRN Mild Pain (1 - 3)  acetaminophen   Tablet .. 650 milliGRAM(s) Oral every 6 hours PRN Temp greater or equal to 38C (100.4F)  albuterol/ipratropium for Nebulization 3 milliLiter(s) Nebulizer every 6 hours PRN Shortness of Breath and/or Wheezing  guaiFENesin   Syrup  (Sugar-Free) 200 milliGRAM(s) Oral every 6 hours PRN Cough      Allergies    sulfa drugs (Unknown)    Intolerances        Review of Systems:    General:  No wt loss, fevers, chills, night sweats, fatigue   Eyes:  Good vision, no reported pain  ENT:  No sore throat, pain, runny nose, dysphagia  CV:  No pain, palpitations, hypo/hypertension  Resp:  No dyspnea, cough, tachypnea, wheezing  GI:  No pain, No nausea, No vomiting, No diarrhea, No constipation, No weight loss, No fever, No pruritis, No rectal bleeding, No melena, No dysphagia  :  No pain, bleeding, incontinence, nocturia  Muscle:  No pain, weakness  Neuro:  No weakness, tingling, memory problems  Psych:  No fatigue, insomnia, mood problems, depression  Endocrine:  No polyuria, polydypsia, cold/heat intolerance  Heme:  No petechiae, ecchymosis, easy bruisability  Skin:  No rash, tattoos, scars, edema      Vital Signs Last 24 Hrs  T(C): 36.6 (27 Mar 2020 06:43), Max: 37.2 (26 Mar 2020 22:02)  T(F): 97.9 (27 Mar 2020 06:43), Max: 99 (26 Mar 2020 22:02)  HR: 85 (27 Mar 2020 06:43) (80 - 93)  BP: 101/62 (27 Mar 2020 06:43) (101/62 - 136/84)  BP(mean): --  RR: 18 (27 Mar 2020 06:43) (16 - 18)  SpO2: 98% (27 Mar 2020 06:43) (97% - 100%)    PHYSICAL EXAM:    lying in bed   awake alert  nad      LABS:                        7.4    3.58  )-----------( 1        ( 26 Mar 2020 06:33 )             22.2     03-26    138  |  112<H>  |  22  ----------------------------<  102<H>  4.2   |  18<L>  |  0.88    Ca    7.7<L>      26 Mar 2020 06:33    TPro  5.4<L>  /  Alb  2.6<L>  /  TBili  3.2<H>  /  DBili  x   /  AST  46<H>  /  ALT  20  /  AlkPhos  167<H>  03-26          RADIOLOGY & ADDITIONAL TESTS:

## 2020-03-27 NOTE — PROGRESS NOTE ADULT - SUBJECTIVE AND OBJECTIVE BOX
Patient seen and examined;  Chart reviewed and events noted;   transferred to 1East floor after testing negative for COVID19  continues to have swelling in left antecubital area  Afebrile overnight    MEDICATIONS  (STANDING):  entecavir 0.5 milliGRAM(s) Oral daily  pantoprazole    Tablet 40 milliGRAM(s) Oral before breakfast  potassium chloride    Tablet ER 20 milliEquivalent(s) Oral two times a day  predniSONE   Tablet 10 milliGRAM(s) Oral daily  venetoclax 10 milliGRAM(s) Oral <User Schedule>    MEDICATIONS  (PRN):  acetaminophen   Tablet .. 650 milliGRAM(s) Oral every 6 hours PRN Mild Pain (1 - 3)  acetaminophen   Tablet .. 650 milliGRAM(s) Oral every 6 hours PRN Temp greater or equal to 38C (100.4F)  albuterol/ipratropium for Nebulization 3 milliLiter(s) Nebulizer every 6 hours PRN Shortness of Breath and/or Wheezing  guaiFENesin   Syrup  (Sugar-Free) 200 milliGRAM(s) Oral every 6 hours PRN Cough      Vital Signs Last 24 Hrs  T(C): 37 (27 Mar 2020 10:57), Max: 37.2 (26 Mar 2020 22:02)  T(F): 98.6 (27 Mar 2020 10:57), Max: 99 (26 Mar 2020 22:02)  HR: 87 (27 Mar 2020 10:57) (80 - 93)  BP: 113/66 (27 Mar 2020 10:57) (101/62 - 136/84)  RR: 16 (27 Mar 2020 10:57) (16 - 18)  SpO2: 96% (27 Mar 2020 10:57) (96% - 100%)    PHYSICAL EXAM  General: adult male  in NAD  HEENT: clear oropharynx, anicteric sclera, pink conjunctivae  Neck: supple  CV: normal S1S2 with no murmur rubs or gallops  Lungs: clear to auscultation, no wheezes, no rhales  Abdomen: soft non-tender non-distended, no hepato/splenomegaly  Ext: trace edema  Skin: + petichiae on arms; + bruising on right shoulder  Neuro: alert and oriented X3 no focal deficits      LABS:                        6.6    1.75  )-----------( 3        ( 27 Mar 2020 10:36 )             19.5     Hemoglobin: 6.6 g/dL (03-27 @ 10:36)  Hemoglobin: 7.4 g/dL (03-26 @ 06:33)  Hemoglobin: 7.6 g/dL (03-25 @ 07:53)  Hemoglobin: 6.9 g/dL (03-24 @ 06:19)  Hemoglobin: 7.1 g/dL (03-23 @ 06:06)    WBC Count: 1.75 K/uL (03-27 @ 10:36)  WBC Count: 3.58 K/uL (03-26 @ 06:33)  WBC Count: 3.53 K/uL (03-25 @ 07:53)  WBC Count: 3.27 K/uL (03-24 @ 06:19)  WBC Count: 2.41 K/uL (03-23 @ 06:06)    Platelet Count - Automated: 3 K/uL (03-27 @ 10:36)  Platelet Count - Automated: 1 K/uL (03-26 @ 06:33)  Platelet Count - Automated: 3 K/uL (03-25 @ 07:53)  Platelet Count - Automated: 1 K/uL (03-24 @ 06:19)  Platelet Count - Automated: 9 K/uL (03-23 @ 06:06)    03-27    136  |  112<H>  |  22  ----------------------------<  174<H>  4.3   |  17<L>  |  0.78    Ca    7.9<L>      27 Mar 2020 10:36    TPro  5.4<L>  /  Alb  2.6<L>  /  TBili  3.2<H>  /  DBili  x   /  AST  32  /  ALT  18  /  AlkPhos  150<H>  03-27

## 2020-03-27 NOTE — CHART NOTE - NSCHARTNOTEFT_GEN_A_CORE
Assessment:   Pt receiving transfusion at present. C/o decreased intake, many taste changes. agreeable to try Glucerna supplement at this time; encouraged use of plastic utensils.BM noted 3/24; may benefit from bowel regimen.  Glu fairly controlled, on prednisone Rx. Consider accuchecks to monitor.  Factors impacting intake: [ ] none [ ] nausea  [ ] vomiting [ ] diarrhea [ ] constipation  [ ]chewing problems [ ] swallowing issues  [ ] other:     Diet Presciption: Diet, Consistent Carbohydrate w/Evening Snack (03-01-20 @ 11:26)    Intake: poor to fair due to taste changes    Current Weight:   % Weight Change    Pertinent Medications: MEDICATIONS  (STANDING):  entecavir 0.5 milliGRAM(s) Oral daily  pantoprazole    Tablet 40 milliGRAM(s) Oral before breakfast  potassium chloride    Tablet ER 20 milliEquivalent(s) Oral two times a day  predniSONE   Tablet 10 milliGRAM(s) Oral daily  venetoclax 10 milliGRAM(s) Oral <User Schedule>    MEDICATIONS  (PRN):  acetaminophen   Tablet .. 650 milliGRAM(s) Oral every 6 hours PRN Mild Pain (1 - 3)  acetaminophen   Tablet .. 650 milliGRAM(s) Oral every 6 hours PRN Temp greater or equal to 38C (100.4F)  albuterol/ipratropium for Nebulization 3 milliLiter(s) Nebulizer every 6 hours PRN Shortness of Breath and/or Wheezing  guaiFENesin   Syrup  (Sugar-Free) 200 milliGRAM(s) Oral every 6 hours PRN Cough    Pertinent Labs: 03-27 Na136 mmol/L Glu 174 mg/dL<H> K+ 4.3 mmol/L Cr  0.78 mg/dL BUN 22 mg/dL 03-27 Alb 2.6 g/dL<L>     CAPILLARY BLOOD GLUCOSE        Skin:     Estimated Needs:   [ ] no change since previous assessment  [ ] recalculated:     Previous Nutrition Diagnosis: (X) altered nutrition related labs  [ ] Inadequate Energy Intake [ ]Inadequate Oral Intake [ ] Excessive Energy Intake   [ ] Underweight [ ] Increased Nutrient Needs [ ] Overweight/Obesity   [ ] Altered GI Function [ ] Unintended Weight Loss [ ] Food & Nutrition Related Knowledge Deficit [ ] Malnutrition     Nutrition Diagnosis is [x ] ongoing  [ ] resolved [ ] not applicable     New Nutrition Diagnosis: [x ] decreased oral intake      Interventions:   Recommend  [ ] Change Diet To:  [x ] Nutrition Supplement trial glucerna BID  [ ] Nutrition Support  [ ] Other:     Monitoring and Evaluation:   [ ] PO intake [ x ] Tolerance to diet prescription [ x ] weights [ x ] labs[ x ] follow up per protocol  [ ] other:

## 2020-03-27 NOTE — PROGRESS NOTE ADULT - SUBJECTIVE AND OBJECTIVE BOX
[facility] -- Cori Dawkins, Travis Villagomez, Ryland Wright [place]ella, [place]  Office # [phone]    Follow Up:  Cardiac Arrhythmia    Subjective/Observations:     REVIEW OF SYSTEMS: All other review of systems is negative unless indicated above  PAST MEDICAL & SURGICAL HISTORY:  KIRTI (obstructive sleep apnea): on nocturnal cpap  Hypertension: not on any medications  Diabetes: not on any medications  Hepatitis B  Lymphoma: Small cell B cell  AIHA (autoimmune hemolytic anemia)  H/O lithotripsy    MEDICATIONS  (STANDING):  entecavir 0.5 milliGRAM(s) Oral daily  pantoprazole    Tablet 40 milliGRAM(s) Oral before breakfast  potassium chloride    Tablet ER 20 milliEquivalent(s) Oral two times a day  predniSONE   Tablet 10 milliGRAM(s) Oral daily  venetoclax 10 milliGRAM(s) Oral <User Schedule>    MEDICATIONS  (PRN):  acetaminophen   Tablet .. 650 milliGRAM(s) Oral every 6 hours PRN Mild Pain (1 - 3)  acetaminophen   Tablet .. 650 milliGRAM(s) Oral every 6 hours PRN Temp greater or equal to 38C (100.4F)  albuterol/ipratropium for Nebulization 3 milliLiter(s) Nebulizer every 6 hours PRN Shortness of Breath and/or Wheezing  guaiFENesin   Syrup  (Sugar-Free) 200 milliGRAM(s) Oral every 6 hours PRN Cough    Allergies    sulfa drugs (Unknown)    Intolerances      Vital Signs Last 24 Hrs  T(C): 36.6 (27 Mar 2020 06:43), Max: 37.2 (26 Mar 2020 22:02)  T(F): 97.9 (27 Mar 2020 06:43), Max: 99 (26 Mar 2020 22:02)  HR: 85 (27 Mar 2020 06:43) (80 - 93)  BP: 101/62 (27 Mar 2020 06:43) (101/62 - 136/84)  BP(mean): --  RR: 18 (27 Mar 2020 06:43) (16 - 18)  SpO2: 98% (27 Mar 2020 06:43) (97% - 100%)  I&O's Summary      PHYSICAL EXAM:  TELE:   Constitutional: NAD, awake and alert, well-developed  HEENT: Moist Mucous Membranes, Anicteric  Pulmonary: Non-labored, breath sounds are clear bilaterally, No wheezing, rales or rhonchi  Cardiovascular: Regular, S1 and S2, No murmurs, rubs, gallops or clicks  Gastrointestinal: Bowel Sounds present, soft, nontender.   Lymph: No peripheral edema. No lymphadenopathy.  Skin: No visible rashes or ulcers.  Psych:  Mood & affect appropriate  LABS: All Labs Reviewed:                        7.4    3.58  )-----------( 1        ( 26 Mar 2020 06:33 )             22.2                         7.6    3.53  )-----------( 3        ( 25 Mar 2020 07:53 )             22.4     26 Mar 2020 06:33    138    |  112    |  22     ----------------------------<  102    4.2     |  18     |  0.88   25 Mar 2020 07:53    138    |  110    |  21     ----------------------------<  93     4.4     |  18     |  0.85     Ca    7.7        26 Mar 2020 06:33  Ca    7.9        25 Mar 2020 07:53    TPro  5.4    /  Alb  2.6    /  TBili  3.2    /  DBili  x      /  AST  46     /  ALT  20     /  AlkPhos  167    26 Mar 2020 06:33  TPro  5.3    /  Alb  2.5    /  TBili  3.6    /  DBili  x      /  AST  53     /  ALT  23     /  AlkPhos  151    25 Mar 2020 07:53    < from: TTE Echo Complete w/o contrast w/ Doppler (03.04.20 @ 09:54) >     EXAM:  ECHO TTE WO CON COMP W DOPP         PROCEDURE DATE:  03/04/2020        INTERPRETATION:  INDICATION: edema  Sonographer PH    Blood Pressure 129/63    Height 165 cm     Weight 82.9 kg       BSA 1.9 sq m    Dimensions:    LA 2.9       Normal Values: 2.0 - 4.0 cm    Ao 2.9        Normal Values: 2.0 - 3.8 cm  SEPTUM 0.9       Normal Values: 0.6 - 1.2 cm  PWT 0.9       Normal Values: 0.6 - 1.1 cm  LVIDd 4.1         Normal Values: 3.0 - 5.6 cm  LVIDs 2.3         Normal Values: 1.8 - 4.0 cm    OBSERVATIONS:    Mitral Valve: normal, trace physiologic MR.  Aortic Valve/Aorta: Normal trileaflet aortic valve with normal opening.  Tricuspid Valve: normal with trace TR.  Pulmonic Valve: normal  Left Atrium: normal  Right Atrium: normal  Left Ventricle: normal LV size and systolic function, estimated LVEF of 65%.  Right Ventricle: Grossly normal size and systolic function.  Pericardium/Pleura: normal, no significant pericardial effusion.    Conclusion:   Normal left ventricular internal dimensions and systolic function, estimated LVEF of 65%.   Grossly normal RV size and systolic function.     Normal trileaflet aortic valve, without AI.   Trace physiologic MR and TR.      KALPANA GUERRERO   This document has been electronically signed. Mar  5 2020  7:32AM      < end of copied text >    < from: CT Chest No Cont (03.19.20 @ 09:40) >    EXAM:  CT CHEST                          PROCEDURE DATE:  03/19/2020      INTERPRETATION:  CLINICAL INFORMATION: CLL, fever    COMPARISON: 2/21/2020    PROCEDURE:   CT of the Chest, Abdomen and Pelvis was performed without intravenous contrast.   Intravenous contrast: None.  Oral contrast: None.  Sagittal and coronal reformats were performed.    FINDINGS:  Lack of IV contrast limits evaluation diana, vascular structures and solid organ parenchyma  CHEST:     LUNGS AND LARGE AIRWAYS: Patentcentral airways. Scattered bilateral patchy, nodular and groundglass opacities slightly improved at the lung bases compared to prior. Findings could all be inflammatory/infectious. Neoplastic component not excluded.  PLEURA: Interval increase in small left and small-to-moderate right pleural effusion.  VESSELS: Nonaneurysmal  HEART: Heart size is normal. Coronary artery calcination. Decrease cardiac chamber blood pool attenuation suggests an anemic state. Trace pericardial effusion.  MEDIASTINUM AND DIANA: Multistation mediastinal  supraclavicular, bilateral hilar and bilateral axillary adenopathy similar to prior  CHEST WALL AND LOWER NECK: Within normal limits.    ABDOMEN AND PELVIS:    LIVER: Hepatomegaly similar to prior  BILE DUCTS: Normal caliber.  GALLBLADDER: Cholelithiasis.  SPLEEN: Splenomegaly similar to prior  PANCREAS: Within normal limits.  ADRENALS: Within normal limits.  KIDNEYS/URETERS: Within normal limits.    BLADDER: Within normal limits.  REPRODUCTIVE ORGANS: Unremarkable    BOWEL: Evaluation limited due to limited by nonopacification underdistention. No gross active bowel inflammation. No bowel obstruction.. Appendix no appendicitis  PERITONEUM: Trace ascites. No extraluminal gas or organized collections.  VESSELS: Nonaneurysmal.  RETROPERITONEUM/LYMPH NODES: Stable periportal and retroperitoneal, mesenteric pelvic and bilateral inguinal adenopathy.    ABDOMINAL WALL: Mild anasarca.  BONES: Stable    IMPRESSION:     Improvement in the scattered bilateral parenchymal opacities as described.  Slight increase in bilateral pleural effusions.  Stable multistation adenopathy chest abdomen and pelvis as described  No acute findings abdomen and pelvis  Additional findings as discussed    PANTERA CALVILLO M.D., ATTENDING RADIOLOGIST  This document has been electronically signed. Mar 19 2020 10:26AM     < end of copied text >    < from: 12 Lead ECG (03.18.20 @ 19:13) >    Ventricular Rate 167 BPM    Atrial Rate 178 BPM    QRS Duration 64 ms    Q-T Interval 296 ms    QTC Calculation(Bezet) 493 ms    R Axis 32 degrees    T Axis 61 degrees    Diagnosis Line *** Poor data quality, interpretation may be adversely affected  likely st with apcs  Nonspecific ST and T wave abnormality  Abnormal ECG  When compared with ECG of 16-MAR-2020 17:10,  Previous ECG has undetermined rhythm, needs review  Nonspecific T wave abnormality now evident in Anterior leads  Confirmed by CANDI SCHREIBER ([id number]) on 3/19/2020 5:29:35 PM    < end of copied text > [facility] -- Cori Dawkins, Travis Villagomez, Ryland Wright Savella, [place]  Office # [phone]    Follow Up:  Cardiac Arrhythmia    Subjective/Observations: Awake and alert, comfortable on RA.  Denies any chest discomfort.  Denies any form of bleeding    REVIEW OF SYSTEMS: All other review of systems is negative unless indicated above  PAST MEDICAL & SURGICAL HISTORY:  KIRTI (obstructive sleep apnea): on nocturnal cpap  Hypertension: not on any medications  Diabetes: not on any medications  Hepatitis B  Lymphoma: Small cell B cell  AIHA (autoimmune hemolytic anemia)  H/O lithotripsy    MEDICATIONS  (STANDING):  entecavir 0.5 milliGRAM(s) Oral daily  pantoprazole    Tablet 40 milliGRAM(s) Oral before breakfast  potassium chloride    Tablet ER 20 milliEquivalent(s) Oral two times a day  predniSONE   Tablet 10 milliGRAM(s) Oral daily  venetoclax 10 milliGRAM(s) Oral <User Schedule>    MEDICATIONS  (PRN):  acetaminophen   Tablet .. 650 milliGRAM(s) Oral every 6 hours PRN Mild Pain (1 - 3)  acetaminophen   Tablet .. 650 milliGRAM(s) Oral every 6 hours PRN Temp greater or equal to 38C (100.4F)  albuterol/ipratropium for Nebulization 3 milliLiter(s) Nebulizer every 6 hours PRN Shortness of Breath and/or Wheezing  guaiFENesin   Syrup  (Sugar-Free) 200 milliGRAM(s) Oral every 6 hours PRN Cough    Allergies    sulfa drugs (Unknown)    Intolerances    Vital Signs Last 24 Hrs  T(C): 36.6 (27 Mar 2020 06:43), Max: 37.2 (26 Mar 2020 22:02)  T(F): 97.9 (27 Mar 2020 06:43), Max: 99 (26 Mar 2020 22:02)  HR: 85 (27 Mar 2020 06:43) (80 - 93)  BP: 101/62 (27 Mar 2020 06:43) (101/62 - 136/84)  BP(mean): --  RR: 18 (27 Mar 2020 06:43) (16 - 18)  SpO2: 98% (27 Mar 2020 06:43) (97% - 100%)  I&O's Summary      PHYSICAL EXAM:  TELE: NSR  Constitutional: NAD, awake and alert, well-developed  HEENT: Moist Mucous Membranes, Anicteric  Pulmonary: Non-labored, breath sounds are clear bilaterally, No wheezing, rales or rhonchi  Cardiovascular: Regular, S1 and S2, No murmurs, rubs, gallops or clicks  Gastrointestinal: Bowel Sounds present, soft, nontender.   Lymph: 1+ BLE edema R>L. No lymphadenopathy.  Skin: No visible rashes or ulcers.  Pale in color  Psych:  Mood & affect appropriate  LABS: All Labs Reviewed:                        7.4    3.58  )-----------( 1        ( 26 Mar 2020 06:33 )             22.2                         7.6    3.53  )-----------( 3        ( 25 Mar 2020 07:53 )             22.4     26 Mar 2020 06:33    138    |  112    |  22     ----------------------------<  102    4.2     |  18     |  0.88   25 Mar 2020 07:53    138    |  110    |  21     ----------------------------<  93     4.4     |  18     |  0.85     Ca    7.7        26 Mar 2020 06:33  Ca    7.9        25 Mar 2020 07:53    TPro  5.4    /  Alb  2.6    /  TBili  3.2    /  DBili  x      /  AST  46     /  ALT  20     /  AlkPhos  167    26 Mar 2020 06:33  TPro  5.3    /  Alb  2.5    /  TBili  3.6    /  DBili  x      /  AST  53     /  ALT  23     /  AlkPhos  151    25 Mar 2020 07:53    < from: TTE Echo Complete w/o contrast w/ Doppler (03.04.20 @ 09:54) >     EXAM:  ECHO TTE WO CON COMP W DOPP         PROCEDURE DATE:  03/04/2020        INTERPRETATION:  INDICATION: edema  Sonographer PH    Blood Pressure 129/63    Height 165 cm     Weight 82.9 kg       BSA 1.9 sq m    Dimensions:    LA 2.9       Normal Values: 2.0 - 4.0 cm    Ao 2.9        Normal Values: 2.0 - 3.8 cm  SEPTUM 0.9       Normal Values: 0.6 - 1.2 cm  PWT 0.9       Normal Values: 0.6 - 1.1 cm  LVIDd 4.1         Normal Values: 3.0 - 5.6 cm  LVIDs 2.3         Normal Values: 1.8 - 4.0 cm    OBSERVATIONS:    Mitral Valve: normal, trace physiologic MR.  Aortic Valve/Aorta: Normal trileaflet aortic valve with normal opening.  Tricuspid Valve: normal with trace TR.  Pulmonic Valve: normal  Left Atrium: normal  Right Atrium: normal  Left Ventricle: normal LV size and systolic function, estimated LVEF of 65%.  Right Ventricle: Grossly normal size and systolic function.  Pericardium/Pleura: normal, no significant pericardial effusion.    Conclusion:   Normal left ventricular internal dimensions and systolic function, estimated LVEF of 65%.   Grossly normal RV size and systolic function.     Normal trileaflet aortic valve, without AI.   Trace physiologic MR and TR.      KALPANA GUERRERO   This document has been electronically signed. Mar  5 2020  7:32AM      < end of copied text >    < from: CT Chest No Cont (03.19.20 @ 09:40) >    EXAM:  CT CHEST                          PROCEDURE DATE:  03/19/2020      INTERPRETATION:  CLINICAL INFORMATION: CLL, fever    COMPARISON: 2/21/2020    PROCEDURE:   CT of the Chest, Abdomen and Pelvis was performed without intravenous contrast.   Intravenous contrast: None.  Oral contrast: None.  Sagittal and coronal reformats were performed.    FINDINGS:  Lack of IV contrast limits evaluation diana, vascular structures and solid organ parenchyma  CHEST:     LUNGS AND LARGE AIRWAYS: Patentcentral airways. Scattered bilateral patchy, nodular and groundglass opacities slightly improved at the lung bases compared to prior. Findings could all be inflammatory/infectious. Neoplastic component not excluded.  PLEURA: Interval increase in small left and small-to-moderate right pleural effusion.  VESSELS: Nonaneurysmal  HEART: Heart size is normal. Coronary artery calcination. Decrease cardiac chamber blood pool attenuation suggests an anemic state. Trace pericardial effusion.  MEDIASTINUM AND DIANA: Multistation mediastinal  supraclavicular, bilateral hilar and bilateral axillary adenopathy similar to prior  CHEST WALL AND LOWER NECK: Within normal limits.    ABDOMEN AND PELVIS:    LIVER: Hepatomegaly similar to prior  BILE DUCTS: Normal caliber.  GALLBLADDER: Cholelithiasis.  SPLEEN: Splenomegaly similar to prior  PANCREAS: Within normal limits.  ADRENALS: Within normal limits.  KIDNEYS/URETERS: Within normal limits.    BLADDER: Within normal limits.  REPRODUCTIVE ORGANS: Unremarkable    BOWEL: Evaluation limited due to limited by nonopacification underdistention. No gross active bowel inflammation. No bowel obstruction.. Appendix no appendicitis  PERITONEUM: Trace ascites. No extraluminal gas or organized collections.  VESSELS: Nonaneurysmal.  RETROPERITONEUM/LYMPH NODES: Stable periportal and retroperitoneal, mesenteric pelvic and bilateral inguinal adenopathy.    ABDOMINAL WALL: Mild anasarca.  BONES: Stable    IMPRESSION:     Improvement in the scattered bilateral parenchymal opacities as described.  Slight increase in bilateral pleural effusions.  Stable multistation adenopathy chest abdomen and pelvis as described  No acute findings abdomen and pelvis  Additional findings as discussed    PANTERA CALVILLO M.D., ATTENDING RADIOLOGIST  This document has been electronically signed. Mar 19 2020 10:26AM     < end of copied text >    < from: 12 Lead ECG (03.18.20 @ 19:13) >    Ventricular Rate 167 BPM    Atrial Rate 178 BPM    QRS Duration 64 ms    Q-T Interval 296 ms    QTC Calculation(Bezet) 493 ms    R Axis 32 degrees    T Axis 61 degrees    Diagnosis Line *** Poor data quality, interpretation may be adversely affected  likely st with apcs  Nonspecific ST and T wave abnormality  Abnormal ECG  When compared with ECG of 16-MAR-2020 17:10,  Previous ECG has undetermined rhythm, needs review  Nonspecific T wave abnormality now evident in Anterior leads  Confirmed by CANDI SCHREIBER ([id number]) on 3/19/2020 5:29:35 PM    < end of copied text >

## 2020-03-27 NOTE — PROGRESS NOTE ADULT - ASSESSMENT
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 Tachy likely reactive but now resolved. No significant cedric or tachycardia per tele.  Can discontinue tele   - Monitor and replete electrolytes. Keep K>4.0 and Mg>2.0.    Pancytopenia   - Hem/onc following   - Multiple tx of PRBC last tx (3/21), Platelets and Plasma last tx (3/5).  s/p Platelet transfusion 3/26 for plate of 100  - Hgb remained low at 7.4 yesterday.  Transfuse per Heme    LE edema  - Doppler negative   - Albumin 2.4 likely contributing to his LE edema   - Echo 2/24/2020 revealing trace AI trace TR ef 60-65%.  No need to repeat    DM  - Management as per primary     Further cardiac workup will depend on clinical course.   All other workup per primary team. Will followup.     Seda Moore DNP, NP-C  Cardiology   Spectra #[phone]

## 2020-03-27 NOTE — PROGRESS NOTE ADULT - SUBJECTIVE AND OBJECTIVE BOX
infectious diseases progress note:    DIONICIO SULLIVAN is a 75y y. o. Male patient    No concerning overnight events    Allergies    sulfa drugs (Unknown)    Intolerances        ANTIBIOTICS/RELEVANT:  antimicrobials  entecavir 0.5 milliGRAM(s) Oral daily    immunologic:    OTHER:  acetaminophen   Tablet .. 650 milliGRAM(s) Oral every 6 hours PRN  acetaminophen   Tablet .. 650 milliGRAM(s) Oral every 6 hours PRN  albuterol/ipratropium for Nebulization 3 milliLiter(s) Nebulizer every 6 hours PRN  guaiFENesin   Syrup  (Sugar-Free) 200 milliGRAM(s) Oral every 6 hours PRN  pantoprazole    Tablet 40 milliGRAM(s) Oral before breakfast  potassium chloride    Tablet ER 20 milliEquivalent(s) Oral two times a day  predniSONE   Tablet 10 milliGRAM(s) Oral daily  venetoclax 10 milliGRAM(s) Oral <User Schedule>      Objective:  Vital Signs Last 24 Hrs  T(C): 36.6 (27 Mar 2020 13:33), Max: 37.2 (26 Mar 2020 22:02)  T(F): 97.8 (27 Mar 2020 13:33), Max: 99 (26 Mar 2020 22:02)  HR: 86 (27 Mar 2020 13:33) (80 - 93)  BP: 123/70 (27 Mar 2020 13:33) (101/62 - 136/84)  BP(mean): --  RR: 16 (27 Mar 2020 13:33) (16 - 18)  SpO2: 97% (27 Mar 2020 13:33) (96% - 100%)    T(C): 36.6 (03-27-20 @ 13:33), Max: 37.2 (03-25-20 @ 23:30)  T(C): 36.6 (03-27-20 @ 13:33), Max: 38.1 (03-24-20 @ 23:30)  T(C): 36.6 (03-27-20 @ 13:33), Max: 38.1 (03-24-20 @ 23:30)    PHYSICAL EXAM:  HEENT: NC atramautic  Neck: supple  Respiratory: no accessory muscle use, breathing comfortably  Cardiovascular: distant  Gastrointestinal: normal appearing, nondistended  Extremities: no clubbing, no cyanosis,      LABS:                          6.6    1.75  )-----------( 3        ( 27 Mar 2020 10:36 )             19.5       1.75 03-27 @ 10:36  3.58 03-26 @ 06:33  3.53 03-25 @ 07:53  3.27 03-24 @ 06:19  2.41 03-23 @ 06:06  3.71 03-22 @ 22:47  3.50 03-22 @ 06:56  3.96 03-21 @ 05:48      03-27    136  |  112<H>  |  22  ----------------------------<  174<H>  4.3   |  17<L>  |  0.78    Ca    7.9<L>      27 Mar 2020 10:36    TPro  5.4<L>  /  Alb  2.6<L>  /  TBili  3.2<H>  /  DBili  x   /  AST  32  /  ALT  18  /  AlkPhos  150<H>  03-27      Creatinine, Serum: 0.78 mg/dL (03-27-20 @ 10:36)  Creatinine, Serum: 0.88 mg/dL (03-26-20 @ 06:33)  Creatinine, Serum: 0.85 mg/dL (03-25-20 @ 07:53)  Creatinine, Serum: 1.00 mg/dL (03-24-20 @ 06:19)  Creatinine, Serum: 0.97 mg/dL (03-23-20 @ 06:06)  Creatinine, Serum: 1.00 mg/dL (03-22-20 @ 06:56)  Creatinine, Serum: 0.98 mg/dL (03-21-20 @ 05:48)                INFLAMMATORY MARKERS  Auto Neutrophil #: 0.17 K/uL (03-22-20 @ 22:47)  Auto Lymphocyte #: 3.11 K/uL (03-22-20 @ 22:47)  Auto Neutrophil #: 0.14 K/uL (03-22-20 @ 06:56)  Auto Lymphocyte #: 2.86 K/uL (03-22-20 @ 06:56)  Auto Neutrophil #: 0.16 K/uL (03-21-20 @ 05:48)  Auto Lymphocyte #: 3.72 K/uL (03-21-20 @ 05:48)  Auto Neutrophil #: 0.22 K/uL (03-20-20 @ 06:31)  Auto Lymphocyte #: 3.10 K/uL (03-20-20 @ 06:31)  Auto Neutrophil #: 0.10 K/uL (03-18-20 @ 06:41)  Auto Lymphocyte #: 4.64 K/uL (03-18-20 @ 06:41)  Auto Neutrophil #: 0.11 K/uL (03-17-20 @ 07:55)  Auto Lymphocyte #: 1.48 K/uL (03-17-20 @ 07:55)  Auto Neutrophil #: 0.15 K/uL (03-16-20 @ 17:39)  Auto Lymphocyte #: 1.04 K/uL (03-16-20 @ 17:39)  Auto Neutrophil #: 0.27 K/uL (03-16-20 @ 09:51)  Auto Lymphocyte #: 1.27 K/uL (03-16-20 @ 09:51)  Auto Neutrophil #: 0.43 K/uL (03-14-20 @ 09:40)  Auto Lymphocyte #: 1.80 K/uL (03-14-20 @ 09:40)      Auto Eosinophil #: 0.00 K/uL (03-22-20 @ 22:47)  Auto Eosinophil #: 0.00 K/uL (03-22-20 @ 06:56)  Auto Eosinophil #: 0.00 K/uL (03-21-20 @ 05:48)  Auto Eosinophil #: 0.00 K/uL (03-20-20 @ 06:31)  Auto Eosinophil #: 0.00 K/uL (03-18-20 @ 06:41)  Auto Eosinophil #: 0.00 K/uL (03-17-20 @ 07:55)  Auto Eosinophil #: 0.00 K/uL (03-16-20 @ 17:39)  Auto Eosinophil #: 0.00 K/uL (03-16-20 @ 09:51)  Auto Eosinophil #: 0.00 K/uL (03-14-20 @ 09:40)    Lactate Dehydrogenase, Serum: 601 U/L (03-24-20 @ 13:30)        Troponin I, Serum: .017 ng/mL (03-16-20 @ 17:42)    Creatine Kinase, Serum: 19 U/L (03-16-20 @ 17:42)              INR: 1.26 ratio (03-22-20 @ 06:56)  INR: 1.27 ratio (03-21-20 @ 05:48)          MICROBIOLOGY:              RADIOLOGY & ADDITIONAL STUDIES:

## 2020-03-27 NOTE — PROGRESS NOTE ADULT - PROBLEM SELECTOR PLAN 4
-Patient initially treated for multifocal PNA  -Patient developed high fever on 3/16/20, ID consulted and he was treated with Cefipime. After stopping, he spiked high fevers and abx were restarted, but further infectious workup was unrevealing. Will reconsult ID.  -Patient also with tachycardia while febrile, Cardio was consulted for concern for afib- no afib noted  -CT chest/abd/pelvis noted- nonspecific findings similar to prior  -Cefepime d/c'd as per ID   -Most recent blood cultures 3/23- preliminary negative.  Will continue to await final results.    -Urine culture- negative. Previous blood culture negative.  -Afebrile x 48 hours. -Patient initially treated for multifocal PNA  -Patient developed high fever on 3/16/20, ID consulted and he was treated with Cefipime. After stopping, he spiked high fevers and abx were restarted, but further infectious workup was unrevealing. Re-consulted ID, recommended COVID testing and stop antibiotics.   -Patient also with tachycardia while febrile, Cardio was consulted for concern for afib- no afib noted  -CT chest/abd/pelvis noted- nonspecific findings similar to prior  -Cefepime d/c'd as per ID   -Most recent blood cultures 3/23- preliminary negative.  Will continue to await final results.    -Urine culture- negative. Previous blood culture negative.  -Afebrile x 48 hours.

## 2020-03-27 NOTE — CONSULT NOTE ADULT - SUBJECTIVE AND OBJECTIVE BOX
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 Has been coughing as he was recently treated for influenza A on last admission. States that when he was discharged from [facility] on 2/15, he was feeling well and had no fevers. Patient developed a fever, T100.9F (oral) today for which he called heme/onc, Dr. Stephenson. Per Dr. Stuart, patient advised to go to ER. He was found to have a fever, T100.6F (rectal) in ER. Denies recent travel or sick contacts. Denies headache, chest pain, sob, palpitations, abdominal pain, diarrhea, melena, hematochezia, dysuria or hematuria.     Of note, patient was recently admitted to [facility] from 2/14-2/15/2020 for chemotherapy with Venetoclax, found to be febrile with similar associated complaints of generalized malaise, cough, and weakness. Patient was found to be Flu A positive during that admission and was treated with tamiflu. Patient was found to be thrombocytopenic to 35k with a Hb of 6.6, transfused 2U PRBCs and 1U platelets. Chemotherapy was held off due to acute illness.      In ED, VS Tmax 100.6 rectal (repeat s/p tylenol 99.5),  -> 99, BP stable, saturating well on RA  CBC significant for .14 (on d/c 56.25), Hb 6.8 (on d/c 8.7), Plt 11 (on d/c 21)  CMP significant for K 3.4, Cr 1.5 (appears to be baseline per chart review), bili 3.3  Type and screen performed. Will be getting 1 unit pRBCs  Flu/RSV negative  CT Chest: Multilobar patchy peribronchovascular airspace opacities and more dense consolidative process in the right middle lobe likely reflecting multifocal pneumonia. Small right and trace left pleural effusion. Mediastinal and hilar lymphadenopathy worsened compared with prior exam from 8/30/2019. Numerous mildly prominent bilateral axillary lymph nodes. Retroperitoneal lymphadenopathy. Mild splenomegaly. Cholelithiasis.  CXR (wet read): noted to have increased opacities in right lower and middle lobes as well as left lower lobe in comparison to CXR on 2/14/2020  EKG: sinus tachycardic    S/p cefepime 1g IV x1, NS bolus 1L x1, duonebs x2, tylenol 650mg PO x1, 1U PRBCs ordered and to be transfused    Dr. Stuart was called and he recommended to only transfused PRBC and not platelets.  He will see pt in the morning. (22 Feb 2020 00:14)    PAST MEDICAL & SURGICAL HISTORY:  KIRTI (obstructive sleep apnea): on nocturnal cpap  Hypertension: not on any medications  Diabetes: not on any medications  Hepatitis B  Lymphoma: Small cell B cell  AIHA (autoimmune hemolytic anemia)  H/O lithotripsy    REVIEW OF SYSTEMS  General:  Reports fatigue and malaise.    Skin/Breast:  Reports concern with 2 to 3 day history of visible/ palpable collection or fullness on left forearm: no odor or drainage or pain presently.  	  Ophthalmologic:  Denies vision changes.  	  ENMT:	Denies naso or oropharyngeal discharge.    Respiratory and Thorax:  Reports cough, but denies SOB now.  	  Cardiovascular:	Denies chest pain.    Gastrointestinal:	  Denies abdominal pain.    Genitourinary:	Denies dysuria.    Musculoskeletal:	  "arthritis... joint pain...  aches..."    Neurological:	Denies focal deficits.    Psychiatric:	Anxious, but not inappropriately so.    Hematology/Lymphatics:	  Reports easy bruising as baseline.    Endocrine:	Diet controlled diabetic per his report/ description.    Allergic/Immunologic:	Denies presently; see allergies.      MEDICATIONS  (STANDING):  entecavir 0.5 milliGRAM(s) Oral daily  pantoprazole    Tablet 40 milliGRAM(s) Oral before breakfast  potassium chloride    Tablet ER 20 milliEquivalent(s) Oral two times a day  predniSONE   Tablet 10 milliGRAM(s) Oral daily  venetoclax 10 milliGRAM(s) Oral <User Schedule>      MEDICATIONS  (PRN):  acetaminophen   Tablet .. 650 milliGRAM(s) Oral every 6 hours PRN Mild Pain (1 - 3)  acetaminophen   Tablet .. 650 milliGRAM(s) Oral every 6 hours PRN Temp greater or equal to 38C (100.4F)  albuterol/ipratropium for Nebulization 3 milliLiter(s) Nebulizer every 6 hours PRN Shortness of Breath and/or Wheezing  guaiFENesin   Syrup  (Sugar-Free) 200 milliGRAM(s) Oral every 6 hours PRN Cough      Allergies  sulfa drugs (Unknown)      SOCIAL HISTORY:  .  Adult children.  Retired.  Non smoker.  No history of EtHO abuse.  No history of illicit drug use.      FAMILY HISTORY:  Family history of hypertension in mother      Vital Signs Last 24 Hrs  T(F): 97.8 (27 Mar 2020 13:33), Max: 99 (26 Mar 2020 22:02)  HR: 86 (27 Mar 2020 13:33) (80 - 87)  BP: 123/70 (27 Mar 2020 13:33) (101/62 - 136/84)  RR: 16 (27 Mar 2020 13:33) (16 - 18)  SpO2: 97% (27 Mar 2020 13:33) (96% - 100%)      PHYSICAL EXAM-  Constitutional:  Appears elderly and frail, but NAD.    Eyes:  Equal round and reactive.    ENMT:  Moist mucosal membranes.    Neck:  Supple with full ROM.    Breasts:  Normal male.    Back:  No CVAT.    Respiratory:  Equal expansion bilaterally.    Cardiovascular:  Pulse regular in rate and rhythm.    Gastrointestinal:   Soft and non tense and non tender.    Genitourinary:  Normal external genitalia.    Rectal:  Deferred.    Extremities:  See skin.    Vascular:  Brisk capillary refill.    Neurological:  aler    Skin: Extremities grossly symmetric.  However, ~4 cm area of visible/ palpable collection on left forearm.  No expressible drainage.  No appreciable odor.  No surrounding cellulitis.  No fluctuance at time of this examination.  Rather, a non-tender firmness is present with overlying and surrounding skin changes suggestive of hematoma +/- ecchymoses.    Lymph Nodes:  No cervical, supraclavicular, axillary +/- inguinal adenopathy?    Musculoskeletal:  No casts, splints or bracces.    Psychiatric:  Anxious but not inappropriately so.        LABS:                        6.6    1.75  )-----------( 3        ( 27 Mar 2020 10:36 )             19.5     03-27    136  |  112<H>  |  22  ----------------------------<  174<H>  4.3   |  17<L>  |  0.78    Ca    7.9<L>      27 Mar 2020 10:36    TPro  5.4<L>  /  Alb  2.6<L>  /  TBili  3.2<H>  /  DBili  x   /  AST  32  /  ALT  18  /  AlkPhos  150<H>  03-27      RADIOLOGY & ADDITIONAL STUDIES:  CT Scan of Chest and Abdomen and Pelvis IMPRESSION:   Improvement in the scattered bilateral parenchymal opacities as described.  Slight increase in bilateral pleural effusions.  Stable multistation adenopathy chest abdomen and pelvis as described  No acute findings abdomen and pelvis  Additional findings as discussed  PANTERA CALVILLO M.D., ATTENDING RADIOLOGIST  This document has been electronically signed. Mar 19 2020 10:26AM

## 2020-03-27 NOTE — PROGRESS NOTE ADULT - PROBLEM SELECTOR PLAN 2
- Per chart review, platelet count steadily declining since 2018  - poor response to platelets transfusion, suspect ITP component  - s/p one dose of IVIG  - Hem/Onc following, will follow the recommendation   -Platelet count 3 today- received 1 unit platelets yesterday  -To receive 1 unit of platelets today    -Heme/Onc- continue supportive care.  Dr Stephenson to be in over the weekend, this is patients personal hematologist.  -continue to monitor.

## 2020-03-27 NOTE — CONSULT NOTE ADULT - REASON FOR ADMISSION
weakness, anemia

## 2020-03-27 NOTE — CONSULT NOTE ADULT - ASSESSMENT
Very pleasant, but medically complex gentleman with concern for left forearm collection/ process; rule out abscess.  At this time, he denies pain.  Signs of infection by vital/ labs are difficult due to his immunosuppression and steroid.  However, examination is generally reassuring: no warmth, no pain, no fluctuance, no surrounding cellulitis.  As such, given the local findings of ecchymoses and probable hematoma, I do NOT think represents an abscess or source of sepsis at present.  Order given for warm compress.  Patient advised to elevate.  Following progress with you.  If fluctuance develops, can consider drainage, but would likely opt for bedside unroofing given platelet count and medical comorbidities.  Patient pleased, agrees, is now in improved spirits and verbalizes the plan as outlined above.

## 2020-03-27 NOTE — PROGRESS NOTE ADULT - PROBLEM SELECTOR PLAN 2
cont supportive care   management per primary and heme  Thank you for consulting us and involving us in the management of this most interesting and challenging case.     Please Call with any further questions

## 2020-03-28 LAB
ALBUMIN SERPL ELPH-MCNC: 2.6 G/DL — LOW (ref 3.3–5)
ALP SERPL-CCNC: 139 U/L — HIGH (ref 40–120)
ALT FLD-CCNC: 17 U/L — SIGNIFICANT CHANGE UP (ref 12–78)
ANION GAP SERPL CALC-SCNC: 8 MMOL/L — SIGNIFICANT CHANGE UP (ref 5–17)
AST SERPL-CCNC: 27 U/L — SIGNIFICANT CHANGE UP (ref 15–37)
BILIRUB SERPL-MCNC: 2.8 MG/DL — HIGH (ref 0.2–1.2)
BUN SERPL-MCNC: 22 MG/DL — SIGNIFICANT CHANGE UP (ref 7–23)
CALCIUM SERPL-MCNC: 7.5 MG/DL — LOW (ref 8.5–10.1)
CHLORIDE SERPL-SCNC: 111 MMOL/L — HIGH (ref 96–108)
CO2 SERPL-SCNC: 20 MMOL/L — LOW (ref 22–31)
CREAT SERPL-MCNC: 0.84 MG/DL — SIGNIFICANT CHANGE UP (ref 0.5–1.3)
CULTURE RESULTS: SIGNIFICANT CHANGE UP
CULTURE RESULTS: SIGNIFICANT CHANGE UP
GLUCOSE SERPL-MCNC: 108 MG/DL — HIGH (ref 70–99)
HCT VFR BLD CALC: 17.8 % — CRITICAL LOW (ref 39–50)
HGB BLD-MCNC: 6 G/DL — CRITICAL LOW (ref 13–17)
MCHC RBC-ENTMCNC: 29.6 PG — SIGNIFICANT CHANGE UP (ref 27–34)
MCHC RBC-ENTMCNC: 33.7 GM/DL — SIGNIFICANT CHANGE UP (ref 32–36)
MCV RBC AUTO: 87.7 FL — SIGNIFICANT CHANGE UP (ref 80–100)
NRBC # BLD: 0 /100 WBCS — SIGNIFICANT CHANGE UP (ref 0–0)
PLATELET # BLD AUTO: 3 K/UL — CRITICAL LOW (ref 150–400)
POTASSIUM SERPL-MCNC: 4.2 MMOL/L — SIGNIFICANT CHANGE UP (ref 3.5–5.3)
POTASSIUM SERPL-SCNC: 4.2 MMOL/L — SIGNIFICANT CHANGE UP (ref 3.5–5.3)
PROT SERPL-MCNC: 5.5 G/DL — LOW (ref 6–8.3)
RBC # BLD: 2.03 M/UL — LOW (ref 4.2–5.8)
RBC # FLD: 18.4 % — HIGH (ref 10.3–14.5)
SODIUM SERPL-SCNC: 139 MMOL/L — SIGNIFICANT CHANGE UP (ref 135–145)
SPECIMEN SOURCE: SIGNIFICANT CHANGE UP
SPECIMEN SOURCE: SIGNIFICANT CHANGE UP
WBC # BLD: 3.73 K/UL — LOW (ref 3.8–10.5)
WBC # FLD AUTO: 3.73 K/UL — LOW (ref 3.8–10.5)

## 2020-03-28 PROCEDURE — 99232 SBSQ HOSP IP/OBS MODERATE 35: CPT

## 2020-03-28 PROCEDURE — 99231 SBSQ HOSP IP/OBS SF/LOW 25: CPT

## 2020-03-28 RX ADMIN — Medication 20 MILLIEQUIVALENT(S): at 06:11

## 2020-03-28 RX ADMIN — PANTOPRAZOLE SODIUM 40 MILLIGRAM(S): 20 TABLET, DELAYED RELEASE ORAL at 06:11

## 2020-03-28 RX ADMIN — ENTECAVIR 0.5 MILLIGRAM(S): 0.5 TABLET ORAL at 06:11

## 2020-03-28 RX ADMIN — VENETOCLAX 10 MILLIGRAM(S): 100 TABLET, FILM COATED ORAL at 21:17

## 2020-03-28 RX ADMIN — Medication 10 MILLIGRAM(S): at 06:11

## 2020-03-28 RX ADMIN — Medication 20 MILLIEQUIVALENT(S): at 17:09

## 2020-03-28 NOTE — PROGRESS NOTE ADULT - ASSESSMENT
Concern for left forearm collection/ process; rule out abscess.  At this time, he continues to deny pain.  Signs of infection by vital and labs difficult due to immunosuppression and steroids...  However, examination remains reassuring: no warmth, no pain, no fluctuance, no surrounding cellulitis at this time.  As such, I do not think represents an abscess or source of sepsis at present.  Warm compresses continue.  Patient reminded to elevate.  Following progress with you.  No immediate surgical intervention planned.  Patient pleased, agrees, is now in improved spirits and verbalizes the plan as outlined above.

## 2020-03-28 NOTE — PROGRESS NOTE ADULT - PROBLEM SELECTOR PLAN 3
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 Was on prednisone 10mg daily -- increase to prednisone 40mg po during hospitalization, tapering started @2/29 - now 10mg daily  - Heme/onc following  - H&H have again dropped. 1 unit PRBCs ordered  - will continue to monitor

## 2020-03-28 NOTE — PROGRESS NOTE ADULT - ASSESSMENT
hepatitis B   elevated lfts   CLL  pancytopenia  intermittent fever      f/u am labs  patient with elevated bilirubin and alk phos at baseline ? leon  mri showed normal liver, no cbd stone/dilatation; hep b pcr neg  ob neg; no s/s overt gib; monitor cbc daily, transfuse per heme/onc  diet as tolerated; correct elytes prn  continue hepatitis b therapy  cont ppi ppx  further care per heme/onc and primary    Advanced care planning was discussed with patient and family.  Advanced care planning forms were reviewed and discussed.  Risks, benefits and alternatives of gastroenterologic procedures were discussed in detail and all questions were answered.    30 minutes spent.

## 2020-03-28 NOTE — PROGRESS NOTE ADULT - PROBLEM SELECTOR PLAN 4
-Patient initially treated for multifocal PNA  -Patient developed high fever on 3/16/20, ID consulted and he was treated with Cefipime. After stopping, he spiked high fevers and abx were restarted, but further infectious workup was unrevealing. Re-consulted ID, recommended COVID testing which was negative and stop antibiotics.   -Patient also with tachycardia while febrile, Cardio was consulted for concern for afib- no afib noted  -CT chest/abd/pelvis noted- nonspecific findings similar to prior  -Cefepime d/c'd as per ID   -Most recent blood cultures 3/23- preliminary negative.  Will continue to await final results.    -Urine culture- negative. Previous blood culture negative.  -Afebrile

## 2020-03-28 NOTE — PROGRESS NOTE ADULT - ASSESSMENT
74 yo male with pmhx of Hepatitis B on entecavirt, CLL/SLL, recent Flu diagnosis in February 2020, with an overnight temperature of 100.6.  Pt fatigued and per heme/onc, prognosis is poor. Will continue to monitor.

## 2020-03-28 NOTE — PROGRESS NOTE ADULT - SUBJECTIVE AND OBJECTIVE BOX
[facility] -- Cori Dawkins, Dahlia, Travis, Ryland Wright Savella  Office # [phone]      Follow Up:    Cardiac Arrhythmia  Subjective/Observations:   No events overnight resting comfortably in bed.  No complaints of chest pain, dyspnea, or palpitations reported. No signs of orthopnea or PND.     REVIEW OF SYSTEMS: All other review of systems is negative unless indicated above    PAST MEDICAL & SURGICAL HISTORY:  KIRTI (obstructive sleep apnea): on nocturnal cpap  Hypertension: not on any medications  Diabetes: not on any medications  Hepatitis B  Lymphoma: Small cell B cell  AIHA (autoimmune hemolytic anemia)  H/O lithotripsy      MEDICATIONS  (STANDING):  entecavir 0.5 milliGRAM(s) Oral daily  pantoprazole    Tablet 40 milliGRAM(s) Oral before breakfast  potassium chloride    Tablet ER 20 milliEquivalent(s) Oral two times a day  predniSONE   Tablet 10 milliGRAM(s) Oral daily  venetoclax 10 milliGRAM(s) Oral <User Schedule>    MEDICATIONS  (PRN):  acetaminophen   Tablet .. 650 milliGRAM(s) Oral every 6 hours PRN Mild Pain (1 - 3)  acetaminophen   Tablet .. 650 milliGRAM(s) Oral every 6 hours PRN Temp greater or equal to 38C (100.4F)  albuterol/ipratropium for Nebulization 3 milliLiter(s) Nebulizer every 6 hours PRN Shortness of Breath and/or Wheezing  guaiFENesin   Syrup  (Sugar-Free) 200 milliGRAM(s) Oral every 6 hours PRN Cough      Allergies    sulfa drugs (Unknown)    Intolerances        Vital Signs Last 24 Hrs  T(C): 36.9 (28 Mar 2020 08:16), Max: 37.1 (28 Mar 2020 00:18)  T(F): 98.4 (28 Mar 2020 08:16), Max: 98.7 (28 Mar 2020 00:18)  HR: 87 (28 Mar 2020 08:16) (86 - 96)  BP: 100/53 (28 Mar 2020 08:16) (100/53 - 138/73)  BP(mean): --  RR: 18 (28 Mar 2020 08:16) (16 - 18)  SpO2: 95% (28 Mar 2020 08:16) (95% - 97%)    I&O's Summary        PHYSICAL EXAM:  TELE:   Constitutional: NAD, awake and alert, well-developed  HEENT: Moist Mucous Membranes, Anicteric  Pulmonary: Non-labored, breath sounds are clear bilaterally, No wheezing, crackles or rhonchi  Cardiovascular: Regular, S1 and S2 nl, No murmurs, rubs, gallops or clicks  Gastrointestinal: Bowel Sounds present, soft, nontender.   Lymph: No lymphadenopathy. No peripheral edema.  Skin: No visible rashes or ulcers.  Psych:  Mood & affect appropriate    LABS: All Labs Reviewed:                        6.0    3.73  )-----------( 3        ( 28 Mar 2020 07:05 )             17.8                         6.6    1.75  )-----------( 3        ( 27 Mar 2020 10:36 )             19.5                         7.4    3.58  )-----------( 1        ( 26 Mar 2020 06:33 )             22.2     28 Mar 2020 07:05    139    |  111    |  22     ----------------------------<  108    4.2     |  20     |  0.84   27 Mar 2020 10:36    136    |  112    |  22     ----------------------------<  174    4.3     |  17     |  0.78   26 Mar 2020 06:33    138    |  112    |  22     ----------------------------<  102    4.2     |  18     |  0.88     Ca    7.5        28 Mar 2020 07:05  Ca    7.9        27 Mar 2020 10:36  Ca    7.7        26 Mar 2020 06:33    TPro  5.5    /  Alb  2.6    /  TBili  2.8    /  DBili  x      /  AST  27     /  ALT  17     /  AlkPhos  139    28 Mar 2020 07:05  TPro  5.4    /  Alb  2.6    /  TBili  3.2    /  DBili  x      /  AST  32     /  ALT  18     /  AlkPhos  150    27 Mar 2020 10:36  TPro  5.4    /  Alb  2.6    /  TBili  3.2    /  DBili  x      /  AST  46     /  ALT  20     /  AlkPhos  167    26 Mar 2020 06:33             ECG:  < from: 12 Lead ECG (03.18.20 @ 19:13) >  Ventricular Rate 167 BPM    Atrial Rate 178 BPM    QRS Duration 64 ms    Q-T Interval 296 ms    QTC Calculation(Bezet) 493 ms    R Axis 32 degrees    T Axis 61 degrees    Diagnosis Line *** Poor data quality, interpretation may be adversely affected  likely st with apcs  Nonspecific ST and T wave abnormality  Abnormal ECG  When compared with ECG of 16-MAR-2020 17:10,  Previous ECG has undetermined rhythm, needs review  Nonspecific T wave abnormality now evident in Anterior leads  Confirmed by CANDI SCHREIBER ([id number]) on 3/19/2020 5:29:35 PM    < end of copied text >    Echo:  < from: TTE Echo Complete w/o contrast w/ Doppler (03.04.20 @ 09:54) >  EXAM:  ECHO TTE WO CON COMP W DOPP         PROCEDURE DATE:  03/04/2020        INTERPRETATION:  INDICATION: edema  Sonographer PH    Blood Pressure 129/63    Height 165 cm     Weight 82.9 kg       BSA 1.9 sq m    Dimensions:    LA 2.9       Normal Values: 2.0 - 4.0 cm    Ao 2.9        Normal Values: 2.0 - 3.8 cm  SEPTUM 0.9       Normal Values: 0.6 - 1.2 cm  PWT 0.9       Normal Values: 0.6 - 1.1 cm  LVIDd 4.1         Normal Values: 3.0 - 5.6 cm  LVIDs 2.3         Normal Values: 1.8 - 4.0 cm      OBSERVATIONS:    Mitral Valve: normal, trace physiologic MR.  Aortic Valve/Aorta: Normal trileaflet aortic valve with normal opening.  Tricuspid Valve: normal with trace TR.  Pulmonic Valve: normal  Left Atrium: normal  Right Atrium: normal  Left Ventricle: normal LV size and systolic function, estimated LVEF of 65%.  Right Ventricle: Grossly normal size and systolic function.  Pericardium/Pleura: normal, no significant pericardial effusion.      Conclusion:   Normal left ventricular internal dimensions and systolic function, estimated LVEF of 65%.   Grossly normal RV size and systolic function.     Normal trileaflet aortic valve, without AI.   Trace physiologic MR and TR.        KALPANA GUERRERO   This document has been electronically signed. Mar  5 2020  7:32AM          < end of copied text >    Radiology:

## 2020-03-28 NOTE — PROGRESS NOTE ADULT - PROBLEM SELECTOR PLAN 1
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 as per heme/onc.   - Steroids being tapered - 10mg now  - Hem/Onc following, recs appreciated- venetoclax restarted Tuesday March 25. - patient with poor overall prognosis, but as per patient, he wants to try all options.  -Primary oncologist Dr. Stephenson to discuss goals of care with patient and family today.

## 2020-03-28 NOTE — PROGRESS NOTE ADULT - SUBJECTIVE AND OBJECTIVE BOX
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 Has been coughing as he was recently treated for influenza A on last admission. States that when he was discharged from [facility] on 2/15, he was feeling well and had no fevers. Patient developed a fever, T100.9F (oral) today for which he called heme/onc, Dr. Stephenson. Per Dr. Stuart, patient advised to go to ER. He was found to have a fever, T100.6F (rectal) in ER. Denies recent travel or sick contacts. Denies headache, chest pain, sob, palpitations, abdominal pain, diarrhea, melena, hematochezia, dysuria or hematuria.     INTERVAL HPI:  Pt seen and examined at bedside today.  Pt is sitting in bed eating breakfast. Tolerating diet, denies N/V/D.  Remains afebrile. Denies headaches, SOB/CP/LLAMAS/palpitations, dizziness/lightheadedness.        REVIEW OF SYSTEMS:    CONSTITUTIONAL: Some weakness.    EYES/ENT: No visual changes, no throat pain   RESPIRATORY: No cough, wheezing, hemoptysis; No shortness of breath  CARDIOVASCULAR: No chest pain or palpitations  GASTROINTESTINAL: No abdominal pain, nausea, vomiting, or hematemesis; No diarrhea or constipation. No melena or hematochezia.  GENITOURINARY: No dysuria, frequency or hematuria  NEUROLOGICAL: No dizziness, numbness, or weakness  SKIN: An ecchymotic round raised area just inferior to antecubital area. Multiple spots of ecchymosis on body.  All other review of systems is negative unless indicated above.    Vital Signs Last 24 Hrs  T(C): 36.9 (28 Mar 2020 08:16), Max: 37.1 (28 Mar 2020 00:18)  T(F): 98.4 (28 Mar 2020 08:16), Max: 98.7 (28 Mar 2020 00:18)  HR: 87 (28 Mar 2020 08:16) (86 - 96)  BP: 100/53 (28 Mar 2020 08:16) (100/53 - 138/73)  BP(mean): --  RR: 18 (28 Mar 2020 08:16) (16 - 18)  SpO2: 95% (28 Mar 2020 08:16) (95% - 97%)    PHYSICAL EXAM:     GENERAL: no acute distress  HEENT: NC/AT, EOMI, neck supple  RESPIRATORY: CTA B/L no wheezes no rales no rhonchi  CARDIOVASCULAR: RRR, no murmurs, gallops, rubs  ABDOMINAL: soft, non-tender, non-distended, positive bowel sounds   NEURO/SKIN: Multiple areas of ecchymotic appearing spots.  Left forearm with moderately tense hematoma without drainage, streaking, surrounding erythema and no warmth to            touch.  Compartments soft.  Sensation to light touch grossly intact and symmetric through all distributions bilat UE/LE.  No evidence of infection.  VASCULAR: Distal pulses 2+ bilat UE/LE.  Calves soft, NT bilat LE's.   MUSCULOSKELETAL: no gross joint deformity 5/5 lower extremity strength.      LABS:                        6.0    3.73  )-----------( 3        ( 28 Mar 2020 07:05 )             17.8   03-28    139  |  111<H>  |  22  ----------------------------<  108<H>  4.2   |  20<L>  |  0.84    Ca    7.5<L>      28 Mar 2020 07:05    TPro  5.5<L>  /  Alb  2.6<L>  /  TBili  2.8<H>  /  DBili  x   /  AST  27  /  ALT  17  /  AlkPhos  139<H>  03-28      MEDICATIONS  (STANDING):  entecavir 0.5 milliGRAM(s) Oral daily  pantoprazole    Tablet 40 milliGRAM(s) Oral before breakfast  potassium chloride    Tablet ER 20 milliEquivalent(s) Oral two times a day  predniSONE   Tablet 10 milliGRAM(s) Oral daily  venetoclax 10 milliGRAM(s) Oral <User Schedule>    MEDICATIONS  (PRN):  acetaminophen   Tablet .. 650 milliGRAM(s) Oral every 6 hours PRN Mild Pain (1 - 3)  acetaminophen   Tablet .. 650 milliGRAM(s) Oral every 6 hours PRN Temp greater or equal to 38C (100.4F)  albuterol/ipratropium for Nebulization 3 milliLiter(s) Nebulizer every 6 hours PRN Shortness of Breath and/or Wheezing  guaiFENesin   Syrup  (Sugar-Free) 200 milliGRAM(s) Oral every 6 hours PRN Cough

## 2020-03-28 NOTE — PROGRESS NOTE ADULT - SUBJECTIVE AND OBJECTIVE BOX
INTERVAL HPI/OVERNIGHT EVENTS:  No new overnight event.  No N/V/D.  Tolerating diet.      Allergies    sulfa drugs (Unknown)    Intolerances    General:  No wt loss, fevers, chills, night sweats, fatigue,   Eyes:  Good vision, no reported pain  ENT:  No sore throat, pain, runny nose, dysphagia  CV:  No pain, palpitations, hypo/hypertension  Resp:  No dyspnea, cough, tachypnea, wheezing  GI:  No pain, No nausea, No vomiting, No diarrhea, No constipation, No weight loss, No fever, No pruritis, No rectal bleeding, No tarry stools, No dysphagia,  :  No pain, bleeding, incontinence, nocturia  Muscle:  No pain, weakness  Neuro:  No weakness, tingling, memory problems  Psych:  No fatigue, insomnia, mood problems, depression  Endocrine:  No polyuria, polydipsia, cold/heat intolerance  Heme:  No petechiae, ecchymosis, easy bruisability  Skin:  No rash, tattoos, scars, edema      PHYSICAL EXAM:   Vital Signs:  Vital Signs Last 24 Hrs  T(C): 36.7 (28 Mar 2020 12:41), Max: 37.1 (28 Mar 2020 00:18)  T(F): 98.1 (28 Mar 2020 12:41), Max: 98.7 (28 Mar 2020 00:18)  HR: 89 (28 Mar 2020 12:41) (86 - 96)  BP: 134/76 (28 Mar 2020 12:41) (100/53 - 138/73)  BP(mean): --  RR: 17 (28 Mar 2020 12:41) (16 - 18)  SpO2: 95% (28 Mar 2020 12:41) (95% - 97%)  Daily     Daily Weight in k (28 Mar 2020 05:05)I&O's Summary      GENERAL:  Appears stated age, well-groomed, well-nourished, no distress  HEENT:  NC/AT,  conjunctivae clear and pink, no thyromegaly, nodules, adenopathy, no JVD, sclera -anicteric  CHEST:  Full & symmetric excursion, no increased effort, breath sounds clear  HEART:  Regular rhythm, S1, S2, no murmur/rub/S3/S4, no abdominal bruit, no edema  ABDOMEN:  Soft, non-tender, non-distended, normoactive bowel sounds,  no masses ,no hepato-splenomegaly, no signs of chronic liver disease  EXTEREMITIES:  no cyanosis,clubbing or edema  SKIN:  No rash/erythema/ecchymoses/petechiae/wounds/abscess/warm/dry  NEURO:  Alert, oriented, no asterixis, no tremor, no encephalopathy      LABS:                        6.0    3.73  )-----------( 3        ( 28 Mar 2020 07:05 )             17.8         139  |  111<H>  |  22  ----------------------------<  108<H>  4.2   |  20<L>  |  0.84    Ca    7.5<L>      28 Mar 2020 07:05    TPro  5.5<L>  /  Alb  2.6<L>  /  TBili  2.8<H>  /  DBili  x   /  AST  27  /  ALT  17  /  AlkPhos  139<H>          amylase   lipase  RADIOLOGY & ADDITIONAL TESTS:

## 2020-03-28 NOTE — PROGRESS NOTE ADULT - ASSESSMENT
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    Was discharged home and admitted again 2/22/20 with  fever and found w pneumonia, post course of Ceftriaxone, continues to require transfusions plts and PRBCs.  Hep B titer negative  Post IVIG in late February 2020  Had trial of Venetoclax started 3/1/20 and received 4 days of treatment which was held on 3/5/20 due to pancytopenia. Restarted 3/24/20 on 10mg qD, lowest available dose as attempt to see if any benefit.    -remains pancytopenic inspite of above and transfusion dependent, today req both PRBC and plts again as no sig response to yesterday's transfusions  -total bili better and SGOT normalized will taper prednisone to 5mg to plan to top. No evidence of sig hemolysis component at this time  -discussed w pt and wife, poor prognosis, no signs improvement of CLL/SLL  continue Venetoclax 10mg qD, if transfusion frequency further worsen will then need to stop  -at this time they would still like to continue present treatment, transfusions 76 y/o man w Hepatitis B on Entecavirt, Chronic Lymphocytic Leukemia/Small Lymphocytic Lymphoma 4/2018 when presented w immune hemolytic anemia w partial response to steroids, started on Ibrutinib w heme CR and NH by CT scan w some decrease of lymphadenopathies, until 1/2020 when relapsed w incr WBC, anemia(initially not hemolytic, has chronic Matthew positive due to CLL/SLL), thrombocytopenia.    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    Was discharged home and admitted again 2/22/20 with  fever and found w pneumonia, post course of Ceftriaxone, continues to require transfusions plts and PRBCs.  Hep B titer negative  Post IVIG in late February 2020  Had trial of Venetoclax started 3/1/20 and received 4 days of treatment which was held on 3/5/20 due to pancytopenia. Restarted 3/24/20 on 10mg qD, lowest available dose as attempt to see if any benefit.    -remains pancytopenic inspite of above and transfusion dependent, today req both PRBC and plts again as no sig response to yesterday's transfusions  -total bili better and SGOT normalized will taper prednisone to 5mg to plan to top. No evidence of sig hemolysis component at this time  -discussed w pt and wife, poor prognosis, no signs improvement of CLL/SLL  continue Venetoclax 10mg qD, if transfusion frequency further worsen will then need to stop  -discussed w wife Hospice, they are unable to do home Hospice, but may may consider [facility], understands once Hospice, no further blood transfusions. They will make final decisions pending events over next few days

## 2020-03-28 NOTE — PROGRESS NOTE ADULT - SUBJECTIVE AND OBJECTIVE BOX
All interim records and events noted.    up in bed, not in acute distress      MEDICATIONS  (STANDING):  entecavir 0.5 milliGRAM(s) Oral daily  pantoprazole    Tablet 40 milliGRAM(s) Oral before breakfast  potassium chloride    Tablet ER 20 milliEquivalent(s) Oral two times a day  predniSONE   Tablet 10 milliGRAM(s) Oral daily  venetoclax 10 milliGRAM(s) Oral <User Schedule>    MEDICATIONS  (PRN):  acetaminophen   Tablet .. 650 milliGRAM(s) Oral every 6 hours PRN Mild Pain (1 - 3)  acetaminophen   Tablet .. 650 milliGRAM(s) Oral every 6 hours PRN Temp greater or equal to 38C (100.4F)  albuterol/ipratropium for Nebulization 3 milliLiter(s) Nebulizer every 6 hours PRN Shortness of Breath and/or Wheezing  guaiFENesin   Syrup  (Sugar-Free) 200 milliGRAM(s) Oral every 6 hours PRN Cough      Vital Signs Last 24 Hrs  T(C): 36.7 (28 Mar 2020 12:41), Max: 37.1 (28 Mar 2020 00:18)  T(F): 98.1 (28 Mar 2020 12:41), Max: 98.7 (28 Mar 2020 00:18)  HR: 89 (28 Mar 2020 12:41) (87 - 96)  BP: 134/76 (28 Mar 2020 12:41) (100/53 - 138/73)  BP(mean): --  RR: 17 (28 Mar 2020 12:41) (16 - 18)  SpO2: 95% (28 Mar 2020 12:41) (95% - 96%)    PHYSICAL EXAM  General: well developed  well nourished, in no acute distress  Head: atraumatic, normocephalic  ENT: sclera anicteric, buccal mucosa moist  Neck: supple, trachea midline, no palpable masses  CV: S1 S2, regular rate and rhythm  Lungs: clear to auscultation, no wheezes/rhonchi  Abdomen: soft, nontender, bowel sounds present, no palpable hepatosplenomegaly  Extrem: trace - 1+ blair legs and arms edema  Skin: multiple areas of ecchymoses and purpura blair arms  Neuro: alert and oriented X3,  no focal deficits      LABS:             6.0    3.73  )-----------( 3        ( 03-28 @ 07:05 )             17.8                6.6    1.75  )-----------( 3        ( 03-27 @ 10:36 )             19.5                7.4    3.58  )-----------( 1        ( 03-26 @ 06:33 )             22.2       03-28    139  |  111<H>  |  22  ----------------------------<  108<H>  4.2   |  20<L>  |  0.84    Ca    7.5<L>      28 Mar 2020 07:05    TPro  5.5<L>  /  Alb  2.6<L>  /  TBili  2.8<H>  /  DBili  x   /  AST  27  /  ALT  17  /  AlkPhos  139<H>  03-28        RADIOLOGY & ADDITIONAL STUDIES:    IMPRESSION/RECOMMENDATIONS:

## 2020-03-28 NOTE — PROGRESS NOTE ADULT - ASSESSMENT
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 Tachy likely reactive but now resolved.   - Monitor and replete electrolytes. Keep K>4.0 and Mg>2.0.    Pancytopenia   - Hem/onc following   - Multiple tx of PRBC last tx (3/21), Platelets and Plasma last tx (3/5).  s/p Platelet transfusion 3/26 for plate of 100  - Hgb remained low   Transfuse per Heme    LE edema  - Doppler negative   - Albumin 2.4 likely contributing to his LE edema   - Echo 2/24/2020 revealing trace AI trace TR ef 60-65%.  No need to repeat    DM  - Management as per primary     Further cardiac workup will depend on clinical course.   All other workup per primary team. Will followup.     Guerrero Capps [facility]  Cardiology   Spectra #[phone]

## 2020-03-28 NOTE — PROGRESS NOTE ADULT - SUBJECTIVE AND OBJECTIVE BOX
SUBJECTIVE:  76 y/o M seen and examined at bedside. Surgery following for hematoma of LUE. pt denies any pain of LUE, states "its the same". pt denies any fevers, chills, chest pain, shortness of breath, abdominal pain, nausea, vomiting or diarrhea.    Vital Signs Last 24 Hrs  T(C): 36.9 (28 Mar 2020 08:16), Max: 37.1 (28 Mar 2020 00:18)  T(F): 98.4 (28 Mar 2020 08:16), Max: 98.7 (28 Mar 2020 00:18)  HR: 87 (28 Mar 2020 08:16) (86 - 96)  BP: 100/53 (28 Mar 2020 08:16) (100/53 - 138/73)  BP(mean): --  RR: 18 (28 Mar 2020 08:16) (16 - 18)  SpO2: 95% (28 Mar 2020 08:16) (95% - 97%)    PHYSICAL EXAM:  GENERAL: No acute distress, well-developed  EXTREMITIES: LUE with about 4 cm round palpable area of LUE. Area nonttp, hard, and nonfluctuant with surrounding contusion of superficial skin. No discharge or drainage noted  NEUROLOGY: A&O x 3, no focal deficits    I&O's Summary    I&O's Detail    MEDICATIONS  (STANDING):  entecavir 0.5 milliGRAM(s) Oral daily  pantoprazole    Tablet 40 milliGRAM(s) Oral before breakfast  potassium chloride    Tablet ER 20 milliEquivalent(s) Oral two times a day  predniSONE   Tablet 10 milliGRAM(s) Oral daily  venetoclax 10 milliGRAM(s) Oral <User Schedule>    MEDICATIONS  (PRN):  acetaminophen   Tablet .. 650 milliGRAM(s) Oral every 6 hours PRN Mild Pain (1 - 3)  acetaminophen   Tablet .. 650 milliGRAM(s) Oral every 6 hours PRN Temp greater or equal to 38C (100.4F)  albuterol/ipratropium for Nebulization 3 milliLiter(s) Nebulizer every 6 hours PRN Shortness of Breath and/or Wheezing  guaiFENesin   Syrup  (Sugar-Free) 200 milliGRAM(s) Oral every 6 hours PRN Cough    LABS:                        6.0    3.73  )-----------( 3        ( 28 Mar 2020 07:05 )             17.8     03-28    139  |  111<H>  |  22  ----------------------------<  108<H>  4.2   |  20<L>  |  0.84    Ca    7.5<L>      28 Mar 2020 07:05    TPro  5.5<L>  /  Alb  2.6<L>  /  TBili  2.8<H>  /  DBili  x   /  AST  27  /  ALT  17  /  AlkPhos  139<H>  03-28    ASSESSMENT  76 y/o M pmhx small cell, b cell lymphoma, being managed for anemia, following for a hematoma of LUE,     PLAN  - cont care per primary team  - warm compresses, elevate extremity   - pain control, supportive care  - OOB, ambulation as tolerated  - to be discussed with Dr. Abdul    Surgical Team Contact Information  Spectralink: Ext: [phone] or [phone]  Pager: [phone] SUBJECTIVE:  74 y/o M seen and examined at bedside. Surgery following for hematoma of LUE. pt denies any pain of LUE, states "its the same". pt denies any fevers, chills, chest pain, shortness of breath, abdominal pain, nausea, vomiting or diarrhea.      Vital Signs Last 24 Hrs  T(F): 98.4 (28 Mar 2020 08:16), Max: 98.7 (28 Mar 2020 00:18)  HR: 87 (28 Mar 2020 08:16) (86 - 96)  BP: 100/53 (28 Mar 2020 08:16) (100/53 - 138/73)  RR: 18 (28 Mar 2020 08:16) (16 - 18)  SpO2: 95% (28 Mar 2020 08:16) (95% - 97%)      PHYSICAL EXAM:  GENERAL: No acute distress, well-developed  EXTREMITIES: LUE with about 4 cm round palpable area of LUE. Area nonttp, hard, and nonfluctuant with surrounding contusion of superficial skin. No discharge or drainage noted  NEUROLOGY: A&O x 3, no focal deficits      MEDICATIONS  (STANDING):  entecavir 0.5 milliGRAM(s) Oral daily  pantoprazole    Tablet 40 milliGRAM(s) Oral before breakfast  potassium chloride    Tablet ER 20 milliEquivalent(s) Oral two times a day  predniSONE   Tablet 10 milliGRAM(s) Oral daily  venetoclax 10 milliGRAM(s) Oral <User Schedule>      MEDICATIONS  (PRN):  acetaminophen   Tablet .. 650 milliGRAM(s) Oral every 6 hours PRN Mild Pain (1 - 3)  acetaminophen   Tablet .. 650 milliGRAM(s) Oral every 6 hours PRN Temp greater or equal to 38C (100.4F)  albuterol/ipratropium for Nebulization 3 milliLiter(s) Nebulizer every 6 hours PRN Shortness of Breath and/or Wheezing  guaiFENesin   Syrup  (Sugar-Free) 200 milliGRAM(s) Oral every 6 hours PRN Cough      LABS:                        6.0    3.73  )-----------( 3        ( 28 Mar 2020 07:05 )             17.8     03-28    139  |  111<H>  |  22  ----------------------------<  108<H>  4.2   |  20<L>  |  0.84    Ca    7.5<L>      28 Mar 2020 07:05    TPro  5.5<L>  /  Alb  2.6<L>  /  TBili  2.8<H>  /  DBili  x   /  AST  27  /  ALT  17  /  AlkPhos  139<H>  03-28      ASSESSMENT  74 y/o M pmhx small cell, b cell lymphoma, being managed for anemia, following for a hematoma of LUE,       PLAN  - cont care per primary team  - warm compresses, elevate extremity   - pain control, supportive care  - OOB, ambulation as tolerated  - to be discussed with Dr. Abdul      Surgical Team Contact Information  Spectralink: Ext: [phone] or [phone]  Pager: [phone]

## 2020-03-29 LAB
ALBUMIN SERPL ELPH-MCNC: 2.7 G/DL — LOW (ref 3.3–5)
ALP SERPL-CCNC: 140 U/L — HIGH (ref 40–120)
ALT FLD-CCNC: 16 U/L — SIGNIFICANT CHANGE UP (ref 12–78)
ANION GAP SERPL CALC-SCNC: 6 MMOL/L — SIGNIFICANT CHANGE UP (ref 5–17)
AST SERPL-CCNC: 22 U/L — SIGNIFICANT CHANGE UP (ref 15–37)
BASOPHILS # BLD AUTO: 0.01 K/UL — SIGNIFICANT CHANGE UP (ref 0–0.2)
BASOPHILS NFR BLD AUTO: 0.4 % — SIGNIFICANT CHANGE UP (ref 0–2)
BILIRUB SERPL-MCNC: 3.5 MG/DL — HIGH (ref 0.2–1.2)
BUN SERPL-MCNC: 24 MG/DL — HIGH (ref 7–23)
CALCIUM SERPL-MCNC: 7.9 MG/DL — LOW (ref 8.5–10.1)
CHLORIDE SERPL-SCNC: 112 MMOL/L — HIGH (ref 96–108)
CO2 SERPL-SCNC: 19 MMOL/L — LOW (ref 22–31)
CREAT SERPL-MCNC: 0.85 MG/DL — SIGNIFICANT CHANGE UP (ref 0.5–1.3)
EOSINOPHIL # BLD AUTO: 0 K/UL — SIGNIFICANT CHANGE UP (ref 0–0.5)
EOSINOPHIL NFR BLD AUTO: 0 % — SIGNIFICANT CHANGE UP (ref 0–6)
GLUCOSE SERPL-MCNC: 144 MG/DL — HIGH (ref 70–99)
HCT VFR BLD CALC: 18.5 % — CRITICAL LOW (ref 39–50)
HGB BLD-MCNC: 6.1 G/DL — CRITICAL LOW (ref 13–17)
IMM GRANULOCYTES NFR BLD AUTO: 0 % — SIGNIFICANT CHANGE UP (ref 0–1.5)
LYMPHOCYTES # BLD AUTO: 2 K/UL — SIGNIFICANT CHANGE UP (ref 1–3.3)
LYMPHOCYTES # BLD AUTO: 81 % — HIGH (ref 13–44)
MCHC RBC-ENTMCNC: 29 PG — SIGNIFICANT CHANGE UP (ref 27–34)
MCHC RBC-ENTMCNC: 33 GM/DL — SIGNIFICANT CHANGE UP (ref 32–36)
MCV RBC AUTO: 88.1 FL — SIGNIFICANT CHANGE UP (ref 80–100)
MONOCYTES # BLD AUTO: 0.3 K/UL — SIGNIFICANT CHANGE UP (ref 0–0.9)
MONOCYTES NFR BLD AUTO: 12.1 % — SIGNIFICANT CHANGE UP (ref 2–14)
NEUTROPHILS # BLD AUTO: 0.16 K/UL — LOW (ref 1.8–7.4)
NEUTROPHILS NFR BLD AUTO: 6.5 % — LOW (ref 43–77)
NRBC # BLD: 0 /100 WBCS — SIGNIFICANT CHANGE UP (ref 0–0)
PLATELET # BLD AUTO: 1 K/UL — CRITICAL LOW (ref 150–400)
POTASSIUM SERPL-MCNC: 4.1 MMOL/L — SIGNIFICANT CHANGE UP (ref 3.5–5.3)
POTASSIUM SERPL-SCNC: 4.1 MMOL/L — SIGNIFICANT CHANGE UP (ref 3.5–5.3)
PROT SERPL-MCNC: 5.6 G/DL — LOW (ref 6–8.3)
RBC # BLD: 2.1 M/UL — LOW (ref 4.2–5.8)
RBC # FLD: 17.5 % — HIGH (ref 10.3–14.5)
SODIUM SERPL-SCNC: 137 MMOL/L — SIGNIFICANT CHANGE UP (ref 135–145)
WBC # BLD: 2.47 K/UL — LOW (ref 3.8–10.5)
WBC # FLD AUTO: 2.47 K/UL — LOW (ref 3.8–10.5)

## 2020-03-29 PROCEDURE — 99231 SBSQ HOSP IP/OBS SF/LOW 25: CPT

## 2020-03-29 PROCEDURE — 99232 SBSQ HOSP IP/OBS MODERATE 35: CPT

## 2020-03-29 RX ORDER — ACETAMINOPHEN 500 MG
650 TABLET ORAL ONCE
Refills: 0 | Status: COMPLETED | OUTPATIENT
Start: 2020-03-29 | End: 2020-03-29

## 2020-03-29 RX ORDER — DIPHENHYDRAMINE HCL 50 MG
25 CAPSULE ORAL EVERY 4 HOURS
Refills: 0 | Status: DISCONTINUED | OUTPATIENT
Start: 2020-03-29 | End: 2020-04-04

## 2020-03-29 RX ADMIN — Medication 20 MILLIEQUIVALENT(S): at 17:33

## 2020-03-29 RX ADMIN — Medication 650 MILLIGRAM(S): at 09:00

## 2020-03-29 RX ADMIN — PANTOPRAZOLE SODIUM 40 MILLIGRAM(S): 20 TABLET, DELAYED RELEASE ORAL at 05:49

## 2020-03-29 RX ADMIN — Medication 650 MILLIGRAM(S): at 13:22

## 2020-03-29 RX ADMIN — Medication 650 MILLIGRAM(S): at 12:43

## 2020-03-29 RX ADMIN — Medication 20 MILLIEQUIVALENT(S): at 05:49

## 2020-03-29 RX ADMIN — ENTECAVIR 0.5 MILLIGRAM(S): 0.5 TABLET ORAL at 05:49

## 2020-03-29 RX ADMIN — Medication 650 MILLIGRAM(S): at 08:06

## 2020-03-29 RX ADMIN — Medication 5 MILLIGRAM(S): at 05:49

## 2020-03-29 NOTE — PROGRESS NOTE ADULT - ASSESSMENT
76 yo male with pmhx of Hepatitis B on entecavirt, CLL/SLL, recent Flu diagnosis in February 2020, with an overnight temperature of 100.6.  Pt fatigued and per heme/onc, prognosis is poor. Will continue to monitor.

## 2020-03-29 NOTE — PROGRESS NOTE ADULT - SUBJECTIVE AND OBJECTIVE BOX
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 Has been coughing as he was recently treated for influenza A on last admission. States that when he was discharged from [facility] on 2/15, he was feeling well and had no fevers. Patient developed a fever, T100.9F (oral) today for which he called heme/onc, Dr. Stephenson. Per Dr. Stuart, patient advised to go to ER. He was found to have a fever, T100.6F (rectal) in ER. Denies recent travel or sick contacts. Denies headache, chest pain, sob, palpitations, abdominal pain, diarrhea, melena, hematochezia, dysuria or hematuria.     Of note, patient was recently admitted to [facility] from 2/14-2/15/2020 for chemotherapy with Venetoclax, found to be febrile with similar associated complaints of generalized malaise, cough, and weakness. Patient was found to be Flu A positive during that admission and was treated with tamiflu. Patient was found to be thrombocytopenic to 35k with a Hb of 6.6, transfused 2U PRBCs and 1U platelets. Chemotherapy was held off due to acute illness.      In ED, VS Tmax 100.6 rectal (repeat s/p tylenol 99.5),  -> 99, BP stable, saturating well on RA  CBC significant for .14 (on d/c 56.25), Hb 6.8 (on d/c 8.7), Plt 11 (on d/c 21)  CMP significant for K 3.4, Cr 1.5 (appears to be baseline per chart review), bili 3.3  Type and screen performed. Will be getting 1 unit pRBCs  Flu/RSV negative  CT Chest: Multilobar patchy peribronchovascular airspace opacities and more dense consolidative process in the right middle lobe likely reflecting multifocal pneumonia. Small right and trace left pleural effusion. Mediastinal and hilar lymphadenopathy worsened compared with prior exam from 8/30/2019. Numerous mildly prominent bilateral axillary lymph nodes. Retroperitoneal lymphadenopathy. Mild splenomegaly. Cholelithiasis.  CXR (wet read): noted to have increased opacities in right lower and middle lobes as well as left lower lobe in comparison to CXR on 2/14/2020  EKG: sinus tachycardic    S/p cefepime 1g IV x1, NS bolus 1L x1, duonebs x2, tylenol 650mg PO x1, 1U PRBCs ordered and to be transfused    Dr. Stuart was called and he recommended to only transfused PRBC and not platelets.  He will see pt in the morning. (22 Feb 2020 00:14)    -----------------------------------------------  INTERVAL HPI/OVERNIGHT EVENTS:  -----------------------------------------------    Patient examined at bedside.  Patient denies chest pain, abdominal pain, N/V/D at this time. Patient admits to *******. No other complaints at this time. No other overnight events appreciated.    T(C): 36.6 (03-29-20 @ 09:17), Max: 36.7 (03-28-20 @ 12:41)  HR: 97 (03-29-20 @ 09:17) (89 - 104)  BP: 113/71 (03-29-20 @ 09:17) (110/67 - 139/80)  RR: 18 (03-29-20 @ 09:17) (16 - 18)  SpO2: 94% (03-29-20 @ 09:17) (93% - 100%)  Wt(kg): --  I&O's Summary    ----------------------------  REVIEW OF SYSTEMS:  CONSTITUTIONAL: No fever, fatigue  EYES: No visual disturbances  ENMT:  No difficulty hearing; No throat pain  RESPIRATORY: No shortness of breath. No cough, wheezing, chills or hemoptysis  CARDIOVASCULAR: No chest pain, palpitations,   GASTROINTESTINAL: No abdominal or epigastric pain. No nausea, vomiting, or hematemesis; No diarrhea or constipation. No melena or hematochezia.  GENITOURINARY: No dysuria, frequency, hematuria, or incontinence  NEUROLOGICAL: No headaches, dizziness, numbness, or tremors  MUSCULOSKELETAL: No joint pain or swelling;     ----------------------  PHYSICAL EXAM:  GENERAL: NAD, well-groomed, well-developed  HEAD:  Atraumatic, Normocephalic  NERVOUS SYSTEM:  Alert & Oriented X3  CHEST/LUNG: Clear to ausculation bilaterally; No rales, rhonchi, wheezing, or rubs  HEART: Regular rate and rhythm; No murmurs, rubs, or gallops  ABDOMEN: Soft, Nontender, Nondistended; Bowel sounds present  EXTREMITIES:  2+ Peripheral Pulses, No clubbing, cyanosis, or edema  SKIN: No rashes or lesions    ----------------------  MEDICATIONS  (STANDING):  entecavir 0.5 milliGRAM(s) Oral daily  pantoprazole    Tablet 40 milliGRAM(s) Oral before breakfast  potassium chloride    Tablet ER 20 milliEquivalent(s) Oral two times a day  predniSONE   Tablet 5 milliGRAM(s) Oral daily  venetoclax 10 milliGRAM(s) Oral <User Schedule>    MEDICATIONS  (PRN):  acetaminophen   Tablet .. 650 milliGRAM(s) Oral every 6 hours PRN Mild Pain (1 - 3)  acetaminophen   Tablet .. 650 milliGRAM(s) Oral every 6 hours PRN Temp greater or equal to 38C (100.4F)  albuterol/ipratropium for Nebulization 3 milliLiter(s) Nebulizer every 6 hours PRN Shortness of Breath and/or Wheezing  guaiFENesin   Syrup  (Sugar-Free) 200 milliGRAM(s) Oral every 6 hours PRN Cough      --------  LABS:                        6.0    3.73  )-----------( 3        ( 28 Mar 2020 07:05 )             17.8     03-28    139  |  111<H>  |  22  ----------------------------<  108<H>  4.2   |  20<L>  |  0.84    Ca    7.5<L>      28 Mar 2020 07:05    TPro  5.5<L>  /  Alb  2.6<L>  /  TBili  2.8<H>  /  DBili  x   /  AST  27  /  ALT  17  /  AlkPhos  139<H>  03-28        CAPILLARY BLOOD GLUCOSE                  ----------------------------------------------  RADIOLOGY & ADDITIONAL TESTS:  No imaging performed in the last 24 hours This progress note was completed in part by resident acting as telephonic scribe during COVID-19 crisis. Physical exam was completed by attending physician, and findings below are those of the attending physician, and have been reviewed in detail.    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 Has been coughing as he was recently treated for influenza A on last admission. States that when he was discharged from [facility] on 2/15, he was feeling well and had no fevers. Patient developed a fever, T100.9F (oral) today for which he called heme/onc, Dr. Stephenson. Per Dr. Stuart, patient advised to go to ER. He was found to have a fever, T100.6F (rectal) in ER. Denies recent travel or sick contacts. Denies headache, chest pain, sob, palpitations, abdominal pain, diarrhea, melena, hematochezia, dysuria or hematuria.     Of note, patient was recently admitted to [facility] from 2/14-2/15/2020 for chemotherapy with Venetoclax, found to be febrile with similar associated complaints of generalized malaise, cough, and weakness. Patient was found to be Flu A positive during that admission and was treated with tamiflu. Patient was found to be thrombocytopenic to 35k with a Hb of 6.6, transfused 2U PRBCs and 1U platelets. Chemotherapy was held off due to acute illness.      In ED, VS Tmax 100.6 rectal (repeat s/p tylenol 99.5),  -> 99, BP stable, saturating well on RA  CBC significant for .14 (on d/c 56.25), Hb 6.8 (on d/c 8.7), Plt 11 (on d/c 21)  CMP significant for K 3.4, Cr 1.5 (appears to be baseline per chart review), bili 3.3  Type and screen performed. Will be getting 1 unit pRBCs  Flu/RSV negative  CT Chest: Multilobar patchy peribronchovascular airspace opacities and more dense consolidative process in the right middle lobe likely reflecting multifocal pneumonia. Small right and trace left pleural effusion. Mediastinal and hilar lymphadenopathy worsened compared with prior exam from 8/30/2019. Numerous mildly prominent bilateral axillary lymph nodes. Retroperitoneal lymphadenopathy. Mild splenomegaly. Cholelithiasis.  CXR (wet read): noted to have increased opacities in right lower and middle lobes as well as left lower lobe in comparison to CXR on 2/14/2020  EKG: sinus tachycardic    S/p cefepime 1g IV x1, NS bolus 1L x1, duonebs x2, tylenol 650mg PO x1, 1U PRBCs ordered and to be transfused    Dr. Stuart was called and he recommended to only transfused PRBC and not platelets.  He will see pt in the morning. (22 Feb 2020 00:14)    -----------------------------------------------  INTERVAL HPI/OVERNIGHT EVENTS:  -----------------------------------------------    Patient examined at bedside.  No acute overnight events. Complains of left arm swelling. Patient denies chest pain, abdominal pain, N/V/D at this time.     T(C): 36.6 (03-29-20 @ 09:17), Max: 36.7 (03-28-20 @ 12:41)  HR: 97 (03-29-20 @ 09:17) (89 - 104)  BP: 113/71 (03-29-20 @ 09:17) (110/67 - 139/80)  RR: 18 (03-29-20 @ 09:17) (16 - 18)  SpO2: 94% (03-29-20 @ 09:17) (93% - 100%)  Wt(kg): --  I&O's Summary    ----------------------------  REVIEW OF SYSTEMS:  CONSTITUTIONAL: No fever, fatigue  EYES: No visual disturbances  ENMT:  No difficulty hearing; No throat pain  RESPIRATORY: No shortness of breath. No cough, wheezing, chills or hemoptysis  CARDIOVASCULAR: No chest pain, palpitations,   GASTROINTESTINAL: No abdominal or epigastric pain. No nausea, vomiting, or hematemesis; No diarrhea or constipation. No melena or hematochezia.  GENITOURINARY: No dysuria, frequency, hematuria, or incontinence  NEUROLOGICAL: No headaches, dizziness, numbness, or tremors  MUSCULOSKELETAL: No joint pain or swelling;     ----------------------  PHYSICAL EXAM:  GENERAL: no acute distress  HEENT: NC/AT, EOMI, neck supple  RESPIRATORY: CTA B/L no wheezes no rales no rhonchi  CARDIOVASCULAR: RRR, no murmurs, gallops, rubs  ABDOMINAL: soft, non-tender, non-distended, positive bowel sounds   NEURO/SKIN: Multiple areas of ecchymotic appearing spots.  Left forearm with moderately tense hematoma without drainage, streaking, surrounding erythema and no warmth to touch.  Compartments soft.  Sensation to light touch grossly intact and symmetric through all distributions bilat UE/LE.  No evidence of infection.  VASCULAR: Distal pulses 2+ bilat UE/LE.  Calves soft, NT bilat LE's.   MUSCULOSKELETAL: no gross joint deformity 5/5 lower extremity strength.  ----------------------  MEDICATIONS  (STANDING):  entecavir 0.5 milliGRAM(s) Oral daily  pantoprazole    Tablet 40 milliGRAM(s) Oral before breakfast  potassium chloride    Tablet ER 20 milliEquivalent(s) Oral two times a day  predniSONE   Tablet 5 milliGRAM(s) Oral daily    MEDICATIONS  (PRN):  acetaminophen   Tablet .. 650 milliGRAM(s) Oral every 6 hours PRN Mild Pain (1 - 3)  acetaminophen   Tablet .. 650 milliGRAM(s) Oral every 6 hours PRN Temp greater or equal to 38C (100.4F)  albuterol/ipratropium for Nebulization 3 milliLiter(s) Nebulizer every 6 hours PRN Shortness of Breath and/or Wheezing  diphenhydrAMINE 25 milliGRAM(s) Oral every 4 hours PRN Rash and/or Itching  guaiFENesin   Syrup  (Sugar-Free) 200 milliGRAM(s) Oral every 6 hours PRN Cough    --------  LABS:                         6.1    2.47  )-----------( 1        ( 29 Mar 2020 10:22 )             18.5   03-29    137  |  112<H>  |  24<H>  ----------------------------<  144<H>  4.1   |  19<L>  |  0.85    Ca    7.9<L>      29 Mar 2020 10:22    TPro  5.6<L>  /  Alb  2.7<L>  /  TBili  3.5<H>  /  DBili  x   /  AST  22  /  ALT  16  /  AlkPhos  140<H>  03-29    ----------------------------------------------  RADIOLOGY & ADDITIONAL TESTS:  No imaging performed in the last 24 hours

## 2020-03-29 NOTE — PROGRESS NOTE ADULT - ASSESSMENT
76 y/o man w Hepatitis B on Entecavirt, Chronic Lymphocytic Leukemia/Small Lymphocytic Lymphoma 4/2018 when presented w immune hemolytic anemia w partial response to steroids, started on Ibrutinib w heme CR and NV by CT scan w some decrease of lymphadenopathies, until 1/2020 when relapsed w incr WBC, anemia(initially not hemolytic, has chronic Matthew positive due to CLL/SLL), thrombocytopenia.    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    Was discharged home and admitted again 2/22/20 with  fever and found w pneumonia, post course of Ceftriaxone, continues to require transfusions plts and PRBCs.  Hep B titer negative  Post IVIG in late February 2020  Had trial of Venetoclax started 3/1/20 and received 4 days of treatment which was held on 3/5/20 due to pancytopenia. Restarted 3/24/20 on 10mg qD, lowest available dose as attempt to see if any benefit.    -remains pancytopenic inspite of above and transfusion dependent, f/u today's CBC after additional PRBC and plats tx yesterday, no active bleeds  -total bili better and SGOT normalized yesterday, tapered prednisone further to 5mg and plan to stop after 1 wk.   -had discussed w pt and wife very poor clinical condition, refractory to treatments, possible [facility](can't manage at home), they at this time still wishes transfusions  continue Venetoclax 10mg qD, if transfusion frequency further worsen will then need to stop

## 2020-03-29 NOTE — PROGRESS NOTE ADULT - SUBJECTIVE AND OBJECTIVE BOX
No new overnight event.  No N/V/D.  Tolerating diet.    Allergies    sulfa drugs (Unknown)    Intolerances          General:  No wt loss, fevers, chills, night sweats, fatigue,   Eyes:  Good vision, no reported pain  ENT:  No sore throat, pain, runny nose, dysphagia  CV:  No pain, palpitations, hypo/hypertension  Resp:  No dyspnea, cough, tachypnea, wheezing  GI:  No pain, No nausea, No vomiting, No diarrhea, No constipation, No weight loss, No fever, No pruritis, No rectal bleeding, No tarry stools, No dysphagia,  :  No pain, bleeding, incontinence, nocturia  Muscle:  No pain, weakness  Neuro:  No weakness, tingling, memory problems  Psych:  No fatigue, insomnia, mood problems, depression  Endocrine:  No polyuria, polydipsia, cold/heat intolerance  Heme:  No petechiae, ecchymosis, easy bruisability  Skin:  No rash, tattoos, scars, edema      PHYSICAL EXAM:   Vital Signs:  Vital Signs Last 24 Hrs  T(C): 36.6 (29 Mar 2020 15:10), Max: 36.7 (29 Mar 2020 00:30)  T(F): 97.8 (29 Mar 2020 15:10), Max: 98 (29 Mar 2020 00:30)  HR: 84 (29 Mar 2020 15:10) (84 - 104)  BP: 113/67 (29 Mar 2020 15:10) (110/67 - 127/70)  BP(mean): --  RR: 16 (29 Mar 2020 15:10) (16 - 18)  SpO2: 97% (29 Mar 2020 15:10) (93% - 100%)  Daily     Daily I&O's Summary      GENERAL:  Appears stated age, well-groomed, well-nourished, no distress  HEENT:  NC/AT,  conjunctivae clear and pink, no thyromegaly, nodules, adenopathy, no JVD, sclera -anicteric  CHEST:  Full & symmetric excursion, no increased effort, breath sounds clear  HEART:  Regular rhythm, S1, S2, no murmur/rub/S3/S4, no abdominal bruit, no edema  ABDOMEN:  Soft, non-tender, non-distended, normoactive bowel sounds,  no masses ,no hepato-splenomegaly, no signs of chronic liver disease  EXTEREMITIES:  no cyanosis,clubbing or edema  SKIN:  No rash/erythema/ecchymoses/petechiae/wounds/abscess/warm/dry  NEURO:  Alert, oriented, no asterixis, no tremor, no encephalopathy      LABS:                        6.1    2.47  )-----------( 1        ( 29 Mar 2020 10:22 )             18.5     03-29    137  |  112<H>  |  24<H>  ----------------------------<  144<H>  4.1   |  19<L>  |  0.85    Ca    7.9<L>      29 Mar 2020 10:22    TPro  5.6<L>  /  Alb  2.7<L>  /  TBili  3.5<H>  /  DBili  x   /  AST  22  /  ALT  16  /  AlkPhos  140<H>  03-29        amylase   lipase  RADIOLOGY & ADDITIONAL TESTS:

## 2020-03-29 NOTE — PROGRESS NOTE ADULT - SUBJECTIVE AND OBJECTIVE BOX
SUBJECTIVE:  Patient seen and examined at bedside.  No overnight events.  Patient with no new complaints at this time, resting comfortably in bed.    Vital Signs Last 24 Hrs  T(C): 36.6 (29 Mar 2020 04:22), Max: 36.7 (28 Mar 2020 12:41)  T(F): 97.9 (29 Mar 2020 04:22), Max: 98.1 (28 Mar 2020 12:41)  HR: 103 (29 Mar 2020 04:22) (89 - 104)  BP: 127/70 (29 Mar 2020 04:22) (110/67 - 139/80)  RR: 18 (29 Mar 2020 04:22) (16 - 18)  SpO2: 93% (29 Mar 2020 04:22) (93% - 100%)    PHYSICAL EXAM  GENERAL:  Well-developed male lying comfortably in bed in NAD  HEAD:  Normocephalic, atraumatic  EXTREMITIES: LUE with approximately 2cm x 2cm area of ecchymosis/induration, no surrounding erythema, warmth, or drainage noted, mildly TTP.  NEURO:  A&O x 3    MEDICATIONS  (STANDING):  entecavir 0.5 milliGRAM(s) Oral daily  pantoprazole    Tablet 40 milliGRAM(s) Oral before breakfast  potassium chloride    Tablet ER 20 milliEquivalent(s) Oral two times a day  predniSONE   Tablet 5 milliGRAM(s) Oral daily  venetoclax 10 milliGRAM(s) Oral <User Schedule>    MEDICATIONS  (PRN):  acetaminophen   Tablet .. 650 milliGRAM(s) Oral every 6 hours PRN Mild Pain (1 - 3)  acetaminophen   Tablet .. 650 milliGRAM(s) Oral every 6 hours PRN Temp greater or equal to 38C (100.4F)  albuterol/ipratropium for Nebulization 3 milliLiter(s) Nebulizer every 6 hours PRN Shortness of Breath and/or Wheezing  guaiFENesin   Syrup  (Sugar-Free) 200 milliGRAM(s) Oral every 6 hours PRN Cough    LABS:                        6.0    3.73  )-----------( 3        ( 28 Mar 2020 07:05 )             17.8     03-28    139  |  111<H>  |  22  ----------------------------<  108<H>  4.2   |  20<L>  |  0.84    Ca    7.5<L>      28 Mar 2020 07:05    TPro  5.5<L>  /  Alb  2.6<L>  /  TBili  2.8<H>  /  DBili  x   /  AST  27  /  ALT  17  /  AlkPhos  139<H>  03-28    ASSESSMENT & PLAN  75 year old male with small LUE hematoma, unchanged, with no signs of infection.    - No surgical intervention warranted at this time  - Continue care per primary team  - Will sign off, reconsult if needed.  - Discussed with Dr. Abdul SUBJECTIVE:  Patient seen and examined at bedside.  No overnight events.  Patient with no new complaints at this time, resting comfortably in bed.    Vital Signs Last 24 Hrs  T(C): 36.6 (29 Mar 2020 04:22), Max: 36.7 (28 Mar 2020 12:41)  T(F): 97.9 (29 Mar 2020 04:22), Max: 98.1 (28 Mar 2020 12:41)  HR: 103 (29 Mar 2020 04:22) (89 - 104)  BP: 127/70 (29 Mar 2020 04:22) (110/67 - 139/80)  RR: 18 (29 Mar 2020 04:22) (16 - 18)  SpO2: 93% (29 Mar 2020 04:22) (93% - 100%)    PHYSICAL EXAM  GENERAL:  Well-developed male lying comfortably in bed in NAD  HEAD:  Normocephalic, atraumatic  EXTREMITIES: LUE with approximately 2cm x 2cm area of ecchymosis/induration, no surrounding erythema, warmth, or drainage noted, mildly TTP.  NEURO:  A&O x 3    MEDICATIONS  (STANDING):  entecavir 0.5 milliGRAM(s) Oral daily  pantoprazole    Tablet 40 milliGRAM(s) Oral before breakfast  potassium chloride    Tablet ER 20 milliEquivalent(s) Oral two times a day  predniSONE   Tablet 5 milliGRAM(s) Oral daily  venetoclax 10 milliGRAM(s) Oral <User Schedule>    MEDICATIONS  (PRN):  acetaminophen   Tablet .. 650 milliGRAM(s) Oral every 6 hours PRN Mild Pain (1 - 3)  acetaminophen   Tablet .. 650 milliGRAM(s) Oral every 6 hours PRN Temp greater or equal to 38C (100.4F)  albuterol/ipratropium for Nebulization 3 milliLiter(s) Nebulizer every 6 hours PRN Shortness of Breath and/or Wheezing  guaiFENesin   Syrup  (Sugar-Free) 200 milliGRAM(s) Oral every 6 hours PRN Cough    LABS:                        6.0    3.73  )-----------( 3        ( 28 Mar 2020 07:05 )             17.8     03-28    139  |  111<H>  |  22  ----------------------------<  108<H>  4.2   |  20<L>  |  0.84    Ca    7.5<L>      28 Mar 2020 07:05    TPro  5.5<L>  /  Alb  2.6<L>  /  TBili  2.8<H>  /  DBili  x   /  AST  27  /  ALT  17  /  AlkPhos  139<H>  03-28    ASSESSMENT & PLAN  75 year old male with small LUE hematoma, unchanged, with no signs of infection.    - No surgical intervention warranted at this time  - Continue care per primary team  - Will follow  - Discussed with Dr. Abdul

## 2020-03-29 NOTE — PROGRESS NOTE ADULT - SUBJECTIVE AND OBJECTIVE BOX
[facility] -- Cori Dawkins, Travis Villagomez, Ryland Wright Savella  Office # [phone]      Follow Up:  Cardiac Arrhythmia    Subjective/Observations: Seen and examined.  Resting in bed with no new complaints.  Denies cp, sob or palpitations.  No signs of orthopnea or PND.  NAD.       REVIEW OF SYSTEMS: All other review of systems is negative unless indicated above    PAST MEDICAL & SURGICAL HISTORY:  KIRTI (obstructive sleep apnea): on nocturnal cpap  Hypertension: not on any medications  Diabetes: not on any medications  Hepatitis B  Lymphoma: Small cell B cell  AIHA (autoimmune hemolytic anemia)  H/O lithotripsy      MEDICATIONS  (STANDING):  acetaminophen   Tablet .. 650 milliGRAM(s) Oral once  entecavir 0.5 milliGRAM(s) Oral daily  pantoprazole    Tablet 40 milliGRAM(s) Oral before breakfast  potassium chloride    Tablet ER 20 milliEquivalent(s) Oral two times a day  predniSONE   Tablet 5 milliGRAM(s) Oral daily    MEDICATIONS  (PRN):  acetaminophen   Tablet .. 650 milliGRAM(s) Oral every 6 hours PRN Mild Pain (1 - 3)  acetaminophen   Tablet .. 650 milliGRAM(s) Oral every 6 hours PRN Temp greater or equal to 38C (100.4F)  albuterol/ipratropium for Nebulization 3 milliLiter(s) Nebulizer every 6 hours PRN Shortness of Breath and/or Wheezing  diphenhydrAMINE 25 milliGRAM(s) Oral every 4 hours PRN Rash and/or Itching  guaiFENesin   Syrup  (Sugar-Free) 200 milliGRAM(s) Oral every 6 hours PRN Cough      Allergies    sulfa drugs (Unknown)    Intolerances            Vital Signs Last 24 Hrs  T(C): 36.6 (29 Mar 2020 09:17), Max: 36.7 (28 Mar 2020 12:41)  T(F): 97.8 (29 Mar 2020 09:17), Max: 98.1 (28 Mar 2020 12:41)  HR: 97 (29 Mar 2020 09:17) (89 - 104)  BP: 113/71 (29 Mar 2020 09:17) (110/67 - 139/80)  BP(mean): --  RR: 18 (29 Mar 2020 09:17) (16 - 18)  SpO2: 94% (29 Mar 2020 09:17) (93% - 100%)    I&O's Summary        PHYSICAL EXAM:  TELE: Not on tele  Constitutional: NAD, awake and alert, well-developed, frail  HEENT: Moist Mucous Membranes, Anicteric  Pulmonary: Non-labored, breath sounds are clear anteriorly and decreased in bases bilaterally, No wheezing, rales or rhonchi  Cardiovascular: Regular, S1 and S2, No murmurs, rubs, gallops or clicks  Gastrointestinal: Bowel Sounds present, soft, nontender.   Lymph: + LUE edema > RUE with b/l +2 pitting edema in LE bilaterally. No lymphadenopathy.  Skin: No visible rashes or ulcers.  + petechia on LUE  Psych:  Mood & affect flat    LABS: All Labs Reviewed:                        6.1    2.47  )-----------( 1        ( 29 Mar 2020 10:22 )             18.5                         6.0    3.73  )-----------( 3        ( 28 Mar 2020 07:05 )             17.8                         6.6    1.75  )-----------( 3        ( 27 Mar 2020 10:36 )             19.5     28 Mar 2020 07:05    139    |  111    |  22     ----------------------------<  108    4.2     |  20     |  0.84   27 Mar 2020 10:36    136    |  112    |  22     ----------------------------<  174    4.3     |  17     |  0.78     Ca    7.5        28 Mar 2020 07:05  Ca    7.9        27 Mar 2020 10:36    TPro  5.5    /  Alb  2.6    /  TBili  2.8    /  DBili  x      /  AST  27     /  ALT  17     /  AlkPhos  139    28 Mar 2020 07:05  TPro  5.4    /  Alb  2.6    /  TBili  3.2    /  DBili  x      /  AST  32     /  ALT  18     /  AlkPhos  150    27 Mar 2020 10:36    < from: 12 Lead ECG (03.18.20 @ 19:13) >  Ventricular Rate 167 BPM    Atrial Rate 178 BPM    QRS Duration 64 ms    Q-T Interval 296 ms    QTC Calculation(Bezet) 493 ms    R Axis 32 degrees    T Axis 61 degrees    Diagnosis Line *** Poor data quality, interpretation may be adversely affected  likely st with apcs  Nonspecific ST and T wave abnormality  Abnormal ECG  When compared with ECG of 16-MAR-2020 17:10,  Previous ECG has undetermined rhythm, needs review  Nonspecific T wave abnormality now evident in Anterior leads  Confirmed by CANDI SCHREIBER ([id number]) on 3/19/2020 5:29:35 PM    < end of copied text >    < from: TTE Echo Complete w/o contrast w/ Doppler (03.04.20 @ 09:54) >     EXAM:  ECHO TTE WO CON COMP W DOPP         PROCEDURE DATE:  03/04/2020        INTERPRETATION:  INDICATION: edema  Sonographer PH    Blood Pressure 129/63    Height 165 cm     Weight 82.9 kg       BSA 1.9 sq m    Dimensions:    LA 2.9       Normal Values: 2.0 - 4.0 cm    Ao 2.9        Normal Values: 2.0 - 3.8 cm  SEPTUM 0.9       Normal Values: 0.6 - 1.2 cm  PWT 0.9       Normal Values: 0.6 - 1.1 cm  LVIDd 4.1         Normal Values: 3.0 - 5.6 cm  LVIDs 2.3         Normal Values: 1.8 - 4.0 cm      OBSERVATIONS:    Mitral Valve: normal, trace physiologic MR.  Aortic Valve/Aorta: Normal trileaflet aortic valve with normal opening.  Tricuspid Valve: normal with trace TR.  Pulmonic Valve: normal  Left Atrium: normal  Right Atrium: normal  Left Ventricle: normal LV size and systolic function, estimated LVEF of 65%.  Right Ventricle: Grossly normal size and systolic function.  Pericardium/Pleura: normal, no significant pericardial effusion.      Conclusion:   Normal left ventricular internal dimensions and systolic function, estimated LVEF of 65%.   Grossly normal RV size and systolic function.     Normal trileaflet aortic valve, without AI.   Trace physiologic MR and TR.                      KALPANA GUERRERO   This document has been electronically signed. Mar  5 2020  7:32AM                < end of copied text >    < from: US Duplex Venous Upper Ext Ltd, Left (03.25.20 @ 02:05) >  EXAM:  US DPLX UPR EXT VEINS LTD LT                            PROCEDURE DATE:  03/25/2020          INTERPRETATION:  Exam: US Duplex Left Upper Extremity Veins, Limited   Exam date and time: 3/25/2020 1:32 AM   Age: 75 years old   Clinical indication: Pain; Arm, lower; Left     TECHNIQUE:   Imaging protocol: Real-time Duplex ultrasound of the Left Upper Extremity with 2-D gray scale, color Doppler flow and spectral waveform analysis with image documentation. Limited exam focused on the left upper extremity veins.     COMPARISON:   No relevant prior studies available.     FINDINGS:   Left deep veins:  Axillary , subclavian, radial, ulnar, brachial veins are patent throughout without thrombus. Normal Doppler waveforms. Normal compressibility and/or augmentation response. Visualized internal jugular and subclavian veins are patent.     Left superficial veins: Unremarkable. Visualized cephalic and basilic veins are patent without thrombus.      Soft tissues: Small hematoma formation measuring 0.5 x 0.1 x 0.3 cm in the left anterior forearm.     Lymph nodes: Multiple nonspecific left neck lymph nodes measuring up to 1.1 cm in short axis measurement.    IMPRESSION:     No evidence of DVT.  .  Small hematoma in the anterior left forearm.    A preliminary report was given by the overnight radiologist                ELIO JOHNSON M.D., ATTENDING RADIOLOGIST  This document has been electronically signed. Mar 25 2020  8:07AM                < end of copied text >  < from: CT Chest No Cont (03.19.20 @ 09:40) >  EXAM:  CT CHEST                            PROCEDURE DATE:  03/19/2020          INTERPRETATION:  CLINICAL INFORMATION: CLL, fever    COMPARISON: 2/21/2020    PROCEDURE:   CT of the Chest, Abdomen and Pelvis was performed without intravenous contrast.   Intravenous contrast: None.  Oral contrast: None.  Sagittal and coronal reformats were performed.    FINDINGS:  Lack of IV contrast limits evaluation diana, vascular structures and solid organ parenchyma  CHEST:     LUNGS AND LARGE AIRWAYS: Patentcentral airways. Scattered bilateral patchy, nodular and groundglass opacities slightly improved at the lung bases compared to prior. Findings could all be inflammatory/infectious. Neoplastic component not excluded.  PLEURA: Interval increase in small left and small-to-moderate right pleural effusion.  VESSELS: Nonaneurysmal  HEART: Heart size is normal. Coronary artery calcination. Decrease cardiac chamber blood pool attenuation suggests an anemic state. Trace pericardial effusion.  MEDIASTINUM AND DIANA: Multistation mediastinal  supraclavicular, bilateral hilar and bilateral axillary adenopathy similar to prior  CHEST WALL AND LOWER NECK: Within normal limits.    ABDOMEN AND PELVIS:    LIVER: Hepatomegaly similar to prior  BILE DUCTS: Normal caliber.  GALLBLADDER: Cholelithiasis.  SPLEEN: Splenomegaly similar to prior  PANCREAS: Within normal limits.  ADRENALS: Within normal limits.  KIDNEYS/URETERS: Within normal limits.    BLADDER: Within normal limits.  REPRODUCTIVE ORGANS: Unremarkable    BOWEL: Evaluation limited due to limited by nonopacification underdistention. No gross active bowel inflammation. No bowel obstruction.. Appendix no appendicitis  PERITONEUM: Trace ascites. No extraluminal gas or organized collections.  VESSELS: Nonaneurysmal.  RETROPERITONEUM/LYMPH NODES: Stable periportal and retroperitoneal, mesenteric pelvic and bilateral inguinal adenopathy.    ABDOMINAL WALL: Mild anasarca.  BONES: Stable    IMPRESSION:     Improvement in the scattered bilateral parenchymal opacities as described.  Slight increase in bilateral pleural effusions.  Stable multistation adenopathy chest abdomen and pelvis as described  No acute findings abdomen and pelvis  Additional findings as discussed    < end of copied text >

## 2020-03-29 NOTE — PROGRESS NOTE ADULT - PROBLEM SELECTOR PLAN 9
Cr at baseline  - Avoid nephrotoxic medications  - Monitor renal function  -BUN/Creatinine today: 22/0.88 Cr at baseline  - Avoid nephrotoxic medications  - Monitor renal function  -BUN/Creatinine today: 24/0.85

## 2020-03-29 NOTE — PROGRESS NOTE ADULT - PROBLEM SELECTOR PLAN 1
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 as per heme/onc.   - Steroids being tapered - 10mg now  - Hem/Onc following, recs appreciated- venetoclax restarted Tuesday March 25. - patient with poor overall prognosis, but as per patient, he wants to try all options.  -Primary oncologist Dr. Stephenson to discuss goals of care with patient and family today. 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 as per heme/onc.   - Steroids being tapered - 10mg now  - Hem/Onc following, recs appreciated- venetoclax restarted Tuesday March 25, but given his anemia and thrombocytopenia have remained refractory, treatment as stopped today 3/29.  Dr. Stephenson continues to address GOC with patient and family. They would not be able to take him home with home hospice, but may be a candidate for inpatient hospice.

## 2020-03-29 NOTE — PROGRESS NOTE ADULT - PROBLEM SELECTOR PLAN 2
- Per chart review, platelet count steadily declining since 2018  - poor response to platelets transfusion, suspect ITP component  - s/p one dose of IVIG  - Hem/Onc following, will follow the recommendation   -Platelet count 3 today- received 1 unit platelets yesterday  -To receive 1 unit of platelets today    -Heme/Onc- continue supportive care.  Dr Stephenson to be in over the weekend, this is patients personal hematologist.  -continue to monitor. - Per chart review, platelet count steadily declining since 2018  - poor response to platelets transfusion, suspect ITP component  - s/p one dose of IVIG  - Hem/Onc following, will follow the recommendation   -To receive 1 unit of platelets today    -Heme/Onc- continue supportive care.    -continue to monitor.

## 2020-03-29 NOTE — PROGRESS NOTE ADULT - SUBJECTIVE AND OBJECTIVE BOX
All interim records and events noted.    post tx PRBC and plts, no adverse reactions, same fatigue, no active bleeds      MEDICATIONS  (STANDING):  entecavir 0.5 milliGRAM(s) Oral daily  pantoprazole    Tablet 40 milliGRAM(s) Oral before breakfast  potassium chloride    Tablet ER 20 milliEquivalent(s) Oral two times a day  predniSONE   Tablet 5 milliGRAM(s) Oral daily  venetoclax 10 milliGRAM(s) Oral <User Schedule>    MEDICATIONS  (PRN):  acetaminophen   Tablet .. 650 milliGRAM(s) Oral every 6 hours PRN Mild Pain (1 - 3)  acetaminophen   Tablet .. 650 milliGRAM(s) Oral every 6 hours PRN Temp greater or equal to 38C (100.4F)  albuterol/ipratropium for Nebulization 3 milliLiter(s) Nebulizer every 6 hours PRN Shortness of Breath and/or Wheezing  guaiFENesin   Syrup  (Sugar-Free) 200 milliGRAM(s) Oral every 6 hours PRN Cough      Vital Signs Last 24 Hrs  T(C): 36.6 (29 Mar 2020 09:17), Max: 36.7 (28 Mar 2020 12:41)  T(F): 97.8 (29 Mar 2020 09:17), Max: 98.1 (28 Mar 2020 12:41)  HR: 97 (29 Mar 2020 09:17) (89 - 104)  BP: 113/71 (29 Mar 2020 09:17) (110/67 - 139/80)  BP(mean): --  RR: 18 (29 Mar 2020 09:17) (16 - 18)  SpO2: 94% (29 Mar 2020 09:17) (93% - 100%)    PHYSICAL EXAM  General: well developed  well nourished, in no acute distress but weak appearing  Head: atraumatic, normocephalic  ENT: sclera anicteric, buccal mucosa moist  Neck: supple, trachea midline, no palpable masses  CV: S1 S2, regular rate and rhythm  Lungs: clear to auscultation, no wheezes/rhonchi  Abdomen: soft, nontender, bowel sounds present, no palpable masses  Extrem: trace-1+ edema left arm/hand  Skin: no significant increased ecchymosis/petechiae  Neuro: alert and oriented X3,  no focal deficits      LABS:             6.0    3.73  )-----------( 3        ( 03-28 @ 07:05 )             17.8                6.6    1.75  )-----------( 3        ( 03-27 @ 10:36 )             19.5       03-28    139  |  111<H>  |  22  ----------------------------<  108<H>  4.2   |  20<L>  |  0.84    Ca    7.5<L>      28 Mar 2020 07:05    TPro  5.5<L>  /  Alb  2.6<L>  /  TBili  2.8<H>  /  DBili  x   /  AST  27  /  ALT  17  /  AlkPhos  139<H>  03-28        RADIOLOGY & ADDITIONAL STUDIES:    IMPRESSION/RECOMMENDATIONS:

## 2020-03-29 NOTE — PROGRESS NOTE ADULT - ASSESSMENT
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 Tachy likely reactive but now resolved.   - Monitor and replete electrolytes. Keep K>4.0 and Mg>2.0.    Pancytopenia   - Hem/onc following   - Multiple tx of PRBC , Platelets and Plasma   s/p Platelet & PRBC today for Hgb 6 and plt 100  - Hgb remained low   Transfuse per Heme    LE edema  - Doppler negative   - Albumin 2.4 likely contributing to his LE edema   - Echo 2/24/2020 revealing trace AI trace TR ef 60-65%.  No need to repeat    LUE edema  - Small hematoma noted on ultrasound with no surgical intervention at this time as per recs    DM  - Management as per primary     Goals of care - Poor prognosis 2/2 transfusion dependent, refractory to treatment as per Heme notes d/w spouse possible inhouse hospice    Further cardiac workup will depend on clinical course.   All other workup per primary team. Will followup.     Philomena Hoang, YAS-BC  Cardiology   Spectra #[phone]

## 2020-03-29 NOTE — PROVIDER CONTACT NOTE (CRITICAL VALUE NOTIFICATION) - BACKGROUND
leukemia, DM
From home
Dr. Young aware of critical value 141.98. Will place call to ID and hemo
Hx Autoimmune  hemolithic anemia
Leukemia
Patient admitted with leukemia
Pt H&H was 7.3 & 21.8, pt was already given 1 unit of PRBC during shift
admitted for leukemia
had level of 3 on 3/5, had two units of platelets 3/5
last H&H 7.3&21.8, platelets 11
yesterday platelets 11

## 2020-03-30 LAB
ALBUMIN SERPL ELPH-MCNC: 2.7 G/DL — LOW (ref 3.3–5)
ALP SERPL-CCNC: 128 U/L — HIGH (ref 40–120)
ALT FLD-CCNC: 14 U/L — SIGNIFICANT CHANGE UP (ref 12–78)
ANION GAP SERPL CALC-SCNC: 7 MMOL/L — SIGNIFICANT CHANGE UP (ref 5–17)
AST SERPL-CCNC: 21 U/L — SIGNIFICANT CHANGE UP (ref 15–37)
BASOPHILS # BLD AUTO: 0 K/UL — SIGNIFICANT CHANGE UP (ref 0–0.2)
BASOPHILS NFR BLD AUTO: 0 % — SIGNIFICANT CHANGE UP (ref 0–2)
BILIRUB SERPL-MCNC: 3.5 MG/DL — HIGH (ref 0.2–1.2)
BUN SERPL-MCNC: 19 MG/DL — SIGNIFICANT CHANGE UP (ref 7–23)
CALCIUM SERPL-MCNC: 8 MG/DL — LOW (ref 8.5–10.1)
CHLORIDE SERPL-SCNC: 111 MMOL/L — HIGH (ref 96–108)
CO2 SERPL-SCNC: 21 MMOL/L — LOW (ref 22–31)
CREAT SERPL-MCNC: 0.84 MG/DL — SIGNIFICANT CHANGE UP (ref 0.5–1.3)
EOSINOPHIL # BLD AUTO: 0.01 K/UL — SIGNIFICANT CHANGE UP (ref 0–0.5)
EOSINOPHIL NFR BLD AUTO: 0.4 % — SIGNIFICANT CHANGE UP (ref 0–6)
GLUCOSE SERPL-MCNC: 118 MG/DL — HIGH (ref 70–99)
HCT VFR BLD CALC: 19.6 % — CRITICAL LOW (ref 39–50)
HGB BLD-MCNC: 6.3 G/DL — CRITICAL LOW (ref 13–17)
IMM GRANULOCYTES NFR BLD AUTO: 0 % — SIGNIFICANT CHANGE UP (ref 0–1.5)
LYMPHOCYTES # BLD AUTO: 1.85 K/UL — SIGNIFICANT CHANGE UP (ref 1–3.3)
LYMPHOCYTES # BLD AUTO: 79.1 % — HIGH (ref 13–44)
MANUAL SMEAR VERIFICATION: SIGNIFICANT CHANGE UP
MCHC RBC-ENTMCNC: 28.4 PG — SIGNIFICANT CHANGE UP (ref 27–34)
MCHC RBC-ENTMCNC: 32.1 GM/DL — SIGNIFICANT CHANGE UP (ref 32–36)
MCV RBC AUTO: 88.3 FL — SIGNIFICANT CHANGE UP (ref 80–100)
MONOCYTES # BLD AUTO: 0.34 K/UL — SIGNIFICANT CHANGE UP (ref 0–0.9)
MONOCYTES NFR BLD AUTO: 14.5 % — HIGH (ref 2–14)
NEUTROPHILS # BLD AUTO: 0.14 K/UL — LOW (ref 1.8–7.4)
NEUTROPHILS NFR BLD AUTO: 6 % — LOW (ref 43–77)
NRBC # BLD: 0 /100 WBCS — SIGNIFICANT CHANGE UP (ref 0–0)
PLAT MORPH BLD: NORMAL — SIGNIFICANT CHANGE UP
PLATELET # BLD AUTO: 7 K/UL — CRITICAL LOW (ref 150–400)
POTASSIUM SERPL-MCNC: 4.1 MMOL/L — SIGNIFICANT CHANGE UP (ref 3.5–5.3)
POTASSIUM SERPL-SCNC: 4.1 MMOL/L — SIGNIFICANT CHANGE UP (ref 3.5–5.3)
PROT SERPL-MCNC: 5.6 G/DL — LOW (ref 6–8.3)
RBC # BLD: 2.22 M/UL — LOW (ref 4.2–5.8)
RBC # FLD: 17 % — HIGH (ref 10.3–14.5)
RBC BLD AUTO: ABNORMAL
SODIUM SERPL-SCNC: 139 MMOL/L — SIGNIFICANT CHANGE UP (ref 135–145)
URATE SERPL-MCNC: 0.9 MG/DL — LOW (ref 3.4–8.8)
WBC # BLD: 2.34 K/UL — LOW (ref 3.8–10.5)
WBC # FLD AUTO: 2.34 K/UL — LOW (ref 3.8–10.5)

## 2020-03-30 PROCEDURE — 99232 SBSQ HOSP IP/OBS MODERATE 35: CPT

## 2020-03-30 PROCEDURE — 99233 SBSQ HOSP IP/OBS HIGH 50: CPT | Mod: GC

## 2020-03-30 RX ORDER — ACETAMINOPHEN 500 MG
650 TABLET ORAL ONCE
Refills: 0 | Status: COMPLETED | OUTPATIENT
Start: 2020-03-30 | End: 2020-03-30

## 2020-03-30 RX ORDER — DIPHENHYDRAMINE HCL 50 MG
50 CAPSULE ORAL ONCE
Refills: 0 | Status: COMPLETED | OUTPATIENT
Start: 2020-03-30 | End: 2020-03-30

## 2020-03-30 RX ORDER — DIPHENHYDRAMINE HCL 50 MG
25 CAPSULE ORAL ONCE
Refills: 0 | Status: COMPLETED | OUTPATIENT
Start: 2020-03-30 | End: 2020-03-30

## 2020-03-30 RX ORDER — RITUXIMAB 10 MG/ML
50 INJECTION, SOLUTION INTRAVENOUS
Qty: 700 | Refills: 0 | Status: DISCONTINUED | OUTPATIENT
Start: 2020-03-30 | End: 2020-04-04

## 2020-03-30 RX ORDER — DEXAMETHASONE 0.5 MG/5ML
20 ELIXIR ORAL ONCE
Refills: 0 | Status: COMPLETED | OUTPATIENT
Start: 2020-03-30 | End: 2020-03-30

## 2020-03-30 RX ADMIN — Medication 650 MILLIGRAM(S): at 18:24

## 2020-03-30 RX ADMIN — Medication 110 MILLIGRAM(S): at 17:11

## 2020-03-30 RX ADMIN — Medication 25 MILLIGRAM(S): at 22:54

## 2020-03-30 RX ADMIN — Medication 20 MILLIEQUIVALENT(S): at 05:24

## 2020-03-30 RX ADMIN — ENTECAVIR 0.5 MILLIGRAM(S): 0.5 TABLET ORAL at 05:24

## 2020-03-30 RX ADMIN — RITUXIMAB 50 ML/HR: 10 INJECTION, SOLUTION INTRAVENOUS at 15:20

## 2020-03-30 RX ADMIN — Medication 5 MILLIGRAM(S): at 05:24

## 2020-03-30 RX ADMIN — Medication 650 MILLIGRAM(S): at 22:54

## 2020-03-30 RX ADMIN — Medication 50 MILLIGRAM(S): at 14:19

## 2020-03-30 RX ADMIN — Medication 650 MILLIGRAM(S): at 14:13

## 2020-03-30 RX ADMIN — PANTOPRAZOLE SODIUM 40 MILLIGRAM(S): 20 TABLET, DELAYED RELEASE ORAL at 05:24

## 2020-03-30 NOTE — PROGRESS NOTE ADULT - ASSESSMENT
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 Tachy likely reactive but now resolved.   - Monitor and replete electrolytes. Keep K>4.0 and Mg>2.0.    Pancytopenia   - Hem/onc following   - Multiple tx of PRBC, Platelets and Plasma.  s/p Platelet & PRBC today for Hgb 6 and plt 100.  Pancytopenia refractory to transfusion  - Monitor closely for bleeding  - Transfuse per Heme    LUE edema  - Doppler negative DVT but + hematoma  - Echo 2/24/2020 revealing trace AI trace TR ef 60-65%.  No need to repeat    DM  - Management as per primary     Goals of care - Poor prognosis 2/2 transfusion dependent, refractory to treatment as per Heme notes d/w spouse possible inhouse hospice    Further cardiac workup will depend on clinical course.   All other workup per primary team. Will followup.     Seda Moore DNP, NP-C  Cardiology   Spectra #[phone]

## 2020-03-30 NOTE — PROGRESS NOTE ADULT - SUBJECTIVE AND OBJECTIVE BOX
INTERVAL HPI/OVERNIGHT EVENTS:  pt seen and examined  denies n/v/d/c/abd pain  tolerating some po  no overt gib per nursing  sp plt and prbc yesterday    MEDICATIONS  (STANDING):  entecavir 0.5 milliGRAM(s) Oral daily  pantoprazole    Tablet 40 milliGRAM(s) Oral before breakfast  potassium chloride    Tablet ER 20 milliEquivalent(s) Oral two times a day  predniSONE   Tablet 5 milliGRAM(s) Oral daily    MEDICATIONS  (PRN):  acetaminophen   Tablet .. 650 milliGRAM(s) Oral every 6 hours PRN Mild Pain (1 - 3)  acetaminophen   Tablet .. 650 milliGRAM(s) Oral every 6 hours PRN Temp greater or equal to 38C (100.4F)  albuterol/ipratropium for Nebulization 3 milliLiter(s) Nebulizer every 6 hours PRN Shortness of Breath and/or Wheezing  diphenhydrAMINE 25 milliGRAM(s) Oral every 4 hours PRN Rash and/or Itching  guaiFENesin   Syrup  (Sugar-Free) 200 milliGRAM(s) Oral every 6 hours PRN Cough      Allergies    sulfa drugs (Unknown)    Intolerances        Review of Systems:    General:  No wt loss, fevers, chills, night sweats, fatigue   Eyes:  Good vision, no reported pain  ENT:  No sore throat, pain, runny nose, dysphagia  CV:  No pain, palpitations, hypo/hypertension  Resp:  No dyspnea, cough, tachypnea, wheezing  GI:  No pain, No nausea, No vomiting, No diarrhea, No constipation, No weight loss, No fever, No pruritis, No rectal bleeding, No melena, No dysphagia  :  No pain, bleeding, incontinence, nocturia  Muscle:  No pain, weakness  Neuro:  No weakness, tingling, memory problems  Psych:  No fatigue, insomnia, mood problems, depression  Endocrine:  No polyuria, polydypsia, cold/heat intolerance  Heme:  No petechiae, ecchymosis, easy bruisability  Skin:  No rash, tattoos, scars, edema      Vital Signs Last 24 Hrs  T(C): 37.1 (30 Mar 2020 07:27), Max: 37.1 (30 Mar 2020 07:27)  T(F): 98.8 (30 Mar 2020 07:27), Max: 98.8 (30 Mar 2020 07:27)  HR: 99 (30 Mar 2020 07:27) (84 - 99)  BP: 117/69 (30 Mar 2020 07:27) (110/60 - 144/75)  BP(mean): --  RR: 18 (30 Mar 2020 07:27) (16 - 18)  SpO2: 95% (30 Mar 2020 07:27) (94% - 97%)    PHYSICAL EXAM:    Constitutional: sitting up in bed  HEENT: ncat  Gastrointestinal: soft nt mild dt  Extremities: edema  Skin: jaundice  Neurological: Awake alert appropriate ?periods of confusion      LABS:                        6.1    2.47  )-----------( 1        ( 29 Mar 2020 10:22 )             18.5     03-29    137  |  112<H>  |  24<H>  ----------------------------<  144<H>  4.1   |  19<L>  |  0.85    Ca    7.9<L>      29 Mar 2020 10:22    TPro  5.6<L>  /  Alb  2.7<L>  /  TBili  3.5<H>  /  DBili  x   /  AST  22  /  ALT  16  /  AlkPhos  140<H>  03-29          RADIOLOGY & ADDITIONAL TESTS:

## 2020-03-30 NOTE — PROGRESS NOTE ADULT - SUBJECTIVE AND OBJECTIVE BOX
All interim records and events noted.    awake, alert up in bed      MEDICATIONS  (STANDING):  entecavir 0.5 milliGRAM(s) Oral daily  pantoprazole    Tablet 40 milliGRAM(s) Oral before breakfast  potassium chloride    Tablet ER 20 milliEquivalent(s) Oral two times a day  predniSONE   Tablet 5 milliGRAM(s) Oral daily    MEDICATIONS  (PRN):  acetaminophen   Tablet .. 650 milliGRAM(s) Oral every 6 hours PRN Mild Pain (1 - 3)  acetaminophen   Tablet .. 650 milliGRAM(s) Oral every 6 hours PRN Temp greater or equal to 38C (100.4F)  albuterol/ipratropium for Nebulization 3 milliLiter(s) Nebulizer every 6 hours PRN Shortness of Breath and/or Wheezing  diphenhydrAMINE 25 milliGRAM(s) Oral every 4 hours PRN Rash and/or Itching  guaiFENesin   Syrup  (Sugar-Free) 200 milliGRAM(s) Oral every 6 hours PRN Cough      Vital Signs Last 24 Hrs  T(C): 37.1 (30 Mar 2020 07:27), Max: 37.1 (30 Mar 2020 07:27)  T(F): 98.8 (30 Mar 2020 07:27), Max: 98.8 (30 Mar 2020 07:27)  HR: 99 (30 Mar 2020 07:27) (84 - 99)  BP: 117/69 (30 Mar 2020 07:27) (110/60 - 144/75)  BP(mean): --  RR: 18 (30 Mar 2020 07:27) (16 - 18)  SpO2: 95% (30 Mar 2020 07:27) (95% - 97%)    PHYSICAL EXAM  General: well developed  well nourished, in no acute distress  Head: atraumatic, normocephalic  ENT: sclera anicteric, buccal mucosa moist  Neck: supple  CV: S1 S2, regular rate and rhythm  Lungs: clear to auscultation, no wheezes/rhonchi  Abdomen: soft, nontender, bowel sounds present, pouchy  Extrem: left arm and hand trace-1+ edema  Skin: ecchymosis and purpura in various stages  Neuro: alert and oriented X3,  no focal deficits      LABS:             6.3    2.34  )-----------( 7        ( 03-30 @ 08:06 )             19.6                6.1    2.47  )-----------( 1        ( 03-29 @ 10:22 )             18.5                6.0    3.73  )-----------( 3        ( 03-28 @ 07:05 )             17.8                6.6    1.75  )-----------( 3        ( 03-27 @ 10:36 )             19.5       03-30    139  |  111<H>  |  19  ----------------------------<  118<H>  4.1   |  21<L>  |  0.84    Ca    8.0<L>      30 Mar 2020 08:06    TPro  5.6<L>  /  Alb  2.7<L>  /  TBili  3.5<H>  /  DBili  x   /  AST  21  /  ALT  14  /  AlkPhos  128<H>  03-30        RADIOLOGY & ADDITIONAL STUDIES:    IMPRESSION/RECOMMENDATIONS:

## 2020-03-30 NOTE — PROGRESS NOTE ADULT - ASSESSMENT
74 y/o man w Hepatitis B on Entecavirt, Chronic Lymphocytic Leukemia/Small Lymphocytic Lymphoma 4/2018 when presented w immune hemolytic anemia w partial response to steroids, started on Ibrutinib w heme CR and ND by CT scan w some decrease of lymphadenopathies, until 1/2020 when relapsed w incr WBC, anemia(initially not hemolytic, has chronic Matthew positive due to CLL/SLL), thrombocytopenia.    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    Was discharged home and admitted again 2/22/20 with  fever and found w pneumonia, post course of Ceftriaxone, continues to require transfusions plts and PRBCs.  Hep B titer negative  Post IVIG in late February 2020  Had trial of Venetoclax started 3/1/20 and received 4 days of treatment which was held on 3/5/20 due to pancytopenia. Restarted 3/24/20 on 10mg qD, lowest available dose as attempt to see if any benefit.    -remains pancytopenic inspite of above and transfusion dependent  -no sig change in Hgb and plts after one unit each tx yesterday  -now off Venetoclax and prednisone decr to 5mg  -had discussed hold further transfusions and Hospice  -pt wishes to continue transfusions, and will decide on Hospice at end of week  -will therefore tx one unit PRBC one unit platelets, discussed that if again no sig increase by tomorrow then hold further transfusions

## 2020-03-30 NOTE — PROVIDER CONTACT NOTE (OTHER) - ACTION/TREATMENT ORDERED:
Tylenol administered as ordered.
Tylenol 650 mg po given, sepsis work up ordered
 MADE AWARE. 1655 TO> Dexamethasone 20mg IBP once over 30 min , wait for rigors to subsides and restart at 150mg/hr x 30 and increase as per protocol.
 made aware. TO > "give 650 tylenol for temp , HR could be due to steroids, start infusion now with charted VS  at 50mg/hr x 30 min . if reaction happens again , call me "
Cont to assess no orders at this time

## 2020-03-30 NOTE — PROGRESS NOTE ADULT - SUBJECTIVE AND OBJECTIVE BOX
SUBJECTIVE:  Patient seen and examined at bedside.  No overnight events.  Patient with no new complaints at this time, resting comfortably in bed.    Vital Signs Last 24 Hrs  T(C): 37.1 (30 Mar 2020 07:27), Max: 37.1 (30 Mar 2020 07:27)  T(F): 98.8 (30 Mar 2020 07:27), Max: 98.8 (30 Mar 2020 07:27)  HR: 99 (30 Mar 2020 07:27) (84 - 99)  BP: 117/69 (30 Mar 2020 07:27) (110/60 - 144/75)  RR: 18 (30 Mar 2020 07:27) (16 - 18)  SpO2: 95% (30 Mar 2020 07:27) (94% - 97%)    PHYSICAL EXAM  GENERAL:  Well-developed male lying comfortably in bed in NAD  HEAD:  Normocephalic, atraumatic  EXTREMITIES:  LUE with approximately 2cm x 2cm area of ecchymosis/induration, unchanged, no surrounding erythema, warmth, or drainage noted, mildly TTP.  Decreased LUE swelling from yesterday, palpable radial and ulnar pulses 2+.  NEURO:  A&O x 3    MEDICATIONS  (STANDING):  entecavir 0.5 milliGRAM(s) Oral daily  pantoprazole    Tablet 40 milliGRAM(s) Oral before breakfast  potassium chloride    Tablet ER 20 milliEquivalent(s) Oral two times a day  predniSONE   Tablet 5 milliGRAM(s) Oral daily    MEDICATIONS  (PRN):  acetaminophen   Tablet .. 650 milliGRAM(s) Oral every 6 hours PRN Mild Pain (1 - 3)  acetaminophen   Tablet .. 650 milliGRAM(s) Oral every 6 hours PRN Temp greater or equal to 38C (100.4F)  albuterol/ipratropium for Nebulization 3 milliLiter(s) Nebulizer every 6 hours PRN Shortness of Breath and/or Wheezing  diphenhydrAMINE 25 milliGRAM(s) Oral every 4 hours PRN Rash and/or Itching  guaiFENesin   Syrup  (Sugar-Free) 200 milliGRAM(s) Oral every 6 hours PRN Cough    LABS:                        6.1    2.47  )-----------( 1        ( 29 Mar 2020 10:22 )             18.5     03-30    139  |  111<H>  |  19  ----------------------------<  118<H>  4.1   |  21<L>  |  0.84    Ca    8.0<L>      30 Mar 2020 08:06    TPro  5.6<L>  /  Alb  2.7<L>  /  TBili  3.5<H>  /  DBili  x   /  AST  21  /  ALT  14  /  AlkPhos  128<H>  03-30    ASSESSMENT & PLAN  75 year old male with small LUE hematoma, unchanged, with no signs of infection.  Decreased LUE swelling.    - No surgical intervention warranted at this time  - Continue care per primary team  - Will follow  - Case discussed with Dr. Abdul

## 2020-03-30 NOTE — PROGRESS NOTE ADULT - PROBLEM SELECTOR PLAN 10
DVT PPx: SCDs- start on ALYSON stockings, no chemical ppx in the setting of symptomatic anemia and thrombocytopenia  -Ambulate with assist/OOB with assist.    11. HTN- not on meds  12. T2DM- stable with lifestyle modifications. Hyperglycemia likely due to steroid use- now improved as steroids tapered DVT PPx: SCDs- start on ALYSON stockings, no chemical ppx in the setting of symptomatic anemia and thrombocytopenia  -Ambulate with assist/OOB with assist.    11. HTN- not on meds  12. T2DM- stable with lifestyle modifications. Hyperglycemia likely due to steroid use- now improved as steroids tapered  13. LUE small hematoma- non op

## 2020-03-30 NOTE — PROGRESS NOTE ADULT - SUBJECTIVE AND OBJECTIVE BOX
[facility] -- Cori Dawkins, Travis Villagomez, Ryland Wright [place], [place]  Office # [phone]    Follow Up: Cardiac Arrhythmia     Subjective/Observations: states he slept well until MN last night only.  Denies any respiratory or cardiac discomfort    REVIEW OF SYSTEMS: All other review of systems is negative unless indicated above  PAST MEDICAL & SURGICAL HISTORY:  KIRTI (obstructive sleep apnea): on nocturnal cpap  Hypertension: not on any medications  Diabetes: not on any medications  Hepatitis B  Lymphoma: Small cell B cell  AIHA (autoimmune hemolytic anemia)  H/O lithotripsy    MEDICATIONS  (STANDING):  entecavir 0.5 milliGRAM(s) Oral daily  pantoprazole    Tablet 40 milliGRAM(s) Oral before breakfast  potassium chloride    Tablet ER 20 milliEquivalent(s) Oral two times a day  predniSONE   Tablet 5 milliGRAM(s) Oral daily    MEDICATIONS  (PRN):  acetaminophen   Tablet .. 650 milliGRAM(s) Oral every 6 hours PRN Mild Pain (1 - 3)  acetaminophen   Tablet .. 650 milliGRAM(s) Oral every 6 hours PRN Temp greater or equal to 38C (100.4F)  albuterol/ipratropium for Nebulization 3 milliLiter(s) Nebulizer every 6 hours PRN Shortness of Breath and/or Wheezing  diphenhydrAMINE 25 milliGRAM(s) Oral every 4 hours PRN Rash and/or Itching  guaiFENesin   Syrup  (Sugar-Free) 200 milliGRAM(s) Oral every 6 hours PRN Cough    Allergies    sulfa drugs (Unknown)    Intolerances    Vital Signs Last 24 Hrs  T(C): 37.1 (30 Mar 2020 07:27), Max: 37.1 (30 Mar 2020 07:27)  T(F): 98.8 (30 Mar 2020 07:27), Max: 98.8 (30 Mar 2020 07:27)  HR: 99 (30 Mar 2020 07:27) (84 - 99)  BP: 117/69 (30 Mar 2020 07:27) (110/60 - 144/75)  BP(mean): --  RR: 18 (30 Mar 2020 07:27) (16 - 18)  SpO2: 95% (30 Mar 2020 07:27) (94% - 97%)  I&O's Summary      PHYSICAL EXAM:  TELE: Not on tele  Constitutional: NAD, awake and alert, well-developed  HEENT: Moist Mucous Membranes, Anicteric  Pulmonary: Non-labored, breath sounds are clear bilaterally, No wheezing, rales or rhonchi  Cardiovascular: Regular, S1 and S2, No murmurs, rubs, gallops or clicks  Gastrointestinal: Bowel Sounds present, soft, nontender.   Extr: Left arm swollen  Lymph: No peripheral edema. No lymphadenopathy.  Skin: No visible rashes or ulcers.  BUE ecchymoses  Psych:  Mood & affect: flat  LABS: All Labs Reviewed:                        6.1    2.47  )-----------( 1        ( 29 Mar 2020 10:22 )             18.5                         6.0    3.73  )-----------( 3        ( 28 Mar 2020 07:05 )             17.8                         6.6    1.75  )-----------( 3        ( 27 Mar 2020 10:36 )             19.5     30 Mar 2020 08:06    139    |  111    |  19     ----------------------------<  118    4.1     |  21     |  0.84   29 Mar 2020 10:22    137    |  112    |  24     ----------------------------<  144    4.1     |  19     |  0.85   28 Mar 2020 07:05    139    |  111    |  22     ----------------------------<  108    4.2     |  20     |  0.84     Ca    8.0        30 Mar 2020 08:06  Ca    7.9        29 Mar 2020 10:22  Ca    7.5        28 Mar 2020 07:05    TPro  5.6    /  Alb  2.7    /  TBili  3.5    /  DBili  x      /  AST  21     /  ALT  14     /  AlkPhos  128    30 Mar 2020 08:06  TPro  5.6    /  Alb  2.7    /  TBili  3.5    /  DBili  x      /  AST  22     /  ALT  16     /  AlkPhos  140    29 Mar 2020 10:22  TPro  5.5    /  Alb  2.6    /  TBili  2.8    /  DBili  x      /  AST  27     /  ALT  17     /  AlkPhos  139    28 Mar 2020 07:05    < from: TTE Echo Complete w/o contrast w/ Doppler (03.04.20 @ 09:54) >     EXAM:  ECHO TTE WO CON COMP W DOPP         PROCEDURE DATE:  03/04/2020        INTERPRETATION:  INDICATION: edema  Sonographer PH    Blood Pressure 129/63    Height 165 cm     Weight 82.9 kg       BSA 1.9 sq m    Dimensions:    LA 2.9       Normal Values: 2.0 - 4.0 cm    Ao 2.9        Normal Values: 2.0 - 3.8 cm  SEPTUM 0.9       Normal Values: 0.6 - 1.2 cm  PWT 0.9       Normal Values: 0.6 - 1.1 cm  LVIDd 4.1         Normal Values: 3.0 - 5.6 cm  LVIDs 2.3         Normal Values: 1.8 - 4.0 cm    OBSERVATIONS:    Mitral Valve: normal, trace physiologic MR.  Aortic Valve/Aorta: Normal trileaflet aortic valve with normal opening.  Tricuspid Valve: normal with trace TR.  Pulmonic Valve: normal  Left Atrium: normal  Right Atrium: normal  Left Ventricle: normal LV size and systolic function, estimated LVEF of 65%.  Right Ventricle: Grossly normal size and systolic function.  Pericardium/Pleura: normal, no significant pericardial effusion.    Conclusion:   Normal left ventricular internal dimensions and systolic function, estimated LVEF of 65%.   Grossly normal RV size and systolic function.     Normal trileaflet aortic valve, without AI.   Trace physiologic MR and TR.      KALPANA GUERRERO   This document has been electronically signed. Mar  5 2020  7:32AM      < end of copied text >    < from: CT Chest No Cont (03.19.20 @ 09:40) >    EXAM:  CT CHEST                          PROCEDURE DATE:  03/19/2020      INTERPRETATION:  CLINICAL INFORMATION: CLL, fever    COMPARISON: 2/21/2020    PROCEDURE:   CT of the Chest, Abdomen and Pelvis was performed without intravenous contrast.   Intravenous contrast: None.  Oral contrast: None.  Sagittal and coronal reformats were performed.    FINDINGS:  Lack of IV contrast limits evaluation diana, vascular structures and solid organ parenchyma  CHEST:     LUNGS AND LARGE AIRWAYS: Patentcentral airways. Scattered bilateral patchy, nodular and groundglass opacities slightly improved at the lung bases compared to prior. Findings could all be inflammatory/infectious. Neoplastic component not excluded.  PLEURA: Interval increase in small left and small-to-moderate right pleural effusion.  VESSELS: Nonaneurysmal  HEART: Heart size is normal. Coronary artery calcination. Decrease cardiac chamber blood pool attenuation suggests an anemic state. Trace pericardial effusion.  MEDIASTINUM AND DIANA: Multistation mediastinal  supraclavicular, bilateral hilar and bilateral axillary adenopathy similar to prior  CHEST WALL AND LOWER NECK: Within normal limits.    ABDOMEN AND PELVIS:    LIVER: Hepatomegaly similar to prior  BILE DUCTS: Normal caliber.  GALLBLADDER: Cholelithiasis.  SPLEEN: Splenomegaly similar to prior  PANCREAS: Within normal limits.  ADRENALS: Within normal limits.  KIDNEYS/URETERS: Within normal limits.    BLADDER: Within normal limits.  REPRODUCTIVE ORGANS: Unremarkable    BOWEL: Evaluation limited due to limited by nonopacification underdistention. No gross active bowel inflammation. No bowel obstruction.. Appendix no appendicitis  PERITONEUM: Trace ascites. No extraluminal gas or organized collections.  VESSELS: Nonaneurysmal.  RETROPERITONEUM/LYMPH NODES: Stable periportal and retroperitoneal, mesenteric pelvic and bilateral inguinal adenopathy.    ABDOMINAL WALL: Mild anasarca.  BONES: Stable    IMPRESSION:     Improvement in the scattered bilateral parenchymal opacities as described.  Slight increase in bilateral pleural effusions.  Stable multistation adenopathy chest abdomen and pelvis as described  No acute findings abdomen and pelvis  Additional findings as discussed    PANTERA CALVILLO M.D., ATTENDING RADIOLOGIST  This document has been electronically signed. Mar 19 2020 10:26AM      < end of copied text >    < from: US Duplex Venous Upper Ext Ltd, Left (03.25.20 @ 02:05) >    EXAM:  US DPLX UPR EXT VEINS LTD LT                          PROCEDURE DATE:  03/25/2020      INTERPRETATION:  Exam: US Duplex Left Upper Extremity Veins, Limited   Exam date and time: 3/25/2020 1:32 AM   Age: 75 years old   Clinical indication: Pain; Arm, lower; Left     TECHNIQUE:   Imaging protocol: Real-time Duplex ultrasound of the Left Upper Extremity with 2-D gray scale, color Doppler flow and spectral waveform analysis with image documentation. Limited exam focused on the left upper extremity veins.     COMPARISON:   No relevant prior studies available.     FINDINGS:   Left deep veins:  Axillary , subclavian, radial, ulnar, brachial veins are patent throughout without thrombus. Normal Doppler waveforms. Normal compressibility and/or augmentation response. Visualized internal jugular and subclavian veins are patent.     Left superficial veins: Unremarkable. Visualized cephalic and basilic veins are patent without thrombus.      Soft tissues: Small hematoma formation measuring 0.5 x 0.1 x 0.3 cm in the left anterior forearm.     Lymph nodes: Multiple nonspecific left neck lymph nodes measuring up to 1.1 cm in short axis measurement.    IMPRESSION:     No evidence of DVT.  .  Small hematoma in the anterior left forearm.    A preliminary report was given by the overnight radiologist    ELIO JOHNSON M.D., ATTENDING RADIOLOGIST  This document has been electronically signed. Mar 25 2020  8:07AM      < end of copied text >

## 2020-03-30 NOTE — PROGRESS NOTE ADULT - ASSESSMENT
hepatitis B   elevated lfts   CLL  pancytopenia      am labs pending  patient with elevated bilirubin and alk phos at baseline ? leon  mri showed normal liver, no cbd stone/dilatation; hep b pcr neg  ob neg; no s/s overt gib; monitor cbc daily, transfuse per heme/onc  diet as tolerated; correct elytes prn  cont hepatitis b therapy  cont ppi ppx  goc discussions ongoing  further care per heme/onc and primary    Advanced care planning was discussed with patient and family.  Advanced care planning forms were reviewed and discussed.  Risks, benefits and alternatives of gastroenterologic procedures were discussed in detail and all questions were answered.    30 minutes spent.

## 2020-03-30 NOTE — PROGRESS NOTE ADULT - PROBLEM SELECTOR PLAN 1
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 as per heme/onc.   - Steroids being tapered - 10mg now  - Hem/Onc following, recs appreciated- venetoclax restarted Tuesday March 25, but given his anemia and thrombocytopenia have remained refractory, treatment as stopped today 3/29.  Dr. Stephenson continues to address GOC with patient and family. They would not be able to take him home with home hospice, but may be a candidate for inpatient hospice. 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 as per heme/onc.   - Steroids being tapered - 5mg now  - Hem/Onc following, recs appreciated- venetoclax restarted Tuesday March 25, but given his anemia and thrombocytopenia have remained refractory, treatment as stopped today 3/29.  Dr. Stephenson continues to address GOC with patient and family. They would not be able to take him home with home hospice, but may be a candidate for inpatient hospice. Will decide at end of week.

## 2020-03-30 NOTE — PROGRESS NOTE ADULT - PROBLEM SELECTOR PLAN 9
Cr at baseline  - Avoid nephrotoxic medications  - Monitor renal function  -BUN/Creatinine today: 24/0.85

## 2020-03-30 NOTE — PROGRESS NOTE ADULT - PROBLEM SELECTOR PLAN 3
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 Was on prednisone 10mg daily -- increase to prednisone 40mg po during hospitalization, tapering started @2/29 - now 10mg daily  - Heme/onc following  - H&H have again dropped. 1 unit PRBCs ordered  - will continue to monitor - Worsening anemia and thrombocytopenia since discharge ~1 week ago likely in part due to lymphoma/leukemia in part due to hemolytic anemia  - No acute s/s of bleeding on admission, continue to monitor, high bilirubin and though a greater direct bili component, suspect this is in large part due to AIHA and the indirect bili is getting conjugated  - suspect due to AIHA. Was on prednisone 10mg daily -- increase to prednisone 40mg po during hospitalization, tapering started @2/29 - now 5mg daily  - Heme/onc following  - H&H have again dropped. 1 unit PRBCs ordered  - will continue to monitor

## 2020-03-30 NOTE — PROGRESS NOTE ADULT - PROBLEM SELECTOR PLAN 2
- Per chart review, platelet count steadily declining since 2018  - poor response to platelets transfusion, suspect ITP component  - s/p one dose of IVIG  - Hem/Onc following, will follow the recommendation   -To receive 1 unit of platelets today    -Heme/Onc- continue supportive care.    -continue to monitor. - Per chart review, platelet count steadily declining since 2018  - poor response to platelets transfusion, suspect ITP component  - s/p one dose of IVIG  - Hem/Onc following, will follow the recommendation   -To receive 1 unit of platelets today. If no response will hold transfusions tomorrow.    -Heme/Onc- continue supportive care.    -continue to monitor.

## 2020-03-30 NOTE — PROGRESS NOTE ADULT - SUBJECTIVE AND OBJECTIVE BOX
*******CHARTING IN PROGRESS********  Patient is a 75y old  Male who presents with a chief complaint of weakness, anemia (30 Mar 2020 08:39)      INTERVAL HPI/OVERNIGHT EVENTS: Patient seen and examined at bedside.    MEDICATIONS  (STANDING):  entecavir 0.5 milliGRAM(s) Oral daily  pantoprazole    Tablet 40 milliGRAM(s) Oral before breakfast  potassium chloride    Tablet ER 20 milliEquivalent(s) Oral two times a day  predniSONE   Tablet 5 milliGRAM(s) Oral daily    MEDICATIONS  (PRN):  acetaminophen   Tablet .. 650 milliGRAM(s) Oral every 6 hours PRN Mild Pain (1 - 3)  acetaminophen   Tablet .. 650 milliGRAM(s) Oral every 6 hours PRN Temp greater or equal to 38C (100.4F)  albuterol/ipratropium for Nebulization 3 milliLiter(s) Nebulizer every 6 hours PRN Shortness of Breath and/or Wheezing  diphenhydrAMINE 25 milliGRAM(s) Oral every 4 hours PRN Rash and/or Itching  guaiFENesin   Syrup  (Sugar-Free) 200 milliGRAM(s) Oral every 6 hours PRN Cough      Allergies    sulfa drugs (Unknown)    Intolerances        REVIEW OF SYSTEMS:  CONSTITUTIONAL: No fever or chills  HEENT: No headache, no sore throat  RESPIRATORY: No cough, wheezing, or shortness of breath  CARDIOVASCULAR: No chest pain, palpitations, or leg swelling  GASTROINTESTINAL: No abd pain, nausea, vomiting, or diarrhea  GENITOURINARY: No dysuria, frequency, or hematuria  NEUROLOGICAL: No focal weakness or dizziness  MUSCULOSKELETAL: No myalgias     Vital Signs Last 24 Hrs  T(C): 37.1 (30 Mar 2020 07:27), Max: 37.1 (30 Mar 2020 07:27)  T(F): 98.8 (30 Mar 2020 07:27), Max: 98.8 (30 Mar 2020 07:27)  HR: 99 (30 Mar 2020 07:27) (84 - 99)  BP: 117/69 (30 Mar 2020 07:27) (110/60 - 144/75)  BP(mean): --  RR: 18 (30 Mar 2020 07:27) (16 - 18)  SpO2: 95% (30 Mar 2020 07:27) (94% - 97%)    PHYSICAL EXAM:  GENERAL: NAD  HEENT: EOMI, moist mucous membranes  CHEST/LUNG: CTA b/l, no rales, wheezes, or rhonchi  HEART: RRR, S1, S2  ABDOMEN: BS+, soft, nontender, nondistended  EXTREMITIES: No edema, cyanosis, or calf tenderness  NERVOUS SYSTEM: AA&Ox3    LABS:                        6.1    2.47  )-----------( 1        ( 29 Mar 2020 10:22 )             18.5     CBC Full  -  ( 29 Mar 2020 10:22 )  WBC Count : 2.47 K/uL  Hemoglobin : 6.1 g/dL  Hematocrit : 18.5 %  Platelet Count - Automated : 1 K/uL  Mean Cell Volume : 88.1 fl  Mean Cell Hemoglobin : 29.0 pg  Mean Cell Hemoglobin Concentration : 33.0 gm/dL  Auto Neutrophil # : 0.16 K/uL  Auto Lymphocyte # : 2.00 K/uL  Auto Monocyte # : 0.30 K/uL  Auto Eosinophil # : 0.00 K/uL  Auto Basophil # : 0.01 K/uL  Auto Neutrophil % : 6.5 %  Auto Lymphocyte % : 81.0 %  Auto Monocyte % : 12.1 %  Auto Eosinophil % : 0.0 %  Auto Basophil % : 0.4 %    30 Mar 2020 08:06    139    |  111    |  19     ----------------------------<  118    4.1     |  21     |  0.84     Ca    8.0        30 Mar 2020 08:06    TPro  5.6    /  Alb  2.7    /  TBili  3.5    /  DBili  x      /  AST  21     /  ALT  14     /  AlkPhos  128    30 Mar 2020 08:06        CAPILLARY BLOOD GLUCOSE            Culture - Blood (collected 03-23-20 @ 08:54)  Source: .Blood Blood  Final Report (03-28-20 @ 09:00):    No growth at 5 days.    Culture - Blood (collected 03-23-20 @ 08:54)  Source: .Blood Blood  Final Report (03-28-20 @ 09:00):    No growth at 5 days.        RADIOLOGY & ADDITIONAL TESTS:    Personally reviewed.     Consultant(s) Notes Reviewed:  [x] YES  [ ] NO Patient is a 75y old  Male who presents with a chief complaint of weakness, anemia (30 Mar 2020 08:39)      INTERVAL HPI/OVERNIGHT EVENTS: Patient seen and examined at bedside.    MEDICATIONS  (STANDING):  entecavir 0.5 milliGRAM(s) Oral daily  pantoprazole    Tablet 40 milliGRAM(s) Oral before breakfast  potassium chloride    Tablet ER 20 milliEquivalent(s) Oral two times a day  predniSONE   Tablet 5 milliGRAM(s) Oral daily    MEDICATIONS  (PRN):  acetaminophen   Tablet .. 650 milliGRAM(s) Oral every 6 hours PRN Mild Pain (1 - 3)  acetaminophen   Tablet .. 650 milliGRAM(s) Oral every 6 hours PRN Temp greater or equal to 38C (100.4F)  albuterol/ipratropium for Nebulization 3 milliLiter(s) Nebulizer every 6 hours PRN Shortness of Breath and/or Wheezing  diphenhydrAMINE 25 milliGRAM(s) Oral every 4 hours PRN Rash and/or Itching  guaiFENesin   Syrup  (Sugar-Free) 200 milliGRAM(s) Oral every 6 hours PRN Cough      Allergies    sulfa drugs (Unknown)    Intolerances        REVIEW OF SYSTEMS:  CONSTITUTIONAL: No fever or chills  HEENT: No headache, no sore throat  RESPIRATORY: No cough, wheezing, or shortness of breath  CARDIOVASCULAR: No chest pain, palpitations, or leg swelling  GASTROINTESTINAL: No abd pain, nausea, vomiting, or diarrhea  GENITOURINARY: No dysuria, frequency, or hematuria  NEUROLOGICAL: No focal weakness or dizziness  MUSCULOSKELETAL: No myalgias     Vital Signs Last 24 Hrs  T(C): 37.1 (30 Mar 2020 07:27), Max: 37.1 (30 Mar 2020 07:27)  T(F): 98.8 (30 Mar 2020 07:27), Max: 98.8 (30 Mar 2020 07:27)  HR: 99 (30 Mar 2020 07:27) (84 - 99)  BP: 117/69 (30 Mar 2020 07:27) (110/60 - 144/75)  BP(mean): --  RR: 18 (30 Mar 2020 07:27) (16 - 18)  SpO2: 95% (30 Mar 2020 07:27) (94% - 97%)    PHYSICAL EXAM:  GENERAL: NAD  HEENT: EOMI, moist mucous membranes  CHEST/LUNG: CTA b/l, no rales, wheezes, or rhonchi  HEART: RRR, S1, S2  ABDOMEN: BS+, soft, nontender, nondistended  EXTREMITIES: No edema, cyanosis, or calf tenderness  NERVOUS SYSTEM: AA&Ox3    LABS:                        6.1    2.47  )-----------( 1        ( 29 Mar 2020 10:22 )             18.5     CBC Full  -  ( 29 Mar 2020 10:22 )  WBC Count : 2.47 K/uL  Hemoglobin : 6.1 g/dL  Hematocrit : 18.5 %  Platelet Count - Automated : 1 K/uL  Mean Cell Volume : 88.1 fl  Mean Cell Hemoglobin : 29.0 pg  Mean Cell Hemoglobin Concentration : 33.0 gm/dL  Auto Neutrophil # : 0.16 K/uL  Auto Lymphocyte # : 2.00 K/uL  Auto Monocyte # : 0.30 K/uL  Auto Eosinophil # : 0.00 K/uL  Auto Basophil # : 0.01 K/uL  Auto Neutrophil % : 6.5 %  Auto Lymphocyte % : 81.0 %  Auto Monocyte % : 12.1 %  Auto Eosinophil % : 0.0 %  Auto Basophil % : 0.4 %    30 Mar 2020 08:06    139    |  111    |  19     ----------------------------<  118    4.1     |  21     |  0.84     Ca    8.0        30 Mar 2020 08:06    TPro  5.6    /  Alb  2.7    /  TBili  3.5    /  DBili  x      /  AST  21     /  ALT  14     /  AlkPhos  128    30 Mar 2020 08:06        CAPILLARY BLOOD GLUCOSE            Culture - Blood (collected 03-23-20 @ 08:54)  Source: .Blood Blood  Final Report (03-28-20 @ 09:00):    No growth at 5 days.    Culture - Blood (collected 03-23-20 @ 08:54)  Source: .Blood Blood  Final Report (03-28-20 @ 09:00):    No growth at 5 days.        RADIOLOGY & ADDITIONAL TESTS:    Personally reviewed.     Consultant(s) Notes Reviewed:  [x] YES  [ ] NO

## 2020-03-31 LAB
ALBUMIN SERPL ELPH-MCNC: 2.7 G/DL — LOW (ref 3.3–5)
ALP SERPL-CCNC: 154 U/L — HIGH (ref 40–120)
ALT FLD-CCNC: 18 U/L — SIGNIFICANT CHANGE UP (ref 12–78)
ANION GAP SERPL CALC-SCNC: 8 MMOL/L — SIGNIFICANT CHANGE UP (ref 5–17)
AST SERPL-CCNC: 35 U/L — SIGNIFICANT CHANGE UP (ref 15–37)
BASOPHILS # BLD AUTO: 0 K/UL — SIGNIFICANT CHANGE UP (ref 0–0.2)
BASOPHILS NFR BLD AUTO: 0 % — SIGNIFICANT CHANGE UP (ref 0–2)
BILIRUB SERPL-MCNC: 2.6 MG/DL — HIGH (ref 0.2–1.2)
BUN SERPL-MCNC: 28 MG/DL — HIGH (ref 7–23)
CALCIUM SERPL-MCNC: 7.7 MG/DL — LOW (ref 8.5–10.1)
CHLORIDE SERPL-SCNC: 111 MMOL/L — HIGH (ref 96–108)
CO2 SERPL-SCNC: 18 MMOL/L — LOW (ref 22–31)
CREAT SERPL-MCNC: 0.85 MG/DL — SIGNIFICANT CHANGE UP (ref 0.5–1.3)
EOSINOPHIL # BLD AUTO: 0 K/UL — SIGNIFICANT CHANGE UP (ref 0–0.5)
EOSINOPHIL NFR BLD AUTO: 0 % — SIGNIFICANT CHANGE UP (ref 0–6)
GLUCOSE SERPL-MCNC: 168 MG/DL — HIGH (ref 70–99)
HCT VFR BLD CALC: 19.8 % — CRITICAL LOW (ref 39–50)
HGB BLD-MCNC: 6.7 G/DL — CRITICAL LOW (ref 13–17)
LYMPHOCYTES # BLD AUTO: 0.3 K/UL — LOW (ref 1–3.3)
LYMPHOCYTES # BLD AUTO: 24 % — SIGNIFICANT CHANGE UP (ref 13–44)
MCHC RBC-ENTMCNC: 29.8 PG — SIGNIFICANT CHANGE UP (ref 27–34)
MCHC RBC-ENTMCNC: 33.8 GM/DL — SIGNIFICANT CHANGE UP (ref 32–36)
MCV RBC AUTO: 88 FL — SIGNIFICANT CHANGE UP (ref 80–100)
MONOCYTES # BLD AUTO: 0.11 K/UL — SIGNIFICANT CHANGE UP (ref 0–0.9)
MONOCYTES NFR BLD AUTO: 9 % — SIGNIFICANT CHANGE UP (ref 2–14)
NEUTROPHILS # BLD AUTO: 0.45 K/UL — LOW (ref 1.8–7.4)
NEUTROPHILS NFR BLD AUTO: 28 % — LOW (ref 43–77)
NRBC # BLD: SIGNIFICANT CHANGE UP /100 WBCS (ref 0–0)
PLATELET # BLD AUTO: 42 K/UL — LOW (ref 150–400)
POTASSIUM SERPL-MCNC: 4.6 MMOL/L — SIGNIFICANT CHANGE UP (ref 3.5–5.3)
POTASSIUM SERPL-SCNC: 4.6 MMOL/L — SIGNIFICANT CHANGE UP (ref 3.5–5.3)
PROT SERPL-MCNC: 5.3 G/DL — LOW (ref 6–8.3)
RBC # BLD: 2.25 M/UL — LOW (ref 4.2–5.8)
RBC # FLD: 16.1 % — HIGH (ref 10.3–14.5)
SODIUM SERPL-SCNC: 137 MMOL/L — SIGNIFICANT CHANGE UP (ref 135–145)
URATE SERPL-MCNC: 3.4 MG/DL — SIGNIFICANT CHANGE UP (ref 3.4–8.8)
WBC # BLD: 1.24 K/UL — LOW (ref 3.8–10.5)
WBC # FLD AUTO: 1.24 K/UL — LOW (ref 3.8–10.5)

## 2020-03-31 PROCEDURE — 99233 SBSQ HOSP IP/OBS HIGH 50: CPT | Mod: GC

## 2020-03-31 PROCEDURE — 99232 SBSQ HOSP IP/OBS MODERATE 35: CPT

## 2020-03-31 RX ADMIN — Medication 20 MILLIEQUIVALENT(S): at 17:12

## 2020-03-31 RX ADMIN — ENTECAVIR 0.5 MILLIGRAM(S): 0.5 TABLET ORAL at 04:43

## 2020-03-31 RX ADMIN — PANTOPRAZOLE SODIUM 40 MILLIGRAM(S): 20 TABLET, DELAYED RELEASE ORAL at 04:43

## 2020-03-31 RX ADMIN — Medication 5 MILLIGRAM(S): at 04:43

## 2020-03-31 RX ADMIN — Medication 20 MILLIEQUIVALENT(S): at 04:43

## 2020-03-31 NOTE — PROGRESS NOTE ADULT - PROBLEM SELECTOR PLAN 3
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 Was on prednisone 10mg daily -- increase to prednisone 40mg po during hospitalization, tapering started @2/29 - now 5mg daily  - Heme/onc following  - H&H have again dropped. 1 unit PRBCs ordered  - will continue to monitor

## 2020-03-31 NOTE — PROGRESS NOTE ADULT - PROBLEM SELECTOR PLAN 2
- Per chart review, platelet count steadily declining since 2018  - poor response to platelets transfusion, suspect ITP component  - s/p one dose of IVIG  - Hem/Onc following, will follow the recommendation   -To receive 1 unit of platelets today. If no response will hold transfusions tomorrow.    -Heme/Onc- continue supportive care.    -continue to monitor.

## 2020-03-31 NOTE — PROGRESS NOTE ADULT - SUBJECTIVE AND OBJECTIVE BOX
INTERVAL HPI/OVERNIGHT EVENTS:  pt seen and examined  interim events and heme/onc notes reviewed  pt offers no gi complaints  no overt gib per nursing  sp plts/prbc yesterday    MEDICATIONS  (STANDING):  entecavir 0.5 milliGRAM(s) Oral daily  pantoprazole    Tablet 40 milliGRAM(s) Oral before breakfast  potassium chloride    Tablet ER 20 milliEquivalent(s) Oral two times a day  predniSONE   Tablet 5 milliGRAM(s) Oral daily  riTUXimab Infusion (eMAR) 50 mL/Hr (50 mL/Hr) IV Continuous <Continuous>    MEDICATIONS  (PRN):  acetaminophen   Tablet .. 650 milliGRAM(s) Oral every 6 hours PRN Mild Pain (1 - 3)  acetaminophen   Tablet .. 650 milliGRAM(s) Oral every 6 hours PRN Temp greater or equal to 38C (100.4F)  albuterol/ipratropium for Nebulization 3 milliLiter(s) Nebulizer every 6 hours PRN Shortness of Breath and/or Wheezing  diphenhydrAMINE 25 milliGRAM(s) Oral every 4 hours PRN Rash and/or Itching  guaiFENesin   Syrup  (Sugar-Free) 200 milliGRAM(s) Oral every 6 hours PRN Cough      Allergies    sulfa drugs (Unknown)    Intolerances        Review of Systems:    General:  prior chills/fever  Eyes:  Good vision, no reported pain  ENT:  No sore throat, pain, runny nose, dysphagia  CV:  No pain, palpitations, hypo/hypertension  Resp:  No dyspnea, cough, tachypnea, wheezing  GI:  No pain, No nausea, No vomiting, No diarrhea, No constipation, No weight loss, No fever, No pruritis, No rectal bleeding, No melena, No dysphagia  :  No pain, bleeding, incontinence, nocturia  Muscle:  No pain, weakness  Neuro:  No weakness, tingling, memory problems  Psych:  No fatigue, insomnia, mood problems, depression  Endocrine:  No polyuria, polydypsia, cold/heat intolerance      Vital Signs Last 24 Hrs  T(C): 36.3 (31 Mar 2020 07:37), Max: 38 (30 Mar 2020 18:10)  T(F): 97.3 (31 Mar 2020 07:37), Max: 100.4 (30 Mar 2020 18:10)  HR: 70 (31 Mar 2020 07:37) (70 - 144)  BP: 120/69 (31 Mar 2020 07:37) (100/63 - 149/80)  BP(mean): --  RR: 16 (31 Mar 2020 07:37) (14 - 18)  SpO2: 97% (31 Mar 2020 07:37) (93% - 97%)    PHYSICAL EXAM:    Constitutional: sitting up in bed  HEENT: ncat  Gastrointestinal: soft nt mild dt  Extremities: generalized edema  Neurological: Awake alert appropriate ?periods of confusion    LABS:                        6.7    1.24  )-----------( x        ( 31 Mar 2020 06:43 )             19.8     03-31    137  |  111<H>  |  28<H>  ----------------------------<  168<H>  4.6   |  18<L>  |  0.85    Ca    7.7<L>      31 Mar 2020 06:43    TPro  5.3<L>  /  Alb  2.7<L>  /  TBili  2.6<H>  /  DBili  x   /  AST  35  /  ALT  18  /  AlkPhos  154<H>  03-31          RADIOLOGY & ADDITIONAL TESTS:

## 2020-03-31 NOTE — PROGRESS NOTE ADULT - PROBLEM SELECTOR PLAN 10
DVT PPx: SCDs- start on ALYSON stockings, no chemical ppx in the setting of symptomatic anemia and thrombocytopenia  -Ambulate with assist/OOB with assist.    11. HTN- not on meds  12. T2DM- stable with lifestyle modifications. Hyperglycemia likely due to steroid use- now improved as steroids tapered  13. LUE small hematoma- non op

## 2020-03-31 NOTE — PROGRESS NOTE ADULT - ASSESSMENT
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 Tachy likely reactive but now resolved.   - Monitor and replete electrolytes. Keep K>4.0 and Mg>2.0.    Pancytopenia   - Hem/onc following   - Multiple tx of PRBC, Platelets and Plasma.   Pancytopenia refractory to transfusion  - Monitor closely for bleeding  - Transfuse per Heme    LUE edema  - Doppler negative DVT but + hematoma  - Echo 2/24/2020 revealing trace AI trace TR ef 60-65%.  No need to repeat    DM  - Management as per primary     Goals of care - Poor prognosis 2/2 transfusion dependent, refractory to treatment as per Heme notes d/w spouse possible inhouse hospice    Further cardiac workup will depend on clinical course.   All other workup per primary team. Will followup.     [facility]  Cardiology   Spectra #[phone]

## 2020-03-31 NOTE — PROGRESS NOTE ADULT - PROBLEM SELECTOR PLAN 1
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 as per heme/onc.   - Steroids being tapered - 5mg now  - Hem/Onc following, recs appreciated- venetoclax restarted Tuesday March 25, but given his anemia and thrombocytopenia have remained refractory, treatment as stopped today 3/29.  Dr. Stephenson continues to address GOC with patient and family. They would not be able to take him home with home hospice, but may be a candidate for inpatient hospice. Will decide at end of week.

## 2020-03-31 NOTE — PROGRESS NOTE ADULT - ASSESSMENT
hepatitis B   elevated lfts   CLL  pancytopenia        patient with elevated bilirubin and alk phos at baseline ? leon  mri showed normal liver, no cbd stone/dilatation; hep b pcr neg  ob neg; no s/s overt gib; monitor cbc, transfuse per heme/onc  diet as tolerated; correct elytes prn  cont hepatitis b therapy  cont ppi ppx  goc discussions ongoing  further care per heme/onc     Advanced care planning was discussed with patient and family.  Advanced care planning forms were reviewed and discussed.  Risks, benefits and alternatives of gastroenterologic procedures were discussed in detail and all questions were answered.    30 minutes spent.

## 2020-03-31 NOTE — PROGRESS NOTE ADULT - SUBJECTIVE AND OBJECTIVE BOX
All interim records and events noted.    post Rituxan yesterday, some chills infusion reaction, Rx'd w dexaxmethsone w resolution and able to complete dose  reports slept well last night      MEDICATIONS  (STANDING):  entecavir 0.5 milliGRAM(s) Oral daily  pantoprazole    Tablet 40 milliGRAM(s) Oral before breakfast  potassium chloride    Tablet ER 20 milliEquivalent(s) Oral two times a day  predniSONE   Tablet 5 milliGRAM(s) Oral daily  riTUXimab Infusion (eMAR) 50 mL/Hr (50 mL/Hr) IV Continuous <Continuous>    MEDICATIONS  (PRN):  acetaminophen   Tablet .. 650 milliGRAM(s) Oral every 6 hours PRN Mild Pain (1 - 3)  acetaminophen   Tablet .. 650 milliGRAM(s) Oral every 6 hours PRN Temp greater or equal to 38C (100.4F)  albuterol/ipratropium for Nebulization 3 milliLiter(s) Nebulizer every 6 hours PRN Shortness of Breath and/or Wheezing  diphenhydrAMINE 25 milliGRAM(s) Oral every 4 hours PRN Rash and/or Itching  guaiFENesin   Syrup  (Sugar-Free) 200 milliGRAM(s) Oral every 6 hours PRN Cough      Vital Signs Last 24 Hrs  T(C): 36.3 (31 Mar 2020 07:37), Max: 38 (30 Mar 2020 18:10)  T(F): 97.3 (31 Mar 2020 07:37), Max: 100.4 (30 Mar 2020 18:10)  HR: 70 (31 Mar 2020 07:37) (70 - 144)  BP: 120/69 (31 Mar 2020 07:37) (100/63 - 149/80)  BP(mean): --  RR: 16 (31 Mar 2020 07:37) (14 - 18)  SpO2: 97% (31 Mar 2020 07:37) (93% - 97%)    PHYSICAL EXAM  General: well developed  well nourished, in no acute distress  Head: atraumatic, normocephalic  ENT: sclera anicteric, buccal mucosa moist  Neck: supple  CV: S1 S2, regular rate and rhythm  Lungs: clear to auscultation, no wheezes/rhonchi  Abdomen: soft, nontender, bowel sounds present, no palpable masses, pouchy  Extrem: trace edema all extremities except for left arm and hand 1+, no sig change, hematoma site stable left forearm near elbow  Skin: mild ecchymosis  Neuro: alert and oriented X3,  no focal deficits      LABS:             6.7    1.24  )-----------( 42       ( 03-31 @ 06:43 )             19.8                6.3    2.34  )-----------( 7        ( 03-30 @ 08:06 )             19.6                6.1    2.47  )-----------( 1        ( 03-29 @ 10:22 )             18.5       03-31    137  |  111<H>  |  28<H>  ----------------------------<  168<H>  4.6   |  18<L>  |  0.85    Ca    7.7<L>      31 Mar 2020 06:43    TPro  5.3<L>  /  Alb  2.7<L>  /  TBili  2.6<H>  /  DBili  x   /  AST  35  /  ALT  18  /  AlkPhos  154<H>  03-31        RADIOLOGY & ADDITIONAL STUDIES:    IMPRESSION/RECOMMENDATIONS:

## 2020-03-31 NOTE — PROGRESS NOTE ADULT - ASSESSMENT
76 y/o man w Hepatitis B on Entecavirt, Chronic Lymphocytic Leukemia/Small Lymphocytic Lymphoma 4/2018 when presented w immune hemolytic anemia w partial response to steroids, started on Ibrutinib w heme CR and CT by CT scan w some decrease of lymphadenopathies, until 1/2020 when relapsed/progressed w incr WBC, anemia(initially not hemolytic, has chronic Matthew positive due to CLL/SLL), thrombocytopenia.    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    Was discharged home and admitted again 2/22/20 with  fever and found w pneumonia, post course of Ceftriaxone, continues to require transfusions plts and PRBCs.  Hep B titer negative  Post IVIG in late February 2020  Had trial of Venetoclax started 3/1/20 and received 4 days of treatment which was held on 3/5/20 due to pancytopenia. Restarted 3/24/20 on 10mg qD, lowest available dose as attempt to see if any benefit.    -pt progressed on first line Ibrutinib, dev new poor prognosis cytogenetics, interim complication of flu aand pneumonia causing delay start second line traetment. Unable to give appropriate dosing of Venetoclax, severe pancytopenia, PRBC and platelets transfusion dependent even before start of Venotoclax  -Venetoclax stopped 3/29 due to worsened transfusion needs and no apparent response  -now post one dose Rituxn as last effort  -note plts much increased after tx on one unit, whereas no response previous days, ?due to timing of plts tx vs response to Rituxan, will monitor. No tx plts needed  -post another unit PRBC yesterday as no sig response to previous days and Hgb still 6 range. note still no sig change, pt hemodynamically stable, will hold further PRBBC to see if holds by tomorrow  -I have discussed stop transfusion and Hospice care w pt and wife, pt still wants treatment at this time, wants to see if by end of week anything changes before making final decision.  -continue daily CBC/CMP, check uric acid to see if tumor lysis now post Rituxan(already on Allopurinol)  -continue prednisone at the now tapered down dose of 5mg, plan to further taper to off

## 2020-03-31 NOTE — PROGRESS NOTE ADULT - SUBJECTIVE AND OBJECTIVE BOX
[facility] -- Cori Dawkins, Travis Villagomez, Ryland Wright Savella  Office # [phone]      Follow Up:    Cardiac arythmia  Subjective/Observations:   No events overnight resting comfortably in bed.  No complaints of chest pain, dyspnea, or palpitations reported. No signs of orthopnea or PND. Better this morning    REVIEW OF SYSTEMS: All other review of systems is negative unless indicated above    PAST MEDICAL & SURGICAL HISTORY:  KIRTI (obstructive sleep apnea): on nocturnal cpap  Hypertension: not on any medications  Diabetes: not on any medications  Hepatitis B  Lymphoma: Small cell B cell  AIHA (autoimmune hemolytic anemia)  H/O lithotripsy      MEDICATIONS  (STANDING):  entecavir 0.5 milliGRAM(s) Oral daily  pantoprazole    Tablet 40 milliGRAM(s) Oral before breakfast  potassium chloride    Tablet ER 20 milliEquivalent(s) Oral two times a day  predniSONE   Tablet 5 milliGRAM(s) Oral daily  riTUXimab Infusion (eMAR) 50 mL/Hr (50 mL/Hr) IV Continuous <Continuous>    MEDICATIONS  (PRN):  acetaminophen   Tablet .. 650 milliGRAM(s) Oral every 6 hours PRN Mild Pain (1 - 3)  acetaminophen   Tablet .. 650 milliGRAM(s) Oral every 6 hours PRN Temp greater or equal to 38C (100.4F)  albuterol/ipratropium for Nebulization 3 milliLiter(s) Nebulizer every 6 hours PRN Shortness of Breath and/or Wheezing  diphenhydrAMINE 25 milliGRAM(s) Oral every 4 hours PRN Rash and/or Itching  guaiFENesin   Syrup  (Sugar-Free) 200 milliGRAM(s) Oral every 6 hours PRN Cough      Allergies    sulfa drugs (Unknown)    Intolerances        Vital Signs Last 24 Hrs  T(C): 36.3 (31 Mar 2020 07:37), Max: 38 (30 Mar 2020 18:10)  T(F): 97.3 (31 Mar 2020 07:37), Max: 100.4 (30 Mar 2020 18:10)  HR: 70 (31 Mar 2020 07:37) (70 - 144)  BP: 120/69 (31 Mar 2020 07:37) (100/63 - 149/80)  BP(mean): --  RR: 16 (31 Mar 2020 07:37) (14 - 18)  SpO2: 97% (31 Mar 2020 07:37) (93% - 97%)    I&O's Summary    30 Mar 2020 07:01  -  31 Mar 2020 07:00  --------------------------------------------------------  IN: 855 mL / OUT: 0 mL / NET: 855 mL          PHYSICAL EXAM:  TELE: Off tele   Constitutional: NAD, awake and alert, well-developed  HEENT: Moist Mucous Membranes, icteric  Pulmonary: Non-labored, breath sounds are clear bilaterally, No wheezing, crackles or rhonchi  Cardiovascular: Regular, S1 and S2 nl, No murmurs, rubs, gallops or clicks  Gastrointestinal: Bowel Sounds present, soft, nontender.   Lymph: No lymphadenopathy. + generalized peripheral edema.  Skin: No visible rashes or ulcers.  Psych:  Mood & affect appropriate    LABS: All Labs Reviewed:                        6.7    1.24  )-----------( 42       ( 31 Mar 2020 06:43 )             19.8                         6.3    2.34  )-----------( 7        ( 30 Mar 2020 08:06 )             19.6                         6.1    2.47  )-----------( 1        ( 29 Mar 2020 10:22 )             18.5     31 Mar 2020 06:43    137    |  111    |  28     ----------------------------<  168    4.6     |  18     |  0.85   30 Mar 2020 08:06    139    |  111    |  19     ----------------------------<  118    4.1     |  21     |  0.84   29 Mar 2020 10:22    137    |  112    |  24     ----------------------------<  144    4.1     |  19     |  0.85     Ca    7.7        31 Mar 2020 06:43  Ca    8.0        30 Mar 2020 08:06  Ca    7.9        29 Mar 2020 10:22    TPro  5.3    /  Alb  2.7    /  TBili  2.6    /  DBili  x      /  AST  35     /  ALT  18     /  AlkPhos  154    31 Mar 2020 06:43  TPro  5.6    /  Alb  2.7    /  TBili  3.5    /  DBili  x      /  AST  21     /  ALT  14     /  AlkPhos  128    30 Mar 2020 08:06  TPro  5.6    /  Alb  2.7    /  TBili  3.5    /  DBili  x      /  AST  22     /  ALT  16     /  AlkPhos  140    29 Mar 2020 10:22      < from: TTE Echo Complete w/o contrast w/ Doppler (03.04.20 @ 09:54) >     EXAM:  ECHO TTE WO CON COMP W DOPP         PROCEDURE DATE:  03/04/2020        INTERPRETATION:  INDICATION: edema  Sonographer PH    Blood Pressure 129/63    Height 165 cm     Weight 82.9 kg       BSA 1.9 sq m    Dimensions:    LA 2.9       Normal Values: 2.0 - 4.0 cm    Ao 2.9        Normal Values: 2.0 - 3.8 cm  SEPTUM 0.9       Normal Values: 0.6 - 1.2 cm  PWT 0.9       Normal Values: 0.6 - 1.1 cm  LVIDd 4.1         Normal Values: 3.0 - 5.6 cm  LVIDs 2.3         Normal Values: 1.8 - 4.0 cm    OBSERVATIONS:    Mitral Valve: normal, trace physiologic MR.  Aortic Valve/Aorta: Normal trileaflet aortic valve with normal opening.  Tricuspid Valve: normal with trace TR.  Pulmonic Valve: normal  Left Atrium: normal  Right Atrium: normal  Left Ventricle: normal LV size and systolic function, estimated LVEF of 65%.  Right Ventricle: Grossly normal size and systolic function.  Pericardium/Pleura: normal, no significant pericardial effusion.    Conclusion:   Normal left ventricular internal dimensions and systolic function, estimated LVEF of 65%.   Grossly normal RV size and systolic function.     Normal trileaflet aortic valve, without AI.   Trace physiologic MR and TR.      KALPANA GUERRERO   This document has been electronically signed. Mar  5 2020  7:32AM      < end of copied text >    < from: CT Chest No Cont (03.19.20 @ 09:40) >    EXAM:  CT CHEST                          PROCEDURE DATE:  03/19/2020      INTERPRETATION:  CLINICAL INFORMATION: CLL, fever    COMPARISON: 2/21/2020    PROCEDURE:   CT of the Chest, Abdomen and Pelvis was performed without intravenous contrast.   Intravenous contrast: None.  Oral contrast: None.  Sagittal and coronal reformats were performed.    FINDINGS:  Lack of IV contrast limits evaluation diana, vascular structures and solid organ parenchyma  CHEST:     LUNGS AND LARGE AIRWAYS: Patentcentral airways. Scattered bilateral patchy, nodular and groundglass opacities slightly improved at the lung bases compared to prior. Findings could all be inflammatory/infectious. Neoplastic component not excluded.  PLEURA: Interval increase in small left and small-to-moderate right pleural effusion.  VESSELS: Nonaneurysmal  HEART: Heart size is normal. Coronary artery calcination. Decrease cardiac chamber blood pool attenuation suggests an anemic state. Trace pericardial effusion.  MEDIASTINUM AND DIANA: Multistation mediastinal  supraclavicular, bilateral hilar and bilateral axillary adenopathy similar to prior  CHEST WALL AND LOWER NECK: Within normal limits.    ABDOMEN AND PELVIS:    LIVER: Hepatomegaly similar to prior  BILE DUCTS: Normal caliber.  GALLBLADDER: Cholelithiasis.  SPLEEN: Splenomegaly similar to prior  PANCREAS: Within normal limits.  ADRENALS: Within normal limits.  KIDNEYS/URETERS: Within normal limits.    BLADDER: Within normal limits.  REPRODUCTIVE ORGANS: Unremarkable    BOWEL: Evaluation limited due to limited by nonopacification underdistention. No gross active bowel inflammation. No bowel obstruction.. Appendix no appendicitis  PERITONEUM: Trace ascites. No extraluminal gas or organized collections.  VESSELS: Nonaneurysmal.  RETROPERITONEUM/LYMPH NODES: Stable periportal and retroperitoneal, mesenteric pelvic and bilateral inguinal adenopathy.    ABDOMINAL WALL: Mild anasarca.  BONES: Stable    IMPRESSION:     Improvement in the scattered bilateral parenchymal opacities as described.  Slight increase in bilateral pleural effusions.  Stable multistation adenopathy chest abdomen and pelvis as described  No acute findings abdomen and pelvis  Additional findings as discussed    PANTERA CALVILLO M.D., ATTENDING RADIOLOGIST  This document has been electronically signed. Mar 19 2020 10:26AM      < end of copied text >    < from: US Duplex Venous Upper Ext Ltd, Left (03.25.20 @ 02:05) >    EXAM:  US DPLX UPR EXT VEINS LTD LT                          PROCEDURE DATE:  03/25/2020      INTERPRETATION:  Exam: US Duplex Left Upper Extremity Veins, Limited   Exam date and time: 3/25/2020 1:32 AM   Age: 75 years old   Clinical indication: Pain; Arm, lower; Left     TECHNIQUE:   Imaging protocol: Real-time Duplex ultrasound of the Left Upper Extremity with 2-D gray scale, color Doppler flow and spectral waveform analysis with image documentation. Limited exam focused on the left upper extremity veins.     COMPARISON:   No relevant prior studies available.     FINDINGS:   Left deep veins:  Axillary , subclavian, radial, ulnar, brachial veins are patent throughout without thrombus. Normal Doppler waveforms. Normal compressibility and/or augmentation response. Visualized internal jugular and subclavian veins are patent.     Left superficial veins: Unremarkable. Visualized cephalic and basilic veins are patent without thrombus.      Soft tissues: Small hematoma formation measuring 0.5 x 0.1 x 0.3 cm in the left anterior forearm.     Lymph nodes: Multiple nonspecific left neck lymph nodes measuring up to 1.1 cm in short axis measurement.    IMPRESSION:     No evidence of DVT.  .  Small hematoma in the anterior left forearm.    A preliminary report was given by the overnight radiologist    ELIO JOHNSON M.D., ATTENDING RADIOLOGIST  This document has been electronically signed. Mar 25 2020  8:07AM

## 2020-03-31 NOTE — PROGRESS NOTE ADULT - SUBJECTIVE AND OBJECTIVE BOX
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 Has been coughing as he was recently treated for influenza A on last admission. States that when he was discharged from [facility] on 2/15, he was feeling well and had no fevers. Patient developed a fever, T100.9F (oral) today for which he called heme/onc, Dr. Stephenson. Per Dr. Stuart, patient advised to go to ER. He was found to have a fever, T100.6F (rectal) in ER. Denies recent travel or sick contacts. Denies headache, chest pain, sob, palpitations, abdominal pain, diarrhea, melena, hematochezia, dysuria or hematuria.     Of note, patient was recently admitted to [facility] from 2/14-2/15/2020 for chemotherapy with Venetoclax, found to be febrile with similar associated complaints of generalized malaise, cough, and weakness. Patient was found to be Flu A positive during that admission and was treated with tamiflu. Patient was found to be thrombocytopenic to 35k with a Hb of 6.6, transfused 2U PRBCs and 1U platelets. Chemotherapy was held off due to acute illness.      In ED, VS Tmax 100.6 rectal (repeat s/p tylenol 99.5),  -> 99, BP stable, saturating well on RA  CBC significant for .14 (on d/c 56.25), Hb 6.8 (on d/c 8.7), Plt 11 (on d/c 21)  CMP significant for K 3.4, Cr 1.5 (appears to be baseline per chart review), bili 3.3  Type and screen performed. Will be getting 1 unit pRBCs  Flu/RSV negative  CT Chest: Multilobar patchy peribronchovascular airspace opacities and more dense consolidative process in the right middle lobe likely reflecting multifocal pneumonia. Small right and trace left pleural effusion. Mediastinal and hilar lymphadenopathy worsened compared with prior exam from 8/30/2019. Numerous mildly prominent bilateral axillary lymph nodes. Retroperitoneal lymphadenopathy. Mild splenomegaly. Cholelithiasis.  CXR (wet read): noted to have increased opacities in right lower and middle lobes as well as left lower lobe in comparison to CXR on 2/14/2020  EKG: sinus tachycardic    S/p cefepime 1g IV x1, NS bolus 1L x1, duonebs x2, tylenol 650mg PO x1, 1U PRBCs ordered and to be transfused    Dr. Stuart was called and he recommended to only transfused PRBC and not platelets.  He will see pt in the morning. (22 Feb 2020 00:14)      SUBJECTIVE & OBJECTIVE: Pt seen and examined at bedside. [email or web address]     PHYSICAL EXAM:  T(C): 36.3 (03-31-20 @ 07:37), Max: 38 (03-30-20 @ 18:10)  HR: 70 (03-31-20 @ 07:37) (70 - 144)  BP: 120/69 (03-31-20 @ 07:37) (100/63 - 149/80)  RR: 16 (03-31-20 @ 07:37) (14 - 18)  SpO2: 97% (03-31-20 @ 07:37) (93% - 97%)  Wt(kg): --   GENERAL: NAD,  HEAD:  Atraumatic, Normocephalic  NERVOUS SYSTEM:  Alert & Oriented X3,   CHEST/LUNG: decrease air entr sim the bases   HEART: Regular rate and rhythm; No murmurs, rubs, or gallops  ABDOMEN: Soft, Nontender, Nondistended; Bowel sounds present  EXTREMITIES:  2+ Peripheral Pulses, No clubbing, cyanosis, or edema        MEDICATIONS  (STANDING):  entecavir 0.5 milliGRAM(s) Oral daily  pantoprazole    Tablet 40 milliGRAM(s) Oral before breakfast  potassium chloride    Tablet ER 20 milliEquivalent(s) Oral two times a day  predniSONE   Tablet 5 milliGRAM(s) Oral daily  riTUXimab Infusion (eMAR) 50 mL/Hr (50 mL/Hr) IV Continuous <Continuous>    MEDICATIONS  (PRN):  acetaminophen   Tablet .. 650 milliGRAM(s) Oral every 6 hours PRN Mild Pain (1 - 3)  acetaminophen   Tablet .. 650 milliGRAM(s) Oral every 6 hours PRN Temp greater or equal to 38C (100.4F)  albuterol/ipratropium for Nebulization 3 milliLiter(s) Nebulizer every 6 hours PRN Shortness of Breath and/or Wheezing  diphenhydrAMINE 25 milliGRAM(s) Oral every 4 hours PRN Rash and/or Itching  guaiFENesin   Syrup  (Sugar-Free) 200 milliGRAM(s) Oral every 6 hours PRN Cough      LABS:                        6.7    1.24  )-----------( 42       ( 31 Mar 2020 06:43 )             19.8     03-31    137  |  111<H>  |  28<H>  ----------------------------<  168<H>  4.6   |  18<L>  |  0.85    Ca    7.7<L>      31 Mar 2020 06:43    TPro  5.3<L>  /  Alb  2.7<L>  /  TBili  2.6<H>  /  DBili  x   /  AST  35  /  ALT  18  /  AlkPhos  154<H>  03-31          CAPILLARY BLOOD GLUCOSE          CAPILLARY BLOOD GLUCOSE        CAPILLARY BLOOD GLUCOSE                RECENT CULTURES:      RADIOLOGY & ADDITIONAL TESTS:                        DVT/GI ppx  Discussed with pt @ bedside

## 2020-03-31 NOTE — PROVIDER CONTACT NOTE (OTHER) - ASSESSMENT
left arm swelling with tender to touch. denies tingling and mild numbness at certain areas throughout affected limb

## 2020-04-01 LAB
ALBUMIN SERPL ELPH-MCNC: 2.6 G/DL — LOW (ref 3.3–5)
ALP SERPL-CCNC: 135 U/L — HIGH (ref 40–120)
ALT FLD-CCNC: 15 U/L — SIGNIFICANT CHANGE UP (ref 12–78)
ANION GAP SERPL CALC-SCNC: 9 MMOL/L — SIGNIFICANT CHANGE UP (ref 5–17)
AST SERPL-CCNC: 17 U/L — SIGNIFICANT CHANGE UP (ref 15–37)
BASOPHILS # BLD AUTO: 0 K/UL — SIGNIFICANT CHANGE UP (ref 0–0.2)
BASOPHILS NFR BLD AUTO: 0 % — SIGNIFICANT CHANGE UP (ref 0–2)
BILIRUB SERPL-MCNC: 2.5 MG/DL — HIGH (ref 0.2–1.2)
BUN SERPL-MCNC: 30 MG/DL — HIGH (ref 7–23)
CALCIUM SERPL-MCNC: 8.2 MG/DL — LOW (ref 8.5–10.1)
CHLORIDE SERPL-SCNC: 111 MMOL/L — HIGH (ref 96–108)
CO2 SERPL-SCNC: 16 MMOL/L — LOW (ref 22–31)
CREAT SERPL-MCNC: 0.97 MG/DL — SIGNIFICANT CHANGE UP (ref 0.5–1.3)
EOSINOPHIL # BLD AUTO: 0 K/UL — SIGNIFICANT CHANGE UP (ref 0–0.5)
EOSINOPHIL NFR BLD AUTO: 0 % — SIGNIFICANT CHANGE UP (ref 0–6)
GLUCOSE SERPL-MCNC: 133 MG/DL — HIGH (ref 70–99)
HCT VFR BLD CALC: 21.1 % — LOW (ref 39–50)
HGB BLD-MCNC: 7 G/DL — CRITICAL LOW (ref 13–17)
IMM GRANULOCYTES NFR BLD AUTO: 3.3 % — HIGH (ref 0–1.5)
LYMPHOCYTES # BLD AUTO: 0.23 K/UL — LOW (ref 1–3.3)
LYMPHOCYTES # BLD AUTO: 38.3 % — SIGNIFICANT CHANGE UP (ref 13–44)
MCHC RBC-ENTMCNC: 28.8 PG — SIGNIFICANT CHANGE UP (ref 27–34)
MCHC RBC-ENTMCNC: 33.2 GM/DL — SIGNIFICANT CHANGE UP (ref 32–36)
MCV RBC AUTO: 86.8 FL — SIGNIFICANT CHANGE UP (ref 80–100)
MONOCYTES # BLD AUTO: 0.08 K/UL — SIGNIFICANT CHANGE UP (ref 0–0.9)
MONOCYTES NFR BLD AUTO: 13.3 % — SIGNIFICANT CHANGE UP (ref 2–14)
NEUTROPHILS # BLD AUTO: 0.27 K/UL — LOW (ref 1.8–7.4)
NEUTROPHILS NFR BLD AUTO: 45.1 % — SIGNIFICANT CHANGE UP (ref 43–77)
NRBC # BLD: 0 /100 WBCS — SIGNIFICANT CHANGE UP (ref 0–0)
PLATELET # BLD AUTO: 4 K/UL — CRITICAL LOW (ref 150–400)
POTASSIUM SERPL-MCNC: 3.8 MMOL/L — SIGNIFICANT CHANGE UP (ref 3.5–5.3)
POTASSIUM SERPL-SCNC: 3.8 MMOL/L — SIGNIFICANT CHANGE UP (ref 3.5–5.3)
PROT SERPL-MCNC: 5.2 G/DL — LOW (ref 6–8.3)
RBC # BLD: 2.43 M/UL — LOW (ref 4.2–5.8)
RBC # FLD: 16.4 % — HIGH (ref 10.3–14.5)
SODIUM SERPL-SCNC: 136 MMOL/L — SIGNIFICANT CHANGE UP (ref 135–145)
URATE SERPL-MCNC: 3.1 MG/DL — LOW (ref 3.4–8.8)
WBC # BLD: 0.6 K/UL — CRITICAL LOW (ref 3.8–10.5)
WBC # FLD AUTO: 0.6 K/UL — CRITICAL LOW (ref 3.8–10.5)

## 2020-04-01 PROCEDURE — 99232 SBSQ HOSP IP/OBS MODERATE 35: CPT

## 2020-04-01 PROCEDURE — 99233 SBSQ HOSP IP/OBS HIGH 50: CPT

## 2020-04-01 RX ADMIN — PANTOPRAZOLE SODIUM 40 MILLIGRAM(S): 20 TABLET, DELAYED RELEASE ORAL at 05:36

## 2020-04-01 RX ADMIN — Medication 20 MILLIEQUIVALENT(S): at 05:36

## 2020-04-01 RX ADMIN — Medication 5 MILLIGRAM(S): at 05:36

## 2020-04-01 RX ADMIN — Medication 20 MILLIEQUIVALENT(S): at 18:35

## 2020-04-01 RX ADMIN — ENTECAVIR 0.5 MILLIGRAM(S): 0.5 TABLET ORAL at 05:36

## 2020-04-01 NOTE — PROGRESS NOTE ADULT - PROBLEM SELECTOR PLAN 3
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 Was on prednisone 10mg daily -- increase to prednisone 40mg po during hospitalization, tapering started @2/29 - now 5mg daily  - Heme/onc following  - Hgb of 7 today, no transfusion  - will continue to monitor

## 2020-04-01 NOTE — PROGRESS NOTE ADULT - SUBJECTIVE AND OBJECTIVE BOX
All interim records and events noted.    appear comfortable, awake alert  no SOB or pain, left arm and hand still edematous      MEDICATIONS  (STANDING):  entecavir 0.5 milliGRAM(s) Oral daily  pantoprazole    Tablet 40 milliGRAM(s) Oral before breakfast  potassium chloride    Tablet ER 20 milliEquivalent(s) Oral two times a day  predniSONE   Tablet 5 milliGRAM(s) Oral daily  riTUXimab Infusion (eMAR) 50 mL/Hr (50 mL/Hr) IV Continuous <Continuous>    MEDICATIONS  (PRN):  acetaminophen   Tablet .. 650 milliGRAM(s) Oral every 6 hours PRN Mild Pain (1 - 3)  acetaminophen   Tablet .. 650 milliGRAM(s) Oral every 6 hours PRN Temp greater or equal to 38C (100.4F)  albuterol/ipratropium for Nebulization 3 milliLiter(s) Nebulizer every 6 hours PRN Shortness of Breath and/or Wheezing  diphenhydrAMINE 25 milliGRAM(s) Oral every 4 hours PRN Rash and/or Itching  guaiFENesin   Syrup  (Sugar-Free) 200 milliGRAM(s) Oral every 6 hours PRN Cough      Vital Signs Last 24 Hrs  T(C): 37.3 (01 Apr 2020 09:03), Max: 37.3 (01 Apr 2020 09:03)  T(F): 99.2 (01 Apr 2020 09:03), Max: 99.2 (01 Apr 2020 09:03)  HR: 104 (01 Apr 2020 09:03) (86 - 104)  BP: 112/68 (01 Apr 2020 09:03) (112/68 - 122/69)  BP(mean): --  RR: 16 (01 Apr 2020 09:03) (16 - 16)  SpO2: 95% (01 Apr 2020 09:03) (95% - 95%)    PHYSICAL EXAM  General: well developed  well nourished, in no acute distress  Head: atraumatic, normocephalic  ENT: sclera anicteric, buccal mucosa moist  Neck: supple, trachea midline, no palpable masses  CV: S1 S2, regular rate and rhythm  Lungs: clear to auscultation, no wheezes/rhonchi  Abdomen: soft, nontender, bowel sounds present, no palpable masses pouchy  Extrem: trace edema right forearm, trace-1+ edema left arm and hand  Skin: ecchymoses on invasive sites blair arms right >left  Neuro: alert and oriented X3,  no focal deficits      LABS:             7.0    0.60  )-----------( 4        ( 04-01 @ 10:21 )             21.1                6.7    1.24  )-----------( 42       ( 03-31 @ 06:43 )             19.8                6.3    2.34  )-----------( 7        ( 03-30 @ 08:06 )             19.6       04-01    136  |  111<H>  |  30<H>  ----------------------------<  133<H>  3.8   |  16<L>  |  0.97    Ca    8.2<L>      01 Apr 2020 10:21    TPro  5.2<L>  /  Alb  2.6<L>  /  TBili  2.5<H>  /  DBili  x   /  AST  17  /  ALT  15  /  AlkPhos  135<H>  04-01        RADIOLOGY & ADDITIONAL STUDIES:    IMPRESSION/RECOMMENDATIONS:

## 2020-04-01 NOTE — PROGRESS NOTE ADULT - PROBLEM SELECTOR PLAN 8
Pt states that lower ext edema occurs while on prednisone  - Checked venous doppler - negative  - ECHO 2/2018 showed Preserved left ventricular systolic function. Stage I   diastolic dysfunction. EF 65%   - repeat ECHO estimated EF 60-65%  - albumin level has been low might contribute to edema  - edema worsened after IVF with chemo  - Strict I&O's  - Repeat ECHO shows normal systolic function with LVEF 65%

## 2020-04-01 NOTE — PROGRESS NOTE ADULT - PROBLEM SELECTOR PLAN 1
- active Small Cell B-Cell Lymphoma/leukemia, not in remission  - repeat flow cytometry confirms diagnosis, FISH study suggests poor prognosis, detailed in Hem/Onc notes  - Worsening anemia and thrombocytopenia since previous discharge  - trial of Venetoclax started 3/1/20 but required dose reduction and holds, finally stopped 3/29/20 due to further marrow suppression, now also s/p Rituxan  - prednisone being tapered - 5mg now  - Dr. Stephenson continues to address GOC with patient and family. Likely to make final decision regarding hospice in next 1-2 days.

## 2020-04-01 NOTE — PROGRESS NOTE ADULT - PROBLEM SELECTOR PLAN 2
- Per chart review, platelet count steadily declining since 2018  - poor response to platelets transfusion, suspect ITP component  - s/p one dose of IVIG  - Hem/Onc following, will follow the recommendation   -To receive 1 unit of platelets today  -Heme/Onc- continue supportive care.    -continue to monitor.

## 2020-04-01 NOTE — PROGRESS NOTE ADULT - SUBJECTIVE AND OBJECTIVE BOX
[facility] -- Cori Dawkins Grossman, [place], Ryland Wright Savella, Goodger: Office # [phone]    Follow Up:  Cardiac arythmia    Subjective/Observations: Patient awake and alert. Resting comfortably in bed. No complaints of chest pain, SOB, LE edema, cough. No signs of orthopnea or PND.    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 650 milliGRAM(s) Oral every 6 hours PRN Mild Pain (1 - 3)  acetaminophen   Tablet .. 650 milliGRAM(s) Oral every 6 hours PRN Temp greater or equal to 38C (100.4F)  albuterol/ipratropium for Nebulization 3 milliLiter(s) Nebulizer every 6 hours PRN Shortness of Breath and/or Wheezing  diphenhydrAMINE 25 milliGRAM(s) Oral every 4 hours PRN Rash and/or Itching  guaiFENesin   Syrup  (Sugar-Free) 200 milliGRAM(s) Oral every 6 hours PRN Cough    Allergies  sulfa drugs (Unknown)    Vital Signs Last 24 Hrs  T(C): 37.3 (01 Apr 2020 09:03), Max: 37.3 (01 Apr 2020 09:03)  T(F): 99.2 (01 Apr 2020 09:03), Max: 99.2 (01 Apr 2020 09:03)  HR: 104 (01 Apr 2020 09:03) (86 - 104)  BP: 112/68 (01 Apr 2020 09:03) (112/68 - 122/69)  BP(mean): --  RR: 16 (01 Apr 2020 09:03) (16 - 16)  SpO2: 95% (01 Apr 2020 09:03) (95% - 95%)    I&O's Summary      TELE: Not on telemetry   PHYSICAL EXAM:  Constitutional: NAD, awake and alert, well-developed  HEENT: Moist Mucous Membranes, Anicteric  Pulmonary: Non-labored, breath sounds are clear bilaterally, No wheezing, No rales, No rhonchi  Cardiovascular: Regular, S1 and S2, No murmurs, No rubs, No gallops, No  clicks  Gastrointestinal: Bowel Sounds present, soft, nontender.   Lymph: No edema. No lymphadenopathy.  Musculoskeletal: No cyanosis, No joint swelling/tenderness  Skin: Lt arm ecchymosis and swelling. No visible rashes or ulcers.  Psych:  Mood & affect appropriate    LABS: All Labs Reviewed:                        6.7    1.24  )-----------( 42       ( 31 Mar 2020 06:43 )             19.8                         6.3    2.34  )-----------( 7        ( 30 Mar 2020 08:06 )             19.6                         6.1    2.47  )-----------( 1        ( 29 Mar 2020 10:22 )             18.5     31 Mar 2020 06:43    137    |  111    |  28     ----------------------------<  168    4.6     |  18     |  0.85   30 Mar 2020 08:06    139    |  111    |  19     ----------------------------<  118    4.1     |  21     |  0.84   29 Mar 2020 10:22    137    |  112    |  24     ----------------------------<  144    4.1     |  19     |  0.85     Ca    7.7        31 Mar 2020 06:43  Ca    8.0        30 Mar 2020 08:06  Ca    7.9        29 Mar 2020 10:22    TPro  5.3    /  Alb  2.7    /  TBili  2.6    /  DBili  x      /  AST  35     /  ALT  18     /  AlkPhos  154    31 Mar 2020 06:43  TPro  5.6    /  Alb  2.7    /  TBili  3.5    /  DBili  x      /  AST  21     /  ALT  14     /  AlkPhos  128    30 Mar 2020 08:06  TPro  5.6    /  Alb  2.7    /  TBili  3.5    /  DBili  x      /  AST  22     /  ALT  16     /  AlkPhos  140    29 Mar 2020 10:22    < from: 12 Lead ECG (03.18.20 @ 19:13) >  Ventriular Rate 167 BPM  Atrial Rate 178 BPM  QRS Duration 64 ms  Q-T Interval 296 ms  QTC Calculation(Bezet) 493 ms  R Axis 32 degrees  T Axis 61 degrees    Diagnosis Line *** Poor data quality, interpretation may be adversely affected  likely st with apcs  Nonspecific ST and T wave abnormality  Abnormal ECG  When compared with ECG of 16-MAR-2020 17:10,  Previous ECG has undetermined rhythm, needs review  Nonspecific T wave abnormality now evident in Anterior leads  Confirmed by CANDI SCHREIBER ([id number]) on 3/19/2020 5:29:35 PM    < end of copied text >      ECHOCARDIOGRAM  < from: TTE Echo Complete w/o contrast w/ Doppler (03.04.20 @ 09:54) >  Dimensions:    LA 2.9       Normal Values: 2.0 - 4.0 cm    Ao 2.9        Normal Values: 2.0 - 3.8 cm  SEPTUM 0.9       Normal Values: 0.6 - 1.2 cm  PWT 0.9       Normal Values: 0.6 - 1.1 cm  LVIDd 4.1         Normal Values: 3.0 - 5.6 cm  LVIDs 2.3         Normal Values: 1.8 - 4.0 cm      OBSERVATIONS:    Mitral Valve: normal, trace physiologic MR.  Aortic Valve/Aorta: Normal trileaflet aortic valve with normal opening.  Tricuspid Valve: normal with trace TR.  Pulmonic Valve: normal  Left Atrium: normal  Right Atrium: normal  Left Ventricle: normal LV size and systolic function, estimated LVEF of 65%.  Right Ventricle: Grossly normal size and systolic function.  Pericardium/Pleura: normal, no significant pericardial effusion.      Conclusion:   Normal left ventricular internal dimensions and systolic function, estimated LVEF of 65%.   Grossly normal RV size and systolic function.     Normal trileaflet aortic valve, without AI.   Trace physiologic MR and TR.        < from: CT Chest No Cont (03.19.20 @ 09:40) >  FINDINGS:  Lack of IV contrast limits evaluation diana, vascular structures and solid organ parenchyma  CHEST:     LUNGS AND LARGE AIRWAYS: Patentcentral airways. Scattered bilateral patchy, nodular and groundglass opacities slightly improved at the lung bases compared to prior. Findings could all be inflammatory/infectious. Neoplastic component not excluded.  PLEURA: Interval increase in small left and small-to-moderate right pleural effusion.  VESSELS: Nonaneurysmal  HEART: Heart size is normal. Coronary artery calcination. Decrease cardiac chamber blood pool attenuation suggests an anemic state. Trace pericardial effusion.  MEDIASTINUM AND DIANA: Multistation mediastinal  supraclavicular, bilateral hilar and bilateral axillary adenopathy similar to prior  CHEST WALL AND LOWER NECK: Within normal limits.    ABDOMEN AND PELVIS:    LIVER: Hepatomegaly similar to prior  BILE DUCTS: Normal caliber.  GALLBLADDER: Cholelithiasis.  SPLEEN: Splenomegaly similar to prior  PANCREAS: Within normal limits.  ADRENALS: Within normal limits.  KIDNEYS/URETERS: Within normal limits.    BLADDER: Within normal limits.  REPRODUCTIVE ORGANS: Unremarkable    BOWEL: Evaluation limited due to limited by nonopacification underdistention. No gross active bowel inflammation. No bowel obstruction.. Appendix no appendicitis  PERITONEUM: Trace ascites. No extraluminal gas or organized collections.  VESSELS: Nonaneurysmal.  RETROPERITONEUM/LYMPH NODES: Stable periportal and retroperitoneal, mesenteric pelvic and bilateral inguinal adenopathy.    ABDOMINAL WALL: Mild anasarca.  BONES: Stable    IMPRESSION:     Improvement in the scattered bilateral parenchymal opacities as described.  Slight increase in bilateral pleural effusions.  Stable multistation adenopathy chest abdomen and pelvis as described  No acute findings abdomen and pelvis    < from: US Duplex Venous Upper Ext Ltd, Left (03.25.20 @ 02:05) >    IMPRESSION:     No evidence of DVT.  .  Small hematoma in the anterior left forearm.    < end of copied text > [facility] -- Cori Dawkins Grossman, [place], Ryland Wright Savella, Goodger: Office # [phone]    Follow Up:  Cardiac arythmia    Subjective/Observations: Patient awake and alert. Resting comfortably in bed. No complaints of chest pain, SOB, LE edema, cough. No signs of orthopnea or PND.    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 650 milliGRAM(s) Oral every 6 hours PRN Mild Pain (1 - 3)  acetaminophen   Tablet .. 650 milliGRAM(s) Oral every 6 hours PRN Temp greater or equal to 38C (100.4F)  albuterol/ipratropium for Nebulization 3 milliLiter(s) Nebulizer every 6 hours PRN Shortness of Breath and/or Wheezing  diphenhydrAMINE 25 milliGRAM(s) Oral every 4 hours PRN Rash and/or Itching  guaiFENesin   Syrup  (Sugar-Free) 200 milliGRAM(s) Oral every 6 hours PRN Cough    Allergies  sulfa drugs (Unknown)    Vital Signs Last 24 Hrs  T(C): 37.3 (01 Apr 2020 09:03), Max: 37.3 (01 Apr 2020 09:03)  T(F): 99.2 (01 Apr 2020 09:03), Max: 99.2 (01 Apr 2020 09:03)  HR: 104 (01 Apr 2020 09:03) (86 - 104)  BP: 112/68 (01 Apr 2020 09:03) (112/68 - 122/69)  BP(mean): --  RR: 16 (01 Apr 2020 09:03) (16 - 16)  SpO2: 95% (01 Apr 2020 09:03) (95% - 95%)    I&O's Summary      TELE: Not on telemetry   PHYSICAL EXAM:  Constitutional: NAD, awake and alert, well-developed  HEENT: Moist Mucous Membranes, Anicteric  Pulmonary: Non-labored, breath sounds are clear bilaterally, No wheezing, No rales, No rhonchi  Cardiovascular: Regular, S1 and S2, No murmurs, No rubs, No gallops, No  clicks  Gastrointestinal: Bowel Sounds present, soft, nontender.   Lymph: + lower ext edema. No lymphadenopathy.  Musculoskeletal: No cyanosis, No joint swelling/tenderness  Skin: Lt arm ecchymosis and swelling. No visible rashes or ulcers.  Psych:  Mood & affect appropriate    LABS: All Labs Reviewed:                        6.7    1.24  )-----------( 42       ( 31 Mar 2020 06:43 )             19.8                         6.3    2.34  )-----------( 7        ( 30 Mar 2020 08:06 )             19.6                         6.1    2.47  )-----------( 1        ( 29 Mar 2020 10:22 )             18.5     31 Mar 2020 06:43    137    |  111    |  28     ----------------------------<  168    4.6     |  18     |  0.85   30 Mar 2020 08:06    139    |  111    |  19     ----------------------------<  118    4.1     |  21     |  0.84   29 Mar 2020 10:22    137    |  112    |  24     ----------------------------<  144    4.1     |  19     |  0.85     Ca    7.7        31 Mar 2020 06:43  Ca    8.0        30 Mar 2020 08:06  Ca    7.9        29 Mar 2020 10:22    TPro  5.3    /  Alb  2.7    /  TBili  2.6    /  DBili  x      /  AST  35     /  ALT  18     /  AlkPhos  154    31 Mar 2020 06:43  TPro  5.6    /  Alb  2.7    /  TBili  3.5    /  DBili  x      /  AST  21     /  ALT  14     /  AlkPhos  128    30 Mar 2020 08:06  TPro  5.6    /  Alb  2.7    /  TBili  3.5    /  DBili  x      /  AST  22     /  ALT  16     /  AlkPhos  140    29 Mar 2020 10:22    < from: 12 Lead ECG (03.18.20 @ 19:13) >  Ventriular Rate 167 BPM  Atrial Rate 178 BPM  QRS Duration 64 ms  Q-T Interval 296 ms  QTC Calculation(Bezet) 493 ms  R Axis 32 degrees  T Axis 61 degrees    Diagnosis Line *** Poor data quality, interpretation may be adversely affected  likely st with apcs  Nonspecific ST and T wave abnormality  Abnormal ECG  When compared with ECG of 16-MAR-2020 17:10,  Previous ECG has undetermined rhythm, needs review  Nonspecific T wave abnormality now evident in Anterior leads  Confirmed by CANDI SCHREIBER ([id number]) on 3/19/2020 5:29:35 PM    < end of copied text >      ECHOCARDIOGRAM  < from: TTE Echo Complete w/o contrast w/ Doppler (03.04.20 @ 09:54) >  Dimensions:    LA 2.9       Normal Values: 2.0 - 4.0 cm    Ao 2.9        Normal Values: 2.0 - 3.8 cm  SEPTUM 0.9       Normal Values: 0.6 - 1.2 cm  PWT 0.9       Normal Values: 0.6 - 1.1 cm  LVIDd 4.1         Normal Values: 3.0 - 5.6 cm  LVIDs 2.3         Normal Values: 1.8 - 4.0 cm      OBSERVATIONS:    Mitral Valve: normal, trace physiologic MR.  Aortic Valve/Aorta: Normal trileaflet aortic valve with normal opening.  Tricuspid Valve: normal with trace TR.  Pulmonic Valve: normal  Left Atrium: normal  Right Atrium: normal  Left Ventricle: normal LV size and systolic function, estimated LVEF of 65%.  Right Ventricle: Grossly normal size and systolic function.  Pericardium/Pleura: normal, no significant pericardial effusion.      Conclusion:   Normal left ventricular internal dimensions and systolic function, estimated LVEF of 65%.   Grossly normal RV size and systolic function.     Normal trileaflet aortic valve, without AI.   Trace physiologic MR and TR.        < from: CT Chest No Cont (03.19.20 @ 09:40) >  FINDINGS:  Lack of IV contrast limits evaluation diana, vascular structures and solid organ parenchyma  CHEST:     LUNGS AND LARGE AIRWAYS: Patentcentral airways. Scattered bilateral patchy, nodular and groundglass opacities slightly improved at the lung bases compared to prior. Findings could all be inflammatory/infectious. Neoplastic component not excluded.  PLEURA: Interval increase in small left and small-to-moderate right pleural effusion.  VESSELS: Nonaneurysmal  HEART: Heart size is normal. Coronary artery calcination. Decrease cardiac chamber blood pool attenuation suggests an anemic state. Trace pericardial effusion.  MEDIASTINUM AND DIANA: Multistation mediastinal  supraclavicular, bilateral hilar and bilateral axillary adenopathy similar to prior  CHEST WALL AND LOWER NECK: Within normal limits.    ABDOMEN AND PELVIS:    LIVER: Hepatomegaly similar to prior  BILE DUCTS: Normal caliber.  GALLBLADDER: Cholelithiasis.  SPLEEN: Splenomegaly similar to prior  PANCREAS: Within normal limits.  ADRENALS: Within normal limits.  KIDNEYS/URETERS: Within normal limits.    BLADDER: Within normal limits.  REPRODUCTIVE ORGANS: Unremarkable    BOWEL: Evaluation limited due to limited by nonopacification underdistention. No gross active bowel inflammation. No bowel obstruction.. Appendix no appendicitis  PERITONEUM: Trace ascites. No extraluminal gas or organized collections.  VESSELS: Nonaneurysmal.  RETROPERITONEUM/LYMPH NODES: Stable periportal and retroperitoneal, mesenteric pelvic and bilateral inguinal adenopathy.    ABDOMINAL WALL: Mild anasarca.  BONES: Stable    IMPRESSION:     Improvement in the scattered bilateral parenchymal opacities as described.  Slight increase in bilateral pleural effusions.  Stable multistation adenopathy chest abdomen and pelvis as described  No acute findings abdomen and pelvis    < from: US Duplex Venous Upper Ext Ltd, Left (03.25.20 @ 02:05) >    IMPRESSION:     No evidence of DVT.  .  Small hematoma in the anterior left forearm.    < end of copied text >

## 2020-04-01 NOTE — PROVIDER CONTACT NOTE (CRITICAL VALUE NOTIFICATION) - ASSESSMENT
patient is able to ambulate with minimal assist. BM brown in color no obvious bleeding noted. denies dizziness or lightheadness. no chest pain
Critical value of .94, yesterday .72 pt WBC currently trending down. Pt completed a three day course of ceftriaxone 100mg Q24hrs.
Patient asymptomatic
Patient asystematic.
Pt currently is no longer on ABX, completed 3 days of ceftriaxone
Pt denies discomfort
denies any symptoms
pt denies complaints

## 2020-04-01 NOTE — PROGRESS NOTE ADULT - ASSESSMENT
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 Tachy likely reactive but now resolved.   - Monitor and replete electrolytes. Keep K>4.0 and Mg>2.0.    Pancytopenia   - Hem/onc following   - Multiple tx of PRBC, Platelets and Plasma. Pancytopenia refractory to transfusion  - Monitor closely for bleeding  - Transfuse per Heme    LUE edema  - Doppler negative DVT but + hematoma  - Echo 2/24/2020 revealing trace AI trace TR ef 60-65%.  No need to repeat    DM  - Management as per primary     - Goals of care - Poor prognosis 2/2 transfusion dependent, refractory to treatment as per Heme notes d/w spouse possible inhouse hospice  - Monitor and replete lytes, keep K>4, Mg>2.  - All other medical needs as per primary team.  - Other cardiovascular workup will depend on clinical course.  - Will continue to follow.      Germain Hernández, MS FNP, [facility]  Nurse Practitioner- Cardiology   Spectra #[phone]

## 2020-04-01 NOTE — PROGRESS NOTE ADULT - ASSESSMENT
74 y/o man w Hepatitis B on Entecavirt, Chronic Lymphocytic Leukemia/Small Lymphocytic Lymphoma 4/2018 when presented w immune hemolytic anemia w partial response to steroids, started on Ibrutinib w heme CR and OH by CT scan w some decrease of lymphadenopathies, until 1/2020 when relapsed/progressed w incr WBC, anemia(initially not hemolytic, has chronic Matthew positive due to CLL/SLL), thrombocytopenia.    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    Was discharged home and admitted again 2/22/20 with  fever and found w pneumonia, post course of Ceftriaxone, continues to require transfusions plts and PRBCs.  Hep B titer negative  Post IVIG in late February 2020  Had trial of Venetoclax started 3/1/20 but required dose reduction and holds, finally stopped 3/29/20 due to further marrow suppression and apparent more frequent need for PRBC/platelets transfusions  As last attempt, post Rituxan 3/30  Discussed multiple times w pt and wife wrt Hospice care and hold transfusions, pt still wishes all treatments    -pancytopenia continues-Hgb stable, but plts again decr to <10k, and WBC further decrease to <1, suspect reflecting marrow compromise w possible Rituxan effect  -continue monitor daily CBC  -continue prednisone at 5mg qD now D#5 on this dose, will continue and taper to off. CMP no incr signs of hemolysis, bilirubin continue to decr on the lower dose of prednisone  -continue allopurinol since post Rituxan and may have further incr of uric acid due to cells break down. Note UA incr from <1 to 3+ but still adequate control

## 2020-04-01 NOTE — PROVIDER CONTACT NOTE (CRITICAL VALUE NOTIFICATION) - RECOMMENDATIONS
1 unit Platelet
Pt has a potassium of 3.2, order of potassium
monitor labs
platelets transfusion
transfuse

## 2020-04-01 NOTE — CHART NOTE - NSCHARTNOTESELECT_GEN_ALL_CORE
Event Note
Nutrition Services
Nutrition Services/Follow up
Nutrition Services/Follow up
PGY 1/Rapid Response
Rapid Response/Event Note
Rapid Response/Follow up
Rapid Response/follow up
gradual onset

## 2020-04-01 NOTE — PROGRESS NOTE ADULT - ASSESSMENT
74yo M with PMH of refractory CLL, AIHA, chronic hepatitis B (on entecavir), KIRTI on CPAP, T2DM (managed with lifestyle changes), HTN (not on any medication) who presents with chief complaint of fever, cough and generalized weakness, admitted with suspected gram-negative multifocal pneumonia, severe anemia and severe thrombocytopenia, long hospital course including trial of Venetoclax started 3/1/20 but required dose reduction and holds, finally stopped 3/29/20 due to further marrow suppression, now also s/p Rituxan, with requirement of large amount of PRBC and platelet transfusions during admission.

## 2020-04-01 NOTE — CHART NOTE - NSCHARTNOTEFT_GEN_A_CORE
Assessment:  Pt seen for nutrition follow up. Chart reviewed, hospital course noted.  Telephone call made to pt's room phone- no answer.  Went to pt's room - pt reports eating OK, did not like the Glucerna shake. Not able to get more info from pt as he was more interested in being taken the bathroom.  BM noted 3/31. Chemo tx 3/30.  Neutropenic precautions ordered today.     Factors impacting intake: [ ] none [ ] nausea  [ ] vomiting [ ] diarrhea [ ] constipation  [ ]chewing problems [ ] swallowing issues  [x] other: taste changes, varying appetite    Diet Prescription: Consistent Carb +evening snack  Intake: PO intake per flowsheets varies %     Current Weight: 3/28 182.9#, 3/24 155.2#, 3/18 161.5#, 3/12  162.2#  % Weight Change    Pertinent Medications: MEDICATIONS  (STANDING):  entecavir 0.5 milliGRAM(s) Oral daily  pantoprazole    Tablet 40 milliGRAM(s) Oral before breakfast  potassium chloride    Tablet ER 20 milliEquivalent(s) Oral two times a day  predniSONE   Tablet 5 milliGRAM(s) Oral daily  riTUXimab Infusion (eMAR) 50 mL/Hr (50 mL/Hr) IV Continuous <Continuous>    MEDICATIONS  (PRN):  acetaminophen   Tablet .. 650 milliGRAM(s) Oral every 6 hours PRN Mild Pain (1 - 3)  acetaminophen   Tablet .. 650 milliGRAM(s) Oral every 6 hours PRN Temp greater or equal to 38C (100.4F)  albuterol/ipratropium for Nebulization 3 milliLiter(s) Nebulizer every 6 hours PRN Shortness of Breath and/or Wheezing  diphenhydrAMINE 25 milliGRAM(s) Oral every 4 hours PRN Rash and/or Itching  guaiFENesin   Syrup  (Sugar-Free) 200 milliGRAM(s) Oral every 6 hours PRN Cough    Pertinent Labs: 04-01 Na136 mmol/L Glu 133 mg/dL<H> K+ 3.8 mmol/L Cr  0.97 mg/dL BUN 30 mg/dL<H> 04-01 Alb 2.6 g/dL<L>     CAPILLARY BLOOD GLUCOSE     Glu 93-174mg/dL-  on Prednisone, no coverage    Skin: no pressure injuries  Edema: 2+ general, 3+ R arm    Estimated Needs:   [x] no change since previous assessment  [ ] recalculated:     Previous Nutrition Diagnosis:   [x] Altered Nutrition Related Labs  [x] Decreased Oral Intake     Nutrition Diagnosis is [x] ongoing  [ ] resolved [ ] not applicable     New Nutrition Diagnosis: [x] not applicable       Interventions:   Recommend  [ ] Change Diet To:  [ ] Nutrition Supplement  [ ] Nutrition Support  [x] Other: Continue diet, add neutropenic precautions to Rx.  Provide food preferences, offer snacks between meals.     Monitoring and Evaluation:   [x] PO intake [ x ] Tolerance to diet prescription [ x ] weights [ x ] labs[ x ] follow up per protocol  [x] other: s/s bowel function, GI distress, nutrition to remain available

## 2020-04-01 NOTE — PROGRESS NOTE ADULT - PROBLEM SELECTOR PLAN 4
-Patient initially treated for suspected gram-negative PNA  -Patient developed high fever on 3/16/20, ID consulted and he was treated with Cefepime. After stopping, he spiked high fevers and abx were restarted, but further infectious workup was unrevealing. Re-consulted ID, recommended COVID testing which was negative and stop antibiotics.   -Patient also with tachycardia while febrile, Cardio was consulted for concern for afib- no afib noted  -CT chest/abd/pelvis noted- nonspecific findings similar to prior  -Cefepime d/c'd as per ID   -BCx negative.  -Urine culture- negative.   -Afebrile

## 2020-04-01 NOTE — PROGRESS NOTE ADULT - SUBJECTIVE AND OBJECTIVE BOX
INTERVAL HPI/OVERNIGHT EVENTS:  pt seen and examined  interim events and heme/onc notes reviewed      MEDICATIONS  (STANDING):  entecavir 0.5 milliGRAM(s) Oral daily  pantoprazole    Tablet 40 milliGRAM(s) Oral before breakfast  potassium chloride    Tablet ER 20 milliEquivalent(s) Oral two times a day  predniSONE   Tablet 5 milliGRAM(s) Oral daily  riTUXimab Infusion (eMAR) 50 mL/Hr (50 mL/Hr) IV Continuous <Continuous>    MEDICATIONS  (PRN):  acetaminophen   Tablet .. 650 milliGRAM(s) Oral every 6 hours PRN Mild Pain (1 - 3)  acetaminophen   Tablet .. 650 milliGRAM(s) Oral every 6 hours PRN Temp greater or equal to 38C (100.4F)  albuterol/ipratropium for Nebulization 3 milliLiter(s) Nebulizer every 6 hours PRN Shortness of Breath and/or Wheezing  diphenhydrAMINE 25 milliGRAM(s) Oral every 4 hours PRN Rash and/or Itching  guaiFENesin   Syrup  (Sugar-Free) 200 milliGRAM(s) Oral every 6 hours PRN Cough      Allergies    sulfa drugs (Unknown)    Intolerances        Review of Systems:    General:  prior chills/fever  Eyes:  Good vision, no reported pain  ENT:  No sore throat, pain, runny nose, dysphagia  CV:  No pain, palpitations, hypo/hypertension  Resp:  No dyspnea, cough, tachypnea, wheezing  GI:  No pain, No nausea, No vomiting, No diarrhea, No constipation, No weight loss, No fever, No pruritis, No rectal bleeding, No melena, No dysphagia  :  No pain, bleeding, incontinence, nocturia  Muscle:  No pain, weakness  Neuro:  No weakness, tingling, memory problems  Psych:  No fatigue, insomnia, mood problems, depression  Endocrine:  No polyuria, polydypsia, cold/heat intolerance      Vital Signs Last 24 Hrs  T(C): 36.3 (31 Mar 2020 07:37), Max: 38 (30 Mar 2020 18:10)  T(F): 97.3 (31 Mar 2020 07:37), Max: 100.4 (30 Mar 2020 18:10)  HR: 70 (31 Mar 2020 07:37) (70 - 144)  BP: 120/69 (31 Mar 2020 07:37) (100/63 - 149/80)  BP(mean): --  RR: 16 (31 Mar 2020 07:37) (14 - 18)  SpO2: 97% (31 Mar 2020 07:37) (93% - 97%)    PHYSICAL EXAM:    Constitutional: sitting up in bed  HEENT: ncat  Gastrointestinal: soft nt mild dt  Extremities: generalized edema  Neurological: Awake alert appropriate ?periods of confusion    LABS:                        6.7    1.24  )-----------( x        ( 31 Mar 2020 06:43 )             19.8     03-31    137  |  111<H>  |  28<H>  ----------------------------<  168<H>  4.6   |  18<L>  |  0.85    Ca    7.7<L>      31 Mar 2020 06:43    TPro  5.3<L>  /  Alb  2.7<L>  /  TBili  2.6<H>  /  DBili  x   /  AST  35  /  ALT  18  /  AlkPhos  154<H>  03-31          RADIOLOGY & ADDITIONAL TESTS:

## 2020-04-02 LAB
ALBUMIN SERPL ELPH-MCNC: 2.5 G/DL — LOW (ref 3.3–5)
ALP SERPL-CCNC: 117 U/L — SIGNIFICANT CHANGE UP (ref 40–120)
ALT FLD-CCNC: 12 U/L — SIGNIFICANT CHANGE UP (ref 12–78)
ANION GAP SERPL CALC-SCNC: 7 MMOL/L — SIGNIFICANT CHANGE UP (ref 5–17)
AST SERPL-CCNC: 15 U/L — SIGNIFICANT CHANGE UP (ref 15–37)
BASOPHILS # BLD AUTO: 0.01 K/UL — SIGNIFICANT CHANGE UP (ref 0–0.2)
BASOPHILS NFR BLD AUTO: 1 % — SIGNIFICANT CHANGE UP (ref 0–2)
BILIRUB SERPL-MCNC: 2.5 MG/DL — HIGH (ref 0.2–1.2)
BUN SERPL-MCNC: 24 MG/DL — HIGH (ref 7–23)
CALCIUM SERPL-MCNC: 8.1 MG/DL — LOW (ref 8.5–10.1)
CHLORIDE SERPL-SCNC: 112 MMOL/L — HIGH (ref 96–108)
CO2 SERPL-SCNC: 20 MMOL/L — LOW (ref 22–31)
CREAT SERPL-MCNC: 0.89 MG/DL — SIGNIFICANT CHANGE UP (ref 0.5–1.3)
EOSINOPHIL # BLD AUTO: 0.01 K/UL — SIGNIFICANT CHANGE UP (ref 0–0.5)
EOSINOPHIL NFR BLD AUTO: 1 % — SIGNIFICANT CHANGE UP (ref 0–6)
GLUCOSE SERPL-MCNC: 120 MG/DL — HIGH (ref 70–99)
HCT VFR BLD CALC: 19.1 % — CRITICAL LOW (ref 39–50)
HGB BLD-MCNC: 6.5 G/DL — CRITICAL LOW (ref 13–17)
LYMPHOCYTES # BLD AUTO: 0.29 K/UL — LOW (ref 1–3.3)
LYMPHOCYTES # BLD AUTO: 38 % — SIGNIFICANT CHANGE UP (ref 13–44)
MCHC RBC-ENTMCNC: 29.3 PG — SIGNIFICANT CHANGE UP (ref 27–34)
MCHC RBC-ENTMCNC: 34 GM/DL — SIGNIFICANT CHANGE UP (ref 32–36)
MCV RBC AUTO: 86 FL — SIGNIFICANT CHANGE UP (ref 80–100)
MONOCYTES # BLD AUTO: 0.07 K/UL — SIGNIFICANT CHANGE UP (ref 0–0.9)
MONOCYTES NFR BLD AUTO: 9 % — SIGNIFICANT CHANGE UP (ref 2–14)
NEUTROPHILS # BLD AUTO: 0.32 K/UL — LOW (ref 1.8–7.4)
NEUTROPHILS NFR BLD AUTO: 34 % — LOW (ref 43–77)
NRBC # BLD: SIGNIFICANT CHANGE UP /100 WBCS (ref 0–0)
PLATELET # BLD AUTO: 7 K/UL — CRITICAL LOW (ref 150–400)
POTASSIUM SERPL-MCNC: 4.3 MMOL/L — SIGNIFICANT CHANGE UP (ref 3.5–5.3)
POTASSIUM SERPL-SCNC: 4.3 MMOL/L — SIGNIFICANT CHANGE UP (ref 3.5–5.3)
PROT SERPL-MCNC: 5.1 G/DL — LOW (ref 6–8.3)
RBC # BLD: 2.22 M/UL — LOW (ref 4.2–5.8)
RBC # FLD: 16.3 % — HIGH (ref 10.3–14.5)
SODIUM SERPL-SCNC: 139 MMOL/L — SIGNIFICANT CHANGE UP (ref 135–145)
WBC # BLD: 0.77 K/UL — CRITICAL LOW (ref 3.8–10.5)
WBC # FLD AUTO: 0.77 K/UL — CRITICAL LOW (ref 3.8–10.5)

## 2020-04-02 PROCEDURE — 99232 SBSQ HOSP IP/OBS MODERATE 35: CPT

## 2020-04-02 PROCEDURE — 99233 SBSQ HOSP IP/OBS HIGH 50: CPT

## 2020-04-02 RX ADMIN — Medication 5 MILLIGRAM(S): at 06:02

## 2020-04-02 RX ADMIN — ENTECAVIR 0.5 MILLIGRAM(S): 0.5 TABLET ORAL at 06:02

## 2020-04-02 RX ADMIN — PANTOPRAZOLE SODIUM 40 MILLIGRAM(S): 20 TABLET, DELAYED RELEASE ORAL at 06:02

## 2020-04-02 RX ADMIN — Medication 20 MILLIEQUIVALENT(S): at 18:02

## 2020-04-02 RX ADMIN — Medication 20 MILLIEQUIVALENT(S): at 06:02

## 2020-04-02 NOTE — PROGRESS NOTE ADULT - PROBLEM SELECTOR PLAN 1
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 Stephenson continues to address GOC with patient and family. This afternoon, pt and his wife decided to pursue home hospice. Referral made.

## 2020-04-02 NOTE — PROGRESS NOTE ADULT - SUBJECTIVE AND OBJECTIVE BOX
INTERVAL HPI/OVERNIGHT EVENTS:  pt seen and examined  offers no gi complaints  frustrated over condition  sp plts yesterday  no gib per rn    MEDICATIONS  (STANDING):  entecavir 0.5 milliGRAM(s) Oral daily  pantoprazole    Tablet 40 milliGRAM(s) Oral before breakfast  potassium chloride    Tablet ER 20 milliEquivalent(s) Oral two times a day  predniSONE   Tablet 5 milliGRAM(s) Oral daily  riTUXimab Infusion (eMAR) 50 mL/Hr (50 mL/Hr) IV Continuous <Continuous>    MEDICATIONS  (PRN):  acetaminophen   Tablet .. 650 milliGRAM(s) Oral every 6 hours PRN Mild Pain (1 - 3)  acetaminophen   Tablet .. 650 milliGRAM(s) Oral every 6 hours PRN Temp greater or equal to 38C (100.4F)  albuterol/ipratropium for Nebulization 3 milliLiter(s) Nebulizer every 6 hours PRN Shortness of Breath and/or Wheezing  diphenhydrAMINE 25 milliGRAM(s) Oral every 4 hours PRN Rash and/or Itching  guaiFENesin   Syrup  (Sugar-Free) 200 milliGRAM(s) Oral every 6 hours PRN Cough      Allergies    sulfa drugs (Unknown)    Intolerances        Review of Systems:    General:  No wt loss, fevers, chills, night sweats, fatigue   Eyes:  Good vision, no reported pain  ENT:  No sore throat, pain, runny nose, dysphagia  CV:  No pain, palpitations, hypo/hypertension  Resp:  No dyspnea, cough, tachypnea, wheezing  GI:  No pain, No nausea, No vomiting, No diarrhea, No constipation, No weight loss, No fever, No pruritis, No rectal bleeding, No melena, No dysphagia  :  No pain, bleeding, incontinence, nocturia  Muscle:  No pain, weakness  Neuro:  No weakness, tingling, memory problems  Psych:  No fatigue, insomnia, mood problems, depression  Endocrine:  No polyuria, polydypsia, cold/heat intolerance  Heme:  No petechiae, ecchymosis, easy bruisability  Skin:  No rash, tattoos, scars, edema      Vital Signs Last 24 Hrs  T(C): 37 (02 Apr 2020 04:45), Max: 37.6 (02 Apr 2020 00:44)  T(F): 98.6 (02 Apr 2020 04:45), Max: 99.6 (02 Apr 2020 00:44)  HR: 97 (02 Apr 2020 04:45) (81 - 104)  BP: 126/62 (02 Apr 2020 04:45) (112/68 - 126/62)  BP(mean): --  RR: 18 (02 Apr 2020 04:45) (16 - 18)  SpO2: 96% (02 Apr 2020 04:45) (95% - 96%)    PHYSICAL EXAM:    Constitutional: sitting up in bed  HEENT: ncat  Gastrointestinal: soft nt mild dt  Extremities: generalized edema  Neurological: Awake alert appropriate ?periods of confusion      LABS:                        7.0    0.60  )-----------( 4        ( 01 Apr 2020 10:21 )             21.1     04-01    136  |  111<H>  |  30<H>  ----------------------------<  133<H>  3.8   |  16<L>  |  0.97    Ca    8.2<L>      01 Apr 2020 10:21    TPro  5.2<L>  /  Alb  2.6<L>  /  TBili  2.5<H>  /  DBili  x   /  AST  17  /  ALT  15  /  AlkPhos  135<H>  04-01          RADIOLOGY & ADDITIONAL TESTS:

## 2020-04-02 NOTE — PROGRESS NOTE ADULT - PROBLEM SELECTOR PLAN 3
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 Was on prednisone 10mg daily -- increase to prednisone 40mg po during hospitalization, tapering started @2/29 - now 2.5mg daily  - Heme/onc following  - Hgb of 6.5 today, no more transfusions - hospice care

## 2020-04-02 NOTE — PROGRESS NOTE ADULT - SUBJECTIVE AND OBJECTIVE BOX
All interim records and events noted.    up in bed  awake alert  no coughs, no SOB/CP/increased bleed      MEDICATIONS  (STANDING):  entecavir 0.5 milliGRAM(s) Oral daily  pantoprazole    Tablet 40 milliGRAM(s) Oral before breakfast  potassium chloride    Tablet ER 20 milliEquivalent(s) Oral two times a day  predniSONE   Tablet 2.5 milliGRAM(s) Oral daily  riTUXimab Infusion (eMAR) 50 mL/Hr (50 mL/Hr) IV Continuous <Continuous>    MEDICATIONS  (PRN):  acetaminophen   Tablet .. 650 milliGRAM(s) Oral every 6 hours PRN Mild Pain (1 - 3)  acetaminophen   Tablet .. 650 milliGRAM(s) Oral every 6 hours PRN Temp greater or equal to 38C (100.4F)  albuterol/ipratropium for Nebulization 3 milliLiter(s) Nebulizer every 6 hours PRN Shortness of Breath and/or Wheezing  diphenhydrAMINE 25 milliGRAM(s) Oral every 4 hours PRN Rash and/or Itching  guaiFENesin   Syrup  (Sugar-Free) 200 milliGRAM(s) Oral every 6 hours PRN Cough      Vital Signs Last 24 Hrs  T(C): 36.6 (02 Apr 2020 07:54), Max: 37.6 (02 Apr 2020 00:44)  T(F): 97.9 (02 Apr 2020 07:54), Max: 99.6 (02 Apr 2020 00:44)  HR: 97 (02 Apr 2020 07:54) (81 - 99)  BP: 138/83 (02 Apr 2020 07:54) (115/64 - 138/83)  BP(mean): --  RR: 19 (02 Apr 2020 07:54) (16 - 19)  SpO2: 96% (02 Apr 2020 07:54) (96% - 96%)    PHYSICAL EXAM  General: well developed  well nourished, in no acute distress  Head: atraumatic, normocephalic  ENT: sclera anicteric, buccal mucosa moist  Neck: supple  CV: S1 S2, regular rate and rhythm  Lungs: clear to auscultation, no wheezes/rhonchi  Abdomen: soft, nontender, bowel sounds present, no palpable masses  Extrem: trace edema blair legs and right arm, trace-1+ left arm/hand  Skin: ecchymosis blair arms, not worse  Neuro: alert and oriented X3,  no focal deficits      LABS:             6.5    0.77  )-----------( 7        ( 04-02 @ 07:44 )             19.1                7.0    0.60  )-----------( 4        ( 04-01 @ 10:21 )             21.1                6.7    1.24  )-----------( 42       ( 03-31 @ 06:43 )             19.8       04-02    139  |  112<H>  |  24<H>  ----------------------------<  120<H>  4.3   |  20<L>  |  0.89    Ca    8.1<L>      02 Apr 2020 07:44    TPro  5.1<L>  /  Alb  2.5<L>  /  TBili  2.5<H>  /  DBili  x   /  AST  15  /  ALT  12  /  AlkPhos  117  04-02        RADIOLOGY & ADDITIONAL STUDIES:    IMPRESSION/RECOMMENDATIONS:

## 2020-04-02 NOTE — PROGRESS NOTE ADULT - ASSESSMENT
hepatitis B   elevated lfts   CLL  pancytopenia      am labs are pending  patient with elevated bilirubin and alk phos at baseline ? leon  mri showed normal liver, no cbd stone/dilatation; hep b pcr neg  ob neg; no s/s overt gib; monitor cbc, transfuse per heme/onc  diet as tolerated; correct elytes prn  cont hepatitis b therapy  cont ppi ppx  goc discussions ongoing  further care per heme/onc     Advanced care planning was discussed with patient and family.  Advanced care planning forms were reviewed and discussed.  Risks, benefits and alternatives of gastroenterologic procedures were discussed in detail and all questions were answered.    30 minutes spent.

## 2020-04-02 NOTE — PROGRESS NOTE ADULT - SUBJECTIVE AND OBJECTIVE BOX
Patient is a 75y old  Male who presents with a chief complaint of fever, weakness, and cough.    INTERVAL HPI/OVERNIGHT EVENTS: Pt reports some general weakness. Denies fever, chills, SOB, CP, palpitations, headache, abd pain. Now willing to pursue hospice.      MEDICATIONS  (STANDING):  entecavir 0.5 milliGRAM(s) Oral daily  nystatin    Suspension 223642 Unit(s) Oral three times a day  pantoprazole    Tablet 40 milliGRAM(s) Oral before breakfast  potassium chloride    Tablet ER 20 milliEquivalent(s) Oral two times a day  predniSONE   Tablet 2.5 milliGRAM(s) Oral daily  riTUXimab Infusion (eMAR) 50 mL/Hr (50 mL/Hr) IV Continuous <Continuous>    MEDICATIONS  (PRN):  acetaminophen   Tablet .. 650 milliGRAM(s) Oral every 6 hours PRN Mild Pain (1 - 3)  acetaminophen   Tablet .. 650 milliGRAM(s) Oral every 6 hours PRN Temp greater or equal to 38C (100.4F)  albuterol/ipratropium for Nebulization 3 milliLiter(s) Nebulizer every 6 hours PRN Shortness of Breath and/or Wheezing  diphenhydrAMINE 25 milliGRAM(s) Oral every 4 hours PRN Rash and/or Itching  guaiFENesin   Syrup  (Sugar-Free) 200 milliGRAM(s) Oral every 6 hours PRN Cough      Allergies    sulfa drugs (Unknown)    Intolerances        REVIEW OF SYSTEMS:  CONSTITUTIONAL: +generalized weakness; No fever or chills  HEENT:  No headache, no sore throat  RESPIRATORY: No cough, wheezing, or shortness of breath  CARDIOVASCULAR: No chest pain, palpitations  GASTROINTESTINAL: No abd pain, nausea, vomiting, or diarrhea  GENITOURINARY: No dysuria, frequency, or hematuria  NEUROLOGICAL: no focal weakness or dizziness  MUSCULOSKELETAL: no myalgias    Vital Signs Last 24 Hrs  T(C): 36.6 (02 Apr 2020 07:54), Max: 37.6 (02 Apr 2020 00:44)  T(F): 97.9 (02 Apr 2020 07:54), Max: 99.6 (02 Apr 2020 00:44)  HR: 97 (02 Apr 2020 07:54) (81 - 99)  BP: 138/83 (02 Apr 2020 07:54) (115/64 - 138/83)  BP(mean): --  RR: 19 (02 Apr 2020 07:54) (16 - 19)  SpO2: 96% (02 Apr 2020 07:54) (96% - 96%)    PHYSICAL EXAM:  GENERAL: NAD, chronically ill-appearing  HEENT:  anicteric, moist mucous membranes  CHEST/LUNG: grossly CTA b/l  HEART:  RRR, S1, S2  ABDOMEN:  BS+, soft, nontender, nondistended  EXTREMITIES: edema in upper extremities (worse in L UE); ecchymoses in UEs; no cyanosis or calf tenderness  NERVOUS SYSTEM: answers questions and follows commands appropriately    LABS:                        6.5    0.77  )-----------( 7        ( 02 Apr 2020 07:44 )             19.1     CBC Full  -  ( 02 Apr 2020 07:44 )  WBC Count : 0.77 K/uL  Hemoglobin : 6.5 g/dL  Hematocrit : 19.1 %  Platelet Count - Automated : 7 K/uL  Mean Cell Volume : 86.0 fl  Mean Cell Hemoglobin : 29.3 pg  Mean Cell Hemoglobin Concentration : 34.0 gm/dL  Auto Neutrophil # : 0.32 K/uL  Auto Lymphocyte # : 0.29 K/uL  Auto Monocyte # : 0.07 K/uL  Auto Eosinophil # : 0.01 K/uL  Auto Basophil # : 0.01 K/uL  Auto Neutrophil % : 34.0 %  Auto Lymphocyte % : 38.0 %  Auto Monocyte % : 9.0 %  Auto Eosinophil % : 1.0 %  Auto Basophil % : 1.0 %    02 Apr 2020 07:44    139    |  112    |  24     ----------------------------<  120    4.3     |  20     |  0.89     Ca    8.1        02 Apr 2020 07:44    TPro  5.1    /  Alb  2.5    /  TBili  2.5    /  DBili  x      /  AST  15     /  ALT  12     /  AlkPhos  117    02 Apr 2020 07:44        CAPILLARY BLOOD GLUCOSE              RADIOLOGY & ADDITIONAL TESTS:    Personally reviewed.     Consultant(s) Notes Reviewed:  [x] YES  [ ] NO Patient is a 75y old  Male who presents with a chief complaint of fever, weakness, and cough.     INTERVAL HPI/OVERNIGHT EVENTS: Pt reports some general weakness. Denies fever, chills, SOB, CP, palpitations, headache, abd pain. Now willing to pursue hospice.      MEDICATIONS  (STANDING):  entecavir 0.5 milliGRAM(s) Oral daily  nystatin    Suspension 290134 Unit(s) Oral three times a day  pantoprazole    Tablet 40 milliGRAM(s) Oral before breakfast  potassium chloride    Tablet ER 20 milliEquivalent(s) Oral two times a day  predniSONE   Tablet 2.5 milliGRAM(s) Oral daily  riTUXimab Infusion (eMAR) 50 mL/Hr (50 mL/Hr) IV Continuous <Continuous>    MEDICATIONS  (PRN):  acetaminophen   Tablet .. 650 milliGRAM(s) Oral every 6 hours PRN Mild Pain (1 - 3)  acetaminophen   Tablet .. 650 milliGRAM(s) Oral every 6 hours PRN Temp greater or equal to 38C (100.4F)  albuterol/ipratropium for Nebulization 3 milliLiter(s) Nebulizer every 6 hours PRN Shortness of Breath and/or Wheezing  diphenhydrAMINE 25 milliGRAM(s) Oral every 4 hours PRN Rash and/or Itching  guaiFENesin   Syrup  (Sugar-Free) 200 milliGRAM(s) Oral every 6 hours PRN Cough      Allergies    sulfa drugs (Unknown)    Intolerances        REVIEW OF SYSTEMS:  CONSTITUTIONAL: +generalized weakness; No fever or chills  HEENT:  No headache, no sore throat  RESPIRATORY: No cough, wheezing, or shortness of breath  CARDIOVASCULAR: No chest pain, palpitations  GASTROINTESTINAL: No abd pain, nausea, vomiting, or diarrhea  GENITOURINARY: No dysuria, frequency, or hematuria  NEUROLOGICAL: no focal weakness or dizziness  MUSCULOSKELETAL: no myalgias    Vital Signs Last 24 Hrs  T(C): 36.6 (02 Apr 2020 07:54), Max: 37.6 (02 Apr 2020 00:44)  T(F): 97.9 (02 Apr 2020 07:54), Max: 99.6 (02 Apr 2020 00:44)  HR: 97 (02 Apr 2020 07:54) (81 - 99)  BP: 138/83 (02 Apr 2020 07:54) (115/64 - 138/83)  BP(mean): --  RR: 19 (02 Apr 2020 07:54) (16 - 19)  SpO2: 96% (02 Apr 2020 07:54) (96% - 96%)    PHYSICAL EXAM:  GENERAL: NAD, chronically ill-appearing  HEENT:  anicteric, moist mucous membranes  CHEST/LUNG: grossly CTA b/l  HEART:  RRR, S1, S2  ABDOMEN:  BS+, soft, nontender, nondistended  EXTREMITIES: edema in upper extremities (worse in L UE); ecchymoses in UEs; no cyanosis or calf tenderness  NERVOUS SYSTEM: answers questions and follows commands appropriately    LABS:                        6.5    0.77  )-----------( 7        ( 02 Apr 2020 07:44 )             19.1     CBC Full  -  ( 02 Apr 2020 07:44 )  WBC Count : 0.77 K/uL  Hemoglobin : 6.5 g/dL  Hematocrit : 19.1 %  Platelet Count - Automated : 7 K/uL  Mean Cell Volume : 86.0 fl  Mean Cell Hemoglobin : 29.3 pg  Mean Cell Hemoglobin Concentration : 34.0 gm/dL  Auto Neutrophil # : 0.32 K/uL  Auto Lymphocyte # : 0.29 K/uL  Auto Monocyte # : 0.07 K/uL  Auto Eosinophil # : 0.01 K/uL  Auto Basophil # : 0.01 K/uL  Auto Neutrophil % : 34.0 %  Auto Lymphocyte % : 38.0 %  Auto Monocyte % : 9.0 %  Auto Eosinophil % : 1.0 %  Auto Basophil % : 1.0 %    02 Apr 2020 07:44    139    |  112    |  24     ----------------------------<  120    4.3     |  20     |  0.89     Ca    8.1        02 Apr 2020 07:44    TPro  5.1    /  Alb  2.5    /  TBili  2.5    /  DBili  x      /  AST  15     /  ALT  12     /  AlkPhos  117    02 Apr 2020 07:44        CAPILLARY BLOOD GLUCOSE              RADIOLOGY & ADDITIONAL TESTS:    Personally reviewed.     Consultant(s) Notes Reviewed:  [x] YES  [ ] NO

## 2020-04-02 NOTE — PROGRESS NOTE ADULT - ASSESSMENT
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   - Monitor and replete electrolytes. Keep K>4.0 and Mg>2.0.    Pancytopenia   - Hem/onc following   - Multiple tx of PRBC, Platelets and Plasma. Pancytopenia refractory to transfusion  - s/p Platelet transfusion yesterday 4/1.  Per Heme, patient still wishes to be transfused  - Monitor closely for bleeding  - Transfuse per Heme.  H/H today = 6.5/19.1; Plate=7    LUE edema  - Doppler negative DVT but + hematoma  - Echo 2/24/2020 revealing trace AI trace TR ef 60-65%.  No need to repeat    DM  - Management as per primary     Poor prognosis 2/2 transfusion dependent, refractory to treatment as per Heme notes d/w spouse possible inhouse hospice  All other medical needs as per primary team.  Other cardiovascular workup will depend on clinical course.  Will continue to follow.    Seda Moore DNP, NP-C  Cardiology   Spectra #[phone]

## 2020-04-02 NOTE — PROGRESS NOTE ADULT - ASSESSMENT
74 y/o man w Hepatitis B on Entecavirt, Chronic Lymphocytic Leukemia/Small Lymphocytic Lymphoma 4/2018 when presented w immune hemolytic anemia w partial response to steroids, started on Ibrutinib w heme CR and AK by CT scan w some decrease of lymphadenopathies, until 1/2020 when relapsed/progressed w incr WBC, anemia(initially not hemolytic, has chronic Matthew positive due to CLL/SLL), thrombocytopenia.    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    Was discharged home and admitted again 2/22/20 with  fever and found w pneumonia, post course of Ceftriaxone, continues to require transfusions plts and PRBCs.  Hep B titer negative  Post IVIG in late February 2020  Had trial of Venetoclax started 3/1/20 but required dose reduction and holds, finally stopped 3/29/20 due to further marrow suppression and apparent more frequent need for PRBC/platelets transfusions  As last attempt, post Rituxan 3/30  Discussed multiple times w pt and wife wrt Hospice care and hold transfusions, pt still wishes all treatments    -pancytopenia-persistent, no sig change, including  no response to the one unit of platelets. Hold transfusion today in view of lack of response and clinically not symptaomatic.  -continue monitor daily CBC  -continue prednisone taper, decrease to 2.5mg qD today

## 2020-04-02 NOTE — PROGRESS NOTE ADULT - PROBLEM SELECTOR PLAN 6
-unclear why pt's direct bilirubin is the majority fraction  -pt has no symptoms or exam findings consistent with obstructed CBD  -RUQ US showing GB with stones but no obstructing stones, and normal caliber CBD  -MRCP did not reveal obstruction  -GI following, recs appreciated  -Hyperbilirubinemia steadily decreasing and improving

## 2020-04-02 NOTE — PROGRESS NOTE ADULT - PROBLEM SELECTOR PLAN 2
- Per chart review, platelet count steadily declining since 2018  - poor response to platelets transfusion, suspect ITP component  - s/p one dose of IVIG  - Hem/Onc following, will follow the recommendation   -platelets of 7,000 - no more transfusions - hospice care  -Heme/Onc- continue supportive care.

## 2020-04-02 NOTE — PROGRESS NOTE ADULT - SUBJECTIVE AND OBJECTIVE BOX
[facility] -- Cori Dawkins [place], [place], Ryland Wright [place], [place]  Office # [phone]    Follow Up:  Cardiac arythmia    Subjective/Observations: Awake and alert, comfortable on RA.  Denies any respiratory oe cardiac discomfort.  Denies any form of bleeding.  C/O left arm pain     REVIEW OF SYSTEMS: All other review of systems is negative unless indicated above  PAST MEDICAL & SURGICAL HISTORY:  KIRTI (obstructive sleep apnea): on nocturnal cpap  Hypertension: not on any medications  Diabetes: not on any medications  Hepatitis B  Lymphoma: Small cell B cell  AIHA (autoimmune hemolytic anemia)  H/O lithotripsy    MEDICATIONS  (STANDING):  entecavir 0.5 milliGRAM(s) Oral daily  pantoprazole    Tablet 40 milliGRAM(s) Oral before breakfast  potassium chloride    Tablet ER 20 milliEquivalent(s) Oral two times a day  predniSONE   Tablet 5 milliGRAM(s) Oral daily  riTUXimab Infusion (eMAR) 50 mL/Hr (50 mL/Hr) IV Continuous <Continuous>    MEDICATIONS  (PRN):  acetaminophen   Tablet .. 650 milliGRAM(s) Oral every 6 hours PRN Mild Pain (1 - 3)  acetaminophen   Tablet .. 650 milliGRAM(s) Oral every 6 hours PRN Temp greater or equal to 38C (100.4F)  albuterol/ipratropium for Nebulization 3 milliLiter(s) Nebulizer every 6 hours PRN Shortness of Breath and/or Wheezing  diphenhydrAMINE 25 milliGRAM(s) Oral every 4 hours PRN Rash and/or Itching  guaiFENesin   Syrup  (Sugar-Free) 200 milliGRAM(s) Oral every 6 hours PRN Cough    Allergies    sulfa drugs (Unknown)    Intolerances    Vital Signs Last 24 Hrs  T(C): 36.6 (02 Apr 2020 07:54), Max: 37.6 (02 Apr 2020 00:44)  T(F): 97.9 (02 Apr 2020 07:54), Max: 99.6 (02 Apr 2020 00:44)  HR: 97 (02 Apr 2020 07:54) (81 - 104)  BP: 138/83 (02 Apr 2020 07:54) (112/68 - 138/83)  BP(mean): --  RR: 19 (02 Apr 2020 07:54) (16 - 19)  SpO2: 96% (02 Apr 2020 07:54) (95% - 96%)  I&O's Summary      PHYSICAL EXAM:  TELE: Not on tele  Constitutional: NAD, awake and alert, well-developed  HEENT: Moist Mucous Membranes, Anicteric  Pulmonary: Non-labored, breath sounds are clear bilaterally, No wheezing, rales or rhonchi  Cardiovascular: Regular, S1 and S2, No murmurs.  No rubs, gallops or clicks  Gastrointestinal: Bowel Sounds present, soft, nontender.   Lymph: No peripheral edema. No lymphadenopathy.  Skin: No visible rashes. Left arm ulcer.  Pale in color. + BUE ecchymoses  Psych:  Mood & affect appropriate  LABS: All Labs Reviewed:                        7.0    0.60  )-----------( 4        ( 01 Apr 2020 10:21 )             21.1                         6.7    1.24  )-----------( 42       ( 31 Mar 2020 06:43 )             19.8     01 Apr 2020 10:21    136    |  111    |  30     ----------------------------<  133    3.8     |  16     |  0.97   31 Mar 2020 06:43    137    |  111    |  28     ----------------------------<  168    4.6     |  18     |  0.85     Ca    8.2        01 Apr 2020 10:21  Ca    7.7        31 Mar 2020 06:43    TPro  5.2    /  Alb  2.6    /  TBili  2.5    /  DBili  x      /  AST  17     /  ALT  15     /  AlkPhos  135    01 Apr 2020 10:21  TPro  5.3    /  Alb  2.7    /  TBili  2.6    /  DBili  x      /  AST  35     /  ALT  18     /  AlkPhos  154    31 Mar 2020 06:43    < from: TTE Echo Complete w/o contrast w/ Doppler (03.04.20 @ 09:54) >     EXAM:  ECHO TTE WO CON COMP W DOPP      PROCEDURE DATE:  03/04/2020      INTERPRETATION:  INDICATION: edema  Sonographer PH    Blood Pressure 129/63    Height 165 cm     Weight 82.9 kg       BSA 1.9 sq m    Dimensions:    LA 2.9       Normal Values: 2.0 - 4.0 cm    Ao 2.9        Normal Values: 2.0 - 3.8 cm  SEPTUM 0.9       Normal Values: 0.6 - 1.2 cm  PWT 0.9       Normal Values: 0.6 - 1.1 cm  LVIDd 4.1         Normal Values: 3.0 - 5.6 cm  LVIDs 2.3         Normal Values: 1.8 - 4.0 cm    OBSERVATIONS:    Mitral Valve: normal, trace physiologic MR.  Aortic Valve/Aorta: Normal trileaflet aortic valve with normal opening.  Tricuspid Valve: normal with trace TR.  Pulmonic Valve: normal  Left Atrium: normal  Right Atrium: normal  Left Ventricle: normal LV size and systolic function, estimated LVEF of 65%.  Right Ventricle: Grossly normal size and systolic function.  Pericardium/Pleura: normal, no significant pericardial effusion.    Conclusion:   Normal left ventricular internal dimensions and systolic function, estimated LVEF of 65%.   Grossly normal RV size and systolic function.     Normal trileaflet aortic valve, without AI.   Trace physiologic MR and TR.      KALPANA GUERRERO   This document has been electronically signed. Mar  5 2020  7:32AM     < end of copied text >    < from: CT Chest No Cont (03.19.20 @ 09:40) >    EXAM:  CT CHEST                          PROCEDURE DATE:  03/19/2020      INTERPRETATION:  CLINICAL INFORMATION: CLL, fever    COMPARISON: 2/21/2020    PROCEDURE:   CT of the Chest, Abdomen and Pelvis was performed without intravenous contrast.   Intravenous contrast: None.  Oral contrast: None.  Sagittal and coronal reformats were performed.    FINDINGS:  Lack of IV contrast limits evaluation diana, vascular structures and solid organ parenchyma  CHEST:     LUNGS AND LARGE AIRWAYS: Patentcentral airways. Scattered bilateral patchy, nodular and groundglass opacities slightly improved at the lung bases compared to prior. Findings could all be inflammatory/infectious. Neoplastic component not excluded.  PLEURA: Interval increase in small left and small-to-moderate right pleural effusion.  VESSELS: Nonaneurysmal  HEART: Heart size is normal. Coronary artery calcination. Decrease cardiac chamber blood pool attenuation suggests an anemic state. Trace pericardial effusion.  MEDIASTINUM AND DIANA: Multistation mediastinal  supraclavicular, bilateral hilar and bilateral axillary adenopathy similar to prior  CHEST WALL AND LOWER NECK: Within normal limits.    ABDOMEN AND PELVIS:    LIVER: Hepatomegaly similar to prior  BILE DUCTS: Normal caliber.  GALLBLADDER: Cholelithiasis.  SPLEEN: Splenomegaly similar to prior  PANCREAS: Within normal limits.  ADRENALS: Within normal limits.  KIDNEYS/URETERS: Within normal limits.    BLADDER: Within normal limits.  REPRODUCTIVE ORGANS: Unremarkable    BOWEL: Evaluation limited due to limited by nonopacification underdistention. No gross active bowel inflammation. No bowel obstruction.. Appendix no appendicitis  PERITONEUM: Trace ascites. No extraluminal gas or organized collections.  VESSELS: Nonaneurysmal.  RETROPERITONEUM/LYMPH NODES: Stable periportal and retroperitoneal, mesenteric pelvic and bilateral inguinal adenopathy.    ABDOMINAL WALL: Mild anasarca.  BONES: Stable    IMPRESSION:     Improvement in the scattered bilateral parenchymal opacities as described.  Slight increase in bilateral pleural effusions.  Stable multistation adenopathy chest abdomen and pelvis as described  No acute findings abdomen and pelvis  Additional findings as discussed    PANTERA CALVILLO M.D., ATTENDING RADIOLOGIST  This document has been electronically signed. Mar 19 2020 10:26AM      < end of copied text >    < from: 12 Lead ECG (03.18.20 @ 19:13) >    Ventricular Rate 167 BPM    Atrial Rate 178 BPM    QRS Duration 64 ms    Q-T Interval 296 ms    QTC Calculation(Bezet) 493 ms    R Axis 32 degrees    T Axis 61 degrees    Diagnosis Line *** Poor data quality, interpretation may be adversely affected  likely st with apcs  Nonspecific ST and T wave abnormality  Abnormal ECG  When compared with ECG of 16-MAR-2020 17:10,  Previous ECG has undetermined rhythm, needs review  Nonspecific T wave abnormality now evident in Anterior leads  Confirmed by CANDI SCHREIBER ([id number]) on 3/19/2020 5:29:35 PM    < end of copied text >

## 2020-04-03 DIAGNOSIS — D61.818 OTHER PANCYTOPENIA: ICD-10-CM

## 2020-04-03 DIAGNOSIS — R94.5 ABNORMAL RESULTS OF LIVER FUNCTION STUDIES: ICD-10-CM

## 2020-04-03 LAB
BLD GP AB SCN SERPL QL: SIGNIFICANT CHANGE UP
HCT VFR BLD CALC: 19.2 % — CRITICAL LOW (ref 39–50)
HGB BLD-MCNC: 6.4 G/DL — CRITICAL LOW (ref 13–17)
MCHC RBC-ENTMCNC: 29 PG — SIGNIFICANT CHANGE UP (ref 27–34)
MCHC RBC-ENTMCNC: 33.3 GM/DL — SIGNIFICANT CHANGE UP (ref 32–36)
MCV RBC AUTO: 86.9 FL — SIGNIFICANT CHANGE UP (ref 80–100)
NRBC # BLD: 0 /100 WBCS — SIGNIFICANT CHANGE UP (ref 0–0)
PLATELET # BLD AUTO: 3 K/UL — CRITICAL LOW (ref 150–400)
RBC # BLD: 2.21 M/UL — LOW (ref 4.2–5.8)
RBC # FLD: 16.1 % — HIGH (ref 10.3–14.5)
WBC # BLD: 1.39 K/UL — LOW (ref 3.8–10.5)
WBC # FLD AUTO: 1.39 K/UL — LOW (ref 3.8–10.5)

## 2020-04-03 PROCEDURE — 99233 SBSQ HOSP IP/OBS HIGH 50: CPT

## 2020-04-03 PROCEDURE — 99232 SBSQ HOSP IP/OBS MODERATE 35: CPT

## 2020-04-03 RX ORDER — NYSTATIN 500MM UNIT
500000 POWDER (EA) MISCELLANEOUS THREE TIMES A DAY
Refills: 0 | Status: DISCONTINUED | OUTPATIENT
Start: 2020-04-03 | End: 2020-04-04

## 2020-04-03 RX ADMIN — ENTECAVIR 0.5 MILLIGRAM(S): 0.5 TABLET ORAL at 04:50

## 2020-04-03 RX ADMIN — PANTOPRAZOLE SODIUM 40 MILLIGRAM(S): 20 TABLET, DELAYED RELEASE ORAL at 04:49

## 2020-04-03 RX ADMIN — Medication 500000 UNIT(S): at 14:01

## 2020-04-03 RX ADMIN — Medication 20 MILLIEQUIVALENT(S): at 04:50

## 2020-04-03 RX ADMIN — Medication 2.5 MILLIGRAM(S): at 04:49

## 2020-04-03 RX ADMIN — Medication 500000 UNIT(S): at 08:38

## 2020-04-03 RX ADMIN — Medication 500000 UNIT(S): at 21:25

## 2020-04-03 RX ADMIN — Medication 20 MILLIEQUIVALENT(S): at 17:48

## 2020-04-03 NOTE — PROGRESS NOTE ADULT - ASSESSMENT
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   - Monitor and replete electrolytes. Keep K>4.0 and Mg>2.0.    Pancytopenia   - Hem/onc following   - Multiple tx of PRBC, Platelets and Plasma. Pancytopenia refractory to transfusion  - s/p Platelet transfusion yesterday 4/1.  Per Heme, patient still wishes to be transfused  - Monitor closely for bleeding, no evidence so far  - Transfuse per Heme.  H/H yesterday = 6.5/19.1; Plate=7; No labs today    LUE edema  - Doppler negative DVT but + hematoma  - Echo 2/24/2020 revealing trace AI trace TR ef 60-65%.  No need to repeat    DM  - Management as per primary     Poor prognosis 2/2 transfusion dependent, refractory to treatment as per Heme notes d/w spouse possible inhouse hospice  All other medical needs as per primary team.  Awaiting discharge  Will continue to follow while inpatient    Seda Moore DNP, NP-C  Cardiology   Spectra #[phone]

## 2020-04-03 NOTE — PROGRESS NOTE ADULT - PROBLEM SELECTOR PROBLEM 3
Hyperbilirubinemia
CLL (chronic lymphocytic leukemia)
Anemia
Hepatitis B

## 2020-04-03 NOTE — PROGRESS NOTE ADULT - ASSESSMENT
74 y/o man w Hepatitis B on Entecavirt, Chronic Lymphocytic Leukemia/Small Lymphocytic Lymphoma 4/2018 when presented w immune hemolytic anemia w partial response to steroids, started on Ibrutinib w heme CR and NM by CT scan w some decrease of lymphadenopathies, until 1/2020 when relapsed/progressed w incr WBC, anemia(initially not hemolytic, has chronic Matthew positive due to CLL/SLL), thrombocytopenia.    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    Was discharged home and admitted again 2/22/20 with  fever and found w pneumonia, post course of Ceftriaxone, continues to require transfusions plts and PRBCs.  Hep B titer negative  Post IVIG in late February 2020  Had trial of Venetoclax started 3/1/20 but required dose reduction and holds, finally stopped 3/29/20 due to further marrow suppression and apparent more frequent need for PRBC/platelets transfusions  As last attempt, post Rituxan 3/30  Discussed multiple times w pt and wife wrt Hospice care and hold transfusions, pt still wishes all treatments    -pancytopenia-persistent, reflecting severely compromised marrow/unresponsive CLL-SLL, also has been refractory to transfusions, will check again today as pt still wishes to know  -continue prednisone, now on the tapered further dose of 2.5mg qD  -after further discussion w pt and wife yesterday, they agreed to home hospice, discussed w Medicine and Hospice Care consult was placef  -continue comfort measures 74 y/o man w Hepatitis B on Entecavirt, Chronic Lymphocytic Leukemia/Small Lymphocytic Lymphoma 4/2018 when presented w immune hemolytic anemia w partial response to steroids, started on Ibrutinib w heme CR and [place] by CT scan w some decrease of lymphadenopathies, until 1/2020 when relapsed/progressed w incr WBC, anemia(initially not hemolytic, has chronic Matthew positive due to CLL/SLL), thrombocytopenia.    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    Was discharged home and admitted again 2/22/20 with  fever and found w pneumonia, post course of Ceftriaxone, continues to require transfusions plts and PRBCs.  Hep B titer negative  Post IVIG in late February 2020  Had trial of Venetoclax started 3/1/20 but required dose reduction and holds, finally stopped 3/29/20 due to further marrow suppression and apparent more frequent need for PRBC/platelets transfusions  As last attempt, post Rituxan 3/30  Discussed multiple times w pt and wife wrt Hospice care and hold transfusions, pt still wishes all treatments    -pancytopenia-persistent, reflecting severely compromised marrow/unresponsive CLL-SLL, also has been refractory to transfusions, will check again today as pt still wishes to know  -continue prednisone, now on the tapered further dose of 2.5mg qD  -after further discussion w pt and wife yesterday, they agreed to home hospice, discussed w Medicine and Hospice Care consult was placef  -continue comfort measures, mucositis cocktail ordered by GI

## 2020-04-03 NOTE — PROGRESS NOTE ADULT - PROBLEM SELECTOR PROBLEM 10
Need for prophylactic measure

## 2020-04-03 NOTE — PROGRESS NOTE ADULT - SUBJECTIVE AND OBJECTIVE BOX
INTERVAL HPI/OVERNIGHT EVENTS:  pt seen and examined  c/o mouth pain  denies n/v/d/c/abd pain  tolerating po  transfusions held yesterday per heme/onc  no new labs    MEDICATIONS  (STANDING):  entecavir 0.5 milliGRAM(s) Oral daily  nystatin    Suspension 272035 Unit(s) Oral three times a day  pantoprazole    Tablet 40 milliGRAM(s) Oral before breakfast  potassium chloride    Tablet ER 20 milliEquivalent(s) Oral two times a day  predniSONE   Tablet 2.5 milliGRAM(s) Oral daily  riTUXimab Infusion (eMAR) 50 mL/Hr (50 mL/Hr) IV Continuous <Continuous>    MEDICATIONS  (PRN):  acetaminophen   Tablet .. 650 milliGRAM(s) Oral every 6 hours PRN Mild Pain (1 - 3)  acetaminophen   Tablet .. 650 milliGRAM(s) Oral every 6 hours PRN Temp greater or equal to 38C (100.4F)  albuterol/ipratropium for Nebulization 3 milliLiter(s) Nebulizer every 6 hours PRN Shortness of Breath and/or Wheezing  diphenhydrAMINE 25 milliGRAM(s) Oral every 4 hours PRN Rash and/or Itching  guaiFENesin   Syrup  (Sugar-Free) 200 milliGRAM(s) Oral every 6 hours PRN Cough      Allergies    sulfa drugs (Unknown)    Intolerances        Review of Systems:    General:  No wt loss, fevers, chills, night sweats, fatigue   Eyes:  Good vision, no reported pain  ENT: mouth pain  CV:  No pain, palpitations, hypo/hypertension  Resp:  No dyspnea, cough, tachypnea, wheezing  GI:  No pain, No nausea, No vomiting, No diarrhea, No constipation, No weight loss, No fever, No pruritis, No rectal bleeding, No melena, No dysphagia  :  No pain, bleeding, incontinence, nocturia  Muscle:  No pain, weakness  Neuro:  No weakness, tingling, memory problems  Psych:  No fatigue, insomnia, mood problems, depression  Endocrine:  No polyuria, polydypsia, cold/heat intolerance  Heme:  No petechiae, ecchymosis, easy bruisability  Skin:  No rash, tattoos, scars, edema      Vital Signs Last 24 Hrs  T(C): 36.6 (03 Apr 2020 04:27), Max: 36.9 (02 Apr 2020 21:23)  T(F): 97.8 (03 Apr 2020 04:27), Max: 98.4 (02 Apr 2020 21:23)  HR: 88 (03 Apr 2020 04:27) (88 - 97)  BP: 155/73 (03 Apr 2020 04:27) (138/83 - 156/83)  BP(mean): --  RR: 17 (03 Apr 2020 04:27) (17 - 19)  SpO2: 96% (03 Apr 2020 04:27) (96% - 97%)    PHYSICAL EXAM:    Constitutional: sitting up in bed  HEENT: ncat no visible plaques appreciated  Gastrointestinal: soft nt mild dt  Extremities: generalized edema  Neurological: Awake alert appropriate       LABS:                        6.5    0.77  )-----------( 7        ( 02 Apr 2020 07:44 )             19.1     04-02    139  |  112<H>  |  24<H>  ----------------------------<  120<H>  4.3   |  20<L>  |  0.89    Ca    8.1<L>      02 Apr 2020 07:44    TPro  5.1<L>  /  Alb  2.5<L>  /  TBili  2.5<H>  /  DBili  x   /  AST  15  /  ALT  12  /  AlkPhos  117  04-02          RADIOLOGY & ADDITIONAL TESTS:

## 2020-04-03 NOTE — PROGRESS NOTE ADULT - SUBJECTIVE AND OBJECTIVE BOX
Patient is a 75y old  Male who presents with a chief complaint of fever, weakness, and cough.     INTERVAL HPI/OVERNIGHT EVENTS: Pt reports some general weakness. Denies fever, chills, SOB, CP, palpitations, headache, abd pain. Now willing to pursue hospice - having tele-meeting at ~3PM.      MEDICATIONS  (STANDING):  entecavir 0.5 milliGRAM(s) Oral daily  nystatin    Suspension 098188 Unit(s) Oral three times a day  pantoprazole    Tablet 40 milliGRAM(s) Oral before breakfast  potassium chloride    Tablet ER 20 milliEquivalent(s) Oral two times a day  predniSONE   Tablet 2.5 milliGRAM(s) Oral daily  riTUXimab Infusion (eMAR) 50 mL/Hr (50 mL/Hr) IV Continuous <Continuous>    MEDICATIONS  (PRN):  acetaminophen   Tablet .. 650 milliGRAM(s) Oral every 6 hours PRN Mild Pain (1 - 3)  acetaminophen   Tablet .. 650 milliGRAM(s) Oral every 6 hours PRN Temp greater or equal to 38C (100.4F)  albuterol/ipratropium for Nebulization 3 milliLiter(s) Nebulizer every 6 hours PRN Shortness of Breath and/or Wheezing  diphenhydrAMINE 25 milliGRAM(s) Oral every 4 hours PRN Rash and/or Itching  guaiFENesin   Syrup  (Sugar-Free) 200 milliGRAM(s) Oral every 6 hours PRN Cough        Allergies    sulfa drugs (Unknown)    Intolerances        REVIEW OF SYSTEMS:  CONSTITUTIONAL: +generalized weakness; No fever or chills  HEENT:  No headache, no sore throat  RESPIRATORY: No cough, wheezing, or shortness of breath  CARDIOVASCULAR: No chest pain, palpitations  GASTROINTESTINAL: No abd pain, nausea, vomiting, or diarrhea  GENITOURINARY: No dysuria, frequency, or hematuria  NEUROLOGICAL: no focal weakness or dizziness  MUSCULOSKELETAL: no myalgias    Vital Signs Last 24 Hrs  T(C): 36.6 (03 Apr 2020 04:27), Max: 36.9 (02 Apr 2020 21:23)  T(F): 97.8 (03 Apr 2020 04:27), Max: 98.4 (02 Apr 2020 21:23)  HR: 88 (03 Apr 2020 04:27) (88 - 93)  BP: 155/73 (03 Apr 2020 04:27) (155/73 - 156/83)  BP(mean): --  RR: 17 (03 Apr 2020 04:27) (17 - 19)  SpO2: 96% (03 Apr 2020 04:27) (96% - 97%)    PHYSICAL EXAM:  GENERAL: NAD, chronically ill-appearing  HEENT:  anicteric, moist mucous membranes  CHEST/LUNG: grossly CTA b/l  HEART:  RRR, S1, S2  ABDOMEN:  BS+, soft, nontender, nondistended  EXTREMITIES: edema in upper extremities (worse in L UE); ecchymoses in UEs; no cyanosis or calf tenderness  NERVOUS SYSTEM: answers questions and follows commands appropriately    LABS:                        6.5    0.77  )-----------( 7        ( 02 Apr 2020 07:44 )             19.1     CBC Full  -  ( 02 Apr 2020 07:44 )  WBC Count : 0.77 K/uL  Hemoglobin : 6.5 g/dL  Hematocrit : 19.1 %  Platelet Count - Automated : 7 K/uL  Mean Cell Volume : 86.0 fl  Mean Cell Hemoglobin : 29.3 pg  Mean Cell Hemoglobin Concentration : 34.0 gm/dL  Auto Neutrophil # : 0.32 K/uL  Auto Lymphocyte # : 0.29 K/uL  Auto Monocyte # : 0.07 K/uL  Auto Eosinophil # : 0.01 K/uL  Auto Basophil # : 0.01 K/uL  Auto Neutrophil % : 34.0 %  Auto Lymphocyte % : 38.0 %  Auto Monocyte % : 9.0 %  Auto Eosinophil % : 1.0 %  Auto Basophil % : 1.0 %    02 Apr 2020 07:44    139    |  112    |  24     ----------------------------<  120    4.3     |  20     |  0.89     Ca    8.1        02 Apr 2020 07:44    TPro  5.1    /  Alb  2.5    /  TBili  2.5    /  DBili  x      /  AST  15     /  ALT  12     /  AlkPhos  117    02 Apr 2020 07:44        CAPILLARY BLOOD GLUCOSE              RADIOLOGY & ADDITIONAL TESTS:    Personally reviewed.     Consultant(s) Notes Reviewed:  [x] YES  [ ] NO Patient is a 75y old  Male who presents with a chief complaint of fever, weakness, and cough.      INTERVAL HPI/OVERNIGHT EVENTS: Pt reports some general weakness. Denies fever, chills, SOB, CP, palpitations, headache, abd pain. Now willing to pursue hospice - having tele-meeting at ~3PM.      MEDICATIONS  (STANDING):  entecavir 0.5 milliGRAM(s) Oral daily  nystatin    Suspension [id number] Unit(s) Oral three times a day  pantoprazole    Tablet 40 milliGRAM(s) Oral before breakfast  potassium chloride    Tablet ER 20 milliEquivalent(s) Oral two times a day  predniSONE   Tablet 2.5 milliGRAM(s) Oral daily  riTUXimab Infusion (eMAR) 50 mL/Hr (50 mL/Hr) IV Continuous <Continuous>    MEDICATIONS  (PRN):  acetaminophen   Tablet .. 650 milliGRAM(s) Oral every 6 hours PRN Mild Pain (1 - 3)  acetaminophen   Tablet .. 650 milliGRAM(s) Oral every 6 hours PRN Temp greater or equal to 38C (100.4F)  albuterol/ipratropium for Nebulization 3 milliLiter(s) Nebulizer every 6 hours PRN Shortness of Breath and/or Wheezing  diphenhydrAMINE 25 milliGRAM(s) Oral every 4 hours PRN Rash and/or Itching  guaiFENesin   Syrup  (Sugar-Free) 200 milliGRAM(s) Oral every 6 hours PRN Cough        Allergies    sulfa drugs (Unknown)    Intolerances        REVIEW OF SYSTEMS:  CONSTITUTIONAL: +generalized weakness; No fever or chills  HEENT:  No headache, no sore throat  RESPIRATORY: No cough, wheezing, or shortness of breath  CARDIOVASCULAR: No chest pain, palpitations  GASTROINTESTINAL: No abd pain, nausea, vomiting, or diarrhea  GENITOURINARY: No dysuria, frequency, or hematuria  NEUROLOGICAL: no focal weakness or dizziness  MUSCULOSKELETAL: no myalgias    Vital Signs Last 24 Hrs  T(C): 36.6 (03 Apr 2020 04:27), Max: 36.9 (02 Apr 2020 21:23)  T(F): 97.8 (03 Apr 2020 04:27), Max: 98.4 (02 Apr 2020 21:23)  HR: 88 (03 Apr 2020 04:27) (88 - 93)  BP: 155/73 (03 Apr 2020 04:27) (155/73 - 156/83)  BP(mean): --  RR: 17 (03 Apr 2020 04:27) (17 - 19)  SpO2: 96% (03 Apr 2020 04:27) (96% - 97%)    PHYSICAL EXAM:  GENERAL: NAD, chronically ill-appearing  HEENT:  anicteric, moist mucous membranes  CHEST/LUNG: grossly CTA b/l  HEART:  RRR, S1, S2  ABDOMEN:  BS+, soft, nontender, nondistended  EXTREMITIES: edema in upper extremities (worse in L UE); ecchymoses in UEs; no cyanosis or calf tenderness  NERVOUS SYSTEM: answers questions and follows commands appropriately    LABS:                        6.5    0.77  )-----------( 7        ( 02 Apr 2020 07:44 )             19.1     CBC Full  -  ( 02 Apr 2020 07:44 )  WBC Count : 0.77 K/uL  Hemoglobin : 6.5 g/dL  Hematocrit : 19.1 %  Platelet Count - Automated : 7 K/uL  Mean Cell Volume : 86.0 fl  Mean Cell Hemoglobin : 29.3 pg  Mean Cell Hemoglobin Concentration : 34.0 gm/dL  Auto Neutrophil # : 0.32 K/uL  Auto Lymphocyte # : 0.29 K/uL  Auto Monocyte # : 0.07 K/uL  Auto Eosinophil # : 0.01 K/uL  Auto Basophil # : 0.01 K/uL  Auto Neutrophil % : 34.0 %  Auto Lymphocyte % : 38.0 %  Auto Monocyte % : 9.0 %  Auto Eosinophil % : 1.0 %  Auto Basophil % : 1.0 %    02 Apr 2020 07:44    139    |  112    |  24     ----------------------------<  120    4.3     |  20     |  0.89     Ca    8.1        02 Apr 2020 07:44    TPro  5.1    /  Alb  2.5    /  TBili  2.5    /  DBili  x      /  AST  15     /  ALT  12     /  AlkPhos  117    02 Apr 2020 07:44        CAPILLARY BLOOD GLUCOSE              RADIOLOGY & ADDITIONAL TESTS:    Personally reviewed.     Consultant(s) Notes Reviewed:  [x] YES  [ ] NO

## 2020-04-03 NOTE — PROGRESS NOTE ADULT - ASSESSMENT
74yo M with PMH of refractory CLL, AIHA, chronic hepatitis B (on entecavir), KIRTI on CPAP, T2DM (managed with lifestyle changes), HTN (not on any medication) who presents with chief complaint of fever, cough and generalized weakness, admitted with suspected gram-negative multifocal pneumonia, severe anemia and severe thrombocytopenia, long hospital course including trial of Venetoclax started 3/1/20 but required dose reduction and holds, finally stopped 3/29/20 due to further marrow suppression, now also s/p Rituxan, with requirement of large amount of PRBC and platelet transfusions during admission, now for hospice.

## 2020-04-03 NOTE — PROGRESS NOTE ADULT - PROBLEM SELECTOR PLAN 1
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 Stephenson continued to address GOC with patient and family. Pt and his wife decided to pursue home hospice. Referral made - will have tele-meeting today ~3PM.

## 2020-04-03 NOTE — PROGRESS NOTE ADULT - PROBLEM SELECTOR PLAN 3
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 Was on prednisone 10mg daily -- increase to prednisone 40mg po during hospitalization, tapering started @2/29 - now 2.5mg daily  - Heme/onc following  - Hgb of ~6.5, no more transfusions - hospice care

## 2020-04-03 NOTE — PROGRESS NOTE ADULT - PROBLEM SELECTOR PLAN 3
care as per heme/onc  ob neg; no s/s overt gib  transfuse per heme/onc  diet as tolerated; correct elytes prn  started on nystatin for possible thrush  add magic mouthwash for symptom relief as needed  cont ppi ppx  per notes, hospice referral now made

## 2020-04-03 NOTE — GOALS OF CARE CONVERSATION - ADVANCED CARE PLANNING - CONVERSATION DETAILS
[facility] RN spoke to patient's wife and patient's daughter, Kaitana, via telephone conference call.  Explained all aspects of home hospice services with good return understanding. All questions answered and emotional support provided.    DME ordered for delivery to patient's home on Saturday (4/4): hospital bed with air mattress, oxygen and commode. As per daughter Katiana, patient has a walker.     Daughter Katiana stated that she is staying with her mother and will be around to assist with patient's care at home. Daughter Katiana understands that [facility] can provide a HHA, 4 hours/day, 5x/week,  but it may take some time for HHA to be available.     [facility] main office notified to follow up regarding possible d/c home tomorrow.    Heidi Aguayo RN  [phone]
met pt, may have hcp, pt son took care of papers, left message for son regarding hcp. pt spoke of conversations with physicians, pt wants to continue chemo treatment & blood transfusions, wants to live. will follow up when son visits regarding resuscitation wishes. PC RN contact # given

## 2020-04-03 NOTE — PROGRESS NOTE ADULT - SUBJECTIVE AND OBJECTIVE BOX
[facility] -- Cori Dawkins, Travis Villagomez Pannella, Patel, Savella, Goodger  Office # [phone]    Follow Up:  Cardiac arrythmia    Subjective/Observations: Sitting up at edge of bed, on RA.  Denies any form of discomfort.  Ate breakfast with good appetite.  However, expresses dismay that he was told there is nothing left to be done.  Awaiting discharge    REVIEW OF SYSTEMS: All other review of systems is negative unless indicated above  PAST MEDICAL & SURGICAL HISTORY:  KIRTI (obstructive sleep apnea): on nocturnal cpap  Hypertension: not on any medications  Diabetes: not on any medications  Hepatitis B  Lymphoma: Small cell B cell  AIHA (autoimmune hemolytic anemia)  H/O lithotripsy    MEDICATIONS  (STANDING):  entecavir 0.5 milliGRAM(s) Oral daily  nystatin    Suspension 805376 Unit(s) Oral three times a day  pantoprazole    Tablet 40 milliGRAM(s) Oral before breakfast  potassium chloride    Tablet ER 20 milliEquivalent(s) Oral two times a day  predniSONE   Tablet 2.5 milliGRAM(s) Oral daily  riTUXimab Infusion (eMAR) 50 mL/Hr (50 mL/Hr) IV Continuous <Continuous>    MEDICATIONS  (PRN):  acetaminophen   Tablet .. 650 milliGRAM(s) Oral every 6 hours PRN Mild Pain (1 - 3)  acetaminophen   Tablet .. 650 milliGRAM(s) Oral every 6 hours PRN Temp greater or equal to 38C (100.4F)  albuterol/ipratropium for Nebulization 3 milliLiter(s) Nebulizer every 6 hours PRN Shortness of Breath and/or Wheezing  diphenhydrAMINE 25 milliGRAM(s) Oral every 4 hours PRN Rash and/or Itching  guaiFENesin   Syrup  (Sugar-Free) 200 milliGRAM(s) Oral every 6 hours PRN Cough    Allergies    sulfa drugs (Unknown)    Intolerances    Vital Signs Last 24 Hrs  T(C): 36.6 (03 Apr 2020 04:27), Max: 36.9 (02 Apr 2020 21:23)  T(F): 97.8 (03 Apr 2020 04:27), Max: 98.4 (02 Apr 2020 21:23)  HR: 88 (03 Apr 2020 04:27) (88 - 93)  BP: 155/73 (03 Apr 2020 04:27) (155/73 - 156/83)  BP(mean): --  RR: 17 (03 Apr 2020 04:27) (17 - 19)  SpO2: 96% (03 Apr 2020 04:27) (96% - 97%)  I&O's Summary    02 Apr 2020 07:01  -  03 Apr 2020 07:00  --------------------------------------------------------  IN: 570 mL / OUT: 0 mL / NET: 570 mL    PHYSICAL EXAM:  TELE: Not on tele  Constitutional: NAD, awake and alert, well-developed  HEENT: Moist Mucous Membranes, Anicteric  Pulmonary: Non-labored, breath sounds are clear bilaterally, No wheezing, rales or rhonchi  Cardiovascular: Regular, S1 and S2, No murmurs.  No rubs, gallops or clicks  Gastrointestinal: Bowel Sounds present, soft, nontender.   Lymph: No peripheral edema. No lymphadenopathy.  Skin: No visible rashes. Left arm ulcer.  Pale in color. + BUE ecchymoses  Psych:  Mood & affect appropriate    LABS: All Labs Reviewed:                        6.5    0.77  )-----------( 7        ( 02 Apr 2020 07:44 )             19.1                         7.0    0.60  )-----------( 4        ( 01 Apr 2020 10:21 )             21.1     02 Apr 2020 07:44    139    |  112    |  24     ----------------------------<  120    4.3     |  20     |  0.89   01 Apr 2020 10:21    136    |  111    |  30     ----------------------------<  133    3.8     |  16     |  0.97     Ca    8.1        02 Apr 2020 07:44  Ca    8.2        01 Apr 2020 10:21    TPro  5.1    /  Alb  2.5    /  TBili  2.5    /  DBili  x      /  AST  15     /  ALT  12     /  AlkPhos  117    02 Apr 2020 07:44  TPro  5.2    /  Alb  2.6    /  TBili  2.5    /  DBili  x      /  AST  17     /  ALT  15     /  AlkPhos  135    01 Apr 2020 10:21    < from: TTE Echo Complete w/o contrast w/ Doppler (03.04.20 @ 09:54) >     EXAM:  ECHO TTE WO CON COMP W DOPP      PROCEDURE DATE:  03/04/2020      INTERPRETATION:  INDICATION: edema  Sonographer PH    Blood Pressure 129/63    Height 165 cm     Weight 82.9 kg       BSA 1.9 sq m    Dimensions:    LA 2.9       Normal Values: 2.0 - 4.0 cm    Ao 2.9        Normal Values: 2.0 - 3.8 cm  SEPTUM 0.9       Normal Values: 0.6 - 1.2 cm  PWT 0.9       Normal Values: 0.6 - 1.1 cm  LVIDd 4.1         Normal Values: 3.0 - 5.6 cm  LVIDs 2.3         Normal Values: 1.8 - 4.0 cm    OBSERVATIONS:    Mitral Valve: normal, trace physiologic MR.  Aortic Valve/Aorta: Normal trileaflet aortic valve with normal opening.  Tricuspid Valve: normal with trace TR.  Pulmonic Valve: normal  Left Atrium: normal  Right Atrium: normal  Left Ventricle: normal LV size and systolic function, estimated LVEF of 65%.  Right Ventricle: Grossly normal size and systolic function.  Pericardium/Pleura: normal, no significant pericardial effusion.    Conclusion:   Normal left ventricular internal dimensions and systolic function, estimated LVEF of 65%.   Grossly normal RV size and systolic function.     Normal trileaflet aortic valve, without AI.   Trace physiologic MR and TR.      KALPANA GUERRERO   This document has been electronically signed. Mar  5 2020  7:32AM     < end of copied text >    < from: CT Chest No Cont (03.19.20 @ 09:40) >    EXAM:  CT CHEST                          PROCEDURE DATE:  03/19/2020      INTERPRETATION:  CLINICAL INFORMATION: CLL, fever    COMPARISON: 2/21/2020    PROCEDURE:   CT of the Chest, Abdomen and Pelvis was performed without intravenous contrast.   Intravenous contrast: None.  Oral contrast: None.  Sagittal and coronal reformats were performed.    FINDINGS:  Lack of IV contrast limits evaluation diana, vascular structures and solid organ parenchyma  CHEST:     LUNGS AND LARGE AIRWAYS: Patentcentral airways. Scattered bilateral patchy, nodular and groundglass opacities slightly improved at the lung bases compared to prior. Findings could all be inflammatory/infectious. Neoplastic component not excluded.  PLEURA: Interval increase in small left and small-to-moderate right pleural effusion.  VESSELS: Nonaneurysmal  HEART: Heart size is normal. Coronary artery calcination. Decrease cardiac chamber blood pool attenuation suggests an anemic state. Trace pericardial effusion.  MEDIASTINUM AND DIANA: Multistation mediastinal  supraclavicular, bilateral hilar and bilateral axillary adenopathy similar to prior  CHEST WALL AND LOWER NECK: Within normal limits.    ABDOMEN AND PELVIS:    LIVER: Hepatomegaly similar to prior  BILE DUCTS: Normal caliber.  GALLBLADDER: Cholelithiasis.  SPLEEN: Splenomegaly similar to prior  PANCREAS: Within normal limits.  ADRENALS: Within normal limits.  KIDNEYS/URETERS: Within normal limits.    BLADDER: Within normal limits.  REPRODUCTIVE ORGANS: Unremarkable    BOWEL: Evaluation limited due to limited by nonopacification underdistention. No gross active bowel inflammation. No bowel obstruction.. Appendix no appendicitis  PERITONEUM: Trace ascites. No extraluminal gas or organized collections.  VESSELS: Nonaneurysmal.  RETROPERITONEUM/LYMPH NODES: Stable periportal and retroperitoneal, mesenteric pelvic and bilateral inguinal adenopathy.    ABDOMINAL WALL: Mild anasarca.  BONES: Stable    IMPRESSION:     Improvement in the scattered bilateral parenchymal opacities as described.  Slight increase in bilateral pleural effusions.  Stable multistation adenopathy chest abdomen and pelvis as described  No acute findings abdomen and pelvis  Additional findings as discussed    PANTERA CALVILLO M.D., ATTENDING RADIOLOGIST  This document has been electronically signed. Mar 19 2020 10:26AM      < end of copied text >    < from: 12 Lead ECG (03.18.20 @ 19:13) >    Ventricular Rate 167 BPM    Atrial Rate 178 BPM    QRS Duration 64 ms    Q-T Interval 296 ms    QTC Calculation(Bezet) 493 ms    R Axis 32 degrees    T Axis 61 degrees    Diagnosis Line *** Poor data quality, interpretation may be adversely affected  likely st with apcs  Nonspecific ST and T wave abnormality  Abnormal ECG  When compared with ECG of 16-MAR-2020 17:10,  Previous ECG has undetermined rhythm, needs review  Nonspecific T wave abnormality now evident in Anterior leads  Confirmed by CANDI SCHREIBER ([id number]) on 3/19/2020 5:29:35 PM    < end of copied text >

## 2020-04-03 NOTE — PROGRESS NOTE ADULT - PROBLEM SELECTOR PLAN 1
improved  patient with elevated bilirubin and alk phos at baseline ? leon  mri showed normal liver, no cbd stone/dilatation  hep b pcr neg

## 2020-04-03 NOTE — PROGRESS NOTE ADULT - SUBJECTIVE AND OBJECTIVE BOX
All interim records and events noted.    some inside of mouth irritation, jeannette corners.      MEDICATIONS  (STANDING):  entecavir 0.5 milliGRAM(s) Oral daily  nystatin    Suspension 734446 Unit(s) Oral three times a day  pantoprazole    Tablet 40 milliGRAM(s) Oral before breakfast  potassium chloride    Tablet ER 20 milliEquivalent(s) Oral two times a day  predniSONE   Tablet 2.5 milliGRAM(s) Oral daily  riTUXimab Infusion (eMAR) 50 mL/Hr (50 mL/Hr) IV Continuous <Continuous>    MEDICATIONS  (PRN):  acetaminophen   Tablet .. 650 milliGRAM(s) Oral every 6 hours PRN Mild Pain (1 - 3)  acetaminophen   Tablet .. 650 milliGRAM(s) Oral every 6 hours PRN Temp greater or equal to 38C (100.4F)  albuterol/ipratropium for Nebulization 3 milliLiter(s) Nebulizer every 6 hours PRN Shortness of Breath and/or Wheezing  diphenhydrAMINE 25 milliGRAM(s) Oral every 4 hours PRN Rash and/or Itching  guaiFENesin   Syrup  (Sugar-Free) 200 milliGRAM(s) Oral every 6 hours PRN Cough      Vital Signs Last 24 Hrs  T(C): 36.6 (03 Apr 2020 04:27), Max: 36.9 (02 Apr 2020 21:23)  T(F): 97.8 (03 Apr 2020 04:27), Max: 98.4 (02 Apr 2020 21:23)  HR: 88 (03 Apr 2020 04:27) (88 - 93)  BP: 155/73 (03 Apr 2020 04:27) (155/73 - 156/83)  BP(mean): --  RR: 17 (03 Apr 2020 04:27) (17 - 19)  SpO2: 96% (03 Apr 2020 04:27) (96% - 97%)    PHYSICAL EXAM  General: well developed  well nourished, in no acute distress but appear chronically ill  Head: atraumatic, normocephalic  ENT: sclera anicteric, buccal mucosa moist, no distinct mouth lesions seen  Neck: supple  CV: S1 S2, regular rate and rhythm  Lungs: clear to auscultation, no wheezes/rhonchi  Abdomen: soft, nontender, bowel sounds present, no palpable masses  Extrem: trace edema blair legs, right arm, trace-1+ left arm/hand  Skin: ecchymosis blair arms, area ant to left elbow decreased  Neuro: alert and oriented X3,  no focal deficits      LABS:             6.5    0.77  )-----------( 7        ( 04-02 @ 07:44 )             19.1                7.0    0.60  )-----------( 4        ( 04-01 @ 10:21 )             21.1       04-02    139  |  112<H>  |  24<H>  ----------------------------<  120<H>  4.3   |  20<L>  |  0.89    Ca    8.1<L>      02 Apr 2020 07:44    TPro  5.1<L>  /  Alb  2.5<L>  /  TBili  2.5<H>  /  DBili  x   /  AST  15  /  ALT  12  /  AlkPhos  117  04-02        RADIOLOGY & ADDITIONAL STUDIES:    IMPRESSION/RECOMMENDATIONS:

## 2020-04-03 NOTE — PROGRESS NOTE ADULT - NSHPATTENDINGPLANDISCUSS_GEN_ALL_CORE
patient, Surgical PA and today's RN.
RN, pt
patient, Heme/Onc, RN
patient, Heme/Onc, RN
patient, RN
Heme/onc, patient re: poor prognosis, goals of acre
pt, consultants nursing
pt, consultants, nursing
pt, pt's family, consultants, nursing
patient, RN
patient, RN, Resident team
patient, Heme/Onc, RN, Resident team
patient, Heme/Onc, RN, Resident team, wife
RN, pt
patient, Heme/Onc, RN, Resident team
Dr. guillory , hematology and Medical Team
Dr. guillory and medical team
patient, Heme/Onc, RN, Resident team
Hematology. ID , Patient and wife and medical team
patient, RN, Resident team

## 2020-04-04 ENCOUNTER — TRANSCRIPTION ENCOUNTER (OUTPATIENT)
Age: 75
End: 2020-04-04

## 2020-04-04 VITALS
HEART RATE: 86 BPM | DIASTOLIC BLOOD PRESSURE: 74 MMHG | SYSTOLIC BLOOD PRESSURE: 165 MMHG | TEMPERATURE: 99 F | OXYGEN SATURATION: 95 % | RESPIRATION RATE: 18 BRPM

## 2020-04-04 LAB
BASOPHILS # BLD AUTO: 0 K/UL — SIGNIFICANT CHANGE UP (ref 0–0.2)
BASOPHILS NFR BLD AUTO: 0 % — SIGNIFICANT CHANGE UP (ref 0–2)
EOSINOPHIL # BLD AUTO: 0.01 K/UL — SIGNIFICANT CHANGE UP (ref 0–0.5)
EOSINOPHIL NFR BLD AUTO: 0.6 % — SIGNIFICANT CHANGE UP (ref 0–6)
HCT VFR BLD CALC: 17.9 % — CRITICAL LOW (ref 39–50)
HGB BLD-MCNC: 6 G/DL — CRITICAL LOW (ref 13–17)
IMM GRANULOCYTES NFR BLD AUTO: 1.2 % — SIGNIFICANT CHANGE UP (ref 0–1.5)
LYMPHOCYTES # BLD AUTO: 1 K/UL — SIGNIFICANT CHANGE UP (ref 1–3.3)
LYMPHOCYTES # BLD AUTO: 62.1 % — HIGH (ref 13–44)
MCHC RBC-ENTMCNC: 29.6 PG — SIGNIFICANT CHANGE UP (ref 27–34)
MCHC RBC-ENTMCNC: 33.5 GM/DL — SIGNIFICANT CHANGE UP (ref 32–36)
MCV RBC AUTO: 88.2 FL — SIGNIFICANT CHANGE UP (ref 80–100)
MONOCYTES # BLD AUTO: 0.29 K/UL — SIGNIFICANT CHANGE UP (ref 0–0.9)
MONOCYTES NFR BLD AUTO: 18 % — HIGH (ref 2–14)
NEUTROPHILS # BLD AUTO: 0.29 K/UL — LOW (ref 1.8–7.4)
NEUTROPHILS NFR BLD AUTO: 18.1 % — LOW (ref 43–77)
NRBC # BLD: 0 /100 WBCS — SIGNIFICANT CHANGE UP (ref 0–0)
PLATELET # BLD AUTO: 2 K/UL — CRITICAL LOW (ref 150–400)
RBC # BLD: 2.03 M/UL — LOW (ref 4.2–5.8)
RBC # FLD: 16 % — HIGH (ref 10.3–14.5)
WBC # BLD: 1.61 K/UL — LOW (ref 3.8–10.5)
WBC # FLD AUTO: 1.61 K/UL — LOW (ref 3.8–10.5)

## 2020-04-04 PROCEDURE — P9037: CPT

## 2020-04-04 PROCEDURE — 80053 COMPREHEN METABOLIC PANEL: CPT

## 2020-04-04 PROCEDURE — 87640 STAPH A DNA AMP PROBE: CPT

## 2020-04-04 PROCEDURE — 85027 COMPLETE CBC AUTOMATED: CPT

## 2020-04-04 PROCEDURE — 87635 SARS-COV-2 COVID-19 AMP PRB: CPT

## 2020-04-04 PROCEDURE — 85045 AUTOMATED RETICULOCYTE COUNT: CPT

## 2020-04-04 PROCEDURE — 83605 ASSAY OF LACTIC ACID: CPT

## 2020-04-04 PROCEDURE — 84550 ASSAY OF BLOOD/URIC ACID: CPT

## 2020-04-04 PROCEDURE — 87449 NOS EACH ORGANISM AG IA: CPT

## 2020-04-04 PROCEDURE — 82550 ASSAY OF CK (CPK): CPT

## 2020-04-04 PROCEDURE — 87641 MR-STAPH DNA AMP PROBE: CPT

## 2020-04-04 PROCEDURE — 97530 THERAPEUTIC ACTIVITIES: CPT

## 2020-04-04 PROCEDURE — 85018 HEMOGLOBIN: CPT

## 2020-04-04 PROCEDURE — 87581 M.PNEUMON DNA AMP PROBE: CPT

## 2020-04-04 PROCEDURE — 82272 OCCULT BLD FECES 1-3 TESTS: CPT

## 2020-04-04 PROCEDURE — 87899 AGENT NOS ASSAY W/OPTIC: CPT

## 2020-04-04 PROCEDURE — 85014 HEMATOCRIT: CPT

## 2020-04-04 PROCEDURE — 96365 THER/PROPH/DIAG IV INF INIT: CPT | Mod: XU

## 2020-04-04 PROCEDURE — 87798 DETECT AGENT NOS DNA AMP: CPT

## 2020-04-04 PROCEDURE — P9059: CPT

## 2020-04-04 PROCEDURE — 83735 ASSAY OF MAGNESIUM: CPT

## 2020-04-04 PROCEDURE — 87633 RESP VIRUS 12-25 TARGETS: CPT

## 2020-04-04 PROCEDURE — 83615 LACTATE (LD) (LDH) ENZYME: CPT

## 2020-04-04 PROCEDURE — 86860 RBC ANTIBODY ELUTION: CPT

## 2020-04-04 PROCEDURE — P9047: CPT

## 2020-04-04 PROCEDURE — 86901 BLOOD TYPING SEROLOGIC RH(D): CPT

## 2020-04-04 PROCEDURE — 99285 EMERGENCY DEPT VISIT HI MDM: CPT | Mod: 25

## 2020-04-04 PROCEDURE — 71045 X-RAY EXAM CHEST 1 VIEW: CPT

## 2020-04-04 PROCEDURE — 93970 EXTREMITY STUDY: CPT

## 2020-04-04 PROCEDURE — 82784 ASSAY IGA/IGD/IGG/IGM EACH: CPT

## 2020-04-04 PROCEDURE — 74176 CT ABD & PELVIS W/O CONTRAST: CPT

## 2020-04-04 PROCEDURE — 97110 THERAPEUTIC EXERCISES: CPT

## 2020-04-04 PROCEDURE — 82962 GLUCOSE BLOOD TEST: CPT

## 2020-04-04 PROCEDURE — 85610 PROTHROMBIN TIME: CPT

## 2020-04-04 PROCEDURE — A9579: CPT

## 2020-04-04 PROCEDURE — 87631 RESP VIRUS 3-5 TARGETS: CPT

## 2020-04-04 PROCEDURE — 84484 ASSAY OF TROPONIN QUANT: CPT

## 2020-04-04 PROCEDURE — 97162 PT EVAL MOD COMPLEX 30 MIN: CPT

## 2020-04-04 PROCEDURE — 82248 BILIRUBIN DIRECT: CPT

## 2020-04-04 PROCEDURE — 82553 CREATINE MB FRACTION: CPT

## 2020-04-04 PROCEDURE — 94760 N-INVAS EAR/PLS OXIMETRY 1: CPT

## 2020-04-04 PROCEDURE — 81001 URINALYSIS AUTO W/SCOPE: CPT

## 2020-04-04 PROCEDURE — 93005 ELECTROCARDIOGRAM TRACING: CPT

## 2020-04-04 PROCEDURE — 93971 EXTREMITY STUDY: CPT

## 2020-04-04 PROCEDURE — 93306 TTE W/DOPPLER COMPLETE: CPT

## 2020-04-04 PROCEDURE — P9016: CPT

## 2020-04-04 PROCEDURE — 36415 COLL VENOUS BLD VENIPUNCTURE: CPT

## 2020-04-04 PROCEDURE — 85730 THROMBOPLASTIN TIME PARTIAL: CPT

## 2020-04-04 PROCEDURE — 86900 BLOOD TYPING SEROLOGIC ABO: CPT

## 2020-04-04 PROCEDURE — 80048 BASIC METABOLIC PNL TOTAL CA: CPT

## 2020-04-04 PROCEDURE — 99232 SBSQ HOSP IP/OBS MODERATE 35: CPT

## 2020-04-04 PROCEDURE — 80202 ASSAY OF VANCOMYCIN: CPT

## 2020-04-04 PROCEDURE — 97116 GAIT TRAINING THERAPY: CPT

## 2020-04-04 PROCEDURE — 80076 HEPATIC FUNCTION PANEL: CPT

## 2020-04-04 PROCEDURE — 36430 TRANSFUSION BLD/BLD COMPNT: CPT

## 2020-04-04 PROCEDURE — 87486 CHLMYD PNEUM DNA AMP PROBE: CPT

## 2020-04-04 PROCEDURE — 87517 HEPATITIS B DNA QUANT: CPT

## 2020-04-04 PROCEDURE — 86850 RBC ANTIBODY SCREEN: CPT

## 2020-04-04 PROCEDURE — 87070 CULTURE OTHR SPECIMN AEROBIC: CPT

## 2020-04-04 PROCEDURE — 87086 URINE CULTURE/COLONY COUNT: CPT

## 2020-04-04 PROCEDURE — 71250 CT THORAX DX C-: CPT

## 2020-04-04 PROCEDURE — 86870 RBC ANTIBODY IDENTIFICATION: CPT

## 2020-04-04 PROCEDURE — 84145 PROCALCITONIN (PCT): CPT

## 2020-04-04 PROCEDURE — 74183 MRI ABD W/O CNTR FLWD CNTR: CPT

## 2020-04-04 PROCEDURE — 99239 HOSP IP/OBS DSCHRG MGMT >30: CPT

## 2020-04-04 PROCEDURE — 83010 ASSAY OF HAPTOGLOBIN QUANT: CPT

## 2020-04-04 PROCEDURE — 86922 COMPATIBILITY TEST ANTIGLOB: CPT

## 2020-04-04 PROCEDURE — 94640 AIRWAY INHALATION TREATMENT: CPT

## 2020-04-04 PROCEDURE — 76705 ECHO EXAM OF ABDOMEN: CPT

## 2020-04-04 PROCEDURE — 84100 ASSAY OF PHOSPHORUS: CPT

## 2020-04-04 PROCEDURE — 94660 CPAP INITIATION&MGMT: CPT

## 2020-04-04 PROCEDURE — 87040 BLOOD CULTURE FOR BACTERIA: CPT

## 2020-04-04 PROCEDURE — 86880 COOMBS TEST DIRECT: CPT

## 2020-04-04 RX ORDER — ACETAMINOPHEN 500 MG
2 TABLET ORAL
Qty: 240 | Refills: 0
Start: 2020-04-04 | End: 2020-05-03

## 2020-04-04 RX ORDER — PREGABALIN 225 MG/1
1 CAPSULE ORAL
Qty: 0 | Refills: 0 | DISCHARGE

## 2020-04-04 RX ORDER — IBRUTINIB 140 MG/1
1 TABLET, FILM COATED ORAL
Qty: 0 | Refills: 0 | DISCHARGE

## 2020-04-04 RX ORDER — FOLIC ACID 0.8 MG
1 TABLET ORAL
Qty: 0 | Refills: 0 | DISCHARGE

## 2020-04-04 RX ORDER — VALSARTAN 80 MG/1
1 TABLET ORAL
Qty: 0 | Refills: 0 | DISCHARGE

## 2020-04-04 RX ORDER — PANTOPRAZOLE SODIUM 20 MG/1
1 TABLET, DELAYED RELEASE ORAL
Qty: 0 | Refills: 0 | DISCHARGE
Start: 2020-04-04

## 2020-04-04 RX ORDER — LIDOCAINE 4 G/100G
5 CREAM TOPICAL
Qty: 300 | Refills: 0
Start: 2020-04-04

## 2020-04-04 RX ORDER — NYSTATIN 500MM UNIT
5 POWDER (EA) MISCELLANEOUS
Qty: 150 | Refills: 0
Start: 2020-04-04 | End: 2020-04-13

## 2020-04-04 RX ORDER — POTASSIUM CHLORIDE 20 MEQ
1 PACKET (EA) ORAL
Qty: 30 | Refills: 0
Start: 2020-04-04

## 2020-04-04 RX ORDER — ALLOPURINOL 300 MG
1 TABLET ORAL
Qty: 0 | Refills: 0 | DISCHARGE

## 2020-04-04 RX ADMIN — Medication 20 MILLIEQUIVALENT(S): at 05:11

## 2020-04-04 RX ADMIN — PANTOPRAZOLE SODIUM 40 MILLIGRAM(S): 20 TABLET, DELAYED RELEASE ORAL at 05:11

## 2020-04-04 RX ADMIN — Medication 500000 UNIT(S): at 05:11

## 2020-04-04 RX ADMIN — Medication 20 MILLIEQUIVALENT(S): at 16:35

## 2020-04-04 RX ADMIN — Medication 500000 UNIT(S): at 16:34

## 2020-04-04 RX ADMIN — ENTECAVIR 0.5 MILLIGRAM(S): 0.5 TABLET ORAL at 05:12

## 2020-04-04 RX ADMIN — Medication 2.5 MILLIGRAM(S): at 05:11

## 2020-04-04 NOTE — PROGRESS NOTE ADULT - SUBJECTIVE AND OBJECTIVE BOX
Patient seen and examined;  Chart reviewed and events noted;   sad and upset; having pain in his mouth when he eats    MEDICATIONS  (STANDING):  entecavir 0.5 milliGRAM(s) Oral daily  nystatin    Suspension [id number] Unit(s) Oral three times a day  pantoprazole    Tablet 40 milliGRAM(s) Oral before breakfast  potassium chloride    Tablet ER 20 milliEquivalent(s) Oral two times a day  predniSONE   Tablet 2.5 milliGRAM(s) Oral daily    MEDICATIONS  (PRN):  acetaminophen   Tablet .. 650 milliGRAM(s) Oral every 6 hours PRN Mild Pain (1 - 3)  acetaminophen   Tablet .. 650 milliGRAM(s) Oral every 6 hours PRN Temp greater or equal to 38C (100.4F)  albuterol/ipratropium for Nebulization 3 milliLiter(s) Nebulizer every 6 hours PRN Shortness of Breath and/or Wheezing  diphenhydrAMINE 25 milliGRAM(s) Oral every 4 hours PRN Rash and/or Itching  guaiFENesin   Syrup  (Sugar-Free) 200 milliGRAM(s) Oral every 6 hours PRN Cough      Vital Signs Last 24 Hrs  T(C): 37 (04 Apr 2020 14:01), Max: 37 (03 Apr 2020 16:14)  T(F): 98.6 (04 Apr 2020 14:01), Max: 98.6 (03 Apr 2020 16:14)  HR: 86 (04 Apr 2020 14:01) (80 - 93)  BP: 165/74 (04 Apr 2020 14:01) (135/80 - 165/74)  RR: 18 (04 Apr 2020 14:01) (17 - 18)  SpO2: 95% (04 Apr 2020 14:01) (95% - 97%)    PHYSICAL EXAM  General: adult in NAD  HEENT: no oral ulcers; no evidence of thrush  Neck: supple  CV: normal S1S2 with no murmur rubs or gallops  Lungs: clear to auscultation, no wheezes, no rhales  Abdomen: soft non-tender non-distended, no hepato/splenomegaly  Ext: no clubbing cyanosis or edema  Skin: left forearm lesion  Neuro: alert and oriented       LABS:                        6.0    1.61  )-----------( 2        ( 04 Apr 2020 09:39 )             17.9     Hemoglobin: 6.0 g/dL (04-04 @ 09:39)  Hemoglobin: 6.4 g/dL (04-03 @ 13:05)  Hemoglobin: 6.5 g/dL (04-02 @ 07:44)  Hemoglobin: 7.0 g/dL (04-01 @ 10:21)  Hemoglobin: 6.7 g/dL (03-31 @ 06:43)    WBC Count: 1.61 K/uL (04-04 @ 09:39)  WBC Count: 1.39 K/uL (04-03 @ 13:05)  WBC Count: 0.77 K/uL (04-02 @ 07:44)  WBC Count: 0.60 K/uL (04-01 @ 10:21)  WBC Count: 1.24 K/uL (03-31 @ 06:43)    Platelet Count - Automated: 2 K/uL (04-04 @ 09:39)  Platelet Count - Automated: 3 K/uL (04-03 @ 13:05)  Platelet Count - Automated: 7 K/uL (04-02 @ 07:44)  Platelet Count - Automated: 4 K/uL (04-01 @ 10:21)  Platelet Count - Automated: 42 K/uL (03-31 @ 06:43)

## 2020-04-04 NOTE — PROVIDER CONTACT NOTE (CRITICAL VALUE NOTIFICATION) - NS PROVIDER READ BACK
yes
yes/DR Cruz

## 2020-04-04 NOTE — PROGRESS NOTE ADULT - ASSESSMENT
74 y/o man w Hepatitis B on Entecavirt, Chronic Lymphocytic Leukemia/Small Lymphocytic Lymphoma 4/2018 when presented w immune hemolytic anemia w partial response to steroids, started on Ibrutinib w heme CR and ND by CT scan w some decrease of lymphadenopathies, until 1/2020 when relapsed/progressed w incr WBC, anemia(initially not hemolytic, has chronic Matthew positive due to CLL/SLL), thrombocytopenia.    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    Was discharged home and admitted again 2/22/20 with  fever and found w pneumonia, post course of Ceftriaxone, tested negative for COVID; had trial of Venetoclax as well as addition of Rituxan in 3/2020 with no improvement and continued transfusion requirements and became transfusion refractory    -ultimately by April 2020 patient's family agreed to Hospice care  -continue prednisone, now on the tapered further dose of 2.5mg qD  -continue comfort measures, mucositis cocktail ordered by GI and to continue at home with hospice  - patient planned for discharge home today with home hospice care with plan to transition to inpatient hospice if symptoms worsen at home.  - case discussed with Dr. Lemons (Hospitalist)

## 2020-04-04 NOTE — PROGRESS NOTE ADULT - SUBJECTIVE AND OBJECTIVE BOX
[facility] -- Cori Dawkins Grossman, Wachsman, Ryland Wright Savella, Goodger: Office # [phone]    Follow Up:  Cardiac arrythmia    Subjective/Observations:  Patient seen and examined. Patient awake and alert, sitting up in bed. Feels tired. Awaiting discharge. No complaints of chest pain, SOB, LE edema, cough. No signs of orthopnea or PND.    REVIEW OF SYSTEMS: All review of systems is negative for eye, ENT, GI, , allergic, dermatologic, musculoskeletal and neurologic except as described above    PAST MEDICAL & SURGICAL HISTORY:  KIRTI (obstructive sleep apnea): on nocturnal cpap  Hypertension: not on any medications  Hypertension: not on any medications  Diabetes: not on any medications  Hepatitis B  Lymphoma: Small cell B cell  AIHA (autoimmune hemolytic anemia)  H/O lithotripsy    MEDICATIONS  (STANDING):  entecavir 0.5 milliGRAM(s) Oral daily  nystatin    Suspension 589126 Unit(s) Oral three times a day  pantoprazole    Tablet 40 milliGRAM(s) Oral before breakfast  potassium chloride    Tablet ER 20 milliEquivalent(s) Oral two times a day  predniSONE   Tablet 2.5 milliGRAM(s) Oral daily  riTUXimab Infusion (eMAR) 50 mL/Hr (50 mL/Hr) IV Continuous <Continuous>    MEDICATIONS  (PRN):  acetaminophen   Tablet .. 650 milliGRAM(s) Oral every 6 hours PRN Mild Pain (1 - 3)  acetaminophen   Tablet .. 650 milliGRAM(s) Oral every 6 hours PRN Temp greater or equal to 38C (100.4F)  albuterol/ipratropium for Nebulization 3 milliLiter(s) Nebulizer every 6 hours PRN Shortness of Breath and/or Wheezing  diphenhydrAMINE 25 milliGRAM(s) Oral every 4 hours PRN Rash and/or Itching  guaiFENesin   Syrup  (Sugar-Free) 200 milliGRAM(s) Oral every 6 hours PRN Cough    Allergies  sulfa drugs (Unknown)    Vital Signs Last 24 Hrs  T(C): 36.8 (04 Apr 2020 04:22), Max: 37 (03 Apr 2020 16:14)  T(F): 98.2 (04 Apr 2020 04:22), Max: 98.6 (03 Apr 2020 16:14)  HR: 84 (04 Apr 2020 04:22) (77 - 93)  BP: 135/80 (04 Apr 2020 04:22) (135/80 - 155/89)  BP(mean): --  RR: 17 (04 Apr 2020 04:22) (12 - 18)  SpO2: 97% (04 Apr 2020 04:22) (94% - 97%)    I&O's Summary    TELE:   PHYSICAL EXAM:  Appearance: NAD, no distress, alert and oriented x 3  HEENT: Moist Mucous Membranes, Anicteric  Cardiovascular: Regular rate and rhythm, Normal S1 S2, No JVD, No murmurs, No rubs, gallops or clicks  Respiratory: Lungs clear to auscultation. No rales, No rhonchi, No wheezing. No tenderness to palpation  Gastrointestinal:  Soft, Non-tender, + BS  Neurologic: Non-focal, A&Ox3  Skin: Warm and dry, No visible rashes or ulcers, No ecchymosis, No cyanosis  Musculoskeletal: No clubbing, No cyanosis, No joint swelling/tenderness  Vascular: Peripheral pulses palpable 2+ bilaterally  Psychiatry: Mood & affect appropriate  Lymph: No peripheral edema.     LABS: All Labs Reviewed:                        6.4    1.39  )-----------( 3        ( 03 Apr 2020 13:05 )             19.2                         6.5    0.77  )-----------( 7        ( 02 Apr 2020 07:44 )             19.1                         7.0    0.60  )-----------( 4        ( 01 Apr 2020 10:21 )             21.1     02 Apr 2020 07:44    139    |  112    |  24     ----------------------------<  120    4.3     |  20     |  0.89   01 Apr 2020 10:21    136    |  111    |  30     ----------------------------<  133    3.8     |  16     |  0.97     Ca    8.1        02 Apr 2020 07:44  Ca    8.2        01 Apr 2020 10:21    TPro  5.1    /  Alb  2.5    /  TBili  2.5    /  DBili  x      /  AST  15     /  ALT  12     /  AlkPhos  117    02 Apr 2020 07:44  TPro  5.2    /  Alb  2.6    /  TBili  2.5    /  DBili  x      /  AST  17     /  ALT  15     /  AlkPhos  135    01 Apr 2020 10:21    < from: 12 Lead ECG (03.16.20 @ 17:10) >  Ventricular Rate 145 BPM    Atrial Rate 144 BPM    QRS Duration 64 ms    Q-T Interval 336 ms    QTC Calculation(Bezet) 521 ms    R Axis 27 degrees    T Axis 47 degrees    Diagnosis Line St with APCs  Nonspecific T wave abnormality  Abnormal ECG    ECHOCARDIOGRAM    < from: TTE Echo Complete w/o contrast w/ Doppler (03.04.20 @ 09:54) >  Dimensions:    LA 2.9       Normal Values: 2.0 - 4.0 cm    Ao 2.9        Normal Values: 2.0 - 3.8 cm  SEPTUM 0.9       Normal Values: 0.6 - 1.2 cm  PWT 0.9       Normal Values: 0.6 - 1.1 cm  LVIDd 4.1         Normal Values: 3.0 - 5.6 cm  LVIDs 2.3         Normal Values: 1.8 - 4.0 cm      OBSERVATIONS:    Mitral Valve: normal, trace physiologic MR.  Aortic Valve/Aorta: Normal trileaflet aortic valve with normal opening.  Tricuspid Valve: normal with trace TR.  Pulmonic Valve: normal  Left Atrium: normal  Right Atrium: normal  Left Ventricle: normal LV size and systolic function, estimated LVEF of 65%.  Right Ventricle: Grossly normal size and systolic function.  Pericardium/Pleura: normal, no significant pericardial effusion.      Conclusion:   Normal left ventricular internal dimensions and systolic function, estimated LVEF of 65%.   Grossly normal RV size and systolic function.     Normal trileaflet aortic valve, without AI.   Trace physiologic MR and TR.      < end of copied text >    < from: CT Chest No Cont (03.19.20 @ 09:40) >  IMPRESSION:     Improvement in the scattered bilateral parenchymal opacities as described.  Slight increase in bilateral pleural effusions.  Stable multistation adenopathy chest abdomen and pelvis as described  No acute findings abdomen and pelvis    < end of copied text >

## 2020-04-04 NOTE — PROGRESS NOTE ADULT - ASSESSMENT
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   - Monitor and replete electrolytes. Keep K>4.0 and Mg>2.0.    Pancytopenia   - Hem/onc following   - Multiple tx of PRBC, Platelets and Plasma. Pancytopenia refractory to transfusion  - s/p Platelet transfusion yesterday 4/1.  Per Heme, patient still wishes to be transfused  - Monitor closely for bleeding, no evidence so far  - Transfuse per Heme.  H/H yesterday = 6.5/19.1; Plate=7; No labs today    LUE edema  - Doppler negative DVT but + hematoma  - Echo 2/24/2020 revealing trace AI trace TR ef 60-65%.  No need to repeat    DM  - Management as per primary     - Poor prognosis 2/2 transfusion dependent, refractory to treatment as per Heme notes d/w spouse possible inhouse hospice  - All other medical needs as per primary team.  - Awaiting discharge  - Will continue to follow while inpatient      Germain Hernández, MS FNP, [facility]  Nurse Practitioner- Cardiology   Spectra #[phone] 76yo M with PMH of refractory CLL, AIHA, chronic hepatitis B (on entecavir), KIRTI on CPAP, T2DM (managed with lifestyle changes), HTN (not on any medication) who presents with chief complaint of fever, cough and generalized weakness, admitted with suspected gram-negative multifocal pneumonia, severe anemia and severe thrombocytopenia, with course c/b tachycardia, long hospital course including trial of Venetoclax started 3/1/20 but required dose reduction and holds, finally stopped 3/29/20 due to further marrow suppression, now also s/p Rituxan, with requirement of large amount of PRBC and platelet transfusions during admission, now for hospice.     Tachycardia with PAC's  - HR remains controlled per flow sheet   - Tachy in setting of fever was ST with APCs and likely reactive but now resolved.   - Monitor and replete electrolytes. Keep K>4.0 and Mg>2.0.    Pancytopenia   - Hem/onc following   - Multiple tx of PRBC, Platelets and Plasma. Pancytopenia refractory to transfusion  - s/p Platelet transfusion yesterday 4/1.  Per Heme, patient still wishes to be transfused  - Monitor closely for bleeding, no evidence so far  - Transfuse per Heme.  H/H yesterday = 6.5/19.1; Plate=7; No labs today    LUE edema  - Doppler negative DVT but + hematoma  - Echo 2/24/2020 revealing trace AI trace TR ef 60-65%.  No need to repeat    DM  - Management as per primary     - Poor prognosis 2/2 transfusion dependent, refractory to treatment as per Heme notes d/w spouse possible inhouse hospice  - All other medical needs as per primary team.  - Awaiting discharge  - Will continue to follow while inpatient      Germain Hernández, MS FNP, AGAP  Nurse Practitioner- Cardiology   Spectra #[phone]

## 2020-04-04 NOTE — PROVIDER CONTACT NOTE (CRITICAL VALUE NOTIFICATION) - ACTION/TREATMENT ORDERED:
Dr Lemons made aware, no new orders given. Wants RN to notify Dr Stephenson, call placed awaiting call back from Dr Stephenson will continue to monitor.

## 2020-04-04 NOTE — PROVIDER CONTACT NOTE (CRITICAL VALUE NOTIFICATION) - SITUATION
elevated WBC
Patient with history of CLL and AIHA  has had low Plts since admission
.72, HGB 6.9, HCT 21.7
H&H 6.0/17.8; platelet 3
H&H 6.1/18.5
MD made aware. No further orders at this time.
MD made aware. Phosphorus ordered.
Patient has Hg &Hct 6.0 & 17.9, Platelet 2
Patient platelet count 8
Patient with .22, Plt 10
Plateles 2
Platelets 19
Platlets 19
Pt admit  for leukemia not having achieved remission
Pt admit for leukemia not having achieved remission
Pt has a platelet result of 11, pt received 2 units of platelets during the day shift.
Received critical results from lab
aware
pt results as above
pt with with  HX  CLA, AIHA
reported above results
H/h 6.6/19.7 & Plt 2
.29, Platelet 9

## 2020-04-04 NOTE — DISCHARGE NOTE NURSING/CASE MANAGEMENT/SOCIAL WORK - PATIENT PORTAL LINK FT
You can access the FollowMyHealth Patient Portal offered by [facility] by registering at the following website: [email or web address]. By joining [employer]’s FollowMyHealth portal, you will also be able to view your health information using other applications (apps) compatible with our system.

## 2020-04-04 NOTE — PROGRESS NOTE ADULT - ATTENDING COMMENTS
Advanced care planning was discussed with patient and family.  Advanced care planning forms were reviewed and discussed.  Risks, benefits and alternatives of gastroenterologic procedures were discussed in detail and all questions were answered.    30 minutes spent.
I have personally seen, examined, and participated in the care of this patient. I have reviewed all pertinent clinical information, including history, physical exam, plan, and the PA's note and agree except as noted.    Covering for Dr Abdul.  Having not previously seen this patients left arm, it appears quite edematous and ecchymotic.  Appears to have had venepuncture, possible prior IV placement in left antecubital fossa.  Small ulcerated lesion lateral to the puncture site possibly skin trauma from adhesive (?).  Ecchymosis extending along medial upper arm and forearm.  Slightly tender to palpation.  FROM all fingers.  No numbness or tingling.  Positive radial and ulnar pulses.  No suggestion of compartment syndrome.    This likely represents hemorrhage from previous venepuncture which given this patient's pancytopenia was unable to quickly stop spontaneously.    Continue arm elevation and warm compress.  Monitor for changes in symptoms, monitor for signs of compartment syndrome.  Monitor peripheral pulses Q shift.
Chart reviewed    Patient is seen and examined    Agree with plan as outlined above
Chart reviewed    Patient seen and examined    Agree with plan as outlined above
I personally saw and examined the patient in detail.  I have spoken to the above provider regarding the assessment and plan of care.  I reviewed the above assessment and plan of care, and agree.  I have made changes in the body of the note where appropriate.
Seen/examined. agree with above.
The patient was personally seen and examined, in addition to being examined and evaluated by NP.  All elements of the note were edited where appropriate.
The patient was personally seen and examined, in addition to being examined and evaluated by NP.  All elements of the note were edited where appropriate.
I saw and examined the patient personally. Spoke with above provider regarding this case. I reviewed the above findings completely.  I agree with the above history, physical, and plan which I have edited where appropriate.
I saw and examined the patient personally. Spoke with above provider regarding this case. I reviewed the above findings completely.  I agree with the above history, physical, and plan which I have edited where appropriate.
Seen/examined. agree with above.
I personally saw and examined the patient in detail.  I have spoken to the above provider regarding the assessment and plan of care.  I reviewed the above assessment and plan of care, and agree.  I have made changes in the body of the note where appropriate.
I saw and examined the patient personally. Spoke with above provider regarding this case. I reviewed the above findings completely.  I agree with the above history, physical, and plan which I have edited where appropriate.
ID will sign off   please call with questions  thank you  [phone]
The patient was personally seen and examined, in addition to being examined and evaluated by NP.  All elements of the note were edited where appropriate.
Chart reviewed    Patient seen and examined     Agree with plan as outlined above
Advanced care planning was discussed with patient and family.  Advanced care planning forms were reviewed and discussed.  Risks, benefits and alternatives of gastroenterologic procedures were discussed in detail and all questions were answered.    30 minutes spent.
Note written by attending, see above.  Time spent: 40min. More than 50% of the visit was spent counseling the patient on his condition -
Note written by attending, see above.  Time spent: 40min. More than 50% of the visit was spent counseling the patient on his condition - refractory CLL, pancytopenia, severe neutropenia and neutropenic precautions, Rituxan, GOC.
Note written by attending, see above.  Time spent: 40min. More than 50% of the visit was spent counseling the patient on his condition - refractory CLL, pancytopenia, severe neutropenia and neutropenic precautions, Rituxan, GOC.    Dr. Stephenson continued to address GOC with patient and family. Pt and his wife decided to pursue home hospice. Referral made - will have tele-meeting today ~3PM.
Note written by attending, see above.  Time spent: 40min. More than 50% of the visit was spent counseling the patient on his condition - refractory CLL, pancytopenia, severe neutropenia and neutropenic precautions, Rituxan, GOC.    Dr. Stephenson continues to address GOC with patient and family. This afternoon, pt and his wife decided to pursue home hospice. Referral made.
Note written by attending, see above.  Time spent: 40min. More than 50% of the visit was spent counseling the patient on his condition - severe anemia, thrombocytopenia, CLL, PNA, rocephin, hyperbilirubinemia, MRCP.
Note written by attending, see above.  Time spent: 40min. More than 50% of the visit was spent counseling the patient on his condition - severe anemia, thrombocytopenia, CLL, chemotherapy for CLL, PNA, rocephin, hyperbilirubinemia, MRCP.
74 yo male with pmhx of Hepatitis B on entecavirt, CLL/SLL, recent Flu diagnosis in February 2020, with an overnight temperature of 100.6.  Pt fatigued and per heme/onc, prognosis is poor. Will continue to monitor.        Lymphoma.  Plan: 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  - repeat flow cytometry confirms diagnosis, FISH study suggests poor prognosis, detailed in Hem/Onc notes  - Worsening anemia and thrombocytopenia since previous discharge  - chemo therapy with venetoclax started 3/1 - restarted 3/11 after being held 3/5 due to leukopenia likely 2/2 to bone marrow suppression, now held again 3/13 due to WBC approx 3. as per heme/onc.   - Steroids being tapered - 10mg now  - Hem/Onc following, recs appreciated- venetoclax restarted Tuesday March 25, but given his anemia and thrombocytopenia have remained refractory, treatment as stopped today 3/29.  Dr. Stephenson continues to address GOC with patient and family. They would not be able to take him home with home hospice, but may be a candidate for inpatient hospice.      Thrombocytopenia.  Plan: - Per chart review, platelet count steadily declining since 2018  - poor response to platelets transfusion, suspect ITP component  - s/p one dose of IVIG  - Hem/Onc following, will follow the recommendation   -To receive 1 unit of platelets today    -Heme/Onc- continue supportive care.
Pt. seen and evaluated for thrombocytopenia and anemia.  Pt. is in no distress.  Overall feeling well.  Drop in platelet count noted and patient ordered for 1 unit SDP transfusion today.  Physical examination as above.     Plan:  -CLL/SLL:  Venetoclax held per Heme/Onc  -Thrombocytopenia:  Will give 1 unit SDP today.  Will continue to monitor platelet counts.  -Anemia:  Possible AIHA.  Continue Prednisone 10mg PO daily.    -Multifocal PNA:  completed course of antibiotics.  Continue Hycodan PRN, Guaifenesin PRN, and Duoneb tx Q6h PRN  -Hyperbilirubinemia:  Abdominal US with multiple gallstones and no evidence of acute cholecystitis.  MRCP with no biliary dilatation or CBD stone.  Pt. without abdominal pain.  GI f/u  -Hepatitis B:  continue Entecavir 0.5mg PO daily  -LE edema:  LE dopplers negative for DVT.  EF 65%.  -CKD stage 3:  stable.  Continue to monitor  -HTN:  Normotensive off any antihypertensive medications  -Type 2 DM:  diet controlled.   -VTE ppx: SCD
Pt. seen and evaluated for thrombocytopenia and anemia.  Pt. is in no distress.  Remains afebrile.  Denies any gross bleeding.  Pt. ordered 1 unit PRBC transfusion.  Physical examination as above.     Plan:  -CLL/SLL:  Continue Venetoclax 20mg PO daily.  Heme/Onc f/u  -Thrombocytopenia:  s/p 1 unit SDP yesterday with improvement to 22K.  Will continue to monitor platelet counts.  -Anemia:  Possible AIHA.  Continue Prednisone 10mg PO daily.  Ordered for 1 unit PRBC transfusion today.  -Multifocal PNA:  completed course of antibiotics.  Continue Hycodan PRN, Guaifenesin PRN, and Duoneb tx Q6h PRN  -Hyperbilirubinemia:  Abdominal US with multiple gallstones and no evidence of acute cholecystitis.  MRCP with no biliary dilatation or CBD stone.  Pt. without abdominal pain.  GI f/u  -Hepatitis B:  continue Entecavir 0.5mg PO daily  -LE edema:  LE dopplers negative for DVT.  EF 65%.  -CKD stage 3:  stable.  Continue to monitor  -HTN:  Normotensive off any antihypertensive medications  -Type 2 DM:  diet controlled.   -VTE ppx: SCD
Pt. seen and evaluated for thrombocytopenia and anemia.  Pt. is in no distress.  Reports feeling well.  Denies having any gross bleeding.  Physical examination as above.  Noted to have platelet count of 8K this morning.      Plan:  -CLL/SLL:  Restarted on Venetoclax 20mg PO daily.  Heme/Onc f/u  -Thrombocytopenia:  Pt. to receive 1 unit SDP today  -Anemia:  Possible AIHA.  Continue Prednisone 10mg PO daily.  PRBC transfusions PRN.    -Multifocal PNA:  completed course of antibiotics.  Continue Hycodan PRN, Guaifenesin PRN, and Duoneb tx Q6h PRN  -Hyperbilirubinemia:  Abdominal US with multiple gallstones and no evidence of acute cholecystitis.  MRCP with no biliary dilatation or CBD stone.  Pt. without abdominal pain.  GI f/u  -Hepatitis B:  continue Entecavir 0.5mg PO daily  -LE edema:  LE dopplers negative for DVT.  EF 65%.  -CKD stage 3:  stable.  Continue to monitor  -HTN:  Normotensive off any antihypertensive medications  -Type 2 DM:  diet controlled.   -VTE ppx: SCD
Discussed advanced directives with patient. Patient stating he would want "everything done" and does not want to give up. Explained CPR and intubation to patient and patient states he would want to pursue both options if necessary. Patient is FULL CODE. Total time spent on discussing advanced directives: 20 minutes
I personally conducted a physical examination of the patient. I personally gathered the patient's history. I edited the above listed findings which were prepared by the listed resident physician. I personally discussed the plan of care with the patient. The questions and concerns were addressed to the best of my ability. The patient is in agreement with the listed treatment plan.    Patient states he feels well, Hgb stable today. Platelets dropped to 10k this AM (was 19k yesterday). Patient denies any CP, SOB, abd pain. Complains of persistent cough    #Multifocal PNA- resolved  #Lymphoma  #Anemia  #Thrombocytopenia    -Completed course of antibiotics. Continue treatment for lymphoma as per Heme recs- may restart venetoclax in AM if WBC >5.  Cell counts stable today- no need for transfusion today. Monitor CBC in AM.
I personally conducted a physical examination of the patient. I personally gathered the patient's history. I edited the above listed findings which were prepared by the listed resident physician. I personally discussed the plan of care with the patient. The questions and concerns were addressed to the best of my ability. The patient is in agreement with the listed treatment plan.    Patient states he feels better, still with lingering cough. Denies any CP, SOB, abd pain.    #Multifocal PNA  #Lymphoma  #Anemia  #Thrombocytopenia    -Completed course of antibiotics. Continue treatment for lymphoma as per Heme recs. Will transfuse another unit of pRBCs today- monitor CBC in AM.
Patient seen and examined separately from PA. I agree with documentation above, edited where necessary.     Surgery consulted for left arm hematoma vs/abscess, although, drainage would be quite challenging due to his thrombocytopenia.
Patient seen and examined separately from PA. Agree with documentation above, edited where necessary.
75M, DM/diet-controlled, HTN, KIRTI/CPAP, HCV on Entecavir, hx SCLC, refractory CLL, AIHA/IVIG, recent admit 2/14-2/15 for chemotx/influenza A tx’d w/Tamiflu; adm w/fever, cough, weakness; mgmt for multifocal PNA, anemia/thrombocytopenia requiring multiple transfusions – being optimized hematologically, planned for further chemotx doses inhouse  -multifocal PNA – completed course ceftriaxone/azithromycin; residual post-infectious cough – on tessalon perles  -anemia/thrombocytopenia – acute on chronic – hgb improved p transfusion PRBCs total x 4 so far; platelets improved to 30s s/p transfusion x 3u platelets, now downtrended to 10 – d/w Heme re need for further transfusion  -Onc – Onc to decide timing of further chemotx inhouse  -dvt prophy
I personally conducted a physical examination of the patient. I personally gathered the patient's history. I edited the above listed findings which were prepared by the listed resident physician. I personally discussed the plan of care with the patient. The questions and concerns were addressed to the best of my ability. The patient is in agreement with the listed treatment plan.    Patient states he feels well, Hgb stable today. Platelets improved to 19k this AM (was 8k yesterday). Patient denies any CP, SOB, abd pain. Complains of persistent cough    #Multifocal PNA- resolved  #Lymphoma  #Anemia  #Thrombocytopenia    -Completed course of antibiotics. Continue treatment for lymphoma as per Heme recs.  Cell counts stable today- no need for transfusion today. Monitor CBC in AM.
I personally conducted a physical examination of the patient. I personally gathered the patient's history. I edited the above listed findings which were prepared by the listed resident physician. I personally discussed the plan of care with the patient. The questions and concerns were addressed to the best of my ability. The patient is in agreement with the listed treatment plan.    Patient states he feels well, Hgb improved to 8.2 last night, but again dropped to 7.1 this AM. Platelets 11k this AM. Patient denies any CP, SOB, abd pain.    #Multifocal PNA- resolved  #Lymphoma  #Anemia  #Thrombocytopenia    -Completed course of antibiotics. Continue treatment for lymphoma as per Heme recs.  Transfuse 1 unit PRBC, 1 units platelets today. Monitor CBC in AM.
I personally conducted a physical examination of the patient. I personally gathered the patient's history. I edited the above listed findings which were prepared by the listed resident physician. I personally discussed the plan of care with the patient. The questions and concerns were addressed to the best of my ability. The patient is in agreement with the listed treatment plan.    Patient states he feels better, still with mild cough. Denies any CP, SOB, abd pain. Hgb dropping, platelets 3K this AM, discussed with Heme/Onc, will transfuse 1 unit PRBC, 2 units FFP (as patient has received multiple transfusions throughout hospital stay) and 2 units platelets    #Multifocal PNA- resolved  #Lymphoma  #Anemia  #Thrombocytopenia    -Completed course of antibiotics. Continue treatment for lymphoma as per Heme recs.  Transfuse 1 unit PRBC, 2 units FFP (as patient has received multiple transfusions throughout hospital stay) and 2 units platelets. Monitor CBC in PM.
Patient seen and examined at bedside. Case discussed with DR. Alvarado and medical team.  no acute overnight events.   today patient more alert, better spirits  on venatoclax.. ibrutinib dced  labs:: white count 12, plt 4    o/e  cardiac:: tricuspid murmur  pulm:: + 3 pitting edema up to below knee    plan:  1. repeat echo  2. platete X 2  3. PRBC X 1  4. repeat chems today  5. change d5 1/2 to lactate ringer --> 2 liters / day of IVF    6. if patient continues to improve, may need some lasix in am
Patient seen and examined at bedside. Case discussed with DR. guillory and medical team  no acute overnight events.  Patient started venatofax + 1 unit prbc and 1 unit plat  lab indices improving    Plan:  continue venatoclax  check cmp/mag/p04 and uric acid now  stop ibruntib 3/3  hematology followup on dc planning
Patient seen and examined at bedside. Case discussed with team.    No overnight events. Patient reports he is "getting used to the walker". walked to the bathroom with assistance yesterday.    Today patient denies any acute complaints. Anxious on his hospitalization.  Hb today 6.8  Plt count at 10 (Patient received platletes yesterday    On IV ceftriaxone now.     Discussed case with ID and Heme    Plan:  1. PRBC tranfusion now  2. IVIG after.. Likely patients hb madeline drop after IVIG  3. Recheck Hb at 8pm. Tranfuse Hb >7
I personally conducted a physical examination of the patient. I personally gathered the patient's history. I edited the above listed findings which were prepared by the listed resident physician. I personally discussed the plan of care with the patient. The questions and concerns were addressed to the best of my ability. The patient is in agreement with the listed treatment plan.    Patient states he feels better, still with mild cough. Denies any CP, SOB, abd pain.    #Multifocal PNA- resolved  #Lymphoma  #Anemia  #Thrombocytopenia    -Completed course of antibiotics. Continue treatment for lymphoma as per Heme recs.  Monitor CBC in AM.

## 2020-05-27 NOTE — PATIENT PROFILE ADULT - NSPROGENBLOODRESTRICT_GEN_A_NUR
One Touch Verio Test Strips  Disp:   Sig: test three times daily as directed  Last Refill:  Last OV: 6/26/19  Next OV: 6/15/2020    Refill sent.          none

## 2020-08-10 NOTE — ED ADULT TRIAGE NOTE - PAIN RATING/NUMBER SCALE (0-10): REST
Is This A New Presentation, Or A Follow-Up?: Skin Lesions How Severe Is Your Skin Lesion?: mild Have Your Skin Lesions Been Treated?: not been treated 0

## 2021-02-21 NOTE — PHYSICAL THERAPY INITIAL EVALUATION ADULT - PHYSICAL ASSIST/NONPHYSICAL ASSIST: STAND/SIT, REHAB EVAL
no abdominal pain, no bloating, no constipation, no diarrhea, no nausea and no vomiting.
supervision

## 2021-05-11 NOTE — ED ADULT NURSE NOTE - CARDIO ASSESSMENT
/56   Pulse 99   Temp 98  F (36.7  C) (Oral)   Resp 18   Wt 67.1 kg (147 lb 14.9 oz)   SpO2 93%   BMI 26.20 kg/m    [id number]: VSS. Denies pain currently. PRN oxy x1. A&Ox4, forgetful at times. Stroke code called this AM, found to be negative for stroke. Likely a seizure. On 24 hr EEG monitor. Radiation and MRI held due to EEG leads. Now wnl. Daughter updated. DD1 diet with nectar thick liquids. Did not need insulin coverage, BG q.4 hours. Purewick in place. No BM, BS normoactive x4 and had miralax today.   Problem: Adult Inpatient Plan of Care  Goal: Patient-Specific Goal (Individualized)  Outcome: No Change     Problem: Adult Inpatient Plan of Care  Goal: Absence of Hospital-Acquired Illness or Injury  Outcome: No Change     Problem: Adult Inpatient Plan of Care  Goal: Optimal Comfort and Wellbeing  Outcome: No Change      ---

## 2021-11-09 NOTE — CHART NOTE - NSCHARTNOTEFT_GEN_A_CORE
Assessment: Pt seen for nutrition follow-up. As per chart pt is a 75 year old male with a PMH of CLL/SLL, AIHA, chronic hepatitis B (on entecavir), KIRTI on CPAP, T2DM (managed with lifestyle changes), HTN (not on any medication) who presents with chief complaint of fever, cough and generalized weakness, admitted with suspected gram-negative multifocal pneumonia, completed a course of IV antibiotics.  Suspect possible ITP with poor response to platelet transfusion. One dose of IVIG was given, s/p 11 units of platelets transfusion. Pt started chemo therapy with venetoclax @ 3/1/2020,  DC'ed @3/5 due to low WBC suspecting bone marrow oversuppression. Pt reports that he continues good appetite and PO intake, per chart pt is consuming about % of meals in house. Pt tried Glucerna, however reports that he would not like to received. Pt admits to having some changes in taste, is requesting for more spicy/ flavorful foods, will add extra flavoring packets to trays. Pt also encouraged to consume more cold/ room temperature meals and use plastic utensils to help with changes in taste. Denies any nausea/ vomiting/ diarrhea/ constipation, pt unsure of last BM, last BM per chart 3/18, consider bowel regimen if constipation persists.      Factors impacting intake:  [x ] other: changes in taste     Diet Presciption: Diet, Consistent Carbohydrate w/Evening Snack (03-01-20 @ 11:26)    Intake: good     Current Weight: (3/18) 161.5lbs  Previous Weight: (3/16) 166lbs (3/12) 162.2lbs  % Weight Change-pt's weights have been trending in the 160's lbs.     Pertinent Medications: MEDICATIONS  (STANDING):  cefepime   IVPB 2000 milliGRAM(s) IV Intermittent every 12 hours  cefepime   IVPB      entecavir 0.5 milliGRAM(s) Oral daily  pantoprazole    Tablet 40 milliGRAM(s) Oral before breakfast  potassium chloride    Tablet ER 20 milliEquivalent(s) Oral two times a day  predniSONE   Tablet 10 milliGRAM(s) Oral daily    MEDICATIONS  (PRN):  acetaminophen   Tablet .. 650 milliGRAM(s) Oral every 6 hours PRN Mild Pain (1 - 3)  acetaminophen   Tablet .. 650 milliGRAM(s) Oral every 6 hours PRN Temp greater or equal to 38C (100.4F)  albuterol/ipratropium for Nebulization 3 milliLiter(s) Nebulizer every 6 hours PRN Shortness of Breath and/or Wheezing  guaiFENesin   Syrup  (Sugar-Free) 200 milliGRAM(s) Oral every 6 hours PRN Cough  hydrocodone/homatropine Syrup 5 milliLiter(s) Oral at bedtime PRN Cough    Pertinent Labs: 03-21 Na137 mmol/L Glu 92 mg/dL K+ 3.4 mmol/L<L> Cr  0.98 mg/dL BUN 22 mg/dL 03-16 Phos 2.0 mg/dL<L> 03-21 Alb 2.5 g/dL<L>, Hgb 6.6, Hct 19.6, Chloride 110, Calcium 7.6     CAPILLARY BLOOD GLUCOSE      Skin: +2 b/l leg edema     Estimated Needs:   [x ] no change since previous assessment  [ ] recalculated:     Previous Nutrition Diagnosis:   [X] Altered Nutrition Labs    Nutrition Diagnosis is [ x] ongoing-being addressed with therapeutic diet     New Nutrition Diagnosis: [x ] not applicable       Interventions: Continue with current Consistent CHO diet   Recommend  [ ] Change Diet To:  [ ] Nutrition Supplement  [ ] Nutrition Support  [ x] Other:   1) Pt provided with strategies to help with changes in taste  2) Encouraged adequate PO intake  3) Monitor pt's PO intake, weight, skin, edema, GI distress     Monitoring and Evaluation:   [x ] PO intake [ x ] Tolerance to diet prescription [ x ] weights [ x ] labs[ x ] follow up per protocol  [ x] other: RD to remain available normal...

## 2021-11-24 NOTE — PROGRESS NOTE ADULT - ASSESSMENT
Pt made an appointment via [address] for 'Sinus headache'    Please advise if ok to keep in office     Future Appointments   Date Time Provider Maryam Amezcua   11/29/2021  8:00 AM [place] [facility] [place] 74 y/o man w Hepatitis B on Entecavirt, Chronic Lymphocytic Leukemia/Small Lymphocytic Lymphoma 4/2018 when presented w immune hemolytic anemia w partial response to steroids, started on Ibrutinib w heme CR and VT by CT scan w some decrease of lymphadenopathies, until 1/2020 when relapsed w incr WBC, anemia(initially not hemolytic, has chronic Matthew positive due to CLL/SLL), thrombocytopenia.    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    Was discharged home and admitted again 2/22/20 with  fever and found w pneumonia, post course of Ceftriaxone, continues to require transfusions plts and PRBCs.  Hep B titer negative  Post IVIG in late February 2020  Had trial of Venetoclax started 3/1/20 and received 4 days of treatment which was held on 3/5/20 due to pancytopenia    Remains pancytopenic and transfusion dependant; suspect element of hemolysis as well      RECOMMENDATIONS  - still PRBC and Platelets transfusion dependent,  - to receive 1 unit platelets and 1 unit pRBC today  - remains prednisone taper to 10mg qD today for chronic hemolysis  - COVID-19 negative on 3/26/20  - Long term prognosis poor  - patient and wife understand that his condition is likely to worsen. will continue low dose Venetoclax 10mg daily - started on 3/24/20  - case discussed with hospitalist Dr. Wiley today

## 2021-11-28 NOTE — PROGRESS NOTE ADULT - SUBJECTIVE AND OBJECTIVE BOX
Patient is a 75y old  Male who presents with a chief complaint of weakness, anemia (01 Apr 2020 16:59)      INTERVAL HPI/OVERNIGHT EVENTS:    MEDICATIONS  (STANDING):  entecavir 0.5 milliGRAM(s) Oral daily  pantoprazole    Tablet 40 milliGRAM(s) Oral before breakfast  potassium chloride    Tablet ER 20 milliEquivalent(s) Oral two times a day  predniSONE   Tablet 5 milliGRAM(s) Oral daily  riTUXimab Infusion (eMAR) 50 mL/Hr (50 mL/Hr) IV Continuous <Continuous>    MEDICATIONS  (PRN):  acetaminophen   Tablet .. 650 milliGRAM(s) Oral every 6 hours PRN Mild Pain (1 - 3)  acetaminophen   Tablet .. 650 milliGRAM(s) Oral every 6 hours PRN Temp greater or equal to 38C (100.4F)  albuterol/ipratropium for Nebulization 3 milliLiter(s) Nebulizer every 6 hours PRN Shortness of Breath and/or Wheezing  diphenhydrAMINE 25 milliGRAM(s) Oral every 4 hours PRN Rash and/or Itching  guaiFENesin   Syrup  (Sugar-Free) 200 milliGRAM(s) Oral every 6 hours PRN Cough      Allergies    sulfa drugs (Unknown)    Intolerances        REVIEW OF SYSTEMS:  CONSTITUTIONAL: No fever or chills  HEENT:  No headache, no sore throat  RESPIRATORY: No cough, wheezing, or shortness of breath  CARDIOVASCULAR: No chest pain, palpitations  GASTROINTESTINAL: No abd pain, nausea, vomiting, or diarrhea  GENITOURINARY: No dysuria, frequency, or hematuria  NEUROLOGICAL: no focal weakness or dizziness  MUSCULOSKELETAL: no myalgias     Vital Signs Last 24 Hrs  T(C): 37.3 (01 Apr 2020 09:03), Max: 37.3 (01 Apr 2020 09:03)  T(F): 99.2 (01 Apr 2020 09:03), Max: 99.2 (01 Apr 2020 09:03)  HR: 104 (01 Apr 2020 09:03) (86 - 104)  BP: 112/68 (01 Apr 2020 09:03) (112/68 - 122/69)  BP(mean): --  RR: 16 (01 Apr 2020 09:03) (16 - 16)  SpO2: 95% (01 Apr 2020 09:03) (95% - 95%)    PHYSICAL EXAM:  GENERAL: NAD, chronically ill-appearing  HEENT:  anicteric, moist mucous membranes  CHEST/LUNG: grossly CTA b/l  HEART:  RRR, S1, S2  ABDOMEN:  BS+, soft, nontender, nondistended  EXTREMITIES: edema in upper extremities (worse in L UE); ecchymoses in UEs; no cyanosis or calf tenderness  NERVOUS SYSTEM: answers questions and follows commands appropriately    LABS:                        7.0    0.60  )-----------( 4        ( 01 Apr 2020 10:21 )             21.1     CBC Full  -  ( 01 Apr 2020 10:21 )  WBC Count : 0.60 K/uL  Hemoglobin : 7.0 g/dL  Hematocrit : 21.1 %  Platelet Count - Automated : 4 K/uL  Mean Cell Volume : 86.8 fl  Mean Cell Hemoglobin : 28.8 pg  Mean Cell Hemoglobin Concentration : 33.2 gm/dL  Auto Neutrophil # : 0.27 K/uL  Auto Lymphocyte # : 0.23 K/uL  Auto Monocyte # : 0.08 K/uL  Auto Eosinophil # : 0.00 K/uL  Auto Basophil # : 0.00 K/uL  Auto Neutrophil % : 45.1 %  Auto Lymphocyte % : 38.3 %  Auto Monocyte % : 13.3 %  Auto Eosinophil % : 0.0 %  Auto Basophil % : 0.0 %    01 Apr 2020 10:21    136    |  111    |  30     ----------------------------<  133    3.8     |  16     |  0.97     Ca    8.2        01 Apr 2020 10:21    TPro  5.2    /  Alb  2.6    /  TBili  2.5    /  DBili  x      /  AST  17     /  ALT  15     /  AlkPhos  135    01 Apr 2020 10:21        CAPILLARY BLOOD GLUCOSE              RADIOLOGY & ADDITIONAL TESTS:    Personally reviewed.     Consultant(s) Notes Reviewed:  [x] YES  [ ] NO none Patient is a 75y old  Male who presents with a chief complaint of fever, weakness, and cough.    INTERVAL HPI/OVERNIGHT EVENTS: Pt reports some general weakness. Denies fever, chills, SOB, CP, palpitations, headache, abd pain.    MEDICATIONS  (STANDING):  entecavir 0.5 milliGRAM(s) Oral daily  pantoprazole    Tablet 40 milliGRAM(s) Oral before breakfast  potassium chloride    Tablet ER 20 milliEquivalent(s) Oral two times a day  predniSONE   Tablet 5 milliGRAM(s) Oral daily  riTUXimab Infusion (eMAR) 50 mL/Hr (50 mL/Hr) IV Continuous <Continuous>    MEDICATIONS  (PRN):  acetaminophen   Tablet .. 650 milliGRAM(s) Oral every 6 hours PRN Mild Pain (1 - 3)  acetaminophen   Tablet .. 650 milliGRAM(s) Oral every 6 hours PRN Temp greater or equal to 38C (100.4F)  albuterol/ipratropium for Nebulization 3 milliLiter(s) Nebulizer every 6 hours PRN Shortness of Breath and/or Wheezing  diphenhydrAMINE 25 milliGRAM(s) Oral every 4 hours PRN Rash and/or Itching  guaiFENesin   Syrup  (Sugar-Free) 200 milliGRAM(s) Oral every 6 hours PRN Cough      Allergies    sulfa drugs (Unknown)    Intolerances        REVIEW OF SYSTEMS:  CONSTITUTIONAL: +generalized weakness; No fever or chills  HEENT:  No headache, no sore throat  RESPIRATORY: No cough, wheezing, or shortness of breath  CARDIOVASCULAR: No chest pain, palpitations  GASTROINTESTINAL: No abd pain, nausea, vomiting, or diarrhea  GENITOURINARY: No dysuria, frequency, or hematuria  NEUROLOGICAL: no focal weakness or dizziness  MUSCULOSKELETAL: no myalgias     Vital Signs Last 24 Hrs  T(C): 37.3 (01 Apr 2020 09:03), Max: 37.3 (01 Apr 2020 09:03)  T(F): 99.2 (01 Apr 2020 09:03), Max: 99.2 (01 Apr 2020 09:03)  HR: 104 (01 Apr 2020 09:03) (86 - 104)  BP: 112/68 (01 Apr 2020 09:03) (112/68 - 122/69)  BP(mean): --  RR: 16 (01 Apr 2020 09:03) (16 - 16)  SpO2: 95% (01 Apr 2020 09:03) (95% - 95%)    PHYSICAL EXAM:  GENERAL: NAD, chronically ill-appearing  HEENT:  anicteric, moist mucous membranes  CHEST/LUNG: grossly CTA b/l  HEART:  RRR, S1, S2  ABDOMEN:  BS+, soft, nontender, nondistended  EXTREMITIES: edema in upper extremities (worse in L UE); ecchymoses in UEs; no cyanosis or calf tenderness  NERVOUS SYSTEM: answers questions and follows commands appropriately    LABS:                        7.0    0.60  )-----------( 4        ( 01 Apr 2020 10:21 )             21.1     CBC Full  -  ( 01 Apr 2020 10:21 )  WBC Count : 0.60 K/uL  Hemoglobin : 7.0 g/dL  Hematocrit : 21.1 %  Platelet Count - Automated : 4 K/uL  Mean Cell Volume : 86.8 fl  Mean Cell Hemoglobin : 28.8 pg  Mean Cell Hemoglobin Concentration : 33.2 gm/dL  Auto Neutrophil # : 0.27 K/uL  Auto Lymphocyte # : 0.23 K/uL  Auto Monocyte # : 0.08 K/uL  Auto Eosinophil # : 0.00 K/uL  Auto Basophil # : 0.00 K/uL  Auto Neutrophil % : 45.1 %  Auto Lymphocyte % : 38.3 %  Auto Monocyte % : 13.3 %  Auto Eosinophil % : 0.0 %  Auto Basophil % : 0.0 %    01 Apr 2020 10:21    136    |  111    |  30     ----------------------------<  133    3.8     |  16     |  0.97     Ca    8.2        01 Apr 2020 10:21    TPro  5.2    /  Alb  2.6    /  TBili  2.5    /  DBili  x      /  AST  17     /  ALT  15     /  AlkPhos  135    01 Apr 2020 10:21        CAPILLARY BLOOD GLUCOSE              RADIOLOGY & ADDITIONAL TESTS:    Personally reviewed.     Consultant(s) Notes Reviewed:  [x] YES  [ ] NO

## 2021-11-30 NOTE — PROGRESS NOTE ADULT - PROBLEM SELECTOR PLAN 7
Pt states that lower ext edema occurs while on prednisone  - Checked venous doppler - negative  - ECHO 2/2018 showed Preserved left ventricular systolic function. Stage I   diastolic dysfunction. EF 65% -- will repeat TTE  - albumin level has been low might contribute to edema  - Caution with IVF  - Strict I&O's  - daily weights  - SCDs Yes...

## 2022-02-26 NOTE — PROGRESS NOTE ADULT - SUBJECTIVE AND OBJECTIVE BOX
[facility] -- Cori Dawkins, Travis Villagomez Pannella, Patel, Savella  Office # [phone]      Follow Up:    tachycardia     Subjective/Observations:   Seen at bedside, offers no complaints sitting up in bed in NAD  denies chest pain dizziness, palpitations n/v/d     REVIEW OF SYSTEMS: All other review of systems is negative unless indicated above    PAST MEDICAL & SURGICAL HISTORY:  KIRTI (obstructive sleep apnea): on nocturnal cpap  Hypertension: not on any medications  Diabetes: not on any medications  Hepatitis B  Lymphoma: Small cell B cell  AIHA (autoimmune hemolytic anemia)  H/O lithotripsy      MEDICATIONS  (STANDING):  cefepime   IVPB 2000 milliGRAM(s) IV Intermittent every 12 hours  cefepime   IVPB      entecavir 0.5 milliGRAM(s) Oral daily  pantoprazole    Tablet 40 milliGRAM(s) Oral before breakfast  potassium chloride    Tablet ER 20 milliEquivalent(s) Oral two times a day  predniSONE   Tablet 10 milliGRAM(s) Oral daily  venetoclax 10 milliGRAM(s) Oral <User Schedule>    MEDICATIONS  (PRN):  acetaminophen   Tablet .. 650 milliGRAM(s) Oral every 6 hours PRN Mild Pain (1 - 3)  acetaminophen   Tablet .. 650 milliGRAM(s) Oral every 6 hours PRN Temp greater or equal to 38C (100.4F)  albuterol/ipratropium for Nebulization 3 milliLiter(s) Nebulizer every 6 hours PRN Shortness of Breath and/or Wheezing  guaiFENesin   Syrup  (Sugar-Free) 200 milliGRAM(s) Oral every 6 hours PRN Cough      Allergies    sulfa drugs (Unknown)    Intolerances        Vital Signs Last 24 Hrs  T(C): 37.1 (25 Mar 2020 04:30), Max: 38.1 (24 Mar 2020 23:30)  T(F): 98.7 (25 Mar 2020 04:30), Max: 100.6 (24 Mar 2020 23:30)  HR: 97 (25 Mar 2020 04:30) (85 - 98)  BP: 113/68 (25 Mar 2020 04:30) (113/68 - 136/71)  BP(mean): --  RR: 17 (25 Mar 2020 04:30) (17 - 18)  SpO2: 96% (25 Mar 2020 04:30) (94% - 98%)    I&O's Summary        PHYSICAL EXAM:  TELE: SR  Constitutional: NAD, awake and alert, well-developed  HEENT: Moist Mucous Membranes, Anicteric  Pulmonary: Non-labored, breath sounds are clear bilaterally, No wheezing, crackles or rhonchi  Cardiovascular: Regular, S1 and S2 nl, No murmurs, rubs, gallops or clicks  Gastrointestinal: Bowel Sounds present, soft, nontender.   Lymph: No lymphadenopathy. No peripheral edema.  Psych:  Mood & affect appropriate    LABS: All Labs Reviewed:                        7.6    3.53  )-----------( x        ( 25 Mar 2020 07:53 )             22.4                         6.9    3.27  )-----------( 1        ( 24 Mar 2020 06:19 )             20.9                         7.1    2.41  )-----------( 9        ( 23 Mar 2020 06:06 )             21.1     24 Mar 2020 06:19    139    |  111    |  24     ----------------------------<  97     3.6     |  17     |  1.00   23 Mar 2020 06:06    139    |  112    |  24     ----------------------------<  104    4.4     |  17     |  0.97     Ca    7.7        24 Mar 2020 06:19  Ca    7.9        23 Mar 2020 06:06    TPro  5.2    /  Alb  2.4    /  TBili  4.4    /  DBili  2.90   /  AST  71     /  ALT  28     /  AlkPhos  155    24 Mar 2020 06:19  TPro  5.5    /  Alb  2.6    /  TBili  3.5    /  DBili  x      /  AST  47     /  ALT  22     /  AlkPhos  180    23 Mar 2020 06:06             ECG:  < from: 12 Lead ECG (03.18.20 @ 19:13) >    Ventricular Rate 167 BPM    Atrial Rate 178 BPM    QRS Duration 64 ms    Q-T Interval 296 ms    QTC Calculation(Bezet) 493 ms    R Axis 32 degrees    T Axis 61 degrees    Diagnosis Line *** Poor data quality, interpretation may be adversely affected  likely st with apcs  Nonspecific ST and T wave abnormality  Abnormal ECG  When compared with ECG of 16-MAR-2020 17:10,  Previous ECG has undetermined rhythm, needs review  Nonspecific T wave abnormality now evident in Anterior leads    < end of copied text >    Echo:  < from: TTE Echo Complete w/o contrast w/ Doppler (03.04.20 @ 09:54) >  OBSERVATIONS:    Mitral Valve: normal, trace physiologic MR.  Aortic Valve/Aorta: Normal trileaflet aortic valve with normal opening.  Tricuspid Valve: normal with trace TR.  Pulmonic Valve: normal  Left Atrium: normal  Right Atrium: normal  Left Ventricle: normal LV size and systolic function, estimated LVEF of 65%.  Right Ventricle: Grossly normal size and systolic function.  Pericardium/Pleura: normal, no significant pericardial effusion.      Conclusion:   Normal left ventricular internal dimensions and systolic function, estimated LVEF of 65%.   Grossly normal RV size and systolic function.     Normal trileaflet aortic valve, without AI.   Trace physiologic MR and TR.            < end of copied text >      Radiology: [facility] -- Cori Dawkins, Travis Villagomez Pannella, Patel, Savella  Office # [phone]      Follow Up:    tachycardia     Subjective/Observations:   Seen at bedside, offers no complaints sitting up in bed in NAD  denies chest pain dizziness, palpitations n/v/d     REVIEW OF SYSTEMS: All other review of systems is negative unless indicated above    PAST MEDICAL & SURGICAL HISTORY:  KIRTI (obstructive sleep apnea): on nocturnal cpap  Hypertension: not on any medications  Diabetes: not on any medications  Hepatitis B  Lymphoma: Small cell B cell  AIHA (autoimmune hemolytic anemia)  H/O lithotripsy      MEDICATIONS  (STANDING):  cefepime   IVPB 2000 milliGRAM(s) IV Intermittent every 12 hours  cefepime   IVPB      entecavir 0.5 milliGRAM(s) Oral daily  pantoprazole    Tablet 40 milliGRAM(s) Oral before breakfast  potassium chloride    Tablet ER 20 milliEquivalent(s) Oral two times a day  predniSONE   Tablet 10 milliGRAM(s) Oral daily  venetoclax 10 milliGRAM(s) Oral <User Schedule>    MEDICATIONS  (PRN):  acetaminophen   Tablet .. 650 milliGRAM(s) Oral every 6 hours PRN Mild Pain (1 - 3)  acetaminophen   Tablet .. 650 milliGRAM(s) Oral every 6 hours PRN Temp greater or equal to 38C (100.4F)  albuterol/ipratropium for Nebulization 3 milliLiter(s) Nebulizer every 6 hours PRN Shortness of Breath and/or Wheezing  guaiFENesin   Syrup  (Sugar-Free) 200 milliGRAM(s) Oral every 6 hours PRN Cough      Allergies    sulfa drugs (Unknown)    Intolerances        Vital Signs Last 24 Hrs  T(C): 37.1 (25 Mar 2020 04:30), Max: 38.1 (24 Mar 2020 23:30)  T(F): 98.7 (25 Mar 2020 04:30), Max: 100.6 (24 Mar 2020 23:30)  HR: 97 (25 Mar 2020 04:30) (85 - 98)  BP: 113/68 (25 Mar 2020 04:30) (113/68 - 136/71)  BP(mean): --  RR: 17 (25 Mar 2020 04:30) (17 - 18)  SpO2: 96% (25 Mar 2020 04:30) (94% - 98%)    I&O's Summary        PHYSICAL EXAM:  TELE: SR  Constitutional: NAD, awake and alert, well-developed  HEENT: Moist Mucous Membranes, Anicteric  Pulmonary: Non-labored, breath sounds are clear bilaterally, No wheezing, crackles or rhonchi  Cardiovascular: Regular, S1 and S2 nl, No murmurs, rubs, gallops or clicks  Gastrointestinal: Bowel Sounds present, soft, nontender.   Lymph: No lymphadenopathy. No peripheral edema.  Psych:  Mood & affect appropriate    LABS: All Labs Reviewed:                        7.6    3.53  )-----------( x        ( 25 Mar 2020 07:53 )             22.4                         6.9    3.27  )-----------( 1        ( 24 Mar 2020 06:19 )             20.9                         7.1    2.41  )-----------( 9        ( 23 Mar 2020 06:06 )             21.1     24 Mar 2020 06:19    139    |  111    |  24     ----------------------------<  97     3.6     |  17     |  1.00   23 Mar 2020 06:06    139    |  112    |  24     ----------------------------<  104    4.4     |  17     |  0.97     Ca    7.7        24 Mar 2020 06:19  Ca    7.9        23 Mar 2020 06:06    TPro  5.2    /  Alb  2.4    /  TBili  4.4    /  DBili  2.90   /  AST  71     /  ALT  28     /  AlkPhos  155    24 Mar 2020 06:19  TPro  5.5    /  Alb  2.6    /  TBili  3.5    /  DBili  x      /  AST  47     /  ALT  22     /  AlkPhos  180    23 Mar 2020 06:06             ECG:  < from: 12 Lead ECG (03.18.20 @ 19:13) >    Ventricular Rate 167 BPM    Atrial Rate 178 BPM    QRS Duration 64 ms    Q-T Interval 296 ms    QTC Calculation(Bezet) 493 ms    R Axis 32 degrees    T Axis 61 degrees    Diagnosis Line *** Poor data quality, interpretation may be adversely affected  likely st with apcs  Nonspecific ST and T wave abnormality  Abnormal ECG  When compared with ECG of 16-MAR-2020 17:10,  Previous ECG has undetermined rhythm, needs review  Nonspecific T wave abnormality now evident in Anterior leads    < end of copied text >    Echo:  < from: TTE Echo Complete w/o contrast w/ Doppler (03.04.20 @ 09:54) >  OBSERVATIONS:    Mitral Valve: normal, trace physiologic MR.  Aortic Valve/Aorta: Normal trileaflet aortic valve with normal opening.  Tricuspid Valve: normal with trace TR.  Pulmonic Valve: normal  Left Atrium: normal  Right Atrium: normal  Left Ventricle: normal LV size and systolic function, estimated LVEF of 65%.  Right Ventricle: Grossly normal size and systolic function.  Pericardium/Pleura: normal, no significant pericardial effusion.      Conclusion:   Normal left ventricular internal dimensions and systolic function, estimated LVEF of 65%.   Grossly normal RV size and systolic function.     Normal trileaflet aortic valve, without AI.   Trace physiologic MR and TR.        < from: US Duplex Venous Upper Ext Ltd, Left (03.25.20 @ 02:05) >    IMPRESSION:     No evidence of DVT.  .  Small hematoma in the anterior left forearm.    A preliminary report was given by the overnight radiologist    < end of copied text >      < end of copied text >      Radiology: [facility] -- Cori Dawkins, Travis Villagomez Pannella, Patel, Savella  Office # [phone]      Follow Up:    tachycardia     Subjective/Observations:   Seen at bedside, offers no complaints sitting up in bed in NAD  denies chest pain dizziness, palpitations n/v/d     REVIEW OF SYSTEMS: All other review of systems is negative unless indicated above    PAST MEDICAL & SURGICAL HISTORY:  KIRTI (obstructive sleep apnea): on nocturnal cpap  Hypertension: not on any medications  Diabetes: not on any medications  Hepatitis B  Lymphoma: Small cell B cell  AIHA (autoimmune hemolytic anemia)  H/O lithotripsy      MEDICATIONS  (STANDING):  cefepime   IVPB 2000 milliGRAM(s) IV Intermittent every 12 hours  cefepime   IVPB      entecavir 0.5 milliGRAM(s) Oral daily  pantoprazole    Tablet 40 milliGRAM(s) Oral before breakfast  potassium chloride    Tablet ER 20 milliEquivalent(s) Oral two times a day  predniSONE   Tablet 10 milliGRAM(s) Oral daily  venetoclax 10 milliGRAM(s) Oral <User Schedule>    MEDICATIONS  (PRN):  acetaminophen   Tablet .. 650 milliGRAM(s) Oral every 6 hours PRN Mild Pain (1 - 3)  acetaminophen   Tablet .. 650 milliGRAM(s) Oral every 6 hours PRN Temp greater or equal to 38C (100.4F)  albuterol/ipratropium for Nebulization 3 milliLiter(s) Nebulizer every 6 hours PRN Shortness of Breath and/or Wheezing  guaiFENesin   Syrup  (Sugar-Free) 200 milliGRAM(s) Oral every 6 hours PRN Cough      Allergies    sulfa drugs (Unknown)    Intolerances        Vital Signs Last 24 Hrs  T(C): 37.1 (25 Mar 2020 04:30), Max: 38.1 (24 Mar 2020 23:30)  T(F): 98.7 (25 Mar 2020 04:30), Max: 100.6 (24 Mar 2020 23:30)  HR: 97 (25 Mar 2020 04:30) (85 - 98)  BP: 113/68 (25 Mar 2020 04:30) (113/68 - 136/71)  BP(mean): --  RR: 17 (25 Mar 2020 04:30) (17 - 18)  SpO2: 96% (25 Mar 2020 04:30) (94% - 98%)    I&O's Summary        PHYSICAL EXAM:  TELE: SR  Constitutional: NAD, awake and alert, well-developed  HEENT: Moist Mucous Membranes, Anicteric  Pulmonary: Non-labored, breath sounds are clear bilaterally, No wheezing, crackles or rhonchi  Cardiovascular: Regular, S1 and S2 nl, No murmurs, rubs, gallops or clicks  Gastrointestinal: Bowel Sounds present, soft, nontender.   Lymph:+ chronic LE edema   Psych:  Mood & affect appropriate    LABS: All Labs Reviewed:                        7.6    3.53  )-----------( x        ( 25 Mar 2020 07:53 )             22.4                         6.9    3.27  )-----------( 1        ( 24 Mar 2020 06:19 )             20.9                         7.1    2.41  )-----------( 9        ( 23 Mar 2020 06:06 )             21.1     24 Mar 2020 06:19    139    |  111    |  24     ----------------------------<  97     3.6     |  17     |  1.00   23 Mar 2020 06:06    139    |  112    |  24     ----------------------------<  104    4.4     |  17     |  0.97     Ca    7.7        24 Mar 2020 06:19  Ca    7.9        23 Mar 2020 06:06    TPro  5.2    /  Alb  2.4    /  TBili  4.4    /  DBili  2.90   /  AST  71     /  ALT  28     /  AlkPhos  155    24 Mar 2020 06:19  TPro  5.5    /  Alb  2.6    /  TBili  3.5    /  DBili  x      /  AST  47     /  ALT  22     /  AlkPhos  180    23 Mar 2020 06:06             ECG:  < from: 12 Lead ECG (03.18.20 @ 19:13) >    Ventricular Rate 167 BPM    Atrial Rate 178 BPM    QRS Duration 64 ms    Q-T Interval 296 ms    QTC Calculation(Bezet) 493 ms    R Axis 32 degrees    T Axis 61 degrees    Diagnosis Line *** Poor data quality, interpretation may be adversely affected  likely st with apcs  Nonspecific ST and T wave abnormality  Abnormal ECG  When compared with ECG of 16-MAR-2020 17:10,  Previous ECG has undetermined rhythm, needs review  Nonspecific T wave abnormality now evident in Anterior leads    < end of copied text >    Echo:  < from: TTE Echo Complete w/o contrast w/ Doppler (03.04.20 @ 09:54) >  OBSERVATIONS:    Mitral Valve: normal, trace physiologic MR.  Aortic Valve/Aorta: Normal trileaflet aortic valve with normal opening.  Tricuspid Valve: normal with trace TR.  Pulmonic Valve: normal  Left Atrium: normal  Right Atrium: normal  Left Ventricle: normal LV size and systolic function, estimated LVEF of 65%.  Right Ventricle: Grossly normal size and systolic function.  Pericardium/Pleura: normal, no significant pericardial effusion.      Conclusion:   Normal left ventricular internal dimensions and systolic function, estimated LVEF of 65%.   Grossly normal RV size and systolic function.     Normal trileaflet aortic valve, without AI.   Trace physiologic MR and TR.        < from: US Duplex Venous Upper Ext Ltd, Left (03.25.20 @ 02:05) >    IMPRESSION:     No evidence of DVT.  .  Small hematoma in the anterior left forearm.    A preliminary report was given by the overnight radiologist    < end of copied text >      < end of copied text >      Radiology: 97.8

## 2022-04-05 NOTE — H&P ADULT - PROBLEM/PLAN-5
DISPLAY PLAN FREE TEXT
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 Pt with strong family hx of heart dz: grandmother  suddenly at age 65, brother  at 76 from MI, younger brother  from MI age at 59, 3rd brother with CABG.  Pt presents with left heart cath with Dr Oneal today prior to scheduled myxoma excision with Dr Wade 22. LHC shows non-obs CAD.     On 22, pt underwent excision of RA myxoma, beating heart via r atriotomy  post-op course unremarkable   hx migraines - valium resumed hs & Bultabilital resumed  4/3 tr. fl- SR 95 - lopressor increased to 25mg po q8h.     VSS; rsr 70-90; change lopressor to toprol 50 qd as per Dr. Wade   VSS d/c home this am as per Dr. Wade

## 2022-10-11 NOTE — PROGRESS NOTE ADULT - REASON FOR ADMISSION
Pt ambulatory into ED. Pt c/o L flank pain. Pt rates pain 9 out of 10. Pt breaths are even and unlabored during triage. weakness, anemia

## 2023-02-16 NOTE — PROVIDER CONTACT NOTE (CRITICAL VALUE NOTIFICATION) - NAME OF MD/NP/PA/DO NOTIFIED:
dr preciado and dr osman Colchicine Counseling:  Patient counseled regarding adverse effects including but not limited to stomach upset (nausea, vomiting, stomach pain, or diarrhea).  Patient instructed to limit alcohol consumption while taking this medication.  Colchicine may reduce blood counts especially with prolonged use.  The patient understands that monitoring of kidney function and blood counts may be required, especially at baseline. The patient verbalized understanding of the proper use and possible adverse effects of colchicine.  All of the patient's questions and concerns were addressed.

## 2023-04-19 NOTE — PROGRESS NOTE ADULT - PROBLEM SELECTOR PLAN 10
DVT PPx: SCDs- start on ALYSON stockings, no chemical ppx in the setting of symptomatic anemia and thrombocytopenia  -Ambulate with assist/OOB with assist.    11. HTN- not on meds  12. T2DM- stable with lifestyle modifications. Hyperglycemia likely due to steroid use- now improved as steroids tapered  13. LUE small hematoma- non op Clindamycin Pregnancy And Lactation Text: This medication can be used in pregnancy if certain situations. Clindamycin is also present in breast milk.

## 2023-07-03 NOTE — CONSULT NOTE ADULT - PROVIDER SPECIALTY LIST ADULT
Heme/Onc
Infectious Disease
Cardiology
Gastroenterology
Surgery
Heme/Onc
Infectious Disease
Statement Selected

## 2023-08-10 NOTE — PATIENT PROFILE ADULT - TOBACCO USE
Never smoker Finasteride Pregnancy And Lactation Text: This medication is absolutely contraindicated during pregnancy. It is unknown if it is excreted in breast milk.

## 2023-10-23 NOTE — PATIENT PROFILE ADULT - PATIENT REPRESENTATIVE: ( YOU CAN CHOOSE ANY PERSON THAT CAN ASSIST YOU WITH YOUR HEALTH CARE PREFERENCES, DOES NOT HAVE TO BE A SPOUSE, IMMEDIATE FAMILY OR SIGNIFICANT OTHER/PARTNER)
Caller: Lisa Hill    Relationship: Self    Best call back number: [phone]     What medication are you requesting: RINDOQ FOR ARTHRITIS OR SOMETHING SIMILAR.      What are your current symptoms: SHE IS HAVING ACHING IN HER FEET AND TOES.      How long have you been experiencing symptoms:     Have you had these symptoms before:    [] Yes  [] No    Have you been treated for these symptoms before:   [] Yes  [] No    If a prescription is needed, what is your preferred pharmacy and phone number:  [facility]    Additional notes:     yes

## 2024-05-09 NOTE — H&P ADULT - ENDOCRINE
Your Hemoglobin A1c level is currently at 7.0%, which suggests that your diabetes is just at the borderline of being uncontrolled. There is certainly room for improvement in managing this condition.    In terms of medication, I recommend that you continue with your current dosage of glimepiride. However, I am going to increase your Metformin dosage from 1000 mg to 1500 mg daily to help better control your blood sugar levels.    Your cholesterol levels are excellent across the board, with total cholesterol at 138, LDL at 72, HDL at 52, and Triglycerides at 71.     However, your sodium level is slightly lower than normal at 132, despite being within the normal range of 136-144 in October, when it was 137. It is important to note that you are not currently on any medication that should cause this decrease. As a precautionary measure, I advise that you limit your fluid intake to approximately 1 liter per day for the next week. After this period, we should retest your sodium levels to ensure they have stabilized. negative
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
## 2024-07-11 NOTE — PROGRESS NOTE ADULT - REASON FOR ADMISSION
INFECTIOUS DISEASE CONSULT  NOTE      Trevor Velazquez  62 year old male  MRN: [id number]  : 1962  Admission date: 7/10/2024  Date of service: 2024   Attending physician: Maki Andrews MD    Reason for transfer of care: We have been asked to assume care of the infection related issues of the patient. Patient is admitted with Right inguinal area large infected surgical wound with underlying abscess    History of present illness:   Trevor Velazquez is a 62 year old male with history of type 2 diabetes mellitus.  Patient is known to infectious disease from admission of 2024 through 06/15/2024 when he was admitted for MSSA bacteremia and right groin area abscess.  He was initially managed with suprapubic tube placement due to urethral stricture.  Subsequently developed right inguinal area abscess for which he underwent right groin abscess aspiration by IR on 2024 and 20 mL of pus was obtained.  The right groin abscess aspirate culture (2024) also grew MSSA.  A ANTONINO drain was placed by IR for persistent purulent drainage.  Transesophageal echocardiogram from 2024 showed no vegetation.  Patient was discharged on IV cefazolin 2 g q.8h hourly to complete 2 weeks through 2024.  On his office visit of , he had completed the IV antibiotics and the midline had repeat removed.  He still has a ANTONINO drain in place.  Patient is admitted to [facility] on 07/10/2024 for persistent loculated abscess around the right inguinal area wound.  At admission patient's temperature recorded was 97.9° F and WBC count 11.4.  His C-reactive protein is 7, ESR of 40.  Patient underwent scrotal exploration and debridement with Espinoza catheter placement (07/10/2024).  It was noted that he had multiple small abscesses cavity within the right inguinal canal wound into the right hemiscrotum.  The surgical tissue culture (07/10/2024) is in process.  Infectious diseases consulted for further management of the wound.   Started on cefazolin 2 g q.8h hourly empirically.    Past medical History:   Past Medical History:   Diagnosis Date    Cellulitis     Left Foot    Essential (primary) hypertension     High cholesterol     Non-healing ulcer of foot  (CMD) 09/2023    Left heel (+) staph    Osteomyelitis  (CMD)     Left foot    Sepsis  (CMD)     Type II diabetes mellitus  (CMD)     Checks blood sugar at home BID     Past Surgical History:   Procedure Laterality Date    Amputation Left 10/03/2023    BKA / Dr. Donnie Alberto / [facility]    Colonoscopy      Foot surgery Right     X 1    Foot surgery Left     X 6    Ir suprapubic catheter placement      Oral surgery procedure       Allergies:  ALLERGIES:  No Known Allergies    Current medications:   Current Facility-Administered Medications   Medication Dose Route Frequency Provider Last Rate Last Admin    insulin lispro (ADMELOG,HumaLOG) - Correction Dose   Subcutaneous TID  Maki Andrews MD   8 Units at 07/11/24 1229    insulin lispro (ADMELOG,HumaLOG) - Correction Dose   Subcutaneous Nightly Maki Andrews MD        insulin lispro (ADMELOG,HumaLOG) - Scheduled Mealtime Dose   Subcutaneous TID  Arnie Hussein NP   12 Units at 07/11/24 1229    insulin glargine (LANTUS) injection 36 Units  36 Units Subcutaneous Daily Arnie Hussein NP   36 Units at 07/11/24 0925    dextrose 50 % injection 12.5 g  12.5 g Intravenous Q15 Min PRN Marco Gardner MD        ferrous sulfate (65 mg Fe per 325 mg) tablet 325 mg  325 mg Oral Daily Chris Felix MD   325 mg at 07/11/24 0749    amLODIPine (NORVASC) tablet 10 mg  10 mg Oral Daily Chris Felix MD   10 mg at 07/11/24 0749    atorvastatin (LIPITOR) tablet 80 mg  80 mg Oral Nightly Chris Felix MD   80 mg at 07/10/24 2110    lisinopril (ZESTRIL) tablet 40 mg  40 mg Oral Daily Chris Felix MD   40 mg at 07/11/24 0749    tamsulosin (FLOMAX) capsule 0.4 mg  0.4 mg Oral Daily Chris Felix MD   0.4 mg at  07/11/24 0750    sodium chloride 0.9% infusion   Intravenous Continuous Chris Felix MD 75 mL/hr at 07/11/24 0929 New Bag at 07/11/24 0929    heparin (porcine) injection 5,000 Units  5,000 Units Subcutaneous 3 times per day Chris Felix MD   5,000 Units at 07/11/24 1303    acetaminophen (TYLENOL) tablet 650 mg  650 mg Oral Q4H PRN Chris Felix MD   650 mg at 07/11/24 0757    acetaminophen (TYLENOL) suppository 650 mg  650 mg Rectal Q4H PRN Chris Felix MD        HYDROcodone-acetaminophen (NORCO) 5-325 MG per tablet 1 tablet  1 tablet Oral Q4H PRN Chris Felix MD   1 tablet at 07/11/24 0925    HYDROmorphone (DILAUDID) injection 0.4 mg  0.4 mg Intravenous Q2H PRN Chris Felix MD   0.4 mg at 07/11/24 1025    ondansetron (ZOFRAN ODT) disintegrating tablet 4 mg  4 mg Oral Q12H PRN Chris Felix MD        Or    ondansetron (ZOFRAN) injection 4 mg  4 mg Intravenous Q12H PRN Chris Felix MD        dextrose 50 % injection 25 g  25 g Intravenous PRN Joselito Sullivan DO        dextrose 50 % injection 12.5 g  12.5 g Intravenous PRN Joselito Sullivan DO        glucagon (GLUCAGEN) injection 1 mg  1 mg Intramuscular PRN Joselito Sullivan DO        dextrose (GLUTOSE) 40 % gel 15 g  15 g Oral PRN Joselito Sullivan DO        dextrose (GLUTOSE) 40 % gel 30 g  30 g Oral PRN Joselito Sullivan DO         Facility-Administered Medications Ordered in Other Encounters   Medication Dose Route Frequency Provider Last Rate Last Admin    sodium chloride 0.9 % injector flush 100 mL  100 mL Injection PRN Ayse Rider DO   100 mL at 06/25/24 1414     Family history:   Family History   Problem Relation Age of Onset    Cancer Mother         Thyroid    Hypertension Father      No known family history of cancer or heart disease.     Social history:   Social History     Tobacco Use    Smoking status: Never    Smokeless tobacco: Never   Substance Use Topics    Alcohol use: Never     Review of systems:    GENERAL: Denies headaches, sore throat, fevers, chills, weight loss, fatigue, malaise or myalgias  GI: Denies nausea, vomiting, diarrhea, constipation or abdominal pain  RESPIRATORY: Denies cough, shortness of breath, wheezing  CARDIOVASCULAR: Denies chest pain/pressure, palpitations, lower extremity edema  URINARY: Denies dysuria, hematuria, incontinence, frequency  GENITAL: Denies discharge or open sores  CNS: Denies gait or speech problems, falls, loss of consciousness, confusion  SKIN: Denies rash, new skin lesions  HEMATOLOGY/LYMPHATIC: Denies bleeding, bruising, LAD  MENTAL HEALTH: Denies anxiety, depression    Vitals:   Vitals:    07/11/24 1411   BP: 94/50   Pulse: 91   Resp: 16   Temp: 97.4 °F (36.3 °C)     Physical Examination:  General : Awake, Alert, Oriented x 3. No acute distress. Well developed, well nourished.   HEENT : Head is normocephalic, atraumatic. PERLLA. No scleral icterus. Oral mucosa moist without lesions. Good dentition.   Neck : Supple, No LAD. No thyromegaly.   Lungs : NAD, Clear to auscultation bilaterally. No wheezes, crackles, or rhonchi. No accessory muscle use.   Heart : Regular rate and rhythm. No murmurs, gallops, or rubs.  Abdomen : Soft, positive bowel sounds, Non tender, Non distended. No masses or hepatosplenomegaly appreciated.   Musculoskeletal : No clubbing, cyanosis, or edema in bilateral lower extremities.  Skin :  The right inguinal wound is examined in conjunction with the wound care team.  The wound is quite deep with healthy granulation tissue at the base.  Neurological : CN II - XII grossly intact. Grossly non focal.  Psych : appropriate mood and affect    Laboratory data:    Recent Labs   Lab 07/11/24  0738 07/11/24  0550 07/10/24  1937 07/05/24  1557   WBC  --  11.4*  --  8.8   HCT  --  35.2*  --  36.9*   HGB  --  12.0*  --  12.5*   PLT  --  273  --  263   SODIUM 136  --  134* 132*   POTASSIUM 3.8  --  4.2 4.3   CHLORIDE 107  --  106 99   CO2 21  --  23 23    CALCIUM 9.2  --  9.3 9.4   GLUCOSE 287*  --  244* 404*   BUN 12  --  14 16   CREATININE 0.44*  --  0.57* 0.52*   AST 8  --  12 10   GPT 11  --  13 13   ALKPT 89  --  99 130*   BILIRUBIN 0.7  --  0.5 0.6   ALBUMIN 2.8*  --  3.2* 3.3*       Urinalysis:  No results found    Imaging: Reviewed  Reviewed  Chest x-ray portable (07/11/2024)-------IMPRESSION: There is no acute cardiopulmonary process.    Culture data: Reviewed  Blood culture (06/07/2024)-----MSSA  Right groin abscess aspirate culture (06/07/2024)------MSSA    Isolation:   No    Immunosuppression:   No    Prophylaxis:   No    Antimicrobials:   IV cefazolin 2 g every 8 hours    Assessment / Diagnoses:  This is a 62 year old male with history of type 2 diabetes mellitus.  Patient is known to infectious disease from admission of 06/07/2024 through 06/15/2024 when he was admitted for MSSA bacteremia and right groin area abscess.  He was initially managed with suprapubic tube placement due to urethral stricture.  Subsequently developed right inguinal area abscess for which he underwent right groin abscess aspiration by IR on 06/07/2024 and 20 mL of pus was obtained.  The right groin abscess aspirate culture (06/07/2024) also grew MSSA.  A ANTONINO drain was placed by IR for persistent purulent drainage.  Transesophageal echocardiogram from 06/12/2024 showed no vegetation.  Patient was discharged on IV cefazolin 2 g q.8h hourly to complete 2 weeks through 06/24/2024.  On his office visit of 02/06/26/24, he had completed the IV antibiotics and the midline had repeat removed.  He still has a ANTONINO drain in place.  Patient is admitted to [facility] on 07/10/2024 for persistent loculated abscess around the right inguinal area wound.  At admission patient's temperature recorded was 97.9° F and WBC count 11.4.  His C-reactive protein is 7, ESR of 40.  Patient underwent scrotal exploration and debridement with Espinoza catheter placement (07/10/2024).  It was noted that he had multiple  small abscesses cavity within the right inguinal canal wound into the right hemiscrotum.  The surgical tissue culture (07/10/2024) is in process.  Infectious diseases consulted for further management of the wound.  Started on cefazolin 2 g q.8h hourly empirically.    1. Right inguinal area large infected surgical wound with underlying abscess  - Patient is known to infectious disease from admission of 06/07/2024 through 06/15/2024 when he was admitted for MSSA bacteremia and right groin area abscess.  He was initially managed with suprapubic tube placement due to urethral stricture.  Subsequently developed right inguinal area abscess for which he underwent right groin abscess aspiration by IR on 06/07/2024 and 20 mL of pus was obtained.  The right groin abscess aspirate culture (06/07/2024) also grew MSSA.  A ANTONINO drain was placed by IR for persistent purulent drainage.  Transesophageal echocardiogram from 06/12/2024 showed no vegetation.  Patient was discharged on IV cefazolin 2 g q.8h hourly to complete 2 weeks through 06/24/2024.  On his office visit of 02/06/26/24, he had completed the IV antibiotics and the midline had repeat removed.  He still has a ANTONINO drain in place.  - admitted to [facility] on 07/10/2024 for persistent loculated abscess around the right inguinal area wound.  - At admission patient's temperature recorded was 97.9° F and WBC count 11.4.  His C-reactive protein is 7, ESR of 40.  - s/p scrotal exploration and debridement with Espinoza catheter placement (07/10/2024).  It was noted that he had multiple small abscesses cavity within the right inguinal canal wound into the right hemiscrotum.    - The surgical tissue culture (07/10/2024) is in process.      - Started on cefazolin 2 g q.8h hourly empirically.  2. Urethral stricture   - s/p suprapubic tube placement due to urethral stricture.    - Espinoza catheter placement (07/10/2024).   3. 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 Type 2 diabetes mellitus, hypertension,  hyperlipidemia      Plan / Recommendations:  1. Agree with starting the cefazolin empirically   2. We will follow the surgical tissue cultures and adjust the antibiotics accordingly.  3. Plan to treat with oral antibiotics for at least 2 weeks and possibly longer    ID Discharge Planning:  Patient is ready for discharge from ID standpoint:  No  Work-up needed prior to discharge:  Yes  Antibiotics at discharge:  TBD  Follow-up appointment:  TBD      Thank you for involving us in the care of this patient. We will continue to follow.     Yasir Arnold MD  Infectious Disease  Office: [phone]   PNA, severe anemia and thrombocytopenia

## 2024-07-31 NOTE — ED PROVIDER NOTE - INTERPRETATION
"Danni Aguilar (89 y.o. Male)       Date of Birth   12/12/1924    Social Security Number       Address   [address] [place] [id number]    Home Phone   [phone]    MRN   [id number]       [facility]    Marital Status                               Admission Date   7/28/24    Admission Type   Emergency    Admitting Provider   Ramez Paredes MD    Attending Provider   Ramez Paredes MD    Department, Room/Bed   [address], P479/1       Discharge Date       Discharge Disposition   Home or Self Care    Discharge Destination                                 Attending Provider: Ramez Paredes MD    Allergies: No Known Allergies    Isolation: None   Infection: None   Code Status: No CPR    Ht: 172.7 cm (68\")   Wt: 65.8 kg (145 lb 1 oz)    Admission Cmt: None   Principal Problem: Right rib fracture [S22.31XA]                   Active Insurance as of 7/28/2024       Primary Coverage       Payor Plan Insurance Group Employer/Plan Group    MEDICARE MEDICARE A & B        Payor Plan Address Payor Plan Phone Number Payor Plan Fax Number Effective Dates    [address] [phone]  12/1/1991 - None Entered    [facility] [id number]         Subscriber Name Subscriber Birth Date Member ID       DANNI AGUILAR 12/12/1924 [id number]               Secondary Coverage       Payor Plan Insurance Group Employer/Plan Group    [employer]ARP [employer] HEALTH CARE OPTIONS        Payor Plan Address Payor Plan Phone Number Payor Plan Fax Number Effective Dates    [facility] [phone]  1/1/2017 - None Entered    [address]       [facility] [id number]         Subscriber Name Subscriber Birth Date Member ID       DANNI AGUILAR 12/12/1924 [id number]                     Emergency Contacts        (Rel.) Home Phone Work Phone Mobile Phone    Cheyenne Corbin (Daughter) -- -- [phone]    Bethany Baer (Daughter) -- -- [phone]          "
normal sinus rhythm

## 2024-08-30 NOTE — DIETITIAN INITIAL EVALUATION ADULT. - PROBLEM/PLAN-10
Note to patient: The 21st Century Cures Act makes medical notes like these available to patients in the interest of transparency. However, be advised this is a medical document. It is intended as peer to peer communication. It is written in medical language and may contain abbreviations or verbiage that are unfamiliar. It may appear blunt or direct. Medical documents are intended to carry relevant information, facts as evident, and the clinical opinion of the practitioner.         Chief Complaint   Patient presents with    Swelling     Patient c/o bilateral feet swelling x one month     Wound Care     Patient c/o healed sore on left buttock - noticed one week ago        HPI:    82-year-old male with a past medical history of coronary artery disease, type 2 diabetes that is well-controlled, pulmonary hypertension, primary hypertension, hypercholesterolemia presenting today for his concern about bilateral lower extremity edema that has been worsening over the last 1 month.  He has a chronic history of lower extremity edema and takes furosemide for many years.  He is also on a daily potassium supplement 10 meq  twice daily.  Furosemide dosing is 20 mg daily.  He follows with cardiology.  Most recent echocardiogram is stated as below.  The swelling is up to mid shin.  Denies any particular pain but does have discomfort with ambulation.  He uses a cane to ambulate.  Denies any recent travel, history of DVT or PE, shortness of breath, chest pain, exertional fatigue, PND.  Water intake is same as usual.    Wound:  Patient complains of a left low back wound that his wife noted 1 week ago.  He personally did not feel any symptoms.  He has been applying Neosporin to it.  Denies any drainage.  Denies pain.  No fevers.  Denies any other wounds or ulcers on the skin.        Last ECHO:   1.The study quality is average. 2.The left ventricle is normal in size, wall thickness, and global left ventricular systolic function. The left  ventricular ejection fraction is 60%. The left ventricle diastolic function isindeterminte.3.LA is mildly enlarged. 4.Mild calcification of the aortic valve is noted with adequate cuspal excursion. 5.Trace tricuspid regurgitation. 6.Trace mitral regurgitation. 7.The estimated pulmonary artery systolic pressure is 27 mmHg assuming a right atrial pressure of 3 mmHg.       Review of Systems   Constitutional: Negative for fever, chills and fatigue. No distress.  Respiratory: Negative for cough, chest tightness, shortness of breath and wheezing.    Cardiovascular: Negative for chest pain, palpitations  Gastrointestinal: Negative for nausea, vomiting, abdominal pain, diarrhea, blood in stool and abdominal distention.   Genitourinary:  difficulty urinating.  Skin: As above    Patient Active Problem List   Diagnosis    Essential hypertension    Hypercholesterolemia    CAD (coronary artery disease)    Type 2 diabetes mellitus without complication, with long-term current use of insulin ([facility])    Class 1 obesity due to excess calories without serious comorbidity with body mass index (BMI) of 33.0 to 33.9 in adult    Pulmonary HTN (HCC)    Thrombocytopenia (HCC)    Polyneuropathy due to type 2 diabetes mellitus (HCC)    Osteoarthritis of spine with radiculopathy, cervical region       Past Medical History:    Atherosclerosis of coronary artery    Heart disease    Hyperthyroidism    Other and unspecified hyperlipidemia    Type II or unspecified type diabetes mellitus without mention of complication, not stated as uncontrolled    Unspecified essential hypertension    Vertigo     Past Surgical History:   Procedure Laterality Date    Cabg  1996    4x bypass    Cataract  Ten yrs approx    Bilateral    Colonoscopy  Not sure    Hemorrhoidectomy  Not sure    Sinus surgery         Family History   Problem Relation Age of Onset    Diabetes Mother     Diabetes Father     Hypertension Paternal Grandmother         Sisters    Heart Disease  Neg     High Blood Pressure Neg      Social History     Socioeconomic History    Marital status:    Tobacco Use    Smoking status: Never     Passive exposure: Never    Smokeless tobacco: Never   Vaping Use    Vaping status: Never Used   Substance and Sexual Activity    Alcohol use: Never    Drug use: Never   Other Topics Concern    Caffeine Concern No    Exercise No    Seat Belt No    Special Diet No    Stress Concern No    Weight Concern No     Social Determinants of Health     Physical Activity: Inactive (10/14/2020)    Received from [facility] [employer], [facility] [employer]    Exercise Vital Sign     Days of Exercise per Week: 0 days     Minutes of Exercise per Session: 0 min       Current Outpatient Medications   Medication Sig Dispense Refill    metOLazone 2.5 MG Oral Tab Take 1 tablet (2.5 mg total) by mouth twice a week. 8 tablet 0    collagenase (SANTYL) 250 UNIT/GM External Ointment Apply 1 Application topically daily. 90 g 0    Calcium Alginate (ALGICELL CALCIUM DRESSING 2\"X2) External Misc Apply 1 Application topically daily. 10 each 5    levothyroxine 75 MCG Oral Tab Take 1 tablet (75 mcg total) by mouth before breakfast. 90 tablet 0    metFORMIN 500 MG Oral Tab Take 2 tablets (1,000 mg total) by mouth 2 (two) times daily with meals. 360 tablet 1    IRBESARTAN 150 MG Oral Tab TAKE 1 TABLET DAILY 90 tablet 0    Glucose Blood (ACCU-CHEK GUIDE) In Vitro Strip Test 4-5 times per day.  Diagnoses: E11.649 and Z79.4 400 strip 0    isosorbide mononitrate ER 30 MG Oral Tablet 24 Hr Take 1 tablet (30 mg total) by mouth daily. 90 tablet 0    Potassium Chloride ER 10 MEQ Oral Tab CR Take 1 tablet (10 mEq total) by mouth 2 (two) times daily. 180 tablet 0    pregabalin (LYRICA) 75 MG Oral Cap Take 1 capsule (75 mg total) by mouth 2 (two) times daily. 180 capsule 0    insulin aspart (NOVOLOG FLEXPEN) 100 Units/mL Subcutaneous Solution Pen-injector Take 16U with meals and ISF 1:20 >120 48 mL 2    insulin  glargine (LANTUS SOLOSTAR) 100 UNIT/ML Subcutaneous Solution Pen-injector Inject 65 Units into the skin nightly. 60 mL 1    atorvastatin 40 MG Oral Tab Take 1 tablet (40 mg total) by mouth nightly. 90 tablet 1    Niacin ER, Antihyperlipidemic, 500 MG Oral Tab CR Take 1 tablet (500 mg total) by mouth nightly. 90 tablet 1    tamsulosin 0.4 MG Oral Cap Take 2 capsules (0.8 mg total) by mouth daily. 180 capsule 1    furosemide 20 MG Oral Tab Take 1 tablet (20 mg total) by mouth daily. 90 tablet 1    Glucose Blood (ACCU-CHEK GUIDE) In Vitro Strip Test glucose two times a day 100 strip 3    Blood Glucose Monitoring Suppl (ACCU-CHEK GUIDE) w/Device Does not apply Kit 1 Device  in the morning and 1 Device before bedtime. 1 kit 0    Accu-Chek FastClix Lancets Does not apply Misc 1 Lancet  in the morning and 1 Lancet before bedtime. Test glucose twice daily. 100 each 5    Empagliflozin (JARDIANCE) 25 MG Oral Tab Take 1 tablet by mouth daily. 90 tablet 0    atenolol 25 MG Oral Tab Take 1 tablet (25 mg total) by mouth daily. 90 tablet 0    aspirin 81 MG Oral Tab EC Take 1 tablet (81 mg total) by mouth daily.      Blood Glucose Monitoring Suppl (ACCU-CHEK GUIDE) w/Device Does not apply Kit 1 kit by Does not apply route.      Insulin Pen Needle 31G X 8 MM Does not apply Misc 4/day      Lancets Misc. (ACCU-CHEK FASTCLIX LANCET) Does not apply Kit       Ferrous Sulfate 325 (65 FE) MG Oral Tab Take 1 tablet (325 mg total) by mouth daily with breakfast.         Allergies  Allergies   Allergen Reactions    Bromocriptine ANAPHYLAXIS and OTHER (SEE COMMENTS)    Clindamycin HIVES    Other OTHER (SEE COMMENTS)     Cycloset       Health Maintenance  Immunizations:  Immunization History   Administered Date(s) Administered    >=3 YRS FLUZONE OR FLUARIX QUAD PRESERVE FREE SINGLE DOSE (42767) FLU CLINIC 10/19/2015    Covid-19 Vaccine Pfizer 30 mcg/0.3 ml 02/03/2021, 02/24/2021, 09/30/2021    Covid-19 Vaccine Pfizer Bivalent 30mcg/0.3mL  01/21/2023    Covid-19 Vaccine Pfizer Philip-Sucrose 30 mcg/0.3 ml 06/03/2022, 08/01/2022    FLU VAC High Dose 65 YRS & Older PRSV Free (66137) 10/03/2019, 10/25/2021    FLUAD High Dose 65 yr and older (86288) 10/20/2017, 10/16/2018, 09/10/2022    FLUZONE 6 months and older PFS 0.5 ml (73318) 10/19/2015, 09/15/2020    Fluvirin, 3 Years & >, Im 09/21/2009, 10/30/2013    Fluzone Vaccine Medicare () 11/28/2016, 10/03/2019, 10/25/2021    High Dose Fluzone Influenza Vaccine, 65yr+ PF 0.5mL (21419) 09/12/2023    Influenza 10/30/2013    Pfizer Covid-19 Vaccine 30mcg/0.3ml 12yrs+ (7495-0417) 09/19/2023    Pneumococcal (Prevnar 13) 07/22/2019    Pneumococcal Conjugate PCV20 09/10/2022    Pneumovax 23 10/25/2021    TDAP 01/19/2020    Zoster Vaccine Recombinant Adjuvanted (Shingrix) 08/05/2019, 10/16/2019         Physical Exam  /72   Pulse 50   Temp 97.3 °F (36.3 °C) (Temporal)   Resp 16   Ht 5' 5\" (1.651 m)   Wt 202 lb (91.6 kg)   SpO2 96%   BMI 33.61 kg/m²   Constitutional: Oriented to person, place, and time. No distress.   HEENT:  Normocephalic and atraumatic.   Cardiovascular: Normal rate, regular rhythm and intact distal pulses.  No murmur, rubs or gallops.   Pulmonary/Chest: Effort normal and breath sounds normal. No respiratory distress. No wheezes, rhonchi or rales  Musculoskeletal: bilatearl 1+ pitting edema in both feet up to mid-shin b/l. Bilateral pedal pulse palapble.   Long toenails. No evidence of toenail fungus/infection.        Examination of Wound:  Left side of his low back with partial-thickness stage II wound that is measuring 2 x 2 cm.  There is no sloughing or eschar.  No blister.  Granulation tissue is present.  Irregular wound edges.  Did not assess depth      A/P:    Encounter Diagnoses   Name Primary?    Leg swelling Yes    Wound of left side of back, initial encounter     Type 2 diabetes mellitus without complication, with long-term current use of insulin (HCC)      1. Leg  swelling  Bilateral lower extremity edema.  Does not appear to be in any acute distress.  Will check BMP and BNP.  May see cardiology.  For now he can continue the furosemide and potassium that he has been taking for years now.  Due to acute flare and symptoms added metolazone 2.5 mg twice a week.  Discussed risks of using to diuretics therefore only to be used for short-term duration.  For now he can take the metolazone for week 1.  If symptoms improve he can hold off on taking any additional metolazone.  If he needs to take more he can continue to do so with a repeat BMP.  Patient should be seeing cardiology within the next 3 months.Instructed to keep the legs elevated, reduce salt intake, exercise.  Restrict water to 1.5 L a day  - metOLazone 2.5 MG Oral Tab; Take 1 tablet (2.5 mg total) by mouth twice a week.  Dispense: 8 tablet; Refill: 0  - Basic Metabolic Panel (8) [E]; Future  - Pro Beta Natriuretic Peptide [E]; Future  - Cardio Referral - Internal    2. Wound of left side of back, initial encounter  Refer to see wound clinic in the next 1 to 2 weeks.    In the meantime started on Santyl application with dressing as below  Instructed to keep the the affected area clean  Stop using Neosporin  if bleeding, redness, tenderness, or a pustular discharge occur, call the office     - collagenase (SANTYL) 250 UNIT/GM External Ointment; Apply 1 Application topically daily.  Dispense: 90 g; Refill: 0  - Calcium Alginate (ALGICELL CALCIUM DRESSING 2\"X2) External Misc; Apply 1 Application topically daily.  Dispense: 10 each; Refill: 5  - OP REFERRAL - WOUND CLINIC    3. Type 2 diabetes mellitus without complication, with long-term current use of insulin ([facility])    - Podiatry Referral - In Network        Orders Placed This Encounter   Procedures    Basic Metabolic Panel (8) [E]    Pro Beta Natriuretic Peptide [E]       Meds & Refills for this Visit:  Requested Prescriptions     Signed Prescriptions Disp Refills     metOLazone 2.5 MG Oral Tab 8 tablet 0     Sig: Take 1 tablet (2.5 mg total) by mouth twice a week.    collagenase (SANTYL) 250 UNIT/GM External Ointment 90 g 0     Sig: Apply 1 Application topically daily.    Calcium Alginate (ALGICELL CALCIUM DRESSING 2\"X2) External Misc 10 each 5     Sig: Apply 1 Application topically daily.       Imaging & Consults:  CARDIO - INTERNAL  OP RERERRAL TO WOUND VISIT  PODIATRY - INTERNAL      Return in about 2 weeks (around 9/13/2024), or if symptoms worsen or fail to improve.    Patient Instructions   APPLY SANTYL TO YOUR WOUND DAILY AND APPLY CALCIUM ALGINATE OVER THE WOUND. I GAVE A TWO MONTH SUPPLY OF THIS. YOU SHOULD CALL THE WOUND CARE CLINIC AND SCHEDULE AN APPOINTMENT TO BE SEEN IN THE NEXT 1-2 WEEKS.     FOR THE SWELLING, TAKE METOLAZONE 1 TABLET TWICE A WEEK FOR WEEK #1. IF SYMPTOMS ARE BETTER, HOLD THE MEDICATION. YOU CAN TAKE IT AGAIN IF NEEDED. I HAVE ONLY GIVEN 8 TABLETS - THIS IS NOT A MEDICATION I WANT YOU TO TAKE REGULARLY DUE TO THE RISK OF TAKING TWO DIURETICS. FOLLOW UP WITH CARDIOLOGY.     I ALSO PLACED A REFERRAL FOR DR. ANTUNEZ FOR TOENAIL CARE/DIABETIC FOOT EXAM.       All questions were answered and the patient understands the plan.     Ashley Anna, DO        This note was prepared using Dragon Medical voice recognition dictation software. As a result errors may occur. When identified these errors have been corrected. While every attempt is made to correct errors during dictation discrepancies may still exist.      Note to patient: The 21st Century Cures Act makes medical notes like these available to patients in the interest of transparency. However, be advised this is a medical document. It is intended as peer to peer communication. It is written in medical language and may contain abbreviations or verbiage that are unfamiliar. It may appear blunt or direct. Medical documents are intended to carry relevant information, facts as evident, and the clinical  opinion of the practitioner.            DISPLAY PLAN FREE TEXT

## 2025-05-20 NOTE — DISCHARGE NOTE ADULT - FUNCTIONAL SCREEN CURRENT LEVEL: BATHING, MLM
(0) independent
How Severe Are Your Spot(S)?: mild
Have Your Spot(S) Been Treated In The Past?: has not been treated
Hpi Title: Evaluation of Skin Lesions
Year Removed: 1900

## 2025-06-24 NOTE — PROGRESS NOTE ADULT - PROBLEM SELECTOR PLAN 10
Inpatient History and Physical Surgical Orders    Preadmission Location:   Preadmission Time:  Facility:  Surgery Date:  Surgery Time:  Preadmission Test date:     Chief Complaint  Outpatient History and Physical / Surgical Orders    Primary Care Provider: Kristy Mast APRN    Referring Provider: Kristy Mast*    Subjective      Patient Name: Arnav Caba : 1950    HPI  The patient is a 74-year-old gentleman that we have taken care of in the past.  He has had multiple lipomas excised from his lower extremities.  He now has 4 lipomas that he wanted me to take a look at.  He has 2 subcutaneous lipomas of the anterior abdominal wall and 2 subcutaneous lipomas of the left medial chest.    Past History:  Medical History: has a past medical history of Allergic, Colon polyp, Diverticulosis, GERD (gastroesophageal reflux disease), HIV disease, and Hypertension.   Surgical History: has no past surgical history on file.   Family History: family history includes Heart disease in his father; Other in his mother.   Social History: reports that he quit smoking about 40 years ago. His smoking use included cigarettes. He started smoking about 57 years ago. He has a 45 pack-year smoking history. He has quit using smokeless tobacco. He reports that he does not drink alcohol and does not use drugs.  Allergies: Erythromycin, Promethazine, Statins, and Trazodone       Current Outpatient Medications:     mupirocin (BACTROBAN) 2 % ointment, , Disp: , Rfl:     triamterene-hydrochlorothiazide (MAXZIDE-25) 37.5-25 MG per tablet, TAKE 1 TABLET BY MOUTH ONCE DAILY, Disp: 90 tablet, Rfl: 0    fluticasone (FLONASE) 50 MCG/ACT nasal spray, Administer 2 sprays into the nostril(s) as directed by provider Daily., Disp: 1 g, Rfl: 5    levocetirizine (XYZAL) 5 MG tablet, Take 1 tablet by mouth Every Evening., Disp: 30 tablet, Rfl: 5    metoprolol succinate XL (Toprol XL) 25 MG 24 hr tablet, Take 1 tablet by mouth  "Daily., Disp: 30 tablet, Rfl: 5    omeprazole (priLOSEC) 40 MG capsule, TAKE 1 CAPSULE BY MOUTH ONCE DAILY FOR STOMACH, Disp: 90 capsule, Rfl: 0       Objective   Vital Signs:   Resp 18   Ht 177.8 cm (70\")   Wt 118 kg (261 lb)   BMI 37.45 kg/m²       Physical Exam  Vitals and nursing note reviewed.   Constitutional:       Appearance: Normal appearance. The patient is well-developed.   Cardiovascular:      Rate and Rhythm: Normal rate and regular rhythm.   Pulmonary:      Effort: Pulmonary effort is normal.      Breath sounds: Normal air entry.   Abdominal:      General: Bowel sounds are normal.      Palpations: Abdomen is soft.  He has 2 subcutaneous lipomas of the anterior abdominal wall and both of these feel to be about 3 cm in diameter.     Skin:     General: Skin is warm and dry.   Neurological:      Mental Status: The patient is alert and oriented to person, place, and time.      Motor: Motor function is intact.   Psychiatric:         Mood and Affect: Mood normal.   Chest: He has 2 subcutaneous lipomas of the left medial chest that both feel about 3 cm in diameter.    Result Review :               Assessment and Plan   Diagnoses and all orders for this visit:    1. Lipoma of torso (Primary)    He has a mammogram and an ultrasound ordered for the left chest lipomas which I think is reasonable but I am pretty sure that these represent benign fatty tumors.  Will make sure that those studies otherwise look normal and we will set him up to excise these 4 lipomas in the operating room.  I have described those procedures to him as well as the risk and benefits and he is agreeable to proceeding.    I  Juan Chun MD  06/24/2025    " DVT PPx: SCDs, no chemical ppx in the setting of symptomatic anemia and thrombocytopenia    11. HTN- not on meds  12. T2DM- stable with lifestyle modifications. Hyperglycemia likely due to steroid use.

## 2025-07-19 NOTE — PROGRESS NOTE ADULT - ASSESSMENT
hepatitis B   elevated lfts   CLL      f/u am labs  patient with elevated bilirubin and alk phos at baseline ? leon  mri showed normal liver, no cbd stone/dilatation; hep b pcr neg  ob neg; no s/s overt gib; monitor cbc daily  transfuse as per heme/onc  cont ppi qd  diet as tolerated  trend lfts periodically   care as per onc/primary     Advanced care planning was discussed with patient and family.  Advanced care planning forms were reviewed and discussed.  Risks, benefits and alternatives of gastroenterologic procedures were discussed in detail and all questions were answered.    30 minutes spent. 136